# Patient Record
Sex: FEMALE | Race: WHITE | Employment: OTHER | URBAN - METROPOLITAN AREA
[De-identification: names, ages, dates, MRNs, and addresses within clinical notes are randomized per-mention and may not be internally consistent; named-entity substitution may affect disease eponyms.]

---

## 2017-06-05 ENCOUNTER — HOSPITAL ENCOUNTER (EMERGENCY)
Age: 77
Discharge: HOME | End: 2017-06-05
Attending: EMERGENCY MEDICINE
Payer: MEDICARE

## 2017-06-05 VITALS
TEMPERATURE: 98.6 F | RESPIRATION RATE: 17 BRPM | OXYGEN SATURATION: 99 % | SYSTOLIC BLOOD PRESSURE: 126 MMHG | HEIGHT: 65 IN | WEIGHT: 123 LBS | BODY MASS INDEX: 20.49 KG/M2 | DIASTOLIC BLOOD PRESSURE: 84 MMHG | HEART RATE: 70 BPM

## 2017-06-05 DIAGNOSIS — R07.89 ATYPICAL CHEST PAIN: Primary | ICD-10-CM

## 2017-06-05 LAB
ANION GAP BLD CALC-SCNC: 4 MMOL/L (ref 3–18)
ATRIAL RATE: 98 BPM
BASOPHILS # BLD AUTO: 0 K/UL (ref 0–0.06)
BASOPHILS # BLD: 0 % (ref 0–2)
BUN SERPL-MCNC: 14 MG/DL (ref 7–18)
BUN/CREAT SERPL: 19 (ref 12–20)
CALCIUM SERPL-MCNC: 8.6 MG/DL (ref 8.5–10.1)
CALCULATED R AXIS, ECG10: 62 DEGREES
CALCULATED T AXIS, ECG11: 25 DEGREES
CHLORIDE SERPL-SCNC: 105 MMOL/L (ref 100–108)
CO2 SERPL-SCNC: 32 MMOL/L (ref 21–32)
CREAT SERPL-MCNC: 0.72 MG/DL (ref 0.6–1.3)
DIAGNOSIS, 93000: NORMAL
DIFFERENTIAL METHOD BLD: ABNORMAL
EOSINOPHIL # BLD: 0.1 K/UL (ref 0–0.4)
EOSINOPHIL NFR BLD: 1 % (ref 0–5)
ERYTHROCYTE [DISTWIDTH] IN BLOOD BY AUTOMATED COUNT: 12.9 % (ref 11.6–14.5)
GLUCOSE SERPL-MCNC: 105 MG/DL (ref 74–99)
HCT VFR BLD AUTO: 36.8 % (ref 35–45)
HGB BLD-MCNC: 11.4 G/DL (ref 12–16)
LYMPHOCYTES # BLD AUTO: 18 % (ref 21–52)
LYMPHOCYTES # BLD: 1.3 K/UL (ref 0.9–3.6)
MCH RBC QN AUTO: 31.8 PG (ref 24–34)
MCHC RBC AUTO-ENTMCNC: 31 G/DL (ref 31–37)
MCV RBC AUTO: 102.5 FL (ref 74–97)
MONOCYTES # BLD: 0.7 K/UL (ref 0.05–1.2)
MONOCYTES NFR BLD AUTO: 10 % (ref 3–10)
NEUTS SEG # BLD: 4.9 K/UL (ref 1.8–8)
NEUTS SEG NFR BLD AUTO: 71 % (ref 40–73)
PLATELET # BLD AUTO: 232 K/UL (ref 135–420)
PMV BLD AUTO: 9.8 FL (ref 9.2–11.8)
POTASSIUM SERPL-SCNC: 4.2 MMOL/L (ref 3.5–5.5)
Q-T INTERVAL, ECG07: 408 MS
QRS DURATION, ECG06: 80 MS
QTC CALCULATION (BEZET), ECG08: 410 MS
RBC # BLD AUTO: 3.59 M/UL (ref 4.2–5.3)
SODIUM SERPL-SCNC: 141 MMOL/L (ref 136–145)
TROPONIN I SERPL-MCNC: 0.02 NG/ML (ref 0–0.06)
VENTRICULAR RATE, ECG03: 61 BPM
WBC # BLD AUTO: 6.9 K/UL (ref 4.6–13.2)

## 2017-06-05 PROCEDURE — 80048 BASIC METABOLIC PNL TOTAL CA: CPT | Performed by: EMERGENCY MEDICINE

## 2017-06-05 PROCEDURE — 85025 COMPLETE CBC W/AUTO DIFF WBC: CPT | Performed by: EMERGENCY MEDICINE

## 2017-06-05 PROCEDURE — 99284 EMERGENCY DEPT VISIT MOD MDM: CPT

## 2017-06-05 PROCEDURE — 84484 ASSAY OF TROPONIN QUANT: CPT | Performed by: EMERGENCY MEDICINE

## 2017-06-05 PROCEDURE — 93005 ELECTROCARDIOGRAM TRACING: CPT

## 2017-06-05 NOTE — ED TRIAGE NOTES
68 YOF here for atraumatic sharp chest pain since 2pm today, she says she has had this before, but that was with a gallbladder attack she says. She says she was told to come to the ED for an evaluation. She denies syncope.

## 2017-06-05 NOTE — ED PROVIDER NOTES
HPI Comments: 4:41 PM Esthela Kaur is a 68 y.o. female with a history of HTN, atrial fibrillation, COPD, asthma who presents to the emergency department c/o acute onset of midsternal chest pain at 2:00 pm today. Patient describes the pain as a \"sharp\" pain and states that the pain resolved on its own after about 12 minutes. She says she experienced similar symptoms in Oct 2016 and states that she had an emergency gall bladder sx. Pt denies and associated SOB, diaphoresis, or any current pain. Patient states that she believes her symtpoms may be due to stress. The patient reports no other symptoms and has no other concerns at this time. PCP: Aubrey Mena MD        The history is provided by the patient. 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      Social History Main Topics    Smoking status: Former Smoker     Quit date: 1/13/1981    Smokeless tobacco: Never Used    Alcohol use 0.0 oz/week      Comment: occasionally    Drug use: No    Sexual activity: Not Currently     Other Topics Concern    Not on file Social History Narrative         ALLERGIES: Codeine and Diltiazem    Review of Systems   Constitutional: Negative for diaphoresis and fever. HENT: Negative for ear pain, rhinorrhea and trouble swallowing. Eyes: Negative for visual disturbance. Respiratory: Negative for cough and shortness of breath. Cardiovascular: Positive for chest pain (acute, midsternal, \"sharp\"). Negative for leg swelling. Gastrointestinal: Negative for abdominal pain, blood in stool, diarrhea, nausea and vomiting. Genitourinary: Negative for difficulty urinating, flank pain and hematuria. Musculoskeletal: Negative for back pain and neck pain. Skin: Negative for rash. Neurological: Negative for dizziness, weakness, numbness and headaches. Hematological: Negative. Psychiatric/Behavioral: Negative. All other systems reviewed and are negative. Vitals:    06/05/17 1530 06/05/17 1645 06/05/17 1700 06/05/17 1715   BP: 144/66 130/60 132/69 126/84   Pulse:  62 63 70   Resp:  27 23 17   Temp:       SpO2:  99% 99% 99%   Weight:       Height:                Physical Exam   Constitutional: She is oriented to person, place, and time. She appears well-developed and well-nourished. No distress. HENT:   Head: Normocephalic and atraumatic. Right Ear: External ear normal.   Left Ear: External ear normal.   Nose: Nose normal.   Mouth/Throat: Oropharynx is clear and moist.   Eyes: Conjunctivae and EOM are normal. Pupils are equal, round, and reactive to light. No scleral icterus. Neck: Normal range of motion. Neck supple. No JVD present. No tracheal deviation present. No thyromegaly present. Cardiovascular: Normal rate, normal heart sounds and intact distal pulses. An irregular rhythm present. Exam reveals no gallop and no friction rub. No murmur heard. Pulmonary/Chest: Effort normal and breath sounds normal. She exhibits no tenderness. Abdominal: Soft. Bowel sounds are normal. She exhibits no distension.  There is no tenderness. There is no rebound and no guarding. Musculoskeletal: Normal range of motion. She exhibits no edema or tenderness. Lymphadenopathy:     She has no cervical adenopathy. Neurological: She is alert and oriented to person, place, and time. No cranial nerve deficit. Coordination normal.   No sensory loss, Gait normal, Motor 5/5   Skin: Skin is warm and dry. Psychiatric: She has a normal mood and affect. Her behavior is normal. Judgment and thought content normal.   Nursing note and vitals reviewed. MDM  Number of Diagnoses or Management Options  Atypical chest pain:   Diagnosis management comments: PT resting comfortably, no recurrence of sx, she agrees with dispo and F/U plan. Bravo Denton MD  5:33 PM         Amount and/or Complexity of Data Reviewed  Clinical lab tests: reviewed and ordered  Independent visualization of images, tracings, or specimens: yes      ED Course       Procedures  Vitals:  Patient Vitals for the past 12 hrs:   Temp Pulse Resp BP SpO2   06/05/17 1715 - 70 17 126/84 99 %   06/05/17 1700 - 63 23 132/69 99 %   06/05/17 1645 - 62 27 130/60 99 %   06/05/17 1530 - - - 144/66 -   06/05/17 1527 98.6 °F (37 °C) 66 18 - 100 %   100 %. Percentage is within normal limits.      Medications ordered:   Medications - No data to display      Lab findings:  Recent Results (from the past 12 hour(s))   EKG, 12 LEAD, INITIAL    Collection Time: 06/05/17  3:21 PM   Result Value Ref Range    Ventricular Rate 61 BPM    Atrial Rate 98 BPM    QRS Duration 80 ms    Q-T Interval 408 ms    QTC Calculation (Bezet) 410 ms    Calculated R Axis 62 degrees    Calculated T Axis 25 degrees    Diagnosis       Atrial fibrillation  Nonspecific ST and T wave abnormality , probably digitalis effect  Abnormal ECG  When compared with ECG of 23-DEC-2016 15:22,  No significant change was found  Confirmed by Betty Vidal MD, --- ([id number]) on 6/5/2017 1:65:93 PM     METABOLIC PANEL, BASIC    Collection Time: 06/05/17  4:58 PM   Result Value Ref Range    Sodium 141 136 - 145 mmol/L    Potassium 4.2 3.5 - 5.5 mmol/L    Chloride 105 100 - 108 mmol/L    CO2 32 21 - 32 mmol/L    Anion gap 4 3.0 - 18 mmol/L    Glucose 105 (H) 74 - 99 mg/dL    BUN 14 7.0 - 18 MG/DL    Creatinine 0.72 0.6 - 1.3 MG/DL    BUN/Creatinine ratio 19 12 - 20      GFR est AA >60 >60 ml/min/1.73m2    GFR est non-AA >60 >60 ml/min/1.73m2    Calcium 8.6 8.5 - 10.1 MG/DL   CBC WITH AUTOMATED DIFF    Collection Time: 06/05/17  4:58 PM   Result Value Ref Range    WBC 6.9 4.6 - 13.2 K/uL    RBC 3.59 (L) 4.20 - 5.30 M/uL    HGB 11.4 (L) 12.0 - 16.0 g/dL    HCT 36.8 35.0 - 45.0 %    .5 (H) 74.0 - 97.0 FL    MCH 31.8 24.0 - 34.0 PG    MCHC 31.0 31.0 - 37.0 g/dL    RDW 12.9 11.6 - 14.5 %    PLATELET 384 831 - 081 K/uL    MPV 9.8 9.2 - 11.8 FL    NEUTROPHILS 71 40 - 73 %    LYMPHOCYTES 18 (L) 21 - 52 %    MONOCYTES 10 3 - 10 %    EOSINOPHILS 1 0 - 5 %    BASOPHILS 0 0 - 2 %    ABS. NEUTROPHILS 4.9 1.8 - 8.0 K/UL    ABS. LYMPHOCYTES 1.3 0.9 - 3.6 K/UL    ABS. MONOCYTES 0.7 0.05 - 1.2 K/UL    ABS. EOSINOPHILS 0.1 0.0 - 0.4 K/UL    ABS. BASOPHILS 0.0 0.0 - 0.06 K/UL    DF AUTOMATED     TROPONIN I    Collection Time: 06/05/17  4:58 PM   Result Value Ref Range    Troponin-I, Qt. 0.02 0.00 - 0.06 NG/ML       EKG interpretation by ED Physician:  15:21 Atrial fibrillation with response of 61. No acute changes. Progress notes, Consult notes or additional Procedure notes:   5:30 PM I have reevaluated patient. Patient is feeling better. Discussed lab results with the patient. She agrees with plan for discharge and follow up with PCP. All concerns have been addressed. Disposition:  Diagnosis:   1.  Atypical chest pain        Disposition: Discharged    Follow-up Information     None           Patient's Medications   Start Taking    No medications on file   Continue Taking    ALBUTEROL (PROVENTIL HFA, VENTOLIN HFA, PROAIR HFA) 90 MCG/ACTUATION INHALER    Take 2 puffs by inhalation every four (4) hours as needed for Wheezing. BIOTIN PO    Take  by mouth. CALCIUM PO    Take  by mouth. CHOLECALCIFEROL, VITAMIN D3, (VITAMIN D3 PO)    Take  by mouth. CYANOCOBALAMIN (VITAMIN B12) 1,000 MCG/ML INJECTION    INJECT 1 ML IM Q 2 WEEKS    DIGOXIN (DIGOX) 0.125 MG TABLET    TAKE 1 TABLET BY MOUTH ONCE DAILY    FOLIC ACID 998 MCG TABLET    Take 400 mcg by mouth daily. HEMOCYTE-F 324 MG (106 MG IRON)-1 MG TAB        HYOSCYAMINE SL (LEVSIN/SL) 0.125 MG SL TABLET    0.125 mg by SubLINGual route every six (6) hours as needed for Cramping. IRON POLYSACCHARIDES COMPLEX (FERREX 150 PO)    Take  by mouth. LACTOBACILLUS ACIDOPHILUS (ACIDOPHILUS PO)    TAKE 1 TABLET PO DAILY    METOPROLOL SUCCINATE (TOPROL-XL) 25 MG XL TABLET    TAKE 1 TABLET BY MOUTH TWICE DAILY    MONTELUKAST (SINGULAIR) 10 MG TABLET    Take 10 mg by mouth daily. HI-XO-HD-FE-MIN-LYCOPEN-LUTEIN (CENTRUM) 0.4-162-18 MG TAB    Take  by mouth. ONDANSETRON (ZOFRAN ODT) 4 MG DISINTEGRATING TABLET    Take 1 Tab by mouth every eight (8) hours as needed for Nausea. PANTOPRAZOLE (PROTONIX) 40 MG TABLET    Take 40 mg by mouth daily. PRADAXA 150 MG CAPSULE    Take 1 Cap by mouth two (2) times a day. TIOTROPIUM (SPIRIVA WITH HANDIHALER) 18 MCG INHALATION CAPSULE    Take 1 Cap by inhalation daily. TIOTROPIUM BROMIDE (SPIRIVA RESPIMAT) 2.5 MCG/ACTUATION MIST    Take 2 Puffs by inhalation daily. TRAMADOL-ACETAMINOPHEN (ULTRACET) 37.5-325 MG PER TABLET    Take 1 Tab by mouth every four (4) hours as needed. Max Daily Amount: 6 Tabs.  Indications: PAIN   These Medications have changed    No medications on file   Stop Taking    No medications on file     Scribe Attestation:     Jennifer TAVERA, scribing for and in the presence of Stacey Ding MD June 05, 2017 at 5:31 PM     Signed by: Johanne Garcia, June 05, 2017, 4:44 PM    Physician Attestation:   I personally performed the services described in this documentation, reviewed and edited the documentation which was dictated to the scribe in my presence, and it accurately records my words and actions.  Anibal Galvez MD  June 05, 2017 at 5:31 PM

## 2017-06-05 NOTE — ED NOTES
Pema Durham is a 68 y.o. female that was discharged in stable condition. The patients diagnosis, condition and treatment were explained to  patient and aftercare instructions were given. The patient verbalized understanding. Patient armband removed and shredded.

## 2017-06-05 NOTE — DISCHARGE INSTRUCTIONS
Chest Pain: Care Instructions  Your Care Instructions  There are many things that can cause chest pain. Some are not serious and will get better on their own in a few days. But some kinds of chest pain need more testing and treatment. Your doctor may have recommended a follow-up visit in the next 8 to 12 hours. If you are not getting better, you may need more tests or treatment. Even though your doctor has released you, you still need to watch for any problems. The doctor carefully checked you, but sometimes problems can develop later. If you have new symptoms or if your symptoms do not get better, get medical care right away. If you have worse or different chest pain or pressure that lasts more than 5 minutes or you passed out (lost consciousness), call 911 or seek other emergency help right away. A medical visit is only one step in your treatment. Even if you feel better, you still need to do what your doctor recommends, such as going to all suggested follow-up appointments and taking medicines exactly as directed. This will help you recover and help prevent future problems. How can you care for yourself at home? · Rest until you feel better. · Take your medicine exactly as prescribed. Call your doctor if you think you are having a problem with your medicine. · Do not drive after taking a prescription pain medicine. When should you call for help? Call 911 if:  · You passed out (lost consciousness). · You have severe difficulty breathing. · You have symptoms of a heart attack. These may include:  ¨ Chest pain or pressure, or a strange feeling in your chest.  ¨ Sweating. ¨ Shortness of breath. ¨ Nausea or vomiting. ¨ Pain, pressure, or a strange feeling in your back, neck, jaw, or upper belly or in one or both shoulders or arms. ¨ Lightheadedness or sudden weakness. ¨ A fast or irregular heartbeat.   After you call 911, the  may tell you to chew 1 adult-strength or 2 to 4 low-dose aspirin. Wait for an ambulance. Do not try to drive yourself. Call your doctor today if:  · You have any trouble breathing. · Your chest pain gets worse. · You are dizzy or lightheaded, or you feel like you may faint. · You are not getting better as expected. · You are having new or different chest pain. Where can you learn more? Go to http://bety-norma.info/. Enter A120 in the search box to learn more about \"Chest Pain: Care Instructions. \"  Current as of: May 27, 2016  Content Version: 11.2  © 1129-1186 [employer]. Care instructions adapted under license by [employer] (which disclaims liability or warranty for this information). If you have questions about a medical condition or this instruction, always ask your healthcare professional. [address] any warranty or liability for your use of this information.

## 2017-08-14 ENCOUNTER — OFFICE VISIT (OUTPATIENT)
Dept: ORTHOPEDIC SURGERY | Age: 77
End: 2017-08-14

## 2017-08-14 VITALS
HEIGHT: 65 IN | BODY MASS INDEX: 21.16 KG/M2 | OXYGEN SATURATION: 96 % | DIASTOLIC BLOOD PRESSURE: 51 MMHG | WEIGHT: 127 LBS | TEMPERATURE: 97.2 F | RESPIRATION RATE: 16 BRPM | HEART RATE: 63 BPM | SYSTOLIC BLOOD PRESSURE: 102 MMHG

## 2017-08-14 DIAGNOSIS — M25.511 RIGHT SHOULDER PAIN, UNSPECIFIED CHRONICITY: ICD-10-CM

## 2017-08-14 DIAGNOSIS — M75.121 COMPLETE TEAR OF RIGHT ROTATOR CUFF: Primary | ICD-10-CM

## 2017-08-14 NOTE — PROGRESS NOTES
Umesh Anaya  1940   Chief Complaint   Patient presents with    Shoulder Pain     right        HISTORY OF PRESENT ILLNESS  Umesh Anaya is a 68 y.o. female who presents today for reevaluation of right shoulder. At last office visit in November 2016, I discussed possible rotator cuff repair. She rates her pain 4/10 today. She completed an MRI 11/8/16, showing a full thickness rotator cuff tear. She has increased pain with reaching and overhead motions. She notes she has difficulty sleeping and with some daily activities due to the pain. Patient denies any fever, chills, chest pain, shortness of breath or calf pain. There are no new illness or injuries to report since last seen in the office. There are no changes to medications, allergies, family or social history. PHYSICAL EXAM:   Visit Vitals    /51    Pulse 63    Temp 97.2 °F (36.2 °C) (Oral)    Resp 16    Ht 5' 5\" (1.651 m)    Wt 127 lb (57.6 kg)    SpO2 96%    BMI 21.13 kg/m2     The patient is a well-developed, well-nourished female   in no acute distress. The patient is alert and oriented times three. The patient is alert and oriented times three. Mood and affect are normal.  LYMPHATIC: lymph nodes are not enlarged and are within normal limits  SKIN: normal in color and non tender to palpation. There are no bruises or abrasions noted. NEUROLOGICAL: Motor sensory exam is within normal limits. Reflexes are equal bilaterally.  There is normal sensation to pinprick and light touch  MUSCULOSKELETAL:  Examination Right shoulder   Skin Intact   AC joint tenderness -   Biceps tenderness -   Forward flexion/Elevation    Active abduction    Glenohumeral abduction 90   External rotation ROM 90   Internal rotation ROM 70   Apprehension -   Ernestos Relocation -   Jerk -   Load and Shift -   Obriens -   Speeds -   Impingement sign +   Supraspinatus/Empty Can +, 4/5   External Rotation Strength -, 5/5   Lift Off/Belly Press -, 5/5   Neurovascular Intact     IMAGING:   XR of the right shoulder dated 8/14/17 was reviewed and read: type 3 acromion, sclerotic changes in the greater tuberosity. MRI of the right shoulder dated 11/8/16 was reviewed and read   IMPRESSION: Limited study due to motion artifacts. Full-thickness tear of  supraspinatus tendon. Infraspinatus tendinosis. Subacromial bursitis. Suspect  anterior labral tear. AC joint hypertrophy/inflammation present.        IMPRESSION:      ICD-10-CM ICD-9-CM    1. Complete tear of right rotator cuff M75.121 727.61    2. Right shoulder pain, unspecified chronicity M25.511 719.41 AMB POC XRAY, SHOULDER; COMPLETE, 2+        PLAN:   1. Patient's right shoulder condition worsening. I discussed the risks and benefits and potential adverse outcomes of both operative vs non operative treatment of right shoulder rotator cuff tear with the patient. Patient wishes to proceed with right arthroscopic rotator cuff repair. Risks of operative intervention include but not limited to bleeding, infection, deep vein thrombosis, pulmonary embolism, death, limb length discrepancy, reflexive sympathetic dystrophy, fat embolism syndrome,damage to blood vessels and nerves, malunion, non-union, delayed union, failure of hardware, post traumatic arthritis, stroke, heart attack, and death. Patient understands that infection may arise and may require numerous surgeries. History and physical exam scheduled for a later date. Risk factors include: hypertension  2. No cortisone injection indicated today   3. No Physical/Occupational Therapy indicated today  4. No diagnostic test indicated today  5. No durable medical equipment indicated today  6. No referral indicated today   7. No medications indicated today  8. No Narcotic indicated today.     RTC H&P  Follow-up Disposition: Not on File    Scribed by Chun Armijo [facility] [address]) as dictated by Rebekah De Dios MD    IDr. Luis Alfredo Job, confirm that all documentation is accurate.     Luis Alfredo Bar M.D.   [place] [facility]

## 2017-08-14 NOTE — PATIENT INSTRUCTIONS
Joint Pain: Care Instructions  Your Care Instructions  Many people have small aches and pains from overuse or injury to muscles and joints. Joint injuries often happen during sports or recreation, work tasks, or projects around the home. An overuse injury can happen when you put too much stress on a joint or when you do an activity that stresses the joint over and over, such as using the computer or rowing a boat. You can take action at home to help your muscles and joints get better. You should feel better in 1 to 2 weeks, but it can take 3 months or more to heal completely. Follow-up care is a key part of your treatment and safety. Be sure to make and go to all appointments, and call your doctor if you are having problems. It's also a good idea to know your test results and keep a list of the medicines you take. How can you care for yourself at home? · Do not put weight on the injured joint for at least a day or two. · For the first day or two after an injury, do not take hot showers or baths, and do not use hot packs. The heat could make swelling worse. · Put ice or a cold pack on the sore joint for 10 to 20 minutes at a time. Try to do this every 1 to 2 hours for the next 3 days (when you are awake) or until the swelling goes down. Put a thin cloth between the ice and your skin. · Wrap the injury in an elastic bandage. Do not wrap it too tightly because this can cause more swelling. · Prop up the sore joint on a pillow when you ice it or anytime you sit or lie down during the next 3 days. Try to keep it above the level of your heart. This will help reduce swelling. · Take an over-the-counter pain medicine, such as acetaminophen (Tylenol), ibuprofen (Advil, Motrin), or naproxen (Aleve). Read and follow all instructions on the label. · After 1 or 2 days of rest, begin moving the joint gently.  While the joint is still healing, you can begin to exercise using activities that do not strain or hurt the painful joint. When should you call for help? Call your doctor now or seek immediate medical care if:  · You have signs of infection, such as:  ¨ Increased pain, swelling, warmth, and redness. ¨ Red streaks leading from the joint. ¨ A fever. Watch closely for changes in your health, and be sure to contact your doctor if:  · Your movement or symptoms are not getting better after 1 to 2 weeks of home treatment. Where can you learn more? Go to http://bety-norma.info/. Enter P205 in the search box to learn more about \"Joint Pain: Care Instructions. \"  Current as of: March 21, 2017  Content Version: 11.3  © 1396-3700 [employer]. Care instructions adapted under license by [employer] (which disclaims liability or warranty for this information). If you have questions about a medical condition or this instruction, always ask your healthcare professional. [address] any warranty or liability for your use of this information.

## 2018-03-05 ENCOUNTER — OFFICE VISIT (OUTPATIENT)
Dept: ORTHOPEDIC SURGERY | Age: 78
End: 2018-03-05

## 2018-03-05 VITALS
HEART RATE: 60 BPM | BODY MASS INDEX: 21.16 KG/M2 | DIASTOLIC BLOOD PRESSURE: 56 MMHG | WEIGHT: 127 LBS | OXYGEN SATURATION: 96 % | HEIGHT: 65 IN | SYSTOLIC BLOOD PRESSURE: 129 MMHG | TEMPERATURE: 95.3 F

## 2018-03-05 DIAGNOSIS — M75.121 COMPLETE TEAR OF RIGHT ROTATOR CUFF: Primary | ICD-10-CM

## 2018-03-05 NOTE — PROGRESS NOTES
Leo Kulkarni  1940   Chief Complaint   Patient presents with    Shoulder Pain     right        HISTORY OF PRESENT ILLNESS  Leo Kulkarni is a 68 y.o. female who presents today for reevaluation of right shoulder. She reports continued pain when she moves her shoulder, especially with abduction and forward flexion. At her last office visit she was scheduled for arthroscopic repair but canceled due to concerns about not having a caregiver for the postsurgical period. PHYSICAL EXAM:   Visit Vitals    /56    Pulse 60    Temp 95.3 °F (35.2 °C) (Oral)    Ht 5' 5\" (1.651 m)    Wt 127 lb (57.6 kg)    SpO2 96%    BMI 21.13 kg/m2     The patient is a well-developed, well-nourished female   in no acute distress. The patient is alert and oriented times three. The patient is alert and oriented times three. Mood and affect are normal.  LYMPHATIC: lymph nodes are not enlarged and are within normal limits  SKIN: normal in color and non tender to palpation. There are no bruises or abrasions noted. NEUROLOGICAL: Motor sensory exam is within normal limits. Reflexes are equal bilaterally. There is normal sensation to pinprick and light touch  MUSCULOSKELETAL:  Examination Right shoulder   Skin Intact   AC joint tenderness -   Biceps tenderness -   Forward flexion/Elevation    Active abduction    Glenohumeral abduction 90   External rotation ROM 90   Internal rotation ROM 70   Apprehension -   Ernestos Relocation -   Jerk -   Load and Shift -   Obriens -   Speeds -   Impingement sign +   Supraspinatus/Empty Can +, 4/5   External Rotation Strength -, 5/5   Lift Off/Belly Press -, 5/5   Neurovascular Intact     IMAGING:   XR of the right shoulder dated 8/14/17 was reviewed and read: type 3 acromion, sclerotic changes in the greater tuberosity. MRI of the right shoulder dated 11/8/16 was reviewed and read   IMPRESSION: Limited study due to motion artifacts.  Full-thickness tear of  supraspinatus tendon. Infraspinatus tendinosis. Subacromial bursitis. Suspect  anterior labral tear. AC joint hypertrophy/inflammation present.          IMPRESSION:      ICD-10-CM ICD-9-CM    1. Complete tear of right rotator cuff M75.121 727.61 MRI SHOULDER RT WO CONT        PLAN:   1. Patient's right shoulder condition worsening. She had to cancel her previously scheduled surgery due to logistical concerns. Given as her MRI is now over a year old, recommended we obtain a new one to evaluate the [address] joint as well as the rotator cuff as she may eventually need a shoulder replacement versus arthroscopic repair. Patient understands that infection may arise and may require numerous surgeries. History and physical exam scheduled for a later date. Risk factors include: hypertension  2. No cortisone injection indicated today   3. No Physical/Occupational Therapy indicated today  4. Yes diagnostic test indicated today: R shoulder MRI  5. No durable medical equipment indicated today  6. No referral indicated today   7. No medications indicated today  8. No Narcotic indicated today. RTC H&P  Follow-up Disposition: Not on File    Scribed by Darci Saleh [address]) as dictated by Kay Soriano MD    I, Dr. Kay Soriano, confirm that all documentation is accurate.     Kay Soriano M.D.   [facility]

## 2018-03-07 ENCOUNTER — HOSPITAL ENCOUNTER (OUTPATIENT)
Age: 78
Discharge: HOME | End: 2018-03-07
Attending: ORTHOPAEDIC SURGERY
Payer: MEDICARE

## 2018-03-07 DIAGNOSIS — M75.121 COMPLETE TEAR OF RIGHT ROTATOR CUFF: ICD-10-CM

## 2018-03-07 PROCEDURE — 73221 MRI JOINT UPR EXTREM W/O DYE: CPT

## 2018-03-13 ENCOUNTER — OFFICE VISIT (OUTPATIENT)
Dept: ORTHOPEDIC SURGERY | Age: 78
End: 2018-03-13

## 2018-03-13 VITALS
HEART RATE: 70 BPM | BODY MASS INDEX: 20.99 KG/M2 | WEIGHT: 126 LBS | DIASTOLIC BLOOD PRESSURE: 57 MMHG | SYSTOLIC BLOOD PRESSURE: 111 MMHG | HEIGHT: 65 IN | RESPIRATION RATE: 16 BRPM | OXYGEN SATURATION: 98 % | TEMPERATURE: 98.5 F

## 2018-03-13 DIAGNOSIS — M75.121 COMPLETE TEAR OF RIGHT ROTATOR CUFF: Primary | ICD-10-CM

## 2018-03-13 RX ORDER — TRIAMCINOLONE ACETONIDE 40 MG/ML
40 INJECTION, SUSPENSION INTRA-ARTICULAR; INTRAMUSCULAR ONCE
Qty: 1 ML | Refills: 0
Start: 2018-03-13 | End: 2018-03-13

## 2018-03-13 NOTE — MR AVS SNAPSHOT
[facility] [address], [address] 706 [facility] 
[phone] Patient: Janice Shrestha MRN:  [id number] Visit Information Date & Time Provider Department Dept. Phone Encounter #  
 3/13/2018  9:40 AM Jose Acevedo MD [facility] [phone] Upcoming Health Maintenance Date Due DTaP/Tdap/Td series (1 - Tdap) 12/8/1961 ZOSTER VACCINE AGE 60> 10/8/2000 GLAUCOMA SCREENING Q2Y 12/8/2005 Bone Densitometry (Dexa) Screening 12/8/2005 Pneumococcal 65+ High/Highest Risk (1 of 2 - PCV13) 12/8/2005 MEDICARE YEARLY EXAM 12/8/2005 Influenza Age 5 to Adult 8/1/2017 Allergies as of 3/13/2018  Review Complete On: 3/13/2018 By: Jose Acevedo MD  
  
 Severity Noted Reaction Type Reactions Codeine    Nausea Only Diltiazem    Other (comments)  
 red raised rash Current Immunizations  Reviewed on 10/22/2012 Name Date Influenza Vaccine Whole 10/15/2012 Not reviewed this visit You Were Diagnosed With   
  
 Codes Comments Complete tear of right rotator cuff    -  Primary ICD-10-CM: G50.274 ICD-9-CM: 727.61 Vitals BP Pulse Temp Resp Height(growth percentile) Weight(growth percentile) 111/57 70 98.5 °F (36.9 °C) 16 5' 5\" (1.651 m) 126 lb (57.2 kg) SpO2 BMI OB Status Smoking Status 98% 20.97 kg/m2 Postmenopausal Former Smoker BMI and BSA Data Body Mass Index Body Surface Area  
 20.97 kg/m 2 1.62 m 2 Preferred Pharmacy Pharmacy Name Phone [facility] [address] 52 [facility] 18 [phone] Your Updated Medication List  
  
   
This list is accurate as of 3/13/18 10:27 AM.  Always use your most recent med list.  
  
  
  
  
 ACIDOPHILUS PO  
TAKE 1 TABLET PO DAILY  
  
 albuterol 90 mcg/actuation inhaler Commonly known as:  PROVENTIL HFA, VENTOLIN HFA, PROAIR HFA Take 2 puffs by inhalation every four (4) hours as needed for Wheezing. BIOTIN PO Take  by mouth. CALCIUM PO Take  by mouth. CENTRUM 0.4-162-18 mg Tab Generic drug:  UR-LI-WP-Fe-Min-Lycopen-Lutein Take  by mouth. CRANBERRY EXTRACT PO Take  by mouth.  
  
 cyanocobalamin 1,000 mcg/mL injection Commonly known as:  VITAMIN B12 INJECT 1 ML IM Q 2 WEEKS  
  
 digoxin 0.125 mg tablet Commonly known as:  [address] [place] TAKE 1 TABLET BY MOUTH ONCE DAILY FERREX 150 PO Take  by mouth. folic acid 294 mcg tablet Take 400 mcg by mouth daily. HEMOCYTE-F 324 mg (106 mg iron)-1 mg Tab Generic drug:  ferrous fumarate-folic acid  
  
 hyoscyamine SL 0.125 mg SL tablet Commonly known as:  LEVSIN/SL  
0.125 mg by SubLINGual route every six (6) hours as needed for Cramping.  
  
 metoprolol succinate 25 mg XL tablet Commonly known as:  TOPROL-XL  
TAKE 1 TABLET BY MOUTH TWICE DAILY  
  
 ondansetron 4 mg disintegrating tablet Commonly known as:  ZOFRAN ODT Take 1 Tab by mouth every eight (8) hours as needed for Nausea. PRADAXA 150 mg capsule Generic drug:  dabigatran etexilate Take 1 Cap by mouth two (2) times a day. PROTONIX 40 mg tablet Generic drug:  pantoprazole Take 40 mg by mouth daily. SINGULAIR 10 mg tablet Generic drug:  montelukast  
Take 10 mg by mouth daily. * tiotropium bromide 2.5 mcg/actuation inhaler Commonly known as:  Paola Flowers Take 2 Puffs by inhalation daily. * tiotropium 18 mcg inhalation capsule Commonly known as:  [facility] [email or web address] Take 1 Cap by inhalation daily. traMADol-acetaminophen 37.5-325 mg per tablet Commonly known as:  ULTRACET Take 1 Tab by mouth every four (4) hours as needed. Max Daily Amount: 6 Tabs. Indications: PAIN  
  
 triamcinolone acetonide 40 mg/mL injection Commonly known as:  KENALOG 1 mL by IntraMUSCular route once for 1 dose. VITAMIN D3 PO Take  by mouth. * Notice: This list has 2 medication(s) that are the same as other medications prescribed for you. Read the directions carefully, and ask your doctor or other care provider to review them with you. We Performed the Following DRAIN/INJECT LARGE JOINT/BURSA [id number] CPT(R)] TRIAMCINOLONE ACETONIDE INJ [ [facility]] Introducing [facility] SERVICES! [facility] introduces IMVU patient portal. Now you can access parts of your medical record, email your doctor's office, and request medication refills online. 1. In your internet browser, go to [email or web address] 2. Click on the First Time User? Click Here link in the Sign In box. You will see the New Member Sign Up page. 3. Enter your IMVU Access Code exactly as it appears below. You will not need to use this code after youve completed the sign-up process. If you do not sign up before the expiration date, you must request a new code. · IMVU Access Code: [email or web address] Expires: 6/3/2018  3:52 PM 
 
4. Enter the last four digits of your Social Security Number (xxxx) and Date of Birth (mm/dd/yyyy) as indicated and click Submit. You will be taken to the next sign-up page. 5. Create a IMVU ID. This will be your IMVU login ID and cannot be changed, so think of one that is secure and easy to remember. 6. Create a IMVU password. You can change your password at any time. 7. Enter your Password Reset Question and Answer. This can be used at a later time if you forget your password. 8. Enter your e-mail address. You will receive e-mail notification when new information is available in [address] 9. Click Sign Up. You can now view and download portions of your medical record. 10. Click the Download Summary menu link to download a portable copy of your medical information. If you have questions, please visit the Frequently Asked Questions section of the [employer] website. Remember, [employer] is NOT to be used for urgent needs. For medical emergencies, dial 911. Now available from your iPhone and Android! Please provide this summary of care documentation to your next provider. Your primary care clinician is listed as Neha Ritchie. If you have any questions after today's visit, please call [phone].

## 2018-03-13 NOTE — PROGRESS NOTES
Anita Renee  1940   Chief Complaint   Patient presents with    Follow-up     rt shoulder         HISTORY OF PRESENT ILLNESS  Anita Renee is a 68 y.o. female who presents today for reevaluation of right shoulder and to review MRI results. Patient rates pain as 2/10 today. She has pain with certain movements but states that overall it does not affect her ADLs. She has pain at night, when laying down. PHYSICAL EXAM:   Visit Vitals    /57    Pulse 70    Temp 98.5 °F (36.9 °C)    Resp 16    Ht 5' 5\" (1.651 m)    Wt 126 lb (57.2 kg)    SpO2 98%    BMI 20.97 kg/m2     The patient is a well-developed, well-nourished female   in no acute distress. The patient is alert and oriented times three. The patient is alert and oriented times three. Mood and affect are normal.  LYMPHATIC: lymph nodes are not enlarged and are within normal limits  SKIN: normal in color and non tender to palpation. There are no bruises or abrasions noted. NEUROLOGICAL: Motor sensory exam is within normal limits. Reflexes are equal bilaterally. There is normal sensation to pinprick and light touch  MUSCULOSKELETAL:  Examination Right shoulder   Skin Intact   AC joint tenderness -   Biceps tenderness -   Forward flexion/Elevation    Active abduction    Glenohumeral abduction 90   External rotation ROM 90   Internal rotation ROM 70   Apprehension -   Ernestos Relocation -   Jerk -   Load and Shift -   Obriens -   Speeds -   Impingement sign +   Supraspinatus/Empty Can +, 4/5   External Rotation Strength -, 5/5   Lift Off/Belly Press -, 5/5   Neurovascular Intact     PROCEDURE: After sterile prep, 6 cc of Xylocaine and 1 cc of Kenalog were injected into the right shoulder.        [facility]  OFFICE PROCEDURE PROGRESS NOTE        Chart reviewed for the following:  Rin Elizabeth MD, have reviewed the History, Physical and updated the Allergic reactions for [address] [facility] performed immediately prior to start of procedure:  Kamilah Hernandez MD, have performed the following reviews on [facility] prior to the start of the procedure:            * Patient was identified by name and date of birth   * Agreement on procedure being performed was verified  * Risks and Benefits explained to the patient  * Procedure site verified and marked as necessary  * Patient was positioned for comfort  * Consent was signed and verified     Time: 10:12 AM    Date of procedure: 3/13/2018    Procedure performed by:  Bishop Thor MD    Provider assisted by: (see medication administration)    How tolerated by patient: tolerated the procedure well with no complications    Comments: none      IMAGING: MRI of the right shoulder dated 3/7/18 was reviewed and read:   IMPRESSION:  1. No change in a full-thickness or virtually full-thickness tear of supraspinatus within its attachment. No retraction or disproportionate muscle atrophy. 2. Infraspinatus tendinosis as before. 3. Degenerative signal within the labrum, but no tear. 4. 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 XR of the right shoulder dated 8/14/17 was reviewed and read: type 3 acromion, sclerotic changes in the greater tuberosity. MRI of the right shoulder dated 11/8/16 was reviewed and read   IMPRESSION: Limited study due to motion artifacts. Full-thickness tear of  supraspinatus tendon. Infraspinatus tendinosis. Subacromial bursitis. Suspect  anterior labral tear. AC joint hypertrophy/inflammation present.          IMPRESSION:      ICD-10-CM ICD-9-CM    1. Complete tear of right rotator cuff M75.121 727.61 TRIAMCINOLONE ACETONIDE INJ      triamcinolone acetonide (KENALOG) 40 mg/mL injection      DRAIN/INJECT LARGE JOINT/BURSA        PLAN:   1. I discussed the results of the MRI and the treatment options with the patient.  Emphasized to the patient that due to her age, repairing the tear has a higher failure rate. She does not wish to proceed with surgery at this time, especially due to the recovery. Risk factors include: hypertension  2. Yes cortisone injection indicated today R SHOULDER  3. No Physical/Occupational Therapy indicated today  4. No diagnostic test indicated today:   5. No durable medical equipment indicated today  6. No referral indicated today   7. No medications indicated today  8. No Narcotic indicated today. RTC 4 weeks   Follow-up Disposition: Not on File    Scribed by Lakeisha Valdez [address] [place] [address] [address]) as dictated by Yoli Rodas MD    I, Dr. Yoli Rodas, confirm that all documentation is accurate.     Yoli Rodas M.D.   [facility]

## 2018-04-11 ENCOUNTER — OFFICE VISIT (OUTPATIENT)
Dept: ORTHOPEDIC SURGERY | Age: 78
End: 2018-04-11

## 2018-04-11 VITALS
WEIGHT: 128 LBS | OXYGEN SATURATION: 96 % | TEMPERATURE: 97.8 F | HEART RATE: 85 BPM | DIASTOLIC BLOOD PRESSURE: 59 MMHG | SYSTOLIC BLOOD PRESSURE: 94 MMHG | HEIGHT: 65 IN | BODY MASS INDEX: 21.33 KG/M2

## 2018-04-11 DIAGNOSIS — M75.121 COMPLETE TEAR OF RIGHT ROTATOR CUFF: Primary | ICD-10-CM

## 2018-04-11 NOTE — PROGRESS NOTES
Janet Hendrix  1940   Chief Complaint   Patient presents with    Shoulder Pain     right shoulder pain        HISTORY OF PRESENT ILLNESS  Janet Hendrix is a 68 y.o. female who presents today for reevaluation of right shoulder pain. Patient rates pain as 2/10 today. At last OV, patient had a cortisone injection which provided good relief. A few days after the injection, she was eating cashews and noticed an itchy, red, rash from her forearm to her hand on the right side. She is here today to determine whether the rash could be correlated to the injection. She reports having less shoulder pain following the injection. She is able to reach her hand to her head and has improved ROM. PHYSICAL EXAM:   Visit Vitals    BP 94/59    Pulse 85    Temp 97.8 °F (36.6 °C)    Ht 5' 5\" (1.651 m)    Wt 128 lb (58.1 kg)    SpO2 96%    BMI 21.3 kg/m2     The patient is a well-developed, well-nourished female   in no acute distress. The patient is alert and oriented times three. The patient is alert and oriented times three. Mood and affect are normal.  LYMPHATIC: lymph nodes are not enlarged and are within normal limits  SKIN: normal in color and non tender to palpation. There are no bruises or abrasions noted. NEUROLOGICAL: Motor sensory exam is within normal limits. Reflexes are equal bilaterally.  There is normal sensation to pinprick and light touch  MUSCULOSKELETAL:  Examination Right shoulder   Skin Intact   AC joint tenderness -   Biceps tenderness -   Forward flexion/Elevation    Active abduction    Glenohumeral abduction 90   External rotation ROM 90   Internal rotation ROM 70   Apprehension -   Ernestos Relocation -   Jerk -   Load and Shift -   Obriens -   Speeds -   Impingement sign +   Supraspinatus/Empty Can +, 4/5   External Rotation Strength -, 5/5   Lift Off/Belly Press -, 5/5   Neurovascular Intact       IMAGING: MRI of the right shoulder dated 3/7/18 was reviewed and read:   IMPRESSION:  1. No change in a full-thickness or virtually full-thickness tear of supraspinatus within its attachment. No retraction or disproportionate muscle atrophy. 2. Infraspinatus tendinosis as before. 3. Degenerative signal within the labrum, but no tear. 4. 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 XR of the right shoulder dated 8/14/17 was reviewed and read: type 3 acromion, sclerotic changes in the greater tuberosity. MRI of the right shoulder dated 11/8/16 was reviewed and read   IMPRESSION: Limited study due to motion artifacts. Full-thickness tear of  supraspinatus tendon. Infraspinatus tendinosis. Subacromial bursitis. Suspect  anterior labral tear. AC joint hypertrophy/inflammation present.       IMPRESSION:      ICD-10-CM ICD-9-CM    1. Complete tear of right rotator cuff M75.121 727.61         PLAN:   1. I am pleased that the patient is having good relief from the cortisone injection. I cannot correlate the rash from the cashews with the injection. Risk factors include: hypertension  2. No cortisone injection indicated today   3. No Physical/Occupational Therapy indicated today  4. No diagnostic test indicated today:   5. No durable medical equipment indicated today  6. No referral indicated today   7. No medications indicated today  8. No Narcotic indicated today. RTC prn  Follow-up Disposition: Not on File    Scribed by Kenneth Delgado [facility]) as dictated by Luis Alfredo Bar MD    I, Dr. Luis Alfredo Bar, confirm that all documentation is accurate.     Luis Alfredo Bar M.D.   [facility]

## 2018-06-14 ENCOUNTER — OFFICE VISIT (OUTPATIENT)
Dept: ORTHOPEDIC SURGERY | Age: 78
End: 2018-06-14

## 2018-06-14 VITALS
TEMPERATURE: 97.3 F | BODY MASS INDEX: 21.33 KG/M2 | RESPIRATION RATE: 14 BRPM | WEIGHT: 128 LBS | OXYGEN SATURATION: 94 % | HEIGHT: 65 IN | HEART RATE: 62 BPM | DIASTOLIC BLOOD PRESSURE: 59 MMHG | SYSTOLIC BLOOD PRESSURE: 97 MMHG

## 2018-06-14 DIAGNOSIS — M75.121 COMPLETE TEAR OF RIGHT ROTATOR CUFF: Primary | ICD-10-CM

## 2018-06-14 RX ORDER — TRIAMCINOLONE ACETONIDE 40 MG/ML
40 INJECTION, SUSPENSION INTRA-ARTICULAR; INTRAMUSCULAR ONCE
Qty: 1 ML | Refills: 0
Start: 2018-06-14 | End: 2018-06-14

## 2018-06-14 NOTE — PROGRESS NOTES
Steffi Arriaza  1940   Chief Complaint   Patient presents with    Shoulder Pain     Right        HISTORY OF PRESENT ILLNESS  Steffi Arriaza is a 68 y.o. female who presents today for reevaluation of right shoulder pain. Patient rates pain as 8/10 today. At last OV, patient had a cortisone injection which provided good relief. She is requesting another injection today. The pain has been gradually returning. It is worse with certain movements. PHYSICAL EXAM:   Visit Vitals    BP 97/59 (BP 1 Location: Left arm, BP Patient Position: Sitting)    Pulse 62    Temp 97.3 °F (36.3 °C) (Oral)    Resp 14    Ht 5' 5\" (1.651 m)    Wt 128 lb (58.1 kg)    SpO2 94%    BMI 21.3 kg/m2     The patient is a well-developed, well-nourished female   in no acute distress. The patient is alert and oriented times three. The patient is alert and oriented times three. Mood and affect are normal.  LYMPHATIC: lymph nodes are not enlarged and are within normal limits  SKIN: normal in color and non tender to palpation. There are no bruises or abrasions noted. NEUROLOGICAL: Motor sensory exam is within normal limits. Reflexes are equal bilaterally. There is normal sensation to pinprick and light touch  MUSCULOSKELETAL:  Examination Right shoulder   Skin Intact   AC joint tenderness -   Biceps tenderness -   Forward flexion/Elevation    Active abduction    Glenohumeral abduction 90   External rotation ROM 90   Internal rotation ROM 70   Apprehension -   Ernestos Relocation -   Jerk -   Load and Shift -   Obriens -   Speeds -   Impingement sign +   Supraspinatus/Empty Can +, 4/5   External Rotation Strength -, 5/5   Lift Off/Belly Press -, 5/5   Neurovascular Intact       PROCEDURE: Right shoulder Injection with Ultrasound Guidance      After sterile prep, 6 cc of Xylocaine and 1 cc of Kenalog were injected into the right shoulder.   Ultrasound images captured using Betzy machine and scanned into patient's chart. [facility]  OFFICE PROCEDURE PROGRESS NOTE        Chart reviewed for the following:  Manda Carter M.D, have reviewed the History, Physical and updated the Allergic reactions for [facility] performed immediately prior to start of procedure:  Manda Carter M.D, have performed the following reviews on [facility] prior to the start of the procedure:            * Patient was identified by name and date of birth   * Agreement on procedure being performed was verified  * Risks and Benefits explained to the patient  * Procedure site verified and marked as necessary  * Patient was positioned for comfort  * Consent was signed and verified     Time: 11:35 AM     Date of procedure: 6/14/2018    Procedure performed by:  Rachel Tejeda M.D    Provider assisted by: (see medication administration)    How tolerated by patient: tolerated the procedure well with no complications    Comments: none      IMAGING: MRI of the right shoulder dated 3/7/18 was reviewed and read:   IMPRESSION:  1. No change in a full-thickness or virtually full-thickness tear of supraspinatus within its attachment. No retraction or disproportionate muscle atrophy. 2. Infraspinatus tendinosis as before. 3. Degenerative signal within the labrum, but no tear. 4. 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 XR of the right shoulder dated 8/14/17 was reviewed and read: type 3 acromion, sclerotic changes in the greater tuberosity. MRI of the right shoulder dated 11/8/16 was reviewed and read   IMPRESSION: Limited study due to motion artifacts. Full-thickness tear of  supraspinatus tendon. Infraspinatus tendinosis. Subacromial bursitis. Suspect  anterior labral tear. AC joint hypertrophy/inflammation present.       IMPRESSION:      ICD-10-CM ICD-9-CM    1.  Complete tear of right rotator cuff M75.121 727.61 TRIAMCINOLONE ACETONIDE INJ      triamcinolone acetonide (KENALOG) 40 mg/mL injection      US GUIDE INJ/ASP/ARTHRO LG JNT/BURSA        PLAN:   1. Patient's right shoulder pain has been gradually returning. Risk factors include: hypertension  2. Yes cortisone injection indicated today R SHOULDER, US  3. No Physical/Occupational Therapy indicated today  4. No diagnostic test indicated today:   5. No durable medical equipment indicated today  6. No referral indicated today   7. No medications indicated today  8. No Narcotic indicated today. RTC prn  Follow-up Disposition: Not on File    Scribed by Fabiola Crane [address]) as dictated by Ascencion Clay MD    I, Dr. Ascencion Clay, confirm that all documentation is accurate.     Ascencion Clay M.D.   [facility]

## 2018-08-14 ENCOUNTER — HOSPITAL ENCOUNTER (OUTPATIENT)
Dept: VASCULAR SURGERY | Age: 78
Discharge: HOME | End: 2018-08-14
Attending: FAMILY MEDICINE
Payer: MEDICARE

## 2018-08-14 DIAGNOSIS — M79.605 LEFT LEG PAIN: ICD-10-CM

## 2018-08-14 PROCEDURE — 93971 EXTREMITY STUDY: CPT

## 2018-09-11 ENCOUNTER — OFFICE VISIT (OUTPATIENT)
Dept: ORTHOPEDIC SURGERY | Age: 78
End: 2018-09-11

## 2018-09-11 VITALS
BODY MASS INDEX: 21.29 KG/M2 | DIASTOLIC BLOOD PRESSURE: 61 MMHG | HEIGHT: 65 IN | RESPIRATION RATE: 16 BRPM | OXYGEN SATURATION: 98 % | WEIGHT: 127.8 LBS | SYSTOLIC BLOOD PRESSURE: 132 MMHG | TEMPERATURE: 97.5 F | HEART RATE: 61 BPM

## 2018-09-11 DIAGNOSIS — M75.121 COMPLETE TEAR OF RIGHT ROTATOR CUFF: Primary | ICD-10-CM

## 2018-09-11 RX ORDER — TRIAMCINOLONE ACETONIDE 40 MG/ML
40 INJECTION, SUSPENSION INTRA-ARTICULAR; INTRAMUSCULAR ONCE
Qty: 1 ML | Refills: 0
Start: 2018-09-11 | End: 2018-09-11

## 2018-09-11 NOTE — PROGRESS NOTES
Ange Juarez 
1940 Chief Complaint Patient presents with  Shoulder Pain RIGHT SHOULDER PAIN, F/U APPT. HISTORY OF PRESENT ILLNESS Ange Juarez is a 68 y.o. female who presents today for reevaluation of right shoulder pain. Patient rates pain as 6/10 today. At last OV, patient had a cortisone injection which provided good relief, about 2 months of relief. She is requesting another injection today. The pain has been gradually returning. It is worse with certain movements. Patient denies any fever, chills, chest pain, shortness of breath or calf pain. The remainder of the review of systems is negative. There are no new illness or injuries to report since last seen in the office. There are no changes to medications, allergies, family or social history. PHYSICAL EXAM:  
Visit Vitals  /61  Pulse 61  Temp 97.5 °F (36.4 °C) (Oral)  Resp 16  
 Ht 5' 5\" (1.651 m)  Wt 127 lb 12.8 oz (58 kg)  SpO2 98%  BMI 21.27 kg/m2 The patient is a well-developed, well-nourished female   in no acute distress. The patient is alert and oriented times three. The patient is alert and oriented times three. Mood and affect are normal. 
LYMPHATIC: lymph nodes are not enlarged and are within normal limits SKIN: normal in color and non tender to palpation. There are no bruises or abrasions noted. NEUROLOGICAL: Motor sensory exam is within normal limits. Reflexes are equal bilaterally. There is normal sensation to pinprick and light touch MUSCULOSKELETAL: 
Examination Right shoulder Skin Intact AC joint tenderness - Biceps tenderness - Forward flexion/Elevation  Active abduction  Glenohumeral abduction 90 External rotation ROM 90 Internal rotation ROM 70 Apprehension -  
Ernestos Relocation -  
Jerk - Load and Shift -  
Louana Bolds - Speeds - Impingement sign + Supraspinatus/Empty Can +, 4/5  
 External Rotation Strength -, 5/5 Lift Off/Belly Press -, 5/5 Neurovascular Intact PROCEDURE: Right Shoulder Injection with Ultrasound Guidance After sterile prep, 6 cc of Xylocaine and 1 cc of Kenalog were injected into the right shoulder. Ultrasound images captured using [address] [place] [address] Ultrasound machine and scanned into patient's chart. [address] VIEWOFFICE PROCEDURE PROGRESS NOTE Chart reviewed for the following: 
Britni Dias M.D, have reviewed the History, Physical and updated the Allergic reactions for [facility] TIME OUT performed immediately prior to start of procedure: 
I, Mady Julian M.D, have performed the following reviews on Lopez Zamora prior to the start of the procedure: 
         
* Patient was identified by name and date of birth * Agreement on procedure being performed was verified * Risks and Benefits explained to the patient * Procedure site verified and marked as necessary * Patient was positioned for comfort * Consent was signed and verified Time: 10:01 AM  
 
Date of procedure: 9/11/2018 Procedure performed by:  Mady Julian M.D Provider assisted by: (see medication administration) How tolerated by patient: tolerated the procedure well with no complications Comments: none IMAGING: MRI of the right shoulder dated 3/7/18 was reviewed and read:  
IMPRESSION: 
1. No change in a full-thickness or virtually full-thickness tear of supraspinatus within its attachment. No retraction or disproportionate muscle atrophy. 2. Infraspinatus tendinosis as before. 3. Degenerative signal within the labrum, but no tear. 4. 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 XR of the right shoulder dated 8/14/17 was reviewed and read: type 3 acromion, sclerotic changes in the greater tuberosity. MRI of the right shoulder dated 11/8/16 was reviewed and read IMPRESSION: Limited study due to motion artifacts. Full-thickness tear of 
supraspinatus tendon. Infraspinatus tendinosis. Subacromial bursitis. Suspect 
anterior labral tear. AC joint hypertrophy/inflammation present. 
  
 
IMPRESSION:   
  ICD-10-CM ICD-9-CM 1. Complete tear of right rotator cuff M75.121 727.61 TRIAMCINOLONE ACETONIDE INJ  
   triamcinolone acetonide (KENALOG) 40 mg/mL injection US GUIDE INJ/ASP/ARTHRO LG JNT/BURSA PLAN:  
1. Patient's right shoulder pain has been gradually returning and I am hopeful an injection will help. Discussed surgery options at length today. Risk factors include: hypertension 2. Yes cortisone injection indicated today R SHOULDER, US 
3. No Physical/Occupational Therapy indicated today 4. No diagnostic test indicated today: 5. No durable medical equipment indicated today 6. No referral indicated today 7. No medications indicated today 8. No Narcotic indicated today. RTC prn Follow-up Disposition: Not on File Scribed by Jaylan Graham ([facility]) as dictated by Khris Armendariz MD 
 
I, Dr. Khris Armendariz, confirm that all documentation is accurate.  
 
Khris Armendariz M.D.  
[facility]

## 2018-12-04 ENCOUNTER — OFFICE VISIT (OUTPATIENT)
Dept: ORTHOPEDIC SURGERY | Age: 78
End: 2018-12-04

## 2018-12-04 VITALS
SYSTOLIC BLOOD PRESSURE: 116 MMHG | DIASTOLIC BLOOD PRESSURE: 75 MMHG | HEART RATE: 94 BPM | BODY MASS INDEX: 21.92 KG/M2 | TEMPERATURE: 97.9 F | WEIGHT: 131.6 LBS | OXYGEN SATURATION: 71 % | HEIGHT: 65 IN | RESPIRATION RATE: 16 BRPM

## 2018-12-04 DIAGNOSIS — M75.121 COMPLETE TEAR OF RIGHT ROTATOR CUFF: Primary | ICD-10-CM

## 2018-12-04 RX ORDER — TRIAMCINOLONE ACETONIDE 40 MG/ML
40 INJECTION, SUSPENSION INTRA-ARTICULAR; INTRAMUSCULAR ONCE
Qty: 1 ML | Refills: 0
Start: 2018-12-04 | End: 2018-12-04

## 2018-12-04 NOTE — PROGRESS NOTES
Cydney Lozano 
1940 Chief Complaint Patient presents with  Shoulder Pain  
  right shoulder follow up HISTORY OF PRESENT ILLNESS Cydney Lozano is a 68 y.o. female who presents today for reevaluation of right shoulder pain. Patient rates pain as 2/10 today. At last OV, patient had a cortisone injection which provided good relief. She is requesting an injection today because she is leaving for [facility] next week. The pain has been gradually returning. It is worse with certain movements. Patient denies any fever, chills, chest pain, shortness of breath or calf pain. The remainder of the review of systems is negative. There are no new illness or injuries to report since last seen in the office. There are no changes to medications, allergies, family or social history. PHYSICAL EXAM:  
Visit Vitals /75 Pulse 94 Temp 97.9 °F (36.6 °C) Resp 16 Ht 5' 5\" (1.651 m) Wt 131 lb 9.6 oz (59.7 kg) SpO2 (!) 71% BMI 21.90 kg/m² The patient is a well-developed, well-nourished female   in no acute distress. The patient is alert and oriented times three. The patient is alert and oriented times three. Mood and affect are normal. 
LYMPHATIC: lymph nodes are not enlarged and are within normal limits SKIN: normal in color and non tender to palpation. There are no bruises or abrasions noted. NEUROLOGICAL: Motor sensory exam is within normal limits. Reflexes are equal bilaterally. There is normal sensation to pinprick and light touch MUSCULOSKELETAL: 
Examination Right shoulder Skin Intact AC joint tenderness - Biceps tenderness - Forward flexion/Elevation  Active abduction  Glenohumeral abduction 90 External rotation ROM 90 Internal rotation ROM 70 Apprehension -  
Ernestos Relocation -  
Jerk - Load and Shift -  
Verprakasha Hung - Speeds - Impingement sign + Supraspinatus/Empty Can +, 4/5 External Rotation Strength -, 5/5  
 Lift Off/Belly Press -, 5/5 Neurovascular Intact PROCEDURE: Right Shoulder Injection with Ultrasound Guidance Indication:Right Shoulder pain/swelling After sterile prep, 6 cc of Xylocaine and 1 cc of Kenalog were injected into the right shoulder. Ultrasound images captured using [address] Ultrasound machine and scanned into patient's chart. [facility] PROCEDURE PROGRESS NOTE Chart reviewed for the following: 
Phillip Nguyen M.D, have reviewed the History, Physical and updated the Allergic reactions for [employer] TIME OUT performed immediately prior to start of procedure: 
IIndia M.D, have performed the following reviews on [employer] prior to the start of the procedure: 
         
* Patient was identified by name and date of birth * Agreement on procedure being performed was verified * Risks and Benefits explained to the patient * Procedure site verified and marked as necessary * Patient was positioned for comfort * Consent was signed and verified Time: 11:52 AM  
 
Date of procedure: 12/4/2018 Procedure performed by:  India Conley M.D Provider assisted by: (see medication administration) How tolerated by patient: tolerated the procedure well with no complications Comments: none IMAGING: MRI of the right shoulder dated 3/7/18 was reviewed and read:  
IMPRESSION: 
1. No change in a full-thickness or virtually full-thickness tear of supraspinatus within its attachment. No retraction or disproportionate muscle atrophy. 2. Infraspinatus tendinosis as before. 3. Degenerative signal within the labrum, but no tear. 4. 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 XR of the right shoulder dated 8/14/17 was reviewed and read: type 3 acromion, sclerotic changes in the greater tuberosity. MRI of the right shoulder dated 11/8/16 was reviewed and read IMPRESSION: Limited study due to motion artifacts. Full-thickness tear of 
supraspinatus tendon. Infraspinatus tendinosis. Subacromial bursitis. Suspect 
anterior labral tear. AC joint hypertrophy/inflammation present. 
  
 
IMPRESSION:   
  ICD-10-CM ICD-9-CM 1. Complete tear of right rotator cuff M75.121 727.61 TRIAMCINOLONE ACETONIDE INJ  
   triamcinolone acetonide (KENALOG) 40 mg/mL injection US GUIDE INJ/ASP/ARTHRO LG JNT/BURSA PLAN:  
1. Patient's right shoulder pain has been gradually returning and I am hopeful an injection will help. Discussed surgery options at length today. Risk factors include: hypertension 2. Yes cortisone injection indicated today R SHOULDER, US 
3. No Physical/Occupational Therapy indicated today 4. No diagnostic test indicated today: 5. No durable medical equipment indicated today 6. No referral indicated today 7. No medications indicated today 8. No Narcotic indicated today. RTC prn Follow-up Disposition: Not on File Scribed by Veronica Harding ([address]) as dictated by India Conley MD 
 
I, Dr. Idnia Conley, confirm that all documentation is accurate.  
 
India Conley M.D.  
[facility]

## 2018-12-04 NOTE — PATIENT INSTRUCTIONS
Aftercare Instructions following your Cortisone Injection: 
 
Your cortisone injection today included both a pain reliever and a steroid. You can expect the pain to begin to improve in the next 30-45 mins secondary to the pain reliever. The steroid medicine itself generally takes up to 48 hours to being to decrease the inflammation. We recommend to avoid strenuous activities on the day you get the injection. The following day you may gradually increase your activities as tolerated. NOTHING should cause an increase in pain or swelling Rarely the following side effects can occur: 1. Flushing or redness - this is a normal reaction and will subside within a day. Benedryl and tylenol can help decrease the symptoms 2. Acute increase in pain - this is also something normal that can occur day of the injection. If the pain occures, you can put ice or a cold pack on the painful area for 10 to 20 minutes at a time. Put a thin cloth between the ice and your skin. 3. If you are a diabetic a cortisone injection can cause your glucose (sugar) to rise. Please monitor your sugar closely for 2 days following the injection. If your sugar is elevated beyond your normal please contact your PCP immediately. We recommend following back up with Orthopedics in 3 weeks following your injection unless your provider instructed differently.

## 2019-03-11 ENCOUNTER — OFFICE VISIT (OUTPATIENT)
Dept: ORTHOPEDIC SURGERY | Age: 79
End: 2019-03-11

## 2019-03-11 VITALS
TEMPERATURE: 96.8 F | HEIGHT: 65 IN | BODY MASS INDEX: 22.09 KG/M2 | WEIGHT: 132.6 LBS | HEART RATE: 67 BPM | RESPIRATION RATE: 16 BRPM | SYSTOLIC BLOOD PRESSURE: 128 MMHG | DIASTOLIC BLOOD PRESSURE: 84 MMHG | OXYGEN SATURATION: 97 %

## 2019-03-11 DIAGNOSIS — M75.121 COMPLETE TEAR OF RIGHT ROTATOR CUFF: Primary | ICD-10-CM

## 2019-03-11 RX ORDER — TRIAMCINOLONE ACETONIDE 40 MG/ML
40 INJECTION, SUSPENSION INTRA-ARTICULAR; INTRAMUSCULAR ONCE
Qty: 1 ML | Refills: 0
Start: 2019-03-11 | End: 2019-03-11

## 2019-03-12 ENCOUNTER — HOSPITAL ENCOUNTER (OUTPATIENT)
Dept: VASCULAR SURGERY | Age: 79
Discharge: HOME | End: 2019-03-12
Attending: FAMILY MEDICINE
Payer: MEDICARE

## 2019-03-12 DIAGNOSIS — R60.0 BILATERAL EDEMA OF LOWER EXTREMITY: ICD-10-CM

## 2019-03-12 PROCEDURE — 93970 EXTREMITY STUDY: CPT

## 2019-04-02 ENCOUNTER — OFFICE VISIT (OUTPATIENT)
Dept: VASCULAR SURGERY | Age: 79
End: 2019-04-02

## 2019-04-02 VITALS
BODY MASS INDEX: 21.99 KG/M2 | RESPIRATION RATE: 12 BRPM | HEIGHT: 65 IN | SYSTOLIC BLOOD PRESSURE: 118 MMHG | WEIGHT: 132 LBS | HEART RATE: 66 BPM | DIASTOLIC BLOOD PRESSURE: 70 MMHG

## 2019-04-02 DIAGNOSIS — M79.605 LEFT LEG PAIN: ICD-10-CM

## 2019-04-02 DIAGNOSIS — R60.0 BILATERAL EDEMA OF LOWER EXTREMITY: Primary | ICD-10-CM

## 2019-04-02 NOTE — PROGRESS NOTES
Shaun Ivan Chief Complaint Patient presents with  New Patient  Swelling History and Physical   
49-year-old female here today for evaluation of leg swelling. She has had this on again off again leg swelling affected both legs especially the right for about the past 5-6 months. She has had 2 studies that were negative for DVT. She has no history of renal failure, she has history of tobacco use and COPD not a current smoker. She is nondiabetic treated for hypertension. 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 Healthy-appearing 78 Past Medical History:  
Diagnosis Date  Aorta aneurysm   
 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   
 nos  DVT   
 12/10  R Subclavian (PICC associated)  Dyslipidemia  Hypertension  Hypertension  Papillary adenocarcinoma   
 gastric  Pernicious anemia Patient Active Problem List  
Diagnosis Code  Hypertension I11.9  Dyslipidemia E78.5  Atrial fibrillation  I48.91  
 Malignant neoplasm of stomach (HCC) C16.9  Right shoulder pain M25.511  
 Postlaminectomy syndrome M96.1  Aorta aneurysm I71.9  Chronic cholecystitis with calculus K80.10 Past Surgical History:  
Procedure Laterality Date  GASTROJEJUNOSTOMY    
 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  
 GASTROJEJUNOSTOMY    
 12/10 Lia en Y (after Bilroth II)  HX HERNIA REPAIR Current Outpatient Medications Medication Sig Dispense Refill  CRANBERRY FRUIT EXTRACT (CRANBERRY EXTRACT PO) Take  by mouth.  metoprolol succinate (TOPROL-XL) 25 mg XL tablet TAKE 1 TABLET BY MOUTH TWICE DAILY 180 Tab 3  PRADAXA 150 mg capsule Take 1 Cap by mouth two (2) times a day. 180 Cap 3  cyanocobalamin (VITAMIN B12) 1,000 mcg/mL injection INJECT 1 ML IM Q 2 WEEKS  1  LACTOBACILLUS ACIDOPHILUS (ACIDOPHILUS PO) TAKE 1 TABLET PO DAILY  3  
 HEMOCYTE-F 324 mg (106 mg iron)-1 mg tab   3  
 digoxin (DIGOX) 0.125 mg tablet TAKE 1 TABLET BY MOUTH ONCE DAILY 90 Tab 3  
 tiotropium (SPIRIVA WITH HANDIHALER) 18 mcg inhalation capsule Take 1 Cap by inhalation daily. 20 Cap 0  
 tiotropium bromide (SPIRIVA RESPIMAT) 2.5 mcg/actuation mist Take 2 Puffs by inhalation daily. 2 Inhaler 0  
 hyoscyamine SL (LEVSIN/SL) 0.125 mg SL tablet 0.125 mg by SubLINGual route every six (6) hours as needed for Cramping.  IA-AM-HS-Fe-Min-Lycopen-Lutein (CENTRUM) 0.4-162-18 mg tab Take  by mouth.  CHOLECALCIFEROL, VITAMIN D3, (VITAMIN D3 PO) Take  by mouth.  IRON POLYSACCHARIDES COMPLEX (FERREX 150 PO) Take  by mouth.  pantoprazole (PROTONIX) 40 mg tablet Take 40 mg by mouth daily.  BIOTIN PO Take  by mouth.  CALCIUM PO Take  by mouth.  montelukast (SINGULAIR) 10 mg tablet Take 10 mg by mouth daily.  folic acid 945 mcg tablet Take 400 mcg by mouth daily. Allergies Allergen Reactions  Codeine Nausea Only Patient states not allergic  Diltiazem Other (comments) Patient states not allergic Social History Socioeconomic History  Marital status:  Spouse name: Not on file  Number of children: Not on file  Years of education: Not on file  Highest education level: Not on file Occupational History  Not on file Social Needs  Financial resource strain: Not on file  Food insecurity:  
  Worry: Not on file Inability: Not on file  Transportation needs:  
  Medical: Not on file Non-medical: Not on file Tobacco Use  Smoking status: Former Smoker Last attempt to quit: 1981 Years since quittin.2  Smokeless tobacco: Never Used Substance and Sexual Activity  Alcohol use: Yes Alcohol/week: 0.0 oz  
  Comment: occasionally  Drug use: No  
 Sexual activity: Not Currently Lifestyle  Physical activity:  
  Days per week: Not on file Minutes per session: Not on file  Stress: Not on file Relationships  Social connections:  
  Talks on phone: Not on file Gets together: Not on file Attends [employer] service: Not on file Active member of club or organization: Not on file Attends meetings of clubs or organizations: Not on file Relationship status: Not on file  Intimate partner violence:  
  Fear of current or ex partner: Not on file Emotionally abused: Not on file Physically abused: Not on file Forced sexual activity: Not on file Other Topics Concern  Not on file Social History Narrative  Not on file Family History Problem Relation Age of Onset  Heart Attack Father  Breast Cancer Sister  Breast Cancer Sister  Breast Cancer Other Grandmother - nos Review of Systems No history of seizures stroke, nondiabetic, positive for hypertension, no constipation or diarrhea, DVT related to PICC line, COPD, currently non-smoker with history of tobacco use, no history of renal failure, leg pain but tells me she walks \"just fine\" no anxiety or depression Physical Exam:   
Visit Vitals /70 (BP 1 Location: Left arm, BP Patient Position: Sitting) Pulse 66 Resp 12 Ht 5' 5\" (1.651 m) Wt 132 lb (59.9 kg) BMI 21.97 kg/m² Good spirits Head is normocephalic Neck no JVD Chest clear Cardiac regular Abdomen soft flat nontender Lower extremities I do not feel her pulses but are present on Doppler Skin is intact and warm Cat scratch ulcer right lower extremity looks like it is healing nicely No significant swelling today Reviewed her ultrasounds are negative for DVT Impression and Plan: ICD-10-CM ICD-9-CM 1. Bilateral edema of lower extremity R60.0 782.3 DUPLEX LOWER EXT VENOUS BILAT 2.  Left leg pain M79.605 729.5 LOWER EXT ART PVR MULT LEVEL SEG PRESSURES  
 
 we will check an arterial PVL with nonpalpable pulses and some leg pain We will check a reflux study to better evaluate for her bilateral leg swelling and follow-up with results No orders of the defined types were placed in this encounter. Follow-up and Dispositions · Return in about 3 weeks (around 4/23/2019). Zaid Scott MD 
 
PLEASE NOTE: 
This document has been produced using voice recognition software. Unrecognized errors in transcription may be present.

## 2019-04-02 NOTE — LETTER
4/2/19 Patient: Daya Cantu YOB: 1940 Date of Visit: 4/2/2019 Kathryn Friedman MD 
[address] [place] [address] [address] [address] VIA Facsimile: [phone] Dear Kathryn Friedman MD, Thank you for referring Ms. Daya Cantu to [facility] for evaluation. My notes for this consultation are attached. If you have questions, please do not hesitate to call me. I look forward to following your patient along with you. Sincerely, Jayden Lazo MD

## 2019-04-09 ENCOUNTER — HOSPITAL ENCOUNTER (OUTPATIENT)
Dept: CT IMAGING | Age: 79
Discharge: HOME | End: 2019-04-09
Attending: INTERNAL MEDICINE
Payer: MEDICARE

## 2019-04-09 DIAGNOSIS — I71.20 THORACIC AORTIC ANEURYSM WITHOUT RUPTURE: ICD-10-CM

## 2019-04-09 DIAGNOSIS — R07.9 CHEST PAIN, UNSPECIFIED: ICD-10-CM

## 2019-04-09 PROCEDURE — 74175 CTA ABDOMEN W/CONTRAST: CPT

## 2019-04-09 PROCEDURE — 74011636320 HC RX REV CODE- 636/320

## 2019-04-09 PROCEDURE — 82565 ASSAY OF CREATININE: CPT

## 2019-04-09 RX ADMIN — IOPAMIDOL 100 ML: 755 INJECTION, SOLUTION INTRAVENOUS at 14:48

## 2019-04-10 LAB — CREAT UR-MCNC: 0.7 MG/DL (ref 0.6–1.3)

## 2019-04-23 ENCOUNTER — OFFICE VISIT (OUTPATIENT)
Dept: VASCULAR SURGERY | Age: 79
End: 2019-04-23

## 2019-04-23 VITALS
HEIGHT: 65 IN | BODY MASS INDEX: 21.99 KG/M2 | WEIGHT: 132 LBS | RESPIRATION RATE: 16 BRPM | HEART RATE: 66 BPM | SYSTOLIC BLOOD PRESSURE: 136 MMHG | DIASTOLIC BLOOD PRESSURE: 70 MMHG

## 2019-04-23 DIAGNOSIS — R60.0 BILATERAL EDEMA OF LOWER EXTREMITY: Primary | ICD-10-CM

## 2019-04-23 NOTE — PROGRESS NOTES
Ms. Gita Kaufman is a 66-year-old female following up today after some lower extremity Doppler testing. She has this bilateral leg swelling. She tells me is persistent day and night worsens throughout the day. Her venous Doppler shows normal sized veins with no evidence of DVT and no evidence of venous reflux deep or superficial. 
Her arterial Doppler shows multiphasic waveforms throughout with no evidence of arterial insufficiency. She has this mild lower extremity swelling with no relationship observable to veins or arteries. Talked her about interstitial edema and fluid retention. Offered her a prescription for light compression stockings. She says likely she will not wear these. She can follow-up as needed.

## 2019-04-23 NOTE — PROGRESS NOTES
1. Have you been to the ER, urgent care clinic since your last visit? Hospitalized since your last visit? No 
 
2. Have you seen or consulted any other health care providers outside of the [address] [place] [address] since your last visit? Include any pap smears or colon screening.  No

## 2019-05-10 ENCOUNTER — HOSPITAL ENCOUNTER (OUTPATIENT)
Dept: BONE DENSITY | Age: 79
Discharge: HOME | End: 2019-05-10
Attending: FAMILY MEDICINE
Payer: MEDICARE

## 2019-05-10 DIAGNOSIS — M81.0 OSTEOPOROSIS: ICD-10-CM

## 2019-05-10 PROCEDURE — 77080 DXA BONE DENSITY AXIAL: CPT

## 2019-06-05 ENCOUNTER — OFFICE VISIT (OUTPATIENT)
Dept: ORTHOPEDIC SURGERY | Age: 79
End: 2019-06-05

## 2019-06-05 VITALS
HEART RATE: 67 BPM | HEIGHT: 65 IN | BODY MASS INDEX: 21.49 KG/M2 | TEMPERATURE: 96.8 F | DIASTOLIC BLOOD PRESSURE: 60 MMHG | OXYGEN SATURATION: 95 % | SYSTOLIC BLOOD PRESSURE: 135 MMHG | WEIGHT: 129 LBS | RESPIRATION RATE: 16 BRPM

## 2019-06-05 DIAGNOSIS — M75.121 COMPLETE TEAR OF RIGHT ROTATOR CUFF, UNSPECIFIED WHETHER TRAUMATIC: Primary | ICD-10-CM

## 2019-06-05 DIAGNOSIS — M54.2 NECK PAIN: ICD-10-CM

## 2019-06-05 RX ORDER — BACLOFEN 10 MG/1
TABLET ORAL 3 TIMES DAILY
COMMUNITY
End: 2019-07-19

## 2019-06-05 NOTE — PROGRESS NOTES
1. Have you been to the ER, urgent care clinic since your last visit? Hospitalized since your last visit? NO    2. Have you seen or consulted any other health care providers outside of the [address] [place] [address] since your last visit? Include any pap smears or colon screening.  NO

## 2019-06-05 NOTE — PROGRESS NOTES
Chas Estrada  1940   Chief Complaint   Patient presents with    Shoulder Pain     right shoulder pain, f/u appt. HISTORY OF PRESENT ILLNESS  Chas Estrada is a 66 y.o. female who presents today for reevaluation of right shoulder pain. Patient rates pain as 10/10 today. At last OV, the patient had cortisone injection which provided minimal relief. It is worse with certain movements. Patient denies any fever, chills, chest pain, shortness of breath or calf pain. The remainder of the review of systems is negative. There are no new illness or injuries to report since last seen in the office. There are no changes to medications, allergies, family or social history. Pain Assessment  6/5/2019   Location of Pain Shoulder   Location Modifiers Right   Severity of Pain 10   Quality of Pain Sharp;Burning   Duration of Pain A few minutes   Duration of Pain Comment -   Frequency of Pain Intermittent   Aggravating Factors Other (Comment); Bending;Stretching   Aggravating Factors Comment worse at night/reaching/lifting    Limiting Behavior No   Relieving Factors Nothing   Relieving Factors Comment -   Result of Injury No     PHYSICAL EXAM:   Visit Vitals  /60 (BP 1 Location: Left arm, BP Patient Position: Sitting)   Pulse 67   Temp 96.8 °F (36 °C) (Oral)   Resp 16   Ht 5' 5\" (1.651 m)   Wt 129 lb (58.5 kg)   LMP  (LMP Unknown)   SpO2 95%   BMI 21.47 kg/m²     The patient is a well-developed, well-nourished female   in no acute distress. The patient is alert and oriented times three. The patient is alert and oriented times three. Mood and affect are normal.  LYMPHATIC: lymph nodes are not enlarged and are within normal limits  SKIN: normal in color and non tender to palpation. There are no bruises or abrasions noted. NEUROLOGICAL: Motor sensory exam is within normal limits. Reflexes are equal bilaterally.  There is normal sensation to pinprick and light touch  MUSCULOSKELETAL:  Examination Right shoulder   Skin Intact   AC joint tenderness -   Biceps tenderness -   Forward flexion/Elevation    Active abduction    Glenohumeral abduction 90   External rotation ROM 90   Internal rotation ROM 70   Apprehension -   Ernestos Relocation -   Jerk -   Load and Shift -   Obriens -   Speeds -   Impingement sign +   Supraspinatus/Empty Can +, 4/5   External Rotation Strength -, 5/5   Lift Off/Belly Press -, 5/5   Neurovascular Intact     IMAGING: MRI of the right shoulder dated 3/7/18 was reviewed and read:   IMPRESSION:  1. No change in a full-thickness or virtually full-thickness tear of supraspinatus within its attachment. No retraction or disproportionate muscle atrophy. 2. Infraspinatus tendinosis as before. 3. Degenerative signal within the labrum, but no tear. 4. 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 XR of the right shoulder dated 8/14/17 was reviewed and read: type 3 acromion, sclerotic changes in the greater tuberosity. MRI of the right shoulder dated 11/8/16 was reviewed and read   IMPRESSION: Limited study due to motion artifacts. Full-thickness tear of  supraspinatus tendon. Infraspinatus tendinosis. Subacromial bursitis. Suspect  anterior labral tear. AC joint hypertrophy/inflammation present.       IMPRESSION:      ICD-10-CM ICD-9-CM    1. Complete tear of right rotator cuff, unspecified whether traumatic M75.121 727.61 CT SHOULDER RT W CONT      XR INJ SHOULDER RT PRE CT/MRI ARTHRO   2. Neck pain M54.2 723.1 REFERRAL TO SPINE SURGERY        PLAN:   1. The patient presents today with right shoulder pain and I would like to get a CT to assess the rotator cuff. Discussed need for possible shoulder replacement today. Risk factors include: hypertension  2. No cortisone injection indicated today  3. No Physical/Occupational Therapy indicated today  4. Yes diagnostic test indicated today: CT R SHOULDER  5.  No durable medical equipment indicated today  6. Yes referral indicated today Chasidy  7. No medications indicated today  8. No Narcotic indicated today. RTC following CT. Scribed by Kaylie Ibrahim ([address] [place] [address] [address]) as dictated by Kierra Bell MD    I, Dr. Kierra Bell, confirm that all documentation is accurate.     Kierra Bell M.D.   [facility]

## 2019-06-10 ENCOUNTER — OFFICE VISIT (OUTPATIENT)
Dept: ORTHOPEDIC SURGERY | Age: 79
End: 2019-06-10

## 2019-06-10 VITALS
OXYGEN SATURATION: 95 % | RESPIRATION RATE: 24 BRPM | TEMPERATURE: 97.3 F | DIASTOLIC BLOOD PRESSURE: 69 MMHG | SYSTOLIC BLOOD PRESSURE: 102 MMHG | BODY MASS INDEX: 21.33 KG/M2 | HEART RATE: 57 BPM | HEIGHT: 65 IN | WEIGHT: 128 LBS

## 2019-06-10 DIAGNOSIS — M25.511 RIGHT SHOULDER PAIN, UNSPECIFIED CHRONICITY: Primary | ICD-10-CM

## 2019-06-10 DIAGNOSIS — R26.89 LOSS OF BALANCE: ICD-10-CM

## 2019-06-10 DIAGNOSIS — M96.1 POST LAMINECTOMY SYNDROME: ICD-10-CM

## 2019-06-10 RX ORDER — METHYLPREDNISOLONE 4 MG/1
TABLET ORAL
Qty: 1 DOSE PACK | Refills: 0 | Status: SHIPPED | OUTPATIENT
Start: 2019-06-10 | End: 2019-07-19

## 2019-06-10 RX ORDER — GABAPENTIN 300 MG/1
CAPSULE ORAL
Refills: 1 | COMMUNITY
Start: 2019-06-07

## 2019-06-10 NOTE — LETTER
6/10/19 Patient: Will Wren YOB: 1940 Date of Visit: 6/10/2019 Kathryn Friedman MD 
[address] [address] VIA Facsimile: [phone] Consuelo Patel MD 
[place] [address] [address] [address] [place] [id number] VIA In Basket Dear MD Consuelo Pearce MD, Thank you for referring Ms. West Pimentel to [address] [place] [address] for evaluation. My notes for this consultation are attached. If you have questions, please do not hesitate to call me. I look forward to following your patient along with you. Sincerely, Linda Green MD

## 2019-06-10 NOTE — PROGRESS NOTES
[facility]  [address], [place]   Phone: [phone]  Fax: [phone]        PROGRESS NOTE      HISTORY OF PRESENT ILLNESS:  The patient is a 66 y.o. female and was seen today for follow up of right shoulder pain. Pt initially described low back and RUE pain. Pt is a poor historian. She had cervical spine surgery in 2006 by Dr. Dolores Duarte. She denies any injury or trauma. She reports a history of A-fib, which is controlled. She reports right shoulder injection provided good relief x1 month. She has failed LYRICA, NEURONTIN, TOPAMAX, and CYMBALTA in the past. X-ray of the cervical spine from 6/2/16 was reviewed. Per report, AP, lateral and open-mouth views of the cervical spine performed. The C7 level is partially obscured by the patient's shoulders on the lateral view. Again noted are changes of C4 through C7 anterior and interbody fusion with fixation plate and screws at the C4 and C7 levels, unchanged. The fusion again appears solid. A prominent anterior osteophyte is noted projecting from the inferior aspect of C3. There is straightening of the normal lordotic curve. There may be facet joint sclerosis mid cervical spine. No acute fracture or dislocation. No prevertebral soft tissue swelling. Right shoulder MRI dated 11/8/16 pre report revealed limited study due to motion artifacts. Full-thickness tear of supraspinatus tendon. Infraspinatus tendinosis. Subacromial bursitis. Suspect anterior labral tear. AC joint hypertrophy/inflammation present. At her last clinical appointment, once again, she was referred to ortho for further evaluation of right shoulder. The patient returns today for increase in neck pain since early May 2019. She rates her pain 5/10, previously 4/10. Last seen by me 11/10/16. She was scheduled to f/u prn. Pt has been treated with muscle relaxants. She reports LOB x a couple weeks, denies falls or dropping things.  Cervical spine XR dated 19 not available for my review. Per report, solid-appearing C4-C7 fusion. No acute findings. Degenerative changes, most pronounced at the left C3-4 facet. Note from Dr. Sasha Pang dated 16 indicating patient was seen with c/o right shoulder pain. Dx with full rotator cuff tear and discussed surgery at that time. Note from Dr. Sasha Pang dated 19 indicating patient was seen with c/o right shoulder pain. Of note, pt had cortisone injection at her last appointment without relief. Set her up for a CT scan and referred her back to me at that time.  reviewed. Body mass index is 21.3 kg/m². PCP: Luis Enrique Ramirez MD      Past Medical History:   Diagnosis Date    Aorta aneurysm     ascending 4.0 cm (CTA 5/15)    Asthma     Atrial fibrillation     CHADS score 1 (-CHF, +HTN, -AGE, -DM, -CVA)    COPD     Depression     nos    DVT     12/10  R Subclavian (PICC associated)    Dyslipidemia     Hypertension     Hypertension     Papillary adenocarcinoma     gastric    Pernicious anemia         Social History     Socioeconomic History    Marital status:      Spouse name: Not on file    Number of children: Not on file    Years of education: Not on file    Highest education level: Not on file   Occupational History    Not on file   Social Needs    Financial resource strain: Not on file    Food insecurity:     Worry: Not on file     Inability: Not on file    Transportation needs:     Medical: Not on file     Non-medical: Not on file   Tobacco Use    Smoking status: Former Smoker     Last attempt to quit: 1981     Years since quittin.4    Smokeless tobacco: Never Used   Substance and Sexual Activity    Alcohol use:  Yes     Alcohol/week: 0.0 oz     Comment: occasionally    Drug use: No    Sexual activity: Not Currently   Lifestyle    Physical activity:     Days per week: Not on file     Minutes per session: Not on file    Stress: Not on file   Relationships    Social connections:     Talks on phone: Not on file     Gets together: Not on file     Attends Orthodoxy service: Not on file     Active member of club or organization: Not on file     Attends meetings of clubs or organizations: Not on file     Relationship status: Not on file    Intimate partner violence:     Fear of current or ex partner: Not on file     Emotionally abused: Not on file     Physically abused: Not on file     Forced sexual activity: Not on file   Other Topics Concern    Not on file   Social History Narrative    Not on file       Current Outpatient Medications   Medication Sig Dispense Refill    baclofen (LIORESAL) 10 mg tablet Take  by mouth three (3) times daily.  CRANBERRY FRUIT EXTRACT (CRANBERRY EXTRACT PO) Take  by mouth.  metoprolol succinate (TOPROL-XL) 25 mg XL tablet TAKE 1 TABLET BY MOUTH TWICE DAILY 180 Tab 3    PRADAXA 150 mg capsule Take 1 Cap by mouth two (2) times a day. 180 Cap 3    cyanocobalamin (VITAMIN B12) 1,000 mcg/mL injection INJECT 1 ML IM Q 2 WEEKS  1    LACTOBACILLUS ACIDOPHILUS (ACIDOPHILUS PO) TAKE 1 TABLET PO DAILY  3    HEMOCYTE-F 324 mg (106 mg iron)-1 mg tab   3    digoxin (DIGOX) 0.125 mg tablet TAKE 1 TABLET BY MOUTH ONCE DAILY 90 Tab 3    tiotropium (SPIRIVA WITH HANDIHALER) 18 mcg inhalation capsule Take 1 Cap by inhalation daily. 20 Cap 0    tiotropium bromide (SPIRIVA RESPIMAT) 2.5 mcg/actuation mist Take 2 Puffs by inhalation daily. 2 Inhaler 0    hyoscyamine SL (LEVSIN/SL) 0.125 mg SL tablet 0.125 mg by SubLINGual route every six (6) hours as needed for Cramping.  SB-CT-BS-Fe-Min-Lycopen-Lutein (CENTRUM) 0.4-162-18 mg tab Take  by mouth.  CHOLECALCIFEROL, VITAMIN D3, (VITAMIN D3 PO) Take  by mouth.  IRON POLYSACCHARIDES COMPLEX (FERREX 150 PO) Take  by mouth.  pantoprazole (PROTONIX) 40 mg tablet Take 40 mg by mouth daily.  BIOTIN PO Take  by mouth.  folic acid 229 mcg tablet Take 400 mcg by mouth daily.  CALCIUM PO Take  by mouth.  montelukast (SINGULAIR) 10 mg tablet Take 10 mg by mouth daily.  gabapentin (NEURONTIN) 300 mg capsule TK 1 C PO BID  1       Allergies   Allergen Reactions    Codeine Nausea Only     Patient states not allergic    Diltiazem Other (comments)     Patient states not allergic          PHYSICAL EXAMINATION    Visit Vitals  /69   Pulse (!) 57   Temp 97.3 °F (36.3 °C)   Resp 24   Ht 5' 5\" (1.651 m)   Wt 128 lb (58.1 kg)   LMP  (LMP Unknown)   SpO2 95%   BMI 21.30 kg/m²       CONSTITUTIONAL: NAD, A&O x 3  SENSATION: Intact to light touch throughout  NEURO: Blu's is negative bilaterally. RANGE OF MOTION: The patient has full passive range of motion in all four extremities. Tandem gait: mild LOB     Shoulder AB/Flex Elbow Flex Wrist Ext Elbow Ext Wrist Flex Hand Intrin Tone   Right +4/5 +4/5 +4/5 +4/5 +4/5 +4/5 +4/5   Left +4/5 +4/5 +4/5 +4/5 +4/5 +4/5 +4/5                 ASSESSMENT   Diagnoses and all orders for this visit:    1. Right shoulder pain, unspecified chronicity    2. Post laminectomy syndrome    3. Loss of balance          IMPRESSION AND PLAN:  Patient returns for increase in neck pain since early May 2019. She reports LOB x a couple weeks, which I did appreciate upon physical examination. I will set her up for an MRI of the cervical spine. I advised patient to bring copies of films to next visit. In the interim, I will start her on Medrol Dosepak. I gave her a prescription for Naproxen following completion of the Medrol Dosepak. I will see the patient back following MRI. Written by Robi Bronson, as dictated by Sayda Wallace MD  I examined the patient, reviewed and agree with the note.

## 2019-06-11 ENCOUNTER — TELEPHONE (OUTPATIENT)
Dept: ORTHOPEDIC SURGERY | Age: 79
End: 2019-06-11

## 2019-06-11 ENCOUNTER — HOSPITAL ENCOUNTER (OUTPATIENT)
Dept: MRI IMAGING | Age: 79
Discharge: HOME | End: 2019-06-11
Attending: PHYSICAL MEDICINE & REHABILITATION
Payer: MEDICARE

## 2019-06-11 DIAGNOSIS — R26.89 LOSS OF BALANCE: ICD-10-CM

## 2019-06-11 DIAGNOSIS — M54.2 NECK PAIN: ICD-10-CM

## 2019-06-11 DIAGNOSIS — M25.511 RIGHT SHOULDER PAIN, UNSPECIFIED CHRONICITY: ICD-10-CM

## 2019-06-11 DIAGNOSIS — M96.1 POST LAMINECTOMY SYNDROME: ICD-10-CM

## 2019-06-11 DIAGNOSIS — M25.511 RIGHT SHOULDER PAIN, UNSPECIFIED CHRONICITY: Primary | ICD-10-CM

## 2019-06-11 PROCEDURE — 72141 MRI NECK SPINE W/O DYE: CPT

## 2019-06-12 ENCOUNTER — HOSPITAL ENCOUNTER (OUTPATIENT)
Dept: LAB | Age: 79
Discharge: HOME | End: 2019-06-12
Payer: MEDICARE

## 2019-06-12 DIAGNOSIS — R26.89 LOSS OF BALANCE: ICD-10-CM

## 2019-06-12 DIAGNOSIS — M54.2 NECK PAIN: ICD-10-CM

## 2019-06-12 DIAGNOSIS — M96.1 POST LAMINECTOMY SYNDROME: ICD-10-CM

## 2019-06-12 DIAGNOSIS — M25.511 RIGHT SHOULDER PAIN, UNSPECIFIED CHRONICITY: ICD-10-CM

## 2019-06-12 LAB
BASOPHILS # BLD: 0 K/UL (ref 0–0.1)
BASOPHILS NFR BLD: 0 % (ref 0–2)
CRP SERPL-MCNC: <0.3 MG/DL (ref 0–0.3)
DIFFERENTIAL METHOD BLD: ABNORMAL
EOSINOPHIL # BLD: 0.1 K/UL (ref 0–0.4)
EOSINOPHIL NFR BLD: 1 % (ref 0–5)
ERYTHROCYTE [DISTWIDTH] IN BLOOD BY AUTOMATED COUNT: 13.7 % (ref 11.6–14.5)
ERYTHROCYTE [SEDIMENTATION RATE] IN BLOOD: 6 MM/HR (ref 0–30)
HCT VFR BLD AUTO: 39 % (ref 35–45)
HGB BLD-MCNC: 12.3 G/DL (ref 12–16)
LYMPHOCYTES # BLD: 0.7 K/UL (ref 0.9–3.6)
LYMPHOCYTES NFR BLD: 8 % (ref 21–52)
MCH RBC QN AUTO: 30.6 PG (ref 24–34)
MCHC RBC AUTO-ENTMCNC: 31.5 G/DL (ref 31–37)
MCV RBC AUTO: 97 FL (ref 74–97)
MONOCYTES # BLD: 0.4 K/UL (ref 0.05–1.2)
MONOCYTES NFR BLD: 4 % (ref 3–10)
NEUTS SEG # BLD: 8 K/UL (ref 1.8–8)
NEUTS SEG NFR BLD: 87 % (ref 40–73)
PLATELET # BLD AUTO: 293 K/UL (ref 135–420)
PMV BLD AUTO: 9.9 FL (ref 9.2–11.8)
RBC # BLD AUTO: 4.02 M/UL (ref 4.2–5.3)
WBC # BLD AUTO: 9.2 K/UL (ref 4.6–13.2)

## 2019-06-12 PROCEDURE — 36415 COLL VENOUS BLD VENIPUNCTURE: CPT

## 2019-06-12 PROCEDURE — 85025 COMPLETE CBC W/AUTO DIFF WBC: CPT

## 2019-06-12 PROCEDURE — 86140 C-REACTIVE PROTEIN: CPT

## 2019-06-12 PROCEDURE — 85652 RBC SED RATE AUTOMATED: CPT

## 2019-06-12 NOTE — TELEPHONE ENCOUNTER
Received a call from Tho Kennedy from our radiology department in regards to the patient MRI Cervical spine. She states the radiologist was reading the MRI and it showed possible and they wanted the provider to be aware. Dr. Shasha Myles is out of the office and I gave the message and report to Dr. Rinku Ornelas and reviewed the MRI images and stated/gave a verbal to order a CBC, Sed rate and C-reactive protein and have the patient follow up with him.

## 2019-06-12 NOTE — TELEPHONE ENCOUNTER
Called patient and informed her the the radiologist and provider has reviewed her MRi and would like to have additional labs done on her. I informed her that they are CBC, Sed rate and C-reactive protein and it rules out infection. Patient states she will go to [facility] tomorrow and will be going out of town on Saturday.

## 2019-06-13 ENCOUNTER — HOSPITAL ENCOUNTER (OUTPATIENT)
Dept: CT IMAGING | Age: 79
Discharge: HOME | End: 2019-06-13
Attending: ORTHOPAEDIC SURGERY
Payer: MEDICARE

## 2019-06-13 ENCOUNTER — HOSPITAL ENCOUNTER (OUTPATIENT)
Dept: GENERAL RADIOLOGY | Age: 79
Discharge: HOME | End: 2019-06-13
Attending: ORTHOPAEDIC SURGERY
Payer: MEDICARE

## 2019-06-13 DIAGNOSIS — M75.121 COMPLETE TEAR OF RIGHT ROTATOR CUFF, UNSPECIFIED WHETHER TRAUMATIC: ICD-10-CM

## 2019-06-13 PROCEDURE — 74011000250 HC RX REV CODE- 250: Performed by: ORTHOPAEDIC SURGERY

## 2019-06-13 PROCEDURE — 77002 NEEDLE LOCALIZATION BY XRAY: CPT

## 2019-06-13 PROCEDURE — 74011636320 HC RX REV CODE- 636/320: Performed by: ORTHOPAEDIC SURGERY

## 2019-06-13 PROCEDURE — 73201 CT UPPER EXTREMITY W/DYE: CPT

## 2019-06-13 RX ORDER — SODIUM CHLORIDE 9 MG/ML
10 INJECTION INTRAMUSCULAR; INTRAVENOUS; SUBCUTANEOUS
Status: COMPLETED | OUTPATIENT
Start: 2019-06-13 | End: 2019-06-13

## 2019-06-13 RX ADMIN — IOPAMIDOL 10 ML: 408 INJECTION, SOLUTION INTRATHECAL at 12:48

## 2019-06-13 RX ADMIN — SODIUM CHLORIDE 10 ML: 9 INJECTION, SOLUTION INTRAMUSCULAR; INTRAVENOUS; SUBCUTANEOUS at 12:48

## 2019-06-13 NOTE — TELEPHONE ENCOUNTER
Patient called stating she is going on vacation for 3 weeks on Saturday. She is wondering if she is able to have the results of her MRI read to her so Jaqueline Vince is not worrying herself to death on vacation\".  Please advise patient at [phone]

## 2019-06-13 NOTE — TELEPHONE ENCOUNTER
Patient has an appointment with Dr. Cristiana Mayo tomorrow at 1020 am at [facility]. No further action needed at this time.

## 2019-06-14 ENCOUNTER — OFFICE VISIT (OUTPATIENT)
Dept: ORTHOPEDIC SURGERY | Age: 79
End: 2019-06-14

## 2019-06-14 VITALS
DIASTOLIC BLOOD PRESSURE: 73 MMHG | OXYGEN SATURATION: 97 % | BODY MASS INDEX: 20.83 KG/M2 | TEMPERATURE: 97.8 F | SYSTOLIC BLOOD PRESSURE: 127 MMHG | WEIGHT: 125 LBS | RESPIRATION RATE: 18 BRPM | HEIGHT: 65 IN | HEART RATE: 63 BPM

## 2019-06-14 DIAGNOSIS — M54.2 CERVICAL PAIN: Primary | ICD-10-CM

## 2019-06-14 RX ORDER — PREDNISONE 5 MG/1
TABLET ORAL
Qty: 21 TAB | Refills: 0 | Status: SHIPPED | OUTPATIENT
Start: 2019-06-14 | End: 2019-07-19

## 2019-06-14 NOTE — PROGRESS NOTES
[address]  [address]  [place] [address]  Phone: [phone]  Fax: [phone]  INITIAL CONSULTATION  Patient: Harini Leger                MRN: [id number]       SSN: [id number]  YOB: 1940        AGE: 66 y.o. SEX: female  Body mass index is 20.8 kg/m². PCP: Genesis Rodarte MD  06/14/19    Chief Complaint   Patient presents with    Neck Pain     neck pain         HISTORY OF PRESENT ILLNESS, RADIOGRAPHS, and PLAN:         HISTORY OF PRESENT ILLNESS:  Ms. Wyatt Chopra is seen today at the request of Dr. Carly Lauren. Ms. Wyatt Chopra is a 27-year-old female with a history of A-fib and a previous multilevel cervical fusion by Dr. Bryan Ferguson in 2006. He has polyarticular arthritis and a right rotator cuff tear being managed by Dr. Justyn Hamm. She has had a progression of cervical arthritis with neck pain and radiating arm pain. She primarily has no arm pain, just neck pain at the occipital cervical junction. Her MRI demonstrates some synovitis at C3-4 and some ligamentous invagination at C2-3. On close discussion, she may have some subtle myelopathic symptoms from gait disturbance but no other myelopathic findings. She cannot do a tandem gait, but that has been somewhat chronic. It is not clear to me whether it is from her initial cervical surgery 10 years ago or recently. She has good strength, sensation, and reflexes otherwise. ASSESSMENT/PLAN: I reviewed her studies with her and discussed the matter. At this point, pain is really her main issue, and it is mechanical pain after sleeping funny a few months ago. For that, I would try her on a soft collar. She cannot take nonsteroidal anti-inflammatories because she is on blood thinners for her A-fib. We will provide her a Medrol Dosepak for prn use.  I recommend cervical facet blocks and C2-3 and C3-4 to see if they will help her with her junctional neck pain and a soft cervical collar. I will see her back again should she notice any change in her neurologic condition or progression of neck pain. If I had to consider surgery for her, it would likely be a C3 laminectomy that would decompress her cord. There is no evidence of instability. I went over the things in detail. I will see her back in as needed. cc: Alison Ruffin M.D. Past Medical History:   Diagnosis Date    Aorta aneurysm     ascending 4.0 cm (CTA 5/15)    Asthma     Atrial fibrillation     CHADS score 1 (-CHF, +HTN, -AGE, -DM, -CVA)    COPD     Depression     nos    DVT     12/10  R Subclavian (PICC associated)    Dyslipidemia     Hypertension     Hypertension     Papillary adenocarcinoma     gastric    Pernicious anemia        Family History   Problem Relation Age of Onset    Heart Attack Father     Breast Cancer Sister     Breast Cancer Sister     Breast Cancer Other         Grandmother - nos       Current Outpatient Medications   Medication Sig Dispense Refill    gabapentin (NEURONTIN) 300 mg capsule TK 1 C PO BID  1    methylPREDNISolone (MEDROL DOSEPACK) 4 mg tablet Per dose pack instructions 1 Dose Pack 0    baclofen (LIORESAL) 10 mg tablet Take  by mouth three (3) times daily.  CRANBERRY FRUIT EXTRACT (CRANBERRY EXTRACT PO) Take  by mouth.  metoprolol succinate (TOPROL-XL) 25 mg XL tablet TAKE 1 TABLET BY MOUTH TWICE DAILY 180 Tab 3    PRADAXA 150 mg capsule Take 1 Cap by mouth two (2) times a day. 180 Cap 3    cyanocobalamin (VITAMIN B12) 1,000 mcg/mL injection INJECT 1 ML IM Q 2 WEEKS  1    LACTOBACILLUS ACIDOPHILUS (ACIDOPHILUS PO) TAKE 1 TABLET PO DAILY  3    HEMOCYTE-F 324 mg (106 mg iron)-1 mg tab   3    digoxin (DIGOX) 0.125 mg tablet TAKE 1 TABLET BY MOUTH ONCE DAILY 90 Tab 3    tiotropium (SPIRIVA WITH HANDIHALER) 18 mcg inhalation capsule Take 1 Cap by inhalation daily.  20 Cap 0    tiotropium bromide (SPIRIVA RESPIMAT) 2.5 mcg/actuation mist Take 2 Puffs by inhalation daily. 2 Inhaler 0    hyoscyamine SL (LEVSIN/SL) 0.125 mg SL tablet 0.125 mg by SubLINGual route every six (6) hours as needed for Cramping.  LV-YT-YO-Fe-Min-Lycopen-Lutein (CENTRUM) 0.4-162-18 mg tab Take  by mouth.  CHOLECALCIFEROL, VITAMIN D3, (VITAMIN D3 PO) Take  by mouth.  IRON POLYSACCHARIDES COMPLEX (FERREX 150 PO) Take  by mouth.  pantoprazole (PROTONIX) 40 mg tablet Take 40 mg by mouth daily.  BIOTIN PO Take  by mouth.  folic acid 596 mcg tablet Take 400 mcg by mouth daily.  CALCIUM PO Take  by mouth.  montelukast (SINGULAIR) 10 mg tablet Take 10 mg by mouth daily.          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    GASTROJEJUNOSTOMY      12/10 Lia en Y (after Bilroth II)    HX HERNIA REPAIR      NEUROLOGICAL PROCEDURE UNLISTED  2006    neck surgery       Past Medical History:   Diagnosis Date    Aorta aneurysm     ascending 4.0 cm (CTA 5/15)    Asthma     Atrial fibrillation     CHADS score 1 (-CHF, +HTN, -AGE, -DM, -CVA)    COPD     Depression     nos    DVT     12/10  R Subclavian (PICC associated)    Dyslipidemia     Hypertension     Hypertension     Papillary adenocarcinoma     gastric    Pernicious anemia        Social History     Socioeconomic History    Marital status:      Spouse name: Not on file    Number of children: Not on file    Years of education: Not on file    Highest education level: Not on file   Occupational History    Not on file   Social Needs    Financial resource strain: Not on file    Food insecurity:     Worry: Not on file     Inability: Not on file    Transportation needs:     Medical: Not on file     Non-medical: Not on file   Tobacco Use    Smoking status: Former Smoker     Last attempt to quit: 1981     Years since quittin.4    Smokeless tobacco: Never Used   Substance and Sexual Activity    Alcohol use: Yes     Alcohol/week: 0.0 oz     Comment: occasionally    Drug use: No    Sexual activity: Not Currently   Lifestyle    Physical activity:     Days per week: Not on file     Minutes per session: Not on file    Stress: Not on file   Relationships    Social connections:     Talks on phone: Not on file     Gets together: Not on file     Attends Anabaptism service: Not on file     Active member of club or organization: Not on file     Attends meetings of clubs or organizations: Not on file     Relationship status: Not on file    Intimate partner violence:     Fear of current or ex partner: Not on file     Emotionally abused: Not on file     Physically abused: Not on file     Forced sexual activity: Not on file   Other Topics Concern     Service Not Asked    Blood Transfusions Not Asked    Caffeine Concern Not Asked    Occupational Exposure Not Asked   Irvin Estevan Hazards Not Asked    Sleep Concern Not Asked    Stress Concern Not Asked    Weight Concern Not Asked    Special Diet Not Asked    Back Care Not Asked    Exercise Not Asked    Bike Helmet Not Asked    [address] Not Asked    Self-Exams Not Asked   Social History Narrative    Not on file           REVIEW OF SYSTEMS:   CONSTITUTIONAL SYMPTOMS:  Negative. EYES:  Negative. EARS, NOSE, THROAT AND MOUTH:  Negative. CARDIOVASCULAR:  Negative. RESPIRATORY:  Negative. GENITOURINARY: Per HPI. GASTROINTESTINAL:  Per HPI. INTEGUMENTARY (SKIN AND/OR BREAST):  Negative. MUSCULOSKELETAL: Per HPI.   ENDOCRINE/RHEUMATOLOGIC:  Negative. NEUROLOGICAL:  Per HPI. HEMATOLOGIC/LYMPHATIC:  Negative. ALLERGIC/IMMUNOLOGIC:  Negative. PSYCHIATRIC:  Negative.     PHYSICAL EXAMINATION:   Visit Vitals  /73   Pulse 63   Temp 97.8 °F (36.6 °C)   Resp 18   Ht 5' 5\" (1.651 m) Wt 125 lb (56.7 kg)   LMP  (LMP Unknown)   SpO2 97%   BMI 20.80 kg/m²    PAIN SCALE: 6/10    CONSTITUTIONAL: The patient is in no apparent distress and is alert and oriented x 3. HEENT: Normocephalic. Hearing grossly intact. NECK: Supple and symmetric. no tenderness, or masses were felt. RESPIRATORY: No labored breathing. CARDIOVASCULAR: The carotid pulses were normal. Peripheral pulses were 2+. CHEST: Normal AP diameter and normal contour without any kyphoscoliosis. LYMPHATIC: No lymphadenopathy was appreciated in the neck, axillae or groin. SKIN:  Negative for scars, rashes, lesions, or ulcers on the right upper, right lower, left upper, left lower and trunk. NEUROLOGICAL: Alert and oriented x 3. Ambulation without assistive device. FWB. Imbalance. EXTREMITIES:  See musculoskeletal.  MUSCULOSKELETAL:   Head and Neck: Neck pain radiating down RUE. Negative for misalignment, asymmetry, crepitation, defects, tenderness masses or effusions.  Left Upper Extremity: Inspection, percussion and palpation performed. Rapps sign is negative.  Right Upper Extremity: Inspection, percussion and palpation performed. Rapps sign is negative.  Spine, Ribs and Pelvis: Inspection, percussion and palpation performed. Negative for misalignment, asymmetry, crepitation, defects, tenderness masses or effusions.  Left Lower Extremity: Inspection, percussion and palpation performed. Negative straight leg raise.  Right Lower Extremity: Inspection, percussion and palpation performed. Negative straight leg raise. SPINE EXAM:     Cervical spine: Neck is midline. Normal muscle tone. No focal atrophy is noted. Lumbar spine: No rash, ecchymosis, or gross obliquity. No focal atrophy is noted. ASSESSMENT    ICD-10-CM ICD-9-CM    1.  Cervical pain M54.2 723.1 AMB SUPPLY ORDER      REFERRAL TO PAIN MANAGEMENT      predniSONE (STERAPRED) 5 mg dose pack       Written by [place] dictated by Ursula Ivy MD.    I, Dr. Ursula Ivy MD, confirm that all documentation is accurate.

## 2019-06-29 NOTE — ED TRIAGE NOTES
Pt. States she was at her PCP today around 2pm today in the [facility] she experienced Chest pains in the center of her chest. Pt states the pains are now gone and she has no pain at this time. She states she came in to get checked to be sure. none

## 2019-07-02 ENCOUNTER — OFFICE VISIT (OUTPATIENT)
Dept: ORTHOPEDIC SURGERY | Age: 79
End: 2019-07-02

## 2019-07-02 VITALS
BODY MASS INDEX: 20.83 KG/M2 | TEMPERATURE: 97.1 F | HEART RATE: 49 BPM | DIASTOLIC BLOOD PRESSURE: 66 MMHG | WEIGHT: 125 LBS | RESPIRATION RATE: 16 BRPM | OXYGEN SATURATION: 98 % | HEIGHT: 65 IN | SYSTOLIC BLOOD PRESSURE: 115 MMHG

## 2019-07-02 DIAGNOSIS — M12.811 ROTATOR CUFF ARTHROPATHY, RIGHT: Primary | ICD-10-CM

## 2019-07-02 NOTE — PROGRESS NOTES
Radha Rodrigues  1940   Chief Complaint   Patient presents with    Shoulder Pain     right shoulder pain, f/u appt. HISTORY OF PRESENT ILLNESS  Violet Levy is a 66 y.o. female who presents today for reevaluation of right shoulder pain. Patient rates pain as 0/10 today. The patient has had a cortisone injection in the past which provided minimal relief. Pain is worse with certain movements of the arm, reaching, lifting. Patient denies any fever, chills, chest pain, shortness of breath or calf pain. The remainder of the review of systems is negative. There are no new illness or injuries to report since last seen in the office. There are no changes to medications, allergies, family or social history. PHYSICAL EXAM:   Visit Vitals  /66 (BP 1 Location: Left arm, BP Patient Position: Sitting)   Pulse (!) 49   Temp 97.1 °F (36.2 °C) (Oral)   Resp 16   Ht 5' 5\" (1.651 m)   Wt 125 lb (56.7 kg)   LMP  (LMP Unknown)   SpO2 98%   BMI 20.80 kg/m²     The patient is a well-developed, well-nourished female   in no acute distress. The patient is alert and oriented times three. The patient is alert and oriented times three. Mood and affect are normal.  LYMPHATIC: lymph nodes are not enlarged and are within normal limits  SKIN: normal in color and non tender to palpation. There are no bruises or abrasions noted. NEUROLOGICAL: Motor sensory exam is within normal limits. Reflexes are equal bilaterally.  There is normal sensation to pinprick and light touch  MUSCULOSKELETAL:  Examination Right shoulder   Skin Intact   AC joint tenderness -   Biceps tenderness -   Forward flexion/Elevation    Active abduction    Glenohumeral abduction 90   External rotation ROM 90   Internal rotation ROM 70   Apprehension -   Ernestos Relocation -   Jerk -   Load and Shift -   Obriens -   Speeds -   Impingement sign +   Supraspinatus/Empty Can +, 4/5   External Rotation Strength -, 5/5   Lift Off/Belly Press -, 5/5   Neurovascular Intact     IMAGING: CT of right shoulder dated 6/13/19 was reviewed and read:  No CT arthrogram evidence of full-thickness or articular surface rotator cuff tear. Intra-articular contrast does not extend into the subacromial bursa.  -Of note, to full-thickness or near full-thickness supraspinatus tear was identified on 2018 MRI. Bursal sided tears may not be readily identified on CT arthrogram.   Suspected small tear/separation at the superior labrum/chondral junction. AC joint degenerative changes. Type II acromion with mild lateral acromion downsloping and undersurface spurring. ad      MRI of the right shoulder dated 3/7/18 was reviewed and read:   IMPRESSION:  1. No change in a full-thickness or virtually full-thickness tear of supraspinatus within its attachment. No retraction or disproportionate muscle atrophy. 2. Infraspinatus tendinosis as before. 3. Degenerative signal within the labrum, but no tear. 4. 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 XR of the right shoulder dated 8/14/17 was reviewed and read: type 3 acromion, sclerotic changes in the greater tuberosity. MRI of the right shoulder dated 11/8/16 was reviewed and read   IMPRESSION: Limited study due to motion artifacts. Full-thickness tear of  supraspinatus tendon. Infraspinatus tendinosis. Subacromial bursitis. Suspect  anterior labral tear. AC joint hypertrophy/inflammation present.       IMPRESSION:      ICD-10-CM ICD-9-CM    1. Rotator cuff arthropathy, right M12.811 716.81         PLAN:   1. The patient presents today with right shoulder pain due to rotator cuff arthropathy. I discussed the risks and benefits and potential adverse outcomes of both operative vs non operative treatment of right rotator cuff arthropathy with the patient. Patient wishes to proceed with right reverse shoulder replacement.      Risks of operative intervention include but not limited to bleeding, infection, deep vein thrombosis, pulmonary embolism, death, limb length discrepancy, reflexive sympathetic dystrophy, fat embolism syndrome,damage to blood vessels and nerves, malunion, non-union, delayed union, failure of hardware, post traumatic arthritis, stroke, heart attack, and death. Patient understands that infection may arise and may require numerous surgeries. History and physical exam scheduled for a later date. Risk factors include: hypertension  2. No cortisone injection indicated today  3. No Physical/Occupational Therapy indicated today  4. No diagnostic test indicated today:   5. No durable medical equipment indicated today  6. No referral indicated today   7. No medications indicated today  8. No Narcotic indicated today. RTC H&P    Scribed by Cherrie Streeter [address] [place] [address] [address]) as dictated by Kareem Carpenter MD    I, Dr. Kareem Carpenter, confirm that all documentation is accurate.     Kareem Carpenter M.D.   [facility]

## 2019-07-02 NOTE — PROGRESS NOTES
1. Have you been to the ER, urgent care clinic since your last visit? Hospitalized since your last visit? NO    2. Have you seen or consulted any other health care providers outside of the [address] [place] [address] since your last visit? Include any pap smears or colon screening.  NO

## 2019-07-10 DIAGNOSIS — M12.811 ROTATOR CUFF ARTHROPATHY, RIGHT: Primary | ICD-10-CM

## 2019-07-10 DIAGNOSIS — Z01.818 PREOP EXAMINATION: Primary | ICD-10-CM

## 2019-07-12 ENCOUNTER — OFFICE VISIT (OUTPATIENT)
Dept: ORTHOPEDIC SURGERY | Age: 79
End: 2019-07-12

## 2019-07-12 VITALS
TEMPERATURE: 98 F | OXYGEN SATURATION: 98 % | WEIGHT: 129.6 LBS | HEART RATE: 77 BPM | DIASTOLIC BLOOD PRESSURE: 58 MMHG | SYSTOLIC BLOOD PRESSURE: 121 MMHG | BODY MASS INDEX: 21.59 KG/M2 | HEIGHT: 65 IN

## 2019-07-12 DIAGNOSIS — M50.30 DDD (DEGENERATIVE DISC DISEASE), CERVICAL: ICD-10-CM

## 2019-07-12 DIAGNOSIS — M47.812 CERVICAL SPONDYLOSIS WITHOUT MYELOPATHY: ICD-10-CM

## 2019-07-12 DIAGNOSIS — M96.1 POST LAMINECTOMY SYNDROME: Primary | ICD-10-CM

## 2019-07-12 DIAGNOSIS — M54.12 CERVICAL NEURITIS: ICD-10-CM

## 2019-07-12 NOTE — LETTER
7/12/19 Patient: Eric Bello YOB: 1940 Date of Visit: 7/12/2019 Kenn Youssef MD 
[address] [place] [address] [address] [address] VIA Facsimile: [phone] Dear Kenn Youssef MD, Thank you for referring Ms. Clifton Suárez to [address] [place] [address] for evaluation. My notes for this consultation are attached. If you have questions, please do not hesitate to call me. I look forward to following your patient along with you. Sincerely, Navjot Cm MD

## 2019-07-12 NOTE — PROGRESS NOTES
[facility]  [address], [place]   Phone: [phone]  Fax: [phone]        PROGRESS NOTE      HISTORY OF PRESENT ILLNESS:  The patient is a 66 y.o. female and was seen today for follow up of increase in neck pain since early May 2019. Pt initially described low back and RUE pain. Pt is a poor historian. She had cervical spine surgery in 2006 by Dr. Tomasz Mccord. She denies any injury or trauma. She reports a history of A-fib, which is controlled. She reports right shoulder injection provided good relief x1 month. She has failed LYRICA, NEURONTIN, TOPAMAX, and CYMBALTA in the past. Note from Dr. Bob Soria dated 11/29/16 indicating patient was seen with c/o right shoulder pain. Dx with full rotator cuff tear and discussed surgery at that time. Note from Dr. Bob Soria dated 6/5/19 indicating patient was seen with c/o right shoulder pain. Of note, pt had cortisone injection at her last appointment without relief. Set her up for a CT scan and referred her back to me at that time. X-ray of the cervical spine from 6/2/16 was reviewed. Per report, AP, lateral and open-mouth views of the cervical spine performed. The C7 level is partially obscured by the patient's shoulders on the lateral view. Again noted are changes of C4 through C7 anterior and interbody fusion with fixation plate and screws at the C4 and C7 levels, unchanged. The fusion again appears solid. A prominent anterior osteophyte is noted projecting from the inferior aspect of C3. There is straightening of the normal lordotic curve. There may be facet joint sclerosis mid cervical spine. No acute fracture or dislocation. No prevertebral soft tissue swelling. Right shoulder MRI dated 11/8/16 pre report revealed limited study due to motion artifacts. Full-thickness tear of supraspinatus tendon. Infraspinatus tendinosis. Subacromial bursitis. Suspect anterior labral tear. AC joint hypertrophy/inflammation present. Cervical spine XR dated 5/7/19 not available for my review. Per report, solid-appearing C4-C7 fusion. No acute findings. Degenerative changes, most pronounced at the left C3-4 facet. At her last clinical appointment, patient returned for increase in neck pain since early May 2019. She reported LOB x a couple weeks, which I did appreciate upon physical examination. I set her up for an MRI of the cervical spine. I advised patient to bring copies of films to next visit. In the interim, I started her on Medrol Dosepak. I gave her a prescription for Naproxen following completion of the Medrol Dosepak. The patient returns today with pain location and distribution remain unchanged. She rates her pain 0/10, previously 5/10. Radiologist referred her to Kari Cotto following MRI. Patient completed MDP with no relief. She reports LOB x a couple weeks, denies falls or dropping things. She is unsure if it has been getting worse. Note from Dr. Catina Chauhan dated 6/14/19 indicating patient was seen with c/o progressive neck pain in the RUE. Her MRI reveled synovitis at C3-4 and some ligamentous invagination at C2-3. Recommended a soft collar. Put her on MDP. Wasn't concerned about neurologic issues to get her into the OR. Set her up to get cervical facet block on 7/17/19. Note from Dr. Kj Howard dated 7/2/19 indicating patient was seen with c/o right shoulder pain. The patient has had a cortisone injection in the past which provided minimal relief. Pain is worse with certain movements of the arm, reaching, lifting. Patient wished to proceed with right reverse shoulder replacement on 7/31/19. Cervical spine MRI dated 6/11/19 reviewed. Per report, right C2-C3 facet marrow edema, small facet effusion, and jessi-facet edema. This may be due to active facet arthropathy, or instability. Septic facet arthritis or inflammatory arthropathy may be considered in the appropriate setting. Degenerative changes results in mild to moderate spinal canal stenosis and foraminal stenoses at this level. Status post C4-C7 ACDF. Spinal canal patent at these levels. Notable foraminal stenoses at these levels described above. At C3-C4, the superior junctional level, degenerative changes results in severe foraminal stenoses and mild spinal canal stenosis. Notable degenerative changes at other levels without significant stenosis. Carmelo Lozano  reviewed. Body mass index is 21.57 kg/m². PCP: Charlene Troncoso MD      Past Medical History:   Diagnosis Date    Aorta aneurysm     ascending 4.0 cm (CTA 5/15)    Asthma     Atrial fibrillation     CHADS score 1 (-CHF, +HTN, -AGE, -DM, -CVA)    COPD     Depression     nos    DVT     12/10  R Subclavian (PICC associated)    Dyslipidemia     Hypertension     Hypertension     Papillary adenocarcinoma     gastric    Pernicious anemia         Social History     Socioeconomic History    Marital status:      Spouse name: Not on file    Number of children: Not on file    Years of education: Not on file    Highest education level: Not on file   Occupational History    Not on file   Social Needs    Financial resource strain: Not on file    Food insecurity:     Worry: Not on file     Inability: Not on file    Transportation needs:     Medical: Not on file     Non-medical: Not on file   Tobacco Use    Smoking status: Former Smoker     Last attempt to quit: 1981     Years since quittin.5    Smokeless tobacco: Never Used   Substance and Sexual Activity    Alcohol use:  Yes     Alcohol/week: 0.0 oz     Comment: occasionally    Drug use: No    Sexual activity: Not Currently   Lifestyle    Physical activity:     Days per week: Not on file     Minutes per session: Not on file    Stress: Not on file   Relationships    Social connections:     Talks on phone: Not on file     Gets together: Not on file     Attends Alevism service: Not on file     Active member of club or organization: Not on file Attends meetings of clubs or organizations: Not on file     Relationship status: Not on file    Intimate partner violence:     Fear of current or ex partner: Not on file     Emotionally abused: Not on file     Physically abused: Not on file     Forced sexual activity: Not on file   Other Topics Concern     Service Not Asked    Blood Transfusions Not Asked    Caffeine Concern Not Asked    Occupational Exposure Not Asked   Omayra Daubs Hazards Not Asked    Sleep Concern Not Asked    Stress Concern Not Asked    Weight Concern Not Asked    Special Diet Not Asked    Back Care Not Asked    Exercise Not Asked    Bike Helmet Not Asked    Seat Belt Not Asked    Self-Exams Not Asked   Social History Narrative    Not on file       Current Outpatient Medications   Medication Sig Dispense Refill    gabapentin (NEURONTIN) 300 mg capsule TK 1 C PO BID  1    methylPREDNISolone (MEDROL DOSEPACK) 4 mg tablet Per dose pack instructions 1 Dose Pack 0    CRANBERRY FRUIT EXTRACT (CRANBERRY EXTRACT PO) Take  by mouth.  metoprolol succinate (TOPROL-XL) 25 mg XL tablet TAKE 1 TABLET BY MOUTH TWICE DAILY 180 Tab 3    PRADAXA 150 mg capsule Take 1 Cap by mouth two (2) times a day. 180 Cap 3    cyanocobalamin (VITAMIN B12) 1,000 mcg/mL injection INJECT 1 ML IM Q 2 WEEKS  1    HEMOCYTE-F 324 mg (106 mg iron)-1 mg tab   3    digoxin (DIGOX) 0.125 mg tablet TAKE 1 TABLET BY MOUTH ONCE DAILY 90 Tab 3    tiotropium (SPIRIVA WITH HANDIHALER) 18 mcg inhalation capsule Take 1 Cap by inhalation daily. 20 Cap 0    RF-WM-RE-Fe-Min-Lycopen-Lutein (CENTRUM) 0.4-162-18 mg tab Take  by mouth.  CHOLECALCIFEROL, VITAMIN D3, (VITAMIN D3 PO) Take  by mouth.  pantoprazole (PROTONIX) 40 mg tablet Take 40 mg by mouth daily.  BIOTIN PO Take  by mouth.  CALCIUM PO Take  by mouth.  montelukast (SINGULAIR) 10 mg tablet Take 10 mg by mouth daily.       predniSONE (STERAPRED) 5 mg dose pack See administration instruction per 5mg dose pack 21 Tab 0    baclofen (LIORESAL) 10 mg tablet Take  by mouth three (3) times daily.  LACTOBACILLUS ACIDOPHILUS (ACIDOPHILUS PO) TAKE 1 TABLET PO DAILY  3    tiotropium bromide (SPIRIVA RESPIMAT) 2.5 mcg/actuation mist Take 2 Puffs by inhalation daily. 2 Inhaler 0    hyoscyamine SL (LEVSIN/SL) 0.125 mg SL tablet 0.125 mg by SubLINGual route every six (6) hours as needed for Cramping.  IRON POLYSACCHARIDES COMPLEX (FERREX 150 PO) Take  by mouth.  folic acid 298 mcg tablet Take 400 mcg by mouth daily. Facility-Administered Medications Ordered in Other Visits   Medication Dose Route Frequency Provider Last Rate Last Dose    [START ON 7/18/2019] denosumab (PROLIA) injection 60 mg  60 mg SubCUTAneous ONCE Alison Osborn MD           Allergies   Allergen Reactions    Codeine Nausea Only     Patient states not allergic    Diltiazem Other (comments)     Patient states not allergic          PHYSICAL EXAMINATION    Visit Vitals  /58 (BP 1 Location: Left arm, BP Patient Position: Sitting)   Pulse 77   Temp 98 °F (36.7 °C) (Oral)   Ht 5' 5\" (1.651 m)   Wt 129 lb 9.6 oz (58.8 kg)   LMP  (LMP Unknown)   SpO2 98%   BMI 21.57 kg/m²       CONSTITUTIONAL: NAD, A&O x 3  SENSATION: Intact to light touch throughout  NEURO: Blu's is negative bilaterally. RANGE OF MOTION: The patient has full passive range of motion in all four extremities. Tandem gait: LOB      Shoulder AB/Flex Elbow Flex Wrist Ext Elbow Ext Wrist Flex Hand Intrin Tone   Right +4/5 +4/5 +4/5 +4/5 +4/5 +4/5 +4/5   Left +4/5 +4/5 +4/5 +4/5 +4/5 +4/5 +4/5                 ASSESSMENT   Diagnoses and all orders for this visit:    1. Post laminectomy syndrome    2. Cervical neuritis    3. DDD (degenerative disc disease), cervical    4. Cervical spondylosis without myelopathy          IMPRESSION AND PLAN:  Patient is scheduled for right reverse shoulder replacement on 7/31/19 with Dr. Yeni Baugh.  She reports LOB x a couple weeks, denies falls or dropping things. She is unsure if it has been getting worse. I advised patient that if loss of balance noticeably worsens, to call us. I recommend she continue with cervical facet block scheduled 7/17/19 with Dr. Ginna Teran to see if that helps with her sxs. Patient is neurologically intact. I will see the patient back in 2 month's time or earlier if needed. Written by Tia Casillas, as dictated by Alexandrea Griffith MD  I examined the patient, reviewed and agree with the note.

## 2019-07-18 ENCOUNTER — HOSPITAL ENCOUNTER (OUTPATIENT)
Dept: INFUSION THERAPY | Age: 79
Discharge: HOME | End: 2019-07-18
Payer: MEDICARE

## 2019-07-18 VITALS
DIASTOLIC BLOOD PRESSURE: 68 MMHG | OXYGEN SATURATION: 98 % | RESPIRATION RATE: 18 BRPM | HEART RATE: 63 BPM | TEMPERATURE: 98 F | SYSTOLIC BLOOD PRESSURE: 117 MMHG

## 2019-07-18 LAB
ANION GAP BLD CALC-SCNC: 17 MMOL/L (ref 10–20)
BUN BLD-MCNC: 17 MG/DL (ref 7–18)
CA-I BLD-MCNC: 1.13 MMOL/L (ref 1.12–1.32)
CHLORIDE BLD-SCNC: 104 MMOL/L (ref 100–108)
CO2 BLD-SCNC: 25 MMOL/L (ref 19–24)
CREAT UR-MCNC: 0.7 MG/DL (ref 0.6–1.3)
GLUCOSE BLD STRIP.AUTO-MCNC: 132 MG/DL (ref 74–106)
HCT VFR BLD CALC: 35 % (ref 36–49)
HGB BLD-MCNC: 11.9 G/DL (ref 12–16)
MAGNESIUM SERPL-MCNC: 2.3 MG/DL (ref 1.6–2.6)
PHOSPHATE SERPL-MCNC: 3.4 MG/DL (ref 2.5–4.9)
POTASSIUM BLD-SCNC: 4.6 MMOL/L (ref 3.5–5.5)
SODIUM BLD-SCNC: 140 MMOL/L (ref 136–145)

## 2019-07-18 PROCEDURE — 96372 THER/PROPH/DIAG INJ SC/IM: CPT

## 2019-07-18 PROCEDURE — 36415 COLL VENOUS BLD VENIPUNCTURE: CPT

## 2019-07-18 PROCEDURE — 74011250636 HC RX REV CODE- 250/636: Performed by: FAMILY MEDICINE

## 2019-07-18 PROCEDURE — 83735 ASSAY OF MAGNESIUM: CPT

## 2019-07-18 PROCEDURE — 84100 ASSAY OF PHOSPHORUS: CPT

## 2019-07-18 PROCEDURE — 80047 BASIC METABLC PNL IONIZED CA: CPT

## 2019-07-18 RX ADMIN — DENOSUMAB 60 MG: 60 INJECTION SUBCUTANEOUS at 09:59

## 2019-07-18 NOTE — PROGRESS NOTES
[facility] Progress Note    Date: 2019    Name: [facility]    MRN: [id number]         : 1940    Prolia injection. Ms. Anais Dominguez arrived to [facility] at [address] ambulatory. No complaints or concerns voiced    Ms. Anais Dominguez was assessed and education was provided. Care notes given. Patient verbalized understanding of actions, route, side effects, possible reaction, and management if reaction occurs    Ms. Jenna Jones vitals were reviewed. Visit Vitals  /68 (BP 1 Location: Left arm, BP Patient Position: At rest)   Pulse 63   Temp 98 °F (36.7 °C)   Resp 18   SpO2 98%       Blood drawn for labs via LEFT AC venipuncture x1 attempt for istat bmp,magnesium and phosphorus    Lab results were obtained and reviewed. Recent Results (from the past 12 hour(s))   POC CHEM8    Collection Time: 19  9:49 AM   Result Value Ref Range    CO2, POC 25 (H) 19 - 24 MMOL/L    Glucose,  (H) 74 - 106 MG/DL    BUN, POC 17 7 - 18 MG/DL    Creatinine, POC 0.7 0.6 - 1.3 MG/DL    GFRAA, POC >60 >60 ml/min/1.73m2    GFRNA, POC >60 >60 ml/min/1.73m2    Sodium,  136 - 145 MMOL/L    Potassium, POC 4.6 3.5 - 5.5 MMOL/L    Calcium, ionized (POC) 1.13 1.12 - 1.32 mmol/L    Chloride,  100 - 108 MMOL/L    Anion gap, POC 17 10 - 20      Hematocrit, POC 35 (L) 36 - 49 %    Hemoglobin, POC 11.9 (L) 12 - 16 G/DL         Prolia 60 mg was administered as ordered SQ in patient's LEFT upper back of arm. No irritation to site. Bandaid to site. Ms. Anais Dominguez tolerated well without complaints. Will monitor for 30 minutes    Patient without complaints or reaction 30 minutes post injection. Patient Vitals for the past 4 hrs:   Temp Pulse Resp BP SpO2   19 1029 98 °F (36.7 °C) 63 18 117/68    19 0940 98 °F (36.7 °C) 68 18 125/61 98 %       Reviewed discharge instructions with patient. She verbalized understanding        Ms. Anais Dominguez was discharged from [facility] in stable condition at 1030. She is to return on 1/16/2020 at 0930 for her next appointment.     Scott Byrd RN  July 18, 2019

## 2019-07-19 ENCOUNTER — HOSPITAL ENCOUNTER (OUTPATIENT)
Dept: GENERAL RADIOLOGY | Age: 79
Discharge: HOME | End: 2019-07-19
Payer: MEDICARE

## 2019-07-19 ENCOUNTER — TELEPHONE (OUTPATIENT)
Dept: ORTHOPEDIC SURGERY | Age: 79
End: 2019-07-19

## 2019-07-19 ENCOUNTER — HOSPITAL ENCOUNTER (OUTPATIENT)
Dept: PREADMISSION TESTING | Age: 79
Discharge: HOME | End: 2019-07-19
Payer: MEDICARE

## 2019-07-19 DIAGNOSIS — Z01.818 PREOP EXAMINATION: ICD-10-CM

## 2019-07-19 LAB
ABO + RH BLD: NORMAL
ALBUMIN SERPL-MCNC: 3.2 G/DL (ref 3.4–5)
ALBUMIN/GLOB SERPL: 1.3 {RATIO} (ref 0.8–1.7)
ALP SERPL-CCNC: 57 U/L (ref 45–117)
ALT SERPL-CCNC: 31 U/L (ref 13–56)
ANION GAP SERPL CALC-SCNC: 5 MMOL/L (ref 3–18)
APPEARANCE UR: CLEAR
APTT PPP: 31 SEC (ref 23–36.4)
AST SERPL-CCNC: 14 U/L (ref 10–38)
BACTERIA URNS QL MICRO: ABNORMAL /HPF
BASOPHILS # BLD: 0 K/UL (ref 0–0.1)
BASOPHILS NFR BLD: 0 % (ref 0–2)
BILIRUB SERPL-MCNC: 0.2 MG/DL (ref 0.2–1)
BILIRUB UR QL: NEGATIVE
BLOOD GROUP ANTIBODIES SERPL: NORMAL
BUN SERPL-MCNC: 17 MG/DL (ref 7–18)
BUN/CREAT SERPL: 25 (ref 12–20)
CALCIUM SERPL-MCNC: 8.7 MG/DL (ref 8.5–10.1)
CHLORIDE SERPL-SCNC: 108 MMOL/L (ref 100–111)
CO2 SERPL-SCNC: 29 MMOL/L (ref 21–32)
COLOR UR: YELLOW
CREAT SERPL-MCNC: 0.68 MG/DL (ref 0.6–1.3)
DIFFERENTIAL METHOD BLD: ABNORMAL
EOSINOPHIL # BLD: 0 K/UL (ref 0–0.4)
EOSINOPHIL NFR BLD: 0 % (ref 0–5)
EPITH CASTS URNS QL MICRO: ABNORMAL /LPF (ref 0–5)
ERYTHROCYTE [DISTWIDTH] IN BLOOD BY AUTOMATED COUNT: 14.1 % (ref 11.6–14.5)
GLOBULIN SER CALC-MCNC: 2.5 G/DL (ref 2–4)
GLUCOSE SERPL-MCNC: 111 MG/DL (ref 74–99)
GLUCOSE UR STRIP.AUTO-MCNC: NEGATIVE MG/DL
HCT VFR BLD AUTO: 37.8 % (ref 35–45)
HGB BLD-MCNC: 11.8 G/DL (ref 12–16)
HGB UR QL STRIP: ABNORMAL
INR PPP: 1.1 (ref 0.8–1.2)
KETONES UR QL STRIP.AUTO: NEGATIVE MG/DL
LEUKOCYTE ESTERASE UR QL STRIP.AUTO: ABNORMAL
LYMPHOCYTES # BLD: 0.8 K/UL (ref 0.9–3.6)
LYMPHOCYTES NFR BLD: 9 % (ref 21–52)
MCH RBC QN AUTO: 30.3 PG (ref 24–34)
MCHC RBC AUTO-ENTMCNC: 31.2 G/DL (ref 31–37)
MCV RBC AUTO: 96.9 FL (ref 74–97)
MONOCYTES # BLD: 0.6 K/UL (ref 0.05–1.2)
MONOCYTES NFR BLD: 6 % (ref 3–10)
NEUTS SEG # BLD: 7.8 K/UL (ref 1.8–8)
NEUTS SEG NFR BLD: 85 % (ref 40–73)
NITRITE UR QL STRIP.AUTO: NEGATIVE
PH UR STRIP: 5.5 [PH] (ref 5–8)
PLATELET # BLD AUTO: 319 K/UL (ref 135–420)
PMV BLD AUTO: 10.1 FL (ref 9.2–11.8)
POTASSIUM SERPL-SCNC: 4.4 MMOL/L (ref 3.5–5.5)
PROT SERPL-MCNC: 5.7 G/DL (ref 6.4–8.2)
PROT UR STRIP-MCNC: NEGATIVE MG/DL
PROTHROMBIN TIME: 13.7 SEC (ref 11.5–15.2)
RBC # BLD AUTO: 3.9 M/UL (ref 4.2–5.3)
RBC #/AREA URNS HPF: ABNORMAL /HPF (ref 0–5)
SODIUM SERPL-SCNC: 142 MMOL/L (ref 136–145)
SP GR UR REFRACTOMETRY: 1.02 (ref 1–1.03)
SPECIMEN EXP DATE BLD: NORMAL
UROBILINOGEN UR QL STRIP.AUTO: 0.2 EU/DL (ref 0.2–1)
WBC # BLD AUTO: 9.3 K/UL (ref 4.6–13.2)
WBC URNS QL MICRO: ABNORMAL /HPF (ref 0–4)

## 2019-07-19 PROCEDURE — 81001 URINALYSIS AUTO W/SCOPE: CPT

## 2019-07-19 PROCEDURE — 93005 ELECTROCARDIOGRAM TRACING: CPT

## 2019-07-19 PROCEDURE — 71046 X-RAY EXAM CHEST 2 VIEWS: CPT

## 2019-07-19 PROCEDURE — 85610 PROTHROMBIN TIME: CPT

## 2019-07-19 PROCEDURE — 36415 COLL VENOUS BLD VENIPUNCTURE: CPT

## 2019-07-19 PROCEDURE — 80053 COMPREHEN METABOLIC PANEL: CPT

## 2019-07-19 PROCEDURE — 86900 BLOOD TYPING SEROLOGIC ABO: CPT

## 2019-07-19 PROCEDURE — 85025 COMPLETE CBC W/AUTO DIFF WBC: CPT

## 2019-07-19 PROCEDURE — 85730 THROMBOPLASTIN TIME PARTIAL: CPT

## 2019-07-19 RX ORDER — LANOLIN ALCOHOL/MO/W.PET/CERES
325 CREAM (GRAM) TOPICAL 2 TIMES DAILY
COMMUNITY

## 2019-07-19 RX ORDER — SULFAMETHOXAZOLE AND TRIMETHOPRIM 400; 80 MG/1; MG/1
1 TABLET ORAL 2 TIMES DAILY
Qty: 14 TAB | Refills: 0 | Status: SHIPPED | OUTPATIENT
Start: 2019-07-19 | End: 2019-07-26

## 2019-07-19 NOTE — TELEPHONE ENCOUNTER
Please call and inform patient she has a small UTI. I have sent abx to her pharmacy.  Please have her start taking ASAP

## 2019-07-19 NOTE — PERIOP NOTES
Yaniv Aguilar was here today for her PAT appointment. Health assessment was completed and instructions given regarding NPO status, medications, Hibiclens washes, and removal of all jewelry and/or body piercing. Instructed patient not to remove the red Blood Bank armband that was placed on their arm when the Type and Screen was drawn. Opportunity was given to ask questions and all questions were answered. Understanding of instructions was verbalized.

## 2019-07-20 LAB
ATRIAL RATE: 42 BPM
CALCULATED R AXIS, ECG10: 68 DEGREES
CALCULATED T AXIS, ECG11: 5 DEGREES
DIAGNOSIS, 93000: NORMAL
Q-T INTERVAL, ECG07: 410 MS
QRS DURATION, ECG06: 80 MS
QTC CALCULATION (BEZET), ECG08: 416 MS
VENTRICULAR RATE, ECG03: 62 BPM

## 2019-07-29 ENCOUNTER — OFFICE VISIT (OUTPATIENT)
Dept: ORTHOPEDIC SURGERY | Age: 79
End: 2019-07-29

## 2019-07-29 ENCOUNTER — TELEPHONE (OUTPATIENT)
Dept: ORTHOPEDIC SURGERY | Age: 79
End: 2019-07-29

## 2019-07-29 VITALS
OXYGEN SATURATION: 95 % | BODY MASS INDEX: 20.34 KG/M2 | HEIGHT: 66 IN | SYSTOLIC BLOOD PRESSURE: 110 MMHG | DIASTOLIC BLOOD PRESSURE: 55 MMHG | HEART RATE: 54 BPM | TEMPERATURE: 97.7 F | WEIGHT: 126.6 LBS

## 2019-07-29 DIAGNOSIS — M12.811 ROTATOR CUFF ARTHROPATHY, RIGHT: Primary | ICD-10-CM

## 2019-07-29 RX ORDER — GABAPENTIN 100 MG/1
400 CAPSULE ORAL ONCE
Status: CANCELLED | OUTPATIENT
Start: 2019-07-29 | End: 2019-07-29

## 2019-07-29 RX ORDER — ACETAMINOPHEN 325 MG/1
1000 TABLET ORAL ONCE
Status: CANCELLED | OUTPATIENT
Start: 2019-07-29 | End: 2019-07-29

## 2019-07-29 RX ORDER — OXYCODONE HYDROCHLORIDE 5 MG/1
5 TABLET ORAL
Qty: 40 TAB | Refills: 0 | Status: SHIPPED | OUTPATIENT
Start: 2019-07-29 | End: 2019-08-05

## 2019-07-29 RX ORDER — CELECOXIB 100 MG/1
400 CAPSULE ORAL ONCE
Status: CANCELLED | OUTPATIENT
Start: 2019-07-29 | End: 2019-07-29

## 2019-07-29 NOTE — TELEPHONE ENCOUNTER
Patient called and she wants to change where she will have PT. The patient will like to go to [facility] [address] [facility]. Patient may be reached at [phone].

## 2019-07-29 NOTE — H&P
HISTORY AND PHYSICAL          Patient: Eleanor Limon                MRN: [id number]       SSN: [id number]  YOB: 1940          AGE: 66 y.o. SEX: female      Patient scheduled for:  Right reverse total shoulder arthroplasty    Surgeon: Kourtney Russell MD    ANESTHESIA TYPE:  General    HISTORY:     The patient was seen in the office today for a preoperative history and physical for an upcoming above listed surgery. The patient is a pleasant 66 y.o. female who has a history of right shoulder pain. The patient has had a cortisone injection in the past which provided minimal relief. Pain is worse with certain movements of the arm, reaching, lifting. . Pain level is a 0/10. Due to the current findings, affected activity of daily living and continued pain and discomfort, surgical intervention is indicated. The alternatives, risks, and complications, including but not limited to infection, blood loss, need for blood transfusion, neurovascular damage, jessi-incisional numbness, subcutaneous hematoma, bone fracture, anesthetic complications, DVT, PE, death, RSD, postoperative stiffness and pain, possible surgical scar, delayed healing and nonhealing, reflexive sympathetic dystrophy, damage to blood vessels and nerves, need for more surgery, MI, and stroke,  failure of hardware, gait disturbances,have been discussed. The patient understands and wishes to proceed with surgery.      PAST MEDICAL HISTORY:     Past Medical History:   Diagnosis Date    Aorta aneurysm     ascending 4.0 cm (CTA 5/15)    Arthritis     Asthma     Atrial fibrillation     CHADS score 1 (-CHF, +HTN, -AGE, -DM, -CVA)    COPD     Depression     nos    DVT     12/10  R Subclavian (PICC associated)    Dyslipidemia     Hypertension     Hypertension     Papillary adenocarcinoma     gastric    Pernicious anemia     Sleep apnea        CURRENT MEDICATIONS:     Current Outpatient Medications   Medication Sig Dispense Refill    ferrous sulfate 325 mg (65 mg iron) tablet Take 325 mg by mouth two (2) times a day.  gabapentin (NEURONTIN) 300 mg capsule nightly. 1    CRANBERRY FRUIT EXTRACT (CRANBERRY EXTRACT PO) Take  by mouth daily.  metoprolol succinate (TOPROL-XL) 25 mg XL tablet TAKE 1 TABLET BY MOUTH TWICE DAILY 180 Tab 3    PRADAXA 150 mg capsule Take 1 Cap by mouth two (2) times a day. 180 Cap 3    cyanocobalamin (VITAMIN B12) 1,000 mcg/mL injection INJECT 1 ML IM Q 2 WEEKS  1    digoxin (DIGOX) 0.125 mg tablet TAKE 1 TABLET BY MOUTH ONCE DAILY 90 Tab 3    tiotropium (SPIRIVA WITH HANDIHALER) 18 mcg inhalation capsule Take 1 Cap by inhalation daily. 20 Cap 0    ZX-HG-MG-Fe-Min-Lycopen-Lutein (CENTRUM) 0.4-162-18 mg tab Take  by mouth daily.  pantoprazole (PROTONIX) 40 mg tablet Take 40 mg by mouth daily.  BIOTIN PO Take  by mouth two (2) times a day.  CALCIUM PO Take  by mouth daily.  montelukast (SINGULAIR) 10 mg tablet Take 10 mg by mouth daily.  folic acid 472 mcg tablet Take 400 mcg by mouth daily. ALLERGIES:     Allergies   Allergen Reactions    Codeine Nausea Only     Patient states not allergic    Diltiazem Other (comments)     Patient states not allergic         SURGICAL HISTORY:     Past Surgical History:   Procedure Laterality Date    GASTROJEJUNOSTOMY      12/10 Bilroth II    GASTROJEJUNOSTOMY      12/10 Lia en Y (after Bilroth II)    HX HERNIA REPAIR      NEUROLOGICAL PROCEDURE UNLISTED      neck surgery       SOCIAL HISTORY:     Social History     Socioeconomic History    Marital status:      Spouse name: Not on file    Number of children: Not on file    Years of education: Not on file    Highest education level: Not on file   Tobacco Use    Smoking status: Former Smoker     Last attempt to quit: 1981     Years since quittin.5    Smokeless tobacco: Never Used   Substance and Sexual Activity    Alcohol use:  Yes Alcohol/week: 0.0 standard drinks     Comment: wine nightly    Drug use: No    Sexual activity: Not Currently   Other Topics Concern       FAMILY HISTORY:     Family History   Problem Relation Age of Onset    Heart Attack Father     Breast Cancer Sister     Breast Cancer Sister     Breast Cancer Other         Grandmother - nos       REVIEW OF SYSTEMS:     Negative for fevers, chills, chest pain, shortness of breath, weight loss, recent illness     General: Negative for fever and chills. No unexpected change in weight. Denies fatigue. No change in appetite. Skin: Negative for rash or itching. HEENT: Negative for congestion, sore throat, neck pain and neck stiffness. No change in vision or hearing. Hasn't noted any enlarged lymph nodes in the neck. Cardiovascular:  Negative for chest pain and palpitations. Has not noted pedal edema. Respiratory: Negative for cough, colds, sinus, hemoptysis, shortness of breath and wheezing. Gastrointestinal: Negative for nausea and vomiting, rectal bleeding, coffee ground emesis, abdominal pain, diarrhea and constipation. Genitourinary: Negative for dysuria, frequency urgency, or burning on micturition. No flank pain, no foul smelling urine, no difficulty with initiating urination. Hematological: Negative for bleeding or easy bruising. Musculoskeletal: Negative  for arthralgias, back pain or neck pain. Neurological: Negative for dizziness, seizures or syncopal episodes. Denies headaches. Endocrine: Denies excessive thirst.  No heat/cold intolerance. Psychiatric: Negative for depression or insomnia. PHYSICAL EXAMINATION:     VITALS:   Visit Vitals  /55   Pulse (!) 54   Temp 97.7 °F (36.5 °C) (Oral)   Ht 5' 5.5\" (1.664 m)   Wt 126 lb 9.6 oz (57.4 kg)   LMP  (LMP Unknown)   SpO2 95%   BMI 20.75 kg/m²     GEN:  Well developed, well nourished 66 y.o. female in no acute distress. HEENT: Normocephalic and atraumatic. Eyes: Conjunctivae and EOM are normal.Pupils are equal, round, and reactive to light. External ear normal appearance, external nose normal appearing. Mouth/Throat: Oropharynx is clear and moist, able to handle oral secretions w/out difficulty, airway patent  NECK: Supple. Normal ROM, No lymphadenopathy. Trachea is midline. No bruising, swelling or deformity  RESP: Clear to auscultation bilaterally. No wheezes, rales, rhonchi. Normal effort and breath sounds. No respiratory distress  CARDIO:  Normal rate, regular rhythm and normal heart sounds. No MGR. ABDOMEN: Soft, non-tender, non-distended, normoactive bowel sounds in all four quadrants. There is no tenderness. There is no rebound and no guarding. BACK: No CVA or spinal tenderness  BREAST:  Deferred  PELVIC:    Deferred   RECTAL:  Deferred   :           Deferred  EXTREMITIES: EXAMINATION OF: right shoulder  Examination Right shoulder   Skin Intact   AC joint tenderness -   Biceps tenderness -   Forward flexion/Elevation    Active abduction    Glenohumeral abduction 90   External rotation ROM 90   Internal rotation ROM 70   Apprehension -   Ernestos Relocation -   Jerk -   Load and Shift -   Obriens -   Speeds -   Impingement sign +   Supraspinatus/Empty Can +, 4/5   External Rotation Strength -, 5/5   Lift Off/Belly Press -, 5/5   Neurovascular Intact     NEUROVASCULAR: Sensation intact to light touch and strength grossly intact and symmetrical. No nystagmus. Positive distal pulses and capillary refill. DVT ASSESSMENT:  There is not  calf tenderness. No evidence of DVT seen on physical exam.  MOTOR: In tact  PSYCH: Alert an oriented to person, place and time. Mood, memory, affect, behavior and judgment normal       RADIOGRAPHS & DIAGNOSTIC STUDIES:     MRI/xray reveals :    IMAGING: CT of right shoulder dated 6/13/19 was reviewed and read:  No CT arthrogram evidence of full-thickness or articular surface rotator cuff tear.  Intra-articular contrast does not extend into the subacromial bursa.  -Of note, to full-thickness or near full-thickness supraspinatus tear was identified on 2018 MRI. Bursal sided tears may not be readily identified on CT arthrogram.   Suspected small tear/separation at the superior labrum/chondral junction. AC joint degenerative changes. Type II acromion with mild lateral acromion downsloping and undersurface spurring. ad        MRI of the right shoulder dated 3/7/18 was reviewed and read:   IMPRESSION:  1. No change in a full-thickness or virtually full-thickness tear of supraspinatus within its attachment. No retraction or disproportionate muscle atrophy. 2. Infraspinatus tendinosis as before. 3. Degenerative signal within the labrum, but no tear. 4. 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     XR of the right shoulder dated 8/14/17 was reviewed and read: type 3 acromion, sclerotic changes in the greater tuberosity.     MRI of the right shoulder dated 11/8/16 was reviewed and read   IMPRESSION: Limited study due to motion artifacts. Full-thickness tear of  supraspinatus tendon. Infraspinatus tendinosis. Subacromial bursitis. Suspect  anterior labral tear. AC joint hypertrophy/inflammation present.         LABS:       @  CBC:   Lab Results   Component Value Date/Time    WBC 9.3 07/19/2019 09:29 AM    RBC 3.90 (L) 07/19/2019 09:29 AM    HGB 11.8 (L) 07/19/2019 09:29 AM    HCT 37.8 07/19/2019 09:29 AM    PLATELET 246 88/27/6235 09:29 AM   , BMP:   Lab Results   Component Value Date/Time    Glucose 111 (H) 07/19/2019 09:29 AM    Sodium 142 07/19/2019 09:29 AM    Potassium 4.4 07/19/2019 09:29 AM    Chloride 108 07/19/2019 09:29 AM    CO2 29 07/19/2019 09:29 AM    BUN 17 07/19/2019 09:29 AM    Creatinine 0.68 07/19/2019 09:29 AM    Calcium 8.7 07/19/2019 09:29 AM   , Coagulation:   Lab Results   Component Value Date/Time    Prothrombin time 13.7 07/19/2019 09:29 AM    INR 1.1 07/19/2019 09:29 AM    aPTT 31.0 07/19/2019 09:29 AM    and Radiology review: CXR no acute abnormalities@  Urinalysis reveals UTI - treated  Preoperative labs were reviewed and are substantially within normal limits   EKG: atrial fibrillation no changes. ASSESSMENT:       Encounter Diagnosis   Name Primary?  Rotator cuff arthropathy, right Yes       PLAN:     Again, the alternatives, risks, and complications, as well as expected outcome were discussed. The patient understands and agrees to proceed with right reverse total shoulder arthroplasty. She has been cleared by cardiac and by PCP. Patient given orders listed below:    No orders of the defined types were placed in this encounter.         Ayleen Bailey PA-C  7/29/2019  3:22 PM

## 2019-07-30 ENCOUNTER — ANESTHESIA EVENT (OUTPATIENT)
Dept: SURGERY | Age: 79
DRG: 483 | End: 2019-07-30
Payer: MEDICARE

## 2019-07-31 ENCOUNTER — ANESTHESIA (OUTPATIENT)
Dept: SURGERY | Age: 79
DRG: 483 | End: 2019-07-31
Payer: MEDICARE

## 2019-07-31 ENCOUNTER — HOSPITAL ENCOUNTER (INPATIENT)
Age: 79
LOS: 1 days | Discharge: HOME | DRG: 483 | End: 2019-08-01
Attending: ORTHOPAEDIC SURGERY | Admitting: ORTHOPAEDIC SURGERY
Payer: MEDICARE

## 2019-07-31 PROBLEM — M12.811 ROTATOR CUFF TEAR ARTHROPATHY OF RIGHT SHOULDER: Status: ACTIVE | Noted: 2019-07-31

## 2019-07-31 PROBLEM — M75.101 ROTATOR CUFF TEAR ARTHROPATHY OF RIGHT SHOULDER: Status: ACTIVE | Noted: 2019-07-31

## 2019-07-31 PROCEDURE — 0RRJ00Z REPLACEMENT OF RIGHT SHOULDER JOINT WITH REVERSE BALL AND SOCKET SYNTHETIC SUBSTITUTE, OPEN APPROACH: ICD-10-PCS | Performed by: ORTHOPAEDIC SURGERY

## 2019-07-31 PROCEDURE — 77030003666 HC NDL SPINAL BD -A: Performed by: ORTHOPAEDIC SURGERY

## 2019-07-31 PROCEDURE — 77030013079 HC BLNKT BAIR HGGR 3M -A: Performed by: ORTHOPAEDIC SURGERY

## 2019-07-31 PROCEDURE — 77030019605: Performed by: ORTHOPAEDIC SURGERY

## 2019-07-31 PROCEDURE — 64415 NJX AA&/STRD BRCH PLXS IMG: CPT | Performed by: ANESTHESIOLOGY

## 2019-07-31 PROCEDURE — L3650 SO 8 ABD RESTRAINT PRE OTS: HCPCS | Performed by: ORTHOPAEDIC SURGERY

## 2019-07-31 PROCEDURE — C9290 INJ, BUPIVACAINE LIPOSOME: HCPCS | Performed by: ORTHOPAEDIC SURGERY

## 2019-07-31 PROCEDURE — 74011250636 HC RX REV CODE- 250/636

## 2019-07-31 PROCEDURE — 74011000250 HC RX REV CODE- 250: Performed by: NURSE ANESTHETIST, CERTIFIED REGISTERED

## 2019-07-31 PROCEDURE — 77030006835 HC BLD SAW SAG STRY -B: Performed by: ORTHOPAEDIC SURGERY

## 2019-07-31 PROCEDURE — 77030018074 HC RTVR SUT ARTH4 S&N -B: Performed by: ORTHOPAEDIC SURGERY

## 2019-07-31 PROCEDURE — 74011250636 HC RX REV CODE- 250/636: Performed by: NURSE ANESTHETIST, CERTIFIED REGISTERED

## 2019-07-31 PROCEDURE — 77030018788 HC NDL SUT ANCH -A: Performed by: ORTHOPAEDIC SURGERY

## 2019-07-31 PROCEDURE — 77030020268 HC MISC GENERAL SUPPLY: Performed by: ORTHOPAEDIC SURGERY

## 2019-07-31 PROCEDURE — 77030002933 HC SUT MCRYL J&J -A: Performed by: ORTHOPAEDIC SURGERY

## 2019-07-31 PROCEDURE — 76010000132 HC OR TIME 2.5 TO 3 HR: Performed by: ORTHOPAEDIC SURGERY

## 2019-07-31 PROCEDURE — 65270000029 HC RM PRIVATE

## 2019-07-31 PROCEDURE — 77030018836 HC SOL IRR NACL ICUM -A: Performed by: ORTHOPAEDIC SURGERY

## 2019-07-31 PROCEDURE — C1776 JOINT DEVICE (IMPLANTABLE): HCPCS | Performed by: ORTHOPAEDIC SURGERY

## 2019-07-31 PROCEDURE — 77030013708 HC HNDPC SUC IRR PULS STRY –B: Performed by: ORTHOPAEDIC SURGERY

## 2019-07-31 PROCEDURE — 76942 ECHO GUIDE FOR BIOPSY: CPT | Performed by: ANESTHESIOLOGY

## 2019-07-31 PROCEDURE — 77030040361 HC SLV COMPR DVT MDII -B: Performed by: ORTHOPAEDIC SURGERY

## 2019-07-31 PROCEDURE — 74011250636 HC RX REV CODE- 250/636: Performed by: PHYSICIAN ASSISTANT

## 2019-07-31 PROCEDURE — 76060000036 HC ANESTHESIA 2.5 TO 3 HR: Performed by: ORTHOPAEDIC SURGERY

## 2019-07-31 PROCEDURE — 74011250636 HC RX REV CODE- 250/636: Performed by: ORTHOPAEDIC SURGERY

## 2019-07-31 PROCEDURE — 74011250637 HC RX REV CODE- 250/637: Performed by: INTERNAL MEDICINE

## 2019-07-31 PROCEDURE — 97161 PT EVAL LOW COMPLEX 20 MIN: CPT

## 2019-07-31 PROCEDURE — 97116 GAIT TRAINING THERAPY: CPT

## 2019-07-31 PROCEDURE — 77030027138 HC INCENT SPIROMETER -A: Performed by: ORTHOPAEDIC SURGERY

## 2019-07-31 PROCEDURE — 77030002922 HC SUT FBRWRE ARTH -B: Performed by: ORTHOPAEDIC SURGERY

## 2019-07-31 PROCEDURE — 77030026091 HC NDL SUT TAPR HMRK -A: Performed by: ORTHOPAEDIC SURGERY

## 2019-07-31 PROCEDURE — 76210000016 HC OR PH I REC 1 TO 1.5 HR: Performed by: ORTHOPAEDIC SURGERY

## 2019-07-31 PROCEDURE — 77030031139 HC SUT VCRL2 J&J -A: Performed by: ORTHOPAEDIC SURGERY

## 2019-07-31 PROCEDURE — 3E0T3BZ INTRODUCTION OF ANESTHETIC AGENT INTO PERIPHERAL NERVES AND PLEXI, PERCUTANEOUS APPROACH: ICD-10-PCS | Performed by: ANESTHESIOLOGY

## 2019-07-31 PROCEDURE — 74011000250 HC RX REV CODE- 250

## 2019-07-31 PROCEDURE — 74011250637 HC RX REV CODE- 250/637: Performed by: PHYSICIAN ASSISTANT

## 2019-07-31 DEVICE — COMPONENT SHLDR REVERSED S4: Type: IMPLANTABLE DEVICE | Site: SHOULDER | Status: FUNCTIONAL

## 2019-07-31 DEVICE — STEM HUM SZ 8 SHLDR TI UNIVERS REVERS: Type: IMPLANTABLE DEVICE | Site: SHOULDER | Status: FUNCTIONAL

## 2019-07-31 DEVICE — SCREW BONE L16MM DIA4.5MM PERIPH NONLOCKING FOR MOD GLEN SYS: Type: IMPLANTABLE DEVICE | Site: SHOULDER | Status: FUNCTIONAL

## 2019-07-31 DEVICE — SCREW BNE L20MM DIA45MM PERIPH NONLOCKING FOR MOD GLEN SYS: Type: IMPLANTABLE DEVICE | Site: SHOULDER | Status: FUNCTIONAL

## 2019-07-31 RX ORDER — ACETAMINOPHEN 500 MG
1000 TABLET ORAL EVERY 8 HOURS
Status: DISCONTINUED | OUTPATIENT
Start: 2019-07-31 | End: 2019-08-01 | Stop reason: HOSPADM

## 2019-07-31 RX ORDER — CEFAZOLIN SODIUM 2 G/50ML
2 SOLUTION INTRAVENOUS ONCE
Status: DISCONTINUED | OUTPATIENT
Start: 2019-07-31 | End: 2019-07-31 | Stop reason: HOSPADM

## 2019-07-31 RX ORDER — MONTELUKAST SODIUM 10 MG/1
10 TABLET ORAL DAILY
Status: DISCONTINUED | OUTPATIENT
Start: 2019-08-01 | End: 2019-08-01 | Stop reason: HOSPADM

## 2019-07-31 RX ORDER — OXYCODONE HYDROCHLORIDE 5 MG/1
5 TABLET ORAL
Status: DISCONTINUED | OUTPATIENT
Start: 2019-07-31 | End: 2019-08-01

## 2019-07-31 RX ORDER — SODIUM CHLORIDE, SODIUM LACTATE, POTASSIUM CHLORIDE, CALCIUM CHLORIDE 600; 310; 30; 20 MG/100ML; MG/100ML; MG/100ML; MG/100ML
INJECTION, SOLUTION INTRAVENOUS
Status: DISCONTINUED | OUTPATIENT
Start: 2019-07-31 | End: 2019-07-31

## 2019-07-31 RX ORDER — LANOLIN ALCOHOL/MO/W.PET/CERES
325 CREAM (GRAM) TOPICAL 2 TIMES DAILY
Status: DISCONTINUED | OUTPATIENT
Start: 2019-07-31 | End: 2019-08-01 | Stop reason: HOSPADM

## 2019-07-31 RX ORDER — NALOXONE HYDROCHLORIDE 1 MG/ML
1 INJECTION INTRAMUSCULAR; INTRAVENOUS; SUBCUTANEOUS
Status: ACTIVE | OUTPATIENT
Start: 2019-07-31 | End: 2019-08-01

## 2019-07-31 RX ORDER — AMOXICILLIN 250 MG
1 CAPSULE ORAL DAILY
Status: DISCONTINUED | OUTPATIENT
Start: 2019-07-31 | End: 2019-08-01 | Stop reason: HOSPADM

## 2019-07-31 RX ORDER — FENTANYL CITRATE 50 UG/ML
25 INJECTION, SOLUTION INTRAMUSCULAR; INTRAVENOUS
Status: DISCONTINUED | OUTPATIENT
Start: 2019-07-31 | End: 2019-07-31 | Stop reason: HOSPADM

## 2019-07-31 RX ORDER — SODIUM CHLORIDE 0.9 % (FLUSH) 0.9 %
5-40 SYRINGE (ML) INJECTION AS NEEDED
Status: DISCONTINUED | OUTPATIENT
Start: 2019-07-31 | End: 2019-07-31 | Stop reason: HOSPADM

## 2019-07-31 RX ORDER — SODIUM CHLORIDE 0.9 % (FLUSH) 0.9 %
5-40 SYRINGE (ML) INJECTION EVERY 8 HOURS
Status: DISCONTINUED | OUTPATIENT
Start: 2019-07-31 | End: 2019-07-31 | Stop reason: HOSPADM

## 2019-07-31 RX ORDER — EPHEDRINE SULFATE 50 MG/ML
INJECTION, SOLUTION INTRAVENOUS AS NEEDED
Status: DISCONTINUED | OUTPATIENT
Start: 2019-07-31 | End: 2019-07-31 | Stop reason: HOSPADM

## 2019-07-31 RX ORDER — ONDANSETRON 2 MG/ML
4 INJECTION INTRAMUSCULAR; INTRAVENOUS ONCE
Status: DISCONTINUED | OUTPATIENT
Start: 2019-07-31 | End: 2019-07-31 | Stop reason: HOSPADM

## 2019-07-31 RX ORDER — DEXAMETHASONE SODIUM PHOSPHATE 4 MG/ML
INJECTION, SOLUTION INTRA-ARTICULAR; INTRALESIONAL; INTRAMUSCULAR; INTRAVENOUS; SOFT TISSUE AS NEEDED
Status: DISCONTINUED | OUTPATIENT
Start: 2019-07-31 | End: 2019-07-31 | Stop reason: HOSPADM

## 2019-07-31 RX ORDER — GABAPENTIN 300 MG/1
300 CAPSULE ORAL
Status: DISCONTINUED | OUTPATIENT
Start: 2019-07-31 | End: 2019-08-01 | Stop reason: HOSPADM

## 2019-07-31 RX ORDER — CEFAZOLIN SODIUM 2 G/50ML
2 SOLUTION INTRAVENOUS EVERY 8 HOURS
Status: COMPLETED | OUTPATIENT
Start: 2019-07-31 | End: 2019-08-01

## 2019-07-31 RX ORDER — MORPHINE SULFATE 2 MG/ML
1-2 INJECTION, SOLUTION INTRAMUSCULAR; INTRAVENOUS
Status: DISCONTINUED | OUTPATIENT
Start: 2019-07-31 | End: 2019-08-01 | Stop reason: HOSPADM

## 2019-07-31 RX ORDER — METOPROLOL SUCCINATE 25 MG/1
25 TABLET, EXTENDED RELEASE ORAL 2 TIMES DAILY
Status: DISCONTINUED | OUTPATIENT
Start: 2019-07-31 | End: 2019-08-01 | Stop reason: HOSPADM

## 2019-07-31 RX ORDER — MIDAZOLAM HYDROCHLORIDE 1 MG/ML
2 INJECTION, SOLUTION INTRAMUSCULAR; INTRAVENOUS ONCE
Status: COMPLETED | OUTPATIENT
Start: 2019-07-31 | End: 2019-07-31

## 2019-07-31 RX ORDER — GLYCOPYRROLATE 0.2 MG/ML
INJECTION INTRAMUSCULAR; INTRAVENOUS AS NEEDED
Status: DISCONTINUED | OUTPATIENT
Start: 2019-07-31 | End: 2019-07-31 | Stop reason: HOSPADM

## 2019-07-31 RX ORDER — POLYETHYLENE GLYCOL 3350 17 G/17G
17 POWDER, FOR SOLUTION ORAL DAILY
Status: DISCONTINUED | OUTPATIENT
Start: 2019-07-31 | End: 2019-08-01 | Stop reason: HOSPADM

## 2019-07-31 RX ORDER — SUCCINYLCHOLINE CHLORIDE 20 MG/ML
INJECTION INTRAMUSCULAR; INTRAVENOUS AS NEEDED
Status: DISCONTINUED | OUTPATIENT
Start: 2019-07-31 | End: 2019-07-31 | Stop reason: HOSPADM

## 2019-07-31 RX ORDER — ONDANSETRON 2 MG/ML
8 INJECTION INTRAMUSCULAR; INTRAVENOUS
Status: DISCONTINUED | OUTPATIENT
Start: 2019-07-31 | End: 2019-08-01 | Stop reason: HOSPADM

## 2019-07-31 RX ORDER — CELECOXIB 400 MG/1
400 CAPSULE ORAL ONCE
Status: COMPLETED | OUTPATIENT
Start: 2019-07-31 | End: 2019-07-31

## 2019-07-31 RX ORDER — ROPIVACAINE HYDROCHLORIDE 5 MG/ML
60 INJECTION, SOLUTION EPIDURAL; INFILTRATION; PERINEURAL
Status: COMPLETED | OUTPATIENT
Start: 2019-07-31 | End: 2019-07-31

## 2019-07-31 RX ORDER — HYDROMORPHONE HYDROCHLORIDE 2 MG/ML
0.5 INJECTION, SOLUTION INTRAMUSCULAR; INTRAVENOUS; SUBCUTANEOUS
Status: DISCONTINUED | OUTPATIENT
Start: 2019-07-31 | End: 2019-07-31 | Stop reason: HOSPADM

## 2019-07-31 RX ORDER — LIDOCAINE HYDROCHLORIDE 20 MG/ML
INJECTION, SOLUTION EPIDURAL; INFILTRATION; INTRACAUDAL; PERINEURAL AS NEEDED
Status: DISCONTINUED | OUTPATIENT
Start: 2019-07-31 | End: 2019-07-31 | Stop reason: HOSPADM

## 2019-07-31 RX ORDER — LIDOCAINE HYDROCHLORIDE 10 MG/ML
0.1 INJECTION, SOLUTION EPIDURAL; INFILTRATION; INTRACAUDAL; PERINEURAL AS NEEDED
Status: DISCONTINUED | OUTPATIENT
Start: 2019-07-31 | End: 2019-07-31 | Stop reason: HOSPADM

## 2019-07-31 RX ORDER — SODIUM CHLORIDE 9 MG/ML
50 INJECTION, SOLUTION INTRAVENOUS CONTINUOUS
Status: DISCONTINUED | OUTPATIENT
Start: 2019-07-31 | End: 2019-07-31 | Stop reason: HOSPADM

## 2019-07-31 RX ORDER — ACETAMINOPHEN 500 MG
1000 TABLET ORAL ONCE
Status: COMPLETED | OUTPATIENT
Start: 2019-07-31 | End: 2019-07-31

## 2019-07-31 RX ORDER — PROPOFOL 10 MG/ML
INJECTION, EMULSION INTRAVENOUS AS NEEDED
Status: DISCONTINUED | OUTPATIENT
Start: 2019-07-31 | End: 2019-07-31 | Stop reason: HOSPADM

## 2019-07-31 RX ORDER — PANTOPRAZOLE SODIUM 40 MG/1
40 TABLET, DELAYED RELEASE ORAL DAILY
Status: DISCONTINUED | OUTPATIENT
Start: 2019-07-31 | End: 2019-08-01 | Stop reason: HOSPADM

## 2019-07-31 RX ORDER — DABIGATRAN ETEXILATE 150 MG/1
150 CAPSULE ORAL 2 TIMES DAILY
Status: DISCONTINUED | OUTPATIENT
Start: 2019-08-01 | End: 2019-08-01 | Stop reason: HOSPADM

## 2019-07-31 RX ORDER — ROCURONIUM BROMIDE 10 MG/ML
INJECTION, SOLUTION INTRAVENOUS AS NEEDED
Status: DISCONTINUED | OUTPATIENT
Start: 2019-07-31 | End: 2019-07-31 | Stop reason: HOSPADM

## 2019-07-31 RX ORDER — FOLIC ACID 1 MG/1
1000 TABLET ORAL DAILY
Status: DISCONTINUED | OUTPATIENT
Start: 2019-07-31 | End: 2019-08-01 | Stop reason: HOSPADM

## 2019-07-31 RX ORDER — ONDANSETRON 2 MG/ML
INJECTION INTRAMUSCULAR; INTRAVENOUS AS NEEDED
Status: DISCONTINUED | OUTPATIENT
Start: 2019-07-31 | End: 2019-07-31 | Stop reason: HOSPADM

## 2019-07-31 RX ORDER — ROPIVACAINE HYDROCHLORIDE 5 MG/ML
INJECTION, SOLUTION EPIDURAL; INFILTRATION; PERINEURAL
Status: COMPLETED | OUTPATIENT
Start: 2019-07-31 | End: 2019-07-31

## 2019-07-31 RX ORDER — LABETALOL HCL 20 MG/4 ML
SYRINGE (ML) INTRAVENOUS AS NEEDED
Status: DISCONTINUED | OUTPATIENT
Start: 2019-07-31 | End: 2019-07-31 | Stop reason: HOSPADM

## 2019-07-31 RX ORDER — IPRATROPIUM BROMIDE AND ALBUTEROL SULFATE 2.5; .5 MG/3ML; MG/3ML
3 SOLUTION RESPIRATORY (INHALATION)
Status: DISCONTINUED | OUTPATIENT
Start: 2019-07-31 | End: 2019-08-01 | Stop reason: HOSPADM

## 2019-07-31 RX ORDER — NEOSTIGMINE METHYLSULFATE 5 MG/5 ML
SYRINGE (ML) INTRAVENOUS AS NEEDED
Status: DISCONTINUED | OUTPATIENT
Start: 2019-07-31 | End: 2019-07-31 | Stop reason: HOSPADM

## 2019-07-31 RX ORDER — GABAPENTIN 400 MG/1
400 CAPSULE ORAL ONCE
Status: COMPLETED | OUTPATIENT
Start: 2019-07-31 | End: 2019-07-31

## 2019-07-31 RX ORDER — CEFAZOLIN SODIUM IN 0.9 % NACL 2 G/100 ML
PLASTIC BAG, INJECTION (ML) INTRAVENOUS AS NEEDED
Status: DISCONTINUED | OUTPATIENT
Start: 2019-07-31 | End: 2019-07-31 | Stop reason: HOSPADM

## 2019-07-31 RX ORDER — DIGOXIN 125 MCG
0.12 TABLET ORAL DAILY
Status: DISCONTINUED | OUTPATIENT
Start: 2019-08-01 | End: 2019-08-01 | Stop reason: HOSPADM

## 2019-07-31 RX ORDER — FENTANYL CITRATE 50 UG/ML
100 INJECTION, SOLUTION INTRAMUSCULAR; INTRAVENOUS ONCE
Status: COMPLETED | OUTPATIENT
Start: 2019-07-31 | End: 2019-07-31

## 2019-07-31 RX ADMIN — Medication 5 MG: at 09:50

## 2019-07-31 RX ADMIN — Medication 2.5 MG: at 09:36

## 2019-07-31 RX ADMIN — Medication 2 G: at 07:55

## 2019-07-31 RX ADMIN — ONDANSETRON 4 MG: 2 INJECTION INTRAMUSCULAR; INTRAVENOUS at 09:35

## 2019-07-31 RX ADMIN — BENZOCAINE AND MENTHOL 1 LOZENGE: 15; 3.6 LOZENGE ORAL at 13:17

## 2019-07-31 RX ADMIN — ROCURONIUM BROMIDE 10 MG: 10 INJECTION, SOLUTION INTRAVENOUS at 08:34

## 2019-07-31 RX ADMIN — OXYCODONE HYDROCHLORIDE 5 MG: 5 TABLET ORAL at 20:52

## 2019-07-31 RX ADMIN — SODIUM CHLORIDE: 900 INJECTION, SOLUTION INTRAVENOUS at 07:30

## 2019-07-31 RX ADMIN — ACETAMINOPHEN 1000 MG: 500 TABLET ORAL at 22:00

## 2019-07-31 RX ADMIN — LIDOCAINE HYDROCHLORIDE 40 MG: 20 INJECTION, SOLUTION EPIDURAL; INFILTRATION; INTRACAUDAL; PERINEURAL at 07:37

## 2019-07-31 RX ADMIN — EPHEDRINE SULFATE 5 MG: 50 INJECTION, SOLUTION INTRAVENOUS at 08:13

## 2019-07-31 RX ADMIN — CEFAZOLIN 2 G: 10 INJECTION, POWDER, FOR SOLUTION INTRAVENOUS at 15:54

## 2019-07-31 RX ADMIN — GABAPENTIN 400 MG: 400 CAPSULE ORAL at 06:54

## 2019-07-31 RX ADMIN — FOLIC ACID 1 MG: 1 TABLET ORAL at 15:53

## 2019-07-31 RX ADMIN — FERROUS SULFATE TAB 325 MG (65 MG ELEMENTAL FE) 325 MG: 325 (65 FE) TAB at 18:23

## 2019-07-31 RX ADMIN — GLYCOPYRROLATE 0.4 MG: 0.2 INJECTION INTRAMUSCULAR; INTRAVENOUS at 09:34

## 2019-07-31 RX ADMIN — SODIUM CHLORIDE 50 ML/HR: 900 INJECTION, SOLUTION INTRAVENOUS at 06:54

## 2019-07-31 RX ADMIN — BENZOCAINE AND MENTHOL 1 LOZENGE: 15; 3.6 LOZENGE ORAL at 18:23

## 2019-07-31 RX ADMIN — FAMOTIDINE 20 MG: 10 INJECTION, SOLUTION INTRAVENOUS at 06:55

## 2019-07-31 RX ADMIN — SUCCINYLCHOLINE CHLORIDE 120 MG: 20 INJECTION INTRAMUSCULAR; INTRAVENOUS at 07:39

## 2019-07-31 RX ADMIN — FENTANYL CITRATE 50 MCG: 50 INJECTION INTRAMUSCULAR; INTRAVENOUS at 07:14

## 2019-07-31 RX ADMIN — ROPIVACAINE HYDROCHLORIDE 25 ML: 5 INJECTION, SOLUTION EPIDURAL; INFILTRATION; PERINEURAL at 07:48

## 2019-07-31 RX ADMIN — ACETAMINOPHEN 1000 MG: 500 TABLET ORAL at 06:54

## 2019-07-31 RX ADMIN — CEFAZOLIN 2 G: 10 INJECTION, POWDER, FOR SOLUTION INTRAVENOUS at 23:32

## 2019-07-31 RX ADMIN — DEXAMETHASONE SODIUM PHOSPHATE 4 MG: 4 INJECTION, SOLUTION INTRA-ARTICULAR; INTRALESIONAL; INTRAMUSCULAR; INTRAVENOUS; SOFT TISSUE at 08:54

## 2019-07-31 RX ADMIN — GABAPENTIN 300 MG: 300 CAPSULE ORAL at 23:00

## 2019-07-31 RX ADMIN — MIDAZOLAM HYDROCHLORIDE 2 MG: 2 INJECTION, SOLUTION INTRAMUSCULAR; INTRAVENOUS at 07:14

## 2019-07-31 RX ADMIN — ROCURONIUM BROMIDE 30 MG: 10 INJECTION, SOLUTION INTRAVENOUS at 07:55

## 2019-07-31 RX ADMIN — ROPIVACAINE HYDROCHLORIDE 60 MG: 5 INJECTION, SOLUTION EPIDURAL; INFILTRATION; PERINEURAL at 07:16

## 2019-07-31 RX ADMIN — CELECOXIB 400 MG: 400 CAPSULE ORAL at 06:54

## 2019-07-31 RX ADMIN — BENZOCAINE AND MENTHOL 1 LOZENGE: 15; 3.6 LOZENGE ORAL at 21:28

## 2019-07-31 RX ADMIN — ACETAMINOPHEN 1000 MG: 500 TABLET ORAL at 15:53

## 2019-07-31 RX ADMIN — SENNOSIDES, DOCUSATE SODIUM 1 TABLET: 50; 8.6 TABLET, FILM COATED ORAL at 15:53

## 2019-07-31 RX ADMIN — PROPOFOL 120 MG: 10 INJECTION, EMULSION INTRAVENOUS at 07:39

## 2019-07-31 NOTE — CONSULTS
Consult    Patient: Delores Stahl MRN: [id number]  SSN: [id number]    YOB: 1940  Age: 66 y.o. Sex: female      Subjective:      Delores Stahl is a 66 y.o. female who is being seen for medical consultation and management. All available records, laboratory results and images are being reviewed. Pt was seen post right reverse total shoulder replacement/arthrex, family at bedside. She is alert and awake. No complaint of pain. She has sore throat and has been hard time swallowing. No shortness of breath, able to cough appropriately. Reviewed her medications as per charge and she agreed. Has not taken her medications this AM.     She underwent right reverse total shoulder replacement/arthrex for rotator cuff arthropathy, by Dr. Tariq Rodriguez. No complication. Required general ansthesia.        ROS: no fever/chills, no headache, no dizziness, no facial pain, no sinus congestion, +sore throat  + swallowing pain, No chest pain, no palpitation, no shortness of breath, no abd pain,  No diarrhea, no urinary complaint, no leg pain or swelling    Past Medical History:   Diagnosis Date    Aorta aneurysm     ascending 4.0 cm (CTA 5/15)    Arthritis     Asthma     Atrial fibrillation     CHADS score 1 (-CHF, +HTN, -AGE, -DM, -CVA)    COPD     Depression     nos    DVT     12/10  R Subclavian (PICC associated)    Dyslipidemia     Hypertension     Hypertension     Papillary adenocarcinoma     gastric    Pernicious anemia     Sleep apnea      Past Surgical History:   Procedure Laterality Date    GASTROJEJUNOSTOMY      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    GASTROJEJUNOSTOMY      12/10 Lia en Y (after Bilroth II)    HX HERNIA REPAIR      NEUROLOGICAL PROCEDURE UNLISTED  2006    neck surgery      Family History   Problem Relation Age of Onset    Heart Attack Father     Breast Cancer Sister     Breast Cancer Sister     Breast Cancer Other         Grandmother - nos     Social History Tobacco Use    Smoking status: Former Smoker     Last attempt to quit: 1981     Years since quittin.5    Smokeless tobacco: Never Used   Substance Use Topics    Alcohol use:  Yes     Alcohol/week: 0.0 standard drinks     Comment: wine nightly      Current Facility-Administered Medications   Medication Dose Route Frequency Provider Last Rate Last Dose    [START ON 2019] digoxin (LANOXIN) tablet 0.125 mg  0.125 mg Oral DAILY Sue Beyer [place]        ferrous sulfate tablet 325 mg  325 mg Oral BID Sue Beyer [place]        folic acid (FOLVITE) tablet 1 mg  1,000 mcg Oral DAILY Mortimer Ginsberg, [place]        gabapentin (NEURONTIN) capsule 300 mg  300 mg Oral QHS Mortimer Ginsberg, [place]        metoprolol succinate (TOPROL-XL) XL tablet 25 mg  25 mg Oral BID Osiris Villalobos        [START ON 2019] montelukast (SINGULAIR) tablet 10 mg  10 mg Oral DAILY Mortimer Ginsberg, [place]        oxyCODONE IR (ROXICODONE) tablet 5 mg  5 mg Oral Q4H PRN MATTHEW Villalobos        pantoprazole (PROTONIX) tablet 40 mg  40 mg Oral DAILY Mortimer Ginsberg, [place]        [START ON 2019] dabigatran etexilate (PRADAXA) capsule 150 mg  150 mg Oral BID Sue Beyer PA        tiotropium [facility]) inhalation capsule 18 mcg  1 Cap Inhalation QAM RT Osiris Villalobos        acetaminophen (TYLENOL) tablet 1,000 mg  1,000 mg Oral Q8H Sue Beyer [place]        ceFAZolin (ANCEF) 2g IVPB in 50 mL D5W  2 g IntraVENous Q8H Sue Beyer PA        naloxone [facility]) injection 1 mg  1 mg IntraVENous ONCE PRN Osiris Villalobos        polyethylene glycol (MIRALAX) packet 17 g  17 g Oral DAILY Mortimer Ginsberg, [place]        senna-docusate (PERICOLACE) 8.6-50 mg per tablet 1 Tab  1 Tab Oral DAILY Mortimer Ginsberg, [place]        morphine injection 1-2 mg  1-2 mg IntraVENous Q4H PRN Sue Beyer PA        ondansetron [facility]) injection 8 mg  8 mg IntraVENous Q6H PRN Kayleen, MATTHEW Oseguera        benzocaine-menthol (CEPACOL) lozenge 1 Lozenge  1 Lozenge Mucous Membrane PRN Catalina Crowe MD          Prior to Admission medications    Medication Sig Start Date End Date Taking? Authorizing Provider   ferrous sulfate 325 mg (65 mg iron) tablet Take 325 mg by mouth two (2) times a day. Yes Provider, Historical   gabapentin (NEURONTIN) 300 mg capsule nightly as needed. 6/7/19  Yes Provider, Historical   CRANBERRY FRUIT EXTRACT (CRANBERRY EXTRACT PO) Take  by mouth daily. Yes Provider, Historical   metoprolol succinate (TOPROL-XL) 25 mg XL tablet TAKE 1 TABLET BY MOUTH TWICE DAILY 12/14/16  Yes Angélica Duncan MD   PRADAXA 150 mg capsule Take 1 Cap by mouth two (2) times a day. 12/14/16  Yes Angélica Duncan MD   cyanocobalamin (VITAMIN B12) 1,000 mcg/mL injection Taken weekly on Saturdays 10/4/16  Yes Provider, Historical   digoxin (DIGOX) 0.125 mg tablet TAKE 1 TABLET BY MOUTH ONCE DAILY 7/6/16  Yes Angélica Duncan MD   tiotropium (SPIRIVA WITH HANDIHALER) 18 mcg inhalation capsule Take 1 Cap by inhalation daily. 12/8/15  Yes Bianca Pedraza MD   KQ-SG-EV-Fe-Min-Lycopen-Lutein (CENTRUM) 0.4-162-18 mg tab Take  by mouth daily. Yes Provider, Historical   BIOTIN PO Take  by mouth two (2) times a day. Yes Provider, Historical   CALCIUM PO Take  by mouth daily. Yes Provider, Historical   montelukast (SINGULAIR) 10 mg tablet Take 10 mg by mouth daily. Yes Provider, Historical   oxyCODONE IR (ROXICODONE) 5 mg immediate release tablet Take 1 Tab by mouth every four (4) hours as needed for Pain for up to 7 days. Max Daily Amount: 30 mg. DO NOT TAKE UNTIL AFTER SURGERY (for acute post operative pain) 7/29/19 8/5/19  MATTHEW Castillo   pantoprazole (PROTONIX) 40 mg tablet Take 40 mg by mouth daily. Provider, Historical   folic acid 298 mcg tablet Take 400 mcg by mouth daily.     Provider, Historical       Allergies   Allergen Reactions    Codeine Nausea Only Patient states not allergic    Diltiazem Other (comments)     Patient states not allergic         Objective:     Vitals:    07/31/19 1108 07/31/19 1118 07/31/19 1123 07/31/19 1154   BP: 110/62   102/59   Pulse: (!) 55 (!) 54 (!) 57 63   Resp: 17 23 17 16   Temp:    95.8 °F (35.4 °C)   SpO2: 99% 96% 99% 98%   Weight:       Height:            Lab/Data Review:  Labs: Results:       Chemistry Lab Results   Component Value Date/Time    Sodium 142 07/19/2019 09:29 AM    Potassium 4.4 07/19/2019 09:29 AM    Chloride 108 07/19/2019 09:29 AM    CO2 29 07/19/2019 09:29 AM    Anion gap 5 07/19/2019 09:29 AM    Glucose 111 (H) 07/19/2019 09:29 AM    BUN 17 07/19/2019 09:29 AM    Creatinine 0.68 07/19/2019 09:29 AM    BUN/Creatinine ratio 25 (H) 07/19/2019 09:29 AM    GFR est AA >60 07/19/2019 09:29 AM    GFR est non-AA >60 07/19/2019 09:29 AM    Calcium 8.7 07/19/2019 09:29 AM        CBC w/Diff Lab Results   Component Value Date/Time    WBC 9.3 07/19/2019 09:29 AM    Hemoglobin, POC 11.9 (L) 07/18/2019 09:49 AM    HGB 11.8 (L) 07/19/2019 09:29 AM    Hematocrit, POC 35 (L) 07/18/2019 09:49 AM    HCT 37.8 07/19/2019 09:29 AM    PLATELET 232 58/31/1847 09:29 AM    MCV 96.9 07/19/2019 09:29 AM        Coagulation    Iron/Ferritin    BNP    Cardiac Enzymes    Liver Enzymes    Thyroid Studies Lab Results   Component Value Date/Time    TSH 1.140 08/21/2014 12:00 AM          All Micro Results     None          Intake/Output    Intake/Output Summary (Last 24 hours) at 7/31/2019 1253  Last data filed at 7/31/2019 0942  Gross per 24 hour   Intake --   Output 75 ml   Net -75 ml       Physical Exam:    General:  Cooperative, Not in acute distress, speaks in full sentence while in bed  HEENT: PERRL, EOMI, supple neck, no JVD, dry oral mucosa  Cardiovascular: S1S2 regular, no rub/gallop   Pulmonary: Clear air entry bilaterally, no wheezing, no crackle  GI:  Soft, non tender, non distended, +bs, no guarding   Extremities:  No pedal edema, +distal pulses appreciated   Right shoulder wound dress, dry, no swelling, ice pack placed   Neuro: AOx3, moving all extremities, no gross deficit. Assessment:     1)  Right shoulder, rotator cuff tear arthropathy  2)  S/p Right reverse total shoulder replacement/arthrex  3)  Sore throat. 4)  COPD/asthma, stable without wheezing   5)  HTN, stable  6)  Atrial fibrillation, Persistent  7)  Chronic anticoagulation with Pradaxa   8)  Normocytic anemia, listed with pernicious anemia  9)  H/o papillary adenocarcinoma of gastric         Plan:     Flako Barreto ordered, will ask nurse for assistance. Resume PPI  Can start bronchodilator prn, resume her Spiriva, continue Singulair   She can resume home pradaxa, metoprolol, digoxin   Encourage ICS, OOB and PT/OT follow up. Wound care and pain control per Ortho.      Full code  Continue to follow     Thank You    Signed By: Debby Joseph MD     July 31, 2019

## 2019-07-31 NOTE — PERIOP NOTES
TRANSFER - OUT REPORT:    Verbal report given to Andree ABBOTT)\ on Larissa Zazueta  being transferred to  [phone] for routine post - op       Report consisted of patients Situation, Background, Assessment and   Recommendations(SBAR). Information from the following report(s) SBAR, Kardex and MAR was reviewed with the receiving nurse. Lines:   Peripheral IV 07/31/19 Left; Anterior Wrist (Active)   Site Assessment Clean, dry, & intact 7/31/2019  6:35 AM   Phlebitis Assessment 0 7/31/2019  6:35 AM   Infiltration Assessment 0 7/31/2019  6:35 AM   Dressing Status Clean, dry, & intact; Occlusive 7/31/2019  6:35 AM   Dressing Type Transparent 7/31/2019  6:35 AM   Hub Color/Line Status Pink; Infusing 7/31/2019  6:35 AM        Opportunity for questions and clarification was provided.       Patient transported with:   [place]

## 2019-07-31 NOTE — PROGRESS NOTES
Date of Surgery Update:  Eleanor Limon was seen and examined. History and physical has been reviewed. The patient has been examined.  There have been no significant clinical changes since the completion of the originally dated History and Physical.    Signed By: Graylon Callander, MD     July 31, 2019 7:26 AM

## 2019-07-31 NOTE — BRIEF OP NOTE
BRIEF OPERATIVE NOTE    Date of Procedure: 7/31/2019   Preoperative Diagnosis: Rotator cuff arthropathy, right [M12.811]  Postoperative Diagnosis: Rotator cuff arthropathy, right     Procedure(s):  RIGHT REVERSE TOTAL SHOULDER REPLACEMENT/ARTHREX  Surgeon(s) and Role:     Duaine Frankel, MD - Primary         Surgical Assistant: Kraig Jeffers PA-C    Surgical Staff:  Circ-1: Hima Ontiveros RN  Circ-2: Clifford Beavers RN  Circ-Relief: Lexus Pak RN  Physician Assistant: MATTHEW Gomez  Scrub Tech-1: Nanette Hidalgo  Scrub Tech-2: Akhil ACOSTA  Surg Asst-1: Alicja Mata  Event Time In Time Out   Incision Start 8240    Incision Close       Anesthesia: General   Estimated Blood Loss: 100mL  Specimens: * No specimens in log *   Findings: cuff tear arthropathy   Complications: none patient to recovery room stable  Implants:   Implant Name Type Inv.  Item Serial No.  Lot No. LRB No. Used Action   UNIVERS REVERS MODULAR [id number] SYS, CENTRAL SCREW MODULAR    [id number] Right 1 Implanted   Glenoid  system glenospere,     ARTHREX [id number] Right 1 Implanted   MODULAR GLENOID SYS, BASEPLATE, MODULAR    ARTHREX [id number] Right 1 Implanted   PERIPHERAL SCREW LOCKING    ARTHREX [id number] Right 1 Implanted   SUTURE CUP     ARTHREX [id number] Right 1 Implanted   STEM HUM REV SHLDR SZ8 Martha Scotter - [id number]  STEM HUM REV SHLDR SZ8 -- [employer]IVERS REVERS  ARTHREX [id number] Right 1 Implanted   PERHERAL SCREW, LOCKING    ARTHREX [id number] Right 1 Implanted   SCR BNE NLCK 4.5X20MM -- [employer]IVERS REVERS - [id number]  SCR BNE NLCK 4.5X20MM -- [employer] REVERS  ARTHREX [id number] Right 1 Implanted   COMBINATION HUMERAL INSERT    ARTHREX [id number] Right 1 Implanted   SCR BNE NLCK 4.5X16MM -- [employer]IVERS REVERS - [id number]  SCR BNE NLCK 4.5X16MM -- UNIVERS REVERS  ARTHREX [id number] Right 1 Implanted

## 2019-07-31 NOTE — ANESTHESIA PREPROCEDURE EVALUATION
Anesthetic History               Review of Systems / Medical History  Patient summary reviewed and pertinent labs reviewed    Pulmonary    COPD: moderate    Sleep apnea: No treatment    Asthma : well controlled       Neuro/Psych         Psychiatric history     Cardiovascular    Hypertension: well controlled        Dysrhythmias : atrial fibrillation      Exercise tolerance: >4 METS     GI/Hepatic/Renal                Endo/Other        Arthritis     Other Findings   Comments: FLU SHOT received? YES       If NO, provide reason: Pt did not want a Flu shot    Current Smoker? NO       Elective Surgery? Yes       Abstained from smoking 24 hours prior to anesthesia? N/A    Risk Factors for Postoperative nausea/vomiting:       History of postoperative nausea/vomiting? NO       Female? YES       Motion sickness? NO       Intended opioid administration for postoperative analgesia?   YES           Physical Exam    Airway  Mallampati: II  TM Distance: > 6 cm  Neck ROM: normal range of motion   Mouth opening: Normal     Cardiovascular    Rhythm: irregular  Rate: normal         Dental    Dentition: Full upper dentures     Pulmonary  Breath sounds clear to auscultation               Abdominal  GI exam deferred       Other Findings            Anesthetic Plan    ASA: 3  Anesthesia type: general      Post-op pain plan if not by surgeon: peripheral nerve block single    Induction: Intravenous  Anesthetic plan and risks discussed with: Patient

## 2019-07-31 NOTE — ANESTHESIA POSTPROCEDURE EVALUATION
Procedure(s):  RIGHT REVERSE TOTAL SHOULDER REPLACEMENT/ARTHREX.    general    Anesthesia Post Evaluation      Multimodal analgesia: multimodal analgesia used between 6 hours prior to anesthesia start to PACU discharge  Patient location during evaluation: bedside  Patient participation: complete - patient participated  Level of consciousness: awake  Pain management: adequate  Airway patency: patent  Anesthetic complications: no  Cardiovascular status: stable  Respiratory status: acceptable  Hydration status: acceptable  Post anesthesia nausea and vomiting:  controlled      Vitals Value Taken Time   /49 7/31/2019 11:18 AM   Temp 36.3 °C (97.4 °F) 7/31/2019 10:59 AM   Pulse 57 7/31/2019 11:23 AM   Resp 17 7/31/2019 11:23 AM   SpO2 99 % 7/31/2019 11:23 AM   Vitals shown include unvalidated device data.

## 2019-07-31 NOTE — PROGRESS NOTES
Reason for Admission:  Rotator cuff arthropathy, right [M12.811]  Rotator cuff tear arthropathy of right shoulder [M75.101, M12.811]                  RRAT Score:    12            Plan for utilizing home health:    NO, OUTPATIENT PT                      Likelihood of Readmission:   LOW                         Transition of Care Plan:              Initial assessment completed with patient. Cognitive status of patient: oriented to time, place, person and situation. Face sheet information confirmed:  yes. The patient designates Spouse Yissel Adair to participate in her discharge plan and to receive any needed information. This patient lives in a single family home with patient and spouse. Patient is able to navigate steps as needed. Prior to hospitalization, patient was considered to be independent with ADLs/IADLS : yes . Patient has a current ACP document on file: no  The patient and spouse will be available to transport patient home upon discharge. The patient already has none reported medical equipment available in the home. Patient is not currently active with home health. Patient has not stayed in a skilled nursing facility or rehab. This patient is on dialysis :no      Freedom of choice signed: no. Currently, the discharge plan is Home with outpatient PT    The patient states that she can obtain her medications from the pharmacy, and take her medications as directed. Patient's current insurance is Medicare and [address] [place] [address] Management Interventions  PCP Verified by CM:  Yes  Last Visit to PCP: 07/29/19  Mode of Transport at Discharge: Self  Physical Therapy Consult: Yes  Occupational Therapy Consult: Yes  Current Support Network: Lives with Spouse  Confirm Follow Up Transport: Family  Plan discussed with Pt/Family/Caregiver: Yes  Discharge Location  Discharge Placement: Home with outpatient services        Sanjeev Omer RN  Care Manager  [phone]

## 2019-07-31 NOTE — ANESTHESIA PROCEDURE NOTES
Peripheral Block    Start time: 7/31/2019 7:10 AM  End time: 7/31/2019 7:20 AM  Performed by: Eboni Jurado MD  Authorized by: Eboni Jurado MD       Pre-procedure: Indications: at surgeon's request and post-op pain management    Preanesthetic Checklist: patient identified, risks and benefits discussed, site marked, timeout performed, anesthesia consent given and patient being monitored      Block Type:   Block Type:  Brachial plexus and interscalene  Laterality:  Right  Monitoring:  Standard ASA monitoring, continuous pulse ox, responsive to questions, oxygen, frequent vital sign checks and heart rate  Injection Technique:  Single shot  Procedures: ultrasound guided and nerve stimulator    Patient Position: seated  Prep: chlorhexidine    Location:  Interscalene  Needle Type:  Ultraplex  Needle Gauge:  22 G  Needle Localization:  Ultrasound guidance  Motor Response: minimal motor response >0.4 mA      Assessment:  Number of attempts:  1  Injection Assessment:  Incremental injection every 5 mL, negative aspiration for blood, local visualized surrounding nerve on ultrasound, no paresthesia, ultrasound image on chart and no intravascular symptoms  Patient tolerance:  Patient tolerated the procedure well with no immediate complications  For Vitals see nursing notes.      Monika Andrews MD  7:48 AM

## 2019-07-31 NOTE — PROGRESS NOTES
Problem: Mobility Impaired (Adult and Pediatric)  Goal: *Acute Goals and Plan of Care (Insert Text)  Description  Physical Therapy Goals  Initiated 7/31/2019 and to be accomplished within 7 day(s)  1. Patient will move from supine to sit and sit to supine  in bed with modified independence. 2.  Patient will transfer from bed to chair and chair to bed with modified independence using the least restrictive device. 3.  Patient will perform sit to stand with modified independence. 4.  Patient will ambulate with modified independence for 200 feet with the least restrictive device. 5.  Patient will ascend/descend 3 stairs with 1 handrail(s) with supervision/set-up. To prepare pt for home mobility. PLOF:  Pt lives with  in a 1 story home with 3 steps to enter, 1 railing. Pt ambulated independently in community with no AD prior to this procedure. Outcome: Progressing Towards Goal  PHYSICAL THERAPY EVALUATION    Patient: Lilo Ballesteros (26 y.o. female)  Date: 7/31/2019  Primary Diagnosis: Rotator cuff arthropathy, right [M12.811]  Rotator cuff tear arthropathy of right shoulder [M75.101, M12.811]  Procedure(s) (LRB):  RIGHT REVERSE TOTAL SHOULDER REPLACEMENT/ARTHREX (Right) Day of Surgery   Precautions:   NWB( R UE)    PLOF: See goals section above    ASSESSMENT :  Based on the objective data described below, the patient presents with decreased bilateral LE strength, impaired gait, and decreased functional activity tolerance following admission for R Reverse total shoulder replacement. Pt was cleared by nursing to work with therapy. Pt was received semi-reclined in bed, agreeable to participate in PT . Pt performed supine-sit with min A and bed modified and sit-stand with CGA. Pt displayed good sitting balance and good static/fair dynamic  standing balance. Pt ambulated 15 feet to bathroom with no AD with CGA. Pt transferred on/off toilet with CGA.   She ambulated an additional 15 feet with no AD to recliner and performed stand-sit transfer with CGA with verbal cues for safe hand placement. At conclusion of session, pt was left resting comfortably in chair, needs met, call bell in reach, nurse notified. Encouraged pt to move R hand/squeeze ball as feeling returns to R UE. Patient will benefit from skilled intervention to address the above impairments. Patient's rehabilitation potential is considered to be Good  Factors which may influence rehabilitation potential include:   ? None noted  ? Mental ability/status  ? Medical condition  ? Home/family situation and support systems  ? Safety awareness  ? Pain tolerance/management  ? Other:      PLAN :  Recommendations and Planned Interventions:   ?           Bed Mobility Training             ? Neuromuscular Re-Education  ? Transfer Training                   ? Orthotic/Prosthetic Training  ? Gait Training                          ? Modalities  ? Therapeutic Exercises           ? Edema Management/Control  ? Therapeutic Activities            ? Family Training/Education  ? Patient Education  ? Other (comment):    Frequency/Duration: Patient will be followed by physical therapy 1-2 times per day to address goals. Discharge Recommendations: Outpatient  Further Equipment Recommendations for Discharge: N/A     SUBJECTIVE:   Patient stated  I hate this sling. I want a different one. 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    GASTROJEJUNOSTOMY      12/10 [facility] (after Bilroth II)    HX HERNIA REPAIR      NEUROLOGICAL PROCEDURE UNLISTED  2006    neck surgery     Barriers to Learning/Limitations: None  Compensate with: N/A  Home Situation:  Home Situation  Home Environment: Private residence  # Steps to Enter: 3  Rails to Enter: No  One/Two Story Residence: One story  Living Alone: No  Support Systems: Spouse/Significant Other/Partner  Patient Expects to be Discharged to[de-identified] Private residence  Current DME Used/Available at Home: None  Critical Behavior:  Neurologic State: Alert  Orientation Level: Oriented X4  Cognition: Follows commands     Psychosocial  Patient Behaviors: Calm; Cooperative  Purposeful Interaction: No (comment)    Skin Integrity: Intact  Skin Integumentary  Skin Color: (Right hand fingers warm.)  Skin Integrity: Intact    Strength:    Strength: Within functional limits    Tone & Sensation:   Tone: Normal    Sensation: Impaired(R UE numbness)    Range Of Motion:  AROM: Within functional limits(B LEs)    Functional Mobility:  Bed Mobility:    Supine to Sit: Minimum assistance    Transfers:  Sit to Stand: Contact guard assistance  Stand to Sit: Contact guard assistance    Balance:   Sitting: Intact  Standing: Impaired; Without support  Standing - Static: Good  Standing - Dynamic : Fair  Ambulation/Gait Training:  Distance (ft): 30 Feet (ft)  Assistive Device: (no AD)  Ambulation - Level of Assistance: Contact guard assistance  Gait Description (WDL): Exceptions to WDL  Gait Abnormalities: Decreased step clearance    Speed/Zina: Shuffled; Slow  Step Length: Left shortened;Right shortened      Pain:  Pain level pre-treatment: 4/10 (throat)  Pain level post-treatment: 4/10 (throat)  Pain Intervention(s) : Medication (see MAR);  Rest, Ice, Repositioning  Response to intervention: Nurse notified, See doc flow    Activity Tolerance:   Good  Please refer to the flowsheet for vital signs taken during this treatment. After treatment:   ?         Patient left in no apparent distress sitting up in chair  ? Patient left in no apparent distress in bed  ? Call bell left within reach  ? Nursing notified  ? Caregiver present  ? Bed alarm activated  ? SCDs applied    COMMUNICATION/EDUCATION:   ?         Role of Physical Therapy in the acute care setting. ?         Fall prevention education was provided and the patient/caregiver indicated understanding. ? Patient/family have participated as able in goal setting and plan of care. ?         Patient/family agree to work toward stated goals and plan of care. ?         Patient understands intent and goals of therapy, but is neutral about his/her participation. ? Patient is unable to participate in goal setting/plan of care: ongoing with therapy staff. ?         Other:     Thank you for this referral.  Felice Nunes, PT   Time Calculation: 25 mins      Eval Complexity: History: MEDIUM  Complexity : 1-2 comorbidities / personal factors will impact the outcome/ POC Exam:MEDIUM Complexity : 3 Standardized tests and measures addressing body structure, function, activity limitation and / or participation in recreation  Presentation: MEDIUM Complexity : Evolving with changing characteristics  Clinical Decision Making:Medium Complexity    Overall Complexity:MEDIUM

## 2019-07-31 NOTE — PROGRESS NOTES
conducted a pre-surgery visit with Kirti Wilson, who is a 66 y.o.,female. The  provided the following Interventions:  Initiated a relationship of care and support. Offered prayer and assurance of continued prayers on patient's behalf. Plan:  Chaplains will continue to follow and will provide pastoral care on an as needed/requested basis.  recommends bedside caregivers page  on duty if patient shows signs of acute spiritual or emotional distress.     [address] [facility]   Board Certified [address] [place] [address]   [phone]

## 2019-07-31 NOTE — ROUTINE PROCESS
End of Shift Note     Bedside and verbal shift change report given to Ben Olivares  (On coming nurse) by Poonam Martínez (Off going nurse).   Report included the following information:Procedure Summary, Intake/Output, MAR, Recent Results, Med Rec Status, and SBAR  (Situation, Background, Assessment and Recommendations)    SBAR Recommendations:     Issues for Provider to address: none    Activity This Shift      [] Bedrest   [] Dangle   [x] Chair Ambulated to     [x] Bathroom     [] BSC     [] Refused Ambulated In    [x] Room    [] Hallway     Bladder Scan  Voiding Status [] Yes  [x] Void [] No  [] Sy [] N/A  [] I&O Cath   Bladder Irrigation Managed [] Yes [] No Time Bag Changed    Blood Sugars Managed [x] Yes [] No [] N/A   Bowel Movement  Passing Gas [] Yes  [x] Yes [x] No  [] No    CHG Bath Done     Before Surgery     After Surgery   [x] Yes  [] Yes   [] No  [x] No   [] N/A  [] N/A   Drain Removed [] Yes [] No [x] N/A   Dressing Checked  Dressing Changed [] Yes  [x] Yes [x] No  [] No [] N/A  [] N/A   Nausea/Vomiting [] Yes [x] No    Ice Packs Changed [] Yes [] No [x] N/A   Incentive Spirometer  [x] Yes [] No Volume 2500   Ostomy Assessed Change/Emptied [] Yes  [] Yes [] No  [] No [x] N/A  [x] N/A   SCD Pumps On Bilaterally  Ankle Pumping  [x] Yes  [] Yes [] Yes  [] No [] N/A  [x] N/A    Supplemental O2 [] Yes [] No Sat Off O2    Telemetry Monitoring [] Yes [] No Rhythm NA   Up to Chair for Meals [x] Yes [] No [] N/A   Urinal In Bathroom [] Yes [] No [x] N/A   Urinal Emptied  Bathroom \"Hat\" Emptied [] Yes  [x] Yes [] No  [] No [x] N/A  [] N/A

## 2019-08-01 VITALS
SYSTOLIC BLOOD PRESSURE: 100 MMHG | WEIGHT: 126 LBS | RESPIRATION RATE: 17 BRPM | TEMPERATURE: 98.3 F | HEART RATE: 60 BPM | HEIGHT: 66 IN | OXYGEN SATURATION: 98 % | BODY MASS INDEX: 20.25 KG/M2 | DIASTOLIC BLOOD PRESSURE: 61 MMHG

## 2019-08-01 LAB
ERYTHROCYTE [DISTWIDTH] IN BLOOD BY AUTOMATED COUNT: 14.6 % (ref 11.6–14.5)
HCT VFR BLD AUTO: 34 % (ref 35–45)
HGB BLD-MCNC: 10.5 G/DL (ref 12–16)
MCH RBC QN AUTO: 30 PG (ref 24–34)
MCHC RBC AUTO-ENTMCNC: 30.9 G/DL (ref 31–37)
MCV RBC AUTO: 97.1 FL (ref 74–97)
PLATELET # BLD AUTO: 244 K/UL (ref 135–420)
PMV BLD AUTO: 9.4 FL (ref 9.2–11.8)
RBC # BLD AUTO: 3.5 M/UL (ref 4.2–5.3)
WBC # BLD AUTO: 11.5 K/UL (ref 4.6–13.2)

## 2019-08-01 PROCEDURE — 74011250637 HC RX REV CODE- 250/637: Performed by: PHYSICIAN ASSISTANT

## 2019-08-01 PROCEDURE — 85027 COMPLETE CBC AUTOMATED: CPT

## 2019-08-01 PROCEDURE — 36415 COLL VENOUS BLD VENIPUNCTURE: CPT

## 2019-08-01 PROCEDURE — 97165 OT EVAL LOW COMPLEX 30 MIN: CPT

## 2019-08-01 PROCEDURE — 74011250636 HC RX REV CODE- 250/636: Performed by: PHYSICIAN ASSISTANT

## 2019-08-01 PROCEDURE — 97535 SELF CARE MNGMENT TRAINING: CPT

## 2019-08-01 PROCEDURE — 97530 THERAPEUTIC ACTIVITIES: CPT

## 2019-08-01 PROCEDURE — 97116 GAIT TRAINING THERAPY: CPT

## 2019-08-01 RX ORDER — ACETAMINOPHEN 500 MG
1000 TABLET ORAL EVERY 8 HOURS
Qty: 30 TAB | Refills: 0 | Status: SHIPPED | OUTPATIENT
Start: 2019-08-01 | End: 2019-08-06

## 2019-08-01 RX ORDER — BACLOFEN 10 MG/1
10 TABLET ORAL 3 TIMES DAILY
Status: DISCONTINUED | OUTPATIENT
Start: 2019-08-01 | End: 2019-08-01 | Stop reason: HOSPADM

## 2019-08-01 RX ORDER — OXYCODONE HYDROCHLORIDE 5 MG/1
5-10 TABLET ORAL
Status: DISCONTINUED | OUTPATIENT
Start: 2019-08-01 | End: 2019-08-01 | Stop reason: HOSPADM

## 2019-08-01 RX ORDER — BACLOFEN 10 MG/1
10 TABLET ORAL
Qty: 90 TAB | Refills: 0 | Status: SHIPPED | OUTPATIENT
Start: 2019-08-01

## 2019-08-01 RX ADMIN — DABIGATRAN ETEXILATE MESYLATE 150 MG: 150 CAPSULE ORAL at 09:53

## 2019-08-01 RX ADMIN — MONTELUKAST SODIUM 10 MG: 10 TABLET, FILM COATED ORAL at 09:52

## 2019-08-01 RX ADMIN — POLYETHYLENE GLYCOL 3350 17 G: 17 POWDER, FOR SOLUTION ORAL at 09:52

## 2019-08-01 RX ADMIN — ACETAMINOPHEN 1000 MG: 500 TABLET ORAL at 06:22

## 2019-08-01 RX ADMIN — SENNOSIDES, DOCUSATE SODIUM 1 TABLET: 50; 8.6 TABLET, FILM COATED ORAL at 09:53

## 2019-08-01 RX ADMIN — OXYCODONE HYDROCHLORIDE 10 MG: 5 TABLET ORAL at 12:13

## 2019-08-01 RX ADMIN — MORPHINE SULFATE 2 MG: 2 INJECTION, SOLUTION INTRAMUSCULAR; INTRAVENOUS at 03:15

## 2019-08-01 RX ADMIN — FOLIC ACID 1 MG: 1 TABLET ORAL at 09:54

## 2019-08-01 RX ADMIN — DIGOXIN 0.12 MG: 125 TABLET ORAL at 09:54

## 2019-08-01 RX ADMIN — BACLOFEN 10 MG: 10 TABLET ORAL at 09:53

## 2019-08-01 RX ADMIN — BENZOCAINE AND MENTHOL 1 LOZENGE: 15; 3.6 LOZENGE ORAL at 10:16

## 2019-08-01 RX ADMIN — PANTOPRAZOLE SODIUM 40 MG: 40 TABLET, DELAYED RELEASE ORAL at 09:52

## 2019-08-01 RX ADMIN — FERROUS SULFATE TAB 325 MG (65 MG ELEMENTAL FE) 325 MG: 325 (65 FE) TAB at 09:52

## 2019-08-01 RX ADMIN — CEFAZOLIN 2 G: 10 INJECTION, POWDER, FOR SOLUTION INTRAVENOUS at 09:52

## 2019-08-01 RX ADMIN — METOPROLOL SUCCINATE 25 MG: 25 TABLET, EXTENDED RELEASE ORAL at 09:53

## 2019-08-01 RX ADMIN — TIOTROPIUM BROMIDE 18 MCG: 18 CAPSULE ORAL; RESPIRATORY (INHALATION) at 10:16

## 2019-08-01 NOTE — PROGRESS NOTES
8/1/2019  3:07 AM    Patient appears agitated with complaints about sling/brace. Patient requesting smaller blue arm sling as she \"will not wear this big one in public. \"    Patient complaint of pain related to sling. Adjusted and educated through demonstration and teachback proper alignment of sling and ability to adjust sling as needed when sitting/standing in different positions and ability to remove 'block'. Patient more agreeable but still would like blue sling to go home with. Patient returned to bed; right arm elevated with pillows; and towel roll given for neck. Mirna barraza.

## 2019-08-01 NOTE — PROGRESS NOTES
Problem: Mobility Impaired (Adult and Pediatric)  Goal: *Acute Goals and Plan of Care (Insert Text)  Description  Physical Therapy Goals  Initiated 7/31/2019 and to be accomplished within 7 day(s)  1. Patient will move from supine to sit and sit to supine  in bed with modified independence. 2.  Patient will transfer from bed to chair and chair to bed with modified independence using the least restrictive device. 3.  Patient will perform sit to stand with modified independence. 4.  Patient will ambulate with modified independence for 200 feet with the least restrictive device. 5.  Patient will ascend/descend 3 stairs with 1 handrail(s) with supervision/set-up. To prepare pt for home mobility. PLOF:  Pt lives with  in a 1 story home with 3 steps to enter, 1 railing. Pt ambulated independently in community with no AD prior to this procedure. Outcome: Progressing Towards Goal   PHYSICAL THERAPY TREATMENT    Patient: Samy August (89 y.o. female)  Date: 8/1/2019  Diagnosis: Rotator cuff arthropathy, right [M12.811]  Rotator cuff tear arthropathy of right shoulder [M75.101, M12.811] <principal problem not specified>  Procedure(s) (LRB):  RIGHT REVERSE TOTAL SHOULDER REPLACEMENT/ARTHREX (Right) 1 Day Post-Op  Precautions: NWB( R UE)      ASSESSMENT:  Pt found seated in recliner willing to participate w/ therapy. Pt demonstrated good stability and balance t/o tx all w/ good compliance w/ shoulder precautions. Prior to mobility, precautions reviewed w/ good understanding. There-ex also reviewed/performed w/ no increase in pain. Only pain pt voiced during tx was a persistent pain, which increased during elbow flexion, in medial right arm in area of insertion of coracobrachialis.  Prior to mobility, abductor pillow removed as pt voiced \"it's too heavy\" w/ further complaints of brace in general. When observing bracing w/ arm in proper position, the current brace seemed to line appropriately w/ proper support. However, pt cont to voice discomfort and and need of other brace, pt stated surgical staff aware of needs. From a safety standpoint, pt mobilized well w/ no concerns displaying ability to safely amb w/o AD for 61' and safely perform 4 steps using one hand railing. Pt returned to room to recliner and left in OT care. From a PT standpoint, pt is safe to return home and would benefit OP therapy. Progression toward goals: good   ? Improving appropriately and progressing toward goals  ? Improving slowly and progressing toward goals  ? Not making progress toward goals and plan of care will be adjusted     PLAN:  Patient continues to benefit from skilled intervention to address the above impairments. Continue treatment per established plan of care. Discharge Recommendations:  Outpatient  Further Equipment Recommendations for Discharge: none      SUBJECTIVE:   Patient stated I'm not leaving here until I get that new brace.     OBJECTIVE DATA SUMMARY:   Critical Behavior:  Neurologic State: Alert  Orientation Level: Oriented X4  Cognition: Follows commands  Safety/Judgement: Good awareness of safety precautions, Awareness of environment  Functional Mobility Training:    Transfers:  Sit to Stand: Supervision  Stand to Sit: Supervision  Balance:  Sitting: Intact  Standing: Intact; With support  Standing - Static: Good  Standing - Dynamic : Fair(+)  Ambulation/Gait Training:  Distance (ft): 60 Feet (ft)  Assistive Device: (none)  Ambulation - Level of Assistance: Supervision  Gait Abnormalities: Decreased step clearance  Base of Support: Center of gravity altered  Speed/Zina: Pace decreased (<100 feet/min)  Stairs:  Number of Stairs Trained: 4  Stairs - Level of Assistance: Supervision  Rail Use: Left   Therapeutic Exercises:       EXERCISE   Sets   Reps   Active Active Assist   Passive Self ROM   Comments   Elbow flex/ext 1 10  ? ? ? ?     Wrist flex/ext/  radial/ulnar dev 1 10  ? ? ? ? Hold for 5 secs   Wrist ABCs 1 1X through ? ? ? ? A-Z     Pain:  Pain level pre-treatment: 0/10  Pain level post-treatment: 0/10   0/10 in shoulder joint, pain voiced in medial area near insertion of coracobrachialis     Activity Tolerance:   Good   Please refer to the flowsheet for vital signs taken during this treatment. After treatment:   ? Patient left in no apparent distress sitting up in chair  ? Patient left in no apparent distress in bed  ? Call bell left within reach  ? Nursing notified  ? Caregiver present  ? Bed alarm activated  ? SCDs applied      COMMUNICATION/EDUCATION:   ?         Role of Physical Therapy in the acute care setting. ?         Fall prevention education was provided and the patient/caregiver indicated understanding. ? Patient/family have participated as able in working toward goals and plan of care. ?         Patient/family agree to work toward stated goals and plan of care. ?         Patient understands intent and goals of therapy, but is neutral about his/her participation. ? Patient is unable to participate in stated goals/plan of care: ongoing with therapy staff.   ?         Other:        Chad Olivo PTA   Time Calculation: 23 mins

## 2019-08-01 NOTE — ROUTINE PROCESS
End of Shift Note     Bedside and verbal shift change report given to Mook (On coming nurse) by Lexx Wolfe RN (Off going nurse).   Report included the following information:Procedure Summary, Intake/Output, MAR, Recent Results, Med Rec Status, and SBAR  (Situation, Background, Assessment and Recommendations)    SBAR Recommendations: PT/OT    Issues for Provider to address Safety    Activity This Shift      [] Bedrest   [] Dangle   [] Chair Ambulated to     [x] Bathroom     [] BSC     [] Refused Ambulated In    [] Room    [x] Hallway     Bladder Scan  Voiding Status [] Yes  [] Void [] No  [] Sy [x] N/A  [] I&O Cath   Bladder Irrigation Managed [] Yes [x] No Time Bag Changed    Blood Sugars Managed [] Yes [x] No [x] N/A   Bowel Movement  Passing Gas [] Yes  [x] Yes [x] No  [] No    CHG Bath Done     Before Surgery     After Surgery   [] Yes  [x] Yes   [] No  [] No   [x] N/A  [] N/A   Drain Removed [] Yes [x] No [] N/A   Dressing Checked  Dressing Changed [x] Yes  [] Yes [] No  [x] No [] N/A  [] N/A   Nausea/Vomiting [] Yes [x] No    Ice Packs Changed [x] Yes [] No [] N/A   Incentive Spirometer  [x] Yes [] No Volume    Ostomy Assessed Change/Emptied [] Yes  [] Yes [] No  [] No [x] N/A  [x] N/A   SCD Pumps On Bilaterally  Ankle Pumping  [x] Yes  [] Yes [] Yes  [] No [] N/A  [x] N/A    Supplemental O2 [] Yes [x] No Sat Off O2    Telemetry Monitoring [] Yes [x] No Rhythm    Up to Chair for Meals [x] Yes [] No [] N/A   Urinal In Bathroom [x] Yes [] No [] N/A   Urinal Emptied  Bathroom \"Hat\" Emptied [] Yes  [x] Yes [] No  [] No [x] N/A  [] N/A

## 2019-08-01 NOTE — OP NOTES
[address] [place] [address]   OPERATIVE REPORT    Name:  April Mccain  MR#:   [id number]  :  1940  ACCOUNT #:  [de-identified]  DATE OF SERVICE:  2019    PREOPERATIVE DIAGNOSIS:  Cuff tear arthropathy, right shoulder. POSTOPERATIVE DIAGNOSIS:  Cuff tear arthropathy, right shoulder. PROCEDURE PERFORMED:  Reverse total shoulder arthroplasty, right shoulder. SURGEON:  Landon Godinez MD.    ASSISTANT:  MATTHEW Meeks. Shai Denise was instrumental in assisting, managing soft tissue while soft tissue and bony work is done as well as assisting in positioning and closure. ANESTHESIA:  General.    COMPLICATIONS:  None. SPECIMENS REMOVED:  None. IMPLANTS:  Per brief op note. ESTIMATED BLOOD LOSS:  30 mL. BRIEF HISTORY:  The patient has had significant amount of problems with the right shoulder, not responding to conservative treatment, was consented for surgery after having discussed at length possible risks and complications of surgery including infection, bleeding, recurrence of pain among other possible problems. PROCEDURE IN DETAIL:  The patient was taken to the operating room, placed under general endotracheal anesthesia by the Anesthesia staff, placed in a standard beach chair position. The right arm was prepped with ChloraPrep solution and draped as a free sterile field. A deltopectoral approach was used. Subcutaneous tissues dissected sharply to the level of the interval, which was developed protecting the cephalic vein medially. The superior third of the pectoralis tendon was divided and the conjoint tendon was retracted medially and deltoid laterally protecting the musculocutaneous axillary nerves at all times. The circumflex vessels were ligated. A subscapularis tenotomy performed and the capsule was divided. The joint was being accessed through the top and it was reamed to 5 and 6 mm.   Cutting guide was placed and a 135-degree cut was accomplished to the anatomic neck. Attention was then placed in the glenoid where it was exposed by moving the capsule circumferentially. The guide pin was placed and was measured at 33. The central hole was measured at 25, it was drilled and tapped, and a 33 baseplate was then inserted. Stabilized with 4 screws, 2 locking screws and 2 non-locking screws. A +4 glenosphere was then placed and secured with the screw. Attention was then placed in the humerus where the implant size 8 mm thought to be appropiate and a 36 +3 humeral insert. Humerus was reduced, was stable in all planes of range of motion. Subscapularis was reattached with 4 holes to the bone and 7 sutures of FiberWire. Exofin was then injected. The deltopectoral interval was closed with 0 Vicryl, subcutaneous tissues with 2-0 Vicryl, and the skin with 4-0 Monocryl. Sterile dressings were applied. The patient tolerated the procedure well and was taken to recovery room without problems.       Katelynn Reynolds MD      [id number]  D:  07/31/2019 15:05  T:  08/01/2019 1:23  JOB #:  [id number]

## 2019-08-01 NOTE — PROGRESS NOTES
D/C orders received. No needs identified by . Patient will follow up with outpatient PT. Chart reviewed. Pt will be transported home by .  available as needed.     Lara Aguilar, RN  Care Manager  [phone]

## 2019-08-01 NOTE — DISCHARGE INSTRUCTIONS
DISCHARGE SUMMARY from Nurse    PATIENT INSTRUCTIONS:    After general anesthesia or intravenous sedation, for 24 hours or while taking prescription Narcotics:  · Limit your activities  · Do not drive and operate hazardous machinery  · Do not make important personal or business decisions  · Do  not drink alcoholic beverages  · If you have not urinated within 8 hours after discharge, please contact your surgeon on call. Report the following to your surgeon:  · Excessive pain, swelling, redness or odor of or around the surgical area  · Temperature over 100.5  · Nausea and vomiting lasting longer than 4 hours or if unable to take medications  · Any signs of decreased circulation or nerve impairment to extremity: change in color, persistent  numbness, tingling, coldness or increase pain  · Any questions    What to do at Home:  Recommended activity: Activity as tolerated,     If you experience any of the following symptoms  Fever, chills, shorntes of breath, please follow up with md    *  Please give a list of your current medications to your Primary Care Provider. *  Please update this list whenever your medications are discontinued, doses are      changed, or new medications (including over-the-counter products) are added. *  Please carry medication information at all times in case of emergency situations. These are general instructions for a healthy lifestyle:    No smoking/ No tobacco products/ Avoid exposure to second hand smoke  Surgeon General's Warning:  Quitting smoking now greatly reduces serious risk to your health.     Obesity, smoking, and sedentary lifestyle greatly increases your risk for illness    A healthy diet, regular physical exercise & weight monitoring are important for maintaining a healthy lifestyle    You may be retaining fluid if you have a history of heart failure or if you experience any of the following symptoms:  Weight gain of 3 pounds or more overnight or 5 pounds in a week, increased swelling in our hands or feet or shortness of breath while lying flat in bed. Please call your doctor as soon as you notice any of these symptoms; do not wait until your next office visit. The discharge information has been reviewed with the patient. The patient verbalized understanding. Discharge medications reviewed with the {[facility] meds reviewed PKME:[id number]} and appropriate educational materials and side effects teaching were provided. ___________________________________________________________________________________________________________________________________Dr. Sherrill Tyler Total Shoulder Postoperative Information    How to manage your pain after your Surgery:  After your surgery it is expected that you will have some pain. In efforts to reduce the amounts of side effects from pain medications we would like you to follow the below medication regimen after surgery:  1. Take 1000mg of Tylenol by mouth every 8 hours x 5 days. This is 4 tabs of regular strength Tylenol or 2 tabs of Extra Strength Tylenol  2. Then ONLY IF YOUR PAIN IS UNCONTROLLED you may take your Narcotic Pain medication as prescribed. THIS IS THE PRESCRIPTION THAT WAS GIVEN TO YOU IN THE OFFICE. You should place an ice bag over your shoulder to help with pain and reduce swelling. Your shoulder may be sore and develop bruising over the next several days. The bruising should resolve and no special care will be needed. Leave your bandage on until we see you in the office next week. It is safe to take a shower when you get home    You will be given a sling to use. Continue to wear this while you are active or up and moving around. OK to take off if you are sedentary. No moving the arm away from your body on your own, reaching or carrying with the operated arm. There has been an operation inside and around the shoulder joint. Complete healing may take weeks or several months.       If you have a high temperature, unexpected pain, redness or swelling in your shoulder please contact my office immediately. Please call to make an appointment to see me in my office in 1 week.      Dr. Osvaldo Gaston office number [phone]

## 2019-08-01 NOTE — PROGRESS NOTES
Problem: Self Care Deficits Care Plan (Adult)  Goal: *Acute Goals and Plan of Care (Insert Text)  Outcome: Resolved/Met     OCCUPATIONAL THERAPY EVALUATION/DISCHARGE    Patient: Rafael Chery (89 y.o. female)  Date: 8/1/2019  Primary Diagnosis: Rotator cuff arthropathy, right [M12.811]  Rotator cuff tear arthropathy of right shoulder [M75.101, M12.811]  Procedure(s) (LRB):  RIGHT REVERSE TOTAL SHOULDER REPLACEMENT/ARTHREX (Right) 1 Day Post-Op   Precautions: NWB( R UE)  PLOF: Patient was independent with self-care and functional mobility PTA. ASSESSMENT AND RECOMMENDATIONS:  Pt cleared to participate in OT evaluation by RN. Upon entering the room, pt was seated in chair, alert, and agreeable to participate in OT evaluation. Patient is modified-independent- supervision (vcs for no shoulder movement) with basic self-care and Supervision with functional transfers. Patient educated on shoulder precautions including no active use of shoulder, proper sling application/wear and importance of elbow flex/ext, wrist flex/ext and ulnar and radial deviation, as well as active finger and hand movement to prevent edema and maintain function. Patient educated on compensatory strategies for self care tasks including bathing and dressing and able to dress self for d/c home. Based on the objective data described below, the patient presents with no deficits that impede pt function with ADLs, functional transfers, and functional mobility. At this time, pt with no further skilled OT needs. OT to d/c from caseload. Skilled occupational therapy is not indicated at this time.   Discharge Recommendations: Outpatient  Further Equipment Recommendations for Discharge: N/A      SUBJECTIVE:   Patient stated I dont like this sling at all    OBJECTIVE DATA SUMMARY:     Past Medical History:   Diagnosis Date    Aorta aneurysm     ascending 4.0 cm (CTA 5/15)    Arthritis     Asthma     Atrial fibrillation     CHADS score 1 (-CHF, +HTN, -AGE, -DM, -CVA)    COPD     Depression     nos    DVT     12/10  R Subclavian (PICC associated)    Dyslipidemia     Hypertension     Hypertension     Papillary adenocarcinoma     gastric    Pernicious anemia     Sleep apnea      Past Surgical History:   Procedure Laterality Date    GASTROJEJUNOSTOMY      12/10 [facility]th II    GASTROJEJUNOSTOMY      12/10 [facility] (after Bilroth II)    HX HERNIA REPAIR      NEUROLOGICAL PROCEDURE UNLISTED  2006    neck surgery     Barriers to Learning/Limitations: None  Compensate with: visual, verbal, tactile, kinesthetic cues/model    Home Situation:   Home Situation  Home Environment: Private residence  # Steps to Enter: 3  Rails to Enter: No  One/Two Story Residence: One story  Living Alone: No  Support Systems: Spouse/Significant Other/Partner  Patient Expects to be Discharged to[de-identified] Private residence  Current DME Used/Available at Home: None  Tub or Shower Type: Tub/Shower combination  ? Right hand dominant   ? Left hand dominant    Cognitive/Behavioral Status:  Neurologic State: Alert  Orientation Level: Oriented X4  Cognition: Follows commands  Safety/Judgement: Good awareness of safety precautions; Awareness of environment    Skin: Intact  Edema: None noted    Vision/Perceptual:    Acuity: Within Defined Limits      Balance:  Sitting: Intact  Standing: Intact; With support  Standing - Static: Good  Standing - Dynamic : Fair(+)    Strength: BUE  Strength:  Within Functional Limits ( strength)    Tone & Sensation: BUE  Tone: Normal  Sensation: Impaired (BUE numbness)    Range of Motion: BUE  AROM: Within Functional Limits (LUE and R wrist, hand, elbow, shoulder within precautions)  PROM: Within Functional Limits (RUE)      Functional Mobility and Transfers for ADLs:  Transfers:  Sit to Stand: Supervision  Stand to Sit: Supervision      ADL Assessment:  Feeding: Modified Independent  Grooming: Modified Independent  Bathing: Modified Independent  Upper Body Dressing: Independent  Lower Body Dressing: Independent  Toileting: Modified Independent    ADL Intervention:  Feeding  Feeding Assistance: Modified independent  Food to Mouth: Modified independent    Upper Body Dressing Assistance  Dressing Assistance: Modified independent  Bra: Supervision  Orthotics(Brace): Supervision(sling; vcs for straps)  Pullover Shirt: Modified independent    Lower Body Dressing Assistance  Dressing Assistance: Modified independent  Underpants: Modified independent  Pants With Button/Zipper: Modified independent  Socks: Modified independent  Leg Crossed Method Used: Yes  Position Performed: Seated in chair      Cognitive Retraining  Safety/Judgement: Good awareness of safety precautions; Awareness of environment    Pain:  Pain level pre-treatment: 5/10, RUE  Pain level post-treatment: 5/10, RUE  Pain Intervention(s): Medication (see MAR); Response to intervention: See doc flow    Activity Tolerance:   Patient demonstrated good activity tolerance    Please refer to the flowsheet for vital signs taken during this treatment. After treatment:   ?  Patient left in no apparent distress sitting up in chair  ? Patient left in no apparent distress in bed  ? Call bell left within reach  ? Nursing notified  ? Caregiver present  ? Bed alarm activated    COMMUNICATION/EDUCATION:   ?      Role of Occupational Therapy in the acute care setting  ? Home safety education was provided and the patient/caregiver indicated understanding. ? Patient/family have participated as able and agree with findings and recommendations. ?      Patient is unable to participate in plan of care at this time. Thank you for this referral.  Luz Marina Parker, OTR/L  Time Calculation: 48 mins      Eval Complexity: History: LOW Complexity : Brief history review ;    Examination: LOW Complexity : 1-3 performance deficits relating to physical, cognitive , or psychosocial skils that result in activity limitations and / or participation restrictions ;    Decision Making:LOW Complexity : No comorbidities that affect functional and no verbal or physical assistance needed to complete eval tasks

## 2019-08-01 NOTE — PROGRESS NOTES
Hospitalist Progress Note    Patient: Osmin Urban Age: 66 y.o. : 1940 MR#: [id number] SSN: [id number]  Date/Time: 2019 11:49 AM    DOA: 2019  PCP: Chong Mae MD    Subjective:     Pt found sitting in chair post PT/OT work this morning. Pain in right shoulder. No fever. Tolerated pain medications last night. Feels left hand shaking but no there complaint. Has tolerated resume all home medication. Stable lab. Stable vitals    ROS: no fever/chills, no headache, no dizziness, no facial pain, no sinus congestion,   No swallowing pain, No chest pain, no palpitation, no shortness of breath, no abd pain,  No diarrhea, no urinary complaint, no leg pain or swelling, +right shoulder pain      Assessment/Plan:     1)  Right shoulder, rotator cuff tear arthropathy  2)  S/p Right reverse total shoulder replacement/arthrex, POD#1  3)  Sore throat. improved  4)  COPD/asthma, stable without wheezing   5)  HTN, stable  6)  Atrial fibrillation, Persistent  7)  Chronic anticoagulation with Pradaxa   8)  Normocytic anemia, listed with pernicious anemia, stable   9)  H/o papillary adenocarcinoma of gastric     OK for home discharge. Pain management per Ortho. Wound care follow up OP. Resume home medications. Encourage continue ICS and PT outpt     Thank you. Additional Notes:    Time spent >20 minutes    Case discussed with:  [x]Patient  [x]Family  [x]Nursing  []Case Management  DVT Prophylaxis:  [x]Lovenox  []Hep SQ  []SCDs  []Coumadin   []On Heparin gtt    Signed By: Shae Chavez MD     2019 11:49 AM              Objective:   VS:   Visit Vitals  /61 (BP 1 Location: Left arm)   Pulse 60   Temp 98.3 °F (36.8 °C)   Resp 17   Ht 5' 5.5\" (1.664 m)   Wt 57.2 kg (126 lb)   LMP  (LMP Unknown)   SpO2 98%   Breastfeeding?  No   BMI 20.65 kg/m²      Tmax/24hrs: Temp (24hrs), Av.3 °F (36.3 °C), Min:95.8 °F (35.4 °C), Max:98.3 °F (36.8 °C)      Intake/Output Summary (Last 24 hours) at 8/1/2019 1149  Last data filed at 8/1/2019 4300  Gross per 24 hour   Intake 840 ml   Output 1402 ml   Net -562 ml       General:  Cooperative, Not in acute distress, speaks in full sentence while in bed  HEENT: PERRL, EOMI, supple neck, no JVD, dry oral mucosa  Cardiovascular: S1S2 irregular, no rub/gallop   Pulmonary: Clear air entry bilaterally, no wheezing, no crackle  GI:  Soft, non tender, non distended, +bs, no guarding   Extremities:  No pedal edema, +distal pulses appreciated   Right shoulder dress wound clean/dry  Neuro: AOx3, moving all extremities, no gross deficit.      Additional:       Current Facility-Administered Medications   Medication Dose Route Frequency    oxyCODONE IR (ROXICODONE) tablet 5-10 mg  5-10 mg Oral Q4H PRN    baclofen (LIORESAL) tablet 10 mg  10 mg Oral TID    digoxin (LANOXIN) tablet 0.125 mg  0.125 mg Oral DAILY    ferrous sulfate tablet 325 mg  325 mg Oral BID    folic acid (FOLVITE) tablet 1 mg  1,000 mcg Oral DAILY    gabapentin (NEURONTIN) capsule 300 mg  300 mg Oral QHS    metoprolol succinate (TOPROL-XL) XL tablet 25 mg  25 mg Oral BID    montelukast (SINGULAIR) tablet 10 mg  10 mg Oral DAILY    pantoprazole (PROTONIX) tablet 40 mg  40 mg Oral DAILY    dabigatran etexilate (PRADAXA) capsule 150 mg  150 mg Oral BID    tiotropium (SPIRIVA) inhalation capsule 18 mcg  1 Cap Inhalation QAM RT    acetaminophen (TYLENOL) tablet 1,000 mg  1,000 mg Oral Q8H    polyethylene glycol (MIRALAX) packet 17 g  17 g Oral DAILY    senna-docusate (PERICOLACE) 8.6-50 mg per tablet 1 Tab  1 Tab Oral DAILY    morphine injection 1-2 mg  1-2 mg IntraVENous Q4H PRN    ondansetron (ZOFRAN) injection 8 mg  8 mg IntraVENous Q6H PRN    benzocaine-menthol (CEPACOL) lozenge 1 Lozenge  1 Lozenge Mucous Membrane QID    albuterol-ipratropium (DUO-NEB) 2.5 MG-0.5 MG/3 ML  3 mL Nebulization Q6H PRN            Lab/Data Review:  Labs: Results:       Chemistry No results for input(s): GLU, NA, K, CL, CO2, BUN, CREA, BUCR, AGAP, CA, PHOS in the last 72 hours. No results for input(s): TBIL, ALT, SGOT, ALKP, TP, ALB, GLOB, AGRAT in the last 72 hours. CBC w/Diff Recent Labs     08/01/19  0540   WBC 11.5   RBC 3.50*   HGB 10.5*   HCT 34.0*   MCV 97.1*   MCH 30.0   MCHC 30.9*   RDW 14.6*         Coagulation No results for input(s): PTP, INR, APTT in the last 72 hours. No lab exists for component: INREXT    Iron/Ferritin Lab Results   Component Value Date/Time    Iron 43 10/29/2010 04:30 AM    TIBC 297 10/29/2010 04:30 AM    Iron % saturation 14 (L) 10/29/2010 04:30 AM    Ferritin 73 10/30/2010 03:05 AM       BNP    Cardiac Enzymes Lab Results   Component Value Date/Time    Troponin-I, QT 0.02 06/05/2017 04:58 PM        Lactic Acid    Thyroid Studies          All Micro Results     None            Images:    CT (Most Recent). XRAY (Most Recent)      EKG Results for orders placed or performed in visit on 07/15/14   AMB POC EKG ROUTINE W/ 12 LEADS, INTER & REP     Status: None    Impression    Atrial fibrillation. Diffuse ST abnormality.  Consider digoxin  effect         2D ECHO

## 2019-08-01 NOTE — ROUTINE PROCESS
Pt given discharge instructions. Pt verbalized understanding.  IV d/cd no signs of infection noted. Each discharge page verified that it matched pts name. Pt d/cd to home. Pt stable. Merry Amezcua

## 2019-08-01 NOTE — DISCHARGE SUMMARY
Discharge Summary    Patient: Giana Kenny               Sex: female          DOA: 7/31/2019         YOB: 1940      Age:  66 y.o.        LOS:  LOS: 1 day                Admit Date: 7/31/2019    Discharge Date: 8/1/2019    Admission Diagnoses: Rotator cuff arthropathy, right [M12.811]  Rotator cuff tear arthropathy of right shoulder [M75.101, M12.811]    Discharge Diagnoses:    Problem List as of 8/1/2019 Date Reviewed: 7/29/2019          Codes Class Noted - Resolved    Rotator cuff tear arthropathy of right shoulder ICD-10-CM: M75.101, M12.811  ICD-9-CM: 716.81  7/31/2019 - Present        Chronic cholecystitis with calculus (Chronic) ICD-10-CM: K80.10  ICD-9-CM: 574.10  12/28/2016 - Present        Aorta aneurysm ICD-10-CM: I71.9  ICD-9-CM: 441.9  Unknown - Present    Overview Signed 11/17/2016  8:02 AM by Jeffrey DILLON     ascending 4.0 cm (CTA 5/15)             Right shoulder pain ICD-10-CM: M25.511  ICD-9-CM: 719.41  10/27/2016 - Present        Postlaminectomy syndrome ICD-10-CM: M96.1  ICD-9-CM: 722.80  10/27/2016 - Present        Hypertension ICD-10-CM: I11.9  ICD-9-CM: 402.10  Unknown - Present        Dyslipidemia ICD-10-CM: E78.5  ICD-9-CM: 272.4  Unknown - Present        Atrial fibrillation  ICD-10-CM: I48.91  ICD-9-CM: 427.31  Unknown - Present    Overview Signed 5/15/2012  4:24 PM by Jeffrey DILLON     CHADS score 1 (-CHF, +HTN, -AGE, -DM, -CVA)             Malignant neoplasm of stomach ([facility] [address] ICD-10-CM: C16.9  ICD-9-CM: 151.9  12/9/2010 - Present              Discharge Condition: Good    Discharge Destination: Home    Hospital Course: 66 y.o. female who is s/p right reverse total shoulder arthroplasty. She is doing well post operatively. Notes some spasms PO day #1. Overall pain controlled. States that \"her sling sucks and we should be better\". That is her only complaint. Her labs and vitals remained stable. Denied chest pain, sob. Only fatigued from lack of sleep. Hospitalist, PT and OT saw patient. Received 23 hours of prophylactic abx. Patient verbalized readiness to be discharged to home. Consults: Hospitalist    Significant Diagnostic Studies: labs: CBC    Discharge Medications:     Current Discharge Medication List      START taking these medications    Details   baclofen (LIORESAL) 10 mg tablet Take 1 Tab by mouth three (3) times daily as needed for Other (spasms). Qty: 90 Tab, Refills: 0      acetaminophen (TYLENOL EXTRA STRENGTH) 500 mg tablet Take 2 Tabs by mouth every eight (8) hours for 5 days. Qty: 30 Tab, Refills: 0         CONTINUE these medications which have NOT CHANGED    Details   ferrous sulfate 325 mg (65 mg iron) tablet Take 325 mg by mouth two (2) times a day.      gabapentin (NEURONTIN) 300 mg capsule nightly as needed. Refills: 1      CRANBERRY FRUIT EXTRACT (CRANBERRY EXTRACT PO) Take  by mouth daily. metoprolol succinate (TOPROL-XL) 25 mg XL tablet TAKE 1 TABLET BY MOUTH TWICE DAILY  Qty: 180 Tab, Refills: 3      PRADAXA 150 mg capsule Take 1 Cap by mouth two (2) times a day. Qty: 180 Cap, Refills: 3      cyanocobalamin (VITAMIN B12) 1,000 mcg/mL injection Taken weekly on Saturdays  Refills: 1      digoxin (DIGOX) 0.125 mg tablet TAKE 1 TABLET BY MOUTH ONCE DAILY  Qty: 90 Tab, Refills: 3      tiotropium (SPIRIVA WITH HANDIHALER) 18 mcg inhalation capsule Take 1 Cap by inhalation daily. Qty: 20 Cap, Refills: 0      DB-AB-QQ-Fe-Min-Lycopen-Lutein (CENTRUM) 0.4-162-18 mg tab Take  by mouth daily. BIOTIN PO Take  by mouth two (2) times a day. CALCIUM PO Take  by mouth daily. montelukast (SINGULAIR) 10 mg tablet Take 10 mg by mouth daily. oxyCODONE IR (ROXICODONE) 5 mg immediate release tablet Take 1 Tab by mouth every four (4) hours as needed for Pain for up to 7 days.  Max Daily Amount: 30 mg. DO NOT TAKE UNTIL AFTER SURGERY (for acute post operative pain)  Qty: 40 Tab, Refills: 0    Associated Diagnoses: Rotator cuff arthropathy, right      pantoprazole (PROTONIX) 40 mg tablet Take 40 mg by mouth daily. folic acid 098 mcg tablet Take 400 mcg by mouth daily. Activity: No heavy lifting, pushing, pulling with the implant side for 2 months, No driving for 3 weeks, PT/OT Eval and Treat and See surgical instructions    Diet: Cardiac Diet    Wound Care: Keep wound clean and dry, Reinforce dressing PRN and Ice to area for comfort    Follow-up: On Thursday 8/8/19 with ortho.  2 weeks with PCP      DARCIE [facility]

## 2019-08-01 NOTE — ROUTINE PROCESS
Mobility Intervention:       [] Pt dangled at edge of bed    [] Pt assisted OOB to bedside commode    [] Pt assisted OOB to chair    [x] Pt ambulated to bathroom    [x] Patient was ambulated in room/hallway    Assistive Device Utilized:       [] Rolling walker   [] Crutches   [] Straight Cane   [] Knee immobilizer   [] IV pole    After Mobilization:     [] Patient left in no apparent distress sitting up in chair  [x] Patient left in no apparent distress in bed  [x] Call bell left within reach  [x] SCDs on & machine turned on  [] Ice applied   RN notified  [] Caregiver present  [] Bed alarm activated    Reason patient not mobilized:      [] Patient refused   [] Nausea/vomiting   [] Low blood pressure   [] Drowsy/lethargic    Pain Rating:     [] 0  [] 1  Assistive Device:        [] 2  [] 3  [] 4  [] 5  [] 6  Assistive Device:        [] 7  [] 8  [] 9  [] 10    Comments:

## 2019-08-01 NOTE — PROGRESS NOTES
Sandra 51  rounded on patient post shoulder surgery    Educated on:  . Activity:  OOB for all meals, walk every hour to prevent blood clots  Follow shoulder restrictions of no active ROM. VTE prophylaxis:   Use SCD pumps except when walking. Ankle pumps 10 times an hour at hospital & home. If chemical prophylaxis is order take per MD direction. Do not skip a dose. Call the surgeon if bleeding noted or forget to take a dose when at home. Pain Control:  Pain medications side effects discussed. Wean off narcotics ASAP. Use Tylenol ( 3000 mg/24 hours) , ice, distraction, moving, & change position to help with pain. Don't get nauseated. Eat a snack before taking pain medication    Do not get constipated: take stool softener/mild laxative daily while on narcotics. Incentive Spirometry:    Use of incentive spirometer 10 x/hr. Demonstration  1250 ml x 3  Wound Care: Dressing intact with sling in place. Unhappy with her sling. .   Educated patient on how to care for their dressing and incision at home per MD protocol. Keep the incision and or/dressing clean and dry. No lotions, powders, creams to surgical leg. .    Patient Safety:   Call light & belongings in reach. Call for help when want to walk or get OOB. Diet:   Eat for healing. Protein heals bone/muscle. Drink 8 glasses of water a day. Educational material given. Patient agreed to continue doing everything at home to prevent complications and have a successful recovery. Patient verbalizing understanding of using incentive spirometer, ankle pumping, drinking lots of water, taking medication to help with constipation, importance of wearing the breg sling, and walking frequently during the day and evening. Patient verbalized understand. Given the opportunity for asking questions.

## 2019-08-01 NOTE — ROUTINE PROCESS
2050 Patient AAOx4 , resting in bed,respiration unlabored. Patient ambulated in the hallway tolerated well, returned to bed.  2350 Patient assisted to bathroom voided adequate amounts. IV site swelling took it off. New IV cath #24 inserted in the left forearm, patient tolerated procedure.

## 2019-08-01 NOTE — PROGRESS NOTES
PT tx performed at 733. Pt safely able to perform all mobility tasks and is safe to return home. Full note to follow.     Amol Denton, LPTA

## 2019-08-04 ENCOUNTER — APPOINTMENT (OUTPATIENT)
Dept: VASCULAR SURGERY | Age: 79
End: 2019-08-04
Attending: EMERGENCY MEDICINE
Payer: MEDICARE

## 2019-08-04 ENCOUNTER — HOSPITAL ENCOUNTER (EMERGENCY)
Age: 79
Discharge: HOME | End: 2019-08-04
Attending: EMERGENCY MEDICINE
Payer: MEDICARE

## 2019-08-04 VITALS
OXYGEN SATURATION: 95 % | HEIGHT: 65 IN | WEIGHT: 126 LBS | BODY MASS INDEX: 20.99 KG/M2 | RESPIRATION RATE: 12 BRPM | HEART RATE: 83 BPM | SYSTOLIC BLOOD PRESSURE: 125 MMHG | TEMPERATURE: 97.9 F | DIASTOLIC BLOOD PRESSURE: 78 MMHG

## 2019-08-04 DIAGNOSIS — M79.601 ARM PAIN, DIFFUSE, RIGHT: Primary | ICD-10-CM

## 2019-08-04 LAB
ANION GAP SERPL CALC-SCNC: 7 MMOL/L (ref 3–18)
BASOPHILS # BLD: 0 K/UL (ref 0–0.1)
BASOPHILS NFR BLD: 0 % (ref 0–2)
BUN SERPL-MCNC: 14 MG/DL (ref 7–18)
BUN/CREAT SERPL: 23 (ref 12–20)
CALCIUM SERPL-MCNC: 7.8 MG/DL (ref 8.5–10.1)
CHLORIDE SERPL-SCNC: 109 MMOL/L (ref 100–111)
CO2 SERPL-SCNC: 27 MMOL/L (ref 21–32)
CREAT SERPL-MCNC: 0.6 MG/DL (ref 0.6–1.3)
DIFFERENTIAL METHOD BLD: ABNORMAL
EOSINOPHIL # BLD: 0.2 K/UL (ref 0–0.4)
EOSINOPHIL NFR BLD: 2 % (ref 0–5)
ERYTHROCYTE [DISTWIDTH] IN BLOOD BY AUTOMATED COUNT: 14.4 % (ref 11.6–14.5)
GLUCOSE SERPL-MCNC: 138 MG/DL (ref 74–99)
HCT VFR BLD AUTO: 35.5 % (ref 35–45)
HGB BLD-MCNC: 11 G/DL (ref 12–16)
LYMPHOCYTES # BLD: 0.9 K/UL (ref 0.9–3.6)
LYMPHOCYTES NFR BLD: 11 % (ref 21–52)
MCH RBC QN AUTO: 30.2 PG (ref 24–34)
MCHC RBC AUTO-ENTMCNC: 31 G/DL (ref 31–37)
MCV RBC AUTO: 97.5 FL (ref 74–97)
MONOCYTES # BLD: 0.3 K/UL (ref 0.05–1.2)
MONOCYTES NFR BLD: 4 % (ref 3–10)
NEUTS SEG # BLD: 6.6 K/UL (ref 1.8–8)
NEUTS SEG NFR BLD: 83 % (ref 40–73)
PLATELET # BLD AUTO: 263 K/UL (ref 135–420)
PMV BLD AUTO: 9.2 FL (ref 9.2–11.8)
POTASSIUM SERPL-SCNC: 4.1 MMOL/L (ref 3.5–5.5)
RBC # BLD AUTO: 3.64 M/UL (ref 4.2–5.3)
SODIUM SERPL-SCNC: 143 MMOL/L (ref 136–145)
WBC # BLD AUTO: 8 K/UL (ref 4.6–13.2)

## 2019-08-04 PROCEDURE — 93971 EXTREMITY STUDY: CPT

## 2019-08-04 PROCEDURE — 99283 EMERGENCY DEPT VISIT LOW MDM: CPT

## 2019-08-04 PROCEDURE — 85025 COMPLETE CBC W/AUTO DIFF WBC: CPT

## 2019-08-04 PROCEDURE — 80048 BASIC METABOLIC PNL TOTAL CA: CPT

## 2019-08-04 NOTE — ED TRIAGE NOTES
\"I had surgery on my shoulder on 07/31, a reversal total shoulder rebuild. My hand started swelling yesterday. I had a blood clot in this same arm before. \"

## 2019-08-04 NOTE — ED PROVIDER NOTES
EMERGENCY DEPARTMENT HISTORY AND PHYSICAL EXAM    Date: 8/4/2019  Patient Name: Sulema Mahoney    History of Presenting Illness     Chief Complaint   Patient presents with    Post OP Complication         History Provided By: Patient and Patient's     Additional History (Context): Sulema Mahoney is a 66 y.o. female with hypertension, hyperlipidemia, osteoarthritis and aortic aneurysm, afib who presents with right arm swelling. Patient had right shoulder revision performed this week by Dr. Billy Rivers and started having swelling yesterday. Was instructed by PA on call to apply ice. Symptoms worsened despite the ice that she came in for her concerns. Had a pain medication this morning and is not want anything additional.  Denies any chest pain or shortness of breath. Does have a history of a DVT in this arm when she had a PICC line placed. PCP: Charlene Troncoso MD    Current Outpatient Medications   Medication Sig Dispense Refill    baclofen (LIORESAL) 10 mg tablet Take 1 Tab by mouth three (3) times daily as needed for Other (spasms). 90 Tab 0    acetaminophen (TYLENOL EXTRA STRENGTH) 500 mg tablet Take 2 Tabs by mouth every eight (8) hours for 5 days. 30 Tab 0    oxyCODONE IR (ROXICODONE) 5 mg immediate release tablet Take 1 Tab by mouth every four (4) hours as needed for Pain for up to 7 days. Max Daily Amount: 30 mg. DO NOT TAKE UNTIL AFTER SURGERY (for acute post operative pain) 40 Tab 0    ferrous sulfate 325 mg (65 mg iron) tablet Take 325 mg by mouth two (2) times a day.  gabapentin (NEURONTIN) 300 mg capsule nightly as needed. 1    CRANBERRY FRUIT EXTRACT (CRANBERRY EXTRACT PO) Take  by mouth daily.  metoprolol succinate (TOPROL-XL) 25 mg XL tablet TAKE 1 TABLET BY MOUTH TWICE DAILY 180 Tab 3    PRADAXA 150 mg capsule Take 1 Cap by mouth two (2) times a day.  180 Cap 3    cyanocobalamin (VITAMIN B12) 1,000 mcg/mL injection Taken weekly on   1    digoxin (DIGOX) 0.125 mg tablet TAKE 1 TABLET BY MOUTH ONCE DAILY 90 Tab 3    tiotropium (SPIRIVA WITH HANDIHALER) 18 mcg inhalation capsule Take 1 Cap by inhalation daily. 20 Cap 0    UO-PU-VJ-Fe-Min-Lycopen-Lutein (CENTRUM) 0.4-162-18 mg tab Take  by mouth daily.  pantoprazole (PROTONIX) 40 mg tablet Take 40 mg by mouth daily.  BIOTIN PO Take  by mouth two (2) times a day.  folic acid 494 mcg tablet Take 400 mcg by mouth daily.  CALCIUM PO Take  by mouth daily.  montelukast (SINGULAIR) 10 mg tablet Take 10 mg by mouth daily. 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    GASTROJEJUNOSTOMY      12/10 Lia en Y (after Bilroth II)    HX HERNIA REPAIR      NEUROLOGICAL PROCEDURE UNLISTED  2006    neck surgery       Family History:  Family History   Problem Relation Age of Onset    Heart Attack Father     Breast Cancer Sister     Breast Cancer Sister     Breast Cancer Other         Grandmother - nos       Social History:  Social History     Tobacco Use    Smoking status: Former Smoker     Last attempt to quit: 1981     Years since quittin.5    Smokeless tobacco: Never Used   Substance Use Topics    Alcohol use: Yes     Alcohol/week: 0.0 standard drinks     Comment: wine nightly    Drug use: No       Allergies:   Allergies   Allergen Reactions    Codeine Nausea Only     Patient states not allergic    Diltiazem Other (comments) Patient states not allergic         Review of Systems   Review of Systems   Respiratory: Negative for shortness of breath. Cardiovascular: Negative for chest pain. Musculoskeletal: Positive for myalgias. Arm swelling   Neurological: Negative for weakness and numbness. All Other Systems Negative  Physical Exam     Vitals:    08/04/19 1001 08/04/19 1038   BP: 125/78    Pulse: 83    Resp: 12    Temp: 97.9 °F (36.6 °C)    SpO2: 95% 95%   Weight: 57.2 kg (126 lb)    Height: 5' 5\" (1.651 m)      Physical Exam   Constitutional: She is oriented to person, place, and time. She appears well-developed. HENT:   Head: Normocephalic and atraumatic. Eyes: Pupils are equal, round, and reactive to light. Neck: No JVD present. No tracheal deviation present. No thyromegaly present. Cardiovascular: Normal rate, regular rhythm and normal heart sounds. Exam reveals no gallop and no friction rub. No murmur heard. Pulmonary/Chest: Effort normal and breath sounds normal. No stridor. No respiratory distress. She has no wheezes. She has no rales. She exhibits no tenderness. Abdominal: Soft. She exhibits no distension and no mass. There is no tenderness. There is no rebound and no guarding. Musculoskeletal: She exhibits edema. She exhibits no tenderness. 1cm circumferential difference between both upper extremities. Radial pulse palpable. RUE in sling. Lymphadenopathy:     She has no cervical adenopathy. Neurological: She is alert and oriented to person, place, and time. Skin: Skin is warm and dry. No rash noted. No erythema. No pallor. Psychiatric: She has a normal mood and affect. Her behavior is normal. Thought content normal.   Nursing note and vitals reviewed.              Diagnostic Study Results     Labs -     Recent Results (from the past 12 hour(s))   METABOLIC PANEL, BASIC    Collection Time: 08/04/19 10:34 AM   Result Value Ref Range    Sodium 143 136 - 145 mmol/L    Potassium 4.1 3.5 - 5.5 mmol/L    Chloride 109 100 - 111 mmol/L    CO2 27 21 - 32 mmol/L    Anion gap 7 3.0 - 18 mmol/L    Glucose 138 (H) 74 - 99 mg/dL    BUN 14 7.0 - 18 MG/DL    Creatinine 0.60 0.6 - 1.3 MG/DL    BUN/Creatinine ratio 23 (H) 12 - 20      GFR est AA >60 >60 ml/min/1.73m2    GFR est non-AA >60 >60 ml/min/1.73m2    Calcium 7.8 (L) 8.5 - 10.1 MG/DL   CBC WITH AUTOMATED DIFF    Collection Time: 08/04/19 10:34 AM   Result Value Ref Range    WBC 8.0 4.6 - 13.2 K/uL    RBC 3.64 (L) 4.20 - 5.30 M/uL    HGB 11.0 (L) 12.0 - 16.0 g/dL    HCT 35.5 35.0 - 45.0 %    MCV 97.5 (H) 74.0 - 97.0 FL    MCH 30.2 24.0 - 34.0 PG    MCHC 31.0 31.0 - 37.0 g/dL    RDW 14.4 11.6 - 14.5 %    PLATELET 114 959 - 576 K/uL    MPV 9.2 9.2 - 11.8 FL    NEUTROPHILS 83 (H) 40 - 73 %    LYMPHOCYTES 11 (L) 21 - 52 %    MONOCYTES 4 3 - 10 %    EOSINOPHILS 2 0 - 5 %    BASOPHILS 0 0 - 2 %    ABS. NEUTROPHILS 6.6 1.8 - 8.0 K/UL    ABS. LYMPHOCYTES 0.9 0.9 - 3.6 K/UL    ABS. MONOCYTES 0.3 0.05 - 1.2 K/UL    ABS. EOSINOPHILS 0.2 0.0 - 0.4 K/UL    ABS. BASOPHILS 0.0 0.0 - 0.1 K/UL    DF AUTOMATED         Radiologic Studies -   No orders to display     CT Results  (Last 48 hours)    None        CXR Results  (Last 48 hours)    None            Medical Decision Making   I am the first provider for this patient. I reviewed the vital signs, available nursing notes, past medical history, past surgical history, family history and social history. Vital Signs-Reviewed the patient's vital signs. Procedures:  Procedures    Provider Notes (Medical Decision Making): Nothing acute on labs. No acute occlusive thrombus on right upper extremity from PVL study. Has pain medications at home. Will DC home and have her follow-up with Dr. lÁvaro Barry tomorrow. MED RECONCILIATION:  No current facility-administered medications for this encounter.       Current Outpatient Medications   Medication Sig    baclofen (LIORESAL) 10 mg tablet Take 1 Tab by mouth three (3) times daily as needed for Other (spasms).  acetaminophen (TYLENOL EXTRA STRENGTH) 500 mg tablet Take 2 Tabs by mouth every eight (8) hours for 5 days.  oxyCODONE IR (ROXICODONE) 5 mg immediate release tablet Take 1 Tab by mouth every four (4) hours as needed for Pain for up to 7 days. Max Daily Amount: 30 mg. DO NOT TAKE UNTIL AFTER SURGERY (for acute post operative pain)    ferrous sulfate 325 mg (65 mg iron) tablet Take 325 mg by mouth two (2) times a day.  gabapentin (NEURONTIN) 300 mg capsule nightly as needed.  CRANBERRY FRUIT EXTRACT (CRANBERRY EXTRACT PO) Take  by mouth daily.  metoprolol succinate (TOPROL-XL) 25 mg XL tablet TAKE 1 TABLET BY MOUTH TWICE DAILY    PRADAXA 150 mg capsule Take 1 Cap by mouth two (2) times a day.  cyanocobalamin (VITAMIN B12) 1,000 mcg/mL injection Taken weekly on Saturdays    digoxin (DIGOX) 0.125 mg tablet TAKE 1 TABLET BY MOUTH ONCE DAILY    tiotropium (SPIRIVA WITH HANDIHALER) 18 mcg inhalation capsule Take 1 Cap by inhalation daily.  JU-UB-DD-Fe-Min-Lycopen-Lutein (CENTRUM) 0.4-162-18 mg tab Take  by mouth daily.  pantoprazole (PROTONIX) 40 mg tablet Take 40 mg by mouth daily.  BIOTIN PO Take  by mouth two (2) times a day.  folic acid 307 mcg tablet Take 400 mcg by mouth daily.  CALCIUM PO Take  by mouth daily.  montelukast (SINGULAIR) 10 mg tablet Take 10 mg by mouth daily. Disposition:  home    DISCHARGE NOTE:   1:43 PM    Pt has been reexamined. Patient has no new complaints, changes, or physical findings. Care plan outlined and precautions discussed. Results of labs, PVL were reviewed with the patient. All medications were reviewed with the patient. All of pt's questions and concerns were addressed. Patient was instructed and agrees to follow up with Manjit Ortiz, as well as to return to the ED upon further deterioration. Patient is ready to go home.     Follow-up Information     Follow up With Specialties Details Why Contact Info Tyson Parker MD Orthopedic Surgery Schedule an appointment as soon as possible for a visit in 1 day  [address]      [facility] EMERGENCY DEPT Emergency Medicine  If symptoms worsen return immediately [address]  [phone]          Current Discharge Medication List          Diagnosis     Clinical Impression:   1.  Arm pain, diffuse, right

## 2019-08-05 ENCOUNTER — OFFICE VISIT (OUTPATIENT)
Dept: ORTHOPEDIC SURGERY | Age: 79
End: 2019-08-05

## 2019-08-05 VITALS
SYSTOLIC BLOOD PRESSURE: 142 MMHG | DIASTOLIC BLOOD PRESSURE: 76 MMHG | HEIGHT: 65 IN | WEIGHT: 126 LBS | BODY MASS INDEX: 20.99 KG/M2

## 2019-08-05 DIAGNOSIS — M12.811 ROTATOR CUFF ARTHROPATHY, RIGHT: Primary | ICD-10-CM

## 2019-08-05 RX ORDER — TRAMADOL HYDROCHLORIDE 50 MG/1
50 TABLET ORAL
Qty: 40 TAB | Refills: 0 | Status: SHIPPED | OUTPATIENT
Start: 2019-08-05 | End: 2019-08-12

## 2019-08-05 NOTE — PROGRESS NOTES
Allie Tejada Osman  1940     HISTORY OF PRESENT ILLNESS  Violet Barry is a 66 y.o. female who presents today for evaluation s/p Right reverse total shoulder arthroplasty on 7/31/19. Patient has not been going to PT. Describes pain as a 7/10. Has been taking roxicodoen for pain butit keeps her up at night. Has a lot of swelling in her hand. Still has night pain. Went to ED on Sunday at my request to have doppler r/o DVT. Patient denies any fever, chills, chest pain, shortness of breath or calf pain. The remainder of the review of systems is negative. There are no new illness or injuries to report since last seen in the office. No changes in medications, allergies, social or family history. PHYSICAL EXAM:   Visit Vitals  /76   Ht 5' 5\" (1.651 m)   Wt 126 lb (57.2 kg)   LMP  (LMP Unknown)   BMI 20.97 kg/m²      The patient is a well-developed, well-nourished female in no acute distress. The patient is alert and oriented times three. The patient appears to be well groomed. Mood and affect are normal.  ORTHOPEDIC EXAM of right shoulder:  Inspection: swelling present,  Bruising not present, swelling to right hand and fingers  Incision, clean, dry, intact, sutures in place  Passive glenohumeral abduction 0-30 degrees  Stability: Stable  Strength: n/a  2+ distal pulses    Doppler RUE: no evidence of DVT    IMPRESSION:  S/P Right reverse total shoulder arthroplasty    PLAN:   Incisions cleaned. Surgery was discussed at length today. Will place well padded splint on right upper extremity to decrease swelling. Patient to continue with PROM and PT. Continue wearing sling. Stressed to patient that nothing causes an increase in pain. rx for tramadol at night. Patient given pain medication for short term acute pain relief. Goal is to treat patient according to above plan and to ultimately have patient off all pain medications once appropriate.  If chronic pain management is required beyond what is expected for current orthopedic problem, will refer patient to pain management.   was reviewed and will be reviewed with every medication refill request.      RTC Thursday    Rigoberto Sanz [facility]ist

## 2019-08-07 ENCOUNTER — HOSPITAL ENCOUNTER (OUTPATIENT)
Dept: PHYSICAL THERAPY | Age: 79
End: 2019-08-07

## 2019-08-08 ENCOUNTER — OFFICE VISIT (OUTPATIENT)
Dept: ORTHOPEDIC SURGERY | Age: 79
End: 2019-08-08

## 2019-08-08 ENCOUNTER — HOSPITAL ENCOUNTER (OUTPATIENT)
Dept: PHYSICAL THERAPY | Age: 79
Discharge: HOME | End: 2019-08-08
Payer: MEDICARE

## 2019-08-08 VITALS
HEART RATE: 67 BPM | OXYGEN SATURATION: 96 % | HEIGHT: 65 IN | RESPIRATION RATE: 17 BRPM | WEIGHT: 130 LBS | DIASTOLIC BLOOD PRESSURE: 58 MMHG | BODY MASS INDEX: 21.66 KG/M2 | TEMPERATURE: 96.3 F | SYSTOLIC BLOOD PRESSURE: 120 MMHG

## 2019-08-08 DIAGNOSIS — M12.811 ROTATOR CUFF ARTHROPATHY, RIGHT: Primary | ICD-10-CM

## 2019-08-08 PROCEDURE — 97162 PT EVAL MOD COMPLEX 30 MIN: CPT

## 2019-08-08 PROCEDURE — 97016 VASOPNEUMATIC DEVICE THERAPY: CPT

## 2019-08-08 PROCEDURE — 97140 MANUAL THERAPY 1/> REGIONS: CPT

## 2019-08-08 PROCEDURE — 97110 THERAPEUTIC EXERCISES: CPT

## 2019-08-08 NOTE — PROGRESS NOTES
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 Prior Level of Function: Right Hand Dominant. Previous DVT. Lives with . The Plan of Care and following information is based on the information from the initial evaluation. Assessment/ key information: Pt is a 66 yr old right hand dominant female sp Right TSR 19. Pt attends evaluation today wearing a sling and with 3/10 pain throughout her arm. Pt is observed to have moderate pitting edema throughout her UE including her hand and digits. Tissue is tender to touch throughout Right UE. DVT is ruled out. Pt reports she is frustrated that the swelling has occurred and the outcome is not what she expected from her surgery  so far. Right Shoulder PROM flexion=72 deg , scaption=70 deg (supine)  Pt is wearing her sling most times. Incision area is covered and dry. Pt will benefit from skilled therapy to improve ROM, decrease swelling, improve shoulder mobility, strength and improve QOL to return to PLOF.  .     Evaluation Complexity History MEDIUM  Complexity : 1-2 comorbidities / personal factors will impact the outcome/ POC ; Examination MEDIUM Complexity : 3 Standardized tests and measures addressing body structure, function, activity limitation and / or participation in recreation  ;Presentation MEDIUM Complexity : Evolving with changing characteristics  ; Clinical Decision Making MEDIUM Complexity : FOTO score of 26-74  Overall Complexity Rating: MEDIUM  Problem List: pain affecting function, decrease ROM, decrease strength, impaired gait/ balance, decrease ADL/ functional abilitiies and decrease activity tolerance   Treatment Plan may include any combination of the following: Therapeutic exercise, Therapeutic activities, Neuromuscular re-education, Physical agent/modality, Gait/balance training, Manual therapy, Patient education and Self Care training  Patient / Family readiness to learn indicated by: asking questions, trying to perform skills and interest  Persons(s) to be included in education: patient (P) and family support person (FSP);list   Barriers to Learning/Limitations: None  Patient Goal (s): to get her shoulder better  Patient Self Reported Health Status: good  Rehabilitation Potential: good    Short Term Goals: To be accomplished in 1 weeks:   1. Pt will be compliant with a HEP to improve shoulder function. Long Term Goals: To be accomplished in 8 weeks:   1. Pt will complete FOTO to be able to determine right shoulder function. 2. Pt will increase right shoulder ROM >130 deg flexion/scaption to ease with progress toward ADL's.    3. Pt will decrease right shoulder swelling at web space circumference to be same as left Hand to be able to hold and  light objects for ADL's.   4. Pt will have full passive flexion /scaption to [facility] to ease with progression of shoulder mobility per protocol. Frequency / Duration: Patient to be seen 3 times per week for 8 weeks.     Patient/ Caregiver education and instruction: Diagnosis, prognosis, self care, activity modification, brace/ splint application and exercises   [x]  Plan of care has been reviewed with PTA    Certification Period: 8/8/19-  Toro Ferris, PT 8/8/2019 2:38 PM    ________________________________________________________________________    I certify that the above Therapy Services are being furnished while the patient is under my care. I agree with the treatment plan and certify that this therapy is necessary.     Physician's Signature:____________Date:_________TIME:________    ** Signature, Date and Time must be completed for valid certification **    Please sign and return to [facility] [employer] [employer] [facility]  [address] [place] [address]  [phone] fax

## 2019-08-08 NOTE — PROGRESS NOTES
Jc Brewer  1940     HISTORY OF PRESENT ILLNESS  Violet Forde is a 66 y.o. female who presents today for evaluation s/p Right reverse total shoulder arthroplasty on 7/31/19. Patient has not been going to PT. Her therapy eval was cancelled yesterday. Describes pain as a 5/10. Has been able to get some some sleep taking tramadol at night Has a lot of swelling in her hand. Still has night pain. . Patient denies any fever, chills, chest pain, shortness of breath or calf pain. The remainder of the review of systems is negative. There are no new illness or injuries to report since last seen in the office. No changes in medications, allergies, social or family history. PHYSICAL EXAM:   Visit Vitals  LMP  (LMP Unknown)      The patient is a well-developed, well-nourished female in no acute distress. The patient is alert and oriented times three. The patient appears to be well groomed. Mood and affect are normal.  ORTHOPEDIC EXAM of right shoulder:  Inspection: swelling present,  Bruising present, swelling to right hand and fingers - with some improvement  Incision well healed  Passive glenohumeral abduction 0-30 degrees, able to make passive fist  Stability: Stable  Strength: n/a  2+ distal pulses    Doppler RUE: no evidence of DVT    IMPRESSION:  S/P Right reverse total shoulder arthroplasty    PLAN:   1. Patient swelling better- splint removed.    2. Will get patient back into PT - apt scheduled at noon today      Patient seen and evaluated by Dr. Ricardo Wyatt today who agrees with treatment plan     RTC Tuesday    DARCIE Mack [facility]ist

## 2019-08-08 NOTE — PROGRESS NOTES
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 Moderate pitting edema to dorsum , forearm and upper arm  Pain, warmth as well. Doppler studies are negative. Previous DVT from subclavian picc line. Wearing sling following precautions.      OBJECTIVE    Modality rationale: decrease edema and increase tissue extensibility to improve the patients ability to ease with ADL's   Min Type Additional Details    [] Estim:  []Unatt       []IFC  []Premod                        []Other:  []w/ice   []w/heat  Position:  Location:    [] Estim: []Att    []TENS instruct  []NMES                    []Other:  []w/US   []w/ice   []w/heat  Position:  Location:    []  Traction: [] Cervical       []Lumbar                       [] Prone          []Supine                       []Intermittent   []Continuous Lbs:  [] before manual  [] after manual    []  Ultrasound: []Continuous   [] Pulsed                           []1MHz   []3MHz W/cm2:  Location:    []  Iontophoresis with dexamethasone         Location: [] Take home patch   [] In clinic    []  Ice     []  heat  []  Ice massage  []  Laser   []  Anodyne Position:  Location:    []  Laser with stim  []  Other:  Position:  Location:   15 []  Vasopneumatic Device Pressure:       [x] lo [] med [] hi   Temperature: [x] lo [] med [] hi   [] Skin assessment post-treatment:  []intact []redness- no adverse reaction    []redness - adverse reaction:     15 min [x]Eval                  []Re-Eval       15 min Therapeutic Exercise:  [] See flow sheet :   Rationale: increase ROM to improve the patients ability to ease with ADL's    15 min Manual Therapy:  PROM, Oscillation, retrograde massage. Rationale: decrease pain, increase ROM, increase tissue extensibility, decrease edema , correct positional vertigo and decrease trigger points to ease with PROM. With   [] TE   [] TA   [] neuro   [] other: Patient Education: [x] Review HEP    [] Progressed/Changed HEP based on:   [] positioning   [] body mechanics   [] transfers   [] heat/ice application    [] other:      Other Objective/Functional Measures: PROM right shoulder flexion= 72 deg/ scaption=70 deg   Observed swelling and bruising of arm , hand and fingers. Edema is pitting on arm and forearm. Measurements: 4 cm up from wrist=17 cm, Circumference at hand,hemphill space=19.5 cm  Pain Level (0-10 scale) post treatment: 3/10    ASSESSMENT/Changes in Function: see poc    Patient will continue to benefit from skilled PT services to modify and progress therapeutic interventions, address functional mobility deficits, address ROM deficits, address strength deficits, analyze and address soft tissue restrictions, analyze and cue movement patterns, analyze and modify body mechanics/ergonomics and assess and modify postural abnormalities to attain remaining goals.      [x]  See Plan of Care  []  See progress note/recertification  []  See Discharge Summary         Progress towards goals / Updated goals:  See poc    PLAN  [x]  Upgrade activities as tolerated     [x]  Continue plan of care  []  Update interventions per flow sheet       []  Discharge due to:_  []  Other:_      Zoltan Barger, PT 8/8/2019  11:56 AM    Future Appointments   Date Time Provider Heri Beal   8/8/2019 12:00 PM Alethea Flores, PT [id number]   8/13/2019  9:00 AM MATTHEW Tucker [facility]   9/10/2019 10:15 AM MD Saud Holloway   1/16/2020  9:30 AM [facility] FAST TRACK NURSE [facility]

## 2019-08-08 NOTE — PROGRESS NOTES
1. Have you been to the ER, urgent care clinic since your last visit? Hospitalized since your last visit? No    2. Have you seen or consulted any other health care providers outside of the [address] [place] [address] since your last visit? Include any pap smears or colon screening.  No

## 2019-08-12 ENCOUNTER — HOSPITAL ENCOUNTER (OUTPATIENT)
Dept: PHYSICAL THERAPY | Age: 79
Discharge: HOME | End: 2019-08-12
Payer: MEDICARE

## 2019-08-12 PROCEDURE — 97140 MANUAL THERAPY 1/> REGIONS: CPT

## 2019-08-12 PROCEDURE — 97110 THERAPEUTIC EXERCISES: CPT

## 2019-08-12 PROCEDURE — 97016 VASOPNEUMATIC DEVICE THERAPY: CPT

## 2019-08-12 NOTE — PROGRESS NOTES
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 She went to the emergency room on  and they ruled out a DVT. She follows up with her surgeon tomorrow.         OBJECTIVE    Modality rationale: decrease inflammation and decrease pain to improve the patients ability to tolerate daily tasks    Min Type Additional Details    [] Estim:  []Unatt       []IFC  []Premod                        []Other:  []w/ice   []w/heat  Position:  Location:    [] Estim: []Att    []TENS instruct  []NMES                    []Other:  []w/US   []w/ice   []w/heat  Position:  Location:    []  Traction: [] Cervical       []Lumbar                       [] Prone          []Supine                       []Intermittent   []Continuous Lbs:  [] before manual  [] after manual    []  Ultrasound: []Continuous   [] Pulsed                           []1MHz   []3MHz W/cm2:  Location:    []  Iontophoresis with dexamethasone         Location: [] Take home patch   [] In clinic    []  Ice     []  heat  []  Ice massage  []  Laser   []  Anodyne Position:  Location:    []  Laser with stim  []  Other:  Position:  Location:   15 []  Vasopneumatic Device Pressure:       [x] lo [] med [] hi   Temperature: [x] lo [] med [] hi   [x] Skin assessment post-treatment:  [x]intact []redness- no adverse reaction    []redness - adverse reaction:       25 min Therapeutic Exercise:  [x] See flow sheet :   Rationale: increase ROM and increase strength to improve the patients ability to improve ease of ADLs    10 min Manual Therapy:  PROM flexion/ER right shoulder with gentle oscillations, retrograde massage right UE   Rationale: decrease pain, increase ROM and increase tissue extensibility to increase functional use of right UE       With   [] TE   [] TA   [] neuro   [] other: Patient Education: [x] Review HEP    [] Progressed/Changed HEP based on:   [] positioning   [] body mechanics   [] transfers   [] heat/ice application    [] other:      Other Objective/Functional Measures:     PROM flexion to ~75*, not formally assessed   PROM ER in scapular plane to neutral  Empty end-feel with PROM all directions  All PROM performed unforced     Continued significant swelling right UE  Discomfort with elbow supination/pronation that subsided immediately upon ceasing    No significant change in swelling with retrograde massage    Right radial pulse 2+ using pulse 4-point scale    No increased pain following session      Discussed possible referral for lymphedema therapy pending MD recommendation and pt agreed  Gave pt a blank script for lymphedema therapy for pt to discuss with her MD tomorrow      Pain Level (0-10 scale) post treatment: 4-5/10    ASSESSMENT/Changes in Function:     Initiated treatment per POC. Pt was motivated in therapy and put forth good effort with interventions. She continues to demonstrate significant swelling, pain, and increased temp throughout right UE. Differential diagnosis includes complex regional pain syndrome; however, right radial pulse strength is WFL. Pt follows up with her MD tomorrow and was instructed to ask about possible referral for lymphedema therapy.   Will continue to address strength, ROM, and flexibility deficits in order to increase functional use of right UE and allow for return to PLOF. Patient will continue to benefit from skilled PT services to modify and progress therapeutic interventions, address ROM deficits, address strength deficits, analyze and address soft tissue restrictions, analyze and cue movement patterns, analyze and modify body mechanics/ergonomics, assess and modify postural abnormalities and instruct in home and community integration to attain remaining goals. Progress towards goals / Updated goals:  Short Term Goals: To be accomplished in 1 weeks:              1. Pt will be compliant with a HEP to improve shoulder function. Long Term Goals: To be accomplished in 8 weeks:              1. Pt will complete FOTO to be able to determine right shoulder function. 2. Pt will increase right shoulder ROM >130 deg flexion/scaption to ease with progress toward ADL's. Progressing. PROM flexion ~ 75* 8/12/19              3. Pt will decrease right shoulder swelling at web space circumference to be same as left Hand to be able to hold and  light objects for ADL's.              4. Pt will have full passive flexion /scaption to [facility] to ease with progression of shoulder mobility per protocol.      PLAN  []  Upgrade activities as tolerated     [x]  Continue plan of care  []  Update interventions per flow sheet       []  Discharge due to:_  []  Other:_      Dania Salazar PT 8/12/2019  1:03 PM    Future Appointments   Date Time Provider Heri Beal   8/13/2019  9:00 AM MATTHEW [address]   8/13/2019 12:00 PM Kary Hinds PT [facility] [address]   8/16/2019 11:00 AM Kanu Mao PT [facility] [address]   8/20/2019  3:00 PM Vince Cabral PTA [facility] [address]   8/22/2019 12:00 PM Kary Hinds PT [id number] [address]   8/23/2019  9:30 AM Carter Galarza PTA [facility] [address]   8/26/2019  9:30 AM Vince Cabral PTA [facility] [address]   8/30/2019  3:00 PM Vince Cabral PTA [facility]   9/3/2019 10:30 AM Jessica Guevara, PT [facility]   9/4/2019  9:00 AM Luis Robertson, PT [facility]   9/6/2019  9:30 AM Martita Stanford, PTA [facility]   9/10/2019 10:15 AM MD Saud Castillo [place]   1/16/2020  9:30 AM [facility] FAST TRACK NURSE [facility]

## 2019-08-13 ENCOUNTER — HOSPITAL ENCOUNTER (OUTPATIENT)
Dept: PHYSICAL THERAPY | Age: 79
Discharge: HOME | End: 2019-08-13
Payer: MEDICARE

## 2019-08-13 ENCOUNTER — OFFICE VISIT (OUTPATIENT)
Dept: ORTHOPEDIC SURGERY | Age: 79
End: 2019-08-13

## 2019-08-13 VITALS
TEMPERATURE: 95 F | DIASTOLIC BLOOD PRESSURE: 56 MMHG | WEIGHT: 130 LBS | OXYGEN SATURATION: 96 % | SYSTOLIC BLOOD PRESSURE: 105 MMHG | HEIGHT: 65 IN | HEART RATE: 82 BPM | BODY MASS INDEX: 21.66 KG/M2

## 2019-08-13 DIAGNOSIS — M12.811 ROTATOR CUFF ARTHROPATHY, RIGHT: Primary | ICD-10-CM

## 2019-08-13 PROCEDURE — 97016 VASOPNEUMATIC DEVICE THERAPY: CPT

## 2019-08-13 PROCEDURE — 97110 THERAPEUTIC EXERCISES: CPT

## 2019-08-13 PROCEDURE — 97140 MANUAL THERAPY 1/> REGIONS: CPT

## 2019-08-13 NOTE — PROGRESS NOTES
Sherly Bolwillisromán Sylviadanielkitty  1940     HISTORY OF PRESENT ILLNESS  Violet Adams is a 66 y.o. female who presents today for evaluation s/p Right reverse total shoulder arthroplasty on 7/31/19. Patient has been going to PT. Describes pain as a 7/10. Has been able to get some some sleep taking tramadol at night Has a lot of swelling in her hand. Still has night pain. . Patient denies any fever, chills, chest pain, shortness of breath or calf pain. The remainder of the review of systems is negative. There are no new illness or injuries to report since last seen in the office. No changes in medications, allergies, social or family history. PHYSICAL EXAM:   Visit Vitals  /56   Pulse 82   Temp 95 °F (35 °C) (Oral)   Ht 5' 5\" (1.651 m)   Wt 130 lb (59 kg)   LMP  (LMP Unknown)   SpO2 96%   BMI 21.63 kg/m²      The patient is a well-developed, well-nourished female in no acute distress. The patient is alert and oriented times three. The patient appears to be well groomed. Mood and affect are normal.  ORTHOPEDIC EXAM of right shoulder:  Inspection: swelling present,  Bruising present, swelling to right hand and fingers - with some improvement  Incision well healed  Passive glenohumeral abduction 0-45 degrees, able to make passive fist  Stability: Stable  Strength: n/a  2+ distal pulses    Doppler RUE: no evidence of DVT    IMPRESSION:  S/P Right reverse total shoulder arthroplasty    PLAN:   1. Patient swelling better  2.  Will set patient up with PT and OT for cont decrease in inflammation        Patient seen and evaluated by Dr. Juli Brown today who agrees with treatment plan     RTC 2 weeks    DARCIE Savage [facility]

## 2019-08-13 NOTE — PROGRESS NOTES
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 She will begin vitamin E massage.    Pt stated she does self massage and the swelling goes down but comes back in 1/2 hr.   OBJECTIVE    Modality rationale: decrease pain to improve the patients ability to ease with ADL's   Min Type Additional Details    [] Estim:  []Unatt       []IFC  []Premod                        []Other:  []w/ice   []w/heat  Position:  Location:    [] Estim: []Att    []TENS instruct  []NMES                    []Other:  []w/US   []w/ice   []w/heat  Position:  Location:    []  Traction: [] Cervical       []Lumbar                       [] Prone          []Supine                       []Intermittent   []Continuous Lbs:  [] before manual  [] after manual    []  Ultrasound: []Continuous   [] Pulsed                           []1MHz   []3MHz W/cm2:  Location:    []  Iontophoresis with dexamethasone         Location: [] Take home patch   [] In clinic    []  Ice     []  heat  []  Ice massage  []  Laser   []  Anodyne Position:  Location:    []  Laser with stim  []  Other:  Position:  Location:   15 []  Vasopneumatic Device Pressure:       [x] lo [] med [] hi   Temperature: [x] lo [] med [] hi   [] Skin assessment post-treatment:  []intact []redness- no adverse reaction    []redness - adverse reaction:       20 min Therapeutic Exercise:  [] See flow sheet :   Rationale: increase ROM and increase strength to improve the patients ability to ease with ADL:s'    10 min Manual Therapy:  Pt placed in semireclined position with towel under her neck. PROM in flexion and scaption, oscillations, gentle retrograde massage, thumb circles and effleurage    Rationale: decrease pain, increase ROM and decrease trigger points to decr pain. With   [] TE   [] TA   [] neuro   [] other: Patient Education: [x] Review HEP    [] Progressed/Changed HEP based on:   [] positioning   [] body mechanics   [] transfers   [] heat/ice application    [] other:      Other Objective/Functional Measures:NO increased pain reported. Pt is relaxed throughout session. Mod/severe pitting edema throughout increases distally to all digits and dorsum. PROM flexion=94 deg(supine)  Incision is well approximated, some dried blood around. Pain Level (0-10 scale) post treatment: 4/10    ASSESSMENT/Changes in Function: Progressing well with PROM. Pt is Not wearing a sling. Spoke with PA who stated pt will begin /continue with PT/OY at [facility] to get the lymphedema therapy. Pt verbalized agreement. Patient will continue to benefit from skilled PT services to modify and progress therapeutic interventions, address functional mobility deficits, address ROM deficits, address strength deficits, analyze and address soft tissue restrictions, analyze and cue movement patterns and analyze and modify body mechanics/ergonomics to attain remaining goals. [x]  See Plan of Care  []  See progress note/recertification  []  See Discharge Summary         Progress towards goals / Updated goals:      1.  Pt will be compliant with a HEP to improve shoulder function.   Long Term Goals: To be accomplished in 8 weeks:              1. Pt will complete FOTO to be able to determine right shoulder function.               1. Pt will increase right shoulder ROM >130 deg flexion/scaption to ease with progress toward ADL's. Progressing.   PROM flexion ~ 75* 8/12/19              3. Pt will decrease right shoulder swelling at web space circumference to be same as left Hand to be able to hold and  light objects for ADL's.              4. Pt will have full passive flexion /scaption to [facility] to ease with progression of shoulder mobility per protocol.        PLAN  [x]  Upgrade activities as tolerated     [x]  Continue plan of care  []  Update interventions per flow sheet       []  Discharge due to:_  []  Other:_      Doreen Leroy, PT 8/13/2019  11:48 AM    Future Appointments   Date Time Provider Heri Beal   8/13/2019 12:00 PM Alysia Poe PT [facility]   8/16/2019 11:00 AM Sofía Cisneros, PT [facility]   8/20/2019  3:00 PM Wes Nicole, PTA [facility]   8/22/2019 12:00 PM Alysia Poe PT [facility]   8/23/2019  9:30 AM Elier Felix PTA [facility]   8/26/2019  9:30 AM Wes Nicole, PTA [facility]   8/27/2019  8:20 AM Celine Sue MD [place] [id number]   8/30/2019  3:00 PM Wes Nicole, PTA [facility]   9/3/2019 10:30 AM Alysia Poe PT [facility]   9/4/2019  9:00 AM Sofía Cisneros, PT [facility]   9/6/2019  9:30 AM Wes Nicole, PTA [facility]   9/10/2019 10:15 AM Amish Wesley MD [facility]   1/16/2020  9:30 AM [facility] FAST TRACK NURSE [facility]

## 2019-08-14 ENCOUNTER — TELEPHONE (OUTPATIENT)
Dept: ORTHOPEDIC SURGERY | Age: 79
End: 2019-08-14

## 2019-08-14 NOTE — TELEPHONE ENCOUNTER
Spoke with patient, she states that [employer] told her to ask us about ordering a sleeve for her swelling. She also states that [employer] has not contacted her regarding setting up her therapy for her hand.

## 2019-08-14 NOTE — TELEPHONE ENCOUNTER
Not sure what she is discussing. No sleeve was discussed.  Please make sure [facility] called her as well

## 2019-08-14 NOTE — TELEPHONE ENCOUNTER
Patient is calling back asking for Denny Nowak to let her know where the patient can get the sleve discussed yesterday for her rt shld. Patient was upset and forgot to ask. Does need the sleve. Please call her back today at [phone].

## 2019-08-15 NOTE — TELEPHONE ENCOUNTER
Just spoke with therapy. She is going to do both PT and OT at [facility] to make more convenient for her. She should receive a call about setting up OT today.  She can also call them to schedule

## 2019-08-15 NOTE — TELEPHONE ENCOUNTER
She needs to be evaluated by OT first. Arvind Easton should be contacting patient today.  We are trying to get both of her PT and OT to be at [facility] so closer to her home

## 2019-08-16 ENCOUNTER — HOSPITAL ENCOUNTER (OUTPATIENT)
Dept: PHYSICAL THERAPY | Age: 79
Discharge: HOME | End: 2019-08-16
Payer: MEDICARE

## 2019-08-16 PROCEDURE — 97110 THERAPEUTIC EXERCISES: CPT

## 2019-08-16 PROCEDURE — 97016 VASOPNEUMATIC DEVICE THERAPY: CPT

## 2019-08-16 PROCEDURE — 97140 MANUAL THERAPY 1/> REGIONS: CPT

## 2019-08-16 NOTE — PROGRESS NOTES
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 Reports she is doing ok today.  She reports she has been doing the massage at home a lot but it is still swollen     OBJECTIVE    Modality rationale: decrease inflammation and decrease pain to improve the patients ability to increase ease of ADLs   Min Type Additional Details    [] Estim:  []Unatt       []IFC  []Premod                        []Other:  []w/ice   []w/heat  Position:  Location:    [] Estim: []Att    []TENS instruct  []NMES                    []Other:  []w/US   []w/ice   []w/heat  Position:  Location:    []  Traction: [] Cervical       []Lumbar                       [] Prone          []Supine                       []Intermittent   []Continuous Lbs:  [] before manual  [] after manual    []  Ultrasound: []Continuous   [] Pulsed                           []1MHz   []3MHz W/cm2:  Location:    []  Iontophoresis with dexamethasone         Location: [] Take home patch   [] In clinic    []  Ice     []  heat  []  Ice massage  []  Laser   []  Anodyne Position:  Location:    []  Laser with stim  []  Other:  Position:  Location:   15 [x]  Vasopneumatic Device Pressure:       [x] lo [] med [] hi   Temperature: [x] lo [] med [] hi   [x] Skin assessment post-treatment:  [x]intact []redness- no adverse reaction    []redness - adverse reaction:     25 min Therapeutic Exercise:  [] See flow sheet :   Rationale: increase ROM and increase strength to improve the patients ability to increase ease of ADLs    15 min Manual Therapy:  Trigger point release to UT, infraspinatus, and lats   Rationale: decrease pain, increase ROM and increase tissue extensibility to increase ease of ADLs          With   [x] TE   [] TA   [] neuro   [] other: Patient Education: [x] Review HEP    [] Progressed/Changed HEP based on:   [] positioning   [] body mechanics   [] transfers   [] heat/ice application    [] other:      Other Objective/Functional Measures:   Right shoulder flexion PROM: 79 degrees  She was educated on towel under arm when laying supine and no active should motion yet  She came in without sling today because she forgot it and was educated on importance of sling use     Pain Level (0-10 scale) post treatment:  5-6/10    ASSESSMENT/Changes in Function:  Pt. Is progressing slowly towards goals. She continues to have significant right UE edema and guarding during PROM. She was not compliant with sling use today but reports typically uses it. She was also re-educated on not actively using shoulder yet. Patient will continue to benefit from skilled PT services to modify and progress therapeutic interventions, address functional mobility deficits, address ROM deficits, address strength deficits, analyze and address soft tissue restrictions, analyze and cue movement patterns, analyze and modify body mechanics/ergonomics and assess and modify postural abnormalities to attain remaining goals. Progress towards goals / Updated goals:  1.  Pt will be compliant with a HEP to improve shoulder function.   met    Long Term Goals: To be accomplished in 8 weeks:              1. Pt will complete FOTO to be able to determine right shoulder function.               2. Pt will increase right shoulder ROM >130 deg flexion/scaption to ease with progress toward ADL's. Progressing.  PROM flexion: 79 degrees (8/16/19)              3. Pt will decrease right shoulder swelling at web space circumference to be same as left Hand to be able to hold and  light objects for ADL's.              4. Pt will have full passive flexion /scaption to [facility] to ease with progression of shoulder mobility per protocol.     PLAN  []  Upgrade activities as tolerated     [x]  Continue plan of care  []  Update interventions per flow sheet       []  Discharge due to:_  []  Other:_      Berto Stuart, PT 8/16/2019  10:40 AM    Future Appointments   Date Time Provider Heri Lian   8/16/2019 11:00 AM Sky Singh, PT [facility]   8/20/2019  3:00 PM Tiki Dawit, PTA [facility]   8/22/2019 12:00 PM Riya Boykin, PT [facility]   8/23/2019  9:30 AM Basia Gallegos PTA [facility]   8/26/2019  9:30 AM Tiki Dawit, PTA [facility]   8/27/2019  8:20 AM Marissa Todd MD [address]   8/30/2019  3:00 PM Tiki Dawit, PTA [facility]   9/3/2019 10:30 AM Riya Boykin PT [facility]   9/4/2019  9:00 AM Sky Singh PT [facility]   9/6/2019  9:30 AM Tiki Dawit, PTA [facility]   9/10/2019 10:15 AM MD Saud Guthrie   1/16/2020  9:30 AM [facility] FAST TRACK NURSE [facility]

## 2019-08-20 ENCOUNTER — HOSPITAL ENCOUNTER (OUTPATIENT)
Dept: PHYSICAL THERAPY | Age: 79
Discharge: HOME | End: 2019-08-20
Payer: MEDICARE

## 2019-08-20 PROCEDURE — 97140 MANUAL THERAPY 1/> REGIONS: CPT

## 2019-08-20 PROCEDURE — 97110 THERAPEUTIC EXERCISES: CPT

## 2019-08-20 PROCEDURE — 97165 OT EVAL LOW COMPLEX 30 MIN: CPT

## 2019-08-20 NOTE — PROGRESS NOTES
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 She notes the wrapping for 4 days did nothing and she doesn't understand why she isn't getting transferred for therapy for Lymphedema if that is what she has. She notes difficulty sleeping and that she is icing daily. She notes her entire arm is swollen in the morning and she tries to massage it to help. She is right handed and likes to play golf.      OBJECTIVE      19 min Therapeutic Exercise:  [] See flow sheet :   Rationale: increase ROM and increase strength to improve the patients ability to increase ease of ADLs    15 min Manual Therapy:  PROM with oscillations   Rationale: decrease pain, increase ROM and increase tissue extensibility to increase ease of ADLs          With   [x] TE   [] TA   [] neuro   [] other: Patient Education: [x] Review HEP    [] Progressed/Changed HEP based on:   [] positioning   [] body mechanics   [] transfers   [] heat/ice application    [] other:      Other Objective/Functional Measures:   Manager [address] [place] [address] came over to speak with pt regarding concerns; pt was informed that the PA/MD do not feel it is lymphedema at this time and that the goal is to address the edema with PT/OT to see if improvement can be achieved before further measures are taken  Pt was understanding and agreeable to do OT evaluation  Much improved edema noted compared to previous sessions per Cinthia  Improving PROM  TC for game ready due to OT evaluation    Pain Level (0-10 scale) post treatment:  5/10    ASSESSMENT/Changes in Function: Pt is progressing with improving PROM and decreased swelling. Her pain remains moderate levels but she is performing HEP and icing daily. Will work on edema control with massage, exercise and ice to improve progression through protocol with protective phase for eventual return to performing ADLs and golfing for enjoyment. Progress towards goals / Updated goals:  1.  Pt will be compliant with a HEP to improve shoulder function.   met    Long Term Goals: To be accomplished in 8 weeks:              1. Pt will complete FOTO to be able to determine right shoulder function.               2. Pt will increase right shoulder ROM >130 deg flexion/scaption to ease with progress toward ADL's. Progressing.  PROM flexion: 79 degrees (8/16/19)              3. Pt will decrease right shoulder swelling at web space circumference to be same as left Hand to be able to hold and  light objects for ADL's.              4. Pt will have full passive flexion /scaption to [facility] to ease with progression of shoulder mobility per protocol.     PLAN  [x]  Upgrade activities as tolerated     [x]  Continue plan of care  []  Update interventions per flow sheet       []  Discharge due to:_  []  Other:_      Robbie Coelho PTA 8/20/2019  10:40 AM    Future Appointments   Date Time Provider Heri Dela Cruzi   8/22/2019 12:00 PM Maddy Chavez PT [facility]   8/23/2019  9:30 AM Montana Segovia [facility]   8/26/2019  9:30 AM Jo Ann Yepez PTA [facility]   8/27/2019  8:20 AM Lisandra Rubi MD [facility]   8/30/2019  3:00 PM Jo Ann Yepez PTA [facility]   9/3/2019 10:30 AM Mike Perez, PT [facility]DSDR [facility]   9/4/2019  9:00 AM Douglas Rodriguez, PT [facility]   9/6/2019  9:30 AM Lito Malik, PTA [facility]   9/10/2019 10:15 AM Christiane Goodrich MD [facility]   1/16/2020  9:30 AM [facility] FAST TRACK NURSE [facility]

## 2019-08-20 NOTE — PROGRESS NOTES
[facility] [place] [facility] [employer] [facility] [employer] [facility]  [address] [facility]  [phone] fax    Plan of Care/Statement of Necessity for Occupational Therapy Services    Patient name: Aung Alejo Start of Care: 2019   Referral source: Allen Osiris Molina : 1940    Medical Diagnosis: Rotator cuff arthropathy, right [M12.811]  Payor: VA MEDICARE / Plan: VA MEDICARE PART A & B / Product Type: Medicare /  Onset Date:19    Treatment Diagnosis: Edema pain right UE   Prior Hospitalization: see medical history Provider#: [id number]   Medications: Verified on Patient summary List    Comorbidities: COPD, reverse total shoulder right   Prior Level of Function: I self care home care driving, golf gardening          The Plan of Care and following information is based on the information from the initial evaluation. Assessment/ key information: 66year old RHD female who underwent right reverse total shoulder replacement following several years of pain in right shoulder. She reports pain following surgery of 4/10. Her main concern and reason for referral to occupational therapy is recurrent edema in right upper extremity. She is anxious and frustrated. She reports edema decreases with massage but returns. She is performing massage and hand exercises. She has full AROM of digits and tight fist despite edema on dorsal hand and PIPs. Girth measurements show increased girth at wrist and MCPs. Her wrist AROM is reduced in extension 40 ( left 68). Other motions are essentially equal.  She is beginning to be able to grasp with right hand but has limited use for self care. laila was fitting with edema sleeve and glove and given further instructions in clearing exercises and edema management . She will benefit from skilled occupational therapy to reduce edema, improve distal extremity function for self care.       Evaluation Complexity: History LOW Complexity : Brief history review  Examination LOW Complexity : 1-3 performance deficits relating to physical, cognitive , or psychosocial skils that result in activity limitations and / or participation restrictions  Clinical Decision Making LOW Complexity : No comorbidities that affect functional and no verbal or physical assistance needed to complete eval tasks   Overall Complexity Rating: LOW     Problem List: Pain effecting function, Decreased range of motion, Decreased strength, Edema effecting function, Decreased ADL/functional abilities  and Sensability     Treatment Plan may include any combination of the following: Therapeutic exercise, Therapeutic activities, Physical agent/modality, Manual therapy, Patient education and ADLs/IADLs    Patient / Family readiness to learn indicated by: asking questions, trying to perform skills and interest    Persons(s) to be included in education:   patient (P)    Barriers to Learning/Limitations: yes;  emotional    Patient Goal (s): Get rid of swelling, be able to use hand    Patient Self Reported Health Status: good    Rehabilitation Potential: good    Short Term Goals: To be accomplished in 2 weeks:  1. Patient will be independent in home exercise program for hand and wrist ROM. 2.  Patient will be familiar with positioning of UE to reduce edema. 3.  Patient will be provided with compression sleeve and glove for edema reduction. Long Term Goals: To be accomplished in 4 weeks:   1. Patient will report pain in hand 0-2/10 with use for fine motor tasks at table level. 2.  Patient will increase right wrist AROM to within 5 degrees of left for use for self care tasks. .   3.  Patient will increase right hand  at least 10# for return to hobbies and home care tasks. 4.  Patient will report resolved/ reduced significantly  when garments removed to allow for improved hand function and UE recovery.         Frequency / Duration: Patient to be seen 2-3 times per week for 4 weeks:    Patient/ Caregiver education and instruction: Diagnosis, prognosis, self care, activity modification and exercises  [x]  Plan of care has been reviewed with VELAZQUEZ    Certification Period: 8/20/19-9/18/19    Candelaria Banks OTR/L 8/20/2019 3:21 PM      ________________________________________________________________________    I certify that the above Therapy Services are being furnished while the patient is under my care. I agree with the treatment plan and certify that this therapy is necessary.   Physician's Signature:____________Date:_________TIME:________    ** Signature, Date and Time must be completed for valid certification **    Please sign and return to [facility] [employer] [facility] [employer] [facility]   [address] [place] [address]  [phone] fax

## 2019-08-20 NOTE — PROGRESS NOTES
OT DAILY TREATMENT NOTE 10-18    Patient Name: Paulo Elliott  Date:2019  : 1940  [x]  Patient  Verified  Payor: VA MEDICARE / Plan: VA MEDICARE PART A & B / Product Type: Medicare /    In time:330  Out time:420  Total Treatment Time (min): 50  Visit #: 1 of 8    Medicare/BCBS Only   Total Timed Codes (min):  30 1:1 Treatment Time:  50     Treatment Area: Rotator cuff arthropathy, right [M12.811]    SUBJECTIVE  Pain Level (0-10 scale): 4/10  Any medication changes, allergies to medications, adverse drug reactions, diagnosis change, or new procedure performed?: [x] No    [] Yes (see summary sheet for update)  Subjective functional status/changes:   [] No changes reported  I don't understand why I have so much swelling  I have been doing the hand exercises and the massage    OBJECTIVE    20 min [x]Eval                  []Re-Eval       15 min Therapeutic Exercise:  [] See flow sheet :   Rationale: reduce edema to improve the patients ability to use right arm  Reviewed clearing exercises within limits of precautions      15 min Manual Therapy:  Edema management   Rationale: decrease pain, increase ROM and decrease edema  to improve Right UE funciton  Issued and reviewed use of edema sleeve and glove  Massage focusing on webspaces to recue edema  Reviewed positioning of UE         With   [] TE   [] TA   [] neuro   [] other: Patient Education: [x] Review HEP    [] Progressed/Changed HEP based on: clearing exercises  [] positioning   [] body mechanics   [] transfers   [] heat/ice application   [] Splint wear/care   [] Sensory re-education   [] scar management      [] other:Edema control            Other Objective/Functional Measures:   Subjective: pt is a right hand dominant, 78 y.o.y/o, female whohad pain in right shoulder  and had surgery on 19.    Prior level of function: I self care home care, golf, driving, gardening  Pain level:(0-no pain 10-debilitating pain) moderate Description/Location: right arm and right hand with c/o intermittent rlelief   Worst pain10/10 Least pain 2/10   Activities which aggravate pain: night   Activities which ease pain: rest  Current functional limitations/living situation: *With spouse**    Medical hx: Reverse total shoulder replacement, COPD, osteoporosis    Medications: See the written copy of this report in the patient's paper medical record.        Objective:  Edema: Circumference    Right  Left   Styolid Level  73/4cm  7.0cm   MCP   61/2cm  6.0cm  Olecranon     10cm     Sensation:Tingling all digits  Intact touch localization  Range of Motion:     Active Passive     Norms Right Left Right Left   Shoulder Flex 0-180        Ext 0-60        abd 0-180        Horizontal add 0-45        IR 0-70        ER 0-90       Elbow Ext/flex 0-150       Forearm Supination 0-80        Pronation 0-80       Wrist Flex 0-80 65 60      Ext 0-70 40 68      Ulnar Dev 0-30 30 30      Radial Dev 0-20 20 25           Hand ROM WNL  Hand Strength:   Gross Grasp 3pt Pinch Lateral Pinch Tip Pinch   Right  32 9 9 7   Left 40 8 8 6       Finger Opposition:WNL      ADLs TBA       Pain Level (0-10 scale) post treatment: 4/10    ASSESSMENT/Changes in Function:    [x]  See Plan of Care  []  See progress note/recertification  []  See Discharge Summary             PLAN  []  Upgrade activities as tolerated     []  Continue plan of care  []  Update interventions per flow sheet       []  Discharge due to:_  []  Other:_      GANGA Romero/L 8/20/2019  3:17 PM    Future Appointments   Date Time Provider Heri Beal   8/20/2019  3:30 PM Ellis Baker OTR/L [facility]   8/22/2019 12:00 PM Twanna Goldmann, PT KSBCDNA [facility]   8/23/2019  9:30 AM Eryn Armando PTA [facility]   8/26/2019  9:30 AM Thomas Fermin PTA [facility]   8/27/2019  8:20 AM Benjamin Pa MD [place] [id number]   8/30/2019  3:00 PM Thomas Fermin PTA [facility]   9/3/2019 10:30 AM Twanna Goldmann, PT TAG[facility]NW [facility]   9/4/2019  9:00 AM Prudence Dates, PT [facility]   9/6/2019  9:30 AM Vamshi Zamora, PTA [facility]   9/10/2019 10:15 AM MD Saud Hankins [place]   1/16/2020  9:30 AM [facility] FAST TRACK NURSE [facility]

## 2019-08-21 ENCOUNTER — HOSPITAL ENCOUNTER (OUTPATIENT)
Dept: PHYSICAL THERAPY | Age: 79
Discharge: HOME | End: 2019-08-21
Payer: MEDICARE

## 2019-08-21 PROCEDURE — 97140 MANUAL THERAPY 1/> REGIONS: CPT

## 2019-08-21 PROCEDURE — 97110 THERAPEUTIC EXERCISES: CPT

## 2019-08-21 NOTE — PROGRESS NOTES
OT DAILY TREATMENT NOTE 10-18    Patient Name: Narendra Freeze  Date:2019  : 1940  [x]  Patient  Verified  Payor: VA MEDICARE / Plan: VA MEDICARE PART A & B / Product Type: Medicare /    In time:315  Out time:350  Total Treatment Time (min): 35  Visit #: 2 of     Medicare/BCBS Only   Total Timed Codes (min):  35 1:1 Treatment Time:  35     Treatment Area: Right hand weakness [R29.898]  Rotator cuff arthropathy, right [M12.811]    SUBJECTIVE  Pain Level (0-10 scale): 4/10  Any medication changes, allergies to medications, adverse drug reactions, diagnosis change, or new procedure performed?: [x] No    [] Yes (see summary sheet for update)  Subjective functional status/changes:   [] No changes reported  This is working great!     OBJECTIVE          15 min Therapeutic Exercise:  [] See flow sheet :   Rationale: increase ROM to improve the patients ability to reduce edema improve wrist and forearm motion  AROM wrist flex ext with and no fist, forearm sup pro x 10 each, RD/UD x 10      20 min Manual Therapy:  Edema mangement   Rationale: decrease pain, increase ROM, increase tissue extensibility and decrease edema  to reduce edema  Edema management soft tissue mobilization to reduce edema in hand forearm and wrist      With   [] TE   [] TA   [] neuro   [] other: Patient Education: [x] Review HEP    [] Progressed/Changed HEP based on: wrist and forearm AROM  [] positioning   [] body mechanics   [] transfers   [] heat/ice application   [] Splint wear/care   [] Sensory re-education   [] scar management      [] other:            Other Objective/Functional Measures:   Now with wrinkles at digits     Pain Level (0-10 scale) post treatment: 0/10    ASSESSMENT/Changes in Function: Reduced edema, improved ROM     Patient will continue to benefit from skilled OT services to modify and progress therapeutic interventions, address ROM deficits, address strength deficits, analyze and cue movement patterns and instruct in home and community integration to attain remaining goals. []  See Plan of Care  []  See progress note/recertification  []  See Discharge Summary         Progress towards goals / Updated goals:  *1.  Patient will be independent in home exercise program for hand and wrist ROM. met 8/21/19  2. Patient will be familiar with positioning of UE to reduce edema. 8/21/19  3. Patient will be provided with compression sleeve and glove for edema reduction. met 8/21/19     Long Term Goals: To be accomplished in 4 weeks:              1.  Patient will report pain in hand 0-2/10 with use for fine motor tasks at table level. 2.  Patient will increase right wrist AROM to within 5 degrees of left for use for self care tasks. .   3.  Patient will increase right hand  at least 10# for return to hobbies and home care tasks.     4.  Patient will report resolved/ reduced significantly  when garments removed to allow for improved hand function and UE recovery    PLAN  []  Upgrade activities as tolerated     []  Continue plan of care  []  Update interventions per flow sheet       []  Discharge due to:_  []  Other:_      Rinku Stokes, OTR/L 8/21/2019  4:14 PM    Future Appointments   Date Time Provider Heri Beal   8/22/2019 12:00 PM Dimitris Pacheco, PT [facility]   8/23/2019  7:30 AM Catherine Yusuf, OTR/L [facility]   8/23/2019  8:30 AM Romelia Head, PT [facility]   8/26/2019  8:45 AM Catherine Yusuf, OTR/L [facility]   8/26/2019  9:30 AM Selene Villafana, PTA [facility]   8/27/2019  8:20 AM MD Deepti Siegel 69   8/28/2019 12:00 PM Romelia Head, PT [facility]   8/30/2019  1:15 PM Delbert WILSONUUBOWEN [facility]   8/30/2019  2:00 PM Romelia Head, PT [facility]   9/3/2019 10:30 AM Dimitris Pacheco, PT [facility]   9/3/2019 12:00 PM Delbert PETER [facility]   9/4/2019  9:00 AM Romelia Head, PT [facility]   9/4/2019  9:30 AM Catherine Yusuf, OTR/L [facility] 9/6/2019  9:30 AM Jacob Hills, PTA [facility]   9/6/2019 10:30 AM Yoselin Ding, OTR/L [facility]   9/9/2019  2:00 PM Beth Thornton, PT [facility]   9/9/2019  2:30 PM Yoselin Ding, OTR/L [facility]   9/10/2019 10:15 AM MD Saud Zheng   9/11/2019 11:00 AM Yoselin Ding, OTR/L [facility]   9/11/2019 12:00 PM Jacob Hills, PTA [facility]   9/13/2019 10:30 AM Kenard Habermann [id number] [facility]   9/13/2019 11:30 AM Jacob Hills, PTA [facility]   9/16/2019 11:00 AM Kenard Habermann [id number] [facility]   9/16/2019 12:00 PM Beth Thornton, PT [facility]   9/18/2019 11:45 AM Yoselin Ding, OTR/L [facility]   9/18/2019 12:30 PM Jacob Hills, PTA [facility]   9/19/2019  2:00 PM Beth Thornton, PT [facility]   9/19/2019  2:45 PM Yoselin Ding, OTR/L [facility]   9/23/2019 10:00 AM Beth Thornton, PT [facility]   9/23/2019 11:00 AM Yoselin Ding, OTR/L [facility]   9/25/2019 10:00 AM Jacob Hlils PTA [facility]   9/25/2019 11:00 AM GANGA Gomez/L [facility]   9/27/2019  9:00 AM Jacob Hills PTA [facility]   9/27/2019  9:45 AM Kenard Habermann [facility]   1/16/2020  9:30 AM [facility] FAST TRACK NURSE [facility]

## 2019-08-22 ENCOUNTER — HOSPITAL ENCOUNTER (OUTPATIENT)
Dept: PHYSICAL THERAPY | Age: 79
Discharge: HOME | End: 2019-08-22
Payer: MEDICARE

## 2019-08-22 PROCEDURE — 97016 VASOPNEUMATIC DEVICE THERAPY: CPT

## 2019-08-22 PROCEDURE — 97140 MANUAL THERAPY 1/> REGIONS: CPT

## 2019-08-22 PROCEDURE — 97110 THERAPEUTIC EXERCISES: CPT

## 2019-08-22 NOTE — PROGRESS NOTES
PT DAILY TREATMENT NOTE 10-18    Patient Name: Praveen Meyers  Date:2019  : 1940  [x]  Patient  Verified  Payor: [place] MEDICARE / Plan: [place] MEDICARE PART A & B / Product Type: Medicare /    In IFDP:[id number]  Out time:1243  Total Treatment Time (min): 44  Visit #: 6 of 12    Medicare/BCBS Only   Total Timed Codes (min):  44 1:1 Treatment Time:  29       Treatment Area: Traumatic arthropathy, right shoulder [M12.511]    SUBJECTIVE  Pain Level (0-10 scale): 5/10  Any medication changes, allergies to medications, adverse drug reactions, diagnosis change, or new procedure performed?: [x] No    [] Yes (see summary sheet for update)  Subjective functional status/changes:   [] No changes reported  Pt is pleased with the reduction in swelling since she got her glove/sleeve. She is also pleased with the scar, she has been putting vitamin E and holy water on it.   OBJECTIVE    Modality rationale: decrease pain to improve the patients ability to ease with ADL's   Min Type Additional Details    [] Estim:  []Unatt       []IFC  []Premod                        []Other:  []w/ice   []w/heat  Position:  Location:    [] Estim: []Att    []TENS instruct  []NMES                    []Other:  []w/US   []w/ice   []w/heat  Position:  Location:    []  Traction: [] Cervical       []Lumbar                       [] Prone          []Supine                       []Intermittent   []Continuous Lbs:  [] before manual  [] after manual    []  Ultrasound: []Continuous   [] Pulsed                           []1MHz   []3MHz W/cm2:  Location:    []  Iontophoresis with dexamethasone         Location: [] Take home patch   [] In clinic    []  Ice     []  heat  []  Ice massage  []  Laser   []  Anodyne Position:  Location:    []  Laser with stim  []  Other:  Position:  Location:   15 []  Vasopneumatic Device Pressure:       [x] lo [] med [] hi   Temperature: [x] lo [] med [] hi   [] Skin assessment post-treatment:  []intact []redness- no adverse reaction        19 min Therapeutic Exercise:  [] See flow sheet :   Rationale: increase ROM to improve the patients ability to ease with shoulder mobility and edema reduction. 10 min Manual Therapy:  Gentle upperarm retrograde massage, PROM. Rationale: decrease pain, increase ROM and decrease trigger points to ease with ADL's          With   [] TE   [] TA   [] neuro   [] other: Patient Education: [x] Review HEP    [] Progressed/Changed HEP based on:   [] positioning   [] body mechanics   [] transfers   [] heat/ice application    [] other:      Other Objective/Functional Measures: PROM flexion=93 deg, scaption=61 deg. Scar is well approximated and sensation is intact. Cues to relax upper trap during PROM. Tactile cueing to prevent shoulder hiking. continues to have pockets of edema at edge of sleeve in mid shoulder. Pain Level (0-10 scale) post treatment: 5/10    ASSESSMENT/Changes in Function: Progressing well. Edema to dorsum of hand is reduced with compression glove. Issued longer sleeve with OT's permission to further decrease edema. Patient will continue to benefit from skilled PT services to modify and progress therapeutic interventions, address functional mobility deficits, address ROM deficits, address strength deficits, analyze and address soft tissue restrictions, analyze and cue movement patterns, analyze and modify body mechanics/ergonomics and assess and modify postural abnormalities to attain remaining goals. [x]  See Plan of Care  []  See progress note/recertification  []  See Discharge Summary         Progress towards goals / Updated goals:    1.  Pt will be compliant with a HEP to improve shoulder function.   met     Long Term Goals: To be accomplished in 8 weeks:              1. Pt will complete FOTO to be able to determine right shoulder function.               2. Pt will increase right shoulder ROM >130 deg flexion/scaption to ease with progress toward ADL's. Progressing.  PROM flexion: 79 degrees (8/16/19)              3. Pt will decrease right shoulder swelling at web space circumference to be same as left Hand to be able to hold and  light objects for ADL's.   Progressing. 8/22/19              4. Pt will have full passive flexion /scaption to [facility] to ease with progression of shoulder mobility per protocol.   PLAN  [x]  Upgrade activities as tolerated     [x]  Continue plan of care  []  Update interventions per flow sheet       []  Discharge due to:_  []  Other:_      Beatriz Sutherland, PT 8/22/2019  11:58 AM    Future Appointments   Date Time Provider Heri Lian   8/22/2019 12:00 PM Osbaldo Mckenna, PT [facility]   8/23/2019  7:30 AM Herberth De Los Santos, OTR/L [facility]   8/23/2019  8:30 AM Prudence Dates, PT [facility]   8/26/2019  8:45 AM Herberth De Los Santos, OTR/L [facility]   8/26/2019  9:30 AM Vamshi Zamora PTA [facility]   8/27/2019  8:20 AM [facility]   8/28/2019 12:00 PM Prudence Dates, PT [facility]   8/30/2019  1:15 PM Jeanne Persaud AELWPCR [facility]   8/30/2019  2:00 PM Prudence Dates, PT [facility]   9/3/2019 10:30 AM Osbaldo Mckenna, PT [facility]   9/3/2019 12:00 PM Jeanne ACOSTAOMKVL [facility]   9/4/2019  9:00 AM Prudence Dates, PT [facility]   9/4/2019  9:30 AM Herberth De Los Santos, OTR/L [facility]   9/6/2019  9:30 AM Vamshi Zamora, PTA [facility]   9/6/2019 10:30 AM Vergamaliel De Los Santos, OTR/L [facility]   9/9/2019  2:00 PM Prudence Dates, PT [facility]   9/9/2019  2:30 PM Vernida Filiberto, OTR/L [facility]   9/10/2019 10:15 AM MD Saud Hankins Antonito   9/11/2019 11:00 AM Herberth De Los Santos, OTR/L [facility]   9/11/2019 12:00 PM Vamshi Zamora, PTA [facility]   9/13/2019 10:30 AM Jeanne Persaud IPQCYRL [facility]   9/13/2019 11:30 AM Vamshi Zamora, PTA [facility]   9/16/2019 11:00 AM Jeanne Persaud XLNSURY [facility]   9/16/2019 12:00 PM Prudence Dates, PT [facility]   9/18/2019 11:45 AM Jovani Sam, OTR/L [facility]   9/18/2019 12:30 PM Benny Chapa, PTA [facility]   9/19/2019  2:00 PM Cholo Judit, PT [facility]   9/19/2019  2:45 PM Jovani Sam, OTR/L [facility]   9/23/2019 10:00 AM Cholo Judit, PT [facility]   9/23/2019 11:00 AM Jovani Sam, OTR/L [facility]   9/25/2019 10:00 AM Benny Chapa, PTA [facility]   9/25/2019 11:00 AM Jovani Sam, OTR/L [facility]   9/27/2019  9:00 AM Benny Chapa, PTA [facility]   9/27/2019  9:45 AM Armstrong Lobe [facility]   1/16/2020  9:30 AM [facility] FAST TRACK NURSE [facility]

## 2019-08-23 ENCOUNTER — HOSPITAL ENCOUNTER (OUTPATIENT)
Dept: PHYSICAL THERAPY | Age: 79
Discharge: HOME | End: 2019-08-23
Payer: MEDICARE

## 2019-08-23 PROCEDURE — 97016 VASOPNEUMATIC DEVICE THERAPY: CPT

## 2019-08-23 PROCEDURE — 97140 MANUAL THERAPY 1/> REGIONS: CPT

## 2019-08-23 PROCEDURE — 97110 THERAPEUTIC EXERCISES: CPT

## 2019-08-23 NOTE — PROGRESS NOTES
OT DAILY TREATMENT NOTE 10-18    Patient Name: Giana Kenny  Date:2019  : 1940  [x]  Patient  Verified  Payor: VA MEDICARE / Plan: VA MEDICARE PART A & B / Product Type: Medicare /    In time:740  Out time:815  Total Treatment Time (min): 35  Visit #: 3 of     Medicare/BCBS Only   Total Timed Codes (min):  35 1:1 Treatment Time:  35     Treatment Area: Right hand weakness [R29.898]  Rotator cuff arthropathy, right [M12.811]    SUBJECTIVE  Pain Level (0-10 scale): 7/10  Any medication changes, allergies to medications, adverse drug reactions, diagnosis change, or new procedure performed?: [x] No    [] Yes (see summary sheet for update)  Subjective functional status/changes:   [] No changes reported  Couldn't get comfortable last night  It was really swollen up by my elbow.       OBJECTIVE        15 min Therapeutic Exercise:  [] See flow sheet :   Rationale: increase ROM to improve the patients ability to move wrist forearm and hand  Wrist flex et RD UD sup pro x 10 each  Tendon glides x 10    2**0 min Manual Therapy:  Manual therapy   Rationale: decrease pain, increase ROM and decrease edema  to use right UE  Edema management  Soft tissue mobilization right UE digits to above elbow    With   [] TE   [] TA   [] neuro   [x] other: Patient Education: [x] Review HEP    [] Progressed/Changed HEP based on: Avoid rolling over tubigrip sleeve  [] positioning   [] body mechanics   [] transfers   [] heat/ice application   [] Splint wear/care   [] Sensory re-education   [] scar management      [] other:            Other Objective/Functional Measures:   Increased edema around elbow      Pain Level (0-10 scale) post treatment: 4/10    ASSESSMENT/Changes in Function: REduced edema after session, increase likely due to doubling tubigrip sleeve around elbow blocking fluid flow    Patient will continue to benefit from skilled OT services to modify and progress therapeutic interventions, address ROM deficits, address strength deficits, analyze and address soft tissue restrictions and instruct in home and community integration to attain remaining goals. []  See Plan of Care  []  See progress note/recertification  []  See Discharge Summary         Progress towards goals / Updated goals:  1.  Patient will be independent in home exercise program for hand and wrist ROM. met 8/21/19  2.  Patient will be familiar with positioning of UE to reduce edema. 8/21/19  3.  Patient will be provided with compression sleeve and glove for edema reduction. met 8/21/19, wearing doubled today with reduced improvement 8/23/19     Long Term Goals: To be accomplished in 4 weeks:              1.  Patient will report pain in hand 0-2/10 with use for fine motor tasks at table level. 2.  Patient will increase right wrist AROM to within 5 degrees of left for use for self care tasks. .   3.  Patient will increase right hand  at least 10# for return to hobbies and home care tasks.    4.  Patient will report resolved/ reduced significantly  when garments removed to allow for improved hand function and UE recovery    PLAN  []  Upgrade activities as tolerated     []  Continue plan of care  []  Update interventions per flow sheet       []  Discharge due to:_  []  Other:_      Yoav Arreaga, OTR/L 8/23/2019  8:22 AM    Future Appointments   Date Time Provider Heri Beal   8/23/2019  8:30 AM Alicia Michaud, PT [facility]   8/26/2019  8:45 AM Dawson Conley OTR/L [facility]   8/26/2019  9:30 AM Elver Callejas PTA [facility]   8/27/2019  8:20 AM MD Deepti Hogan 69   8/28/2019 12:00 PM Alicia Michaud, PT [facility]   9/3/2019 10:30 AM Hari Bowen PT [facility]   9/3/2019 12:00 PM Kit Tang XGFDHEG [facility]   9/4/2019  9:00 AM Alicia Michaud, PT QKXEDNY [facility]   9/4/2019  9:30 AM Dawson Conley, OTR/L [facility]   9/6/2019  9:30 AM Elver Callejas, PTA [facility]   9/6/2019 10:30 AM Tinana Rosas Marah Gunn, OTR/L [facility]   9/9/2019  2:00 PM Tura Base, PT [facility]   9/9/2019  2:30 PM Roxene Kind, OTR/L [facility]   9/10/2019 10:15 AM MD Saud Macedo   9/11/2019 11:00 AM Roxene Kind, OTR/L [facility]   9/11/2019 12:00 PM Alisha Regan, PTA [facility]   9/13/2019 10:30 AM Voncille Lav QLFXTEQ [facility]   9/13/2019 11:30 AM Alisha Regan, PTA [facility]   9/16/2019 11:00 AM Voncille Lav GROGRQO [facility]   9/16/2019 12:00 PM Tura Base, PT [facility]   9/18/2019 11:45 AM Roxene Kind, OTR/L [facility]   9/18/2019 12:30 PM Alisha Regan, PTA [facility]   9/19/2019  2:00 PM Tura Base, PT [facility]   9/19/2019  2:45 PM Roxene Kind, OTR/L [facility]   9/23/2019 10:00 AM Tura Base, PT [facility]   9/23/2019 11:00 AM Roxene Kind, OTR/L [facility]   9/25/2019 10:00 AM Alisha Regan, PTA [facility]   9/25/2019 11:00 AM Chandu Mchugh, OTR/L [facility]   9/27/2019  9:00 AM Alisha Regan PTA [facility]   9/27/2019  9:45 AM Mikala Tom [facility]   1/16/2020  9:30 AM [facility] FAST TRACK NURSE [facility]

## 2019-08-23 NOTE — PROGRESS NOTES
PT DAILY TREATMENT NOTE 10-18    Patient Name: Kelly Morocho  Date:2019  : 1940  [x]  Patient  Verified  Payor: [address] [place] [address] / Plan: [place] MEDICARE PART A & B / Product Type: Medicare /    In time: 8:30  Out time: 9:20  Total Treatment Time (min): 50  Visit #: 7 of 12    Medicare/BCBS Only   Total Timed Codes (min):  45 1:1 Treatment Time:  30       Treatment Area: Traumatic arthropathy, right shoulder [M12.511]    SUBJECTIVE  Pain Level (0-10 scale): 3-4/10  Any medication changes, allergies to medications, adverse drug reactions, diagnosis change, or new procedure performed?: [x] No    [] Yes (see summary sheet for update)  Subjective functional status/changes:   [] No changes reported  Pt reports no new changes, continues to have difficulty with sleep and states the arm sleeve is uncomfortable and causes increased arm pain. OBJECTIVE    Modality rationale: decrease inflammation and decrease pain to improve the patients ability to perform ADL's with ease.    Min Type Additional Details    [] Estim:  []Unatt       []IFC  []Premod                        []Other:  []w/ice   []w/heat  Position:  Location:    [] Estim: []Att    []TENS instruct  []NMES                    []Other:  []w/US   []w/ice   []w/heat  Position:  Location:    []  Traction: [] Cervical       []Lumbar                       [] Prone          []Supine                       []Intermittent   []Continuous Lbs:  [] before manual  [] after manual    []  Ultrasound: []Continuous   [] Pulsed                           []1MHz   []3MHz W/cm2:  Location:    []  Iontophoresis with dexamethasone         Location: [] Take home patch   [] In clinic    []  Ice     []  heat  []  Ice massage  []  Laser   []  Anodyne Position:  Location:    []  Laser with stim  []  Other:  Position:  Location:   15 []  Vasopneumatic Device Pressure:       [x] lo [] med [] hi   Temperature: [x] lo [] med [] hi   [] Skin assessment post-treatment:  []intact []redness- no adverse reaction    []redness - adverse reaction:     15 min Therapeutic Exercise:  [x] See flow sheet :   Rationale: increase ROM and increase strength to improve the patients ability to perform ADL's with ease. 15 min Manual Therapy:  STM upper trap and parascapular musculature, PROM flexion   Rationale: decrease pain, increase ROM and decrease trigger points to improve patient's ability to perform ADL's with ease. With   [x] TE   [] TA   [] neuro   [] other: Patient Education: [x] Review HEP    [] Progressed/Changed HEP based on:   [] positioning   [] body mechanics   [] transfers   [] heat/ice application    [] other:      Other Objective/Functional Measures:   PROM shoulder flexion: 90 deg  Pt did not tolerate half foam roll under elbow prefers towel roll  Tolerated all ther-ex  5 minutes untimed for GR setup     Pain Level (0-10 scale) post treatment: 3/10    ASSESSMENT/Changes in Function: Pt progressing slowly with therapy and is advancing therapeutic exercises. Pt shoulder PROM improving. Pt began sub-max shoulder iso's and tolerated well. Pt activity tolerance improving and overall function of UE distal to right shoulder continues to improve. Patient will continue to benefit from skilled PT services to modify and progress therapeutic interventions, address functional mobility deficits, address ROM deficits, address strength deficits, analyze and address soft tissue restrictions, analyze and cue movement patterns, analyze and modify body mechanics/ergonomics and assess and modify postural abnormalities to attain remaining goals. [x]  See Plan of Care  []  See progress note/recertification  []  See Discharge Summary         Progress towards goals / Updated goals:  1.  Pt will be compliant with a HEP to improve shoulder function.   met     Long Term Goals: To be accomplished in 8 weeks:              1. Pt will complete FOTO to be able to determine right shoulder function.               9. Pt will increase right shoulder ROM >130 deg flexion/scaption to ease with progress toward ADL's. Progressing.  PROM flexion: 90 degrees (8/23/19)              3. Pt will decrease right shoulder swelling at web space circumference to be same as left Hand to be able to hold and  light objects for ADL's. Progressing. 8/22/19              4. Pt will have full passive flexion /scaption to [facility] to ease with progression of shoulder mobility per protocol. PLAN  []  Upgrade activities as tolerated     [x]  Continue plan of care  []  Update interventions per flow sheet       []  Discharge due to:_  []  Other:_      Jeanne Heather 8/23/2019  8:25 AM  I was present during the entire treatment, directing and participating in the treatment.    Mellissa Hastings DPT      Future Appointments   Date Time Provider Heri Beal   8/23/2019  8:30 AM Jamel Munoz, PT [facility] [address]   8/26/2019  8:45 AM Manjula Oviedos, OTR/L [facility] [address]   8/26/2019  9:30 AM Terry Rouse PTA [facility] [address]   8/27/2019  8:20 AM Melinda Mota MD [facility]   8/28/2019 12:00 PM Jamel Munoz PT [facility] [address]   9/3/2019 10:30 AM Riana Adams PT [facility] [address]   9/3/2019 12:00 PM Ana Cristina Simpson [id number] [address]   9/4/2019  9:00 AM Jamel Munoz PT [facility] [address]   9/4/2019  9:30 AM Tranniee Preets, OTR/L [facility] [address]   9/6/2019  9:30 AM Terry Rouse PTA [facility] [address]   9/6/2019 10:30 AM Trudee Ores, OTR/L [facility] [address]   9/9/2019  2:00 PM Jamel Munoz PT [facility] [address]   9/9/2019  2:30 PM Trudee Ores, OTR/L [facility] [address]   9/10/2019 10:15 AM MD Saud Mckeon   9/11/2019 11:00 AM Manjula Desai, OTR/L [facility] [address]   9/11/2019 12:00 PM Terry Rouse PTA [facility] [address]   9/13/2019 10:30 AM Ana Cristina Simpson [email or web address] [address]   9/13/2019 11:30 AM Terry Rouse PTA [facility] [address]   9/16/2019 11:00 AM Ana Cristina Simpson [email or web address] [address]   9/16/2019 12:00 PM Jamel Munoz, PT WZUOOCA [facility]   9/18/2019 11:45 AM Yasmani Benitez, OTR/L [facility]   9/18/2019 12:30 PM Akash Leggett, PTA [facility]   9/19/2019  2:00 PM Debrshawn Early, PT [facility]   9/19/2019  2:45 PM Yasmani Benitez, OTR/L [facility]   9/23/2019 10:00 AM Debrshawnh Broderick, PT [facility]   9/23/2019 11:00 AM Yasmani Benitez, OTR/L [facility]   9/25/2019 10:00 AM Akash Leggett, PTA [facility]   9/25/2019 11:00 AM Yasmani Benitez, OTR/L [facility]   9/27/2019  9:00 AM Akash Leggett, PTA [facility]   9/27/2019  9:45 AM Sky Dc [facility]   1/16/2020  9:30 AM [facility] FAST TRACK NURSE [facility]

## 2019-08-26 ENCOUNTER — HOSPITAL ENCOUNTER (OUTPATIENT)
Dept: PHYSICAL THERAPY | Age: 79
Discharge: HOME | End: 2019-08-26
Payer: MEDICARE

## 2019-08-26 PROCEDURE — 97140 MANUAL THERAPY 1/> REGIONS: CPT

## 2019-08-26 PROCEDURE — 97110 THERAPEUTIC EXERCISES: CPT

## 2019-08-26 PROCEDURE — 97016 VASOPNEUMATIC DEVICE THERAPY: CPT

## 2019-08-26 NOTE — PROGRESS NOTES
OT DAILY TREATMENT NOTE 10-18    Patient Name: Narendra Freeze  Date:2019  : 1940  [x]  Patient  Verified  Payor: [place] MEDICARE / Plan: VA MEDICARE PART A & B / Product Type: Medicare /    In time:848  Out time:928  Total Treatment Time (min): 40  Visit #: 4 of 8    Medicare/BCBS Only   Total Timed Codes (min):  40 1:1 Treatment Time:  40     Treatment Area: Right hand weakness [R29.898]  Rotator cuff arthropathy, right [M12.811]    SUBJECTIVE  Pain Level (0-10 scale): 3/10  Any medication changes, allergies to medications, adverse drug reactions, diagnosis change, or new procedure performed?: [x] No    [] Yes (see summary sheet for update)  Subjective functional status/changes:   [] No changes reported  Swelling much better, except ablve elbow    OBJECTIVE      15 min Therapeutic Exercise:  [] See flow sheet :   Rationale: increase ROM to improve the patients ability to grasp  Wrist flex ext x 10, RD UD x 10, sup pro  1/4 in pegs for fine motor tasks with digit ROM-required elbow support      25 min Manual Therapy:  Soft tissue mobilization   Rationale: decrease pain, increase ROM and decrease edema  to improve motion of distal UE  Soft tissue mobilization to forearm, hand and upper arm ( distal ) to reduce edema        With   [] TE   [] TA   [] neuro   [] other: Patient Education: [x] Review HEP    [] Progressed/Changed HEP based on: sleeve to extend past elbow  [] positioning   [] body mechanics   [] transfers   [] heat/ice application   [] Splint wear/care   [] Sensory re-education   [] scar management      [] other:            Other Objective/Functional Measures:   Edema in hand wrist nearly completely resolved  Pocket of edema present above elbow     Pain Level (0-10 scale) post treatment: 2/10    ASSESSMENT/Changes in Function: Edema decreasing, pain decreasing    Patient will continue to benefit from skilled OT services to modify and progress therapeutic interventions, address ROM deficits, analyze and address soft tissue restrictions and instruct in home and community integration to attain remaining goals. []  See Plan of Care  []  See progress note/recertification  []  See Discharge Summary         Progress towards goals / Updated goals:  *1.  Patient will be independent in home exercise program for hand and wrist ROM. met 8/21/19  2.  Patient will be familiar with positioning of UE to reduce edema. 8/21/19  3.  Patient will be provided with compression sleeve and glove for edema reduction. met 8/21/19, wearing doubled today with reduced improvement 8/23/19     Long Term Goals: To be accomplished in 4 weeks:              1.  Patient will report pain in hand 0-2/10 with use for fine motor tasks at table level. 2.  Patient will increase right wrist AROM to within 5 degrees of left for use for self care tasks. .   3.  Patient will increase right hand  at least 10# for return to hobbies and home care tasks.    4.  Patient will report resolved/ reduced significantly  when garments removed to allow for improved hand function and UE recovery     PLAN  []  Upgrade activities as tolerated     [x]  Continue plan of care  []  Update interventions per flow sheet       []  Discharge due to:_  []  Other:_      Emerald Newman OTR/L 8/26/2019  8:27 AM    Future Appointments   Date Time Provider Heri Beal   8/26/2019  8:45 AM Veronika Gupta, OTR/L [facility]   8/26/2019  9:30 AM Feliz Diana PTA [facility]   8/27/2019  8:20 AM Loyd Crump MD [facility]   8/28/2019 12:00 PM Maude Taylor PT [facility]   9/3/2019 10:30 AM Richard Laws PT [facility]   9/3/2019 12:00 PM Monster Leblanc NHXJVCL [facility]   9/4/2019  9:00 AM Maude Taylor PT KUTUQJK [facility]   9/4/2019  9:30 AM Veronika Bound, OTR/L [facility]   9/6/2019  9:30 AM Feliz Diana, DAREK [facility]   9/6/2019 10:30 AM Veronika Bound, OTR/L TXYAKPZ [facility]   9/9/2019  2:00 PM Maude Taylor, PT [facility] 9/9/2019  2:30 PM Janora Schwab, OTR/L [facility]   9/10/2019 10:15 AM MD Saud Flores Pitman   9/11/2019 11:00 AM Janora Schwab, OTR/L [facility]   9/11/2019 12:00 PM Alexys Quinonez, PTA [facility]   9/13/2019 10:30 AM Sally Caba LTCUAKD [facility]   9/13/2019 11:30 AM Alexys Quinonez, PTA [facility]   9/16/2019 11:00 AM Sally Caba [email or web address] [facility]   9/16/2019 12:00 PM Pradeep Peaks, PT [facility]   9/18/2019 11:45 AM Janora Schwab, OTR/L [facility]   9/18/2019 12:30 PM Alexys Quinonez, PTA [facility]   9/19/2019  2:00 PM Pradeep Peaks, PT [facility]   9/19/2019  2:45 PM Janora Schwab, OTR/L [facility]   9/23/2019 10:00 AM Pradeep Peaks, PT [facility]   9/23/2019 11:00 AM Janora Schwab, OTR/L [facility]   9/25/2019 10:00 AM Alexys Quinonez, PTA [facility]   9/25/2019 11:00 AM Janora Schwab, OTR/L [facility]   9/27/2019  9:00 AM Alexys Dayhoff, PTA [facility]   9/27/2019  9:45 AM Sally Caba [facility]   1/16/2020  9:30 AM [facility] FAST TRACK NURSE [facility]

## 2019-08-26 NOTE — PROGRESS NOTES
[facility] - [facility] [employer]  [address] [facility]  [phone] fax    Occupational Therapy Physician Update  [x] Progress Note  [] Discharge Summary  Patient name: Tiff Singh Start of Care: 19   Referral source: Toney Elkins [place] : 1940   Medical/Treatment Diagnosis: Right hand weakness [R29.898]  Rotator cuff arthropathy, right [M12.811]  Payor: VA MEDICARE / Plan: VA MEDICARE PART A & B / Product Type: Medicare /  Onset Date:19     Prior Hospitalization: see medical history Provider#: [id number]   Medications: Verified on Patient Summary List    Comorbidities: *COPD, reverse total shoulder right**  Prior Level of Function:*I self care home care driving, golf gardening                  **  Visits from Start of Care: 4    Missed Visits: 0    Status at Evaluation/Last Progress Note: Diffuse edema from digits to above elbow    Progress towards Goals:   1.  Patient will be independent in home exercise program for hand and wrist ROM. met 19  2.  Patient will be familiar with positioning of UE to reduce edemamet . 19  3.  Patient will be provided with compression sleeve and glove for edema reduction. met 19, wearing doubled today with reduced improvement 19     Long Term Goals: To be accomplished in 4 weeks:              1.  Patient will report pain in hand 0-2/10 with use for fine motor tasks at table level. 2.  Patient will increase right wrist AROM to within 5 degrees of left for use for self care tasks. . Progressing 19  3.  Patient will increase right hand  at least 10# for return to hobbies and home care tasks.    4.  Patient will report resolved/ reduced significantly  when garments removed to allow for improved hand function and UE recoverynow trying to leave garments off at times 19    Goals: to be achieved in 2 weeks:    ASSESSMENT/RECOMMENDATIONS:Excellent reduction in edema and pain since initiation of OT. Will benefit from a few additional sessions to stabilize status. [x]Continue therapy per initial plan/protocol at a frequency of  2 x per week for up to 2 weeks  []Continue therapy with the following recommended changes:_____________________      _____________________________________________________________________  []Discontinue therapy progressing towards or have reached established goals  []Discontinue therapy due to lack of appreciable progress towards goals  []Discontinue therapy due to lack of attendance or compliance  []Await Physician's recommendations/decisions regarding therapy  []Other:________________________________________________________________    Thank you for this referral.   GANGA Byers/L 8/26/2019 3:37 PM  NOTE TO PHYSICIAN:  [address]   FAX TO [facility]: [phone]  If you are unable to process this request in 24 hours please contact our office: [phone] I have read the above report and request that my patient continue as recommended. ? I have read the above report and request that my patient continue therapy with the following changes/special instructions:___________________________________________________________  ? I have read the above report and request that my patient be discharged from therapy.     [de-identified] Signature:____________Date:_________TIME:________    [employer], Date and Time must be completed for valid certification **

## 2019-08-26 NOTE — PROGRESS NOTES
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 She notes the swelling and pain is much improved. She has tightness on the right side of her neck. OBJECTIVE    Modality rationale: decrease inflammation and decrease pain to improve the patients ability to perform ADLs with ease.    Min Type Additional Details    [] Estim:  []Unatt       []IFC  []Premod                        []Other:  []w/ice   []w/heat  Position:  Location:    [] Estim: []Att    []TENS instruct  []NMES                    []Other:  []w/US   []w/ice   []w/heat  Position:  Location:    []  Traction: [] Cervical       []Lumbar                       [] Prone          []Supine                       []Intermittent   []Continuous Lbs:  [] before manual  [] after manual    []  Ultrasound: []Continuous   [] Pulsed                           []1MHz   []3MHz W/cm2:  Location:    []  Iontophoresis with dexamethasone         Location: [] Take home patch   [] In clinic    []  Ice     []  heat  []  Ice massage  []  Laser   []  Anodyne Position:  Location:    []  Laser with stim  []  Other:  Position:  Location:   15 [x]  Vasopneumatic Device Pressure:       [x] lo [] med [] hi   Temperature: [x] lo [] med [] hi   [x] Skin assessment post-treatment:  [x]intact []redness- no adverse reaction    []redness - adverse reaction:     23 min Therapeutic Exercise:  [x] See flow sheet :   Rationale: increase ROM and increase strength to improve the patients ability to perform ADLs with ease. 10 min Manual Therapy: STM right UT; PROM with oscillations flexion,scaption    Rationale: decrease pain, increase ROM and decrease trigger points to improve patient's ability to perform ADLs with ease. With   [x] TE   [] TA   [] neuro   [] other: Patient Education: [x] Review HEP    [] Progressed/Changed HEP based on:   [] positioning   [] body mechanics   [] transfers   [] heat/ice application    [] other:      Other Objective/Functional Measures:   Initiated new exercises per flow sheet  UT compensation initially on pulleys and with scap squeezes  Cues for form with sub max isometrics  Improving PROM >90 degrees today    Pain Level (0-10 scale) post treatment: 1/10    ASSESSMENT/Changes in Function:   Pt is progressing with decreasing pain and swelling. She has improving PROM but continues with compensation using upper trap with exercises. Will continue progressing mobility, posture, decreased edema and decreased pain for ease of sleeping and cooking. Progress towards goals / Updated goals:  1. Pt will be compliant with a HEP to improve shoulder function.   met     Long Term Goals: To be accomplished in 8 weeks:              1. Pt will complete FOTO to be able to determine right shoulder function.               2. Pt will increase right shoulder ROM >130 deg flexion/scaption to ease with progress toward ADL's. Progressing.  PROM flexion: 90 degrees (8/23/19)              3. Pt will decrease right shoulder swelling at web space circumference to be same as left Hand to be able to hold and  light objects for ADL's.   Progressing. 8/22/19              4. Pt will have full passive flexion /scaption to [facility] to ease with progression of shoulder mobility per protocol.     PLAN  [x]  Upgrade activities as tolerated     [x]  Continue plan of care  []  Update interventions per flow sheet       []  Discharge due to:_  []  Other:_      Akanksha Ervin, PTA 8/26/2019  8:25 AM        Future Appointments   Date Time Provider Heri Lian   8/26/2019  9:30 AM Pino Day, PTA [facility]   8/27/2019  8:20 AM Duaine Frankel, MD [address]   8/28/2019 12:00 PM Brian Frank, PT [facility]   9/3/2019 10:30 AM Marco Garcia, PT [facility]   9/3/2019 12:00 PM Carroll Oakley CDSPKNQ [facility]   9/4/2019  9:00 AM Brian Frank, PT [facility]   9/4/2019  9:30 AM Liza Dowd, OTR/L [facility]   9/6/2019  9:30 AM Pino Day, PTA [facility]   9/6/2019 10:30 AM Liza Dowd, OTR/L [facility]   9/9/2019  2:00 PM Brian Frank, PT [facility]   9/9/2019  2:30 PM Liza Dowd, OTR/L [facility]   9/10/2019 10:15 AM Darrius Valdez MD [facility]   9/11/2019 11:00 AM Liza Dowd, OTR/L [facility]   9/11/2019 12:00 PM Pino Day, PTA [facility]   9/13/2019 10:30 AM Carroll Oakley LBAZOVE [facility]   9/13/2019 11:30 AM Pino Halo, PTA [facility]   9/16/2019 11:00 AM Carroll Oakley OABRXRA [facility]   9/16/2019 12:00 PM Brian Frank, PT [facility]   9/18/2019 11:45 AM Liza Dowd, OTR/L [facility]   9/18/2019 12:30 PM Pino Halo, PTA [facility]   9/19/2019  2:00 PM Brian Frank, PT [facility]   9/19/2019  2:45 PM Liza Dowd, OTR/L [facility]   9/23/2019 10:00 AM Brian Frank, PT [facility]   9/23/2019 11:00 AM Liza Dowd, OTR/L [facility]   9/25/2019 10:00 AM Pino Shay, PTA [facility]   9/25/2019 11:00 AM Liza Dowd, OTR/L [facility]   9/27/2019  9:00 AM Pino Halo, PTA [facility]   9/27/2019  9:45 AM Carroll Baezely MMCPTPB [facility]   1/16/2020  9:30 AM [facility] FAST TRACK NURSE [facility]

## 2019-08-27 ENCOUNTER — OFFICE VISIT (OUTPATIENT)
Dept: ORTHOPEDIC SURGERY | Age: 79
End: 2019-08-27

## 2019-08-27 VITALS
OXYGEN SATURATION: 96 % | RESPIRATION RATE: 16 BRPM | TEMPERATURE: 97.1 F | WEIGHT: 123 LBS | HEART RATE: 65 BPM | BODY MASS INDEX: 20.49 KG/M2 | SYSTOLIC BLOOD PRESSURE: 123 MMHG | DIASTOLIC BLOOD PRESSURE: 50 MMHG | HEIGHT: 65 IN

## 2019-08-27 DIAGNOSIS — Z96.611 S/P SHOULDER REPLACEMENT, RIGHT: Primary | ICD-10-CM

## 2019-08-27 NOTE — PROGRESS NOTES
1. Have you been to the ER, urgent care clinic since your last visit? Hospitalized since your last visit? NO    2. Have you seen or consulted any other health care providers outside of the [address] [place] [address] since your last visit? Include any pap smears or colon screening.  NO

## 2019-08-27 NOTE — PROGRESS NOTES
Gertrudis Lucas Tieshakitty  1940     HISTORY OF PRESENT ILLNESS  Violet Layton Grandchild is a 66 y.o. female who presents today for evaluation s/p Right reverse total shoulder arthroplasty on 7/31/19. Describes pain as a 4/10. Pt presents today wearing an arm sleeve. Pt reports her arm is doing much better today and the swelling has improved since last OV. Pt is able to move hand and has been doing exercises. Pt states her shoulder has been sore and has been attending PT. Patient denies any fever, chills, chest pain, shortness of breath or calf pain. The remainder of the review of systems is negative. There are no new illness or injuries to report since last seen in the office. No changes in medications, allergies, social or family history. Pain Assessment  8/27/2019   Location of Pain Shoulder   Location Modifiers Right   Severity of Pain 4   Quality of Pain Sharp   Quality of Pain Comment -   Duration of Pain A few minutes   Duration of Pain Comment -   Frequency of Pain Intermittent   Aggravating Factors (No Data)   Aggravating Factors Comment pt states nothing, and hurts just sitting but states it does hurt when lifting    Limiting Behavior Yes   Relieving Factors Ice   Relieving Factors Comment -   Result of Injury No       PHYSICAL EXAM:   Visit Vitals  /50 (BP 1 Location: Left arm, BP Patient Position: Sitting)   Pulse 65   Temp 97.1 °F (36.2 °C) (Oral)   Resp 16   Ht 5' 5\" (1.651 m)   Wt 123 lb (55.8 kg)   LMP  (LMP Unknown)   SpO2 96%   BMI 20.47 kg/m²      The patient is a well-developed, well-nourished female in no acute distress. The patient is alert and oriented times three. The patient appears to be well groomed.  Mood and affect are normal.  ORTHOPEDIC EXAM of right shoulder:  Inspection: swelling present,  Bruising present, swelling to right hand and fingers - with some improvement  Incision well healed  Passive glenohumeral abduction 0-45 degrees, able to make passive fist  Stability: Stable  Strength: n/a  2+ distal pulses  Pitting edema in the forearm and hand much improved  Doppler RUE: no evidence of DVT    IMPRESSION:  S/P Right reverse total shoulder arthroplasty    PLAN:   1. Patient swelling better  2. Will continue with PT and will start active ROM in 1 week  3. Will start HEP with PROM of arm  4. Limit using the sling to when out of the house. D/c wearing sling after 1 week         RTC 2 weeks    Scribed by Festus Ganser [address]) as dictated by Landon Godinez MD    I, Dr. Landon Godinez, confirm that all documentation is accurate.     Landon Godinez M.D.   [facility]

## 2019-08-28 ENCOUNTER — HOSPITAL ENCOUNTER (OUTPATIENT)
Dept: PHYSICAL THERAPY | Age: 79
Discharge: HOME | End: 2019-08-28
Payer: MEDICARE

## 2019-08-28 PROCEDURE — 97110 THERAPEUTIC EXERCISES: CPT

## 2019-08-28 PROCEDURE — 97016 VASOPNEUMATIC DEVICE THERAPY: CPT

## 2019-08-28 NOTE — PROGRESS NOTES
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 Reports she is doing pretty good and is having less swelling. She reports she saw her physician yesterday and he wants her to work on raising her arm with her elbow straight.      OBJECTIVE    Modality rationale: decrease edema, decrease inflammation and decrease pain to improve the patients ability to increase ease of ADLs   Min Type Additional Details    [] Estim:  []Unatt       []IFC  []Premod                        []Other:  []w/ice   []w/heat  Position:  Location:    [] Estim: []Att    []TENS instruct  []NMES                    []Other:  []w/US   []w/ice   []w/heat  Position:  Location:    []  Traction: [] Cervical       []Lumbar                       [] Prone          []Supine                       []Intermittent   []Continuous Lbs:  [] before manual  [] after manual    []  Ultrasound: []Continuous   [] Pulsed                           []1MHz   []3MHz W/cm2:  Location:    []  Iontophoresis with dexamethasone         Location: [] Take home patch   [] In clinic    []  Ice     []  heat  []  Ice massage  []  Laser   []  Anodyne Position:  Location:    []  Laser with stim  []  Other:  Position:  Location:   15 [x]  Vasopneumatic Device Pressure:       [x] lo [] med [] hi   Temperature: [x] lo [] med [] hi   [x] Skin assessment post-treatment:  [x]intact []redness- no adverse reaction    []redness - adverse reaction:     37 min Therapeutic Exercise:  [x] See flow sheet :   Rationale: increase ROM and increase strength to improve the patients ability to increase ease of ADLs    With   [x] TE   [] TA   [] neuro   [] other: Patient Education: [x] Review HEP    [] Progressed/Changed HEP based on:   [] positioning   [] body mechanics   [] transfers   [] heat/ice application    [] other:      Other Objective/Functional Measures:   Pt. Go to level 13 on finger ladder and required multiple cues to perform in pain free range and to bring it back down slowly  She has poor form with isometrics on wall but performed with manual resistance well  She was educated on getting pulleys for home but patient does not wish to get them at this time       Pain Level (0-10 scale) post treatment: 2/10    ASSESSMENT/Changes in Function:  Pt. Is progressing slowly towards goals. She continues to demonstrate improving right shoulder PROM but has empty end feel due to pain. Patient will continue to benefit from skilled PT services to modify and progress therapeutic interventions, address functional mobility deficits, address ROM deficits, address strength deficits, analyze and address soft tissue restrictions, analyze and cue movement patterns, analyze and modify body mechanics/ergonomics and assess and modify postural abnormalities to attain remaining goals. Progress towards goals / Updated goals:  1.  Pt will be compliant with a HEP to improve shoulder function.   met     Long Term Goals: To be accomplished in 8 weeks:              1. Pt will complete FOTO to be able to determine right shoulder function.               2. Pt will increase right shoulder ROM >130 deg flexion/scaption to ease with progress toward ADL's. Progressing.  PROM flexion: 90 degrees (8/23/19)              3. Pt will decrease right shoulder swelling at web space circumference to be same as left Hand to be able to hold and  light objects for ADL's. Progressing. 8/22/19              4. Pt will have full passive flexion /scaption to [facility] to ease with progression of shoulder mobility per protocol.     PLAN  []  Upgrade activities as tolerated     [x]  Continue plan of care  []  Update interventions per flow sheet       []  Discharge due to:_  []  Other:_      Sayda Choe, PT 8/28/2019  12:06 PM    Future Appointments   Date Time Provider Heri Beal   9/3/2019 10:30 AM Dimitris Pacheco, PT [facility]   9/3/2019 12:00 PM Dlebert Sellers JIIPJCU [facility]   9/4/2019  9:00 AM Romelia Head, PT [facility]   9/4/2019  9:30 AM Catherine [place], OTR/L [facility]   9/6/2019  9:30 AM Earlean Early, PTA [facility]   9/6/2019 10:30 AM Catherine [place], OTR/L [facility]   9/9/2019  2:00 PM Romelia Head, PT [facility]   9/9/2019  2:30 PM Catherine [place], OTR/L [facility]   9/10/2019  8:30 AM MD Nicolas Siegel    9/10/2019 10:15 AM MD Saud Dumont   9/11/2019 11:00 AM Catherine [place], OTR/L [facility]   9/11/2019 12:00 PM Earlean Early, PTA [facility]   9/13/2019 10:30 AM Delbert Sellers JWFIBYD [facility]   9/13/2019 11:30 AM Earlean Early, PTA [facility]   9/16/2019 11:00 AM Delbert Sellers IILKCNI [facility]   9/16/2019 12:00 PM Larrymichael EngSonido, PT [facility]   9/18/2019 11:45 AM Catherine [place], OTR/L [facility]   9/18/2019 12:30 PM Earlean Early, PTA [facility]   9/19/2019  2:00 PM Romelia Head, PT [facility]   9/19/2019  2:45 PM Catherine [place], OTR/L [facility]   9/23/2019 10:00 AM Romelia Head, PT [facility]   9/23/2019 11:00 AM Catherine [place], OTR/L [facility]   9/25/2019 10:00 AM Earlean Early, PTA [facility]   9/25/2019 11:00 AM Catherine [place], OTR/L [facility]   9/27/2019  9:00 AM Earlean Early, PTA [facility]   9/27/2019  9:45 AM Delbert Sellers GJSVOGW [facility]   1/16/2020 9:30 AM [facility] FAST TRACK NURSE [facility]

## 2019-08-30 ENCOUNTER — APPOINTMENT (OUTPATIENT)
Dept: PHYSICAL THERAPY | Age: 79
End: 2019-08-30
Payer: MEDICARE

## 2019-09-03 ENCOUNTER — HOSPITAL ENCOUNTER (OUTPATIENT)
Dept: PHYSICAL THERAPY | Age: 79
End: 2019-09-03
Payer: MEDICARE

## 2019-09-03 ENCOUNTER — HOSPITAL ENCOUNTER (OUTPATIENT)
Dept: PHYSICAL THERAPY | Age: 79
Discharge: HOME | End: 2019-09-03
Payer: MEDICARE

## 2019-09-03 PROCEDURE — 97140 MANUAL THERAPY 1/> REGIONS: CPT

## 2019-09-03 PROCEDURE — 97110 THERAPEUTIC EXERCISES: CPT

## 2019-09-03 PROCEDURE — 97016 VASOPNEUMATIC DEVICE THERAPY: CPT

## 2019-09-03 NOTE — PROGRESS NOTES
OT DAILY TREATMENT NOTE 10-18    Patient Name: Samy August  Date:9/3/2019  : 1940  [x]  Patient  Verified  Payor: [place] MEDICARE / Plan: [place] MEDICARE PART A & B / Product Type: Medicare /    In time:1202  Out time:1245  Total Treatment Time (min): 43  Visit #: 5 of 8    Medicare/BCBS Only   Total Timed Codes (min):  43 1:1 Treatment Time:  43     Treatment Area: Right hand weakness [R29.898]  Rotator cuff arthropathy, right [M12.811]    SUBJECTIVE  Pain Level (0-10 scale): 3/10  Any medication changes, allergies to medications, adverse drug reactions, diagnosis change, or new procedure performed?: [x] No    [] Yes (see summary sheet for update)  Subjective functional status/changes:   [] No changes reported  The swelling is doing much better than it was. OBJECTIVE     18 min Therapeutic Exercise:  [] See flow sheet :   Rationale: increase ROM to improve the patients ability to grasp    Wrist flex ext x 10, RD UD x 10, sup pro  1/4 in pegs for fine motor tasks with digit ROM-required elbow support  Soft Theraputty: manipulated with Right hand with elbow support- to reveal/remove 1/4 in pegs 10/10      25 min Manual Therapy:  Soft tissue mobilization   Rationale: decrease pain, increase ROM and decrease edema  to improve motion of distal UE  Soft tissue mobilization to forearm, hand and upper arm ( distal ) to reduce edema       With   [] TE   [] TA   [] neuro   [] other: Patient Education: [x] Review HEP    [] Progressed/Changed HEP based on:   [] positioning   [] body mechanics   [] transfers   [] heat/ice application   [] Splint wear/care   [] Sensory re-education   [] scar management      [] other:            Other Objective/Functional Measures:   Majority of edema present above elbow.   Perseverating about previous swelling in arm     Pain Level (0-10 scale) post treatment: 3/10    ASSESSMENT/Changes in Function: ROM improving, edema decreasing     Patient will continue to benefit from skilled OT services to modify and progress therapeutic interventions, address ROM deficits, address strength deficits and instruct in home and community integration to attain remaining goals. []  See Plan of Care  []  See progress note/recertification  []  See Discharge Summary           Progress towards Goals:   1.  Patient will be independent in home exercise program for hand and wrist ROM. met 8/21/19  2.  Patient will be familiar with positioning of UE to reduce edemamet . 8/21/19  3.  Patient will be provided with compression sleeve and glove for edema reduction. met 8/21/19, wearing doubled today with reduced improvement 8/23/19     Long Term Goals: To be accomplished in 4 weeks:              1.  Patient will report pain in hand 0-2/10 with use for fine motor tasks at table level. 2.  Patient will increase right wrist AROM to within 5 degrees of left for use for self care tasks. . Progressing 8/26/19  3.  Patient will increase right hand  at least 10# for return to hobbies and home care tasks.  Progressing 9/3/19  4.  Patient will report resolved/ reduced significantly  when garments removed to allow for improved hand function and UE recoverynow trying to leave garments off at times 8/26/19       PLAN  []  Upgrade activities as tolerated     [x]  Continue plan of care  []  Update interventions per flow sheet       []  Discharge due to:_  []  Other:_      DIONNA Jacobson 9/3/2019  12:20 PM    Future Appointments   Date Time Provider Heri Beal   9/3/2019 12:45 PM Mary Cortez PT [facility]   9/4/2019  9:00 AM Yousif Armendariz PT [facility]   9/4/2019  9:30 AM [place] OTR/L [facility]   9/9/2019  2:00 PM Yousif Armendariz PT [facility]   9/9/2019  2:30 PM [place] OTR/L [facility]   9/10/2019  8:30 AM Jeff Oliveira MD [place] [id number]   9/10/2019 10:15 AM MD Saud Alegria   9/10/2019  3:30 PM [place] OTR/APRIL [facility] 9/11/2019 11:00 AM Lawanda Esquivel, OTR/L [facility]   9/11/2019 12:00 PM Tawnya Terry, PTA [facility]   9/13/2019 10:30 AM Gianna Harrington HLEALOG [facility]   9/13/2019 11:30 AM Tawnya Terry, PTA [facility]   9/16/2019 11:00 AM Gianna Harrington PIQFHEA [facility]   9/16/2019 12:00 PM Lelo Resendez, PT [facility]   9/18/2019 11:45 AM Lawanda Esquivel, OTR/L [facility]   9/18/2019 12:30 PM Tawnya Terry, PTA [facility]   9/19/2019  2:00 PM Lelo Resendez, PT [facility]   9/19/2019  2:45 PM Lawanda Esquivel, OTR/L [facility]   9/23/2019 10:00 AM Lelo Resendez, PT [facility]   9/23/2019 11:00 AM Lawanda Esquivel, OTR/L [facility]   9/25/2019 10:00 AM Tawnya Terry, PTA [facility]   9/25/2019 11:00 AM Lawanda Esquivel, OTR/L [facility]   9/27/2019  9:00 AM Tawnya Terry, PTA [facility]   9/27/2019  9:45 AM Gianna Harrington [facility]   1/16/2020  9:30 AM [facility] FAST TRACK NURSE [facility]

## 2019-09-03 NOTE — PROGRESS NOTES
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     \" look the swelling is almost gone, with exception of some fluid pockets\"  OBJECTIVE    Modality rationale: decrease pain to improve the patients ability to ease with ADL's   Min Type Additional Details    [] Estim:  []Unatt       []IFC  []Premod                        []Other:  []w/ice   []w/heat  Position:  Location:    [] Estim: []Att    []TENS instruct  []NMES                    []Other:  []w/US   []w/ice   []w/heat  Position:  Location:    []  Traction: [] Cervical       []Lumbar                       [] Prone          []Supine                       []Intermittent   []Continuous Lbs:  [] before manual  [] after manual    []  Ultrasound: []Continuous   [] Pulsed                           []1MHz   []3MHz W/cm2:  Location:    []  Iontophoresis with dexamethasone         Location: [] Take home patch   [] In clinic    []  Ice     []  heat  []  Ice massage  []  Laser   []  Anodyne Position:  Location:    []  Laser with stim  []  Other:  Position:  Location:   15 []  Vasopneumatic Device Pressure:       [x] lo [] med [] hi   Temperature: [x] lo [] med [] hi   [] Skin assessment post-treatment: []intact []redness- no adverse reaction    []redness - adverse reaction:       25 min Therapeutic Exercise:  [] See flow sheet :   Rationale: increase ROM to improve the patients ability to ease with ADL's    10 min Manual Therapy:  Oscillations, Gentle PROM,    Rationale: decrease pain, increase ROM and increase tissue extensibility to ease with ADLs'          With   [] TE   [] TA   [] neuro   [] other: Patient Education: [x] Review HEP    [] Progressed/Changed HEP based on:   [] positioning   [] body mechanics   [] transfers   [] heat/ice application    [] other:      Other Objective/Functional Measures: Added light hand held weight  for elbow flexion/ext and wrist ROM. Level #15/#16 on finger ladder  Manual resistance isometrics requires correction. Pt issued an ex sheet. Decent of shoulder from flexion with some discomfort at lateral upper arm . PROM bzaewop=151 deg, scaption=74 deg  Pain Level (0-10 scale) post treatment: 0/10    ASSESSMENT/Changes in Function: Progressing well. Requires cues and reminders to relax during PROM to dec shoulder guarding and [place]. Pt issued HEP. Wearing sling only out of house. Initiated AAROM. --> AROM    Patient will continue to benefit from skilled PT services to modify and progress therapeutic interventions, address functional mobility deficits, address ROM deficits, address strength deficits, analyze and address soft tissue restrictions, analyze and cue movement patterns, analyze and modify body mechanics/ergonomics, assess and modify postural abnormalities and address imbalance/dizziness to attain remaining goals. []  See Plan of Care  []  See progress note/recertification  []  See Discharge Summary         Progress towards goals / Updated goals:  1.  Pt will be compliant with a HEP to improve shoulder function.   met     Long Term Goals: To be accomplished in 8 weeks:              1. Pt will complete FOTO to be able to determine right shoulder function.               5. Pt will increase right shoulder ROM >130 deg flexion/scaption to ease with progress toward ADL's. Progressing.  PROM flexion: 103 degrees (9/03/19)              3. Pt will decrease right shoulder swelling at web space circumference to be same as left Hand to be able to hold and  light objects for ADL's. MET. 9/3/19              4. Pt will have full passive flexion /scaption to [facility] to ease with progression of shoulder mobility per protocol. Progressing.  9/3/19    PLAN  [x]  Upgrade activities as tolerated     [x]  Continue plan of care  []  Update interventions per flow sheet       []  Discharge due to:_  []  Other:_      Jeison Simon PT 9/3/2019  2:20 PM    Future Appointments   Date Time Provider Heri Beal   9/4/2019  9:00 AM David Ellis, PT [facility]   9/4/2019  9:30 AM Roxene Kind, OTR/L [facility]   9/9/2019  2:00 PM David Ellis, PT [facility]   9/9/2019  2:30 PM Roxene Kind, OTR/L [facility]   9/10/2019  8:30 AM Tyson Parker MD [place] [id number]   9/10/2019 10:15 AM MD Saud Macedo   9/10/2019  3:30 PM Roxene Kind, OTR/L [facility]   9/11/2019 11:00 AM Roxene Kind, OTR/L [facility]   9/11/2019 12:00 PM Alisha Regan PTA [facility]   9/13/2019 10:30 AM Voncille Lav [id number] [facility]   9/13/2019 11:30 AM Alisha Regan PTA [facility]   9/16/2019 11:00 AM Voncille Lav QBCLUZJ [facility]   9/16/2019 12:00 PM Tura Base, PT [facility]   9/18/2019 11:45 AM Roxene Jeison, OTR/L [facility]   9/18/2019 12:30 PM Alisha Regan, PTA [facility]   9/19/2019  2:00 PM Tura Base, PT [facility]   9/19/2019  2:45 PM Roxene Kind, OTR/L [facility]   9/23/2019 10:00 AM Tura Base, PT [facility]   9/23/2019 11:00 AM Roxene Jeison, OTR/L [facility]   9/25/2019 10:00 AM Alisha Regan, PTA [facility]   9/25/2019 11:00 AM Roxene Kind, OTR/L [facility]   9/27/2019  9:00 AM Jil Wolff Manuel Gibbons [facility]   9/27/2019  9:45 AM Alma Luz [facility]   1/16/2020  9:30 AM [facility] FAST TRACK NURSE [facility]

## 2019-09-03 NOTE — PROGRESS NOTES
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 Prior Level of Function: Right Hand Dominant. Previous DVT. Lives with . Visits from Start of Care: 10    Missed Visits: 0    The Plan of Care and following information is based on the patient's current status:  Goal: Pt will be compliant with a HEP to improve shoulder function.   Status at last note/certification:  Current Status: met    Goal: Pt will complete FOTO to be able to determine right shoulder function.   Status at last note/certification:  Current Status: met    Goal:Pt will increase right shoulder ROM >130 deg flexion/scaption to ease with progress toward ADL's. Status at last note/certification:  Current Status: Progressing    Goal:Pt will decrease right shoulder swelling at web space circumference to be same as left Hand to be able to hold and  light objects for ADL's. Status at last note/certification:  Current Status: met       Pt will have full passive flexion /scaption to [facility] to ease with progression of shoulder mobility per protocol. Progressing. Key functional changes: Pt reports she is making good progress in Therapy. She is improving with a reduction in edema to her hand and forearm, elbow and upper arm as well as ROM to her shoulder. She has been wearing a compression sleeve and glove with good compliance. Right Shoulder gentle  PROM is slowly improving. PROM flexion= 103 deg, scaption= 74 deg  Pt has been updated by MD to wean off sling. LEWIS has been initiated in therapy. Pt is compliant with visits and with shoulder protocol. Her pain varies per visit from 3-5/10    Problems/ barriers to goal attainment: none     Problem List: pain affecting function, decrease ROM, decrease strength, edema affecting function, impaired gait/ balance, decrease ADL/ functional abilitiies and decrease activity tolerance    Treatment Plan: Therapeutic exercise, Therapeutic activities, Neuromuscular re-education, Physical agent/modality, Gait/balance training, Manual therapy, Patient education and Self Care training     Patient Goal (s) has been updated and includes: improve shoulder function     Goals for this certification period to be accomplished in 8 weeks:  1. Pt will increase FOTO score by 27 pts to improve shoulder function.    2. Pt will increase right shoulder AROM >150 deg flexion/scaption to ease with progress toward ADL's   3. Pt will have full passive flexion /scaption to [facility] to ease with progression of shoulder mobility per protocol. 4. Pt will demonstrate right shoulder strength to 4/5 or greater to return to PLOF. Frequency / Duration: Patient to be seen 2 times per week for 8 weeks:    Assessment / Recommendations:Patient will continue to benefit from skilled PT services to modify and progress therapeutic interventions, address functional mobility deficits, address ROM deficits, address strength deficits, analyze and address soft tissue restrictions, analyze and cue movement patterns, analyze and modify body mechanics/ergonomics and assess and modify postural abnormalities to attain remaining goals.     Certification Period: 9/3/19-11/02/19    Gideon Church PT 9/3/2019 2:38 PM    ________________________________________________________________________  I certify that the above Therapy Services are being furnished while the patient is under my care. I agree with the treatment plan and certify that this therapy is necessary. [] I have read the above and request that my patient continue as recommended.   [] I have read the above report and request that my patient continue therapy with the following changes/special instructions: _______________________________________  [] I have read the above report and request that my patient be discharged from therapy    Physician's Signature:____________Date:_________TIME:________    ** Signature, Date and Time must be completed for valid certification **    Please sign and return to [facility] [employer]  [address] [place] [address]  [phone] fax

## 2019-09-04 ENCOUNTER — HOSPITAL ENCOUNTER (OUTPATIENT)
Dept: PHYSICAL THERAPY | Age: 79
Discharge: HOME | End: 2019-09-04
Payer: MEDICARE

## 2019-09-04 PROCEDURE — 97110 THERAPEUTIC EXERCISES: CPT

## 2019-09-04 PROCEDURE — 97140 MANUAL THERAPY 1/> REGIONS: CPT

## 2019-09-04 NOTE — PROGRESS NOTES
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 Reports she had a lot of pain last night from her new exercise. OBJECTIVE    30 min Therapeutic Exercise:  [x] See flow sheet :   Rationale: increase ROM and increase strength to improve the patients ability to increase ease of ADls          With   [] TE   [] TA   [] neuro   [] other: Patient Education: [x] Review HEP    [] Progressed/Changed HEP based on:   [] positioning   [] body mechanics   [] transfers   [] heat/ice application    [] other:      Other Objective/Functional Measures: FOTO: 38  Right shoulder PROM flex: 90 degrees scap: 61 degrees ER at side: 5 degrees   Pt. Was educated on performing self AAROM flexion in supine rather than standing for decreased pain    Pain Level (0-10 scale) post treatment:  0/10    ASSESSMENT/Changes in Function: pt. Is progressing slowly towards goals. She continues to have decreased right shoulder PROM with empty end feel due to pain. She also continues to have difficulty sleeping at times. Her right UE edema is significantly improving.      Patient will continue to benefit from skilled PT services to modify and progress therapeutic interventions, address functional mobility deficits, address ROM deficits, address strength deficits, analyze and address soft tissue restrictions, analyze and cue movement patterns, analyze and modify body mechanics/ergonomics and assess and modify postural abnormalities to attain remaining goals. Progress towards goals / Updated goals:  1. Pt will be compliant with a HEP to improve shoulder function.   met     Long Term Goals: To be accomplished in 8 weeks:              1. Pt will complete FOTO to be able to determine right shoulder function.               2. Pt will increase right shoulder ROM >130 deg flexion/scaption to ease with progress toward ADL's. Progressing.  PROM flexion: 90 degrees (8/23/19)              3. Pt will decrease right shoulder swelling at web space circumference to be same as left Hand to be able to hold and  light objects for ADL's. Progressing. 8/22/19              4. Pt will have full passive flexion /scaption to [facility] to ease with progression of shoulder mobility per protocol.     PLAN  []  Upgrade activities as tolerated     [x]  Continue plan of care  []  Update interventions per flow sheet       []  Discharge due to:_  []  Other:_      Jeremy Cortes, PT 9/4/2019  7:39 AM    Future Appointments   Date Time Provider Heri Beal   9/4/2019  9:00 AM David Ellis, PT [facility]   9/4/2019  9:30 AM Chandu Mchugh, OTR/L [facility]   9/9/2019  2:00 PM David Ellis, PT [facility]   9/9/2019  2:30 PM Chandu Mchugh, OTR/L [facility]   9/10/2019  8:30 AM MD Deepti Ivory 69   9/10/2019 10:15 AM MD Saud Macedo   9/10/2019  3:30 PM Chandu Mchugh, OTR/L [facility]   9/11/2019 11:00 AM Chandu Mchugh, OTR/L [facility]   9/11/2019 12:00 PM Alisha Regan PTA [facility]   9/13/2019 10:30 AM Voncille Lav [id number] [facility]   9/13/2019 11:30 AM Alisha Regan, PTA [facility]   9/16/2019 11:00 AM Voncille Lav SCDDHVR [facility]   9/16/2019 12:00 PM David Ellis, PT [facility]   9/18/2019 11:45 AM Chandu Mchugh, OTR/L [facility] 9/18/2019 12:30 PM Ran Mata, PTA [facility]   9/19/2019  2:00 PM Fany Jimenez, PT [facility]   9/19/2019  2:45 PM Lizzeth Burn, OTR/L [facility]   9/23/2019 10:00 AM Fany Jimenez, PT [facility]   9/23/2019 11:00 AM Lizzeth Burn, OTR/L [facility]   9/25/2019 10:00 AM Ran Mata, PTA [facility]   9/25/2019 11:00 AM Lizzeth Burn, OTR/L [facility]   9/27/2019  9:00 AM Ran Mata, PTA [facility]   9/27/2019  9:45 AM Earnstine Naren [facility]   1/16/2020  9:30 AM [facility] FAST TRACK NURSE [facility]

## 2019-09-04 NOTE — PROGRESS NOTES
OT DAILY TREATMENT NOTE 10-18    Patient Name: Citlaly Valdovinos  Date:2019  : 1940  [x]  Patient  Verified  Payor: VA MEDICARE / Plan: VA MEDICARE PART A & B / Product Type: Medicare /    In time:*930**  Out time:1008  Total Treatment Time (min): 38  Visit #: 6 of 8    Medicare/BCBS Only   Total Timed Codes (min):  38 1:1 Treatment Time:  38     Treatment Area: Right hand weakness [R29.898]  Rotator cuff arthropathy, right [M12.811]    SUBJECTIVE  Pain Level (0-10 scale): 10  Any medication changes, allergies to medications, adverse drug reactions, diagnosis change, or new procedure performed?: [x] No    [] Yes (see summary sheet for update)  Subjective functional status/changes:   [] No changes reported  My arm really hurt last night  Still swollen above the elbow  This putty is good exercises    OBJECTIVE    13 min Therapeutic Exercise:  [] See flow sheet :   Rationale: increase ROM and increase strength to improve the patients ability to grasp  Wrist flex ext and sup pro  And RD/UD x 10  Soft putty and pegs, flatten roll, pinch right        25 min Manual Therapy:  Soft tissue mobilization   Rationale: decrease pain, increase ROM and decrease edema  to reduce swelling above elbow and surrounding area        With   [] TE   [] TA   [] neuro   [] other: Patient Education: [x] Review HEP    [] Progressed/Changed HEP based on: swelling should resolve with overhead activities in PT over time  [] positioning   [] body mechanics   [] transfers   [] heat/ice application   [] Splint wear/care   [] Sensory re-education   [] scar management      [] other:            Other Objective/Functional Measures:   Hiking of shoulder during activities     Pain Level (0-10 scale) post treatment: 3/10  ASSESSMENT/Changes in Function: Improving edema, hand strength    Patient will continue to benefit from skilled OT services to modify and progress therapeutic interventions, address ROM deficits, address strength deficits, analyze and address soft tissue restrictions and instruct in home and community integration to attain remaining goals. []  See Plan of Care  []  See progress note/recertification  []  See Discharge Summary         Progress towards goals / Updated goals:  *1.  Patient will be independent in home exercise program for hand and wrist ROM. met 8/21/19  2.  Patient will be familiar with positioning of UE to reduce edemamet .8/21/19  3.  Patient will be provided with compression sleeve and glove for edema reduction. met 8/21/19, wearing doubled today with reduced improvement 8/23/19     Long Term Goals: To be accomplished in 4 weeks:              1.  Patient will report pain in hand 0-2/10 with use for fine motor tasks at table level. 2.  Patient will increase right wrist AROM to within 5 degrees of left for use for self care tasks. . Progressing 8/26/19  3.  Patient will increase right hand  at least 10# for return to hobbies and home care tasks.  Progressing 9/3/19, progressing with soft putty 9/4/19  4.  Patient will report resolved/ reduced significantly  when garments removed to allow for improved hand function and UE recoverynow trying to leave garments off at times 8/26/19, cleared to remove sling except when out 9/4/19    PLAN  []  Upgrade activities as tolerated     [x]  Continue plan of care  []  Update interventions per flow sheet       []  Discharge due to:_  []  Other:_      Mecca Kovacs OTR/L 9/4/2019  8:15 AM    Future Appointments   Date Time Provider Heri Beal   9/4/2019  9:00 AM Chaka Wesley PT [facility]   9/4/2019  9:30 AM Jorge Hathaway OTR/L [facility]   9/9/2019  2:00 PM Chaka Wesley PT [facility]   9/9/2019  2:30 PM Jorge Hathaway OTR/L [facility]   9/10/2019  8:30 AM MD Deepti Patino Tiago Tia   9/10/2019 10:15 AM MD Saud Blake   9/10/2019  3:30 PM Jorge Hathaway, OTR/L [facility]   9/11/2019 11:00 AM Jorge Hathaway, OTR/L [facility]   9/11/2019 12:00 PM Dayan Jose, PTA [facility]   9/13/2019 10:30 AM Ricarda Salcedohead [id number] [facility]   9/13/2019 11:30 AM Dayan Garcia, PTA [facility]   9/16/2019 11:00 AM Ricarda Perry AELZRMX [facility]   9/16/2019 12:00 PM Lilibeth Seajessica, PT [facility]   9/18/2019 11:45 AM Aftab Juarez, OTR/L [facility]   9/18/2019 12:30 PM Dayan Garcia, PTA [facility]   9/19/2019  2:00 PM Lilibeth Hansen, PT [facility]   9/19/2019  2:45 PM Aftab Juarez, OTR/L [facility]   9/23/2019 10:00 AM Lilibeth Hansen, PT [facility]   9/23/2019 11:00 AM Aftab Juarez, OTR/L [facility]   9/25/2019 10:00 AM Dayan Garcia, PTA [facility]   9/25/2019 11:00 AM Aftab Juarez, OTR/L [facility]   9/27/2019  9:00 AM Dayan Garcia, PTA [facility]   9/27/2019  9:45 AM Ricarda Perry [facility]   1/16/2020  9:30 AM [facility] FAST TRACK NURSE [facility]

## 2019-09-06 ENCOUNTER — APPOINTMENT (OUTPATIENT)
Dept: PHYSICAL THERAPY | Age: 79
End: 2019-09-06
Payer: MEDICARE

## 2019-09-09 ENCOUNTER — HOSPITAL ENCOUNTER (OUTPATIENT)
Dept: PHYSICAL THERAPY | Age: 79
Discharge: HOME | End: 2019-09-09
Payer: MEDICARE

## 2019-09-09 PROCEDURE — 97140 MANUAL THERAPY 1/> REGIONS: CPT

## 2019-09-09 PROCEDURE — 97110 THERAPEUTIC EXERCISES: CPT

## 2019-09-09 NOTE — PROGRESS NOTES
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 Reports she is having more pain today because she thinks she over did it. OBJECTIVE    20 min Therapeutic Exercise:  [x] See flow sheet :   Rationale: increase ROM and increase strength to improve the patients ability to increase ease of ADls    10 min Manual Therapy:  Trigger point release to infraspinatus and UT, scap mobs   Rationale: decrease pain, increase ROM and increase tissue extensibility to increase ease of ADLs          With   [x] TE   [] TA   [] neuro   [] other: Patient Education: [x] Review HEP    [] Progressed/Changed HEP based on:   [] positioning   [] body mechanics   [] transfers   [] heat/ice application    [] other:      Other Objective/Functional Measures:   Pt. Was unable to perform AROM flexion or abduction laying down so did AAROM with therapist assist instead  She was challenged with stick bench press and only able to perform 5 reps  She was re-educated on isometrics for her HEP     Pain Level (0-10 scale) post treatment: 3/10    ASSESSMENT/Changes in Function: pt. Is making slow progress with PT. She continues to have significant shoulder weakness which is affecting her mobility. She was limited today by pain which decreased following trigger point release.  She was re-educated on progressed HEP. Patient will continue to benefit from skilled PT services to modify and progress therapeutic interventions, address functional mobility deficits, address ROM deficits, address strength deficits, analyze and address soft tissue restrictions, analyze and cue movement patterns, analyze and modify body mechanics/ergonomics and assess and modify postural abnormalities to attain remaining goals. Progress towards goals / Updated goals:  1. Pt will increase FOTO score by 27 pts to improve shoulder function.    2. Pt will increase right shoulder AROM >150 deg flexion/scaption to ease with progress toward ADL's   3. Pt will have full passive flexion /scaption to [facility] to ease with progression of shoulder mobility per protocol.   4. Pt will demonstrate right shoulder strength to 4/5 or greater to return to [facility].     PLAN  []  Upgrade activities as tolerated     [x]  Continue plan of care  []  Update interventions per flow sheet       []  Discharge due to:_  []  Other:_      Pamela Martinez, PT 9/9/2019  8:44 AM    Future Appointments   Date Time Provider Heri Beal   9/9/2019  2:00 PM Prudence Dates, PT [facility]   9/9/2019  2:30 PM Herberth De Los Santos, OTR/L [facility]   9/10/2019  8:30 AM Sue Ornelas MD [place] [id number]   9/10/2019 10:15 AM Kiya Painter MD [facility]   9/10/2019  3:30 PM Herberth De Los Santos, OTR/L [facility]   9/11/2019 11:00 AM Herberth De Los Santos OTR/L [facility]   9/11/2019 12:00 PM Vamshi Zamora PTA [facility]   9/13/2019 10:30 AM Jeanne Persaud [id number] [facility]   9/13/2019 11:30 AM Vamshi Zamora PTA [facility]   9/16/2019 11:00 AM Jeanen Persaud YSFPEOI [facility]   9/16/2019 12:00 PM Prudence Dates, PT [id number] [facility]   9/18/2019 11:45 AM Herberth De Los Santos, OTR/L [facility]   9/18/2019 12:30 PM Vamshi Zamora, PTA [facility]   9/19/2019  2:00 PM Prudence Dates, PT [facility]   9/19/2019  2:45 PM Herberth De Los Santos, OTR/L [facility] 9/23/2019 10:00 AM Romelia Head, PT [facility]   9/23/2019 11:00 AM Catherine Yusuf, OTR/L [facility]   9/25/2019 10:00 AM Earlean Early, PTA [facility]   9/25/2019 11:00 AM Catherine Yusuf, OTR/L [facility]   9/27/2019  9:00 AM Earlean Early, PTA [facility]   9/27/2019  9:45 AM Delbert Sellers [facility]   1/16/2020  9:30 AM [facility] FAST TRACK NURSE [facility]

## 2019-09-09 NOTE — PROGRESS NOTES
OT DAILY TREATMENT NOTE 10-18    Patient Name: Magda Melendez  Date:2019  : 1940  [x]  Patient  Verified  Payor: [place] MEDICARE / Plan: [place] MEDICARE PART A & B / Product Type: Medicare /    In time:235  Out time:315  Total Treatment Time (min): 40  Visit #: 7 of     Medicare/BCBS Only   Total Timed Codes (min):  40 1:1 Treatment Time:  40     Treatment Area: Right hand weakness [R29.898]  Rotator cuff arthropathy, right [M12.811]    SUBJECTIVE  Pain Level (0-10 scale): 2-3/10  Any medication changes, allergies to medications, adverse drug reactions, diagnosis change, or new procedure performed?: [x] No    [] Yes (see summary sheet for update)  Subjective functional status/changes:   [] No changes reported  Arm achy all yesterday  Swollen up in armpit    OBJECTIVE      15 min Therapeutic Exercise:  [] See flow sheet :   Rationale: increase ROM and increase strength to improve the patients ability to grasp  Increased to med putty right hnad and pegs x 10  Wrist ROm, forearm ROM x 15 each ( increased reps)       25 min Manual Therapy:  Soft tissue mobilization   Rationale: decrease pain, increase tissue extensibility and decrease edema  to grasp        With   [] TE   [] TA   [] neuro   [] other: Patient Education: [x] Review HEP    [] Progressed/Changed HEP based on:   [] positioning   [] body mechanics   [] transfers   [] heat/ice application   [] Splint wear/care   [] Sensory re-education   [] scar management      [] other:            Other Objective/Functional Measures:   Edema resolved distally and reduced near elbow     Pain Level (0-10 scale) post treatment: 2-310    ASSESSMENT/Changes in Function: Edema improving    Patient will continue to benefit from skilled OT services to modify and progress therapeutic interventions, address ROM deficits, address strength deficits, analyze and address soft tissue restrictions and instruct in home and community integration to attain remaining goals.     []  See Plan of Care  []  See progress note/recertification  []  See Discharge Summary         Progress towards goals / Updated goals:  *1.  Patient will be independent in home exercise program for hand and wrist ROM. met 8/21/19  2.  Patient will be familiar with positioning of UE to reduce edemamet .8/21/19  3.  Patient will be provided with compression sleeve and glove for edema reduction. met 8/21/19, wearing doubled today with reduced improvement 8/23/19     Long Term Goals: To be accomplished in 4 weeks:              1.  Patient will report pain in hand 0-2/10 with use for fine motor tasks at table level. 2.  Patient will increase right wrist AROM to within 5 degrees of left for use for self care tasks. . Progressing 8/26/19  3.  Patient will increase right hand  at least 10# for return to hobbies and home care tasks.  Progressing 9/3/19, progressing with soft putty 9/4/19, medium 9/9/19  4.  Patient will report resolved/ reduced significantly  when garments removed to allow for improved hand function and UE recoverynow trying to leave garments off at times 8/26/19, cleared to remove sling except when out 9/4/19    PLAN  []  Upgrade activities as tolerated     [x]  Continue plan of care  []  Update interventions per flow sheet       []  Discharge due to:_  []  Other:_      GANGA Tabares/L 9/9/2019  3:06 PM    Future Appointments   Date Time Provider Heri Beal   9/10/2019  8:30 AM [place] [id number]   9/10/2019 10:15 AM MD Saud Hagen   9/10/2019  3:30 PM Lizzeth Vasquez OTR/L [facility]   9/11/2019 11:00 AM Lizzeth Vasquez OTR/L [facility]   9/11/2019 12:00 PM Ran Mata PTA [facility]   9/13/2019 10:30 AM Herberth Sneed YCQMMWU [facility]   9/13/2019 11:30 AM Ran Mata PTA [facility]   9/16/2019 11:00 AM Herberth DC [facility]   9/16/2019 12:00 PM Fany Jimenez, PT [facility]   9/18/2019 11:45 AM Leoma Seip Lex Watson, OTR/L [facility]   9/18/2019 12:30 PM Jacob Hills, PTA [facility]   9/19/2019  2:00 PM Beth Thornton, PT [facility]   9/19/2019  2:45 PM Yoselin Ding, OTR/L [facility]   9/23/2019 10:00 AM Beth Thornton, PT [id number] [facility]   9/23/2019 11:00 AM Yoselin Ding, OTR/L [facility]   9/25/2019 10:00 AM Jacob Hills PTA [facility]   9/25/2019 11:00 AM Yoselin Ding, OTR/L [facility]   9/27/2019  9:00 AM Jacob Hills PTA [facility]   9/27/2019  9:45 AM Kenard Habermann [facility]   1/16/2020  9:30 AM [facility] FAST TRACK NURSE [facility]

## 2019-09-10 ENCOUNTER — OFFICE VISIT (OUTPATIENT)
Dept: ORTHOPEDIC SURGERY | Age: 79
End: 2019-09-10

## 2019-09-10 ENCOUNTER — HOSPITAL ENCOUNTER (OUTPATIENT)
Dept: PHYSICAL THERAPY | Age: 79
Discharge: HOME | End: 2019-09-10
Payer: MEDICARE

## 2019-09-10 VITALS
SYSTOLIC BLOOD PRESSURE: 116 MMHG | DIASTOLIC BLOOD PRESSURE: 67 MMHG | BODY MASS INDEX: 21.33 KG/M2 | WEIGHT: 128 LBS | HEIGHT: 65 IN | OXYGEN SATURATION: 98 % | RESPIRATION RATE: 16 BRPM | HEART RATE: 62 BPM

## 2019-09-10 VITALS
HEIGHT: 65 IN | DIASTOLIC BLOOD PRESSURE: 48 MMHG | HEART RATE: 72 BPM | BODY MASS INDEX: 20.66 KG/M2 | SYSTOLIC BLOOD PRESSURE: 105 MMHG | TEMPERATURE: 96 F | OXYGEN SATURATION: 96 % | RESPIRATION RATE: 11 BRPM | WEIGHT: 124 LBS

## 2019-09-10 DIAGNOSIS — M54.12 CERVICAL NEURITIS: ICD-10-CM

## 2019-09-10 DIAGNOSIS — M96.1 POST LAMINECTOMY SYNDROME: Primary | ICD-10-CM

## 2019-09-10 DIAGNOSIS — M50.30 DDD (DEGENERATIVE DISC DISEASE), CERVICAL: ICD-10-CM

## 2019-09-10 DIAGNOSIS — M12.811 ROTATOR CUFF ARTHROPATHY, RIGHT: Primary | ICD-10-CM

## 2019-09-10 DIAGNOSIS — M47.812 CERVICAL SPONDYLOSIS WITHOUT MYELOPATHY: ICD-10-CM

## 2019-09-10 PROCEDURE — 97140 MANUAL THERAPY 1/> REGIONS: CPT

## 2019-09-10 PROCEDURE — 97110 THERAPEUTIC EXERCISES: CPT

## 2019-09-10 NOTE — LETTER
9/10/19 Patient: Narendra Beverly YOB: 1940 Date of Visit: 9/10/2019 Som Hernandez MD 
[address] [place] [address] [address] [address] VIA Facsimile: [phone] Dear Som Hernandez MD, Thank you for referring Ms. Mansi Mcdonald to [address] [place] [address] for evaluation. My notes for this consultation are attached. If you have questions, please do not hesitate to call me. I look forward to following your patient along with you. Sincerely, Eliot Thompson MD

## 2019-09-10 NOTE — PROGRESS NOTES
OT DAILY TREATMENT NOTE 10-18    Patient Name: Magda Melendez  Date:9/10/2019  : 1940  [x]  Patient  Verified  Payor: VA MEDICARE / Plan: VA MEDICARE PART A & B / Product Type: Medicare /    In time:330  Out time:410  Total Treatment Time (min): 40  Visit #: 8 of     Medicare/BCBS Only   Total Timed Codes (min):  40 1:1 Treatment Time:  40     Treatment Area: Right hand weakness [R29.898]  Rotator cuff arthropathy, right [M12.811]    SUBJECTIVE  Pain Level (0-10 scale): 2/10 shoulder  Any medication changes, allergies to medications, adverse drug reactions, diagnosis change, or new procedure performed?: [x] No    [] Yes (see summary sheet for update)  Subjective functional status/changes:   [] No changes reported  I cannot raise my arm    OBJECTIVE        25 min Therapeutic Exercise:  [] See flow sheet :   Rationale: increase ROM and increase strength to improve the patients ability to grasp  Med putty and pegs, rolling pinching gripping right  Wrist flex ext Rd UD and sup pro x 10      20 min Manual Therapy:  *edema management soft tissue mobilization**   Rationale: increase ROM, increase tissue extensibility and decrease edema  to use right arm  Edema management to right UE        With   [] TE   [] TA   [] neuro   [] other: Patient Education: [x] Review HEP    [] Progressed/Changed HEP based on: normal to not have AROM at this stage, has not been allowed  [] positioning   [] body mechanics   [] transfers   [] heat/ice application   [] Splint wear/care   [] Sensory re-education   [] scar management      [] other:            Other Objective/Functional Measures:   Still with edema above elbow on dorsal side     Pain Level (0-10 scale) post treatment: 0/10 distal, 2/10 shoulder    ASSESSMENT/Changes in Function: Improving edema    Patient will continue to benefit from skilled OT services to modify and progress therapeutic interventions, address ROM deficits, address strength deficits, analyze and address soft tissue restrictions and instruct in home and community integration to attain remaining goals. []  See Plan of Care  []  See progress note/recertification  []  See Discharge Summary         Progress towards goals / Updated goals:  1.  Patient will be independent in home exercise program for hand and wrist ROM. met 8/21/19  2.  Patient will be familiar with positioning of UE to reduce edemamet .8/21/19  3.  Patient will be provided with compression sleeve and glove for edema reduction. met 8/21/19, wearing doubled today with reduced improvement 8/23/19     Long Term Goals: To be accomplished in 4 weeks:              1.  Patient will report pain in hand 0-2/10 with use for fine motor tasks at table level. 2.  Patient will increase right wrist AROM to within 5 degrees of left for use for self care tasks. . Progressing 8/26/19  3.  Patient will increase right hand  at least 10# for return to hobbies and home care tasks.  Progressing 9/3/19, progressing with soft putty 9/4/19, medium 9/9/19  4.  Patient will report resolved/ reduced significantly  when garments removed to allow for improved hand function and UE recoverynow trying to leave garments off at times 8/26/19, cleared to remove sling except when out 9/4/19    PLAN  []  Upgrade activities as tolerated     [x]  Continue plan of care  []  Update interventions per flow sheet       []  Discharge due to:_  []  Other:_      GANGA Holley/L 9/10/2019  1:30 PM    Future Appointments   Date Time Provider Heri Beal   9/10/2019  3:30 PM Megan Jurado OTR/L [facility]   9/11/2019 11:00 AM Megan Jurado OTR/L [facility]   9/11/2019 12:00 PM Acacia Sahu PTA [facility]   9/13/2019 10:30 AM Chava TOVARJSSGAMBER [facility]   9/13/2019 11:30 AM Acacia Sahu PTA [facility]   9/16/2019 11:00 AM Chava Jose ACPRZWM [facility]   9/16/2019 12:00 PM Earl Manrique, PT [facility]   9/18/2019 11:45 AM Megan Jurado, OTR/L [facility]   9/18/2019 12:30 PM Teresa Backer, PTA [facility]   9/19/2019  2:00 PM Sandeep Sabal, PT [facility]   9/19/2019  2:45 PM Elfida Ora, OTR/L [facility]   9/23/2019 10:00 AM Sandeep Stevens, PT [facility]   9/23/2019 11:00 AM Elfida Ora, OTR/L [facility]   9/25/2019 10:00 AM Teresa Backer, PTA [facility]   9/25/2019 11:00 AM Elfida Ora, OTR/L [facility]   9/27/2019  9:00 AM Teresa Backer, PTA [facility]   9/27/2019  9:45 AM Eriberto Fitch JLHYDWZ [facility]   10/1/2019  9:20 AM Roz Martel MD [place] [address]   1/16/2020  9:30 AM [facility] FAST TRACK NURSE [facility]

## 2019-09-10 NOTE — PROGRESS NOTES
1. Have you been to the ER, urgent care clinic since your last visit? Hospitalized since your last visit? No    2. Have you seen or consulted any other health care providers outside of the [address] [place] [address] since your last visit? Include any pap smears or colon screening.  No

## 2019-09-10 NOTE — PROGRESS NOTES
[facility]  [address], [place]   Phone: [phone]  Fax: [phone]        PROGRESS NOTE      HISTORY OF PRESENT ILLNESS:  The patient is a 66 y.o. female and was seen today for follow up of increase in neck pain since early May 2019. Pt initially described low back and RUE pain. Pt is a poor historian. She had cervical spine surgery in 2006 by Dr. Leesa Hendrickson. She denies any injury or trauma. She reports a history of A-fib, which is controlled. She reports right shoulder injection provided good relief x1 month. She has failed LYRICA, NEURONTIN, TOPAMAX, and CYMBALTA in the past. Patient completed MDP with no relief. Note from Dr. Nirmala Carreon dated 11/29/16 indicating patient was seen with c/o right shoulder pain. Dx with full rotator cuff tear and discussed surgery at that time. Note from Dr. Nirmala Carreon dated 6/5/19 indicating patient was seen with c/o right shoulder pain. Of note, pt had cortisone injection at her last appointment without relief. Set her up for a CT scan and referred her back to me at that time. Note from Dr. Kameron Dai dated 6/14/19 indicating patient was seen with c/o progressive neck pain in the RUE. Her MRI reveled synovitis at C3-4 and some ligamentous invagination at C2-3. Recommended a soft collar. Put her on MDP. Set her up to get cervical facet block on 7/17/19. Note from Dr. Nirmala Carreon dated 7/2/19 indicating patient was seen with c/o right shoulder pain. The patient has had a cortisone injection in the past which provided minimal relief. Pain is worse with certain movements of the arm, reaching, lifting. Patient wished to proceed with right reverse shoulder replacement on 7/31/19. X-ray of the cervical spine from 6/2/16 was reviewed. Per report, AP, lateral and open-mouth views of the cervical spine performed. The C7 level is partially obscured by the patient's shoulders on the lateral view.  Again noted are changes of C4 through C7 anterior and interbody fusion with fixation plate and screws at the C4 and C7 levels, unchanged. The fusion again appears solid. A prominent anterior osteophyte is noted projecting from the inferior aspect of C3. There is straightening of the normal lordotic curve. There may be facet joint sclerosis mid cervical spine. No acute fracture or dislocation. No prevertebral soft tissue swelling. Right shoulder MRI dated 11/8/16 pre report revealed limited study due to motion artifacts. Full-thickness tear of supraspinatus tendon. Infraspinatus tendinosis. Subacromial bursitis. Suspect anterior labral tear. AC joint hypertrophy/inflammation present. Cervical spine XR dated 5/7/19 not available for my review. Per report, solid-appearing C4-C7 fusion. No acute findings. Degenerative changes, most pronounced at the left C3-4 facet. Cervical spine MRI dated 6/11/19 reviewed. Per report, right C2-C3 facet marrow edema, small facet effusion, and jessi-facet edema. This may be due to active facet arthropathy, or instability. Septic facet arthritis or inflammatory arthropathy may be considered in the appropriate setting. Degenerative changes results in mild to moderate spinal canal stenosis and foraminal stenoses at this level. Status post C4-C7 ACDF. Spinal canal patent at these levels. Notable foraminal stenoses at these levels described above. At C3-C4, the superior junctional level, degenerative changes results in severe foraminal stenoses and mild spinal canal stenosis. Notable degenerative changes at other levels without significant stenosis. At her last clinical appointment, patient was scheduled for right reverse shoulder replacement on 7/31/19 with Dr. Johnny Cantu. She reported LOB x a couple weeks, denied falls or dropping things. She was unsure if it had been getting worse. I advised patient that if loss of balance noticeably worsened, to call us.  I recommended she continue with cervical facet block scheduled 7/17/19 with Dr. Nabil Graham to see if that helps with her sxs. The patient returns today with pain in her right shoulder. She rates her pain 4/10, previously 0/10. Pt is currently enrolled in PT for her shoulder. Pt reports she underwent cervical facet block on 19 with Dr. Lelo Cruz that provided relief for her neck pain. Pt denies dropping things or loss of balance. [facility] [place] [place] [facility] indicating patient presented for post-op complications with right arm swelling beginning the day prior. Performed vascular studies to make sure she did not have a blood clot, came back negative. Note from Dr. Kevin Lucas dated 19 indicating patients arm pain was doing much better. Swelling has improved since last OV. Pain at that time was 4/10. Indicated pt was in PT.  reviewed. Body mass index is 21.3 kg/m². PCP: Sofia Peck MD      Past Medical History:   Diagnosis Date    Aorta aneurysm     ascending 4.0 cm (CTA 5/15)    Arthritis     Asthma     Atrial fibrillation     CHADS score 1 (-CHF, +HTN, -AGE, -DM, -CVA)    COPD     Depression     nos    DVT     12/10  R Subclavian (PICC associated)    Dyslipidemia     Hypertension     Hypertension     Papillary adenocarcinoma     gastric    Pernicious anemia     Sleep apnea         Social History     Socioeconomic History    Marital status:      Spouse name: Not on file    Number of children: Not on file    Years of education: Not on file    Highest education level: Not on file   Occupational History    Not on file   Social Needs    Financial resource strain: Not on file    Food insecurity:     Worry: Not on file     Inability: Not on file    Transportation needs:     Medical: Not on file     Non-medical: Not on file   Tobacco Use    Smoking status: Former Smoker     Last attempt to quit: 1981     Years since quittin.6    Smokeless tobacco: Never Used   Substance and Sexual Activity    Alcohol use:  Yes     Alcohol/week: 0.0 standard drinks     Comment: wine nightly    Drug use: No    Sexual activity: Not Currently   Lifestyle    Physical activity:     Days per week: Not on file     Minutes per session: Not on file    Stress: Not on file   Relationships    Social connections:     Talks on phone: Not on file     Gets together: Not on file     Attends Evangelical service: Not on file     Active member of club or organization: Not on file     Attends meetings of clubs or organizations: Not on file     Relationship status: Not on file    Intimate partner violence:     Fear of current or ex partner: Not on file     Emotionally abused: Not on file     Physically abused: Not on file     Forced sexual activity: Not on file   Other Topics Concern     Service Not Asked    Blood Transfusions Not Asked    Caffeine Concern Not Asked    Occupational Exposure Not Asked   Jessica Rahman Hazards Not Asked    Sleep Concern Not Asked    Stress Concern Not Asked    Weight Concern Not Asked    Special Diet Not Asked    Back Care Not Asked    Exercise Not Asked    Bike Helmet Not Asked   [address] Not Asked    Self-Exams Not Asked   Social History Narrative    Not on file       Current Outpatient Medications   Medication Sig Dispense Refill    baclofen (LIORESAL) 10 mg tablet Take 1 Tab by mouth three (3) times daily as needed for Other (spasms). 90 Tab 0    ferrous sulfate 325 mg (65 mg iron) tablet Take 325 mg by mouth two (2) times a day.  gabapentin (NEURONTIN) 300 mg capsule nightly as needed. 1    CRANBERRY FRUIT EXTRACT (CRANBERRY EXTRACT PO) Take  by mouth daily.  metoprolol succinate (TOPROL-XL) 25 mg XL tablet TAKE 1 TABLET BY MOUTH TWICE DAILY 180 Tab 3    PRADAXA 150 mg capsule Take 1 Cap by mouth two (2) times a day.  180 Cap 3    cyanocobalamin (VITAMIN B12) 1,000 mcg/mL injection Taken weekly on Saturdays  1    digoxin (DIGOX) 0.125 mg tablet TAKE 1 TABLET BY MOUTH ONCE DAILY 90 Tab 3    tiotropium (SPIRIVA WITH HANDIHALER) 18 mcg inhalation capsule Take 1 Cap by inhalation daily. 20 Cap 0    CK-YH-BA-Fe-Min-Lycopen-Lutein (CENTRUM) 0.4-162-18 mg tab Take  by mouth daily.  pantoprazole (PROTONIX) 40 mg tablet Take 40 mg by mouth daily.  BIOTIN PO Take  by mouth two (2) times a day.  folic acid 487 mcg tablet Take 400 mcg by mouth daily.  CALCIUM PO Take  by mouth daily.  montelukast (SINGULAIR) 10 mg tablet Take 10 mg by mouth daily. Allergies   Allergen Reactions    Codeine Nausea Only     Patient states not allergic    Diltiazem Other (comments)     Patient states not allergic          PHYSICAL EXAMINATION    Visit Vitals  /67 (BP 1 Location: Left arm, BP Patient Position: Sitting)   Pulse 62   Resp 16   Ht 5' 5\" (1.651 m)   Wt 128 lb (58.1 kg)   LMP  (LMP Unknown)   SpO2 98%   BMI 21.30 kg/m²       CONSTITUTIONAL: NAD, A&O x 3  SENSATION: Intact to light touch throughout  NEURO: Blu's is negative bilaterally. RANGE OF MOTION: The patient has full passive range of motion in all four extremities. Biceps, triceps and shoulders not tested due to recent surgery   Shoulder AB/Flex Elbow Flex Wrist Ext Elbow Ext Wrist Flex Hand Intrin Tone   Right Not tested +4/5 +4/5 +4/5 +4/5 +4/5 +4/5   Left +4/5 +4/5 +4/5 +4/5 +4/5 +4/5 +4/5                 ASSESSMENT   Diagnoses and all orders for this visit:    1. Post laminectomy syndrome    2. Cervical neuritis    3. DDD (degenerative disc disease), cervical    4. Cervical spondylosis without myelopathy          IMPRESSION AND PLAN:  Patient returns today with improvement in her neck pain. Her primary pain complaint is in her right shoulder. I recommend she continue to f/u with Dr. Jayme Hart for this. Patient is neurologically intact. I will see the patient back prn. Written by Diomedes Desai, as dictated by Keisha Baldwin MD  I examined the patient, reviewed and agree with the note.

## 2019-09-10 NOTE — PROGRESS NOTES
Leda Rose Marie Sylviadanielkitty  1940     HISTORY OF PRESENT ILLNESS  Violet Avelar is a 66 y.o. female who presents today for evaluation s/p Right reverse total shoulder arthroplasty on 7/31/19. Describes pain as a 4/10 today. She has been attending PT for her hand and shoulder. Pt has improved ROM with the shoulder today. Pt localizes pain to the shoulder today. The pain has improved in her hand and lower arm. The swelling has improved in her hand. Pt complains of low blood pressure and dizziness today. Patient denies any fever, chills, chest pain, shortness of breath or calf pain. The remainder of the review of systems is negative. There are no new illness or injuries to report since last seen in the office. No changes in medications, allergies, social or family history. Pain Assessment  9/10/2019   Location of Pain Shoulder   Location Modifiers Right   Severity of Pain 4   Quality of Pain Sharp   Quality of Pain Comment -   Duration of Pain Persistent   Duration of Pain Comment -   Frequency of Pain Constant   Aggravating Factors Stretching   Aggravating Factors Comment -   Limiting Behavior -   Relieving Factors Nothing   Relieving Factors Comment -   Result of Injury No       PHYSICAL EXAM:   Visit Vitals  /48   Pulse 72   Temp 96 °F (35.6 °C) (Oral)   Resp 11   Ht 5' 5\" (1.651 m)   Wt 124 lb (56.2 kg)   LMP  (LMP Unknown)   SpO2 96%   BMI 20.63 kg/m²      The patient is a well-developed, well-nourished female in no acute distress. The patient is alert and oriented times three. The patient appears to be well groomed.  Mood and affect are normal.  ORTHOPEDIC EXAM of right shoulder:  Inspection: swelling present,  Bruising present, swelling to right hand and fingers - with some improvement  Incision well healed  Passive glenohumeral abduction 0-45 degrees, able to make active passive fist  Stability: Stable  Strength: n/a  2+ distal pulses  Pitting edema in the forearm and hand much improved  Doppler RUE: no evidence of DVT    IMPRESSION:  S/P Right reverse total shoulder arthroplasty    PLAN:   1. Patient swelling and pain better  2. Will continue with PT and will start active ROM. RTC 3 weeks    Scribed by Ashley Paul [id number]) as dictated by MD LIANG Pressley, Dr. Hollis Harrell, confirm that all documentation is accurate.     Hollis Harrell M.D.   [facility]

## 2019-09-11 ENCOUNTER — HOSPITAL ENCOUNTER (OUTPATIENT)
Dept: PHYSICAL THERAPY | Age: 79
Discharge: HOME | End: 2019-09-11
Payer: MEDICARE

## 2019-09-11 PROCEDURE — 97140 MANUAL THERAPY 1/> REGIONS: CPT

## 2019-09-11 PROCEDURE — 97110 THERAPEUTIC EXERCISES: CPT

## 2019-09-11 PROCEDURE — 97016 VASOPNEUMATIC DEVICE THERAPY: CPT

## 2019-09-11 NOTE — PROGRESS NOTES
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 She states the pain doesn't go away and the ice feels good. She isn't sure which exercises to continue to do at home or not.      OBJECTIVE    15 minutes of game ready to the right shoulder to decrease pain and improve ease of [place] reaching    18 min Therapeutic Exercise:  [x] See flow sheet :   Rationale: increase ROM and increase strength to improve the patients ability to increase ease of ADls    10 min Manual Therapy:  STM right UT, deltoid, PROM with oscillations; scar massage   Rationale: decrease pain, increase ROM and increase tissue extensibility to increase ease of ADLs          With   [x] TE   [] TA   [] neuro   [] other: Patient Education: [x] Review HEP    [] Progressed/Changed HEP based on:   [] positioning   [] body mechanics   [] transfers   [] heat/ice application    [] other:      Other Objective/Functional Measures:   Educated to bring in all current HEPs to go over which to continue and which to D/C  Challenged with supine AAROM and bench press due to anterior deltoid/pec weakness  Pt was unaware of HEP including shoulder isometrics   Educated on working on updated HEP with Susie Albert flexion and eccentric shoulder flexion in supine  Continues to have decreased ROM and UT hiking likely habitual compensation    Pain Level (0-10 scale) post treatment: 3/10    ASSESSMENT/Changes in Function: Pt is making slow progress towards updated goals with continued decreased strength in the right shoulder and limited ROM. She has scar tightness but has not been performing scar massage. She has most pain relief with manual and ice. Will continue progressing ROM and correct neuro recruitment for ease of shoulder elevation for ADLs. Progress towards goals / Updated goals:  1. Pt will increase FOTO score by 27 pts to improve shoulder function.    2. Pt will increase right shoulder AROM >150 deg flexion/scaption to ease with progress toward ADLs   3. Pt will have full passive flexion /scaption to [facility] to ease with progression of shoulder mobility per protocol.   4. Pt will demonstrate right shoulder strength to 4/5 or greater to return to PLOF.     PLAN  [x]  Upgrade activities as tolerated     [x]  Continue plan of care  []  Update interventions per flow sheet       []  Discharge due to:_  []  Other:_      Lucía Cohen PTA 9/11/2019  8:44 AM    Future Appointments   Date Time Provider Heri Beal   9/11/2019 12:00 PM Dayan Garcia PTA [facility]   9/16/2019 11:00 AM Ricarda Perry PQMTITL [facility]   9/16/2019 12:00 PM Lilibeth Hansen PT [facility]   9/18/2019 11:45 AM Aftab Juarez, OTR/L [facility]   9/18/2019 12:30 PM Dayan Garcia PTA [facility]   9/19/2019  2:00 PM Lilibeth Hansen PT [facility]   9/19/2019  2:45 PM Aftab Juarez, OTR/L [facility]   9/23/2019 10:00 AM Lilibeth Hansen PT [facility]   9/23/2019 11:00 AM Aftab Juarez, OTR/L [facility]   9/25/2019 10:00 AM Dayan Garcia PTA [facility]   9/25/2019 11:00 AM Aftab Juarez, OTR/L [facility]   9/27/2019  9:00 AM Dayan Garcia PTA [facility]   9/27/2019  9:45 AM Ricarda Perry SKQFVPU [facility]   10/1/2019 9:20 AM Muna Mack MD [facility]   1/16/2020  9:30 AM [facility] FAST TRACK NURSE [facility]

## 2019-09-11 NOTE — PROGRESS NOTES
OT DAILY TREATMENT NOTE 10-18    Patient Name: Osmin Urban  Date:2019  : 1940  [x]  Patient  Verified  Payor: [place] MEDICARE / Plan: [place] MEDICARE PART A & B / Product Type: Medicare /    In time:1100  Out time:1140  Total Treatment Time (min): 40  Visit #: 9 of     Medicare/BCBS Only   Total Timed Codes (min):  40 1:1 Treatment Time:  40     Treatment Area: Right hand weakness [R29.898]  Rotator cuff arthropathy, right [M12.811]    SUBJECTIVE  Pain Level (0-10 scale): 2/10  Any medication changes, allergies to medications, adverse drug reactions, diagnosis change, or new procedure performed?: [x] No    [] Yes (see summary sheet for update)  Subjective functional status/changes:   [] No changes reported  Still swells some above elbow  Still worried that my arm will not lift up yet   Feels comforting to have sleeve on    OBJECTIVE        23 min Therapeutic Exercise:  [] See flow sheet :   Rationale: increase ROM and increase strength to improve the patients ability to use right hand  Wrist ex 15 reps each, forearm ex 15 each  Med putty and pegs x 10, flatten roll, pinch      17 min Manual Therapy:  Soft tissue mobilization   Rationale: decrease pain, increase ROM, increase tissue extensibility and decrease edema  to improve right UE use  Edema management right UE    With   [] TE   [] TA   [] neuro   [] other: Patient Education: [x] Review HEP    [] Progressed/Changed HEP based on: reminder that AROM just starting and not expected to be good at this time  [] positioning   [] body mechanics   [] transfers   [] heat/ice application   [] Splint wear/care   [] Sensory re-education   [] scar management      [] other:            Other Objective/Functional Measures:   Edema above elbow remains     Pain Level (0-10 scale) post treatment: , edema    ASSESSMENT/Changes in Function: Improving hand strength    Patient will continue to benefit from skilled OT services to modify and progress therapeutic interventions, address ROM deficits, address strength deficits, analyze and address soft tissue restrictions and instruct in home and community integration to attain remaining goals. []  See Plan of Care  []  See progress note/recertification  []  See Discharge Summary         Progress towards goals / Updated goals:  *1.  Patient will be independent in home exercise program for hand and wrist ROM. met 8/21/19  2.  Patient will be familiar with positioning of UE to reduce edemamet .8/21/19  3.  Patient will be provided with compression sleeve and glove for edema reduction. met 8/21/19, wearing doubled today with reduced improvement 8/23/19     Long Term Goals: To be accomplished in 4 weeks:              1.  Patient will report pain in hand 0-2/10 with use for fine motor tasks at table level. met 9/11/19  2.  Patient will increase right wrist AROM to within 5 degrees of left for use for self care tasks. . Progressing 8/26/19  3.  Patient will increase right hand  at least 10# for return to hobbies and home care tasks.  Progressing 9/3/19, progressing with soft putty 9/4/19, medium 9/9/19  4.  Patient will report resolved/ reduced significantly  when garments removed to allow for improved hand function and UE recoverynow trying to leave garments off at times 8/26/19, cleared to remove sling except when out 9/4/19, removing garments periodically , no longer using glove 9/11/19    PLAN  []  Upgrade activities as tolerated     [x]  Continue plan of care  []  Update interventions per flow sheet       []  Discharge due to:_  []  Other:_      Prudence GANGA Watkins/L 9/11/2019  8:03 AM    Future Appointments   Date Time Provider Heri Beal   9/11/2019 11:00 AM GANGA Christy/L [facility]   9/11/2019 12:00 PM [facility]i Roth [facility]   9/13/2019 10:30 AM Trina Wilson [facility]   9/13/2019 11:30 AM [facility]   9/16/2019 11:00 AM Trina RubinPTPB [facility] 9/16/2019 12:00 PM Ira Anthony, PT [facility]   9/18/2019 11:45 AM Ellis Olivia, OTR/L [facility]   9/18/2019 12:30 PM Thomas Fermin, PTA [facility]   9/19/2019  2:00 PM Ira Boleser, PT [facility]   9/19/2019  2:45 PM Ellis Olivia, OTR/L [facility]   9/23/2019 10:00 AM Ira Anthony, PT [facility]   9/23/2019 11:00 AM Ellis Olivia, OTR/L [facility]   9/25/2019 10:00 AM Thomas Fermin, PTA [facility]   9/25/2019 11:00 AM Ellis Olivia, OTR/L [facility]   9/27/2019  9:00 AM Thomas Fermin, PTA [facility]   9/27/2019  9:45 AM Priscilla Goldsmith EDXZHVC [facility]   10/1/2019  9:20 AM Benjamin Pa MD [place] [address]   1/16/2020  9:30 AM [facility] FAST TRACK NURSE [facility]

## 2019-09-13 ENCOUNTER — APPOINTMENT (OUTPATIENT)
Dept: PHYSICAL THERAPY | Age: 79
End: 2019-09-13
Payer: MEDICARE

## 2019-09-16 ENCOUNTER — HOSPITAL ENCOUNTER (OUTPATIENT)
Dept: PHYSICAL THERAPY | Age: 79
Discharge: HOME | End: 2019-09-16
Payer: MEDICARE

## 2019-09-16 PROCEDURE — 97110 THERAPEUTIC EXERCISES: CPT

## 2019-09-16 PROCEDURE — 97140 MANUAL THERAPY 1/> REGIONS: CPT

## 2019-09-16 PROCEDURE — 97016 VASOPNEUMATIC DEVICE THERAPY: CPT

## 2019-09-16 NOTE — PROGRESS NOTES
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 Reports she is having a lot of pain today and was hurting all weekend.      OBJECTIVE    Modality rationale: decrease edema, decrease inflammation and decrease pain to improve the patients ability to increase ease of ADLs   Min Type Additional Details    [] Estim:  []Unatt       []IFC  []Premod                        []Other:  []w/ice   []w/heat  Position:  Location:    [] Estim: []Att    []TENS instruct  []NMES                    []Other:  []w/US   []w/ice   []w/heat  Position:  Location:    []  Traction: [] Cervical       []Lumbar                       [] Prone          []Supine                       []Intermittent   []Continuous Lbs:  [] before manual  [] after manual    []  Ultrasound: []Continuous   [] Pulsed                           []1MHz   []3MHz W/cm2:  Location:    []  Iontophoresis with dexamethasone         Location: [] Take home patch   [] In clinic    []  Ice     []  heat  []  Ice massage  []  Laser   []  Anodyne Position:  Location:    []  Laser with stim  []  Other:  Position:  Location:   15 [x]  Vasopneumatic Device Pressure:       [x] lo [] med [] hi   Temperature: [x] lo [] med [] hi   [x] Skin assessment post-treatment:  [x]intact []redness- no adverse reaction    []redness - adverse reaction:     18 min Therapeutic Exercise:  [x] See flow sheet :   Rationale: increase ROM and increase strength to improve the patients ability to increase ease of ADLs    15 min Manual Therapy: scap mobs, trigger point release to infraspinatus and UT. Rationale: decrease pain, increase ROM and increase tissue extensibility to increase ease of ADLs          With   [x] TE   [] TA   [] neuro   [] other: Patient Education: [x] Review HEP    [] Progressed/Changed HEP based on:   [] positioning   [] body mechanics   [] transfers   [] heat/ice application    [] other:      Other Objective/Functional Measures:   Right shoulder PROM flex: 90 degrees   Pt. Has less pain following manual  She was educated on less frequency of HEP and avoiding activities that increase shoulder pain    Pain Level (0-10 scale) post treatment:  2/10    ASSESSMENT/Changes in Function:  Pt. Is progressing slowly towards goals. She continues to have limited right shoulder mobility and significant decrease in strength. She requires assistance to perform shoulder flexion and abduction. Patient will continue to benefit from skilled PT services to modify and progress therapeutic interventions, address functional mobility deficits, address ROM deficits, address strength deficits, analyze and address soft tissue restrictions, analyze and cue movement patterns, analyze and modify body mechanics/ergonomics and assess and modify postural abnormalities to attain remaining goals. Progress towards goals / Updated goals:  1. Pt will increase FOTO score by 27 pts to improve shoulder function.    2. Pt will increase right shoulder AROM >150 deg flexion/scaption to ease with progress toward ADLs  Not met: PROM flex: 90 degrees (9/16/19)   3. Pt will have full passive flexion /scaption to [facility] to ease with progression of shoulder mobility per protocol.   4. Pt will demonstrate right shoulder strength to 4/5 or greater to return to PLOF.     PLAN  []  Upgrade activities as tolerated     [x]  Continue plan of care  []  Update interventions per flow sheet       []  Discharge due to:_  []  Other:_      Rinku Alcantara, PT 9/16/2019  12:01 PM    Future Appointments   Date Time Provider Heri Beal   9/18/2019 11:00 AM Katina Daubs, PTA [facility]   9/18/2019 11:45 AM Dillon Hernandez OTR/L [facility]   9/19/2019  2:00 PM Arthur Mcqueen, PT [facility]   9/23/2019 10:00 AM Arthur Mcqueen, PT [facility]   9/24/2019  9:00 AM Katina Daubs, PTA [facility]   9/25/2019 10:00 AM Katina Daubs, PTA [facility]   10/1/2019  9:20 AM Lien Antunez MD [address]   1/16/2020  9:30 AM [facility] FAST TRACK NURSE [facility]

## 2019-09-16 NOTE — PROGRESS NOTES
OT DAILY TREATMENT NOTE 10-18    Patient Name: Osmin Urban  Date:2019  : 1940  [x]  Patient  Verified  Payor: VA MEDICARE / Plan: VA MEDICARE PART A & B / Product Type: Medicare /    In time:1105  Out time:1145  Total Treatment Time (min): 40  Visit #: 10 of 12    Medicare/BCBS Only   Total Timed Codes (min):  40 1:1 Treatment Time:  40     Treatment Area: Right hand weakness [R29.898]  Rotator cuff arthropathy, right [M12.811]    SUBJECTIVE  Pain Level (0-10 scale): 6/10  Any medication changes, allergies to medications, adverse drug reactions, diagnosis change, or new procedure performed?: [x] No    [] Yes (see summary sheet for update)  Subjective functional status/changes:   [] No changes reported  Shoulder really sore, hard to sleep    OBJECTIVE      25 min Therapeutic Exercise:  [] See flow sheet :   Rationale: increase ROM and increase strength to improve the patients ability to grasp  Added 1# t wrist and forearm exercises on wedge x 15 right hand  Med putty and pegs x 10, rolling, pinching right hand      15 min Manual Therapy:  Soft tissue mobilization   Rationale: decrease pain, increase tissue extensibility and decrease edema  to improve right UE motion  Soft tissue mobilization at elbow level to lower upper arm to reuce edema and improve ROM and reduce pain        With   [] TE   [] TA   [] neuro   [] other: Patient Education: [x] Review HEP    [] Progressed/Changed HEP based on: normal pain due to weakness  [] positioning   [] body mechanics   [] transfers   [] heat/ice application   [] Splint wear/care   [] Sensory re-education   [] scar management      [] other:            Other Objective/Functional Measures:   Firm swelling above elbow nearly completely resolved     Pain Level (0-10 scale) post treatment: 4/10    ASSESSMENT/Changes in Function: Improved self cueing for reducing shoulder hiking    Patient will continue to benefit from skilled OT services to modify and progress therapeutic interventions, address strength deficits, analyze and address soft tissue restrictions and instruct in home and community integration to attain remaining goals. []  See Plan of Care  []  See progress note/recertification  []  See Discharge Summary         Progress towards goals / Updated goals:  *1.  Patient will be independent in home exercise program for hand and wrist ROM. met 8/21/19  2.  Patient will be familiar with positioning of UE to reduce edemamet .8/21/19  3.  Patient will be provided with compression sleeve and glove for edema reduction. met 8/21/19, wearing doubled today with reduced improvement 8/23/19     Long Term Goals: To be accomplished in 4 weeks:              1.  Patient will report pain in hand 0-2/10 with use for fine motor tasks at table level. met 9/11/19  2.  Patient will increase right wrist AROM to within 5 degrees of left for use for self care tasks. . Progressing 8/26/19  3.  Patient will increase right hand  at least 10# for return to hobbies and home care tasks.  Progressing 9/3/19, progressing with soft putty 9/4/19, medium 9/9/19  4.  Patient will report resolved/ reduced significantly  when garments removed to allow for improved hand function and UE recoverynow trying to leave garments off at times 8/26/19, cleared to remove sling except when out 9/4/19, removing garments periodically , no longer using glove 9/11/19, not wearing sleeve or glove 9/16/19   **    PLAN  []  Upgrade activities as tolerated     []  Continue plan of care  []  Update interventions per flow sheet       []  Discharge due to:_  []  Other:_      GANGA Barlow/L 9/16/2019  12:00 PM    Future Appointments   Date Time Provider Heri Beal   9/18/2019 11:00 AM Jeny Dutton PTA [facility]   9/18/2019 11:45 AM GANGA Purdy/L [facility]   9/19/2019  2:00 PM Mendel Doan, PT [facility]   9/23/2019 10:00 AM Mendel Doan, ABELINO [facility]   9/24/2019  9:00 AM [place] [facility]   9/25/2019 10:00 AM [facility]   10/1/2019  9:20 AM Mannie Becker MD [place] [address]   1/16/2020  9:30 AM [facility] FAST TRACK NURSE [facility]

## 2019-09-18 ENCOUNTER — HOSPITAL ENCOUNTER (OUTPATIENT)
Dept: PHYSICAL THERAPY | Age: 79
Discharge: HOME | End: 2019-09-18
Payer: MEDICARE

## 2019-09-18 PROCEDURE — 97110 THERAPEUTIC EXERCISES: CPT

## 2019-09-18 PROCEDURE — 97140 MANUAL THERAPY 1/> REGIONS: CPT

## 2019-09-18 NOTE — PROGRESS NOTES
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 She feels like she should be further along than she is. She is finishing up with OT soon.      OBJECTIVE    Modality rationale: decrease edema, decrease inflammation and decrease pain to improve the patients ability to increase ease of ADLs   Min Type Additional Details    [] Estim:  []Unatt       []IFC  []Premod                        []Other:  []w/ice   []w/heat  Position:  Location:    [] Estim: []Att    []TENS instruct  []NMES                    []Other:  []w/US   []w/ice   []w/heat  Position:  Location:    []  Traction: [] Cervical       []Lumbar                       [] Prone          []Supine                       []Intermittent   []Continuous Lbs:  [] before manual  [] after manual    []  Ultrasound: []Continuous   [] Pulsed                           []1MHz   []3MHz W/cm2:  Location:    []  Iontophoresis with dexamethasone         Location: [] Take home patch   [] In clinic   10 []  Ice     [x]  heat  []  Ice massage  []  Laser   []  Anodyne Position:seated  Location: right shoulder    []  Laser with stim  []  Other:  Position:  Location:    []  Vasopneumatic Device Pressure:       [] lo [] med [] hi   Temperature: [] lo [] med [] hi   [x] Skin assessment post-treatment:  [x]intact []redness- no adverse reaction    []redness - adverse reaction:     25 min Therapeutic Exercise:  [x] See flow sheet :   Rationale: increase ROM and increase strength to improve the patients ability to increase ease of ADLs    15 min Manual Therapy: STM to posterior shoulder; contract relax to improve shoulder flexion   Rationale: decrease pain, increase ROM and increase tissue extensibility to increase ease of ADLs          With   [x] TE   [] TA   [] neuro   [] other: Patient Education: [x] Review HEP    [] Progressed/Changed HEP based on:   [] positioning   [] body mechanics   [] transfers   [] heat/ice application    [] other:      Other Objective/Functional Measures:   Right shoulder AAROM post manual 115 degrees  Educated on self stretching at home to improve mobility  No increased pain with session  Trial of heat for muscle tightness/relaxation as pt had no swelling    Pain Level (0-10 scale) post treatment:  2/10    ASSESSMENT/Changes in Function:  Pt progressing slowing with improving strength and mobility in shoulder. She has posterior shoulder tightness and UT hiking with elevation. Will continue progressing strength within available range in order to reach into cabinets to get her salt and pepper shakers. Progress towards goals / Updated goals:  1. Pt will increase FOTO score by 27 pts to improve shoulder function.    2. Pt will increase right shoulder AROM >150 deg flexion/scaption to ease with progress toward ADLs  Not met: PROM flex: 90 degrees (9/16/19)  Progressing AAROM 115 post manual (9/19/19)   3. Pt will have full passive flexion /scaption to [facility] to ease with progression of shoulder mobility per protocol. 4. Pt will demonstrate right shoulder strength to 4/5 or greater to return to PLOF.     PLAN  [x]  Upgrade activities as tolerated     [x]  Continue plan of care  []  Update interventions per flow sheet       []  Discharge due to:_  []  Other:_      Misa Clark, PTA 9/18/2019  12:01 PM    Future Appointments   Date Time Provider Heri Lian   9/19/2019  2:00 PM Mendel Doan, PT [facility] [address]   9/23/2019 10:00 AM Jeny Dutton, PTA [facility] [address]   9/24/2019  9:00 AM Jeny Dutton PTA [facility] [address]   9/25/2019 10:00 AM Carrie Black PTA [facility] [address]   10/1/2019  9:20 AM Kathrin Angelo MD [facility]   1/16/2020  9:30 AM [facility] FAST TRACK NURSE [facility]

## 2019-09-18 NOTE — PROGRESS NOTES
OT DAILY TREATMENT NOTE 10-18    Patient Name: Kyara Evans  Date:2019  : 1940  [x]  Patient  Verified  Payor: [place] MEDICARE / Plan: [place] MEDICARE PART A & B / Product Type: Medicare /    In time:1150  Out time:1230  Total Treatment Time (min): 40  Visit #: 11 of 12    Medicare/BCBS Only   Total Timed Codes (min):  40 1:1 Treatment Time:  40     Treatment Area: Right hand weakness [R29.898]  Rotator cuff arthropathy, right [M12.811]    SUBJECTIVE  Pain Level (0-10 scale): 2/10  Any medication changes, allergies to medications, adverse drug reactions, diagnosis change, or new procedure performed?: [x] No    [] Yes (see summary sheet for update)  Subjective functional status/changes:   [] No changes reported  *Still a little swollen above my elbow**    OBJECTIVE      30 min Therapeutic Exercise:  [] See flow sheet :   Rationale: increase strength to improve the patients ability to grasp, lift  Recheck  and ROM  Wrist flex ext and RD/UD 10x 1# for HEP  Med putty HEP reviewed and completed      10 min Manual Therapy:  Soft tissue mobilization   Rationale: increase ROM, increase tissue extensibility and decrease edema  to Improve right UE function  Soft tissue mobilization right UE      With   [] TE   [] TA   [] neuro   [] other: Patient Education: [x] Review HEP    [] Progressed/Changed HEP based on: Final HEP  [] positioning   [] body mechanics   [] transfers   [] heat/ice application   [] Splint wear/care   [] Sensory re-education   [] scar management      [] other:            Other Objective/Functional Measures:    32# right  Wrsit Ext 68 right     Pain Level (0-10 scale) post treatment: 0/10    ASSESSMENT/Changes in Function: Improved  funciton per FOTO, Improved ROM, edema       []  See Plan of Care  []  See progress note/recertification  [x]  See Discharge Summary         Progress towards goals / Updated goals:  *1.  Patient will be independent in home exercise program for hand and wrist ROM. met 8/21/19  2.  Patient will be familiar with positioning of UE to reduce edemamet .8/21/19  3.  Patient will be provided with compression sleeve and glove for edema reduction. met 8/21/19, wearing doubled today with reduced improvement 8/23/19     Long Term Goals: To be accomplished in 4 weeks:              1.  Patient will report pain in hand 0-2/10 with use for fine motor tasks at table level. met 9/11/19  2.  Patient will increase right wrist AROM to within 5 degrees of left for use for self care tasks. . Progressing 8/26/19, met 9/18/19  3.  Patient will increase right hand  at least 10# for return to hobbies and home care tasks.  Progressing 9/3/19, progressing with soft putty 9/4/19, medium 9/9/19, not met 9/18/19  4.  Patient will report resolved/ reduced significantly  when garments removed to allow for improved hand function and UE recoverynow trying to leave garments off at times 8/26/19, cleared to remove sling except when out 9/4/19, removing garments periodically , no longer using glove 9/11/19, not wearing sleeve or glove 9/16/19   *met 9/18/19    PLAN  []  Upgrade activities as tolerated     []  Continue plan of care  []  Update interventions per flow sheet       [x]  Discharge due to:_goals met/partially met  []  Other:_      GANGA Galo/L 9/18/2019  12:57 PM    Future Appointments   Date Time Provider Heri Beal   9/19/2019  2:00 PM Sky Singh, PT [facility]   9/23/2019 10:00 AM Tiki Dawit, PTA [facility]   9/24/2019  9:00 AM Tiki Dawit, PTA [facility]   9/25/2019 10:00 AM Flaco Grullon, PTA [facility]   10/1/2019  9:20 AM Marissa Todd MD [id number] [facility]   1/16/2020  9:30 AM [facility] FAST TRACK NURSE [facility]

## 2019-09-19 ENCOUNTER — HOSPITAL ENCOUNTER (OUTPATIENT)
Dept: PHYSICAL THERAPY | Age: 79
Discharge: HOME | End: 2019-09-19
Payer: MEDICARE

## 2019-09-19 ENCOUNTER — APPOINTMENT (OUTPATIENT)
Dept: PHYSICAL THERAPY | Age: 79
End: 2019-09-19
Payer: MEDICARE

## 2019-09-19 PROCEDURE — 97016 VASOPNEUMATIC DEVICE THERAPY: CPT

## 2019-09-19 PROCEDURE — 97110 THERAPEUTIC EXERCISES: CPT

## 2019-09-19 PROCEDURE — 97140 MANUAL THERAPY 1/> REGIONS: CPT

## 2019-09-19 NOTE — PROGRESS NOTES
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 Reports she is still having a lot of pain but it's not as bad as the other day.      OBJECTIVE    Modality rationale: decrease edema, decrease inflammation and decrease pain to improve the patients ability to increase ease of ADLs   Min Type Additional Details    [] Estim:  []Unatt       []IFC  []Premod                        []Other:  []w/ice   []w/heat  Position:  Location:    [] Estim: []Att    []TENS instruct  []NMES                    []Other:  []w/US   []w/ice   []w/heat  Position:  Location:    []  Traction: [] Cervical       []Lumbar                       [] Prone          []Supine                       []Intermittent   []Continuous Lbs:  [] before manual  [] after manual    []  Ultrasound: []Continuous   [] Pulsed                           []1MHz   []3MHz W/cm2:  Location:    []  Iontophoresis with dexamethasone         Location: [] Take home patch   [] In clinic    []  Ice     []  heat  []  Ice massage  []  Laser   []  Anodyne Position:  Location:    []  Laser with stim  []  Other:  Position:  Location:   15 [x]  Vasopneumatic Device Pressure:       [x] lo [] med [] hi   Temperature: [x] lo [] med [] hi   [x] Skin assessment post-treatment:  [x]intact []redness- no adverse reaction []redness - adverse reaction:     19 min Therapeutic Exercise:  [x] See flow sheet :   Rationale: increase ROM and increase strength to improve the patients ability to increase ease of ADLs    15 min Manual Therapy:  Trigger point release to infraspinatus, sub scap, and UT   Rationale: decrease pain, increase ROM and increase tissue extensibility to increase ease of reaching          With   [x] TE   [] TA   [] neuro   [] other: Patient Education: [x] Review HEP    [] Progressed/Changed HEP based on:   [] positioning   [] body mechanics   [] transfers   [] heat/ice application    [] other:      Other Objective/Functional Measures:   Pt. Tolerated PT well with no reports of increased pain  Reviewed HEP with pt. For isometrics and shoulder flexion AAROM  Re-educated on decreasing shoulder hike during AAROM    Pain Level (0-10 scale) post treatment:  2/10    ASSESSMENT/Changes in Function: pt. Is progressing slowly towards goals. She continues to have significant weakness in right shoulder which is limiting her mobility. She also continues to have moderate pain with significant tightness and trigger points in posterior shoulder. She was re-educated on her HEP and to avoid activities that cause pain. Patient will continue to benefit from skilled PT services to modify and progress therapeutic interventions, address functional mobility deficits, address ROM deficits, address strength deficits, analyze and address soft tissue restrictions, analyze and cue movement patterns, analyze and modify body mechanics/ergonomics and assess and modify postural abnormalities to attain remaining goals. Progress towards goals / Updated goals:  1.  Pt will increase FOTO score by 27 pts to improve shoulder function.    2. Pt will increase right shoulder AROM >150 deg flexion/scaption to ease with progress toward ADLs  Not met: PROM flex: 90 degrees (9/16/19)  Progressing AAROM 115 post manual (9/19/19)   3. Pt will have full passive flexion /scaption to [facility] to ease with progression of shoulder mobility per protocol. 4. Pt will demonstrate right shoulder strength to 4/5 or greater to return to [facility].     PLAN  []  Upgrade activities as tolerated     [x]  Continue plan of care  []  Update interventions per flow sheet       []  Discharge due to:_  []  Other:_      Ailyn Hollins, PT 9/19/2019  1:56 PM    Future Appointments   Date Time Provider Heri Beal   9/19/2019  2:00 PM Chaka Wesley, PT [facility]   9/23/2019 10:00 AM Joyce GOFF [facility]   9/24/2019  9:00 AM Suyapa Barboza PTA [facility]   9/25/2019 10:00 AM Janyth Hammans, PTA [facility]   10/1/2019  9:20 AM MD Deepti Patino 69   1/16/2020  9:30 AM [facility] FAST TRACK NURSE [facility]

## 2019-09-20 NOTE — PROGRESS NOTES
[facility] - Karuna Chawla  [address]  [phone] fax    Occupational Therapy Discharge Summary  Patient name: Praveen Meyers Start of Care: 19   Referral source: Osiris Obregon : 1940   Medical/Treatment Diagnosis: Right hand weakness [R29.898]  Rotator cuff arthropathy, right [M12.811]  Payor: VA MEDICARE / Plan: VA MEDICARE PART A & B / Product Type: Medicare /  Onset Date:19     Prior Hospitalization: see medical history Provider#: [id number]   Medications: Verified on Patient Summary List    Comorbidities: *COPD, reverse total shoulder right  Prior Level of Function:I self care home care driving, golf gardening                  **    Visits from Start of Care: 11    Missed Visits: 0    Reporting Period : 19 to 19    Summary of Care:Jevon seen for manual thrapy therapeutic exercises and modalities. She has HEP. Goal:1.  Patient will be independent in home exercise program for hand and wrist ROM  Status at last note/certification:Did not have  Status at discharge: met    Bertha Patel will be familiar with positioning of UE to reduce edemamet   Status at last note/certification:Unaware  Status at discharge: met    Bertha Patel will be provided with compression sleeve and glove for edema reduction  Status at last note/certification:Did not have  Status at discharge: met Charline Dobbs will report pain in hand 0-2/10 with use for fine motor tasks at table level  Status at last note/certification:Pain 4/47  Status at discharge: met    Bertha Patel will increase right wrist AROM to within 5 degrees of left for use for self care tasks  Status at last note/certification:Wrist JOHNS 105  Status at discharge: met      Bertha Patel will increase right hand  at least 10# for return to hobbies and home care tasks.    Status at last note/certification: 02#  Status at discharge: not met      Carine Sprain will report resolved/ reduced significantly  when garments removed to allow for improved hand function and UE recover  Status at last note/certification:Wearing all the time  Status at discharge: met      ASSESSMENT/Changes in Function: edema resolved distally and will be further addressed in physical therapy as UE ROM progresses.       ASSESSMENT/RECOMMENDATIONS:  [x]Discontinue therapy: [x]Patient has reached or is progressing toward set goals      []Patient is non-compliant or has abdicated      []Due to lack of appreciable progress towards set goals    Yoav Arreaga OTR/L 9/20/2019 1:47 PM

## 2019-09-23 ENCOUNTER — APPOINTMENT (OUTPATIENT)
Dept: PHYSICAL THERAPY | Age: 79
End: 2019-09-23
Payer: MEDICARE

## 2019-09-23 ENCOUNTER — HOSPITAL ENCOUNTER (OUTPATIENT)
Dept: PHYSICAL THERAPY | Age: 79
Discharge: HOME | End: 2019-09-23
Payer: MEDICARE

## 2019-09-23 PROCEDURE — 97110 THERAPEUTIC EXERCISES: CPT

## 2019-09-23 PROCEDURE — 97140 MANUAL THERAPY 1/> REGIONS: CPT

## 2019-09-23 NOTE — PROGRESS NOTES
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 OBJECTIVE    Modality rationale: decrease edema and decrease inflammation to improve the patients ability to return to normal activities.    Min Type Additional Details    [] Estim:  []Unatt       []IFC  []Premod                        []Other:  []w/ice   []w/heat  Position:  Location:    [] Estim: []Att    []TENS instruct  []NMES                    []Other:  []w/US   []w/ice   []w/heat  Position:  Location:    []  Traction: [] Cervical       []Lumbar                       [] Prone          []Supine                       []Intermittent   []Continuous Lbs:  [] before manual  [] after manual    []  Ultrasound: []Continuous   [] Pulsed                           []1MHz   []3MHz W/cm2:  Location:    []  Iontophoresis with dexamethasone         Location: [] Take home patch   [] In clinic   10 [x]  Ice     []  heat  []  Ice massage  []  Laser   []  Anodyne Position:supine  Location:right shoulder    []  Laser with stim  []  Other:  Position:  Location:    []  Vasopneumatic Device Pressure:       [] lo [] med [] hi   Temperature: [] lo [] med [] hi   [] Skin assessment post-treatment:  []intact []redness- no adverse reaction Therapeutic exercises=32 minutes to address weakness, decreased ROM and difficulty with functional activities. 8 min Manual Therapy:  DTM, TPR of scap   Rationale: decrease pain, increase ROM, increase tissue extensibility and decrease edema  to return to household chores          With   [] TE   [] TA   [] neuro   [] other: Patient Education: [x] Review HEP    [] Progressed/Changed HEP based on:   [] positioning   [] body mechanics   [] transfers   [] heat/ice application    [] other:      Other Objective/Functional Measures: Increased pain and decreased functional independence. Patient does not independently do her hairdo  Patient with negative response to her current progression  Discussed healing process and the need to allow sufficient time for healing to take affect. Pain Level (0-10 scale) post treatment: 6/10    ASSESSMENT/Changes in Function: Slow progress toward goals    Patient will continue to benefit from skilled PT services to address functional mobility deficits, address ROM deficits and address strength deficits to attain remaining goals. [x]  See Plan of Care  []  See progress note/recertification  []  See Discharge Summary         Progress towards goals / Updated goals:  1. Pt will increase FOTO score by 27 pts to improve shoulder function.    2. Pt will increase right shoulder AROM >150 deg flexion/scaption to ease with progress toward ADLs  Not met: PROM flex: 90 degrees (9/16/19)  Progressing AAROM 115 post manual (9/19/19)   3. Pt will have full passive flexion /scaption to [facility] to ease with progression of shoulder mobility per protocol. 4. Pt will demonstrate right shoulder strength to 4/5 or greater to return to PLOF.     PLAN  [x]  Upgrade activities as tolerated     [x]  Continue plan of care  []  Update interventions per flow sheet       []  Discharge due to:_  []  Other:_      Meagan Perez 9/23/2019  10:01 AM    Future Appointments   Date Time Provider Heri Beal 9/24/2019 12:00 PM Nadira Marks, PTA [facility]   9/25/2019 10:00 AM Osiris Larisajustin, PTA [facility]   9/30/2019  3:00 PM Nadira Selina, PTA [facility]   10/1/2019  9:20 AM April Rangel MD [place] [id number]   10/2/2019 10:00 AM Nadira Marks, PTA [facility]   10/4/2019 11:00 AM Nadira Marks, PTA [facility]   10/7/2019  8:00 AM Yousif Armendariz, PT [facility]   10/9/2019 10:00 AM Nadira Marks, PTA [facility]   10/11/2019 10:00 AM Nadira Marks, PTA [facility]   10/14/2019  8:00 AM Yousif Armendariz, PT [facility]   10/16/2019 10:00 AM Nadira Marks, PTA [facility]   10/18/2019 10:30 AM Nadira Marks, PTA [facility]   10/21/2019 11:30 AM Yousif Armendariz, PT [facility]   10/23/2019 12:00 PM Yousif Armendariz, PT [facility]   10/25/2019 12:00 PM Yousif Armendariz, PT [facility]   10/28/2019 11:00 AM Yousif Armendariz, PT [facility]   10/29/2019 10:00 AM Nadira Marks, PTA [facility]   11/4/2019 10:00 AM Nadira Marks PTA [facility]   1/16/2020  9:30 AM [facility] FAST TRACK NURSE [facility]

## 2019-09-24 ENCOUNTER — HOSPITAL ENCOUNTER (OUTPATIENT)
Dept: PHYSICAL THERAPY | Age: 79
Discharge: HOME | End: 2019-09-24
Payer: MEDICARE

## 2019-09-24 PROCEDURE — 97110 THERAPEUTIC EXERCISES: CPT

## 2019-09-24 PROCEDURE — 97112 NEUROMUSCULAR REEDUCATION: CPT

## 2019-09-24 NOTE — PROGRESS NOTES
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 She wants to be able to reach her opposite shoulder and wash her hair. She is frustrated with her slow progress. OBJECTIVE    Modality rationale: decrease edema and decrease inflammation to improve the patients ability to return to normal activities.    Min Type Additional Details    [] Estim:  []Unatt       []IFC  []Premod                        []Other:  []w/ice   []w/heat  Position:  Location:    [] Estim: []Att    []TENS instruct  []NMES                    []Other:  []w/US   []w/ice   []w/heat  Position:  Location:    []  Traction: [] Cervical       []Lumbar                       [] Prone          []Supine                       []Intermittent   []Continuous Lbs:  [] before manual  [] after manual    []  Ultrasound: []Continuous   [] Pulsed                           []1MHz   []3MHz W/cm2:  Location:    []  Iontophoresis with dexamethasone         Location: [] Take home patch   [] In clinic   10 []  Ice     [x]  heat  []  Ice massage  []  Laser   []  Anodyne Position:seated  Location:right shoulder    []  Laser with stim  []  Other:  Position:  Location:    []  Vasopneumatic Device Pressure:       [] lo [] med [] hi   Temperature: [] lo [] med [] hi   [x] Skin assessment post-treatment:  [x]intact []redness- no adverse reaction     30-Therapeutic exercises=32 minutes to address weakness, decreased ROM and difficulty with functional activities. 15 minutes neuro muscular re-education to work on correct muscle recruitment for shoulder elevation to prevent UT compensation for ease of performing ADLs      With   [] TE   [] TA   [] neuro   [] other: Patient Education: [x] Review HEP    [] Progressed/Changed HEP based on:   [] positioning   [] body mechanics   [] transfers   [] heat/ice application    [] other:      Other Objective/Functional Measures:   Cues on pulleys to pull into a stretch; pt noted she thought she was just moving the arms on it and didn't realize she was supposed to stretch  Pt with compensations on finger ladder so D/C  Educated with tactile cues to improve pec activation to help with OH reaching and reaching to contralateral shoulder; max cues to prevent UT hiking  tricep restrictions limiting OH mobility and decreased upward scap rotation    Pain Level (0-10 scale) post treatment: 0/10    ASSESSMENT/Changes in Function: Pt is making slow progress at improving right shoulder strength and OH mobility. She has anterior shoulder pain due to UT compensation likely habitual in nature. Will continue to work on improved posture and improved mobility for ease of washing her hair and performing dressing activities. Progress towards goals / Updated goals:  1. Pt will increase FOTO score by 27 pts to improve shoulder function.    2. Pt will increase right shoulder AROM >150 deg flexion/scaption to ease with progress toward ADLs  Not met: PROM flex: 90 degrees (9/16/19)  Progressing AAROM 115 post manual (9/19/19)   3. Pt will have full passive flexion /scaption to [facility] to ease with progression of shoulder mobility per protocol. 4. Pt will demonstrate right shoulder strength to 4/5 or greater to return to PLOF.     PLAN  [x]  Upgrade activities as tolerated     [x]  Continue plan of care  []  Update interventions per flow sheet       []  Discharge due to:_  []  Other:_      Dustin School, PTA 9/24/2019  10:01 AM    Future Appointments   Date Time Provider Heri Beal   9/24/2019 12:00 PM Erin Lowe, PTA [facility]   9/25/2019 10:00 AM Abhinav Estevez, PTA [facility]   9/30/2019  3:00 PM Erin Lowe, PTA [facility]   10/1/2019  9:20 AM Bard Gracie MD [place] [id number]   10/2/2019 10:00 AM Erin Lowe, PTA [facility]   10/4/2019 11:00 AM Erin Lowe, PTA [facility]   10/7/2019  8:00 AM Josephine Bile, PT [facility]   10/9/2019 10:00 AM Erin Lowe, PTA [facility]   10/11/2019 10:00 AM Erin Lowe, PTA [facility]   10/14/2019  8:00 AM [place] Bile, PT [facility]   10/16/2019 10:00 AM Erin Lowe, PTA [facility]   10/18/2019 10:30 AM Gianniia Mynor, PTA [facility]   10/21/2019 11:30 AM [place] Bile, PT [facility]   10/23/2019 12:00 PM [place] Bile, PT [facility]   10/25/2019 12:00 PM Josephine Bile, PT [facility]   10/28/2019 11:00 AM Josephine Bile, PT [facility]   10/29/2019 10:00 AM Erin Lowe PTA [facility]   11/4/2019 10:00 AM Erin Lowe PTA [facility]   1/16/2020  9:30 AM [facility] FAST TRACK NURSE [facility]

## 2019-09-25 ENCOUNTER — APPOINTMENT (OUTPATIENT)
Dept: PHYSICAL THERAPY | Age: 79
End: 2019-09-25
Payer: MEDICARE

## 2019-09-25 ENCOUNTER — HOSPITAL ENCOUNTER (OUTPATIENT)
Dept: PHYSICAL THERAPY | Age: 79
Discharge: HOME | End: 2019-09-25
Payer: MEDICARE

## 2019-09-25 PROCEDURE — 97140 MANUAL THERAPY 1/> REGIONS: CPT

## 2019-09-25 PROCEDURE — 97110 THERAPEUTIC EXERCISES: CPT

## 2019-09-25 NOTE — PROGRESS NOTES
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 She is working on keeping her shoulders back for posture. OBJECTIVE    Modality rationale: decrease edema and decrease inflammation to improve the patients ability to return to normal activities.    Min Type Additional Details    [] Estim:  []Unatt       []IFC  []Premod                        []Other:  []w/ice   []w/heat  Position:  Location:    [] Estim: []Att    []TENS instruct  []NMES                    []Other:  []w/US   []w/ice   []w/heat  Position:  Location:    []  Traction: [] Cervical       []Lumbar                       [] Prone          []Supine                       []Intermittent   []Continuous Lbs:  [] before manual  [] after manual    []  Ultrasound: []Continuous   [] Pulsed                           []1MHz   []3MHz W/cm2:  Location:    []  Iontophoresis with dexamethasone         Location: [] Take home patch   [] In clinic   10 []  Ice     [x]  heat  []  Ice massage  []  Laser   []  Anodyne Position:seated  Location:right shoulder    []  Laser with stim  []  Other:  Position:  Location:    []  Vasopneumatic Device Pressure:       [] lo [] med [] hi   Temperature: [] lo [] med [] hi   [x] Skin assessment post-treatment:  [x]intact []redness- no adverse reaction     15 minutes of manual therapy for STM and TPR to the posterior right shoulder in the triceps, teres major/minor and lats to improve ease of OH reaching for ADLs like showering and reaching in cabinets    32 minutes of therapeutic exercise to improve strength of the right shoulder for ease of reaching OH and to return to golfing      With   [] TE   [] TA   [] neuro   [] other: Patient Education: [x] Review HEP    [] Progressed/Changed HEP based on:   [] positioning   [] body mechanics   [] transfers   [] heat/ice application    [] other:      Other Objective/Functional Measures:   AAROM flexion with overpressure post manual 122 degrees flexion  Pt given GTB for HEP for rows  Educated on working on stretching in supine at home to prevent UT hiking  Improved activation in supine with better chest engagement to prevent anterior shoulder pain  Educated on bicep stretch to address insertional pain       Pain Level (0-10 scale) post treatment: 0/10    ASSESSMENT/Changes in Function: Pt continues to progress slowly with improving ROM of the right shoulder due to hypertonicity of the shoulder extensor group. She has improved awareness of correct activation of shoulder flexors for elevation to prevent anterior shoulder pain and UT hiking. Will continue to progress mobility and strength for ease of showering and reaching into cabinets. Progress towards goals / Updated goals:  1. Pt will increase FOTO score by 27 pts to improve shoulder function.    2. Pt will increase right shoulder AROM >150 deg flexion/scaption to ease with progress toward ADLs  Not met: PROM flex: 90 degrees (9/16/19)  Progressing AAROM 115 post manual (9/19/19)  AAROM post manual 122 degrees (9/25/19)   3. Pt will have full passive flexion /scaption to [facility] to ease with progression of shoulder mobility per protocol.    4. Pt will demonstrate right shoulder strength to 4/5 or greater to return to [facility].     PLAN  [x]  Upgrade activities as tolerated     [x]  Continue plan of care  []  Update interventions per flow sheet       []  Discharge due to:_  []  Other:_      Julisa Ellington, DAREK 9/25/2019  10:01 AM    Future Appointments   Date Time Provider Heri Beal   9/25/2019 10:00 AM Butler Holstein, PTA [facility]   9/30/2019  3:00 PM Butler Holstein, PTA [facility]   10/1/2019  9:20 AM Momo Feldman MD [place] [id number]   10/2/2019 10:00 AM Butler Holstein, PTA [facility]   10/4/2019 11:00 AM Butler Holstein, PTA [facility]   10/7/2019  8:00 AM Betty Baires, PT [facility]   10/9/2019 10:00 AM Butler Holstein, PTA [facility]   10/11/2019 10:00 AM Butler Holstein, PTA [facility]   10/14/2019  8:00 AM Betty Baires, PT [facility]   10/16/2019 10:00 AM Butler Holstein, PTA [facility]   10/18/2019 10:30 AM Butler Holstein, PTA [facility]   10/21/2019 11:30 AM Betty Baires, PT [facility]   10/23/2019 12:00 PM Betty Baires, PT [facility]   10/25/2019 12:00 PM Betty Baires, PT [facility]   10/28/2019 11:00 AM Betty Baires, PT [facility]   10/29/2019 10:00 AM Butler Holstein, PTA [facility]   11/4/2019 10:00 AM Butler Holstein, PTA [facility]   1/16/2020  9:30 AM [facility] FAST TRACK NURSE [facility]

## 2019-09-27 ENCOUNTER — APPOINTMENT (OUTPATIENT)
Dept: PHYSICAL THERAPY | Age: 79
End: 2019-09-27
Payer: MEDICARE

## 2019-09-30 ENCOUNTER — HOSPITAL ENCOUNTER (OUTPATIENT)
Dept: PHYSICAL THERAPY | Age: 79
Discharge: HOME | End: 2019-09-30
Payer: MEDICARE

## 2019-09-30 PROCEDURE — 97112 NEUROMUSCULAR REEDUCATION: CPT

## 2019-09-30 PROCEDURE — 97110 THERAPEUTIC EXERCISES: CPT

## 2019-09-30 PROCEDURE — 97016 VASOPNEUMATIC DEVICE THERAPY: CPT

## 2019-09-30 NOTE — PROGRESS NOTES
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    Min Type Additional Details    [] Estim:  []Unatt       []IFC  []Premod                        []Other:  []w/ice   []w/heat  Position:  Location:    [] Estim: []Att    []TENS instruct  []NMES                    []Other:  []w/US   []w/ice   []w/heat  Position:  Location:    []  Traction: [] Cervical       []Lumbar                       [] Prone          []Supine                       []Intermittent   []Continuous Lbs:  [] before manual  [] after manual    []  Ultrasound: []Continuous   [] Pulsed                           []1MHz   []3MHz W/cm2:  Location:    []  Iontophoresis with dexamethasone         Location: [] Take home patch   [] In clinic    []  Ice     []  heat  []  Ice massage  []  Laser   []  Anodyne Position:  Location:    []  Laser with stim  []  Other:  Position:  Location:   15 [x]  Vasopneumatic Device Pressure:       [x] lo [] med [] hi   Temperature: [x] lo [] med [] hi   [x] Skin assessment post-treatment:  [x]intact []redness- no adverse reaction     15 minutes of neuromuscular re-education to improve right pectoral/deltoid/bicep activation over UT compensation for ease of reaching contralateral shoulder and posterior head for ADLs like bathing/dressing    15 minutes of therapeutic exercise to improve strength of the right shoulder for ease of reaching OH and to return to golfing      With   [] TE   [] TA   [] neuro   [] other: Patient Education: [x] Review HEP    [] Progressed/Changed HEP based on:   [] positioning   [] body mechanics   [] transfers   [] heat/ice application    [] other:      Other Objective/Functional Measures:   Continues to compensate with UT for shoulder elevation causing anterior pain  Challenged with PNF activation to improve use of right UE  Added rows/extension to help with posture to prevent forward shoulder  Pt appears upset with progress in therapy as she continues to struggle with shoulder elevation     Pain Level (0-10 scale) post treatment: 0/10    ASSESSMENT/Changes in Function: Pt making slow progress at improving right UE strength. She continues to have UT compensation with shoulder elevation causing anterior shoulder pain. Will continue progressing correct muscle recruitment for strengthening and improved scapulohumeral rhythm for ease of performing ADLs. Progress towards goals / Updated goals:  1. Pt will increase FOTO score by 27 pts to improve shoulder function.    2. Pt will increase right shoulder AROM >150 deg flexion/scaption to ease with progress toward ADLs  Not met: PROM flex: 90 degrees (9/16/19)  Progressing AAROM 115 post manual (9/19/19)  AAROM post manual 122 degrees (9/25/19)   3. Pt will have full passive flexion /scaption to [facility] to ease with progression of shoulder mobility per protocol. 4. Pt will demonstrate right shoulder strength to 4/5 or greater to return to PLOF.     PLAN  [x]  Upgrade activities as tolerated     [x]  Continue plan of care  []  Update interventions per flow sheet       []  Discharge due to:_  []  Other:_      Ramila Juárez, PTA 9/30/2019  10:01 AM    Future Appointments   Date Time Provider Heri Beal   10/1/2019  9:20 AM Melina Lemon MD [address]   10/2/2019 10:00 AM Cyn Puckett, PTA [facility]   10/4/2019 11:00 AM Cyn Puckett, PTA [facility]   10/7/2019  8:00 AM Adelaida Hays, PT [facility]   10/9/2019 10:00 AM Cyn Puckett, PTA [facility]   10/11/2019 10:00 AM Cyn Puckett, PTA [facility]   10/14/2019  8:00 AM Adelaida Hays, PT [facility]   10/16/2019 10:00 AM Cyn Puckett, PTA [facility]   10/18/2019 10:30 AM Cyn Puckett, PTA [facility]   10/21/2019 11:30 AM Adelaida Hays, PT [facility]   10/23/2019 12:00 PM Adelaida Hays, PT [facility]   10/25/2019 12:00 PM Adelaida Hays, PT [facility]   10/28/2019 11:00 AM Adelaida Hays, PT [facility]   10/29/2019 10:00 AM Cyn Puckett, PTA [facility]   11/4/2019 10:00 AM Cyn Puckett, PTA [facility]   1/16/2020  9:30 AM [facility] FAST TRACK NURSE [facility]

## 2019-10-01 ENCOUNTER — OFFICE VISIT (OUTPATIENT)
Dept: ORTHOPEDIC SURGERY | Age: 79
End: 2019-10-01

## 2019-10-01 VITALS
BODY MASS INDEX: 20.79 KG/M2 | TEMPERATURE: 97.2 F | OXYGEN SATURATION: 97 % | DIASTOLIC BLOOD PRESSURE: 61 MMHG | HEIGHT: 65 IN | HEART RATE: 65 BPM | SYSTOLIC BLOOD PRESSURE: 105 MMHG | WEIGHT: 124.8 LBS | RESPIRATION RATE: 14 BRPM

## 2019-10-01 DIAGNOSIS — M12.811 ROTATOR CUFF ARTHROPATHY, RIGHT: Primary | ICD-10-CM

## 2019-10-01 NOTE — PROGRESS NOTES
1. Have you been to the ER, urgent care clinic since your last visit? Hospitalized since your last visit? No    2. Have you seen or consulted any other health care providers outside of the [address] [place] [address] since your last visit? Include any pap smears or colon screening.  No

## 2019-10-01 NOTE — PROGRESS NOTES
Nettie Wardacometkitty  1940     HISTORY OF PRESENT ILLNESS  Voilet Connor is a 66 y.o. female who presents today for evaluation s/p Right reverse total shoulder arthroplasty on 7/31/19. Describes pain as a 3/10 today. She has been attending PT for her shoulder. Pt has improved ROM with the shoulder today. Pt notes concerns about how long it has taken to see an improvement in her ROM. Pt still notes trouble with raising her arm and trying to wash her hair. Pt does report an overall improvement since last month. Patient denies any fever, chills, chest pain, shortness of breath or calf pain. The remainder of the review of systems is negative. There are no new illness or injuries to report since last seen in the office. No changes in medications, allergies, social or family history. Pain Assessment  10/1/2019   Location of Pain Shoulder   Location Modifiers Right   Severity of Pain 3   Quality of Pain Aching   Quality of Pain Comment -   Duration of Pain A few hours   Duration of Pain Comment -   Frequency of Pain Intermittent   Aggravating Factors Stretching;Bending   Aggravating Factors Comment -   Limiting Behavior -   Relieving Factors Nothing   Relieving Factors Comment -   Result of Injury No       PHYSICAL EXAM:   Visit Vitals  /61   Pulse 65   Temp 97.2 °F (36.2 °C) (Oral)   Resp 14   Ht 5' 5\" (1.651 m)   Wt 124 lb 12.8 oz (56.6 kg)   LMP  (LMP Unknown)   SpO2 97%   BMI 20.77 kg/m²      The patient is a well-developed, well-nourished female in no acute distress. The patient is alert and oriented times three. The patient appears to be well groomed.  Mood and affect are normal.  ORTHOPEDIC EXAM of right shoulder:  Inspection: swelling present,  Bruising present, swelling to right hand and fingers - with some improvement  Incision well healed  Passive glenohumeral abduction 0-45 degrees, able to make active passive fist  Stability: Stable  Strength: n/a  2+ distal pulses  Pitting edema in the forearm and hand much improved  Doppler RUE: no evidence of DVT    IMPRESSION:  S/P Right reverse total shoulder arthroplasty    PLAN:   1. Patient pain and ROM has improved. 2. Will continue with PT.         RTC 4 weeks    Scribed by Jonny Aceves [address]) as dictated by Genesis Peña MD    I, Dr. Genesis Peña, confirm that all documentation is accurate.     Genesis Peña M.D.   [facility]

## 2019-10-02 ENCOUNTER — HOSPITAL ENCOUNTER (OUTPATIENT)
Dept: PHYSICAL THERAPY | Age: 79
Discharge: HOME | End: 2019-10-02
Payer: MEDICARE

## 2019-10-02 PROCEDURE — 97110 THERAPEUTIC EXERCISES: CPT

## 2019-10-02 PROCEDURE — 97140 MANUAL THERAPY 1/> REGIONS: CPT

## 2019-10-02 NOTE — PROGRESS NOTES
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 She isn't doing much at home to strengthen but is watching her posture. OBJECTIVE    Modality rationale: decrease edema and decrease inflammation to improve the patients ability to return to normal activities.    Min Type Additional Details    [] Estim:  []Unatt       []IFC  []Premod                        []Other:  []w/ice   []w/heat  Position:  Location:    [] Estim: []Att    []TENS instruct  []NMES                    []Other:  []w/US   []w/ice   []w/heat  Position:  Location:    []  Traction: [] Cervical       []Lumbar                       [] Prone          []Supine                       []Intermittent   []Continuous Lbs:  [] before manual  [] after manual    []  Ultrasound: []Continuous   [] Pulsed                           []1MHz   []3MHz W/cm2:  Location:    []  Iontophoresis with dexamethasone         Location: [] Take home patch   [] In clinic   10 []  Ice     [x]  heat  []  Ice massage  []  Laser   []  Anodyne Position:seated  Location: right shoulder    []  Laser with stim  []  Other:  Position:  Location:    []  Vasopneumatic Device Pressure:       [] lo [] med [] hi   Temperature: [] lo [] med [] hi [x] Skin assessment post-treatment:  [x]intact []redness- no adverse reaction     15 minutes of manual for PROM with oscillations and TPR to the posterior shoulder girdle for ease of OH reaching    40 minutes of therapeutic exercise to improve strength of the right shoulder for ease of reaching OH and to return to golfing      With   [] TE   [] TA   [] neuro   [] other: Patient Education: [x] Review HEP    [] Progressed/Changed HEP based on:   [] positioning   [] body mechanics   [] transfers   [] heat/ice application    [] other:      Other Objective/Functional Measures:   UT compensation with shoulder elevation  Worked on eccentric lowering  Worked on overpressure stretching  Educated to work on table slides at home and rows  Added chest flies to also work on pectoral strength     Pain Level (0-10 scale) post treatment: 4/10    ASSESSMENT/Changes in Function: Pt making slow progress at improving right UE strength. She continues to have UT compensation with shoulder elevation causing anterior shoulder pain. She has most difficulty with reaching salt/pepper and washing her hair Will continue progressing correct muscle recruitment for strengthening and improved scapulohumeral rhythm for ease of performing ADLs. Progress towards goals / Updated goals:  1. Pt will increase FOTO score by 27 pts to improve shoulder function.    2. Pt will increase right shoulder AROM >150 deg flexion/scaption to ease with progress toward ADLs  Not met: PROM flex: 90 degrees (9/16/19)  Progressing AAROM 115 post manual (9/19/19)  AAROM post manual 122 degrees (9/25/19)   3. Pt will have full passive flexion /scaption to [facility] to ease with progression of shoulder mobility per protocol. Not met  4. Pt will demonstrate right shoulder strength to 4/5 or greater to return to PLOF.     PLAN  [x]  Upgrade activities as tolerated     [x]  Continue plan of care  []  Update interventions per flow sheet       []  Discharge due to:_  [] Other:_      Reji Whitley, PTA 10/2/2019  10:01 AM    Future Appointments   Date Time Provider Heri Beal   10/2/2019 10:00 AM Jina Farmington, PTA [facility] [address]   10/4/2019 11:00 AM Jina Farmington, PTA [facility] [address]   10/7/2019  8:00 AM Judithe Batters, PT [facility] [address]   10/9/2019 10:00 AM Jina Farmington, PTA [facility] [address]   10/11/2019 10:00 AM [place], PTA [facility] [address]   10/14/2019  8:00 AM Judithe Batters, PT [facility] [address]   10/16/2019 10:00 AM [place], PTA [facility] [address]   10/18/2019 10:30 AM [place], PTA [facility] [address]   10/21/2019 11:30 AM Judithe Batters, PT [facility] [address]   10/23/2019 12:00 PM Judithe Batters, PT [facility] [address]   10/25/2019 12:00 PM Judithe Batters, PT [facility] [address]   10/28/2019 11:00 AM Judithe Batters, PT [facility] [address]   10/29/2019  9:20 [facility] [address]   10/29/2019 10:00 AM Jina Farmington, PTA [facility] [address]   11/4/2019 10:00 AM Jina Farmington, PTA [facility] [address]   1/16/2020  9:30 AM [facility] FAST TRACK NURSE [facility]

## 2019-10-04 ENCOUNTER — HOSPITAL ENCOUNTER (OUTPATIENT)
Dept: PHYSICAL THERAPY | Age: 79
Discharge: HOME | End: 2019-10-04
Payer: MEDICARE

## 2019-10-04 PROCEDURE — 97110 THERAPEUTIC EXERCISES: CPT

## 2019-10-04 PROCEDURE — 97140 MANUAL THERAPY 1/> REGIONS: CPT

## 2019-10-04 NOTE — PROGRESS NOTES
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 She notes little improvements in her right arm like ability to put deodorant on her left arm. She still is using the ability to reach salt/pepper as her goal.      OBJECTIVE    Modality rationale: decrease edema and decrease inflammation to improve the patients ability to return to normal activities.    Min Type Additional Details    [] Estim:  []Unatt       []IFC  []Premod                        []Other:  []w/ice   []w/heat  Position:  Location:    [] Estim: []Att    []TENS instruct  []NMES                    []Other:  []w/US   []w/ice   []w/heat  Position:  Location:    []  Traction: [] Cervical       []Lumbar                       [] Prone          []Supine                       []Intermittent   []Continuous Lbs:  [] before manual  [] after manual    []  Ultrasound: []Continuous   [] Pulsed                           []1MHz   []3MHz W/cm2:  Location:    []  Iontophoresis with dexamethasone         Location: [] Take home patch   [] In clinic   10 []  Ice     [x]  heat  []  Ice massage  []  Laser   []  Anodyne Position:seated  Location: right shoulder    []  Laser with stim  []  Other:  Position:  Location:    []  Vasopneumatic Device Pressure:       [] lo [] med [] hi   Temperature: [] lo [] med [] hi   [x] Skin assessment post-treatment:  [x]intact []redness- no adverse reaction     15 minutes of manual for PROM with oscillations and TPR to the posterior shoulder girdle/right levator scap for ease of OH reaching    24 minutes of therapeutic exercise to improve strength of the right shoulder for ease of reaching OH and to return to golfing      With   [] TE   [] TA   [] neuro   [] other: Patient Education: [x] Review HEP    [] Progressed/Changed HEP based on:   [] positioning   [] body mechanics   [] transfers   [] heat/ice application    [] other:      Other Objective/Functional Measures:   PROM flexion 119 degrees  AAROM flexion inclined table 115 degrees  AROM flexion inclined table: 85 degrees with UT compensation  Educated to continue working on table slides at home  Limited by tricep hypertonicity  TP in right levator scap  Improving scap upward rotation  Continues with forward shoulder posture  TPs in right pectorals     Pain Level (0-10 scale) post treatment: 0/10    ASSESSMENT/Changes in Function: Pt making slow, steady progress with improving PROM and AROM. She continues to have UT compensation from weakness and poor posture. Will continue to work on improving mobility of the right shoulder and correct scapulohumeral rhythm for ease of reaching into her cabinets and washing/styling her hair. Progress towards goals / Updated goals:  1. Pt will increase FOTO score by 27 pts to improve shoulder function.    2. Pt will increase right shoulder AROM >150 deg flexion/scaption to ease with progress toward ADLs  Not met: PROM flex: 90 degrees (9/16/19)  Progressing AAROM 115 post manual (9/19/19)  AAROM post manual 122 degrees (9/25/19)   3. Pt will have full passive flexion /scaption to [facility] to ease with progression of shoulder mobility per protocol. Not met  4.  Pt will demonstrate right shoulder strength to 4/5 or greater to return to PLOF.     PLAN  [x]  Upgrade activities as tolerated     [x]  Continue plan of care  []  Update interventions per flow sheet       []  Discharge due to:_  []  Other:_      Colt Ni, DAREK 10/4/2019  10:01 AM    Future Appointments   Date Time Provider Heri Beal   10/4/2019 11:00 AM Negro Diana PTA [facility]   10/7/2019  8:00 AM Sary Case, PT [facility]   10/9/2019 10:00 AM Negro Diana PTA [facility]   10/11/2019 10:00 AM Negro Diana PTA [facility]   10/14/2019  8:00 AM Sary Case, PT [facility]   10/16/2019 10:00 AM Negro Diana PTA [facility]   10/18/2019 10:30 AM Negro Diana PTA [facility]   10/21/2019 11:30 AM Sary Case, PT [facility]   10/23/2019 12:00 PM Sary Case, PT [facility]   10/25/2019 12:00 PM Sary Case, PT [facility]   10/28/2019 11:00 AM Sary Case, PT [facility]   10/29/2019  9:20 AM Vincent Polo MD [address]   10/29/2019 10:00 AM Negro Diana PTA [facility]   11/4/2019 10:00 AM Negro Diana PTA [facility]   1/16/2020  9:30 AM [facility] FAST TRACK NURSE [facility]

## 2019-10-07 ENCOUNTER — HOSPITAL ENCOUNTER (OUTPATIENT)
Dept: PHYSICAL THERAPY | Age: 79
Discharge: HOME | End: 2019-10-07
Payer: MEDICARE

## 2019-10-07 PROCEDURE — 97110 THERAPEUTIC EXERCISES: CPT

## 2019-10-07 PROCEDURE — 97140 MANUAL THERAPY 1/> REGIONS: CPT

## 2019-10-07 NOTE — PROGRESS NOTES
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  Reports she continues to have a lot of soreness in her arm    OBJECTIVE    Modality rationale: decrease pain and increase tissue extensibility to improve the patients ability to increase ease of reaching   Min Type Additional Details    [] Estim:  []Unatt       []IFC  []Premod                        []Other:  []w/ice   []w/heat  Position:  Location:    [] Estim: []Att    []TENS instruct  []NMES                    []Other:  []w/US   []w/ice   []w/heat  Position:  Location:    []  Traction: [] Cervical       []Lumbar                       [] Prone          []Supine                       []Intermittent   []Continuous Lbs:  [] before manual  [] after manual    []  Ultrasound: []Continuous   [] Pulsed                           []1MHz   []3MHz W/cm2:  Location:    []  Iontophoresis with dexamethasone         Location: [] Take home patch   [] In clinic   15 []  Ice     [x]  heat  []  Ice massage  []  Laser   []  Anodyne Position: seated  Location: right UT    []  Laser with stim  []  Other:  Position:  Location:    []  Vasopneumatic Device Pressure:       [] lo [] med [] hi   Temperature: [] lo [] med [] hi   [] Skin assessment post-treatment:  []intact []redness- no adverse reaction    []redness - adverse reaction:     30 min Therapeutic Exercise:  [x] See flow sheet :   Rationale: increase ROM and increase strength to improve the patients ability to increase ease of ADLs      10 min Manual Therapy:  Trigger point release infraspinatus, UT, and deltoid   Rationale: decrease pain, increase ROM and increase tissue extensibility to increase ease of ADLs          With   [x] TE   [] TA   [] neuro   [] other: Patient Education: [x] Review HEP    [] Progressed/Changed HEP based on:   [] positioning   [] body mechanics   [] transfers   [] heat/ice application    [] other:      Other Objective/Functional Measures:   Pt. Was able to perform some reps of supine and side lying AROM without assistance today  She was challenged with eric exercises today  Pt. Continues to have shrugging with AROM in standing position    Pain Level (0-10 scale) post treatment: 0/10    ASSESSMENT/Changes in Function:  Pt. Is progressing slowly towards goals. She continues to have limited AROM mobility secondary to decreased strength. She demonstrates improving mobility and strength during supine exercises    Patient will continue to benefit from skilled PT services to modify and progress therapeutic interventions, address functional mobility deficits, address ROM deficits, address strength deficits, analyze and address soft tissue restrictions, analyze and cue movement patterns, analyze and modify body mechanics/ergonomics and assess and modify postural abnormalities to attain remaining goals. Progress towards goals / Updated goals:  1. Pt will increase FOTO score by 27 pts to improve shoulder function.    2. Pt will increase right shoulder AROM >150 deg flexion/scaption to ease with progress toward ADLs  Not met: PROM flex: 90 degrees (9/16/19)  Progressing AAROM 115 post manual (9/19/19)  AAROM post manual 122 degrees (9/25/19)   3. Pt will have full passive flexion /scaption to [facility] to ease with progression of shoulder mobility per protocol. Not met  4. Pt will demonstrate right shoulder strength to 4/5 or greater to return to OF.   Not met: right shoulder flex/abd: 2+/5 (10/7/19)    PLAN  []  Upgrade activities as tolerated     [x]  Continue plan of care  []  Update interventions per flow sheet       []  Discharge due to:_  []  Other:_      Paula Dobbins, PT 10/7/2019  7:40 AM    Future Appointments   Date Time Provider Heri Beal   10/7/2019  8:00 AM Jose Carlos Howell, PT [facility]   10/9/2019 10:00 AM Rosary Rom, PTA [facility]   10/11/2019 10:00 AM Rosary Rom, PTA [facility]   10/14/2019  8:00 AM Jose Carlos Howell, PT [facility]   10/16/2019 10:00 AM Rosary Rom, PTA [facility]   10/18/2019 10:30 AM Rosary Rom, PTA [facility]   10/21/2019 11:30 AM Jose Carlos Howell, PT [facility]   10/23/2019 12:00 PM Jose Carlos Howell, PT [facility]   10/25/2019 12:00 PM Jose Carlos Howell, PT [facility]   10/28/2019 11:00 AM Jose Carlos Howell, PT [facility]   10/29/2019  9:20 AM Albertina Beverly MD [facility]   10/29/2019 10:00 AM Rosary Rom, PTA [facility]   11/4/2019 10:00 AM Rosary Rom, PTA [facility]   1/16/2020  9:30 AM [facility] FAST TRACK NURSE [facility]

## 2019-10-09 ENCOUNTER — HOSPITAL ENCOUNTER (OUTPATIENT)
Dept: PHYSICAL THERAPY | Age: 79
Discharge: HOME | End: 2019-10-09
Payer: MEDICARE

## 2019-10-09 PROCEDURE — 97110 THERAPEUTIC EXERCISES: CPT

## 2019-10-09 PROCEDURE — 97140 MANUAL THERAPY 1/> REGIONS: CPT

## 2019-10-09 NOTE — PROGRESS NOTES
PT DAILY TREATMENT NOTE 10-18    Patient Name: Michael 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 She still can't reach her salt and pepper shakers but she can get deodorant on now.        OBJECTIVE    Modality rationale: decrease pain and increase tissue extensibility to improve the patients ability to increase ease of reaching   Min Type Additional Details    [] Estim:  []Unatt       []IFC  []Premod                        []Other:  []w/ice   []w/heat  Position:  Location:    [] Estim: []Att    []TENS instruct  []NMES                    []Other:  []w/US   []w/ice   []w/heat  Position:  Location:    []  Traction: [] Cervical       []Lumbar                       [] Prone          []Supine                       []Intermittent   []Continuous Lbs:  [] before manual  [] after manual    []  Ultrasound: []Continuous   [] Pulsed                           []1MHz   []3MHz W/cm2:  Location:    []  Iontophoresis with dexamethasone         Location: [] Take home patch   [] In clinic   15 []  Ice     [x]  heat  []  Ice massage  []  Laser   []  Anodyne Position: seated  Location: right UT    []  Laser with stim  []  Other:  Position:  Location:    []  Vasopneumatic Device Pressure:       [] lo [] med [] hi   Temperature: [] lo [] med [] hi   [] Skin assessment post-treatment:  []intact []redness- no adverse reaction    []redness - adverse reaction:     15 min Therapeutic Exercise:  [x] See flow sheet :   Rationale: increase ROM and increase strength to improve the patients ability to increase ease of ADLs      15 min Manual Therapy:  Stick roll to teres major/lats/triceps; PROM with oscillations and gentle overpressure; TPR to triceps; MET for posterior capsule   Rationale: decrease pain, increase ROM and increase tissue extensibility to increase ease of ADLs          With   [x] TE   [] TA   [] neuro   [] other: Patient Education: [x] Review HEP    [] Progressed/Changed HEP based on:   [] positioning   [] body mechanics   [] transfers   [] heat/ice application    [] other:      Other Objective/Functional Measures:   PROM flexion 130 degrees  Supine table incline AROM flexion: 110 degrees with UT compensation  Challenged with cabinet reaching to reduce UT compensation and required assistance to perform; worked on eccentric lowering with pec focus  Improved ability to perform opposite shoulder reaching post posterior capsule MET    Pain Level (0-10 scale) post treatment: 0/10    ASSESSMENT/Changes in Function:  Pt making steady progress towards goals with improving PROM but continues to struggle with decreased strength. She has poor muscle recruitment but responds well with PNF for correct activation with less UT compensation. Will continue strengthening within available range to perform ADLs like reaching into her cabinets. Progress towards goals / Updated goals:  1.  Pt will increase FOTO score by 27 pts to improve shoulder function.    2. Pt will increase right shoulder AROM >150 deg flexion/scaption to ease with progress toward ADLs  Not met: PROM flex: 90 degrees (9/16/19)  Progressing AAROM 115 post manual (9/19/19)  AAROM post manual 122 degrees (9/25/19)   3. Pt will have full passive flexion /scaption to [facility] to ease with progression of shoulder mobility per protocol. Not met  4. Pt will demonstrate right shoulder strength to 4/5 or greater to return to PLOF.   Not met: right shoulder flex/abd: 2+/5 (10/7/19)    PLAN  [x]  Upgrade activities as tolerated     [x]  Continue plan of care  []  Update interventions per flow sheet       []  Discharge due to:_  []  Other:_      Corneliomyrna Day, DAREK 10/9/2019  7:40 AM    Future Appointments   Date Time Provider Heri Beal   10/9/2019 10:00 AM Cyn Puckett, PTA [facility] [address]   10/11/2019 10:00 AM Cyn Puckett, PTA [facility] [address]   10/14/2019  8:00 AM Adelaida Lis, PT [facility] [address]   10/16/2019 10:00 AM Cyn Puckett, PTA [facility] [address]   10/18/2019 10:30 AM Cyn Puckett, PTA [facility] [address]   10/21/2019 11:30 AM Adelaida Lis, PT [facility] [address]   10/23/2019 12:00 PM Adelaida Lis, PT [facility] [address]   10/25/2019 12:00 PM Adelaida Lis, PT [facility] [address]   10/28/2019 11:00 AM Adelaida Lis, PT [facility] [address]   10/29/2019  9:20 AM Melina Lemon MD [facility]   10/29/2019 10:00 AM Cyn Puckett, PTA [facility] [address]   11/4/2019 10:00 AM Cyn Puckett, PTA [facility] [address]   1/16/2020  9:30 AM [facility] FAST TRACK NURSE [facility]

## 2019-10-11 ENCOUNTER — HOSPITAL ENCOUNTER (OUTPATIENT)
Dept: PHYSICAL THERAPY | Age: 79
Discharge: HOME | End: 2019-10-11
Payer: MEDICARE

## 2019-10-11 PROCEDURE — 97110 THERAPEUTIC EXERCISES: CPT

## 2019-10-11 PROCEDURE — 97140 MANUAL THERAPY 1/> REGIONS: CPT

## 2019-10-11 NOTE — PROGRESS NOTES
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 She notes after her last session and for a few days was the first time she didn't have any pain. She notes some neck pain today along the right side.      OBJECTIVE    Modality rationale: decrease pain and increase tissue extensibility to improve the patients ability to increase ease of reaching   Min Type Additional Details    [] Estim:  []Unatt       []IFC  []Premod                        []Other:  []w/ice   []w/heat  Position:  Location:    [] Estim: []Att    []TENS instruct  []NMES                    []Other:  []w/US   []w/ice   []w/heat  Position:  Location:    []  Traction: [] Cervical       []Lumbar                       [] Prone          []Supine                       []Intermittent   []Continuous Lbs:  [] before manual  [] after manual    []  Ultrasound: []Continuous   [] Pulsed                           []1MHz   []3MHz W/cm2:  Location:    []  Iontophoresis with dexamethasone         Location: [] Take home patch   [] In clinic   15 []  Ice     [x]  heat  []  Ice massage  []  Laser   []  Anodyne Position: seated  Location: right UT    []  Laser with stim  []  Other:  Position:  Location:    []  Vasopneumatic Device Pressure:       [] lo [] med [] hi   Temperature: [] lo [] med [] hi   [x] Skin assessment post-treatment:  [x]intact []redness- no adverse reaction    []redness - adverse reaction:     33 min Therapeutic Exercise:  [x] See flow sheet :   Rationale: increase ROM and increase strength to improve the patients ability to increase ease of ADLs      25 min Manual Therapy:  STM with doug to right levator scap and UT; stick roll to the teres major/minor and latissimus dorsi; PROM with oscillations and end range Overpressure; TPR to teres major with active release during flexion   Rationale: decrease pain, increase ROM and increase tissue extensibility to increase ease of ADLs          With   [x] TE   [] TA   [] neuro   [] other: Patient Education: [x] Review HEP    [] Progressed/Changed HEP based on:   [] positioning   [] body mechanics   [] transfers   [] heat/ice application    [] other:      Other Objective/Functional Measures:   Worked on standing flexion with forward pushing into therapist hand for correct pec activation  Increased weight for bicep curl  Pain with horizontal adduction due to UT compensation  Teres major/lats restriction flexion mobility  PROM 130 degrees flexion  Improved awareness of posture    Pain Level (0-10 scale) post treatment: 0/10    ASSESSMENT/Changes in Function:  Pt continues to progress with improving PROM and AROM. She continues to have UT compensation but has improved awareness of posture. She has pec activaiton with tactile cueing but is challenged with actively recruiting the muscle for elevation. Will continue working to improve right shoulder mobility and strength for ease of styling her hair and reaching into her cabinets. Progress towards goals / Updated goals:  1.  Pt will increase FOTO score by 27 pts to improve shoulder function.    2. Pt will increase right shoulder AROM >150 deg flexion/scaption to ease with progress toward ADLs  Not met: PROM flex: 90 degrees (9/16/19)  Progressing AAROM 115 post manual (9/19/19)  AAROM post manual 122 degrees (9/25/19)   3. Pt will have full passive flexion /scaption to [facility] to ease with progression of shoulder mobility per protocol. Not met  4. Pt will demonstrate right shoulder strength to 4/5 or greater to return to OF.   Not met: right shoulder flex/abd: 2+/5 (10/7/19)    PLAN  [x]  Upgrade activities as tolerated     [x]  Continue plan of care  []  Update interventions per flow sheet       []  Discharge due to:_  []  Other:_      Do Other, PTA 10/11/2019  7:40 AM    Future Appointments   Date Time Provider Heri Beal   10/11/2019 10:00 AM Howie Margie, PTA [facility]   10/14/2019  8:00 AM Arcelia Blunt PT [facility]   10/16/2019 10:00 AM Howie Margie, PTA [facility]   10/18/2019 10:30 AM Laurel Margie, PTA [facility]   10/21/2019 11:30 AM Arcelia Blunt PT [facility]   10/23/2019 12:00 PM Howie Margie, PTA [facility]   10/25/2019 12:00 PM Arcelia Blunt PT [facility]   10/28/2019 11:00 AM Arcelia Blunt PT [facility]   10/29/2019  9:20 AM Ady Becker MD [facility]   10/29/2019 10:00 AM Laurel Margie, PTA [facility]   11/4/2019 10:00 AM Laurel Margie, PTA [facility]   1/16/2020  9:30 AM [facility] FAST TRACK NURSE [facility]

## 2019-10-14 ENCOUNTER — HOSPITAL ENCOUNTER (OUTPATIENT)
Dept: PHYSICAL THERAPY | Age: 79
Discharge: HOME | End: 2019-10-14
Payer: MEDICARE

## 2019-10-14 PROCEDURE — 97140 MANUAL THERAPY 1/> REGIONS: CPT

## 2019-10-14 PROCEDURE — 97110 THERAPEUTIC EXERCISES: CPT

## 2019-10-14 NOTE — PROGRESS NOTES
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   Prior Level of Function: Right Hand Dominant. Previous DVT. Lives with .     Visits from Start of Care: 22    Missed Visits: 0    Reporting Period: 9/3/19 to 10/4/19    Subjective Reports: pt. Reports she continues to have difficulty reaching to her head and into cabinets at home. Current Status/ treatment goals Objective measures   1. Pt will increase FOTO score by 27 pts to improve shoulder function.    [] met                 [x] not met  [] progressing  last note: 38    Current: 38   2. Pt will increase right shoulder AROM >150 deg flexion/scaption to ease with progress toward ADLs   [] met                 [] not met  [x] progressing Last note: unable    Current: 85 degrees in inclined position   3.  Pt will have full passive flexion /scaption to [facility] to ease with progression of shoulder mobility per protocol. [] met                 [] not met  [x] progressing Last note: decreased mobility     Current: AAROM flex: 122 degrees   4. Pt will demonstrate right shoulder strength to 4/5 or greater to return to PLOF.    [] met                 [x] not met  [] progressing Last note: poor shoulder strength    Current: poor shoulder strength      Key functional changes: improving PROM and AAROM mobility       Problems/ barriers to goal attainment: none Assessment / Recommendations: pt. Is progressing slowly towards goals. She demonstrates improving PROM and AAROM mobility. She continues to be most limited by decreased shoulder strength. AROM is improving slowly but continues to be limited against gravity. She also has right shoulder hiking during AROM to compensate for her weakness. Skilled PT is medically necessary in order to continue to improve right shoulder strength and mobility for increased ease of ADLs and improved independence at home. Problem List: pain affecting function, decrease ROM, decrease strength, edema affecting function, impaired gait/ balance, decrease ADL/ functional abilitiies, decrease activity tolerance and decrease flexibility/ joint mobility   Treatment Plan: Therapeutic exercise, Therapeutic activities, Neuromuscular re-education, Physical agent/modality, Gait/balance training, Manual therapy, Patient education, Self Care training and Functional mobility training    Patient Goal (s) has been updated and includes: to move arm better     Updated Goals to be accomplished in 10 treatments:  1. Patient will improve FOTO score by 27 points in order to demonstrate a significant improvement in function. 2. Patient will improve right shoulder AROM flex/abd to 90 degrees in standing in order to increase ease of reaching into cabinets at home. 3. Patient will improve right shoulder strength to 4-/5 in order to increase ease of household chores.      Frequency / Duration: Patient to be seen 2 times per week for 10 treatments       Randell Abreu PT 10/14/2019 6:38 PM

## 2019-10-14 NOTE — PROGRESS NOTES
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 Reports she isn't doing too bad today.      OBJECTIVE    Modality rationale: decrease pain and increase tissue extensibility to improve the patients ability to increase ease of reaching   Min Type Additional Details    [] Estim:  []Unatt       []IFC  []Premod                        []Other:  []w/ice   []w/heat  Position:  Location:    [] Estim: []Att    []TENS instruct  []NMES                    []Other:  []w/US   []w/ice   []w/heat  Position:  Location:    []  Traction: [] Cervical       []Lumbar                       [] Prone          []Supine                       []Intermittent   []Continuous Lbs:  [] before manual  [] after manual    []  Ultrasound: []Continuous   [] Pulsed                           []1MHz   []3MHz W/cm2:  Location:    []  Iontophoresis with dexamethasone         Location: [] Take home patch   [] In clinic   15 []  Ice     [x]  heat  []  Ice massage  []  Laser   []  Anodyne Position: seated  Location: right UT    []  Laser with stim  []  Other:  Position:  Location:    []  Vasopneumatic Device Pressure:       [] lo [] med [] hi   Temperature: [] lo [] med [] hi   [x] Skin assessment post-treatment:  [x]intact []redness- no adverse reaction    []redness - adverse reaction:     23 min Therapeutic Exercise:  [x] See flow sheet :   Rationale: increase ROM and increase strength to improve the patients ability to increase ease of ADLs    10 min Manual Therapy:  Trigger point release to right UT, infraspinatus,    Rationale: decrease pain, increase ROM and increase tissue extensibility to increase ease of reaching          With   [x] TE   [] TA   [] neuro   [] other: Patient Education: [x] Review HEP    [] Progressed/Changed HEP based on:   [] positioning   [] body mechanics   [] transfers   [] heat/ice application    [] other:      Other Objective/Functional Measures:   Right shoulder AROM in supine ~45 degrees  Required min A for reaching into low cabinet  She tolerated PT well with no reports of increased pain    Pain Level (0-10 scale) post treatment:  2/10    ASSESSMENT/Changes in Function:  Pt. Is progressing slowly towards goals. She continues to have decreased right shoulder strength which is limiting her mobility. She continues to have difficulty reaching her head and into cabinets at home. Patient will continue to benefit from skilled PT services to modify and progress therapeutic interventions, address functional mobility deficits, address ROM deficits, address strength deficits, analyze and address soft tissue restrictions, analyze and cue movement patterns, analyze and modify body mechanics/ergonomics and assess and modify postural abnormalities to attain remaining goals. Progress towards goals / Updated goals:  1. Pt will increase FOTO score by 27 pts to improve shoulder function.    2. Pt will increase right shoulder AROM >150 deg flexion/scaption to ease with progress toward ADLs  Not met: in supine ~45 degrees (10/14/19)   3. Pt will have full passive flexion /scaption to [facility] to ease with progression of shoulder mobility per protocol. Not met  4. Pt will demonstrate right shoulder strength to 4/5 or greater to return to PLOF.   Not met: right shoulder flex/abd: 2+/5 (10/7/19)    PLAN  []  Upgrade activities as tolerated     [x]  Continue plan of care  []  Update interventions per flow sheet        []  Discharge due to:_  []  Other:_      Gely Vasquez, PT 10/14/2019  7:39 AM    Future Appointments   Date Time Provider Heri Beal   10/14/2019  8:00 AM Rekha Fuentes, PT [facility]   10/16/2019 10:00 AM Chad Thompson, PTA [facility]   10/18/2019 10:30 AM Paulofortino Thompson, PTA [facility]   10/21/2019 11:30 AM Rekha Fuentes, PT [facility]   10/23/2019 12:00 PM Paulofortino Thompson, PTA [facility]   10/25/2019 12:00 PM Rekha Fuentes, PT [facility]   10/28/2019 11:00 AM Rekha Fuentes, PT [facility]   10/29/2019  9:20 AM Norman Arreola MD [facility]   10/29/2019 10:00 AM Chad Thompson, PTA [facility]   11/4/2019 10:00 AM Chad Thompson, PTA [facility]   1/16/2020  9:30 AM [facility] FAST TRACK NURSE [facility]

## 2019-10-16 ENCOUNTER — HOSPITAL ENCOUNTER (OUTPATIENT)
Dept: PHYSICAL THERAPY | Age: 79
Discharge: HOME | End: 2019-10-16
Payer: MEDICARE

## 2019-10-16 PROCEDURE — 97140 MANUAL THERAPY 1/> REGIONS: CPT

## 2019-10-16 PROCEDURE — 97110 THERAPEUTIC EXERCISES: CPT

## 2019-10-16 NOTE — PROGRESS NOTES
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 She feels she can lift her arm higher laying down but isn't sure if that caused more neck tightness.        OBJECTIVE    Modality rationale: decrease pain and increase tissue extensibility to improve the patients ability to increase ease of reaching   Min Type Additional Details    [] Estim:  []Unatt       []IFC  []Premod                        []Other:  []w/ice   []w/heat  Position:  Location:    [] Estim: []Att    []TENS instruct  []NMES                    []Other:  []w/US   []w/ice   []w/heat  Position:  Location:    []  Traction: [] Cervical       []Lumbar                       [] Prone          []Supine                       []Intermittent   []Continuous Lbs:  [] before manual  [] after manual    []  Ultrasound: []Continuous   [] Pulsed                           []1MHz   []3MHz W/cm2:  Location:    []  Iontophoresis with dexamethasone         Location: [] Take home patch   [] In clinic   15 []  Ice     [x]  heat  []  Ice massage  []  Laser   []  Anodyne Position: seated  Location: right UT    []  Laser with stim  []  Other:  Position:  Location:    []  Vasopneumatic Device Pressure:       [] lo [] med [] hi   Temperature: [] lo [] med [] hi   [x] Skin assessment post-treatment:  [x]intact []redness- no adverse reaction    []redness - adverse reaction:     25 min Therapeutic Exercise:  [x] See flow sheet :   Rationale: increase ROM and increase strength to improve the patients ability to increase ease of ADLs    10 min Manual Therapy:  Trigger point release to right UT, levator scap; KT to inhibit right levator scapula   Rationale: decrease pain, increase ROM and increase tissue extensibility to increase ease of reaching          With   [x] TE   [] TA   [] neuro   [] other: Patient Education: [x] Review HEP    [] Progressed/Changed HEP based on:   [] positioning   [] body mechanics   [] transfers   [] heat/ice application    [] other:      Other Objective/Functional Measures:   UT and levator compensation with supine end range flexion due to weakness  Educated on tennis ball release in supine to the neck musculature  Instructed in using left UE to lift right and then perform eccentric lowering for strength  Challenged with supine chest flies due to weakness  Cues throughout session to prevent shoulder hiking    Pain Level (0-10 scale) post treatment: 0/10      ASSESSMENT/Changes in Function: Pt making slow progress due to habitual compensation of using UT for shoulder elevation. She continues to have right sided neck pain from the compensations. Will continue progressing correct mechanics for shoulder elevation and improve mobility for ease of [place] reaching into cabinets with decreased pain. Progress towards goals / Updated goals:  1. Pt will increase FOTO score by 27 pts to improve shoulder function.    2. Pt will increase right shoulder AROM >150 deg flexion/scaption to ease with progress toward ADLs  Not met: in supine ~45 degrees (10/14/19)   3. Pt will have full passive flexion /scaption to [facility] to ease with progression of shoulder mobility per protocol. Not met  4.  Pt will demonstrate right shoulder strength to 4/5 or greater to return to PLOF.   Not met: right shoulder flex/abd: 2+/5 (10/7/19)    PLAN  [x]  Upgrade activities as tolerated     [x]  Continue plan of care  []  Update interventions per flow sheet        []  Discharge due to:_  []  Other:_      Eren RoldanDAREK 10/16/2019  7:39 AM    Future Appointments   Date Time Provider Heri Beal   10/16/2019 10:00 AM Kamini Net, PTA [facility]   10/18/2019 10:30 AM Kamini Net, PTA [facility]   10/21/2019 11:30 AM Shayne Randle, PT [facility]   10/23/2019 12:00 PM Kamini Net, PTA [facility]   10/25/2019 12:00 PM Shayne Randle, PT [facility]   10/28/2019 11:00 AM Shayne Randle, PT [facility]   10/29/2019  9:20 AM Shay Dykes MD [place] [id number]   10/29/2019 10:00 AM Kamini Net, PTA [facility]   11/4/2019 10:00 AM Kamini Net, PTA [facility]   1/16/2020  9:30 AM [facility] FAST TRACK NURSE [facility]

## 2019-10-18 ENCOUNTER — HOSPITAL ENCOUNTER (OUTPATIENT)
Dept: PHYSICAL THERAPY | Age: 79
Discharge: HOME | End: 2019-10-18
Payer: MEDICARE

## 2019-10-18 PROCEDURE — 97140 MANUAL THERAPY 1/> REGIONS: CPT

## 2019-10-18 PROCEDURE — 97110 THERAPEUTIC EXERCISES: CPT

## 2019-10-18 NOTE — PROGRESS NOTES
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 She was able to use her hair dryer today on the right.        OBJECTIVE    Modality rationale: decrease pain and increase tissue extensibility to improve the patients ability to increase ease of reaching   Min Type Additional Details    [] Estim:  []Unatt       []IFC  []Premod                        []Other:  []w/ice   []w/heat  Position:  Location:    [] Estim: []Att    []TENS instruct  []NMES                    []Other:  []w/US   []w/ice   []w/heat  Position:  Location:    []  Traction: [] Cervical       []Lumbar                       [] Prone          []Supine                       []Intermittent   []Continuous Lbs:  [] before manual  [] after manual    []  Ultrasound: []Continuous   [] Pulsed                           []1MHz   []3MHz W/cm2:  Location:    []  Iontophoresis with dexamethasone         Location: [] Take home patch   [] In clinic   10 []  Ice     [x]  heat  []  Ice massage  []  Laser   []  Anodyne Position: seated  Location: right UT    []  Laser with stim  []  Other:  Position:  Location:    []  Vasopneumatic Device Pressure:       [] lo [] med [] hi   Temperature: [] lo [] med [] hi   [x] Skin assessment post-treatment:  [x]intact []redness- no adverse reaction    []redness - adverse reaction:     21 min Therapeutic Exercise:  [x] See flow sheet :   Rationale: increase ROM and increase strength to improve the patients ability to increase ease of ADLs    8 min Manual Therapy:  PROM with oscillations and overpressure   Rationale: decrease pain, increase ROM and increase tissue extensibility to increase ease of reaching          With   [x] TE   [] TA   [] neuro   [] other: Patient Education: [x] Review HEP    [] Progressed/Changed HEP based on:   [] positioning   [] body mechanics   [] transfers   [] heat/ice application    [] other:      Other Objective/Functional Measures: Focused on PNF and functional strengthening with tactile cueing to prevent compensations  Improving pectoral activation with shoulder elevation versus initial UT activation  Fatigued with supine pec strengthening of flies and dumbbell press  Tricep limitation and teres major for flexion mobility  Pt more aware of posture as well      Pain Level (0-10 scale) post treatment:  0/10    ASSESSMENT/Changes in Function: Pt making slow progress with improving strength. She has habitual compensation of UT use for shoulder elevation with decreased pectoral strength. Will continue to progress functional strengthening for ease of reaching in her cabinets and performing self care tasks like washing/styling her hair. Progress towards goals / Updated goals:  1. Pt will increase FOTO score by 27 pts to improve shoulder function.    2. Pt will increase right shoulder AROM >150 deg flexion/scaption to ease with progress toward ADLs  Not met: in supine ~45 degrees (10/14/19)   3. Pt will have full passive flexion /scaption to [facility] to ease with progression of shoulder mobility per protocol. Not met  4. Pt will demonstrate right shoulder strength to 4/5 or greater to return to PLOF.   Not met: right shoulder flex/abd: 2+/5 (10/7/19)    PLAN  [x]  Upgrade activities as tolerated     [x]  Continue plan of care  []  Update interventions per flow sheet        []  Discharge due to:_  []  Other:_      Juliocesar Castillo, PTA 10/18/2019  7:39 AM    Future Appointments   Date Time Provider Heri Beal   10/18/2019 10:30 AM Chrissyry Rom, PTA [facility] [address]   10/21/2019 11:30 AM Jose Carlos Howell, PT [id number] [address]   10/23/2019 12:00 PM Rosary Rom, PTA [facility] [address]   10/25/2019 12:00 PM Jose Carlos Howell, PT [facility] [address]   10/28/2019 11:00 AM Jose Carlos Howell, PT [facility] [address]   10/29/2019  9:20 AM [facility]   10/29/2019 10:00 AM Rosary Rom, PTA [facility] [address]   11/4/2019 10:00 AM Rosary Rom, PTA [facility] [address]   1/16/2020  9:30 AM [facility] FAST TRACK NURSE [facility]

## 2019-10-21 ENCOUNTER — HOSPITAL ENCOUNTER (OUTPATIENT)
Dept: PHYSICAL THERAPY | Age: 79
Discharge: HOME | End: 2019-10-21
Payer: MEDICARE

## 2019-10-21 PROCEDURE — 97140 MANUAL THERAPY 1/> REGIONS: CPT

## 2019-10-21 PROCEDURE — 97110 THERAPEUTIC EXERCISES: CPT

## 2019-10-21 NOTE — PROGRESS NOTES
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 Reports she continues to have pain on right side of neck and shoulder.     OBJECTIVE    Modality rationale: decrease pain and increase tissue extensibility to improve the patients ability to increase ease of reaching   Min Type Additional Details    [] Estim:  []Unatt       []IFC  []Premod                        []Other:  []w/ice   []w/heat  Position:  Location:    [] Estim: []Att    []TENS instruct  []NMES                    []Other:  []w/US   []w/ice   []w/heat  Position:  Location:    []  Traction: [] Cervical       []Lumbar                       [] Prone          []Supine                       []Intermittent   []Continuous Lbs:  [] before manual  [] after manual    []  Ultrasound: []Continuous   [] Pulsed                           []1MHz   []3MHz W/cm2:  Location:    []  Iontophoresis with dexamethasone         Location: [] Take home patch   [] In clinic   10 []  Ice     [x]  heat  []  Ice massage  []  Laser   []  Anodyne Position: seated  Location: right shoulder    []  Laser with stim  []  Other:  Position:  Location:    []  Vasopneumatic Device Pressure:       [] lo [] med [] hi   Temperature: [] lo [] med [] hi   [x] Skin assessment post-treatment:  [x]intact []redness- no adverse reaction    []redness - adverse reaction:     22 min Therapeutic Exercise:  [x] See flow sheet :   Rationale: increase ROM and increase strength to improve the patients ability to increase ease of ADLs    10 min: manual: trigger point release and STM to right UT and infraspinatus   Rationale: increase ROM for increased ease of reaching          With   [x] TE   [] TA   [] neuro   [] other: Patient Education: [x] Review HEP    [] Progressed/Changed HEP based on:   [] positioning   [] body mechanics   [] transfers   [] heat/ice application    [] other:      Other Objective/Functional Measures:   Right shoulder AROM flex: 66 degrees  Pt. Tolerated PT well with no reports of increased pain  She continues to have significant shoulder hiking during AROM       Pain Level (0-10 scale) post treatment: 0/10    ASSESSMENT/Changes in Function:  Pt. Is making slow progress towards goals. She continues to have decreased right shoulder AROM secondary to decreased strength. She also continues to have pain in UT secondary to shoulder hiking during AROM. Patient will continue to benefit from skilled PT services to modify and progress therapeutic interventions, address functional mobility deficits, address ROM deficits, address strength deficits, analyze and address soft tissue restrictions, analyze and cue movement patterns, analyze and modify body mechanics/ergonomics and assess and modify postural abnormalities to attain remaining goals. Progress towards goals / Updated goals:  1. Pt will increase FOTO score by 27 pts to improve shoulder function.   2. Pt will increase right shoulder AROM >150 deg flexion/scaption to ease with progress toward ADLs  Not met: flexion:  66 degrees (10/21/19)   3. Pt will have full passive flexion /scaption to [facility] to ease with progression of shoulder mobility per protocol. Not met  4. Pt will demonstrate right shoulder strength to 4/5 or greater to return to PLOF.   Not met: right shoulder flex/abd: 2+/5 (10/7/19)    PLAN  []  Upgrade activities as tolerated     [x]  Continue plan of care  []  Update interventions per flow sheet       []  Discharge due to:_  []  Other:_      Yony Henry, PT 10/21/2019  11:51 AM    Future Appointments   Date Time Provider Heri Beal   10/23/2019 12:00 PM Willirachele Blueer, PTA [facility]   10/25/2019 12:00 PM Martínez Nab, PT [facility]   10/28/2019 11:00 AM Martínez Nab, PT [facility]   10/29/2019  9:20 AM Sissy Moran MD [place] [id number]   10/30/2019  2:00 PM Shiv Aragon, PTA [facility]   11/4/2019 10:00 AM Williemae Reaper, PTA [facility]   11/6/2019 10:00 AM Williemae Reaper, PTA [facility]   11/8/2019 10:30 AM Williemae Reaper, PTA [facility]   11/11/2019 10:30 AM Williemae Reaper, PTA [facility]   11/13/2019  3:30 PM Martínez Nab, PT [facility]   11/15/2019 10:30 AM Williemae Reaper, PTA [facility]   11/18/2019 10:30 AM Williemae Reaper, PTA [facility]   11/20/2019  9:30 AM Martínez Nab, PT [facility]   11/22/2019 12:00 PM Martínez Nab, PT [facility]   11/25/2019  9:30 AM Willimarichuye Reaper, PTA [facility]   11/26/2019  9:00 AM Eugene Spain PTA [facility]   11/27/2019 10:00 AM Eugene Spain PTA [facility]   1/16/2020  9:30 AM [facility] TRACK NUR[facility]

## 2019-10-23 ENCOUNTER — HOSPITAL ENCOUNTER (OUTPATIENT)
Dept: PHYSICAL THERAPY | Age: 79
Discharge: HOME | End: 2019-10-23
Payer: MEDICARE

## 2019-10-23 PROCEDURE — 97110 THERAPEUTIC EXERCISES: CPT

## 2019-10-23 PROCEDURE — 97140 MANUAL THERAPY 1/> REGIONS: CPT

## 2019-10-23 NOTE — PROGRESS NOTES
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 Reports she continues to have pain on right side of neck and shoulder. She still can't reach overhead or her opposite shoulder and wants to know how long it will take to strengthen.      OBJECTIVE    Modality rationale: decrease pain and increase tissue extensibility to improve the patients ability to increase ease of reaching   Min Type Additional Details    [] Estim:  []Unatt       []IFC  []Premod                        []Other:  []w/ice   []w/heat  Position:  Location:    [] Estim: []Att    []TENS instruct  []NMES                    []Other:  []w/US   []w/ice   []w/heat  Position:  Location:    []  Traction: [] Cervical       []Lumbar                       [] Prone          []Supine                       []Intermittent   []Continuous Lbs:  [] before manual  [] after manual    []  Ultrasound: []Continuous   [] Pulsed                           []1MHz   []3MHz W/cm2:  Location:    []  Iontophoresis with dexamethasone         Location: [] Take home patch   [] In clinic   15 []  Ice     [x]  heat  []  Ice massage  []  Laser   []  Anodyne Position: seated  Location: right shoulder    []  Laser with stim  []  Other:  Position:  Location:    []  Vasopneumatic Device Pressure:       [] lo [] med [] hi Temperature: [] lo [] med [] hi   [x] Skin assessment post-treatment:  [x]intact []redness- no adverse reaction    []redness - adverse reaction:     25 min Therapeutic Exercise:  [x] See flow sheet :   Rationale: increase ROM and increase strength to improve the patients ability to increase ease of ADLs    14 min: manual: trigger point release and STM to right UT and infraspinatus and levator scap and scalenes  Rationale: increase ROM for increased ease of reaching          With   [x] TE   [] TA   [] neuro   [] other: Patient Education: [x] Review HEP    [] Progressed/Changed HEP based on:   [] positioning   [] body mechanics   [] transfers   [] heat/ice application    [] other:      Other Objective/Functional Measures:   Improved eccentric control with elevation but challenged with AROM   Continues with UT activation with elevation  Challenged with pectoral strengthening  Multiple TPs in right c/s musculature but good hysteresis with manual       Pain Level (0-10 scale) post treatment: 0/10    ASSESSMENT/Changes in Function:  Pt. Is making slow progress towards goals. She continues to have decreased right shoulder AROM secondary to decreased strength and posterior shoulder tightness. Will continue with strengthening to improve ease of reaching in cabinets. Progress towards goals / Updated goals:  1. Pt will increase FOTO score by 27 pts to improve shoulder function.   2. Pt will increase right shoulder AROM >150 deg flexion/scaption to ease with progress toward ADLs  Not met: flexion:  66 degrees (10/21/19)   3. Pt will have full passive flexion /scaption to [facility] to ease with progression of shoulder mobility per protocol. Not met  4. Pt will demonstrate right shoulder strength to 4/5 or greater to return to PLOF.   Not met: right shoulder flex/abd: 2+/5 (10/7/19)    PLAN  []  Upgrade activities as tolerated     [x]  Continue plan of care  []  Update interventions per flow sheet       []  Discharge due to:_  []  Other:_      Philomena Souza, PTA 10/23/2019  11:51 AM    Future Appointments   Date Time Provider Heri Beal   10/25/2019 12:00 PM Larwence Pacer, PT [facility]   10/28/2019 11:00 AM Tian Pyle, PT [facility]   10/29/2019  9:20 AM Valdez Ross MD [place] [id number]   10/30/2019  2:00 PM Demetra Justyn, PTA [facility]   11/1/2019 10:30 AM Darya Brake, PTA [facility]   11/4/2019 10:00 AM Darya Brake, PTA [facility]   11/6/2019 10:00 AM Darya Brake, PTA [facility]   11/8/2019 10:30 AM Darya Brake, PTA [facility]   11/11/2019 10:30 AM Darya Brake, PTA [facility]   11/13/2019  3:30 PM Larwence Pacer, PT [facility]   11/15/2019 10:30 AM Darya Brake, PTA [facility]   11/18/2019 10:30 AM Darya Brake, PTA [facility]   11/20/2019  9:30 AM Larwence Pacer, PT [facility]   11/22/2019 12:00 PM Larwence Pacer, PT [facility]   11/25/2019  9:30 AM Darya Brake, PTA [facility]   11/26/2019  9:00 AM Darya Brake, PTA [facility]   11/27/2019 10:00 AM Darya Liang PTA [facility]   1/16/2020  9:30 AM [facility]ST TRACK NUR[facility]

## 2019-10-25 ENCOUNTER — HOSPITAL ENCOUNTER (OUTPATIENT)
Dept: PHYSICAL THERAPY | Age: 79
Discharge: HOME | End: 2019-10-25
Payer: MEDICARE

## 2019-10-25 PROCEDURE — 97140 MANUAL THERAPY 1/> REGIONS: CPT

## 2019-10-25 PROCEDURE — 97110 THERAPEUTIC EXERCISES: CPT

## 2019-10-25 NOTE — PROGRESS NOTES
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   Prior Level of Function: Right Hand Dominant. Previous DVT. Lives with .     Visits from Start of Care: 31    Missed Visits: 0    The Plan of Care and following information is based on the patient's current status:  Goal: Patient will improve FOTO score by 27 points in order to demonstrate a significant improvement in function. Status at last note/certification: 38  Current Status: not met    Goal: Patient will improve right shoulder AROM flex/abd to 90 degrees in standing in order to increase ease of reaching into cabinets at home. Status at last note/certification: unable in sitting. 85 degrees in inclined position  Current Status: not met    Goal: Patient will improve right shoulder strength to 4-/5 in order to increase ease of household chores. Status at last note/certification: poor strength   Current Status: not met    Key functional changes: able to put on deodorant now.  Continued difficulty reaching her head to fix her hair and into cabinets      Problems/ barriers to goal attainment: significant edema at start of care which is resolving now     Problem List: pain affecting function, decrease ROM, decrease strength, edema affecting function, impaired gait/ balance, decrease ADL/ functional abilitiies, decrease activity tolerance, decrease flexibility/ joint mobility and decrease transfer abilities    Treatment Plan: Therapeutic exercise, Therapeutic activities, Neuromuscular re-education, Physical agent/modality, Gait/balance training, Manual therapy, Patient education, Self Care training and Functional mobility training     Patient Goal (s) has been updated and includes: to reach into cabinets and to my head better     Goals for this certification period to be accomplished in 4 weeks:  1. Patient will improve FOTO score by 27 points in order to demonstrate a significant improvement in function. 2. Patient will improve right shoulder AROM in flexion and scaption to 90 degrees in seated position in order to increase ease of fixing her hair. 3. Patient will improve right shoulder flexion/abd strength to 4-/5 in order to increase ease of ADLs    Frequency / Duration: Patient to be seen 2-3 times per week for 4 weeks:    Assessment / Recommendations: pt. Continues to make slow progress with physical therapy. She continues to have pain in right upper trap at 3/10. She also continues to have significant decrease in right shoulder mobility in upright position. She is unable to reach into cabinets at home. Right shoulder AROM flexion in upright position was 70 degrees with shoulder shrugging compensation. She does demonstrate improvement in her mobility and strength in supine and side lying positions. She is also now able to put deodorant under left arm. Skilled PT is medically necessary in order to continue to improve right shoulder strength and mobility for increased ease of ADLs and improved quality of life. Certification Period:  10/25/19-11/24/19    Odell Solantwan, PT 10/25/2019 12:57 PM    ________________________________________________________________________  I certify that the above Therapy Services are being furnished while the patient is under my care.  I agree with the treatment plan and certify that this therapy is necessary. [] I have read the above and request that my patient continue as recommended.   [] I have read the above report and request that my patient continue therapy with the following changes/special instructions: _______________________________________  [] I have read the above report and request that my patient be discharged from therapy    Physician's Signature:____________Date:_________TIME:________    ** Signature, Date and Time must be completed for valid certification **    Please sign and return to [facility]  [address] [place] [address]  [phone] fax

## 2019-10-25 NOTE — PROGRESS NOTES
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 Reports she continues to have difficulty reaching up. She reports she has been trying to work on the exercises at home.      OBJECTIVE    Modality rationale: decrease pain and increase tissue extensibility to improve the patients ability to increase ease of reaching   Min Type Additional Details    [] Estim:  []Unatt       []IFC  []Premod                        []Other:  []w/ice   []w/heat  Position:  Location:    [] Estim: []Att    []TENS instruct  []NMES                    []Other:  []w/US   []w/ice   []w/heat  Position:  Location:    []  Traction: [] Cervical       []Lumbar                       [] Prone          []Supine                       []Intermittent   []Continuous Lbs:  [] before manual  [] after manual    []  Ultrasound: []Continuous   [] Pulsed                           []1MHz   []3MHz W/cm2:  Location:    []  Iontophoresis with dexamethasone         Location: [] Take home patch   [] In clinic   10 []  Ice     [x]  heat  []  Ice massage  []  Laser   []  Anodyne Position: seated  Location: right shoulder    []  Laser with stim  []  Other:  Position:  Location:    []  Vasopneumatic Device Pressure:       [] lo [] med [] hi   Temperature: [] lo [] med [] hi   [x] Skin assessment post-treatment:  [x]intact []redness- no adverse reaction    []redness - adverse reaction:      35 min Therapeutic Exercise:  [x] See flow sheet :   Rationale: increase ROM and increase strength to improve the patients ability to increase ease of reaching    10 min Manual Therapy:  Trigger point release to right UT and infraspinatus    Rationale: decrease pain, increase ROM and increase tissue extensibility to increase ease of ADLs          With   [x] TE   [] TA   [] neuro   [] other: Patient Education: [x] Review HEP    [] Progressed/Changed HEP based on:   [] positioning   [] body mechanics   [] transfers   [] heat/ice application    [] other:      Other Objective/Functional Measures:   Right shoulder AROM flexion: 70 degrees with shrugging  Pt.  Tolerated 1# resistance during therex well  Challenged with ball on wall stability  She tolerated PT well with no reports of increased pain       Pain Level (0-10 scale) post treatment: 0/10    ASSESSMENT/Changes in Function:     See progress note     Progress towards goals / Updated goals:  See progress note    PLAN  []  Upgrade activities as tolerated     [x]  Continue plan of care  []  Update interventions per flow sheet       []  Discharge due to:_  []  Other:_      Caryl Mark, PT 10/25/2019  11:59 AM    Future Appointments   Date Time Provider Heri Lian   10/25/2019 12:00 PM Kenya Andrade, PT [facility]   10/28/2019 11:00 AM Anju Membreno, PT [facility]   10/29/2019  9:20 AM Elma Lam MD [place] [id number]   10/30/2019  2:00 PM Ann-Marie Barnes, PTA [facility]   11/1/2019 10:30 AM Erwin Huggins, PTA [facility]   11/4/2019 10:00 AM Erwin Huggins, PTA [facility]   11/6/2019 10:00 AM Erwin Huggins, PTA [facility]   11/8/2019 10:30 AM Erwin Huggins, PTA [facility]   11/11/2019 10:30 AM Erwin Huggins, PTA [facility]   11/13/2019  3:30 PM Kenya Andrade PT [facility]   11/15/2019 10:30 AM Erwin Huggins, PTA [facility]   11/18/2019 10:30 AM Gely Broderick, PTA [facility]   11/20/2019  9:30 AM Ramin Chau, PT [facility]   11/22/2019 12:00 PM Ramin Chau, PT [facility]   11/25/2019  9:30 AM Gely Broderick, PTA [facility]   11/26/2019  9:00 AM Gely Broderick, PTA [facility]   11/27/2019 10:00 AM Gely Broderick PTA [facility]   1/16/2020  9:30 AM [facility]ST TRACK NUR[facility]

## 2019-10-28 ENCOUNTER — HOSPITAL ENCOUNTER (OUTPATIENT)
Dept: PHYSICAL THERAPY | Age: 79
Discharge: HOME | End: 2019-10-28
Payer: MEDICARE

## 2019-10-28 PROCEDURE — 97110 THERAPEUTIC EXERCISES: CPT | Performed by: GENERAL ACUTE CARE HOSPITAL

## 2019-10-28 PROCEDURE — 97140 MANUAL THERAPY 1/> REGIONS: CPT | Performed by: GENERAL ACUTE CARE HOSPITAL

## 2019-10-28 NOTE — PROGRESS NOTES
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      OBJECTIVE    Modality rationale: decrease pain and increase tissue extensibility to improve the patients ability to increase ease of reaching   Min Type Additional Details    [] Estim:  []Unatt       []IFC  []Premod                        []Other:  []w/ice   []w/heat  Position:  Location:    [] Estim: []Att    []TENS instruct  []NMES                    []Other:  []w/US   []w/ice   []w/heat  Position:  Location:    []  Traction: [] Cervical       []Lumbar                       [] Prone          []Supine                       []Intermittent   []Continuous Lbs:  [] before manual  [] after manual    []  Ultrasound: []Continuous   [] Pulsed                           []1MHz   []3MHz W/cm2:  Location:    []  Iontophoresis with dexamethasone         Location: [] Take home patch   [] In clinic   PD []  Ice     [x]  heat  []  Ice massage  []  Laser   []  Anodyne Position: seated  Location: right shoulder    []  Laser with stim  []  Other:  Position:  Location:    []  Vasopneumatic Device Pressure:       [] lo [] med [] hi   Temperature: [] lo [] med [] hi   [x] Skin assessment post-treatment:  [x]intact []redness- no adverse reaction    []redness - adverse reaction:      23 min Therapeutic Exercise:  [x] See flow sheet :   Rationale: increase ROM and increase strength to improve the patients ability to increase ease of reaching    8 min Manual Therapy:  Post/inf GHJ mobs, PROM with end range OP stretch. Manual post cap stretch. Rationale: decrease pain, increase ROM and increase tissue extensibility to increase ease of ADLs          With   [x] TE   [] TA   [] neuro   [] other: Patient Education: [x] Review HEP    [] Progressed/Changed HEP based on:   [] positioning   [] body mechanics   [] transfers   [] heat/ice application    [] other:      Other Objective/Functional Measures:   Right shoulder AROM flexion: 70 degrees with shrugging      Pain Level (0-10 scale) post treatment: 0/10    ASSESSMENT/Changes in Function:   Pt remains with reduced AROM in R shoulder but responded well to manual therapy. Pt has poor strength and was challenged by 1# resistance. Noted rounded shoulder posture, with difficulty maintaining scapular set.        Progress towards goals / Updated goals:    PLAN  []  Upgrade activities as tolerated     [x]  Continue plan of care  []  Update interventions per flow sheet       []  Discharge due to:_  []  Other:_      Kary Cadena, PT 10/28/2019  11:59 AM    Future Appointments   Date Time Provider Heri Dela Cruzi   10/29/2019  9:20 AM Shay Dykes MD [place] [id number]   10/30/2019  9:00 AM Kamini Net, PTA [facility]   11/1/2019 10:30 AM Kamini Net, PTA [facility]   11/4/2019 10:00 AM Kamini Net, PTA [facility]   11/6/2019 10:00 AM Kamini Net, PTA [facility]   11/8/2019 10:30 AM Kamini Net, PTA [facility]   11/11/2019 10:30 AM Kamini Net, PTA [facility]   11/13/2019  3:30 PM Shayne Randle PT [facility]   11/15/2019 10:30 AM DAREK [facility]PB [facility]   11/18/2019 10:30 AM Kamini Pappas PTA [facility]   11/20/2019  9:30 AM Cecil Lindau, PT [facility]   11/22/2019 12:00 PM Cecil Lindkenyetta, PT [facility]   11/25/2019  9:30 AM Jc Khoa, PTA [facility]   11/26/2019  9:00 AM Jc Khoa, PTA [facility]   11/27/2019 10:00 AM Jc Khoa, PTA [facility]   1/16/2020  9:30 AM [facility] NUR[facility]

## 2019-10-29 ENCOUNTER — APPOINTMENT (OUTPATIENT)
Dept: PHYSICAL THERAPY | Age: 79
End: 2019-10-29
Payer: MEDICARE

## 2019-10-29 ENCOUNTER — OFFICE VISIT (OUTPATIENT)
Dept: ORTHOPEDIC SURGERY | Age: 79
End: 2019-10-29

## 2019-10-29 VITALS
RESPIRATION RATE: 12 BRPM | HEIGHT: 65 IN | DIASTOLIC BLOOD PRESSURE: 54 MMHG | OXYGEN SATURATION: 98 % | TEMPERATURE: 97.6 F | HEART RATE: 62 BPM | SYSTOLIC BLOOD PRESSURE: 116 MMHG | WEIGHT: 125.4 LBS | BODY MASS INDEX: 20.89 KG/M2

## 2019-10-29 DIAGNOSIS — M12.811 ROTATOR CUFF ARTHROPATHY, RIGHT: Primary | ICD-10-CM

## 2019-10-29 NOTE — PROGRESS NOTES
Taliajake Paul Osman  1940     HISTORY OF PRESENT ILLNESS  Violet Ortiz is a 66 y.o. female who presents today for evaluation s/p Right reverse total shoulder arthroplasty on 7/31/19. Describes pain as a 2/10 today. She has been attending PT for her shoulder. Pt still notes concerns about how long it has taken to see an improvement in her ROM. She states her progress is too slow for her but does report an overall improvement in ROM since last month. Pt describes pain as achy that is different from her pain prior to surgery. She states the pain before surgery was constant and now she only reports pain while exercising or with certain movements of the arm. She is able to reach up to her head today. Patient denies any fever, chills, chest pain, shortness of breath or calf pain. The remainder of the review of systems is negative. There are no new illness or injuries to report since last seen in the office. No changes in medications, allergies, social or family history. Pain Assessment  10/29/2019   Location of Pain Shoulder   Location Modifiers Right   Severity of Pain 2   Quality of Pain Aching   Quality of Pain Comment -   Duration of Pain A few hours   Duration of Pain Comment -   Frequency of Pain Intermittent   Aggravating Factors Stretching   Aggravating Factors Comment -   Limiting Behavior -   Relieving Factors Heat   Relieving Factors Comment -   Result of Injury No       PHYSICAL EXAM:   Visit Vitals  /54   Pulse 62   Temp 97.6 °F (36.4 °C) (Oral)   Resp 12   Ht 5' 5\" (1.651 m)   Wt 125 lb 6.4 oz (56.9 kg)   LMP  (LMP Unknown)   SpO2 98%   BMI 20.87 kg/m²      The patient is a well-developed, well-nourished female in no acute distress. The patient is alert and oriented times three. The patient appears to be well groomed.  Mood and affect are normal.  ORTHOPEDIC EXAM of right shoulder:  Inspection: swelling present,  Bruising present, swelling to right hand and fingers - with some improvement  Incision well healed  Passive glenohumeral abduction 0-60 degrees, able to make active passive fist  Stability: Stable  Strength: n/a  2+ distal pulses  Pitting edema in the forearm and hand much improved  Doppler RUE: no evidence of DVT    IMPRESSION:  S/P Right reverse total shoulder arthroplasty    PLAN:   1. Patient pain and ROM have improved since last OV. She still notes concerns about how long recovery is taking. 2. Will continue with PT.         RTC 4 weeks    Scribed by Tahir Doherty) as dictated by Medhat Arriaza MD    I, Dr. Medhat Arriaza, confirm that all documentation is accurate.     Medhat Arriaza M.D.   [facility]

## 2019-10-29 NOTE — PROGRESS NOTES
1. Have you been to the ER, urgent care clinic since your last visit? Hospitalized since your last visit? No    2. Have you seen or consulted any other health care providers outside of the [address] [place] [address] since your last visit? Include any pap smears or colon screening.  No

## 2019-10-30 ENCOUNTER — HOSPITAL ENCOUNTER (OUTPATIENT)
Dept: PHYSICAL THERAPY | Age: 79
Discharge: HOME | End: 2019-10-30
Payer: MEDICARE

## 2019-10-30 PROCEDURE — 97140 MANUAL THERAPY 1/> REGIONS: CPT

## 2019-10-30 PROCEDURE — 97110 THERAPEUTIC EXERCISES: CPT

## 2019-10-30 PROCEDURE — 97016 VASOPNEUMATIC DEVICE THERAPY: CPT

## 2019-10-30 NOTE — PROGRESS NOTES
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  Laying down at home helps most.   She has a new script to continue for another month    OBJECTIVE    Modality rationale: decrease pain to improve the patients ability to ease with ADL's   Min Type Additional Details    [] Estim:  []Unatt       []IFC  []Premod                        []Other:  []w/ice   []w/heat  Position:  Location:    [] Estim: []Att    []TENS instruct  []NMES                    []Other:  []w/US   []w/ice   []w/heat  Position:  Location:    []  Traction: [] Cervical       []Lumbar                       [] Prone          []Supine                       []Intermittent   []Continuous Lbs:  [] before manual  [] after manual    []  Ultrasound: []Continuous   [] Pulsed                           []1MHz   []3MHz W/cm2:  Location:    []  Iontophoresis with dexamethasone         Location: [] Take home patch   [] In clinic   15 []  Ice     [x]  heat  []  Ice massage  []  Laser   []  Anodyne Position:seated  Location:right sh    []  Laser with stim  []  Other:  Position:  Location:    []  Vasopneumatic Device Pressure:       [] lo [] med [] hi   Temperature: [] lo [] med [] hi   [] Skin assessment post-treatment:  []intact []redness- no adverse reaction []redness - adverse reaction:       21 min Therapeutic Exercise:  [] See flow sheet :   Rationale: increase ROM and increase strength to improve the patients ability to ease with ADL's    10 min Manual Therapy:  Contract/relax to increase ROM in flexion , jt mobs grades 3 , scapula mobs , PNF pattern., TPR to subscap. Rationale: decrease pain, increase ROM and decrease trigger points to ease with ADL's          With   [] TE   [] TA   [] neuro   [] other: Patient Education: [x] Review HEP    [] Progressed/Changed HEP based on:   [] positioning   [] body mechanics   [] transfers   [] heat/ice application    [] other:      Other Objective/Functional Measures: AAROM flexion with self assist= 108 deg, PROM njylbwz=191 deg after manual.      Pain Level (0-10 scale) post treatment: 1/10    ASSESSMENT/Changes in Function: Progressing steadily. Will continue to work on self ROM in supine to further increase ROM. Pt reports she is running out of patience and wishes she can drive, it's very difficult. Patient will continue to benefit from skilled PT services to modify and progress therapeutic interventions, address functional mobility deficits, address ROM deficits, address strength deficits, analyze and address soft tissue restrictions, analyze and cue movement patterns, analyze and modify body mechanics/ergonomics and assess and modify postural abnormalities to attain remaining goals. [x]  See/recertification           Progress towards goals / Updated goals:  1. Patient will improve FOTO score by 27 points in order to demonstrate a significant improvement in function. 2. Patient will improve right shoulder AROM in flexion and scaption to 90 degrees in seated position in order to increase ease of fixing her hair.    3. Patient will improve right shoulder flexion/abd strength to 4-/5 in order to increase ease of ADLs    PLAN  []  Upgrade activities as tolerated     [x]  Continue plan of care  []  Update interventions per flow sheet       []  Discharge due to:_  []  Other:_      Jak , PT 10/30/2019  9:10 AM    Future Appointments   Date Time Provider Heri Beal   11/4/2019 10:00 AM Savannah Troncoso, PTA [facility]   11/6/2019 10:00 AM Savannah Troncoso, PTA [facility]   11/8/2019 10:30 AM Savannah Troncoso, PTA [facility]   11/11/2019 10:30 AM Savannah Troncoso, PTA [facility]   11/13/2019  3:30 PM Rojelio Pump, PT [facility]   11/15/2019 10:30 AM Savannah Troncoso, PTA [facility]   11/18/2019 10:30 AM Savannah Troncoso, PTA [facility]   11/20/2019  9:30 AM Rojelio Pump, PT [facility]   11/22/2019 12:00 PM Rojelio Pump, PT [facility]   11/25/2019  9:30 AM Savannah Troncoso, PTA [facility]   11/26/2019  9:00 AM Savannah Troncoso, PTA [facility]   11/26/2019 11:30 AM Vitaliy Pereira MD [facility]   11/27/2019 10:00 AM Savannah Troncoso, PTA [facility]   1/16/2020  9:30 AM [facility] NUR[facility]

## 2019-11-01 ENCOUNTER — APPOINTMENT (OUTPATIENT)
Dept: PHYSICAL THERAPY | Age: 79
End: 2019-11-01
Payer: MEDICARE

## 2019-11-04 ENCOUNTER — HOSPITAL ENCOUNTER (OUTPATIENT)
Dept: PHYSICAL THERAPY | Age: 79
Discharge: HOME | End: 2019-11-04
Payer: MEDICARE

## 2019-11-04 PROCEDURE — 97110 THERAPEUTIC EXERCISES: CPT

## 2019-11-04 PROCEDURE — 97140 MANUAL THERAPY 1/> REGIONS: CPT

## 2019-11-04 NOTE — PROGRESS NOTES
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 She still can't reach her salt/pepper shakers but was able to comb her hair.        OBJECTIVE    Modality rationale: decrease pain to improve the patients ability to ease with ADL's   Min Type Additional Details    [] Estim:  []Unatt       []IFC  []Premod                        []Other:  []w/ice   []w/heat  Position:  Location:    [] Estim: []Att    []TENS instruct  []NMES                    []Other:  []w/US   []w/ice   []w/heat  Position:  Location:    []  Traction: [] Cervical       []Lumbar                       [] Prone          []Supine                       []Intermittent   []Continuous Lbs:  [] before manual  [] after manual    []  Ultrasound: []Continuous   [] Pulsed                           []1MHz   []3MHz W/cm2:  Location:    []  Iontophoresis with dexamethasone         Location: [] Take home patch   [] In clinic   10 []  Ice     [x]  heat  []  Ice massage  []  Laser   []  Anodyne Position:seated  Location:right sh    []  Laser with stim  []  Other:  Position:  Location:    []  Vasopneumatic Device Pressure:       [] lo [] med [] hi   Temperature: [] lo [] med [] hi   [] Skin assessment post-treatment:  []intact []redness- no adverse reaction    []redness - adverse reaction:       33 min Therapeutic Exercise:  [] See flow sheet :   Rationale: increase ROM and increase strength to improve the patients ability to ease with ADL's    15 min Manual Therapy:  STM right triceps with TPR: contract relax stretching into flexion     Rationale: decrease pain, increase ROM and decrease trigger points to ease with ADL's          With   [] TE   [] TA   [] neuro   [] other: Patient Education: [x] Review HEP    [] Progressed/Changed HEP based on:   [] positioning   [] body mechanics   [] transfers   [] heat/ice application    [] other:      Other Objective/Functional   PROM flexion 125 degrees  tricep restrictions limiting flexion ROM  Increased neck compensation  flexion from compensation  Continues with decreased pectoral strength    Pain Level (0-10 scale) post treatment: 0/10    ASSESSMENT/Changes in Function: Pt progressing with ROM of the right shoulder but continues to have increased UT compensation with elevation due to habit. She has extensor tightness limiting further flexion ROM> Will continue with right shoulder strengthening to improve ease of reaching into cabinets for salt/pepper shakers with decreased compensations and pain. Progress towards goals / Updated goals:  1. Patient will improve FOTO score by 27 points in order to demonstrate a significant improvement in function. 2. Patient will improve right shoulder AROM in flexion and scaption to 90 degrees in seated position in order to increase ease of fixing her hair.    3. Patient will improve right shoulder flexion/abd strength to 4-/5 in order to increase ease of ADLs    PLAN  [x]  Upgrade activities as tolerated     [x]  Continue plan of care  []  Update interventions per flow sheet       []  Discharge due to:_  []  Other:_      Hannah Dewitt PTA 11/4/2019  9:10 AM    Future Appointments   Date Time Provider Heri Beal   11/4/2019 10:00 AM Ludin Gonsalez PTA [facility]   11/6/2019 10:00 AM Earl Saez, PTA [facility]   11/8/2019 10:30 AM Earl Saez, PTA [facility]   11/11/2019 10:30 AM Earl Saez, PTA [facility]   11/13/2019  3:30 PM Alvin Benavidez, PT [facility]   11/15/2019 10:30 AM Earl Saez, PTA [facility]   11/18/2019 10:30 AM Earl Saez, PTA [facility]   11/20/2019  9:30 AM Alvin Benavidez, PT [facility]   11/22/2019 12:00 PM Alvin eBnavidez, PT [facility]   11/25/2019  9:30 AM Earl Saez, PTA [facility]   11/26/2019  9:00 AM Earl Saez, PTA [facility]   11/26/2019 11:30 AM Mani Loredo MD [place] [id number]   11/27/2019 10:00 AM Earl Saez, PTA [facility]   1/16/2020  9:30 AM [facility] NUR[facility]

## 2019-11-06 ENCOUNTER — HOSPITAL ENCOUNTER (OUTPATIENT)
Dept: PHYSICAL THERAPY | Age: 79
Discharge: HOME | End: 2019-11-06
Payer: MEDICARE

## 2019-11-06 PROCEDURE — 97110 THERAPEUTIC EXERCISES: CPT

## 2019-11-06 PROCEDURE — 97140 MANUAL THERAPY 1/> REGIONS: CPT

## 2019-11-06 NOTE — PROGRESS NOTES
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    \"My muscles hurt, not my shoulder\"    OBJECTIVE    Modality rationale: decrease pain to improve the patients ability to ease with ADL's   Min Type Additional Details    [] Estim:  []Unatt       []IFC  []Premod                        []Other:  []w/ice   []w/heat  Position:  Location:    [] Estim: []Att    []TENS instruct  []NMES                    []Other:  []w/US   []w/ice   []w/heat  Position:  Location:    []  Traction: [] Cervical       []Lumbar                       [] Prone          []Supine                       []Intermittent   []Continuous Lbs:  [] before manual  [] after manual    []  Ultrasound: []Continuous   [] Pulsed                           []1MHz   []3MHz W/cm2:  Location:    []  Iontophoresis with dexamethasone         Location: [] Take home patch   [] In clinic   10 []  Ice     [x]  heat  []  Ice massage   []  Laser   []  Anodyne Position: seated  Location:right sh    []  Laser with stim  []  Other:  Position:  Location:    []  Vasopneumatic Device Pressure:       [] lo [] med [] hi   Temperature: [] lo [] med [] hi   [] Skin assessment post-treatment:  []intact []redness- no adverse reaction      21 min Therapeutic Exercise:  [] See flow sheet :   Rationale: increase ROM and increase strength to improve the patients ability to ease with ADL's    10 min Manual Therapy:  PROM stretch in flexion, ER , Scaption. Contract relax in ER, Flexion with manual GH inferior depression   Rationale: decrease pain, increase ROM, increase tissue extensibility and decrease trigger points to ease with overhead reaching, grooming and dressing and driving. With   [] TE   [] TA   [] neuro   [] other: Patient Education: [x] Review HEP    [] Progressed/Changed HEP based on:   [] positioning   [] body mechanics   [] transfers   [] heat/ice application    [] other:      Other Objective/Functional Measures: Need supervision for form to decrease sh . Shoulder hiking is observed but pt is aware of correcting her form . PROM flexion= 130 deg, AAROM= 112 deg    Pain Level (0-10 scale) post treatment: 0/10    ASSESSMENT/Changes in Function: Slow progression. Pt reports she is going to drive tomorrow to get her hair doine and visit a friend. She is anxious but she can drive but it will depend on hand placement. Patient will continue to benefit from skilled PT services to modify and progress therapeutic interventions, address functional mobility deficits, address ROM deficits, address strength deficits, analyze and address soft tissue restrictions, analyze and cue movement patterns, analyze and modify body mechanics/ergonomics and assess and modify postural abnormalities to attain remaining goals. [x]  See Plan of Care  []  See progress note/recertification  []  See Discharge Summary         Progress towards goals / Updated goals:  1. Patient will improve FOTO score by 27 points in order to demonstrate a significant improvement in function. 2. Patient will improve right shoulder AROM in flexion and scaption to 90 degrees in seated position in order to increase ease of fixing her hair.    3. Patient will improve right shoulder flexion/abd strength to 4-/5 in order to increase ease of ADLs    PLAN  [x]  Upgrade activities as tolerated     [x]  Continue plan of care  []  Update interventions per flow sheet       []  Discharge due to:_  []  Other:_      Bertrand Seals, PT 11/6/2019  10:40 AM    Future Appointments   Date Time Provider Heri Lian   11/8/2019 10:30 AM Williemae Reaper, PTA [facility]   11/11/2019 10:30 AM Williemae Reaper, PTA [facility]   11/13/2019  3:30 PM Martínez Nab, PT [facility]   11/15/2019 10:30 AM Williemae Reaper, PTA [facility]   11/18/2019 10:30 AM Williemae Reaper, PTA [facility]   11/20/2019  9:30 AM Martínez Nab, PT [facility]   11/22/2019 12:00 PM Martínez Nab, PT [facility]   11/25/2019  9:30 AM Williemae Reaper, PTA [facility]   11/26/2019  9:00 AM Williemae Reaper, PTA [facility]   11/26/2019 11:30 AM Troy Carlos MD [place] [id number]   11/27/2019 10:00 AM Williemae Reaper, PTA [facility]   1/16/2020  9:30 AM [facility] TRACK NUR[facility]

## 2019-11-08 ENCOUNTER — HOSPITAL ENCOUNTER (OUTPATIENT)
Dept: PHYSICAL THERAPY | Age: 79
Discharge: HOME | End: 2019-11-08
Payer: MEDICARE

## 2019-11-08 PROCEDURE — 97140 MANUAL THERAPY 1/> REGIONS: CPT

## 2019-11-08 PROCEDURE — 97110 THERAPEUTIC EXERCISES: CPT

## 2019-11-08 NOTE — PROGRESS NOTES
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 She still can't reach into her cabinets.      OBJECTIVE    Modality rationale: decrease pain to improve the patients ability to ease with ADL's   Min Type Additional Details    [] Estim:  []Unatt       []IFC  []Premod                        []Other:  []w/ice   []w/heat  Position:  Location:    [] Estim: []Att    []TENS instruct  []NMES                    []Other:  []w/US   []w/ice   []w/heat  Position:  Location:    []  Traction: [] Cervical       []Lumbar                       [] Prone          []Supine                       []Intermittent   []Continuous Lbs:  [] before manual  [] after manual    []  Ultrasound: []Continuous   [] Pulsed                           []1MHz   []3MHz W/cm2:  Location:    []  Iontophoresis with dexamethasone         Location: [] Take home patch   [] In clinic   10 []  Ice     [x]  heat  []  Ice massage   []  Laser   []  Anodyne Position: seated  Location:right sh    []  Laser with stim  []  Other:  Position:  Location:    []  Vasopneumatic Device Pressure:       [] lo [] med [] hi   Temperature: [] lo [] med [] hi   [x] Skin assessment post-treatment:  [x]intact []redness- no adverse reaction      33 min Therapeutic Exercise: [x] See flow sheet :   Rationale: increase ROM and increase strength to improve the patients ability to ease with ADL's    11 min Manual Therapy: STM right triceps; PROM with oscillations and end range stretching   Rationale: decrease pain, increase ROM, increase tissue extensibility and decrease trigger points to ease with overhead reaching, grooming and dressing and driving. With   [] TE   [] TA   [] neuro   [] other: Patient Education: [x] Review HEP    [] Progressed/Changed HEP based on:   [] positioning   [] body mechanics   [] transfers   [] heat/ice application    [] other:      Other Objective/Functional Measures:   Improved shoulder flexion ROM post tricep massage  Teres major and minor restrictions  Needs GH stretching  Improved pectoral activation with shoulder elevation     Pain Level (0-10 scale) post treatment: 0/10    ASSESSMENT/Changes in Function: Pt progressing with right shoulder elevation post stretching to the posterior arm and shoulder. She has improved pectoral activation with decreased c/s compensation. Will continue to work on stretching and strengthening of the right shoulder to be able to reach [facility] into cabinets. Progress towards goals / Updated goals:  1. Patient will improve FOTO score by 27 points in order to demonstrate a significant improvement in function. 2. Patient will improve right shoulder AROM in flexion and scaption to 90 degrees in seated position in order to increase ease of fixing her hair.    3. Patient will improve right shoulder flexion/abd strength to 4-/5 in order to increase ease of ADLs    PLAN  [x]  Upgrade activities as tolerated     [x]  Continue plan of care  []  Update interventions per flow sheet       []  Discharge due to:_  []  Other:_      Renetta Butler PTA 11/8/2019  10:40 AM    Future Appointments   Date Time Provider Heri Beal   11/11/2019 10:30 AM Kiel Canales PTA [facility]   11/13/2019  3:30 PM Rd Herrera PT [facility]   11/15/2019 10:30 AM Chica Fernandez, PTA [facility]   11/18/2019 10:30 AM Chica Fernandez, PTA [facility]   11/20/2019  9:30 AM Priscila Grate, PT [facility]   11/22/2019 12:00 PM Chica Fernandez, PTA [facility]   11/25/2019  9:30 AM Chica Fernandez, PTA [facility]   11/26/2019  9:00 AM Chica Fernandez, PTA [facility]   11/26/2019 11:30 AM MD Deepti Jordan [place] [address]   11/27/2019 10:00 AM Chica Fernandez, PTA [facility]   1/16/2020  9:30 AM [facility] FAST TRACK NURSE [facility]

## 2019-11-11 ENCOUNTER — HOSPITAL ENCOUNTER (OUTPATIENT)
Dept: PHYSICAL THERAPY | Age: 79
Discharge: HOME | End: 2019-11-11
Payer: MEDICARE

## 2019-11-11 PROCEDURE — 97140 MANUAL THERAPY 1/> REGIONS: CPT

## 2019-11-11 PROCEDURE — 97110 THERAPEUTIC EXERCISES: CPT

## 2019-11-11 NOTE — PROGRESS NOTES
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 She states she tried to clean her ceiling fans but wasn't able to. She has had less pain recently since stretching more.      OBJECTIVE    Modality rationale: decrease pain to improve the patients ability to ease with ADL's   Min Type Additional Details    [] Estim:  []Unatt       []IFC  []Premod                        []Other:  []w/ice   []w/heat  Position:  Location:    [] Estim: []Att    []TENS instruct  []NMES                    []Other:  []w/US   []w/ice   []w/heat  Position:  Location:    []  Traction: [] Cervical       []Lumbar                       [] Prone          []Supine                       []Intermittent   []Continuous Lbs:  [] before manual  [] after manual    []  Ultrasound: []Continuous   [] Pulsed                           []1MHz   []3MHz W/cm2:  Location:    []  Iontophoresis with dexamethasone         Location: [] Take home patch   [] In clinic   10 []  Ice     [x]  heat  []  Ice massage   []  Laser   []  Anodyne Position: seated  Location:right sh    []  Laser with stim  []  Other:  Position:  Location:    []  Vasopneumatic Device Pressure:       [] lo [] med [] hi   Temperature: [] lo [] med [] hi   [x] Skin assessment post-treatment:  [x]intact []redness- no adverse reaction      29 min Therapeutic Exercise:  [x] See flow sheet :   Rationale: increase ROM and increase strength to improve the patients ability to ease with ADL's    10 min Manual Therapy: TPR teres major and triceps; PROM oscillations with end range stretching;GH stretching with scapula held to table   Rationale: decrease pain, increase ROM, increase tissue extensibility and decrease trigger points to ease with overhead reaching, grooming and dressing and driving. With   [] TE   [] TA   [] neuro   [] other: Patient Education: [x] Review HEP    [] Progressed/Changed HEP based on:   [] positioning   [] body mechanics   [] transfers   [] heat/ice application    [] other:      Other Objective/Functional Measures:   Eccentric control from 120 degrees but only able to actively lift to 90 degrees in standing  Improved supine AROM to 120 degrees  Educated on tricep stretching and using left arm to help lift right arm and then eccentrically lower for strengthening  Decreased c/s compensations post manual stretching    Pain Level (0-10 scale) post treatment: 0/10    ASSESSMENT/Changes in Function: Pt is progressing with right shoulder strength within available ranges. She has improved eccentric control from 120 degrees flexion but actively in sitting can only lift to 90 degrees with some compensations of UT from habitual use. Will continue working on posture and improved mechanics for shoulder elevation to reach into cabinets with improved form, ease and decreased pain. Progress towards goals / Updated goals:  1. Patient will improve FOTO score by 27 points in order to demonstrate a significant improvement in function. 2. Patient will improve right shoulder AROM in flexion and scaption to 90 degrees in seated position in order to increase ease of fixing her hair. Progressing right shoulder 90 degrees  3.  Patient will improve right shoulder flexion/abd strength to 4-/5 in order to increase ease of ADLs    PLAN  [x]  Upgrade activities as tolerated     [x]  Continue plan of care  []  Update interventions per flow sheet       []  Discharge due to:_  []  Other:_      Zoe Sy, PTA 11/11/2019  10:40 AM    Future Appointments   Date Time Provider Heri Beal   11/13/2019  3:30 PM Ramin Chau, PT [facility]   11/15/2019 10:30 AM Gely Broderick PTA [facility]   11/18/2019 10:30 AM Gely Broderick PTA [facility]   11/20/2019  9:30 AM Ramin Chau, PT [facility]   11/22/2019 12:00 PM Gely Broderick PTA [facility]   11/25/2019  9:30 AM Gely Broderick PTA [facility]   11/26/2019  9:00 AM Gely Broderick PTA [facility]   11/26/2019 11:30 AM Jesse Cerrato MD [facility]   11/27/2019 10:00 AM Gely Broderick PTA [facility]   1/16/2020  9:30 AM [facility] FAST TRACK NURSE [facility]

## 2019-11-13 ENCOUNTER — HOSPITAL ENCOUNTER (OUTPATIENT)
Dept: PHYSICAL THERAPY | Age: 79
Discharge: HOME | End: 2019-11-13
Payer: MEDICARE

## 2019-11-13 PROCEDURE — 97110 THERAPEUTIC EXERCISES: CPT

## 2019-11-13 NOTE — PROGRESS NOTES
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 She states she can reach into her microwave now. She still has decreased strength to lift or lower anything. She still can't reach her salt/pepper shakers.      OBJECTIVE    Modality rationale: decrease pain to improve the patients ability to ease with ADLs   Min Type Additional Details    [] Estim:  []Unatt       []IFC  []Premod                        []Other:  []w/ice   []w/heat  Position:  Location:    [] Estim: []Att    []TENS instruct  []NMES                    []Other:  []w/US   []w/ice   []w/heat  Position:  Location:    []  Traction: [] Cervical       []Lumbar                       [] Prone          []Supine                       []Intermittent   []Continuous Lbs:  [] before manual  [] after manual    []  Ultrasound: []Continuous   [] Pulsed                           []1MHz   []3MHz W/cm2:  Location:    []  Iontophoresis with dexamethasone         Location: [] Take home patch   [] In clinic   10 []  Ice     [x]  heat  []  Ice massage   []  Laser   []  Anodyne Position: seated  Location:right sh    []  Laser with stim  []  Other:  Position:  Location:    []  Vasopneumatic Device Pressure:       [] lo [] med [] hi   Temperature: [] lo [] med [] hi   [x] Skin assessment post-treatment:  [x]intact []redness- no adverse reaction      33 min Therapeutic Exercise:  [x] See flow sheet :   Rationale: increase ROM and increase strength to improve the patients ability to ease with ADLs            With   [] TE   [] TA   [] neuro   [] other: Patient Education: [x] Review HEP    [] Progressed/Changed HEP based on:   [] positioning   [] body mechanics   [] transfers   [] heat/ice application    [] other:      Other Objective/Functional Measures:   Cues in supine to prevent UT hiking; able to progress with assist to lift 3# weights in supine  At cabinet pt able to elevate ~90 degrees before requiring assist to reach to 125 degrees  tricep and teres major hypertonicity  Functionally improving with higher ROM    Pain Level (0-10 scale) post treatment: 0/10    ASSESSMENT/Changes in Function: Pt progressing in therapy with improving AROM both in supine and standing. She is able to reach into her microwave now but still can't reach into her cabinets for her salt/pepper shakers. She continues to have posterior shoulder tightness preventing higher ROM and decreased shoulder strength globally. She does have improved posture awareness. Will continue with stretching/strengthening to improve functional use of the right shoulder. Progress towards goals / Updated goals:  1. Patient will improve FOTO score by 27 points in order to demonstrate a significant improvement in function. 2. Patient will improve right shoulder AROM in flexion and scaption to 90 degrees in seated position in order to increase ease of fixing her hair. Progressing right shoulder 90 degrees  3.  Patient will improve right shoulder flexion/abd strength to 4-/5 in order to increase ease of ADLs    PLAN  [x]  Upgrade activities as tolerated     [x]  Continue plan of care  []  Update interventions per flow sheet       []  Discharge due to:_  []  Other:_      Ana Paula Cotton, DAREK 11/13/2019  10:40 AM    Future Appointments   Date Time Provider Heri Beal   11/15/2019 10:30 AM Jc Khoa, PTA [facility]   11/18/2019 10:30 AM Jc Khoa, PTA [facility]   11/20/2019  9:30 AM Cecil Cabrales, PT [facility]   11/22/2019 12:00 PM Jc Khoa, PTA [facility]   11/25/2019  9:30 AM Jc Khoa, PTA [facility]   11/26/2019  9:00 AM Jc Khoa, PTA [facility]   11/26/2019 11:30 AM [facility] [place] [facility]   11/27/2019 10:00 AM Jc Khoa, PTA [facility]   1/16/2020  9:30 AM [facility] FAST TRACK NURSE [facility]

## 2019-11-15 ENCOUNTER — HOSPITAL ENCOUNTER (OUTPATIENT)
Dept: PHYSICAL THERAPY | Age: 79
Discharge: HOME | End: 2019-11-15
Payer: MEDICARE

## 2019-11-15 PROCEDURE — 97110 THERAPEUTIC EXERCISES: CPT

## 2019-11-15 NOTE — PROGRESS NOTES
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 She feels like she is getting stronger. She is going to [place] in January.      OBJECTIVE    Modality rationale: decrease pain to improve the patients ability to ease with ADLs   Min Type Additional Details    [] Estim:  []Unatt       []IFC  []Premod                        []Other:  []w/ice   []w/heat  Position:  Location:    [] Estim: []Att    []TENS instruct  []NMES                    []Other:  []w/US   []w/ice   []w/heat  Position:  Location:    []  Traction: [] Cervical       []Lumbar                       [] Prone          []Supine                       []Intermittent   []Continuous Lbs:  [] before manual  [] after manual    []  Ultrasound: []Continuous   [] Pulsed                           []1MHz   []3MHz W/cm2:  Location:    []  Iontophoresis with dexamethasone         Location: [] Take home patch   [] In clinic   15 []  Ice     [x]  heat  []  Ice massage   []  Laser   []  Anodyne Position: seated  Location:right sh    []  Laser with stim  []  Other:  Position:  Location:    []  Vasopneumatic Device Pressure:       [] lo [] med [] hi   Temperature: [] lo [] med [] hi   [x] Skin assessment post-treatment:  [x]intact []redness- no adverse reaction      40 min Therapeutic Exercise:  [x] See flow sheet :   Rationale: increase ROM and increase strength to improve the patients ability to ease with ADLs            With   [] TE   [] TA   [] neuro   [] other: Patient Education: [x] Review HEP    [] Progressed/Changed HEP based on:   [] positioning   [] body mechanics   [] transfers   [] heat/ice application    [] other:      Other Objective/Functional Measures:   Improved pectoral activation with strengthening exercises  Challenged with keeping elbow extended to prevent full use of bicep with flexion versus pectorals  Fatigue with exercises but good push through session    Pain Level (0-10 scale) post treatment: 0/10    ASSESSMENT/Changes in Function: Pt progressing with pectoral strength but continues to overuse biceps for flexion due to pectoral weakness. Will continue with focus of strengthening to improve functional use of right shoulder for reaching into cabinets. Progress towards goals / Updated goals:  1. Patient will improve FOTO score by 27 points in order to demonstrate a significant improvement in function. 2. Patient will improve right shoulder AROM in flexion and scaption to 90 degrees in seated position in order to increase ease of fixing her hair. Progressing right shoulder 90 degrees  3.  Patient will improve right shoulder flexion/abd strength to 4-/5 in order to increase ease of ADLs    PLAN  [x]  Upgrade activities as tolerated     [x]  Continue plan of care  []  Update interventions per flow sheet       []  Discharge due to:_  []  Other:_      Imani Jon PTA 11/15/2019  10:40 AM    Future Appointments   Date Time Provider Heri Beal   11/18/2019 10:30 AM Adalgisa Chamber, PTA [facility]   11/20/2019  9:30 AM Eugene Camp, PT [facility]   11/22/2019 12:00 PM Adalgisa Chamber, PTA [facility]   11/25/2019  9:30 AM Adalgisa Chamber, PTA [facility]   11/26/2019  9:00 AM Adalgisa Chamber, PTA [facility] 11/26/2019 11:30 AM MD Deepti Moreno [address]   11/27/2019 10:00 AM Rosemary Beck PTA [facility]   1/16/2020  9:30 AM [facility] FAST TRACK NURSE [facility]

## 2019-11-18 ENCOUNTER — HOSPITAL ENCOUNTER (OUTPATIENT)
Dept: PHYSICAL THERAPY | Age: 79
Discharge: HOME | End: 2019-11-18
Payer: MEDICARE

## 2019-11-18 PROCEDURE — 97110 THERAPEUTIC EXERCISES: CPT

## 2019-11-18 NOTE — PROGRESS NOTES
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 She states still can't get in her cabinets without help. She doesn't do much strengthening at home but wants functional use of her arm back. Her neck always bothers her.        OBJECTIVE    Modality rationale: decrease pain to improve the patients ability to ease with ADLs   Min Type Additional Details    [] Estim:  []Unatt       []IFC  []Premod                        []Other:  []w/ice   []w/heat  Position:  Location:    [] Estim: []Att    []TENS instruct  []NMES                    []Other:  []w/US   []w/ice   []w/heat  Position:  Location:    []  Traction: [] Cervical       []Lumbar                       [] Prone          []Supine                       []Intermittent   []Continuous Lbs:  [] before manual  [] after manual    []  Ultrasound: []Continuous   [] Pulsed                           []1MHz   []3MHz W/cm2:  Location:    []  Iontophoresis with dexamethasone         Location: [] Take home patch   [] In clinic   15 []  Ice     [x]  heat  []  Ice massage   []  Laser   []  Anodyne Position: seated  Location:right sh    []  Laser with stim  []  Other:  Position:  Location:    []  Vasopneumatic Device Pressure:       [] lo [] med [] hi   Temperature: [] lo [] med [] hi   [x] Skin assessment post-treatment:  [x]intact []redness- no adverse reaction      29 min Therapeutic Exercise:  [x] See flow sheet :   Rationale: increase ROM and increase strength to improve the patients ability to ease with ADLs            With   [] TE   [] TA   [] neuro   [] other: Patient Education: [x] Review HEP    [] Progressed/Changed HEP based on:   [] positioning   [] body mechanics   [] transfers   [] heat/ice application    [] other:      Other Objective/Functional Measures:   Bicep compensation to help with supine shoulder flexion; required assist when lifting with elbow held in extension  Challenged with chest flies and chest bench press with increased weight; close guarding to in case of fatigue  Worked on eccentric strength by helping lift right  UE into cabinet and lowering glasses and bowls  Able to eccentrically lower but not actively flex to same level      Pain Level (0-10 scale) post treatment: 0/10    ASSESSMENT/Changes in Function: Pt progressing with eccentric strength of the shoulder flexors but continues to have difficulty with concentric contraction. She has improving pectoral activation with exercises but relies heavily on long head bicep contraction for assist. Will continue with general strengthening within available range to improve ease of reaching into cabinets. Progress towards goals / Updated goals:  1. Patient will improve FOTO score by 27 points in order to demonstrate a significant improvement in function. 2. Patient will improve right shoulder AROM in flexion and scaption to 90 degrees in seated position in order to increase ease of fixing her hair. Progressing right shoulder 90 degrees  3.  Patient will improve right shoulder flexion/abd strength to 4-/5 in order to increase ease of ADLs    PLAN  [x]  Upgrade activities as tolerated     [x]  Continue plan of care  []  Update interventions per flow sheet       []  Discharge due to:_  []  Other:_      Jeni Luciano Fina Muller, DAREK 11/18/2019  10:40 AM    Future Appointments   Date Time Provider Heri Beal   11/18/2019 10:30 AM Claus Terry, PTA [facility]   11/20/2019  9:30 AM Carmen Saldivar, PT [facility]   11/22/2019 12:00 PM Claus Terry, PTA [facility]   11/25/2019  9:30 AM Claus Terry, PTA [facility]   11/26/2019  9:00 AM Claus Terry, PTA [facility]   11/26/2019 11:30 AM Cecilia De Guzman MD [place] [id number]   11/27/2019 10:00 AM Claus Terry, PTA [facility]   1/16/2020  9:30 AM [facility] FAST TRACK NURSE [facility]

## 2019-11-20 ENCOUNTER — HOSPITAL ENCOUNTER (OUTPATIENT)
Dept: PHYSICAL THERAPY | Age: 79
Discharge: HOME | End: 2019-11-20
Payer: MEDICARE

## 2019-11-20 PROCEDURE — 97110 THERAPEUTIC EXERCISES: CPT

## 2019-11-20 NOTE — PROGRESS NOTES
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   Prior Level of Function: Right Hand Dominant. Previous DVT. Lives with .     Visits from Start of Care: 41    Missed Visits: 0    The Plan of Care and following information is based on the patient's current status:  Goal: Patient will improve FOTO score by 27 points in order to demonstrate a significant improvement in function. Status at last note/certification: 38  Current Status: not met    Goal: Patient will improve right shoulder AROM in flexion and scaption to 90 degrees in seated position in order to increase ease of fixing her hair.    Status at last note/certification: flexion: 70 degrees  Current Status: not met    Goal: Patient will improve right shoulder flexion/abd strength to 4-/5 in order to increase ease of ADLs  Status at last note/certification: unable to test  Current Status: not met    Key functional changes: improving right shoulder flexion AROM      Problems/ barriers to goal attainment: significant edema at start of care     Problem List: pain affecting function, decrease ROM, decrease strength, impaired gait/ balance, decrease ADL/ functional abilitiies, decrease activity tolerance, decrease flexibility/ joint mobility and decrease transfer abilities    Treatment Plan: Therapeutic exercise, Therapeutic activities, Neuromuscular re-education, Physical agent/modality, Gait/balance training, Manual therapy, Patient education, Self Care training and Functional mobility training     Patient Goal (s) has been updated and includes: to reach my salt and pepper shakers     Goals for this certification period to be accomplished in 4 weeks:  1. Patient will improve FOTO score by 27 points in order to demonstrate a significant improvement in function. 2. Patient will improve right shoulder AROM in flexion and scaption to 90 degrees in seated position in order to increase ease of fixing her hair. 3. Patient will improve right shoulder flexion/abd strength to 4-/5 in order to increase ease of ADLs    Frequency / Duration: Patient to be seen 2 times per week for 4 weeks:    Assessment / Recommendations: pt. Continues to progress with physical therapy. FOTO score improved to 54 points. AROM in sitting has been improving flexion: 90 degrees and scaption at 72 degrees. Right shoulder MMT flexion: 4-/5, abd: 3/5. Her mobility continues to be limited by decreased strength. She has increased ease of using right UE for ADLs but continues to be challenged with reaching into cabinets at home and has some difficulty reaching to her head at times. Skilled PT is medically necessary in order to continue to improve right shoulder strength and mobility for increased ease of ADLs and improved quality of life. Certification Period: 11/20/19-12/19/19    Jamie Henry, PT 11/20/2019 1:53 PM    ________________________________________________________________________  I certify that the above Therapy Services are being furnished while the patient is under my care. I agree with the treatment plan and certify that this therapy is necessary. [] I have read the above and request that my patient continue as recommended.   [] I have read the above report and request that my patient continue therapy with the following changes/special instructions: _______________________________________  [] I have read the above report and request that my patient be discharged from therapy    Physician's Signature:____________Date:_________TIME:________    ** Signature, Date and Time must be completed for valid certification **    Please sign and return to [facility] [employer] [facility] [employer]  [address] [place] [address]  [phone] fax

## 2019-11-20 NOTE — PROGRESS NOTES
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 Reports she is doing pretty good. She reports increased ease of plugging things in. Still having difficulty reaching into cabinets    OBJECTIVE    35 min Therapeutic Exercise:  [x] See flow sheet :   Rationale: increase ROM and increase strength to improve the patients ability to increase ease of ADLs          With   [x] TE   [] TA   [] neuro   [] other: Patient Education: [x] Review HEP    [] Progressed/Changed HEP based on:   [] positioning   [] body mechanics   [] transfers   [] heat/ice application    [] other:      Other Objective/Functional Measures: FOTO: 54  Right shoulder AROM flex: 90 scap: 72  Right shoulder MMT: flex: 4-/5 abd: 3/5  Pt.  Was educated on reaching into cabinets for her HEP    Pain Level (0-10 scale) post treatment:  2/10    ASSESSMENT/Changes in Function:    See progress    Progress towards goals / Updated goals:  See progress note    PLAN  []  Upgrade activities as tolerated     [x]  Continue plan of care  []  Update interventions per flow sheet       []  Discharge due to:_  []  Other:_      Lakshmi Mendez, PT 2019  8:40 AM    Future Appointments   Date Time Provider Heri Beal   2019  9:30 AM Suzette Alejo, PT [facility]   2019 12:00 PM Earl Saez, DAREK [facility]   11/25/2019  9:30 AM Earl Saez PTA [facility]   11/26/2019  9:00 AM Earl Saez PTA [facility]   11/26/2019 11:30 AM Mani Loredo MD [place] [id number]   11/27/2019 10:00 AM Earl Saez PTA [facility]   1/16/2020  9:30 AM [facility] FAST TRACK NURSE [facility]

## 2019-11-22 ENCOUNTER — HOSPITAL ENCOUNTER (OUTPATIENT)
Dept: PHYSICAL THERAPY | Age: 79
Discharge: HOME | End: 2019-11-22
Payer: MEDICARE

## 2019-11-22 PROCEDURE — 97110 THERAPEUTIC EXERCISES: CPT

## 2019-11-22 NOTE — PROGRESS NOTES
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 She still needs more strength. She needs to get her Sherri decorations down from the attic. OBJECTIVE    43 min Therapeutic Exercise:  [x] See flow sheet :   Rationale: increase ROM and increase strength to improve the patients ability to increase ease of ADLs    15 minutes of MH at end of session to right shoulder for decreased tightness and pain post exercises          With   [x] TE   [] TA   [] neuro   [] other: Patient Education: [x] Review HEP    [] Progressed/Changed HEP based on:   [] positioning   [] body mechanics   [] transfers   [] heat/ice application    [] other:      Other Objective/Functional Measures:   Supine AROM flexion 120 degrees  Supine PROM flexion with firm end feel 135 degrees  Educated on working on  range for strengthening    Pain Level (0-10 scale) post treatment:  0/10    ASSESSMENT/Changes in Function: Pt progressing with strengthening within available range. She has an easier time reaching into her microwave but still struggles with reaching into the cabinet. Will continue with right shoulder strength to improve functional use of arm for ADLs.     Goals for this certification period to be accomplished in 4 weeks:  1. Patient will improve FOTO score by 27 points in order to demonstrate a significant improvement in function. 2. Patient will improve right shoulder AROM in flexion and scaption to 90 degrees in seated position in order to increase ease of fixing her hair.    3. Patient will improve right shoulder flexion/abd strength to 4-/5 in order to increase ease of ADLs    PLAN  [x]  Upgrade activities as tolerated     [x]  Continue plan of care  []  Update interventions per flow sheet       []  Discharge due to:_  []  Other:_      Mimi Senior PTA 11/22/2019  8:40 AM    Future Appointments   Date Time Provider Heri Beal   11/25/2019  9:30 AM Alicia Pedro PTA [facility]   11/26/2019  9:00 AM Alicia Pedro PTA [facility]   11/26/2019 11:30 AM Samuel Poe MD [facility]   11/27/2019 10:00 AM Alicia Pedro PTA [facility]   12/2/2019 12:30 PM Alicia Pedro PTA [facility]   12/4/2019  9:00 AM Arash Juarez, PT [facility]   12/6/2019  9:30 AM Arash Juarez, PT [facility]   12/9/2019 11:00 AM Alicia Pedro PTA [facility]   12/11/2019  9:30 AM Arash Juarez, PT [facility]   12/12/2019 10:00 AM Alicia Pedro PTA [facility]   12/16/2019  9:00 AM Arash Juarez, PT [facility]   12/18/2019  9:30 AM Alicia Pedro PTA [facility]   12/20/2019  9:30 AM Alicia Gull, PTA [facility]   12/23/2019  9:30 AM Alicia Pedro, PTA [facility]   12/26/2019 10:00 AM Alicia Pedro, PTA [facility]   12/27/2019  9:30 AM Alicia Pedro PTA [facility]   12/30/2019  9:30 AM Alicia Pedro, PTA [facility]   1/2/2020  9:30 AM Alicia Pedro, PTA [facility]   1/3/2020  9:30 AM Alicia Pdero, PTA [facility]   1/16/2020  9:30 AM [facility] FAST TRACK NURSE [facility]

## 2019-11-25 ENCOUNTER — HOSPITAL ENCOUNTER (OUTPATIENT)
Dept: PHYSICAL THERAPY | Age: 79
Discharge: HOME | End: 2019-11-25
Payer: MEDICARE

## 2019-11-25 PROCEDURE — 97110 THERAPEUTIC EXERCISES: CPT

## 2019-11-25 NOTE — PROGRESS NOTES
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 She reports no pain after sessions. She has been working on her posture at home. OBJECTIVE    42 min Therapeutic Exercise:  [x] See flow sheet :   Rationale: increase ROM and increase strength to improve the patients ability to increase ease of ADLs    15 minutes of MH at end of session to right shoulder for decreased tightness and pain post exercises          With   [x] TE   [] TA   [] neuro   [] other: Patient Education: [x] Review HEP    [] Progressed/Changed HEP based on:   [] positioning   [] body mechanics   [] transfers   [] heat/ice application    [] other:      Other Objective/Functional Measures:   Continues to be challenged between  range for strength  Able to perform on  Inclined table but not standing  Cues for posture  Fatigue with pectoral strengthening  No increased pain during session    Pain Level (0-10 scale) post treatment: 0/10     ASSESSMENT/Changes in Function: Pt is progressing with overall strength but is most limited with  degrees range strength for reaching into cabinets. She has improved awareness of posture. Will continue with general functional strengthening for ease of performing ADLs. Goals for this certification period to be accomplished in 4 weeks:  1. Patient will improve FOTO score by 27 points in order to demonstrate a significant improvement in function. 2. Patient will improve right shoulder AROM in flexion and scaption to 90 degrees in seated position in order to increase ease of fixing her hair.    3. Patient will improve right shoulder flexion/abd strength to 4-/5 in order to increase ease of ADLs    PLAN  [x]  Upgrade activities as tolerated     [x]  Continue plan of care  []  Update interventions per flow sheet       []  Discharge due to:_  []  Other:_      Duran Cummings, DAREK 11/25/2019  8:40 AM    Future Appointments   Date Time Provider Heri Lian   11/25/2019  9:30 AM Chica Fernandez, PTA [facility]   11/26/2019  9:00 AM Chica Fernandez, PTA [facility]   11/26/2019 11:30 AM MD Deepti Jordan   11/27/2019 10:00 AM Chica Fernandez, PTA [facility]   12/2/2019 12:30 PM Chicasugey Fernandez, PTA [facility]   12/4/2019  9:00 AM Priscila Grate, PT [facility]   12/6/2019  9:30 AM Priscila Grate, PT [facility]   12/9/2019 11:00 AM Chica Rebecca, PTA [facility]   12/11/2019  9:30 AM Priscila Grate, PT [facility]   12/12/2019 10:00 AM Chica Rebecca, PTA [facility]   12/16/2019  9:00 AM Priscila Grate, PT [facility]   12/18/2019  9:30 AM Chica Rebecca, PTA [facility]   12/20/2019  9:30 AM Chicasugey Regaladoing, PTA [facility]   12/23/2019  9:30 AM Chica Rebecca, PTA [facility]   12/26/2019 10:00 AM Chica Regaladoing, PTA [facility]   12/27/2019  9:30 AM Chica Regaladoing, PTA [facility]   12/30/2019  9:30 AM Chica Regaladoing, PTA [facility]   1/2/2020  9:30 AM Chica Regaladoing, PTA [facility]   1/3/2020  9:30 AM Chica Fernandez, PTA [facility]   1/16/2020  9:30 AM [facility] FAST TRACK NURSE [facility]

## 2019-11-26 ENCOUNTER — OFFICE VISIT (OUTPATIENT)
Dept: ORTHOPEDIC SURGERY | Age: 79
End: 2019-11-26

## 2019-11-26 ENCOUNTER — HOSPITAL ENCOUNTER (OUTPATIENT)
Dept: PHYSICAL THERAPY | Age: 79
Discharge: HOME | End: 2019-11-26
Payer: MEDICARE

## 2019-11-26 VITALS
HEIGHT: 65 IN | HEART RATE: 62 BPM | SYSTOLIC BLOOD PRESSURE: 130 MMHG | DIASTOLIC BLOOD PRESSURE: 63 MMHG | OXYGEN SATURATION: 98 % | RESPIRATION RATE: 16 BRPM | BODY MASS INDEX: 20.69 KG/M2 | TEMPERATURE: 98.4 F | WEIGHT: 124.2 LBS

## 2019-11-26 DIAGNOSIS — M12.811 ROTATOR CUFF ARTHROPATHY, RIGHT: Primary | ICD-10-CM

## 2019-11-26 PROCEDURE — 97110 THERAPEUTIC EXERCISES: CPT

## 2019-11-26 NOTE — PROGRESS NOTES
[facility]  1940     HISTORY OF PRESENT ILLNESS  Violet Garces is a 66 y.o. female who presents today for evaluation s/p Right reverse total shoulder arthroplasty on 7/31/19. Describes pain as a 2/10 today. She has been attending PT for her shoulder. She states she does feel like she is making progress with PT. She does report some pain while in PT but this goes away. She states it is \"hard to Pitney Shon" if her pain has improved since before surgery. She is able to raise her arm a little higher since last OV. Patient denies any fever, chills, chest pain, shortness of breath or calf pain. The remainder of the review of systems is negative. There are no new illness or injuries to report since last seen in the office. No changes in medications, allergies, social or family history. Pain Assessment  11/26/2019   Location of Pain Shoulder   Location Modifiers Right   Severity of Pain 2   Quality of Pain Aching   Quality of Pain Comment -   Duration of Pain A few hours   Duration of Pain Comment -   Frequency of Pain Intermittent   Aggravating Factors Bending;Stretching   Aggravating Factors Comment -   Limiting Behavior Some   Relieving Factors Rest;Heat   Relieving Factors Comment -   Result of Injury No       PHYSICAL EXAM:   Visit Vitals  /63   Pulse 62   Temp 98.4 °F (36.9 °C) (Oral)   Resp 16   Ht 5' 5\" (1.651 m)   Wt 124 lb 3.2 oz (56.3 kg)   LMP  (LMP Unknown)   SpO2 98%   BMI 20.67 kg/m²      The patient is a well-developed, well-nourished female in no acute distress. The patient is alert and oriented times three. The patient appears to be well groomed. Mood and affect are normal.  LYMPHATIC: lymph nodes are not enlarged and are within normal limits  SKIN: normal in color and non tender to palpation. There are no bruises or abrasions noted. NEUROLOGICAL: Motor sensory exam is within normal limits. Reflexes are equal bilaterally.  There is normal sensation to pinprick and light touch  ORTHOPEDIC EXAM of right shoulder:  Inspection: swelling present,  Bruising present, swelling to right hand and fingers - with some improvement  Incision well healed  Passive glenohumeral abduction 0-80 degrees, able to make active passive fist  Stability: Stable  Strength: n/a  2+ distal pulses  Pitting edema in the forearm and hand much improved  Doppler RUE: no evidence of DVT    IMPRESSION:  S/P Right reverse total shoulder arthroplasty    Encounter Diagnosis   Name Primary?  Rotator cuff arthropathy, right Yes         PLAN:   1. Pt presents today s/p Right reverse total shoulder arthroplasty on 7/31/19. Pt reports some progress with PT and I would like her to continue with PT. We will see her back in 4 weeks. Risk factors include: htn  2. No ultrasound exam indicated today  3. No cortisone injection indicated today   4. Yes Physical/Occupational Therapy indicated today  5. No diagnostic test indicated today:   6. No durable medical equipment indicated today  7. No referral indicated today   8. No medications indicated today:   9. No Narcotic indicated today       RTC 4 weeks    Scribed by Ivonne Rich) as dictated by Emily Calderon MD    I, Dr. Emily Calderon, confirm that all documentation is accurate.     Emily Calderon M.D.   [facility]

## 2019-11-26 NOTE — PROGRESS NOTES
1. Have you been to the ER, urgent care clinic since your last visit? Hospitalized since your last visit? No    2. Have you seen or consulted any other health care providers outside of the [address] [place] [address] since your last visit? Include any pap smears or colon screening.  No

## 2019-11-26 NOTE — PROGRESS NOTES
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        OBJECTIVE    42 min Therapeutic Exercise:  [x] See flow sheet :   Rationale: increase ROM and increase strength to improve the patients ability to increase ease of ADLs    15 minutes of MH at end of session to right shoulder for decreased tightness and pain post exercises          With   [x] TE   [] TA   [] neuro   [] other: Patient Education: [x] Review HEP    [] Progressed/Changed HEP based on:   [] positioning   [] body mechanics   [] transfers   [] heat/ice application    [] other:      Other Objective/Functional Measures:   Challenged with eric chest press  Improved form with bench press in supine  Poor pectoral strength but good bicep strength  Fatigue with drivers using a 1# dumbbell  Added open books and bent over rows for posture    Pain Level (0-10 scale) post treatment:  0/10    ASSESSMENT/Changes in Function: Pt making slow progress with final strengthening. She has active motion to about 90 degrees with mainly bicep activation and poor pectoral strength.  Will continue with focus on chest strengthening to improve ease of overhead reaching for getting into her cabinets. Goals for this certification period to be accomplished in 4 weeks:  1. Patient will improve FOTO score by 27 points in order to demonstrate a significant improvement in function. 2. Patient will improve right shoulder AROM in flexion and scaption to 90 degrees in seated position in order to increase ease of fixing her hair.    3. Patient will improve right shoulder flexion/abd strength to 4-/5 in order to increase ease of ADLs    PLAN  [x]  Upgrade activities as tolerated     [x]  Continue plan of care  []  Update interventions per flow sheet       []  Discharge due to:_  []  Other:_      Mesha Cuenca PTA 11/26/2019  8:40 AM    Future Appointments   Date Time Provider Heri Beal   11/26/2019 11:30 AM Sissy Moran MD [place] [id number]   11/27/2019 10:00 AM Eugene Spain, PTA [facility]   12/2/2019 12:30 PM Eugene Spain, PTA [facility]   12/4/2019  9:00 AM Martínez Nab, PT [facility]   12/6/2019  9:30 AM Martínez Nab, PT [facility]   12/9/2019 11:00 AM Eugene Spain, PTA [facility]   12/11/2019  9:30 AM Martínez Nab, PT [facility]   12/12/2019 10:00 AM Eugene Spain, PTA [facility]   12/16/2019  9:00 AM Martínez Nab, PT [facility]   12/18/2019  9:30 AM Eugene Blueer, PTA [facility]   12/20/2019  9:30 AM Eugene Spain, PTA [facility]   12/23/2019  9:30 AM Eugene Spain, PTA [facility]   12/26/2019 10:00 AM Eugene Spain, PTA [facility]   12/27/2019  9:30 AM Eugene Spain, PTA [facility]   12/30/2019  9:30 AM Eugene Spain, PTA [facility]   1/2/2020  9:30 AM Eugene Spain, PTA [facility]   1/3/2020  9:30 AM Eugene Spain, PTA [facility]   1/16/2020  9:30 AM [facility] FAST TRACK NURSE [facility]

## 2019-11-27 ENCOUNTER — HOSPITAL ENCOUNTER (OUTPATIENT)
Dept: PHYSICAL THERAPY | Age: 79
Discharge: HOME | End: 2019-11-27
Payer: MEDICARE

## 2019-11-27 PROCEDURE — 97110 THERAPEUTIC EXERCISES: CPT

## 2019-11-27 NOTE — PROGRESS NOTES
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 She feels she is getting stronger. OBJECTIVE    37 min Therapeutic Exercise:  [x] See flow sheet :   Rationale: increase ROM and increase strength to improve the patients ability to increase ease of ADLs    15 minutes of MH at end of session to right shoulder for decreased tightness and pain post exercises          With   [x] TE   [] TA   [] neuro   [] other: Patient Education: [x] Review HEP    [] Progressed/Changed HEP based on:   [] positioning   [] body mechanics   [] transfers   [] heat/ice application    [] other:      Other Objective/Functional Measures:   Challenged with powder board horizontal adduction and flexion using 10# kettlebell  Challenged with chest press using OTB requiring assist  Fatigue with bench press  Focus of pectoral strengthening  Added SAEBO to improve reaching into cabinets    Pain Level (0-10 scale) post treatment:  0/10    ASSESSMENT/Changes in Function: Pt is progressing with pectoral strengthening but overuses biceps with shoulder flexion. She has improving functional use of the right shoulder.  Will continue per MD recommendation for a few more weeks of strengthening with transition to a home program for continued independent progression. Goals for this certification period to be accomplished in 4 weeks:  1. Patient will improve FOTO score by 27 points in order to demonstrate a significant improvement in function. 2. Patient will improve right shoulder AROM in flexion and scaption to 90 degrees in seated position in order to increase ease of fixing her hair.    3. Patient will improve right shoulder flexion/abd strength to 4-/5 in order to increase ease of ADLs    PLAN  [x]  Upgrade activities as tolerated     [x]  Continue plan of care  []  Update interventions per flow sheet       []  Discharge due to:_  []  Other:_      Ramila Juárez, PTA 11/27/2019  8:40 AM    Future Appointments   Date Time Provider Heri Beal   12/2/2019 12:30 PM Chris Olivares, PTA [facility]   12/4/2019  9:00 AM Alvarado Rouse, PT [facility]   12/6/2019  9:30 AM Alvarado Rouse, PT [facility]   12/9/2019 11:00 AM Chris Olivares, PTA [facility]   12/11/2019  9:30 AM Alvarado Rouse, PT [facility]   12/12/2019 10:00 AM Chris Olivares, PTA [facility]   12/16/2019  9:00 AM Alvarado Rouse, PT [facility]   12/18/2019  9:30 AM Chris Olivares, PTA [facility]   12/20/2019  9:30 AM Chris Olivares, PTA [facility]   12/23/2019  9:30 AM Chris Olivares, PTA [facility]   12/26/2019 10:00 AM Chris Olivares, PTA [facility]   12/27/2019  9:30 AM Chris Olivares, PTA [facility]   12/30/2019  9:30 AM Chris Olivares, PTA [facility]   12/30/2019  1:40 PM Damir Shelley MD [place] [id number]   1/2/2020  9:30 AM Chris Olivares, PTA [facility]   1/3/2020  9:30 AM Chris Olivares, PTA [facility]   1/16/2020  9:30 AM [facility]

## 2019-12-02 ENCOUNTER — HOSPITAL ENCOUNTER (OUTPATIENT)
Dept: PHYSICAL THERAPY | Age: 79
Discharge: HOME | End: 2019-12-02
Payer: MEDICARE

## 2019-12-02 PROCEDURE — 97110 THERAPEUTIC EXERCISES: CPT

## 2019-12-02 NOTE — PROGRESS NOTES
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 She continues to try to walk up her cabinets with her right shoulder but can't lift it on her own. OBJECTIVE    43 min Therapeutic Exercise:  [x] See flow sheet :   Rationale: increase ROM and increase strength to improve the patients ability to increase ease of ADLs    15 minutes of MH at end of session to right shoulder for decreased tightness and pain post exercises          With   [x] TE   [] TA   [] neuro   [] other: Patient Education: [x] Review HEP    [] Progressed/Changed HEP based on:   [] positioning   [] body mechanics   [] transfers   [] heat/ice application    [] other:      Other Objective/Functional Measures:    Focus of session remains on pectoral strengthening to improve shoulder flexion AROM  Challenged with eric chest press at 0.5#  Able to perform pressing motion on powder board for flexion and horizontal adduction  Used B UEs with back to wall for overhead press with 2# dumbbell to prevent trunk extension compensation  Cues to not go on tippy toes performing SAEBO from red to blue      Pain Level (0-10 scale) post treatment:  0/10    ASSESSMENT/Changes in Function: Pt making slow progress towards updated goals due to decreased pectoral strength and activation. She has improved posture awareness but has slow progression of strengthening likely due to continued use of left to assist at home with [facility] reaching. Will continue with functional right shoulder strengthening to improve ease of reaching into cabinets. Goals for this certification period to be accomplished in 4 weeks:  1. Patient will improve FOTO score by 27 points in order to demonstrate a significant improvement in function. 2. Patient will improve right shoulder AROM in flexion and scaption to 90 degrees in seated position in order to increase ease of fixing her hair. Remains 90 degrees  3.  Patient will improve right shoulder flexion/abd strength to 4-/5 in order to increase ease of ADLs    PLAN  [x]  Upgrade activities as tolerated     [x]  Continue plan of care  []  Update interventions per flow sheet       []  Discharge due to:_  []  Other:_      Valorie Lin PTA 12/2/2019  8:40 AM    Future Appointments   Date Time Provider Heri Beal   12/2/2019 12:30 PM Edna Menezes PTA [facility]   12/4/2019  9:00 AM Arie Iba, PT [facility]   12/6/2019  9:30 AM Arie Iba, PT [facility]   12/9/2019 11:00 AM Edna Menezes, PTA [facility]   12/11/2019  9:30 AM Arie Iba, PT [facility]   12/12/2019 10:00 AM Edna Menezes, PTA [facility]   12/16/2019  9:00 AM Arie Iba, PT [facility]   12/18/2019  9:30 AM Edna Menezes, PTA [facility]   12/20/2019  9:30 AM Edna Ponder, PTA [facility]   12/23/2019  9:30 AM Edna Menezes, PTA [facility]   12/26/2019 10:00 AM Edna Menezes, PTA [facility]   12/27/2019  9:30 AM Edna Menezes, PTA [facility]   12/30/2019  9:30 AM Edna Menezes, PTA [facility]   12/30/2019  1:40 PM Tayo Moe MD [place] [id number]   1/2/2020  9:30 AM Edna Menezes, PTA [facility]   1/3/2020  9:30 AM Edna Menezes, PTA [facility]   1/16/2020  9:30 AM [facility]

## 2019-12-04 ENCOUNTER — HOSPITAL ENCOUNTER (OUTPATIENT)
Dept: PHYSICAL THERAPY | Age: 79
Discharge: HOME | End: 2019-12-04
Payer: MEDICARE

## 2019-12-04 PROCEDURE — 97110 THERAPEUTIC EXERCISES: CPT

## 2019-12-04 PROCEDURE — 97140 MANUAL THERAPY 1/> REGIONS: CPT

## 2019-12-04 NOTE — PROGRESS NOTES
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 Reports she is having some more soreness in her shoulder today.      OBJECTIVE    Modality rationale: decrease pain to improve the patients ability to increase ease of ADLs   Min Type Additional Details    - Estim:  -Unatt       -IFC  -Premod                        -Other:  -w/ice   -w/heat  Position:  Location:    - Estim: -Att    -TENS instruct  -NMES                    -Other:  -w/US   -w/ice   -w/heat  Position:  Location:    -  Traction: - Cervical       -Lumbar                       - Prone          -Supine                       -Intermittent   -Continuous Lbs:  - before manual  - after manual    -  Ultrasound: -Continuous   - Pulsed                           -1MHz   -3MHz W/cm2:  Location:    -  Iontophoresis with dexamethasone         Location: - Take home patch   - In clinic   10 -  Ice     -x  heat  -  Ice massage  -  Laser   -  Anodyne Position: seated  Location: right shoulder    -  Laser with stim  -  Other:  Position:  Location:    -  Vasopneumatic Device Pressure:       - lo - med - hi   Temperature: - lo - med - hi   - x Skin assessment post-treatment:  -x intact -redness- no adverse reaction    -redness - adverse reaction:     26 min Therapeutic Exercise:  [x] See flow sheet : Rationale: increase ROM and increase strength to improve the patients ability to increase ease of reaching    10 min Manual Therapy:  scap mobs, trigger point release to deltoid and infraspinatus    Rationale: decrease pain, increase ROM and increase tissue extensibility to increase ease of reaching           With   [x] TE   [] TA   [] neuro   [] other: Patient Education: [x] Review HEP    [] Progressed/Changed HEP based on:   [] positioning   [] body mechanics   [] transfers   [] heat/ice application    [] other:      Other Objective/Functional Measures:   Right shoulder AROM flex: 88 degrees scap: 72 degrees  Pt. Tolerated PT well with no reports of increased pain  She was challenged with 4# resistance for elbow flexion and extension    Pain Level (0-10 scale) post treatment: 0/10    ASSESSMENT/Changes in Function:  Pt. Continues to progress slowly with physical therapy. Right shoulder AROM flexion and scaption mobility is improving but she does continue to have excessive shrugging during shoulder AROM. She has improvement in functional tasks like reaching into microwave at home. Patient will continue to benefit from skilled PT services to modify and progress therapeutic interventions, address functional mobility deficits, address ROM deficits, address strength deficits, analyze and address soft tissue restrictions, analyze and cue movement patterns and analyze and modify body mechanics/ergonomics to attain remaining goals. Progress towards goals / Updated goals:  1. Patient will improve FOTO score by 27 points in order to demonstrate a significant improvement in function. 2. Patient will improve right shoulder AROM in flexion and scaption to 90 degrees in seated position in order to increase ease of fixing her hair. Flex: 88 degrees scap: 72 degrees (12/4/19)  3.  Patient will improve right shoulder flexion/abd strength to 4-/5 in order to increase ease of ADLs    PLAN  []  Upgrade activities as tolerated     [x]  Continue plan of care  []  Update interventions per flow sheet       []  Discharge due to:_  []  Other:_      Makayla Hernández, PT 12/4/2019  9:00 AM    Future Appointments   Date Time Provider Heri Beal   12/6/2019  9:30 AM Noble Carpio, PT [facility]   12/9/2019 11:00 AM Etheleen Gerald, PTA [facility]   12/11/2019  9:30 AM Noble Carpio, PT [facility]   12/12/2019 10:00 AM Etheleen Gerald, PTA [facility]   12/16/2019  9:00 AM Noble Carpio, PT [facility]   12/18/2019  9:30 AM Etheleen Gerald, PTA [facility]   12/20/2019  9:30 AM Etheleen Gerald, PTA [facility]   12/23/2019  9:30 AM Etheleen Gerald, PTA [facility]   12/26/2019 10:00 AM Etheleen Gerald, PTA [facility]   12/27/2019  9:30 AM Etheleen Gerald, PTA [facility]   12/30/2019  9:30 AM Etheleen Gerald, PTA [facility]   12/30/2019  1:40 PM Joselin Olivo MD [facility] [place] [address]   1/2/2020  9:30 AM Etheleen Gerald, PTA [facility]   1/3/2020  9:30 AM Etheleen Gerald, PTA [facility]   1/16/2020  9:30 AM [facility] TRACK [facility]UR[facility]

## 2019-12-06 ENCOUNTER — HOSPITAL ENCOUNTER (OUTPATIENT)
Dept: PHYSICAL THERAPY | Age: 79
Discharge: HOME | End: 2019-12-06
Payer: MEDICARE

## 2019-12-06 PROCEDURE — 97110 THERAPEUTIC EXERCISES: CPT

## 2019-12-06 NOTE — PROGRESS NOTES
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 Reports she is doing a little better today but continues to be limited with her reaching.      OBJECTIVE    Modality rationale: decrease pain and increase tissue extensibility to improve the patients ability to increase ease of ADLs   Min Type Additional Details    [] Estim:  []Unatt       []IFC  []Premod                        []Other:  []w/ice   []w/heat  Position:  Location:    [] Estim: []Att    []TENS instruct  []NMES                    []Other:  []w/US   []w/ice   []w/heat  Position:  Location:    []  Traction: [] Cervical       []Lumbar                       [] Prone          []Supine                       []Intermittent   []Continuous Lbs:  [] before manual  [] after manual    []  Ultrasound: []Continuous   [] Pulsed                           []1MHz   []3MHz W/cm2:  Location:    []  Iontophoresis with dexamethasone         Location: [] Take home patch   [] In clinic   10 []  Ice     [x]  heat  []  Ice massage  []  Laser   []  Anodyne Position: seated  Location: right shoulder    []  Laser with stim  []  Other:  Position:  Location:    []  Vasopneumatic Device Pressure:       [] lo [] med [] hi   Temperature: [] lo [] med [] hi   [x] Skin assessment post-treatment:  [x]intact []redness- no adverse reaction    []redness - adverse reaction:     29 min Therapeutic Exercise:  [x] See flow sheet :   Rationale: increase ROM and increase strength to improve the patients ability to increase ease of reaching          With   [x] TE   [] TA   [] neuro   [] other: Patient Education: [x] Review HEP    [] Progressed/Changed HEP based on:   [] positioning   [] body mechanics   [] transfers   [] heat/ice application    [] other:      Other Objective/Functional Measures:   Pt. Was challenged with lifting 1/2# weight out cabinet  She continues to have significant decreased flexibility in lats   She was educated on table stretch for flexion for HEP     Pain Level (0-10 scale) post treatment: 0/10    ASSESSMENT/Changes in Function:  Pt. Is making limited progress towards goals. She continues to have decreased right shoulder AROM secondary to decreased strength and decreased flexibility. She has some improvement in functional tasks like reaching into microwave at home. Patient will continue to benefit from skilled PT services to modify and progress therapeutic interventions, address functional mobility deficits, address ROM deficits, address strength deficits, analyze and address soft tissue restrictions, analyze and cue movement patterns and analyze and modify body mechanics/ergonomics to attain remaining goals. Progress towards goals / Updated goals:  1. Patient will improve FOTO score by 27 points in order to demonstrate a significant improvement in function. 2. Patient will improve right shoulder AROM in flexion and scaption to 90 degrees in seated position in order to increase ease of fixing her hair. Flex: 88 degrees scap: 72 degrees (12/4/19)  3.  Patient will improve right shoulder flexion/abd strength to 4-/5 in order to increase ease of ADLs    PLAN  []  Upgrade activities as tolerated     [x]  Continue plan of care  []  Update interventions per flow sheet       []  Discharge due to:_  []  Other:_ Azul Hawthorne, PT 12/6/2019  7:44 AM    Future Appointments   Date Time Provider Heri Beal   12/6/2019  9:30 AM Wolm Rotunda, PT [facility]   12/9/2019 11:00 AM Desirae Jenna, PTA [facility]   12/11/2019  9:30 AM Wolm Rotunda, PT [facility]   12/12/2019 10:00 AM Desirae Jenna, PTA [facility]   12/16/2019  9:00 AM Wolm Rotunda, PT [facility]   12/18/2019  9:30 AM Desirae Herrmannl, PTA [facility]   12/20/2019  9:30 AM Desirae West, PTA [facility]   12/23/2019  9:30 AM Desirae West, PTA [facility]   12/26/2019 10:00 AM Desirae West, PTA [facility]   12/27/2019  9:30 AM Desirae West, PTA [facility]   12/30/2019  9:30 AM Desirae West, PTA [facility]   12/30/2019  1:40 PM Marcela Ledesma MD [facility] [address]   1/2/2020  9:30 AM Desirae West, PTA [facility]   1/3/2020  9:30 AM Desirae West, PTA [facility]   1/16/2020  9:30 AM [facility] TRACK [facility]UR[facility]

## 2019-12-09 ENCOUNTER — HOSPITAL ENCOUNTER (OUTPATIENT)
Dept: PHYSICAL THERAPY | Age: 79
Discharge: HOME | End: 2019-12-09
Payer: MEDICARE

## 2019-12-09 PROCEDURE — 97110 THERAPEUTIC EXERCISES: CPT

## 2019-12-09 NOTE — PROGRESS NOTES
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 She states she feels like she isn't getting stronger lately and can't get the last bit of range back. She wants to know what to work on at home to help.      OBJECTIVE    Modality rationale: decrease pain and increase tissue extensibility to improve the patients ability to increase ease of ADLs   Min Type Additional Details    [] Estim:  []Unatt       []IFC  []Premod                        []Other:  []w/ice   []w/heat  Position:  Location:    [] Estim: []Att    []TENS instruct  []NMES                    []Other:  []w/US   []w/ice   []w/heat  Position:  Location:    []  Traction: [] Cervical       []Lumbar                       [] Prone          []Supine                       []Intermittent   []Continuous Lbs:  [] before manual  [] after manual    []  Ultrasound: []Continuous   [] Pulsed                           []1MHz   []3MHz W/cm2:  Location:    []  Iontophoresis with dexamethasone         Location: [] Take home patch   [] In clinic   15 []  Ice     [x]  heat  []  Ice massage  []  Laser   []  Anodyne Position: seated  Location: right shoulder    []  Laser with stim  []  Other:  Position:  Location:    []  Vasopneumatic Device Pressure:       [] lo [] med [] hi Temperature: [] lo [] med [] hi   [x] Skin assessment post-treatment:  [x]intact []redness- no adverse reaction    []redness - adverse reaction:     42 min Therapeutic Exercise:  [x] See flow sheet :   Rationale: increase ROM and increase strength to improve the patients ability to increase ease of reaching          With   [x] TE   [] TA   [] neuro   [] other: Patient Education: [x] Review HEP    [] Progressed/Changed HEP based on:   [] positioning   [] body mechanics   [] transfers   [] heat/ice application    [] other:      Other Objective/Functional Measures: FOTO: 47  Right shoulder AROM flexion: 90 degrees  Right shoulder AROM scaption: 78 degrees  Right shoulder flexion MMT: 3+/5 within available range  Right shoulder abduction MMT:  3+/5 within available range  Educated to work on [employer] with LARA Nielsen for strength improvements with back to wall to prevent trunk extension compensation    Pain Level (0-10 scale) post treatment: 0/10    ASSESSMENT/Changes in Function:  Pt is making slow progress towards final goals in therapy. She continues to lack 20-30 degrees of strength in the right shoulder to reach available range. She struggles with reaching into the cabinet but has an easier time getting into her microwave. Will continue with strengthening and progression towards a home program for further strength improvements upon D/C from therapy. Progress towards goals / Updated goals:  1. Patient will improve FOTO score by 27 points in order to demonstrate a significant improvement in function. 9 point improvement  2. Patient will improve right shoulder AROM in flexion and scaption to 90 degrees in seated position in order to increase ease of fixing her hair. Flex: 88 degrees scap: 72 degrees (12/4/19)  Right shoulder AROM flexion: 90 degrees  Right shoulder AROM scaption: 78 degrees  3.  Patient will improve right shoulder flexion/abd strength to 4-/5 in order to increase ease of ADLs  Right shoulder flexion MMT: 3+/5 within available range  Right shoulder abduction MMT:  3+/5 within available range    PLAN  [x]  Upgrade activities as tolerated     [x]  Continue plan of care  []  Update interventions per flow sheet       []  Discharge due to:_  []  Other:_      Logan Trejo, PTA 12/9/2019  7:44 AM    Future Appointments   Date Time Provider Heri Lian   12/9/2019 11:00 AM Renelda Bigness, PTA [facility]   12/11/2019  9:30 AM Rekha Winside, PT [facility]   12/12/2019 10:00 AM Renelda Bigness, PTA [facility]   12/16/2019  9:00 AM Rekha Gina, PT [facility]   12/18/2019  9:30 AM Renelda Bigness, PTA [facility]   12/20/2019  9:30 AM Renelda Bigness, PTA [facility]   12/23/2019  9:30 AM Renelda Bigness, PTA [facility]   12/26/2019 10:00 AM [facility]el[facility]igness, [facility]   12/27/2019  9:30 AM Renelda Bigness, PTA [facility]   12/30/2019  9:30 AM Renelda Bigness, PTA [facility]   12/30/2019  1:40 PM Norman Arreola MD [facility] [place] [address]   1/2/2020  9:30 AM Renelda Bigness, PTA [facility]   1/3/2020  9:30 AM Renelda Bigness, PTA [facility]   1/16/2020  9:30 AM [facility] FAST TRACK NURSE [facility]

## 2019-12-10 NOTE — PROGRESS NOTES
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   Prior Level of Function: Right Hand Dominant. Previous DVT. Lives with .     Visits from Start of Care: 49    Missed Visits: 0    The Plan of Care and following information is based on the patient's current status:  Goal: Patient will improve FOTO score by 27 points in order to demonstrate a significant improvement in function. Status at last note/certification: 54  Current Status: not met    Goal: Patient will improve right shoulder AROM in flexion and scaption to 90 degrees in seated position in order to increase ease of fixing her hair.    Status at last note/certification: flexion 90 degrees scaption: 72 degrees  Current Status: not met    Goal: Patient will improve right shoulder flexion/abd strength to 4-/5 in order to increase ease of ADLs  Status at last note/certification:flex: 4-/5 abd: 3/5  Current Status: not met    Key functional changes: improving abduction mobility and strength      Problems/ barriers to goal attainment: none     Problem List: pain affecting function, decrease ROM, decrease strength, decrease ADL/ functional abilitiies, decrease activity tolerance, decrease flexibility/ joint mobility and decrease transfer abilities    Treatment Plan: Therapeutic exercise, Therapeutic activities, Neuromuscular re-education, Physical agent/modality, Gait/balance training, Manual therapy, Patient education, Self Care training, Functional mobility training and Home safety training     Patient Goal (s) has been updated and includes: to move shoulder better     Goals for this certification period to be accomplished in 4 weeks:  1. Patient will improve FOTO score by 27 points in order to demonstrate a significant improvement in function. 2. Patient will improve right shoulder elevation AROM to 110 degrees in order to increase ease of reaching into cabinets at home. 3. Patient will improve right shoulder MMT to 4-/5 in order to increase ease of ADLs    Frequency / Duration: Patient to be seen 2-3 times per week for 4 weeks:    Assessment / Recommendations: pt. Continues to make slow progress with PT but is starting to plateau. Right should scaption AROM improved to 78 degrees and abduction strength improved to 3+/5. She has increased ease of performing household chores below shoulder height. She continues to have difficulty reaching overhead into cabinets. Skilled PT is medically necessary in order to improve right shoulder strength and mobility and progress her HEP in order to continue to improve strength following D/C. Certification Period: 12/9/19-1/7/20    Kary Cheema, PT 12/9/2019 7:10 PM    ________________________________________________________________________  I certify that the above Therapy Services are being furnished while the patient is under my care. I agree with the treatment plan and certify that this therapy is necessary. [] I have read the above and request that my patient continue as recommended.   [] I have read the above report and request that my patient continue therapy with the following changes/special instructions: _______________________________________  [] I have read the above report and request that my patient be discharged from therapy    Physician's Signature:____________Date:_________TIME:________    [facility], Date and Time must be completed for valid certification **    Please sign and return to [facility] - Susanna Cadet  [address] [place] [address]  [phone] fax

## 2019-12-11 ENCOUNTER — HOSPITAL ENCOUNTER (OUTPATIENT)
Dept: PHYSICAL THERAPY | Age: 79
Discharge: HOME | End: 2019-12-11
Payer: MEDICARE

## 2019-12-11 PROCEDURE — 97140 MANUAL THERAPY 1/> REGIONS: CPT

## 2019-12-11 PROCEDURE — 97110 THERAPEUTIC EXERCISES: CPT

## 2019-12-11 NOTE — PROGRESS NOTES
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 Reports she feels like her arm was heavy yesterday and had more difficulty with reaching up    OBJECTIVE    30 min Therapeutic Exercise:  [x] See flow sheet :   Rationale: increase ROM and increase strength to improve the patients ability to increase ease of ADLs    8 min Manual Therapy:  Trigger point release to infraspinatus    Rationale: decrease pain, increase ROM and increase tissue extensibility to increase ease of reaching          With   [x] TE   [] TA   [] neuro   [] other: Patient Education: [x] Review HEP    [] Progressed/Changed HEP based on:   [] positioning   [] body mechanics   [] transfers   [] heat/ice application    [] other:      Other Objective/Functional Measures:   Pt. Has improved reaching into cabinet following manual  She continues to have limited mobility past 90 degrees  Unable to perform standing flexion with 0.5#    Pain Level (0-10 scale) post treatment:  0/10    ASSESSMENT/Changes in Function:  Pt. Continues to make slow progress with physical therapy. She continues to have decreased right shoulder mobility and strength and is challenged with overhead reaching.      Patient will continue to benefit from skilled PT services to modify and progress therapeutic interventions, address functional mobility deficits, address ROM deficits, address strength deficits, analyze and address soft tissue restrictions, analyze and cue movement patterns, analyze and modify body mechanics/ergonomics and assess and modify postural abnormalities to attain remaining goals. Progress towards goals / Updated goals:  1. Patient will improve FOTO score by 27 points in order to demonstrate a significant improvement in function. 2. Patient will improve right shoulder elevation AROM to 110 degrees in order to increase ease of reaching into cabinets at home.    3. Patient will improve right shoulder MMT to 4-/5 in order to increase ease of ADLs    PLAN  []  Upgrade activities as tolerated     [x]  Continue plan of care  []  Update interventions per flow sheet       []  Discharge due to:_  []  Other:_      Yony Henry, PT 12/11/2019  7:56 AM    Future Appointments   Date Time Provider Heri Beal   12/11/2019  9:30 AM Martínez Walters, PT [facility]   12/12/2019 10:00 AM Eugene Spain, PTA [facility]   12/16/2019  9:00 AM Martínez Walters, PT [facility]   12/18/2019  9:30 AM Eugene Blueer, PTA [facility]   12/19/2019 10:30 AM Martínez Walters, PT [facility]   12/23/2019  9:30 AM Willimarichuye Mirzaer, PTA [facility]   12/26/2019 10:00 AM Williemae Mirzaer, PTA [facility]   12/27/2019  9:30 AM Williemae Reaper, PTA [facility]   12/30/2019  9:30 AM Willimarichuye Mirzaer, PTA [facility]   12/30/2019  1:40 PM Sissy Moran MD [place] [id number]   1/2/2020  9:30 AM Martínez Walters, PT [facility]   1/3/2020  9:30 AM Eugene Spain, PTA [facility]   1/6/2020  9:30 AM Eugene Blueer, PTA [facility]   1/8/2020  9:30 AM Martínez Nab, PT [facility]   1/10/2020  9:30 AM Eugene Spain, PTA [facility]   1/13/2020  9:30 AM Eugene Spain, PTA [facility]   1/15/2020  9:30 AM Martínez Nab, PT [facility]   1/16/2020  9:30 AM [facility] FAST TRACK NURSE [facility]

## 2019-12-12 ENCOUNTER — HOSPITAL ENCOUNTER (OUTPATIENT)
Dept: PHYSICAL THERAPY | Age: 79
Discharge: HOME | End: 2019-12-12
Payer: MEDICARE

## 2019-12-12 PROCEDURE — 97110 THERAPEUTIC EXERCISES: CPT

## 2019-12-12 NOTE — PROGRESS NOTES
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 She just doesn't feel she is where she needs to be yet in regards to strength. She is happy coming 3 times a week because she knows she is getting her exercises in and strengthening the correct way. OBJECTIVE    15 minutes of MH in sitting to the right shoulder and tricep post session to reduce post exercise soreness for ease of performing ADLs    24 min Therapeutic Exercise:  [x] See flow sheet :   Rationale: increase ROM and increase strength to improve the patients ability to increase ease of ADLs            With   [x] TE   [] TA   [] neuro   [] other: Patient Education: [x] Review HEP    [] Progressed/Changed HEP based on:   [] positioning   [] body mechanics   [] transfers   [] heat/ice application    [] other:      Other Objective/Functional Measures:   Improved awareness of posture and reduced UT hiking  Assistance for [employer] and DB presses  Improved pectoral activation with exercises  Continues with decreased OH ability due to weakness not tightness    Pain Level (0-10 scale) post treatment: 0/10     ASSESSMENT/Changes in Function: Pt is progressing slowly towards final goals.  She has decreased pectoral strength which is limiting her functional OH mobility. Will continue with focus of pectoral strengthening with OH motions for ease of reaching into her cabinets. Progress towards goals / Updated goals:  1. Patient will improve FOTO score by 27 points in order to demonstrate a significant improvement in function. 2. Patient will improve right shoulder elevation AROM to 110 degrees in order to increase ease of reaching into cabinets at home.    3. Patient will improve right shoulder MMT to 4-/5 in order to increase ease of ADLs    PLAN  [x]  Upgrade activities as tolerated     [x]  Continue plan of care  []  Update interventions per flow sheet       []  Discharge due to:_  []  Other:_      Mimi Boss, PTA 12/12/2019  7:56 AM    Future Appointments   Date Time Provider Heri Beal   12/12/2019 10:00 AM Herlene Chain, PTA [facility]   12/16/2019  9:00 AM Reyes Pan, PT [facility]   12/18/2019  9:30 AM Herlene Chain, PTA [facility]   12/19/2019 10:30 AM Reyes Pan, PT [facility]   12/23/2019  9:30 AM Herlene Chain, PTA [facility]   12/26/2019 10:00 AM Herlene Chain, PTA [facility]   12/27/2019  9:30 AM Herlene Chain, PTA [facility]   12/30/2019  9:30 AM Herlene Chain, PTA [facility]   12/30/2019  1:40 PM Radha Flynn MD [place] [id number]   1/2/2020  9:30 AM Reyes Pan, PT [facility]   1/3/2020  9:30 AM Herlene Chain, PTA [facility]   1/6/2020  9:30 AM Herlene Chain, PTA [facility]   1/8/2020  9:30 AM Reyes Pan, PT [facility]   1/10/2020  9:30 AM Herlene Chain, PTA [facility]   1/13/2020  9:30 AM Herlene Chain, PTA [facility]   1/15/2020  9:30 AM Reyes Pan, PT [facility]   1/16/2020  9:30 AM [facility] FAST TRACK NURSE [facility]

## 2019-12-13 ENCOUNTER — OFFICE VISIT (OUTPATIENT)
Dept: ORTHOPEDIC SURGERY | Age: 79
End: 2019-12-13

## 2019-12-13 VITALS
HEART RATE: 60 BPM | HEIGHT: 65 IN | TEMPERATURE: 95.6 F | RESPIRATION RATE: 16 BRPM | OXYGEN SATURATION: 98 % | SYSTOLIC BLOOD PRESSURE: 125 MMHG | BODY MASS INDEX: 20.96 KG/M2 | WEIGHT: 125.8 LBS | DIASTOLIC BLOOD PRESSURE: 65 MMHG

## 2019-12-13 DIAGNOSIS — M25.511 RIGHT SHOULDER PAIN, UNSPECIFIED CHRONICITY: Primary | ICD-10-CM

## 2019-12-13 DIAGNOSIS — Z96.611 S/P SHOULDER REPLACEMENT, RIGHT: ICD-10-CM

## 2019-12-13 RX ORDER — HYDROCODONE BITARTRATE AND ACETAMINOPHEN 5; 325 MG/1; MG/1
1 TABLET ORAL
Qty: 28 TAB | Refills: 0 | Status: SHIPPED | OUTPATIENT
Start: 2019-12-13 | End: 2019-12-20

## 2019-12-13 NOTE — PROGRESS NOTES
Hemal Franklin Osman  1940     HISTORY OF PRESENT ILLNESS  Violet Ugarte is a 78 y.o. female who presents today for evaluation s/p Right reverse total shoulder arthroplasty on 7/31/19. Describes pain as a 10/10 today. She has been attending PT for her shoulder. She notes that 12/12/19 she reached to grab a piece of bread out of the microwave and heard a \"pop\" in her shoulder. Unable to move her arm. pain is intense. Took tylenol last night with no relief. Patient denies any fever, chills, chest pain, shortness of breath or calf pain. The remainder of the review of systems is negative. There are no new illness or injuries to report since last seen in the office. No changes in medications, allergies, social or family history. Pain Assessment  12/13/2019   Location of Pain Shoulder   Location Modifiers Right   Severity of Pain 10   Quality of Pain Popping;Aching   Quality of Pain Comment -   Duration of Pain Persistent   Duration of Pain Comment -   Frequency of Pain Constant   Aggravating Factors Bending;Stretching;Straightening   Aggravating Factors Comment -   Limiting Behavior -   Relieving Factors Rest;Heat   Relieving Factors Comment -   Result of Injury No       PHYSICAL EXAM:   Visit Vitals  /65   Pulse 60   Temp 95.6 °F (35.3 °C) (Oral)   Resp 16   Ht 5' 5\" (1.651 m)   Wt 125 lb 12.8 oz (57.1 kg)   LMP  (LMP Unknown)   SpO2 98%   BMI 20.93 kg/m²      The patient is a well-developed, well-nourished female in no acute distress. The patient is alert and oriented times three. The patient appears to be well groomed. Mood and affect are normal.  LYMPHATIC: lymph nodes are not enlarged and are within normal limits  SKIN: normal in color and non tender to palpation. There are no bruises or abrasions noted. NEUROLOGICAL: Motor sensory exam is within normal limits. Reflexes are equal bilaterally.  There is normal sensation to pinprick and light touch  ORTHOPEDIC EXAM of right shoulder:  Inspection: swelling present,  Bruising present, swelling to right hand and fingers - with some improvement  Incision well healed  Passive glenohumeral abduction 0-30 degrees with pain, able to make active passive fist  Stability: Stable  Strength: n/a  2+ distal pulses    Doppler RUE: no evidence of DVT    IMPRESSION:  S/P Right reverse total shoulder arthroplasty    Encounter Diagnoses   Name Primary?  Right shoulder pain, unspecified chronicity Yes    S/P shoulder replacement, right          PLAN:   1. Pt presents today s/p Right reverse total shoulder arthroplasty on 7/31/19 with increase in pain. No evidence of ant dislocation. Will ice and do PROM only over weekend. Hold PT on Monday  Risk factors include: htn  2. No ultrasound exam indicated today  3. No cortisone injection indicated today   4. No Physical/Occupational Therapy indicated today  5. No diagnostic test indicated today:   6. No durable medical equipment indicated today  7. No referral indicated today   8. No medications indicated today:   9. YES Narcotic indicated today Norco 5. Patient given pain medication for short term acute pain relief. Goal is to treat patient according to above plan and to ultimately have patient off all pain medications once appropriate. If chronic pain management is required beyond what is expected for current orthopedic problem, will refer patient to pain management.   was reviewed and will be reviewed with every medication refill request.          RTC 4 weeks  Nuria Johansen [facility]

## 2019-12-13 NOTE — PROGRESS NOTES
1. Have you been to the ER, urgent care clinic since your last visit? Hospitalized since your last visit? No    2. Have you seen or consulted any other health care providers outside of the [address] [place] [address] since your last visit? Include any pap smears or colon screening.  No

## 2019-12-16 ENCOUNTER — APPOINTMENT (OUTPATIENT)
Dept: PHYSICAL THERAPY | Age: 79
End: 2019-12-16
Payer: MEDICARE

## 2019-12-17 ENCOUNTER — OFFICE VISIT (OUTPATIENT)
Dept: ORTHOPEDIC SURGERY | Age: 79
End: 2019-12-17

## 2019-12-17 VITALS
WEIGHT: 127 LBS | SYSTOLIC BLOOD PRESSURE: 110 MMHG | DIASTOLIC BLOOD PRESSURE: 64 MMHG | RESPIRATION RATE: 16 BRPM | TEMPERATURE: 96.1 F | HEART RATE: 65 BPM | OXYGEN SATURATION: 97 % | BODY MASS INDEX: 21.16 KG/M2 | HEIGHT: 65 IN

## 2019-12-17 DIAGNOSIS — M12.811 ROTATOR CUFF ARTHROPATHY, RIGHT: Primary | ICD-10-CM

## 2019-12-17 NOTE — PROGRESS NOTES
Mayi Tate Osman  1940     HISTORY OF PRESENT ILLNESS  Violet Bisohp is a 78 y.o. female who presents today for evaluation s/p Right reverse total shoulder arthroplasty on 7/31/19. Describes pain as a 10/10 today. She has been to PT for her shoulder, did not go today as instructed during last OV. She notes that on 12/12/19 she reached to grab a piece of bread out of the microwave and heard a \"pop\" in her shoulder. Unable to move her arm,  pain is intense. Has tried taking Tylenol with no relief. She notes concerns with going back to PT, has severe pain with moving the arm. Patient denies any fever, chills, chest pain, shortness of breath or calf pain. The remainder of the review of systems is negative. There are no new illness or injuries to report since last seen in the office. No changes in medications, allergies, social or family history. Pain Assessment  12/17/2019   Location of Pain Shoulder   Location Modifiers Right   Severity of Pain 10   Quality of Pain Dull;Aching   Quality of Pain Comment -   Duration of Pain Persistent   Duration of Pain Comment -   Frequency of Pain Constant   Aggravating Factors Straightening;Stretching   Aggravating Factors Comment -   Limiting Behavior Yes   Relieving Factors Rest;Ice   Relieving Factors Comment -   Result of Injury No       PHYSICAL EXAM:   Visit Vitals  /64   Pulse 65   Temp 96.1 °F (35.6 °C) (Oral)   Resp 16   Ht 5' 5\" (1.651 m)   Wt 127 lb (57.6 kg)   LMP  (LMP Unknown)   SpO2 97%   BMI 21.13 kg/m²      The patient is a well-developed, well-nourished female in no acute distress. The patient is alert and oriented times three. The patient appears to be well groomed. Mood and affect are normal.  LYMPHATIC: lymph nodes are not enlarged and are within normal limits  SKIN: normal in color and non tender to palpation. There are no bruises or abrasions noted. NEUROLOGICAL: Motor sensory exam is within normal limits. Reflexes are equal bilaterally. There is normal sensation to pinprick and light touch  ORTHOPEDIC EXAM of right shoulder:  Inspection: swelling present,  Bruising present, swelling to right hand and fingers - with some improvement  Incision well healed  Passive glenohumeral abduction 0-30 degrees with pain, able to make active passive fist  Stability: Stable  Strength: n/a  2+ distal pulses    Doppler RUE: no evidence of DVT    IMPRESSION:  S/P Right reverse total shoulder arthroplasty    Encounter Diagnosis   Name Primary?  Rotator cuff arthropathy, right Yes         PLAN:   1. Pt presents today s/p Right reverse total shoulder arthroplasty on 7/31/19 with increase in pain. No evidence of ant dislocation. Will order a CT with contrast to r/o subscapularis tendon tear. I would like for her to continue with PT to work on gentle mobility. Risk factors include: htn  2. No ultrasound exam indicated today  3. No cortisone injection indicated today   4. Yes Physical/Occupational Therapy indicated today  5. Yes diagnostic test indicated today: CT R SHOULDER W CONT  6. No durable medical equipment indicated today  7. No referral indicated today   8. No medications indicated today:   9. No Narcotic indicated today      RTC following CT    Scribed by Sharon Pearl [facility]) as dictated by Georgi Mario MD    I, Dr. Georgi Mario, confirm that all documentation is accurate.     Georgi Mario M.D.   [facility]

## 2019-12-17 NOTE — PATIENT INSTRUCTIONS
If we order a Diagnostic test (such as MRI or CT) during your office visit please see below: 
  
Coordination of Care will be calling you to schedule your diagnostic test. If you have not heard from Coordination of Care within 5 business days, please call [phone]. Once you have a date scheduled for your diagnostic test, you will need to contact our office to schedule a follow up appointment, as this is when the physician will review your diagnostic test results with you. You can contact our office to schedule appointment by phone at [phone], or you can send a message via [employer] to request an appointment.

## 2019-12-18 ENCOUNTER — HOSPITAL ENCOUNTER (OUTPATIENT)
Dept: PHYSICAL THERAPY | Age: 79
Discharge: HOME | End: 2019-12-18
Payer: MEDICARE

## 2019-12-18 PROCEDURE — 97140 MANUAL THERAPY 1/> REGIONS: CPT

## 2019-12-18 PROCEDURE — 97016 VASOPNEUMATIC DEVICE THERAPY: CPT

## 2019-12-18 PROCEDURE — 97110 THERAPEUTIC EXERCISES: CPT

## 2019-12-18 NOTE — PROGRESS NOTES
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 She reports trying to reach into a microwave and feeling/hearing a loud pop in her shoulder with increased pain. She has seen the MD whom requested continuation of PT and a CT scan that will be done .      OBJECTIVE    15 minutes of game ready for decreased swelling/inflammation and ease of ADLs      18 min Therapeutic Exercise:  [x] See flow sheet :   Rationale: increase ROM and increase strength to improve the patients ability to increase ease of ADLs    10 minutes of manual therapy for PROM with gentle oscillations and gentle STM to the right deltoid with TPR to the infraspinatus and middle deltoid for decreased pain and ease of ADLs            With   [x] TE   [] TA   [] neuro   [] other: Patient Education: [x] Review HEP    [] Progressed/Changed HEP based on:   [] positioning   [] body mechanics   [] transfers   [] heat/ice application    [] other:      Other Objective/Functional Measures:   Pain with attempt of AAROM and AROM  TTP posterior shoulder and middle/posterior deltoid  Worked on gentle isometrics  Educated on gentle table slides at home    Pain Level (0-10 scale) post treatment: 2/10    ASSESSMENT/Changes in Function: Pt was making good progress until recent flare up with AROM reaching into a microwave and hearing a loud pop. She now has limited to no AROM of the right shoulder due to pain. She has maintained about 90 degrees PROM with stretching end range pain. Will work on light PROM to maintain mobility pending CT scan and MD recommendations with continuation of PT. Progress towards goals / Updated goals:  1. Patient will improve FOTO score by 27 points in order to demonstrate a significant improvement in function. 2. Patient will improve right shoulder elevation AROM to 110 degrees in order to increase ease of reaching into cabinets at home.    3. Patient will improve right shoulder MMT to 4-/5 in order to increase ease of ADLs    PLAN  [x]  Upgrade activities as tolerated     [x]  Continue plan of care  []  Update interventions per flow sheet       []  Discharge due to:_  []  Other:_      Zoe Sy PTA 12/18/2019  7:56 AM    Future Appointments   Date Time Provider Heri Beal   12/19/2019  9:00 AM Gely Broderick PTA [facility]   12/20/2019  1:00 PM Gely Broderick PTA [facility]   12/23/2019  9:30 AM Gely Broderick PTA [facility]   12/24/2019  1:00 PM [facility] DX RM 1 [facility]   12[facility]24/2019  2:00 PM [facility] CT RM 2 [facility]   12/26/2019 10:00 AM Gely Broderick PTA [facility]   12/27/2019  9:30 AM Gely Broderick PTA [facility]   12/30/2019  9:30 AM Gely Broderick PTA [facility]   12/30/2019  1:40 PM MD Nicolas Lozada 75   1/2/2020  9:30 AM Ramin Chau PT [facility]   1/3/2020  9:30 AM Gely Broderick, PTA [facility]   1/6/2020  9:30 AM Gely Broderick, PTA [facility]   1/8/2020  9:30 AM Ramin Chau, PT [facility]   1/10/2020  9:30 AM Gely Broderick, PTA [facility]   1/13/2020  9:30 AM Gely Broderick, PTA [facility]   1/15/2020  9:30 AM Ramin Chau, PT [facility]   1/16/2020  9:30 AM [facility] NUR[facility]

## 2019-12-19 ENCOUNTER — HOSPITAL ENCOUNTER (OUTPATIENT)
Dept: PHYSICAL THERAPY | Age: 79
Discharge: HOME | End: 2019-12-19
Payer: MEDICARE

## 2019-12-19 ENCOUNTER — APPOINTMENT (OUTPATIENT)
Dept: PHYSICAL THERAPY | Age: 79
End: 2019-12-19
Payer: MEDICARE

## 2019-12-19 PROCEDURE — 97110 THERAPEUTIC EXERCISES: CPT

## 2019-12-19 PROCEDURE — 97140 MANUAL THERAPY 1/> REGIONS: CPT

## 2019-12-19 PROCEDURE — 97016 VASOPNEUMATIC DEVICE THERAPY: CPT

## 2019-12-19 NOTE — PROGRESS NOTES
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 Her CT was scheduled for  and the MD follow up is . She is frustrated with her recent injury as she had come so far and was scheduled to go to [place] in January.      OBJECTIVE    10 minutes of MH in sitting to the right shoulder and tricep post session to reduce post exercise soreness for ease of performing ADLs    10 min Therapeutic Exercise:  [x] See flow sheet :   Rationale: increase ROM and increase strength to improve the patients ability to increase ease of ADLs    22 minutes of manual for PROM with gentle oscillations in all planes of motion for decreased pain and ease of ADLs            With   [x] TE   [] TA   [] neuro   [] other: Patient Education: [x] Review HEP    [] Progressed/Changed HEP based on:   [] positioning   [] body mechanics   [] transfers   [] heat/ice application    [] other:      Other Objective/Functional Measures:   PROM flexion: 89 degrees  PROM scaption: 91 degrees  PROM ER at 30 degrees: 53 degrees  Improved tolerance to table slides today  Educated on ice and rest and gentle table slides at home    Pain Level (0-10 scale) post treatment: 2/10     ASSESSMENT/Changes in Function: Pt with reduction of pain since initial re-injury. She has improving PROM and AAROM with gentle table slides. Will continue to progress gentle mobility without increased pain to maintain ROM while awaiting CT results. Progress towards goals / Updated goals:  1. Patient will improve FOTO score by 27 points in order to demonstrate a significant improvement in function. 2. Patient will improve right shoulder elevation AROM to 110 degrees in order to increase ease of reaching into cabinets at home.    3. Patient will improve right shoulder MMT to 4-/5 in order to increase ease of ADLs    PLAN  [x]  Upgrade activities as tolerated     [x]  Continue plan of care  []  Update interventions per flow sheet       []  Discharge due to:_  []  Other:_      Imanicalos Jon, PTA 12/19/2019  7:56 AM    Future Appointments   Date Time Provider Heri Beal   12/20/2019  1:00 PM Adalgisa Chamber, PTA [facility]   12/23/2019  9:30 AM Adalgisa Chamber, PTA [facility]   12/24/2019  1:00 PM [facility] DX RM 1 [facility]   12/24/2019  2:00 PM [facility] CT RM 2 [facility]   12/26/2019 10:00 AM Adalgisa Chamber, PTA [facility]   12/27/2019  9:30 AM Adalgisa Chamber, PTA [facility]   12/30/2019  9:30 AM Adalgisa Chamber, PTA [facility]   12/30/2019  1:40 PM MD Deepti Muñoz 69   1/2/2020  9:30 AM Eugene Camp, PT [facility]   1/3/2020  9:30 AM Adalgisa Chamber, PTA [facility]   1/6/2020  9:30 AM Adalgisa Chamber, PTA [facility]   1/8/2020  9:30 AM Williemae Zoë, PT [facility]   1/10/2020  9:30 AM Adalgisa Chamber, PTA [facility]   1/13/2020  9:30 AM Adalgisa Chamber, PTA [facility]   1/15/2020  9:30 AM Williemae Zoë, PT [facility]   1/16/2020  9:30 AM [facility]UR[facility]

## 2019-12-20 ENCOUNTER — APPOINTMENT (OUTPATIENT)
Dept: PHYSICAL THERAPY | Age: 79
End: 2019-12-20
Payer: MEDICARE

## 2019-12-20 ENCOUNTER — HOSPITAL ENCOUNTER (OUTPATIENT)
Dept: PHYSICAL THERAPY | Age: 79
Discharge: HOME | End: 2019-12-20
Payer: MEDICARE

## 2019-12-20 PROCEDURE — 97110 THERAPEUTIC EXERCISES: CPT

## 2019-12-20 PROCEDURE — 97016 VASOPNEUMATIC DEVICE THERAPY: CPT

## 2019-12-20 NOTE — PROGRESS NOTES
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 She wants to know what the pop was in her shoulder. OBJECTIVE    15 minutes of game ready for reduced pain inflammation for ease of ADLs    30 min Therapeutic Exercise:  [x] See flow sheet :   Rationale: increase ROM and increase strength to improve the patients ability to increase ease of ADLs            With   [x] TE   [] TA   [] neuro   [] other: Patient Education: [x] Review HEP    [] Progressed/Changed HEP based on:   [] positioning   [] body mechanics   [] transfers   [] heat/ice application    [] other:      Other Objective/Functional Measures:   Worked on light AAROM with pulleys and shoulder iso x 6 to help with healing process  Stretching pain with PROM  Educated on continued ice over the weekend and light table slides as needed    Pain Level (0-10 scale) post treatment: 2/10     ASSESSMENT/Changes in Function: Pt with reduction of pain and improved tolerance to AAROM. She continues to have limited AROM due to pain. Will continue with gentle progression of mobility to prevent frozen shoulder for return to ease of ADLs. Progress towards goals / Updated goals:  1.  Patient will improve FOTO score by 27 points in order to demonstrate a significant improvement in function. 2. Patient will improve right shoulder elevation AROM to 110 degrees in order to increase ease of reaching into cabinets at home.    3. Patient will improve right shoulder MMT to 4-/5 in order to increase ease of ADLs    PLAN  [x]  Upgrade activities as tolerated     [x]  Continue plan of care  []  Update interventions per flow sheet       []  Discharge due to:_  []  Other:_      Mitchel Siddiqui PTA 12/20/2019  7:56 AM    Future Appointments   Date Time Provider Heri Lian   12/20/2019  1:00 PM Etheleen Gerald, PTA [facility]   12/23/2019  9:30 AM Etheleen Gerald, PTA [facility]   12/24/2019  1:00 PM [facility] DX RM 1 [facility]   12/24/2019  2:00 PM [facility] CT RM 2 [facility]   12/26/2019 10:00 AM Etheleen Gerald, PTA [facility]   12/27/2019  9:30 AM Etheleen Gerald, PTA [facility]   12/30/2019  9:30 AM Etheleen Gerald, PTA [facility]   12/30/2019  1:40 PM Joselin Olivo MD [place] [id number]   1/2/2020  9:30 AM Noble Carpio, PT [facility]   1/3/2020  9:30 AM Etheleen Gerald, PTA [facility]   1/6/2020  9:30 AM Etheleen Gerald, PTA [facility]   1/8/2020  9:30 AM Noble Carpio, PT [facility]   1/10/2020  9:30 AM Etheleen Gerald, PTA [facility]   1/13/2020  9:30 AM Eliza Duarte, PTA [facility]   1/15/2020  9:30 AM Noble Carpio, PT [facility]   1/16/2020  9:30 AM [facility]ST TRACK NUR[facility]

## 2019-12-23 ENCOUNTER — HOSPITAL ENCOUNTER (OUTPATIENT)
Dept: PHYSICAL THERAPY | Age: 79
Discharge: HOME | End: 2019-12-23
Payer: MEDICARE

## 2019-12-23 PROCEDURE — 97110 THERAPEUTIC EXERCISES: CPT

## 2019-12-23 PROCEDURE — 97140 MANUAL THERAPY 1/> REGIONS: CPT

## 2019-12-23 NOTE — PROGRESS NOTES
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 She goes tomorrow for the CT to figure out what is happening in the shoulder. She still has pain any time she tries to move her shoulder. OBJECTIVE    10 minutes of ice to the right shoulder due to game ready in use to decreased pain and inflammation for ease of ADLs      30 min Therapeutic Exercise:  [x] See flow sheet :   Rationale: increase ROM and increase strength to improve the patients ability to increase ease of ADLs    8 minutes of manual therapy for gentle PROM with oscillations for ease OH reaching with less pain            With   [x] TE   [] TA   [] neuro   [] other: Patient Education: [x] Review HEP    [] Progressed/Changed HEP based on:   [] positioning   [] body mechanics   [] transfers   [] heat/ice application    [] other:      Other Objective/Functional Measures:   Worked on pulleys and table slides for gentle AAROM  Pain with attempt at self elevation  Good PROM without major loss since injury  Pulling pain in mid to posterior deltoid with attempt at shoulder elevation    Pain Level (0-10 scale) post treatment: 5/10    ASSESSMENT/Changes in Function: Pt making slow progress since re-injury.  While she has maintained close to pre-injury PROM she is limited with AROM. She is progressing with gentle AAROM and is to get a CT to rule out RTC tear of the subscapularis. Will continue with gentle mobility of the right shoulder and light activation for ease of performing ADLs. Progress towards goals / Updated goals:  1. Patient will improve FOTO score by 27 points in order to demonstrate a significant improvement in function. 2. Patient will improve right shoulder elevation AROM to 110 degrees in order to increase ease of reaching into cabinets at home.    3. Patient will improve right shoulder MMT to 4-/5 in order to increase ease of ADLs    PLAN  [x]  Upgrade activities as tolerated     [x]  Continue plan of care  []  Update interventions per flow sheet       []  Discharge due to:_  []  Other:_      Juliocesar Castillo PTA 12/23/2019  7:56 AM    Future Appointments   Date Time Provider Heri Beal   12/24/2019  1:00 PM [facility] DX RM 1 [facility]   12/24/2019  2:00 PM [facility] CT RM 2 [facility]   12/26/2019 10:00 AM Rosary Rom, PTA [facility]   12/27/2019  9:30 AM Rosary Rom, PTA [facility]   12/30/2019  9:30 AM Rosary Rom, PTA [facility]   12/30/2019  1:40 PM Albertina Beverly MD [facility]   1/2/2020  9:30 AM Jose Carlos Howell, PT [facility]   1/3/2020  9:30 AM Rosary Rom, PTA [facility]   1/6/2020  9:30 AM Rosary Rom, PTA [facility]   1/8/2020  9:30 AM Jose Carlos Howell, PT [facility]   1/10/2020  9:30 AM Rosary Rom, PTA [facility]   1/13/2020  9:30 AM Gregg Villar, PTA [facility]   1/15/2020  9:30 AM Jose Carlos Howell, PT [facility]   1/16/2020  9:30 AM [facility] NUR[facility]

## 2019-12-24 ENCOUNTER — HOSPITAL ENCOUNTER (OUTPATIENT)
Dept: CT IMAGING | Age: 79
Discharge: HOME | End: 2019-12-24
Attending: ORTHOPAEDIC SURGERY
Payer: MEDICARE

## 2019-12-24 ENCOUNTER — HOSPITAL ENCOUNTER (OUTPATIENT)
Dept: GENERAL RADIOLOGY | Age: 79
Discharge: HOME | End: 2019-12-24
Attending: ORTHOPAEDIC SURGERY
Payer: MEDICARE

## 2019-12-24 DIAGNOSIS — M12.811 ROTATOR CUFF ARTHROPATHY, RIGHT: ICD-10-CM

## 2019-12-24 PROCEDURE — 74011000250 HC RX REV CODE- 250: Performed by: ORTHOPAEDIC SURGERY

## 2019-12-24 PROCEDURE — 74011636320 HC RX REV CODE- 636/320: Performed by: ORTHOPAEDIC SURGERY

## 2019-12-24 PROCEDURE — 87070 CULTURE OTHR SPECIMN AEROBIC: CPT

## 2019-12-24 PROCEDURE — 73201 CT UPPER EXTREMITY W/DYE: CPT

## 2019-12-24 PROCEDURE — 77002 NEEDLE LOCALIZATION BY XRAY: CPT

## 2019-12-24 RX ORDER — SODIUM CHLORIDE 9 MG/ML
10 INJECTION INTRAMUSCULAR; INTRAVENOUS; SUBCUTANEOUS
Status: COMPLETED | OUTPATIENT
Start: 2019-12-24 | End: 2019-12-24

## 2019-12-24 RX ORDER — LIDOCAINE HYDROCHLORIDE 10 MG/ML
30 INJECTION, SOLUTION EPIDURAL; INFILTRATION; INTRACAUDAL; PERINEURAL
Status: COMPLETED | OUTPATIENT
Start: 2019-12-24 | End: 2019-12-24

## 2019-12-24 RX ADMIN — LIDOCAINE HYDROCHLORIDE 10 ML: 10 INJECTION, SOLUTION EPIDURAL; INFILTRATION; INTRACAUDAL; PERINEURAL at 13:20

## 2019-12-24 RX ADMIN — SODIUM CHLORIDE 10 ML: 9 INJECTION, SOLUTION INTRAMUSCULAR; INTRAVENOUS; SUBCUTANEOUS at 13:20

## 2019-12-24 RX ADMIN — IOPAMIDOL 15 ML: 408 INJECTION, SOLUTION INTRATHECAL at 13:20

## 2019-12-26 ENCOUNTER — HOSPITAL ENCOUNTER (OUTPATIENT)
Dept: PHYSICAL THERAPY | Age: 79
Discharge: HOME | End: 2019-12-26
Payer: MEDICARE

## 2019-12-26 PROCEDURE — 97110 THERAPEUTIC EXERCISES: CPT

## 2019-12-26 PROCEDURE — 97140 MANUAL THERAPY 1/> REGIONS: CPT

## 2019-12-26 NOTE — PROGRESS NOTES
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 She states her elbow has been bothering her but thinks it is from pushing up to sit up in bed. She still has pain trying to move her arm. She got her CT scan done yesterday but follows up with the MD for it next week.        OBJECTIVE    15 minutes of ice to the right shoulder due to game ready in use to decreased pain and inflammation for ease of ADLs      20 min Therapeutic Exercise:  [x] See flow sheet :   Rationale: increase ROM and increase strength to improve the patients ability to increase ease of ADLs     10 minutes of manual therapy for gentle PROM with oscillations for ease OH reaching with less pain            With   [x] TE   [] TA   [] neuro   [] other: Patient Education: [x] Review HEP    [] Progressed/Changed HEP based on:   [] positioning   [] body mechanics   [] transfers   [] heat/ice application    [] other:      Other Objective/Functional Measures:   Educated to work on table slides and shoulder iso x 6 at home  Able to assist with shoulder flexion lightly today with min increase in pain  Continues to have tenderness and swelling at lateral shoulder  No pain with IR/ER resistance  Continues to lack AROM due to pain  Progressing with PROM close to pre-injury measurements    Pain Level (0-10 scale) post treatment: 3/10    ASSESSMENT/Changes in Function: Pt is progressing with reducing pain since recent injury. She has improving PROM close to pre-injury status. She has most pain with attempt at AROM of the right shoulder into flexion. She doesn't have pain with IR/ER resistance. Will continue with gentle stretching and progression of AROM back to pre-injury status for ease of reaching into cabinets. Progress towards goals / Updated goals:  1. Patient will improve FOTO score by 27 points in order to demonstrate a significant improvement in function. 2. Patient will improve right shoulder elevation AROM to 110 degrees in order to increase ease of reaching into cabinets at home.    3. Patient will improve right shoulder MMT to 4-/5 in order to increase ease of ADLs    PLAN  [x]  Upgrade activities as tolerated     [x]  Continue plan of care  []  Update interventions per flow sheet       []  Discharge due to:_  []  Other:_      Zoe Sy PTA 12/26/2019  7:56 AM    Future Appointments   Date Time Provider Heri Beal   12/27/2019  9:30 AM Gely Broderick PTA [facility] [address]   12/30/2019  9:30 AM Gely Broderick PTA [facility] [address]   12/30/2019  1:40 PM Jesse Cerrato MD [place] [id number]   1/2/2020  9:30 AM Ramin Chau, PT [facility] [address]   1/3/2020  9:30 AM Gely Broderick PTA [facility] [address]   1/6/2020  9:30 AM Gely Broderick PTA [facility] [address]   1/8/2020  9:30 AM Ramin Chau PT [facility] [address]   1/10/2020  9:30 AM Gely Broderick PTA [facility] [address]   1/13/2020  9:30 AM Gely Broderick PTA [facility] [address]   1/15/2020  9:30 AM Ramin Chau, PT [facility] [address]   1/16/2020  9:30 AM [facility] FAST TRACK NURSE [facility]

## 2019-12-27 ENCOUNTER — HOSPITAL ENCOUNTER (OUTPATIENT)
Dept: PHYSICAL THERAPY | Age: 79
Discharge: HOME | End: 2019-12-27
Payer: MEDICARE

## 2019-12-27 PROCEDURE — 97140 MANUAL THERAPY 1/> REGIONS: CPT

## 2019-12-27 PROCEDURE — 97110 THERAPEUTIC EXERCISES: CPT

## 2019-12-27 PROCEDURE — 97016 VASOPNEUMATIC DEVICE THERAPY: CPT

## 2019-12-27 NOTE — PROGRESS NOTES
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 She notes feeling like her arm doesn't belong to her almost like she needs to shake it to wake it up.      OBJECTIVE    15 minutes of game ready (low temp, medium pressure) to the right shoulder to decreased pain and inflammation for ease of ADLs      23 min Therapeutic Exercise:  [x] See flow sheet :   Rationale: increase ROM and increase strength to improve the patients ability to increase ease of ADLs    10  minutes of manual therapy for gentle PROM with oscillations for ease OH reaching with less pain            With   [x] TE   [] TA   [] neuro   [] other: Patient Education: [x] Review HEP    [] Progressed/Changed HEP based on:   [] positioning   [] body mechanics   [] transfers   [] heat/ice application    [] other:      Other Objective/Functional Measures:   PROM flexion: 101 degrees  PROM scaption: 111 degrees  AAROM flexion: unable with supine stick  Able to perform S/L ER without pain  Pain with attempt at supine stick AAROM but able to perform with therapist assist  Assist with powder board flexion    Pain Level (0-10 scale) post treatment: 0/10    ASSESSMENT/Changes in Function: Pt is progressing with improving PROM but still has pain with AAROM and AROM. She has burning, sharp pain which may be associated with nerve irritation from swelling due to injury. Will continue with gentle PROM and progress with AAROM as pt is able for return to ease of ADLs with decreased pain. Progress towards goals / Updated goals:  1. Patient will improve FOTO score by 27 points in order to demonstrate a significant improvement in function. 2. Patient will improve right shoulder elevation AROM to 110 degrees in order to increase ease of reaching into cabinets at home.    3. Patient will improve right shoulder MMT to 4-/5 in order to increase ease of ADLs    PLAN  [x]  Upgrade activities as tolerated     [x]  Continue plan of care  []  Update interventions per flow sheet       []  Discharge due to:_  []  Other:_      Colt Ni PTA 12/27/2019  7:56 AM    Future Appointments   Date Time Provider Heri Beal   12/27/2019  9:30 AM Negro Diana PTA [facility]   12/30/2019  9:30 AM Negro Diana PTA [facility]   12/30/2019  1:40 PM Vincent Polo MD [facility]   1/2/2020  9:30 AM Sary Case, PT [facility]   1/3/2020  9:30 AM Negro Diana PTA [facility]   1/6/2020  9:30 AM Negro Diana PTA [facility]   1/7/2020 11:30 AM Tanner Pruett MD [facility]   1/8/2020  9:30 AM Sary Case, PT [facility]   1/10/2020  9:30 AM Negro Diana PTA [facility]   1/13/2020  9:30 AM Negro Diana PTA [facility]   1/15/2020  9:30 AM Sary Case, PT [facility]   1/16/2020  9:30 AM [facility] FAST TRACK NURSE [facility]

## 2019-12-30 ENCOUNTER — HOSPITAL ENCOUNTER (OUTPATIENT)
Dept: PHYSICAL THERAPY | Age: 79
Discharge: HOME | End: 2019-12-30
Payer: MEDICARE

## 2019-12-30 ENCOUNTER — OFFICE VISIT (OUTPATIENT)
Dept: ORTHOPEDIC SURGERY | Age: 79
End: 2019-12-30

## 2019-12-30 VITALS
RESPIRATION RATE: 15 BRPM | HEART RATE: 66 BPM | BODY MASS INDEX: 21.09 KG/M2 | HEIGHT: 65 IN | WEIGHT: 126.6 LBS | DIASTOLIC BLOOD PRESSURE: 54 MMHG | TEMPERATURE: 98.9 F | OXYGEN SATURATION: 96 % | SYSTOLIC BLOOD PRESSURE: 107 MMHG

## 2019-12-30 DIAGNOSIS — M12.811 ROTATOR CUFF ARTHROPATHY, RIGHT: Primary | ICD-10-CM

## 2019-12-30 PROCEDURE — 97110 THERAPEUTIC EXERCISES: CPT

## 2019-12-30 PROCEDURE — 97016 VASOPNEUMATIC DEVICE THERAPY: CPT

## 2019-12-30 PROCEDURE — 97140 MANUAL THERAPY 1/> REGIONS: CPT

## 2019-12-30 NOTE — PROGRESS NOTES
1. Have you been to the ER, urgent care clinic since your last visit? Hospitalized since your last visit? No    2. Have you seen or consulted any other health care providers outside of the [address] [place] [address] since your last visit? Include any pap smears or colon screening.  No

## 2019-12-30 NOTE — PROGRESS NOTES
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 She is able to perform the light table slides at home and shoulder isometrics but is unable to lift her arm without pain. She has the MD follow up today. She has been icing 2-3 times a day to address swelling.        OBJECTIVE    15 minutes of game ready (low temp, medium pressure) to the right shoulder to decreased pain and inflammation for ease of ADLs      23 min Therapeutic Exercise:  [x] See flow sheet :   Rationale: increase ROM and increase strength to improve the patients ability to increase ease of ADLs    10  minutes of manual therapy for gentle PROM with oscillations for ease OH reaching with less pain            With   [x] TE   [] TA   [] neuro   [] other: Patient Education: [x] Review HEP    [] Progressed/Changed HEP based on:   [] positioning   [] body mechanics   [] transfers   [] heat/ice application    [] other:      Other Objective/Functional Measures:   Palpable swelling to the posterior shoulder deltoid region  Continues with pain with attempt at [facility] or AROM   Improving PROM; last assessed >100 degrees; prior to injury she had 120 degrees PROM flexion   Able to perform AAROM with therapist assist in supine but unable to perform independently  TTP bicep long head  Pt points to mid to posterior deltoid when explaining the pain that prevents OH reaching    Pain Level (0-10 scale) post treatment: 0/10    ASSESSMENT/Changes in Function: Pt has made slow progress since injury with return to AROM. She has progressing PROM (101 degrees flexion and 111 degrees scaption measured 12/27/19) back to pre-injury state but has not been able to return to [facility] besides with assist or on the pulleys. She has initiated light stabilizing shoulder isometrics. She has most pain relief with ice/compression but has increased pain with any independent attempt of right shoulder elevation for ADLs. Will await MD recommendations post follow up today regarding progression in therapy. Progress towards goals / Updated goals:  1. Patient will improve FOTO score by 27 points in order to demonstrate a significant improvement in function. 2. Patient will improve right shoulder elevation AROM to 110 degrees in order to increase ease of reaching into cabinets at home. Passively 101 degrees flexion and 111 degrees scaption; unable to perform AROM due to pain   3. Patient will improve right shoulder MMT to 4-/5 in order to increase ease of ADLs Not assessed due to new onset injury.     PLAN  [x]  Upgrade activities as tolerated     [x]  Continue plan of care  []  Update interventions per flow sheet       []  Discharge due to:_  []  Other:_      Edi Diane PTA 12/30/2019  7:56 AM    Future Appointments   Date Time Provider Heri Dela Cruzi   12/30/2019  1:40 PM Matteo Avitia MD [facility]   1/2/2020  9:30 AM Scarlett Daniel PT [facility]   1/3/2020  9:30 AM Héctor Rabago PTA [facility]   1/6/2020  9:30 AM DAREK MoonPTCOLBY [facility]   1/7/2020 11:30 AM Delio Radford MD [facility]   1/8/2020  9:30 AM Scarlett Daniel PT [facility]   1/10/2020  9:30 AM Héctor Rabago PTA [facility] 1/13/2020  9:30 AM Latosha Pastor, PTA [facility]   1/15/2020  9:30 AM Idalmis Alex, PT [facility]   1/16/2020  9:30 AM [facility] FAST TRACK NUR[facility]   1/17/2020  9:30 AM Latosha Pastor, PTA [facility]   1/20/2020 10:00 AM Latosha Pastor, PTA [facility]   1/22/2020  9:30 AM Idalmis Alex, PT [facility]   1/24/2020  9:30 AM Latosha Pastor, PTA [facility]   1/27/2020 10:30 AM Latosha Pastor, PTA [facility]   1/29/2020  9:00 AM Idalmis Alex, PT [facility]   1/31/2020  9:30 AM Latosha Pastor, PTA [facility]

## 2019-12-30 NOTE — PROGRESS NOTES
Anais Brewer  1940     HISTORY OF PRESENT ILLNESS  Violet Carballo is a 78 y.o. female who presents today for evaluation s/p Right reverse total shoulder arthroplasty on 7/31/19. Describes pain as a 7/10 today. She is here for a CT review. She has been going to PT, has been working on gentle mobility exercises. She notes that on 12/12/19 she reached to grab a piece of bread out of the microwave and heard a \"pop\" in her shoulder. Still has significant pain and limited ROM today. She notes there is fluid in her shoulder today. She states that the radiologist took fluid out of her shoulder when she got the CT done. Has tried taking Tylenol with no relief. Patient denies any fever, chills, chest pain, shortness of breath or calf pain. The remainder of the review of systems is negative. There are no new illness or injuries to report since last seen in the office. No changes in medications, allergies, social or family history. Pain Assessment  12/30/2019   Location of Pain Shoulder   Location Modifiers Right   Severity of Pain 7   Quality of Pain Sharp   Quality of Pain Comment -   Duration of Pain Persistent   Duration of Pain Comment -   Frequency of Pain Constant   Aggravating Factors Other (Comment)   Aggravating Factors Comment -   Limiting Behavior -   Relieving Factors -   Relieving Factors Comment -   Result of Injury No       PHYSICAL EXAM:   Visit Vitals  /54 (BP 1 Location: Right arm, BP Patient Position: Sitting)   Pulse 66   Temp 98.9 °F (37.2 °C) (Oral)   Resp 15   Ht 5' 5\" (1.651 m)   Wt 126 lb 9.6 oz (57.4 kg)   LMP  (LMP Unknown)   SpO2 96%   BMI 21.07 kg/m²      The patient is a well-developed, well-nourished female in no acute distress. The patient is alert and oriented times three. The patient appears to be well groomed.  Mood and affect are normal.  LYMPHATIC: lymph nodes are not enlarged and are within normal limits  SKIN: normal in color and non tender to palpation. There are no bruises or abrasions noted. NEUROLOGICAL: Motor sensory exam is within normal limits. Reflexes are equal bilaterally. There is normal sensation to pinprick and light touch  ORTHOPEDIC EXAM of right shoulder:  Inspection: swelling present,  Bruising present, swelling to right hand and fingers - with some improvement  Incision well healed  Passive glenohumeral abduction 0-30 degrees with pain, able to make active passive fist  Stability: Stable  Strength: n/a  2+ distal pulses    IMAGING: CT of right shoulder with contrast dated 12/24/19 was reviewed and read by Dr. Martita Irvin:   IMPRESSION:  1. Mild inferior glenoid notching. No significant loosening or fracture  appreciated. 2.  Small full thickness tear of the supraspinatus. Mild to moderate atrophy. 3.  Small amount of contrast external to the subscapularis noted at the lesser  tuberosity which may be related to small defect or leakage during placement. No  large tear appreciated. Significant atrophy. Doppler RUE: no evidence of DVT    IMPRESSION:  S/P Right reverse total shoulder arthroplasty    Encounter Diagnosis   Name Primary?  Rotator cuff arthropathy, right Yes         PLAN:   1. Pt presents today s/p Right reverse total shoulder arthroplasty on 7/31/19 with increase in pain. No evidence of ant dislocation. The CT does not show a re-tear of the rotator cuff. I would like her to continue with PT and work on gentle mobility and passive ROM as tolerated. We will contact the lab to continue to grow her cultures for 2 more weeks to r/o propionibacterium acnes. Risk factors include: htn  2. No ultrasound exam indicated today  3. No cortisone injection indicated today   4. No Physical/Occupational Therapy indicated today  5. No diagnostic test indicated today  6. No durable medical equipment indicated today  7. No referral indicated today   8. No medications indicated today:   9.  No Narcotic indicated today      RTC 1.5 weeks    Scribed by Severiano Creamer Jacob Buckles) as dictated by MD LIANG Black, Dr. Sabi Maharaj, confirm that all documentation is accurate.     Sabi Maharaj M.D.   [facility]

## 2020-01-02 ENCOUNTER — HOSPITAL ENCOUNTER (OUTPATIENT)
Dept: PHYSICAL THERAPY | Age: 80
Discharge: HOME | End: 2020-01-02
Payer: MEDICARE

## 2020-01-02 PROCEDURE — 97110 THERAPEUTIC EXERCISES: CPT

## 2020-01-02 PROCEDURE — 97140 MANUAL THERAPY 1/> REGIONS: CPT

## 2020-01-02 PROCEDURE — 97016 VASOPNEUMATIC DEVICE THERAPY: CPT

## 2020-01-02 NOTE — PROGRESS NOTES
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 Reports her arm continues to feel about the same.  She reports she is unable to lift her arm and pain is constant     OBJECTIVE    Modality rationale: decrease edema, decrease inflammation and decrease pain to improve the patients ability to increase ease of ADLs   Min Type Additional Details    [] Estim:  []Unatt       []IFC  []Premod                        []Other:  []w/ice   []w/heat  Position:  Location:    [] Estim: []Att    []TENS instruct  []NMES                    []Other:  []w/US   []w/ice   []w/heat  Position:  Location:    []  Traction: [] Cervical       []Lumbar                       [] Prone          []Supine                       []Intermittent   []Continuous Lbs:  [] before manual  [] after manual    []  Ultrasound: []Continuous   [] Pulsed                           []1MHz   []3MHz W/cm2:  Location:    []  Iontophoresis with dexamethasone         Location: [] Take home patch   [] In clinic    []  Ice     []  heat  []  Ice massage  []  Laser   []  Anodyne Position:  Location:    []  Laser with stim  []  Other:  Position:  Location:   15 [x]  Vasopneumatic Device Pressure:       [x] lo [] med [] hi   Temperature: [x] lo [] med [] hi   [x] Skin assessment post-treatment:  [x]intact []redness- no adverse reaction    []redness - adverse reaction:     15 min Therapeutic Exercise:  [x] See flow sheet :   Rationale: increase ROM and increase strength to improve the patients ability to increase ease of reaching    15 min Manual Therapy:   Trigger point release and STM to infraspinatus, teres minor, and deltoid   Rationale: decrease pain, increase ROM and increase tissue extensibility to increase ease of ADLs          With   [x] TE   [] TA   [] neuro   [] other: Patient Education: [x] Review HEP    [] Progressed/Changed HEP based on:   [] positioning   [] body mechanics   [] transfers   [] heat/ice application    [] other:      Other Objective/Functional Measures:   Right shoulder PROM: flex: 90 degrees abd: 70 degrees  Right shoulder AROM  Abd: 20 degrees  MMT ER: 3/5  Some improvement in right shoulder AROM following manual and pt.  Was educated on self massage with tennis ball for HEP    Pain Level (0-10 scale) post treatment: 3/10    ASSESSMENT/Changes in Function:      []  See Plan of Care  [x]  See progress note/recertification  []  See Discharge Summary         Progress towards goals / Updated goals:  See progress note    PLAN  []  Upgrade activities as tolerated     [x]  Continue plan of care  []  Update interventions per flow sheet       []  Discharge due to:_  []  Other:_      Srinath Sosa, PT 1/2/2020  7:44 AM    Future Appointments   Date Time Provider Heri Beal   1/2/2020  9:30 AM Yokasta Hilton PT [facility]   1/3/2020  9:30 AM Darci Suggs PTA [facility]   1/6/2020  9:30 AM Darci Suggs PTA [facility]   1/7/2020 11:30 AM Billie Guaman, [address] [facility]   1/8/2020  9:30 AM Yokasta Hilton, ABELINO [facility]   1/9/2020 11:20 AM Amara Hendrickson MD [id number] [facility]   1/10/2020  9:30 AM Darci Suggs PTA [facility]   1/13/2020  9:30 AM Darci Suggs PTA [facility]   1/15/2020  9:30 AM Yokasta Hilton, PT [facility] 1/16/2020  9:30 AM [facility] FAST TRACK NURSE [facility]   1/17/2020  9:30 AM [facility], PTA [facility]   1/20/2020 10:00 AM [facility], PTA [facility]   1/22/2020  9:30 AM Kit Mustard, PT [facility]   1/24/2020  9:30 AM [facility], PTA [facility]   1/27/2020 10:30 AM [facility], PTA [facility]   1/29/2020  9:00 AM Kit Mustard, PT [facility]   1/31/2020  9:30 AM [facility], PTA [facility]

## 2020-01-02 NOTE — PROGRESS NOTES
[facility] - [facility]  [address] [place] [address]  [phone] fax    Continued Plan of Care/ Re-certification for Physical Therapy Services    Patient name: Violet [facility] Start of Care: 8/8/2019   Referral Margret Harrison,* OFC: 24/0/4320               Medical Diagnosis: Traumatic arthropathy, right shoulder [M12.511]  Payor: [place] MEDICARE / Plan: [place] MEDICARE PART A & B / Product Type: Medicare /  Onset Date:7/31/19               Treatment Diagnosis: Right Shoulder Pain   Prior Hospitalization: see medical history Provider#: [id number]   Medications: Verified on Patient summary List    Comorbidities: DVT, Arthritis, HTN, COPD.   Prior Level of Function: Right Hand Dominant. Previous DVT. Lives with .     Visits from Start of Care: 59    Missed Visits: 0    The Plan of Care and following information is based on the patient's current status:  Goal: Patient will improve FOTO score by 27 points in order to demonstrate a significant improvement in function. Status at last note/certification:47  Current Status: not met    Goal: Patient will improve right shoulder elevation AROM to 110 degrees in order to increase ease of reaching into cabinets at home.    Status at last note/certification: flex: 90 scaption: 72 degrees  Current Status: not met    Goal: Patient will improve right shoulder MMT to 4-/5 in order to increase ease of ADLs  Status at last note/certification: flex: 4-/5 abd: 3/5  Current Status: not met    Key functional changes: decreased AROM and increased pain       Problems/ barriers to goal attainment: increased pain after reaching into microwave     Problem List: pain affecting function, decrease ROM, decrease strength, edema affecting function, decrease ADL/ functional abilitiies, decrease activity tolerance, decrease flexibility/ joint mobility and decrease transfer abilities    Treatment Plan: Therapeutic exercise, Therapeutic activities, Neuromuscular re-education, Physical agent/modality, Manual therapy, Patient education, Self Care training and Functional mobility training     Patient Goal (s) has been updated and includes: to have less pain     Goals for this certification period to be accomplished in 4 weeks:  1. Patient will improve FOTO score by 27 points in order to demonstrate a significant improvement in function. 2. Patient will improve right shoulder PROM flex/abd to 120 degrees in order to increase ease of reaching. 3. Patient will improve right shoulder AROM elevation to 90 degrees in order to increase ease of ADLs. 4. Patient will report pain at 2/10 or less during daily activities in order to improve quality of life. Frequency / Duration: Patient to be seen 2 times per week for 4 weeks:    Assessment / Recommendations: pt. Made limited progress towards goals secondary to having increased shoulder pain. She complained of increased pain after feeling a pop in her shoulder when reaching into microwave at home to pull out a slice of bread. Since that time she has had increased pain and limited mobility. Shoulder AROM elevation was limited to 20 degrees with pain. She also has decreased right shoulder PROM flex: 90 degrees abd: 70 degrees. Right shoulder ER MMT was 3/5. Skilled PT is medically necessary in order to improve right shoulder mobility and strength for increased ease of ADLs and improved quality of life. Certification Period:  1/2/20-2/1/20    Holger Mays, PT 1/2/2020 12:47 PM    ________________________________________________________________________  I certify that the above Therapy Services are being furnished while the patient is under my care. I agree with the treatment plan and certify that this therapy is necessary. [] I have read the above and request that my patient continue as recommended.   [] I have read the above report and request that my patient continue therapy with the following changes/special instructions: _______________________________________  [] I have read the above report and request that my patient be discharged from therapy    Physician's Signature:____________Date:_________TIME:________    ** Signature, Date and Time must be completed for valid certification **    Please sign and return to [facility]  [address] [place] [address]  [phone] fax

## 2020-01-03 ENCOUNTER — HOSPITAL ENCOUNTER (OUTPATIENT)
Dept: PHYSICAL THERAPY | Age: 80
Discharge: HOME | End: 2020-01-03
Payer: MEDICARE

## 2020-01-03 PROCEDURE — 97140 MANUAL THERAPY 1/> REGIONS: CPT

## 2020-01-03 PROCEDURE — 97110 THERAPEUTIC EXERCISES: CPT

## 2020-01-03 PROCEDURE — 97016 VASOPNEUMATIC DEVICE THERAPY: CPT

## 2020-01-03 NOTE — PROGRESS NOTES
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 She states the MD told her in another 1.5 week if her pain hasn't gone down they would scope her shoulder to see the cause of pain.      OBJECTIVE    Modality rationale: decrease edema, decrease inflammation and decrease pain to improve the patients ability to increase ease of ADLs   Min Type Additional Details    [] Estim:  []Unatt       []IFC  []Premod                        []Other:  []w/ice   []w/heat  Position:  Location:    [] Estim: []Att    []TENS instruct  []NMES                    []Other:  []w/US   []w/ice   []w/heat  Position:  Location:    []  Traction: [] Cervical       []Lumbar                       [] Prone          []Supine                       []Intermittent   []Continuous Lbs:  [] before manual  [] after manual    []  Ultrasound: []Continuous   [] Pulsed                           []1MHz   []3MHz W/cm2:  Location:    []  Iontophoresis with dexamethasone         Location: [] Take home patch   [] In clinic    []  Ice     []  heat  []  Ice massage  []  Laser   []  Anodyne Position:  Location:    []  Laser with stim  []  Other:  Position:  Location:   15 [x]  Vasopneumatic Device Pressure: [x] lo [] med [] hi   Temperature: [x] lo [] med [] hi   [x] Skin assessment post-treatment:  [x]intact []redness- no adverse reaction    []redness - adverse reaction:     15 min Therapeutic Exercise:  [x] See flow sheet :   Rationale: increase ROM and increase strength to improve the patients ability to increase ease of reaching    15 min Manual Therapy: STM deltoid with TPR to the middle deltoid   Rationale: decrease pain, increase ROM and increase tissue extensibility to increase ease of ADLs          With   [x] TE   [] TA   [] neuro   [] other: Patient Education: [x] Review HEP    [] Progressed/Changed HEP based on:   [] positioning   [] body mechanics   [] transfers   [] heat/ice application    [] other:      Other Objective/Functional Measures:   No carryover of improvement with AROM or pain relief from last session  Continues to have pain along the middle/posterior deltoid fibers with palpation  Posterior shoulder swelling palpated compared to left shoulder  Decreased pain post ice    Pain Level (0-10 scale) post treatment: 0/10     ASSESSMENT/Changes in Function: Pt continues to make slow progress at improvement with AROM of the right shoulder. She continues to be tender along the middle/posterior deltoid. She has some improvement in pain with ice and manual but no carryover between sessions thus far. Goals for this certification period to be accomplished in 4 weeks:  1. Patient will improve FOTO score by 27 points in order to demonstrate a significant improvement in function. 2. Patient will improve right shoulder PROM flex/abd to 120 degrees in order to increase ease of reaching. 3. Patient will improve right shoulder AROM elevation to 90 degrees in order to increase ease of ADLs. 4. Patient will report pain at 2/10 or less during daily activities in order to improve quality of life.      PLAN  [x]  Upgrade activities as tolerated     [x]  Continue plan of care  []  Update interventions per flow sheet       []  Discharge due to:_  []  Other:_      Yu Ards, PTA 1/3/2020  7:44 AM    Future Appointments   Date Time Provider Heri Beal   1/3/2020  9:30 AM Latosha Pastor, PTA [facility]   1/6/2020  9:30 AM Latosha Pastor, PTA [facility]   1/7/2020 11:30 AM Andres Guajardo MD [id number]   1/8/2020  9:30 AM Idalmis Alex, PT [facility]   1/9/2020 11:20 AM MD Deepti Will [address]   1/10/2020  9:30 AM Latosha Pastor, PTA [facility]   1/13/2020  9:30 AM Latosha Pastor, PTA [facility]   1/15/2020  9:30 AM Idalmis Alex, PT [facility]   1/16/2020  9:30 AM [facility] FAST TRACK NURSE [facility]   1/17/2020  9:30 AM Latosha Pastor, PTA [facility]   1/20/2020 10:00 AM Latosha Pastor, PTA [facility]   1/22/2020  9:30 AM Idalmis Alex, PT [facility]   1/24/2020  9:30 AM Latosha Pastor PTA [facility]   1/27/2020 10:30 AM Latosha Pastor, PTA [facility]   1/29/2020  9:00 AM Idalmis Alex, PT [facility]   1/31/2020  9:30 AM Latosha Pastor, PTA [facility]

## 2020-01-06 ENCOUNTER — HOSPITAL ENCOUNTER (OUTPATIENT)
Dept: PHYSICAL THERAPY | Age: 80
Discharge: HOME | End: 2020-01-06
Payer: MEDICARE

## 2020-01-06 PROCEDURE — 97110 THERAPEUTIC EXERCISES: CPT

## 2020-01-06 PROCEDURE — 97140 MANUAL THERAPY 1/> REGIONS: CPT

## 2020-01-06 PROCEDURE — 97016 VASOPNEUMATIC DEVICE THERAPY: CPT

## 2020-01-06 NOTE — PROGRESS NOTES
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 She goes to see the shoulder doctor on Thursday. She is concerned because she is still having a lot of pain and inability to move her arm herself for dressing and bathing.        OBJECTIVE    Modality rationale: decrease edema, decrease inflammation and decrease pain to improve the patients ability to increase ease of ADLs   Min Type Additional Details    [] Estim:  []Unatt       []IFC  []Premod                        []Other:  []w/ice   []w/heat  Position:  Location:    [] Estim: []Att    []TENS instruct  []NMES                    []Other:  []w/US   []w/ice   []w/heat  Position:  Location:    []  Traction: [] Cervical       []Lumbar                       [] Prone          []Supine                       []Intermittent   []Continuous Lbs:  [] before manual  [] after manual    []  Ultrasound: []Continuous   [] Pulsed                           []1MHz   []3MHz W/cm2:  Location:    []  Iontophoresis with dexamethasone         Location: [] Take home patch   [] In clinic    []  Ice     []  heat  []  Ice massage  []  Laser   []  Anodyne Position:  Location:    []  Laser with stim  []  Other:  Position:  Location:   15 [x]  Vasopneumatic Device Pressure:       [x] lo [] med [] hi   Temperature: [x] lo [] med [] hi   [x] Skin assessment post-treatment:  [x]intact []redness- no adverse reaction    []redness - adverse reaction:     33 min Therapeutic Exercise:  [x] See flow sheet :   Rationale: increase ROM and increase strength to improve the patients ability to increase ease of reaching    10 min Manual Therapy: TPR to the deltoid and infraspinatus   Rationale: decrease pain, increase ROM and increase tissue extensibility to increase ease of ADLs          With   [x] TE   [] TA   [] neuro   [] other: Patient Education: [x] Review HEP    [] Progressed/Changed HEP based on:   [] positioning   [] body mechanics   [] transfers   [] heat/ice application    [] other:      Other Objective/Functional Measures:   Continues with decreased PROM  Pain with attempt at supine AAROM or AROM  Decreased swelling noted in shoulder today  Pitting edema to right LE: being addressed by MD tomorrow  Educated to monitor BP as well with swelling    Pain Level (0-10 scale) post treatment: 5/10    ASSESSMENT/Changes in Function: Pt making slow progress with continued decreased PROM and pain with attempt at [facility] and AROM. She is limited to around 100 degrees flexion PROM. She has pain along the posteromedial deltoid and has multiple TPs throughout the deltoid muscle. Will continue with gentle PROM to maintain range while awaiting MD decision regarding next step for the right shoulder. Goals for this certification period to be accomplished in 4 weeks:  1. Patient will improve FOTO score by 27 points in order to demonstrate a significant improvement in function. 2. Patient will improve right shoulder PROM flex/abd to 120 degrees in order to increase ease of reaching. 3. Patient will improve right shoulder AROM elevation to 90 degrees in order to increase ease of ADLs.    4. Patient will report pain at 2/10 or less during daily activities in order to improve quality of life.     PLAN  [x]  Upgrade activities as tolerated     [x]  Continue plan of care  []  Update interventions per flow sheet       []  Discharge due to:_  []  Other:_      Daylin Leggett, PTA 1/6/2020  7:44 AM    Future Appointments   Date Time Provider Heri Dela Cruzi   1/6/2020  9:30 AM Morgan Lovings, PTA [facility]   1/7/2020 11:30 AM Yinka Ibrahim MD [id number]   1/8/2020  9:30 AM Cindia Level, PT [facility]   1/9/2020 11:20 AM Mesfin Carver MD [id number] [facility]   1/10/2020  9:30 AM Morgan Lovings, PTA [facility]   1/13/2020  9:30 AM Morgan Lovings, PTA [facility]   1/15/2020  9:30 AM Cindia Level, PT [facility]   1/16/2020  9:30 AM [facility] FAST TRACK NURSE [facility]   1/17/2020  9:30 AM Morgan Lovings, PTA [facility]   1/20/2020 10:00 AM Morgan Lovings, PTA [facility]   1/22/2020  9:30 AM Cindia Level, PT [facility]   1/24/2020  9:30 AM Morgan Lovings, PTA [facility]   1/27/2020 10:30 AM Morgan Lovings, PTA [facility]   1/29/2020  9:00 AM [facility]india Level, [facility]   1/31/2020  9:30 AM Morgan Lovings, PTA [facility]

## 2020-01-07 ENCOUNTER — OFFICE VISIT (OUTPATIENT)
Dept: VASCULAR SURGERY | Age: 80
End: 2020-01-07

## 2020-01-07 VITALS
HEIGHT: 65 IN | BODY MASS INDEX: 20.99 KG/M2 | HEART RATE: 75 BPM | DIASTOLIC BLOOD PRESSURE: 70 MMHG | SYSTOLIC BLOOD PRESSURE: 104 MMHG | WEIGHT: 126 LBS | RESPIRATION RATE: 14 BRPM | OXYGEN SATURATION: 96 %

## 2020-01-07 DIAGNOSIS — R60.0 BILATERAL EDEMA OF LOWER EXTREMITY: Primary | ICD-10-CM

## 2020-01-07 LAB
BACTERIA SPEC CULT: NORMAL
GRAM STN SPEC: NORMAL
GRAM STN SPEC: NORMAL
SERVICE CMNT-IMP: NORMAL

## 2020-01-07 NOTE — PROGRESS NOTES
[facility]    Chief Complaint   Patient presents with    Swelling       History and Physical    Most pleasant 80-year-old female here today following up regarding leg edema. She tells me she is having some right shoulder difficulty she had a total shoulder done. She temporarily had some swelling on the arm but this is gone down. She is in physical therapy for this now is helping to improve her range of motion. Regarding her legs she has had a return of swelling again. This affects both legs goes down somewhat overnight but not totally. She wakes up with edema in the morning as well. She is had no ulcerations her legs just are uncomfortable to her she wears stockings as well. She is been wearing compression stockings since I seen her last which is been several months now. No shortness of breath no chest pain no studies indicating DVT.     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    Right shoulder pain M25.511    Postlaminectomy syndrome M96.1    Aorta aneurysm I71.9    Chronic cholecystitis with calculus K80.10    Rotator cuff tear arthropathy of right shoulder M75.101, M12.811     Past Surgical History:   Procedure Laterality Date    GASTROJEJUNOSTOMY      12/10 Bilroth II    GASTROJEJUNOSTOMY      12/10 Lia en Y (after Bilroth II)    HX HERNIA REPAIR      NEUROLOGICAL PROCEDURE UNLISTED  2006    neck surgery     Current Outpatient Medications Medication Sig Dispense Refill    baclofen (LIORESAL) 10 mg tablet Take 1 Tab by mouth three (3) times daily as needed for Other (spasms). 90 Tab 0    ferrous sulfate 325 mg (65 mg iron) tablet Take 325 mg by mouth two (2) times a day.  gabapentin (NEURONTIN) 300 mg capsule nightly as needed. 1    CRANBERRY FRUIT EXTRACT (CRANBERRY EXTRACT PO) Take  by mouth daily.  metoprolol succinate (TOPROL-XL) 25 mg XL tablet TAKE 1 TABLET BY MOUTH TWICE DAILY 180 Tab 3    PRADAXA 150 mg capsule Take 1 Cap by mouth two (2) times a day. 180 Cap 3    cyanocobalamin (VITAMIN B12) 1,000 mcg/mL injection Taken weekly on Saturdays  1    digoxin (DIGOX) 0.125 mg tablet TAKE 1 TABLET BY MOUTH ONCE DAILY 90 Tab 3    tiotropium (SPIRIVA WITH HANDIHALER) 18 mcg inhalation capsule Take 1 Cap by inhalation daily. 20 Cap 0    GA-NL-HG-Fe-Min-Lycopen-Lutein (CENTRUM) 0.4-162-18 mg tab Take  by mouth daily.  pantoprazole (PROTONIX) 40 mg tablet Take 40 mg by mouth daily.  BIOTIN PO Take  by mouth two (2) times a day.  folic acid 130 mcg tablet Take 400 mcg by mouth daily.  CALCIUM PO Take  by mouth daily.  montelukast (SINGULAIR) 10 mg tablet Take 10 mg by mouth daily. Allergies   Allergen Reactions    Codeine Nausea Only     Patient states not allergic    Diltiazem Other (comments)     Patient states not allergic       Review of Systems    A full review of systems was completed times ten organ systems and was deemed negative unless otherwise mentioned in the HPI.     Physical   Visit Vitals  /70 (BP 1 Location: Left arm, BP Patient Position: Sitting)   Pulse 75   Resp 14   Ht 5' 5\" (1.651 m)   Wt 126 lb (57.2 kg)   LMP  (LMP Unknown)   SpO2 96%   BMI 20.97 kg/m²       Thin statured healthy-appearing 78  Head is normocephalic  No facial symmetry  Neck no JVD  Chest is clear  Cardiac regular  Abdomen soft flat nontender  Lower extremities no showed no signs of arterial insufficiency  She does have swelling at the sock lines that goes from the knees to the ankles really. No skin ulcerations no breakdown she does have some scattered varicose veins. Impression/Plan:     ICD-10-CM ICD-9-CM    1. Bilateral edema of lower extremity R60.0 782.3 DUPLEX LOWER EXT VENOUS BILAT     Will repeat her venous testing since she seems to have worsening of symptoms. Her last testing was completely normal.  I encouraged her to continue with her stockings and notes difficult with her shoulder hurting her. We will see her back after the venous testing is complete. She did have her heart tested there is no signs of any heart failure. No orders of the defined types were placed in this encounter. Kitty Catalan MD    PLEASE NOTE:  This document has been produced using voice recognition software. Unrecognized errors in transcription may be present.

## 2020-01-07 NOTE — PROGRESS NOTES
1. Have you been to an emergency room or urgent care clinic since your last visit? Hospitalized since your last visit? If yes, where, when, and reason for visit? yes  2. Have you seen or consulted any other health care providers outside of the [facility] since your last visit including any procedures, health maintenance items.  If yes, where, when and reason for visit?   no

## 2020-01-08 ENCOUNTER — HOSPITAL ENCOUNTER (OUTPATIENT)
Dept: PHYSICAL THERAPY | Age: 80
Discharge: HOME | End: 2020-01-08
Payer: MEDICARE

## 2020-01-08 PROCEDURE — 97140 MANUAL THERAPY 1/> REGIONS: CPT

## 2020-01-08 PROCEDURE — 97110 THERAPEUTIC EXERCISES: CPT

## 2020-01-08 NOTE — PROGRESS NOTES
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 Reports she is still feeling about the same. She reports continued difficulty with lifting and still has constant pain in shoulder. She notes some improvements in positioning her arm while driving.      OBJECTIVE    Modality rationale: decrease inflammation and decrease pain to improve the patients ability to increase ease of ADLs   Min Type Additional Details    [] Estim:  []Unatt       []IFC  []Premod                        []Other:  []w/ice   []w/heat  Position:  Location:    [] Estim: []Att    []TENS instruct  []NMES                    []Other:  []w/US   []w/ice   []w/heat  Position:  Location:    []  Traction: [] Cervical       []Lumbar                       [] Prone          []Supine                       []Intermittent   []Continuous Lbs:  [] before manual  [] after manual    []  Ultrasound: []Continuous   [] Pulsed                           []1MHz   []3MHz W/cm2:  Location:    []  Iontophoresis with dexamethasone         Location: [] Take home patch   [] In clinic   15 []  Ice     [x]  heat  []  Ice massage  []  Laser   []  Anodyne Position: seated  Location: right shoulder    []  Laser with stim  []  Other:  Position:  Location:    []  Vasopneumatic Device Pressure:       [] lo [] med [] hi Temperature: [] lo [] med [] hi   [x] Skin assessment post-treatment:  [x]intact []redness- no adverse reaction    []redness - adverse reaction:     28 min Therapeutic Exercise:  [x] See flow sheet :   Rationale: increase ROM and increase strength to improve the patients ability to increase ease of ADLs    10 min Manual Therapy:  scap mobs, trigger points release and STM to right infraspinatus and deltoid   Rationale: decrease pain, increase ROM and increase tissue extensibility to increase ease of reaching          With   [x] TE   [] TA   [] neuro   [] other: Patient Education: [x] Review HEP    [] Progressed/Changed HEP based on:   [] positioning   [] body mechanics   [] transfers   [] heat/ice application    [] other:      Other Objective/Functional Measures:   Pt. Tolerated PT well with no reports of increased pain  She continues to have limited mobility with right shoulder AROM  Right shoulder PROM flexion ~100 degrees today     Pain Level (0-10 scale) post treatment:  5/10    ASSESSMENT/Changes in Function:  Pt. Is progressing slowly towards goals. She continues to have significant right shoulder pain and decreased PROM and AROM mobility. Will progress pt. Per physician recommendations following her appointment tomorrow. Patient will continue to benefit from skilled PT services to modify and progress therapeutic interventions, address functional mobility deficits, address ROM deficits, address strength deficits, analyze and address soft tissue restrictions, analyze and cue movement patterns, analyze and modify body mechanics/ergonomics and assess and modify postural abnormalities to attain remaining goals. Progress towards goals / Updated goals:  1. Patient will improve FOTO score by 27 points in order to demonstrate a significant improvement in function. 2. Patient will improve right shoulder PROM flex/abd to 120 degrees in order to increase ease of reaching.    Not met: ~100 degrees (1/8/20)  3. Patient will improve right shoulder AROM elevation to 90 degrees in order to increase ease of ADLs.    4. Patient will report pain at 2/10 or less during daily activities in order to improve quality of life.     PLAN  []  Upgrade activities as tolerated     [x]  Continue plan of care  []  Update interventions per flow sheet       []  Discharge due to:_  []  Other:_      Aubrey Chou, PT 1/8/2020  7:52 AM    Future Appointments   Date Time Provider Heri Beal   1/8/2020  9:30 AM Daniel Leak, PT [facility] [address]   1/9/2020 11:20 AM MD Deepti Mehta [address]   1/10/2020  9:30 AM Mere Laity, PTA [facility] [address]   1/13/2020  9:30 AM [place] Laity, PTA [facility] [address]   1/15/2020  9:30 AM Daniel Leak, PT [facility] [address]   1/16/2020  9:30 AM [facility] FAST TRACK NURSE [facility]   1/17/2020  9:30 AM Salem Laity, PTA [facility] [address]   1/20/2020 10:00 AM [place] Laity, PTA [facility] [address]   1/22/2020  9:30 AM Daniel Leak, PT [facility] [address]   1/24/2020  9:30 AM Salem Laity, PTA [facility] [address]   1/27/2020 10:30 AM [place] Laity, PTA [facility] [address]   1/29/2020  9:00 AM [place] Leak, PT [facility] [address]   1/31/2020  9:30 AM Mere Laity, PTA [facility] [address]   2/3/2020 12:45 PM [facility] SCHED   2/7/2020 10:45 AM Gloria Wilcox, [address] [facility]

## 2020-01-09 ENCOUNTER — OFFICE VISIT (OUTPATIENT)
Dept: ORTHOPEDIC SURGERY | Age: 80
End: 2020-01-09

## 2020-01-09 ENCOUNTER — ANESTHESIA EVENT (OUTPATIENT)
Dept: SURGERY | Age: 80
End: 2020-01-09
Payer: MEDICARE

## 2020-01-09 ENCOUNTER — HOSPITAL ENCOUNTER (OUTPATIENT)
Dept: LAB | Age: 80
Discharge: HOME | End: 2020-01-09
Payer: MEDICARE

## 2020-01-09 VITALS
OXYGEN SATURATION: 97 % | DIASTOLIC BLOOD PRESSURE: 61 MMHG | HEIGHT: 65 IN | TEMPERATURE: 97.3 F | SYSTOLIC BLOOD PRESSURE: 114 MMHG | BODY MASS INDEX: 21.06 KG/M2 | WEIGHT: 126.4 LBS | RESPIRATION RATE: 16 BRPM | HEART RATE: 60 BPM

## 2020-01-09 DIAGNOSIS — M12.811 ROTATOR CUFF ARTHROPATHY, RIGHT: ICD-10-CM

## 2020-01-09 DIAGNOSIS — M12.811 ROTATOR CUFF ARTHROPATHY, RIGHT: Primary | ICD-10-CM

## 2020-01-09 PROBLEM — M81.8 IDIOPATHIC OSTEOPOROSIS: Status: ACTIVE | Noted: 2020-01-09

## 2020-01-09 LAB
ANION GAP SERPL CALC-SCNC: 2 MMOL/L (ref 3–18)
BASOPHILS # BLD: 0 K/UL (ref 0–0.1)
BASOPHILS NFR BLD: 0 % (ref 0–2)
BUN SERPL-MCNC: 13 MG/DL (ref 7–18)
BUN/CREAT SERPL: 25 (ref 12–20)
CALCIUM SERPL-MCNC: 8.1 MG/DL (ref 8.5–10.1)
CHLORIDE SERPL-SCNC: 107 MMOL/L (ref 100–111)
CO2 SERPL-SCNC: 33 MMOL/L (ref 21–32)
CREAT SERPL-MCNC: 0.51 MG/DL (ref 0.6–1.3)
CRP SERPL-MCNC: <0.3 MG/DL (ref 0–0.3)
DIFFERENTIAL METHOD BLD: ABNORMAL
EOSINOPHIL # BLD: 0.1 K/UL (ref 0–0.4)
EOSINOPHIL NFR BLD: 1 % (ref 0–5)
ERYTHROCYTE [DISTWIDTH] IN BLOOD BY AUTOMATED COUNT: 14.3 % (ref 11.6–14.5)
ERYTHROCYTE [SEDIMENTATION RATE] IN BLOOD: 3 MM/HR (ref 0–30)
GLUCOSE SERPL-MCNC: 98 MG/DL (ref 74–99)
HCT VFR BLD AUTO: 39.3 % (ref 35–45)
HGB BLD-MCNC: 12.5 G/DL (ref 12–16)
LYMPHOCYTES # BLD: 1.4 K/UL (ref 0.9–3.6)
LYMPHOCYTES NFR BLD: 18 % (ref 21–52)
MCH RBC QN AUTO: 30.4 PG (ref 24–34)
MCHC RBC AUTO-ENTMCNC: 31.8 G/DL (ref 31–37)
MCV RBC AUTO: 95.6 FL (ref 74–97)
MONOCYTES # BLD: 0.6 K/UL (ref 0.05–1.2)
MONOCYTES NFR BLD: 8 % (ref 3–10)
NEUTS SEG # BLD: 5.5 K/UL (ref 1.8–8)
NEUTS SEG NFR BLD: 73 % (ref 40–73)
PLATELET # BLD AUTO: 300 K/UL (ref 135–420)
PMV BLD AUTO: 10.5 FL (ref 9.2–11.8)
POTASSIUM SERPL-SCNC: 4.6 MMOL/L (ref 3.5–5.5)
RBC # BLD AUTO: 4.11 M/UL (ref 4.2–5.3)
SODIUM SERPL-SCNC: 142 MMOL/L (ref 136–145)
WBC # BLD AUTO: 7.6 K/UL (ref 4.6–13.2)

## 2020-01-09 PROCEDURE — 85652 RBC SED RATE AUTOMATED: CPT

## 2020-01-09 PROCEDURE — 85025 COMPLETE CBC W/AUTO DIFF WBC: CPT

## 2020-01-09 PROCEDURE — 36415 COLL VENOUS BLD VENIPUNCTURE: CPT

## 2020-01-09 PROCEDURE — 86140 C-REACTIVE PROTEIN: CPT

## 2020-01-09 PROCEDURE — 80048 BASIC METABOLIC PNL TOTAL CA: CPT

## 2020-01-09 RX ORDER — DIPHENHYDRAMINE HYDROCHLORIDE 50 MG/ML
50 INJECTION, SOLUTION INTRAMUSCULAR; INTRAVENOUS AS NEEDED
Status: CANCELLED
Start: 2020-01-17

## 2020-01-09 RX ORDER — HYDROCODONE BITARTRATE AND ACETAMINOPHEN 5; 325 MG/1; MG/1
1 TABLET ORAL
Qty: 28 TAB | Refills: 0 | Status: CANCELLED | OUTPATIENT
Start: 2020-01-09 | End: 2020-01-16

## 2020-01-09 RX ORDER — ALBUTEROL SULFATE 0.83 MG/ML
2.5 SOLUTION RESPIRATORY (INHALATION) AS NEEDED
Status: CANCELLED
Start: 2020-01-17

## 2020-01-09 RX ORDER — GABAPENTIN 100 MG/1
400 CAPSULE ORAL ONCE
Status: CANCELLED | OUTPATIENT
Start: 2020-01-09 | End: 2020-01-09

## 2020-01-09 RX ORDER — ONDANSETRON 2 MG/ML
8 INJECTION INTRAMUSCULAR; INTRAVENOUS AS NEEDED
Status: CANCELLED | OUTPATIENT
Start: 2020-01-17

## 2020-01-09 RX ORDER — ACETAMINOPHEN 325 MG/1
650 TABLET ORAL AS NEEDED
Status: CANCELLED
Start: 2020-01-17

## 2020-01-09 RX ORDER — ACETAMINOPHEN 325 MG/1
1000 TABLET ORAL ONCE
Status: CANCELLED | OUTPATIENT
Start: 2020-01-09 | End: 2020-01-09

## 2020-01-09 RX ORDER — EPINEPHRINE 1 MG/ML
0.3 INJECTION, SOLUTION, CONCENTRATE INTRAVENOUS AS NEEDED
Status: CANCELLED | OUTPATIENT
Start: 2020-01-17

## 2020-01-09 RX ORDER — HYDROCORTISONE SODIUM SUCCINATE 100 MG/2ML
100 INJECTION, POWDER, FOR SOLUTION INTRAMUSCULAR; INTRAVENOUS AS NEEDED
Status: CANCELLED | OUTPATIENT
Start: 2020-01-17

## 2020-01-09 RX ORDER — HYDROCODONE BITARTRATE AND ACETAMINOPHEN 10; 325 MG/1; MG/1
1 TABLET ORAL
Qty: 40 TAB | Refills: 0 | Status: SHIPPED | OUTPATIENT
Start: 2020-01-09 | End: 2020-01-16

## 2020-01-09 NOTE — LETTER
[facility] Surgery Request Form for the [facility] Fax to [phone]                                        Telephone: [phone] or [phone] To be completed by Physician Office: 
 
Date: 2020    Requested by: Alina Cavazos Phone No: [phone]  Fax No:  [phone] Surgery Date: 1/10/20 Requested Time: 1:30PM         
      
Surgeon: Dominga Cramer MD  Assistant/2nd Surgeon: Please [place]:            EMERGENT       URGENT         ELECTIVE  
CPT CODE*  Procedure 90056 RIGHT SHOULDER ARTHROSCOPIC LYSIS OF ADHESIONS, DEBRIDEMENT   
   
   
*CPT code is required for ALL procedures. Patient Information: 
 
Name: Leydi Montoya SSN: [id number]  : 1940   Male/Female: female Home Phone No: [phone] (home) Primary Insurance: [facility] ([address] [place] [address],[address])   Insurance Policy Number: [id number]  
 
*If self-pay, please fax Self-Pay/Rubia Verification Form with this request. Elective cases will not be scheduled until approved. Latex Allergy:  No  
 
 
 IMPRESSION:   
    ICD-10-CM ICD-9-CM    
1. Rotator cuff arthropathy, right M12.811 716.81    
 
 
 
Admission:  Outpatient Anesthesia Type General 
 
Comments/Special Equipment and/or  [address]

## 2020-01-09 NOTE — H&P
HISTORY AND PHYSICAL          Patient: Tera Higgins                MRN: [id number]       SSN: [id number]  YOB: 1940          AGE: 78 y.o. SEX: female      Patient scheduled for:  Right shoulder arthroscopic lysis of adhesions and debridement    Surgeon: Kenzie Weeks MD    ANESTHESIA TYPE:  General    HISTORY:     The patient was seen in the office today for a preoperative history and physical for an upcoming above listed surgery. The patient is a pleasant 78 y.o. female who has a history of right shoulder pain. Right reverse total shoulder arthroplasty on 7/31/19. Describes pain as a 6/10 today. She has been going to PT with minimal relief with pain and ROM. Still has significant pain and limited ROM today. She notes that on 12/12/19 she reached to grab a piece of bread out of the microwave and heard a \"pop\" in her shoulder. Has tried taking Tylenol with no relief. Due to the current findings, affected activity of daily living and continued pain and discomfort, surgical intervention is indicated. The alternatives, risks, and complications, including but not limited to infection, blood loss, need for blood transfusion, neurovascular damage, jessi-incisional numbness, subcutaneous hematoma, bone fracture, anesthetic complications, DVT, PE, death, RSD, postoperative stiffness and pain, possible surgical scar, delayed healing and nonhealing, reflexive sympathetic dystrophy, damage to blood vessels and nerves, need for more surgery, MI, and stroke,  failure of hardware, gait disturbances,have been discussed. The patient understands and wishes to proceed with surgery.      PAST MEDICAL HISTORY:     Past Medical History:   Diagnosis Date    Aorta aneurysm     ascending 4.0 cm (CTA 5/15)    Arthritis     Asthma     Atrial fibrillation     CHADS score 1 (-CHF, +HTN, -AGE, -DM, -CVA)    COPD     Depression     nos    DVT     12/10  R Subclavian (PICC associated)    Dyslipidemia     Hypertension     Hypertension     Papillary adenocarcinoma     gastric    Pernicious anemia     Sleep apnea        CURRENT MEDICATIONS:     Current Outpatient Medications   Medication Sig Dispense Refill    HYDROcodone-acetaminophen (NORCO)  mg tablet Take 1 Tab by mouth every four (4) hours as needed for Pain for up to 7 days. Max Daily Amount: 6 Tabs. 40 Tab 0    baclofen (LIORESAL) 10 mg tablet Take 1 Tab by mouth three (3) times daily as needed for Other (spasms). 90 Tab 0    ferrous sulfate 325 mg (65 mg iron) tablet Take 325 mg by mouth two (2) times a day.  gabapentin (NEURONTIN) 300 mg capsule nightly as needed. 1    CRANBERRY FRUIT EXTRACT (CRANBERRY EXTRACT PO) Take  by mouth daily.  metoprolol succinate (TOPROL-XL) 25 mg XL tablet TAKE 1 TABLET BY MOUTH TWICE DAILY 180 Tab 3    PRADAXA 150 mg capsule Take 1 Cap by mouth two (2) times a day. 180 Cap 3    cyanocobalamin (VITAMIN B12) 1,000 mcg/mL injection Taken weekly on Saturdays  1    digoxin (DIGOX) 0.125 mg tablet TAKE 1 TABLET BY MOUTH ONCE DAILY 90 Tab 3    tiotropium (SPIRIVA WITH HANDIHALER) 18 mcg inhalation capsule Take 1 Cap by inhalation daily. 20 Cap 0    DM-YZ-BD-Fe-Min-Lycopen-Lutein (CENTRUM) 0.4-162-18 mg tab Take  by mouth daily.  pantoprazole (PROTONIX) 40 mg tablet Take 40 mg by mouth daily.  BIOTIN PO Take  by mouth two (2) times a day.  folic acid 854 mcg tablet Take 400 mcg by mouth daily.  CALCIUM PO Take  by mouth daily.  montelukast (SINGULAIR) 10 mg tablet Take 10 mg by mouth daily.          ALLERGIES:     Allergies   Allergen Reactions    Codeine Nausea Only     Patient states not allergic    Diltiazem Other (comments)     Patient states not allergic         SURGICAL HISTORY:     Past Surgical History:   Procedure Laterality Date    GASTROJEJUNOSTOMY      12/10 Bilroth II    GASTROJEJUNOSTOMY      12/10 Lia en Y (after Bilroth II)    HX HERNIA REPAIR  NEUROLOGICAL PROCEDURE UNLISTED  2006    neck surgery       SOCIAL HISTORY:     Social History     Socioeconomic History    Marital status:      Spouse name: Not on file    Number of children: Not on file    Years of education: Not on file    Highest education level: Not on file   Tobacco Use    Smoking status: Former Smoker     Last attempt to quit: 1981     Years since quittin.0    Smokeless tobacco: Never Used   Substance and Sexual Activity    Alcohol use: Yes     Alcohol/week: 0.0 standard drinks     Comment: wine nightly    Drug use: No    Sexual activity: Not Currently   Other Topics Concern       FAMILY HISTORY:     Family History   Problem Relation Age of Onset    Heart Attack Father     Breast Cancer Sister     Breast Cancer Sister     Breast Cancer Other         Grandmother - nos       REVIEW OF SYSTEMS:     Negative for fevers, chills, chest pain, shortness of breath, weight loss, recent illness     General: Negative for fever and chills. No unexpected change in weight. Denies fatigue. No change in appetite. Skin: Negative for rash or itching. HEENT: Negative for congestion, sore throat, neck pain and neck stiffness. No change in vision or hearing. Hasn't noted any enlarged lymph nodes in the neck. Cardiovascular:  Negative for chest pain and palpitations. Has not noted pedal edema. Respiratory: Negative for cough, colds, sinus, hemoptysis, shortness of breath and wheezing. Gastrointestinal: Negative for nausea and vomiting, rectal bleeding, coffee ground emesis, abdominal pain, diarrhea and constipation. Genitourinary: Negative for dysuria, frequency urgency, or burning on micturition. No flank pain, no foul smelling urine, no difficulty with initiating urination. Hematological: Negative for bleeding or easy bruising. Musculoskeletal: Negative  for arthralgias, back pain or neck pain. Neurological: Negative for dizziness, seizures or syncopal episodes. Denies headaches. Endocrine: Denies excessive thirst.  No heat/cold intolerance. Psychiatric: Negative for depression or insomnia. PHYSICAL EXAMINATION:     VITALS:   Visit Vitals  /61   Pulse 60   Temp 97.3 °F (36.3 °C) (Oral)   Resp 16   Ht 5' 5\" (1.651 m)   Wt 126 lb 6.4 oz (57.3 kg)   LMP  (LMP Unknown)   SpO2 97%   BMI 21.03 kg/m²     GEN:  Well developed, well nourished 78 y.o. female in no acute distress. HEENT: Normocephalic and atraumatic. Eyes: Conjunctivae and EOM are normal.Pupils are equal, round, and reactive to light. External ear normal appearance, external nose normal appearing. Mouth/Throat: Oropharynx is clear and moist, able to handle oral secretions w/out difficulty, airway patent  NECK: Supple. Normal ROM, No lymphadenopathy. Trachea is midline. No bruising, swelling or deformity  RESP: Clear to auscultation bilaterally. No wheezes, rales, rhonchi. Normal effort and breath sounds. No respiratory distress  CARDIO:  Normal rate, regular rhythm and normal heart sounds. No MGR. ABDOMEN: Soft, non-tender, non-distended, normoactive bowel sounds in all four quadrants. There is no tenderness. There is no rebound and no guarding. BACK: No CVA or spinal tenderness  BREAST:  Deferred  PELVIC:    Deferred   RECTAL:  Deferred   :           Deferred  EXTREMITIES: EXAMINATION OF: right shoulder  Inspection: swelling present,  Bruising present, swelling to right hand and fingers - with some improvement  Incision well healed  Passive glenohumeral abduction 0-60 degrees with pain, able to make active passive fist  Stability: Stable  Strength: n/a  2+ distal pulses  NEUROVASCULAR: Sensation intact to light touch and strength grossly intact and symmetrical. No nystagmus. Positive distal pulses and capillary refill. DVT ASSESSMENT:  There is not  calf tenderness. No evidence of DVT seen on physical exam.  MOTOR: In tact  PSYCH: Alert an oriented to person, place and time.  Mood, memory, affect, behavior and judgment normal       RADIOGRAPHS & DIAGNOSTIC STUDIES:     MRI/xray reveals : CT of right shoulder with contrast dated 12/24/19 was reviewed and read by Dr. Agnes Arias:   IMPRESSION:  1.  Mild inferior glenoid notching. No significant loosening or fracture  appreciated. 2.  Small full thickness tear of the supraspinatus. Mild to moderate atrophy. 3.  Small amount of contrast external to the subscapularis noted at the lesser  tuberosity which may be related to small defect or leakage during placement. No  large tear appreciated. Significant atrophy.          Doppler RUE: no evidence of DVT      LABS:     Labs pending    ASSESSMENT:       Encounter Diagnosis   Name Primary?  Rotator cuff arthropathy, right Yes       PLAN:     Again, the alternatives, risks, and complications, as well as expected outcome were discussed. The patient understands and agrees to proceed with right shoulder arthroscopic lysis of adhesions and debridement.  Patient given orders listed below:    Orders Placed This Encounter    CBC WITH AUTOMATED DIFF    METABOLIC PANEL, BASIC    C REACTIVE PROTEIN, QT    SED RATE (ESR)    HYDROcodone-acetaminophen (NORCO)  mg tablet         Job Singh PA-C  1/9/2020  12:25 PM

## 2020-01-09 NOTE — PROGRESS NOTES
1. Have you been to the ER, urgent care clinic since your last visit? Hospitalized since your last visit? No    2. Have you seen or consulted any other health care providers outside of the [address] [place] [address] since your last visit? Include any pap smears or colon screening.  No

## 2020-01-09 NOTE — LETTER
Patient: Juwan Chung Surgery Date: 1/10/20  Time: 1:30pm 
 
PROCEDURE: RIGHT SHOULDER ARTHROSCOPIC LYSIS OF ADHESIONS, DEBRIDEMENT Report for your Surgery at 12:00PM  @ [address]: 
  
cheri Five (5) days prior to surgery STOP taking ASPIRIN, ANTI-INFLAMMATORY , and/or BLOOD THINNER MEDICATIONS (such as COUMADIN, PLAVIX, HEPARIN or others). o If you are taking blood thinner medication please contact your prescribing physician for any special instructions. LAB: Report to  [facility]  or [facility]  14 days before your surgery date. (Your orders for these tests are in [facility] at the [place] [address]  
o It doesn't matter if you have eaten before going to the Lab. o If required labs and EKG are not completed Surgery will be canceled. NO- Medical Clearance appointment. NO-  Other Clearance appointment. (may change based on lab and or ekg results) TODAY History and Physical appointment with Justin Feliciano PA-C at the [facility] SURGICAL [address] [place], [address] [place] [address] Str[address] . This is a required appointment for your upcoming surgery. THE DAY OF SURGERY: 
  
 
o Do not eat, chew gum or drink anything after Midnight prior to the date of your surgery 
o Take your regular medications with small sips of water unless otherwise instructed. (This means blood pressure and/or heart medicine) If you are insulin dependent, bring your insulin with you, unless otherwise instructed. 
o Bring a list of your medications and the dosage to the hospital. 
o Do not wear nail polish, make-up or jewelry. o Do not bring valuables or money to the hospital. 
o Have someone accompany you so they may drive you home following the surgery. Bring your drivers license, your insurance card and Hospital co-pay. Return for your postoperative visit on Thursday, January 16, 2020 10:45 AM at the [facility] SURGICAL [id number] [place], [address] This appointment will be with Sd Wan, Dr. Ralph Clark physician assistant. Eliceo Lopez  [phone]      Surgical Coordinator

## 2020-01-10 ENCOUNTER — HOSPITAL ENCOUNTER (OUTPATIENT)
Age: 80
Setting detail: OUTPATIENT SURGERY
Discharge: HOME | End: 2020-01-10
Attending: ORTHOPAEDIC SURGERY | Admitting: ORTHOPAEDIC SURGERY
Payer: MEDICARE

## 2020-01-10 ENCOUNTER — ANESTHESIA (OUTPATIENT)
Dept: SURGERY | Age: 80
End: 2020-01-10
Payer: MEDICARE

## 2020-01-10 ENCOUNTER — APPOINTMENT (OUTPATIENT)
Dept: PHYSICAL THERAPY | Age: 80
End: 2020-01-10
Payer: MEDICARE

## 2020-01-10 ENCOUNTER — TELEPHONE (OUTPATIENT)
Dept: ORTHOPEDIC SURGERY | Age: 80
End: 2020-01-10

## 2020-01-10 VITALS
OXYGEN SATURATION: 91 % | HEIGHT: 65 IN | RESPIRATION RATE: 19 BRPM | SYSTOLIC BLOOD PRESSURE: 133 MMHG | HEART RATE: 70 BPM | TEMPERATURE: 97 F | BODY MASS INDEX: 20.62 KG/M2 | WEIGHT: 123.8 LBS | DIASTOLIC BLOOD PRESSURE: 65 MMHG

## 2020-01-10 DIAGNOSIS — M12.811 ROTATOR CUFF ARTHROPATHY, RIGHT: Primary | ICD-10-CM

## 2020-01-10 PROCEDURE — 77030018836 HC SOL IRR NACL ICUM -A: Performed by: ORTHOPAEDIC SURGERY

## 2020-01-10 PROCEDURE — 74011250636 HC RX REV CODE- 250/636: Performed by: PHYSICIAN ASSISTANT

## 2020-01-10 PROCEDURE — 77030039267 HC ADH SKN EXOFIN S2SG -B: Performed by: ORTHOPAEDIC SURGERY

## 2020-01-10 PROCEDURE — 77030031139 HC SUT VCRL2 J&J -A: Performed by: ORTHOPAEDIC SURGERY

## 2020-01-10 PROCEDURE — 87075 CULTR BACTERIA EXCEPT BLOOD: CPT

## 2020-01-10 PROCEDURE — 87116 MYCOBACTERIA CULTURE: CPT

## 2020-01-10 PROCEDURE — 76010000149 HC OR TIME 1 TO 1.5 HR: Performed by: ORTHOPAEDIC SURGERY

## 2020-01-10 PROCEDURE — 77030026438 HC STYL ET INTUB CARD -A: Performed by: ANESTHESIOLOGY

## 2020-01-10 PROCEDURE — 77030022036 HC BLD SHV TOMCAT STRY -B: Performed by: ORTHOPAEDIC SURGERY

## 2020-01-10 PROCEDURE — 77030002966 HC SUT PDS J&J -A: Performed by: ORTHOPAEDIC SURGERY

## 2020-01-10 PROCEDURE — 77030008683 HC TU ET CUF COVD -A: Performed by: ANESTHESIOLOGY

## 2020-01-10 PROCEDURE — 77030006891 HC BLD SHV RESECT STRY -B: Performed by: ORTHOPAEDIC SURGERY

## 2020-01-10 PROCEDURE — 76942 ECHO GUIDE FOR BIOPSY: CPT | Performed by: ANESTHESIOLOGY

## 2020-01-10 PROCEDURE — 74011250636 HC RX REV CODE- 250/636: Performed by: NURSE ANESTHETIST, CERTIFIED REGISTERED

## 2020-01-10 PROCEDURE — 74011000250 HC RX REV CODE- 250: Performed by: NURSE ANESTHETIST, CERTIFIED REGISTERED

## 2020-01-10 PROCEDURE — 77030010427: Performed by: ORTHOPAEDIC SURGERY

## 2020-01-10 PROCEDURE — 74011250637 HC RX REV CODE- 250/637: Performed by: NURSE ANESTHETIST, CERTIFIED REGISTERED

## 2020-01-10 PROCEDURE — L3650 SO 8 ABD RESTRAINT PRE OTS: HCPCS | Performed by: ORTHOPAEDIC SURGERY

## 2020-01-10 PROCEDURE — 74011250636 HC RX REV CODE- 250/636: Performed by: ORTHOPAEDIC SURGERY

## 2020-01-10 PROCEDURE — 76210000021 HC REC RM PH II 0.5 TO 1 HR: Performed by: ORTHOPAEDIC SURGERY

## 2020-01-10 PROCEDURE — 77030002933 HC SUT MCRYL J&J -A: Performed by: ORTHOPAEDIC SURGERY

## 2020-01-10 PROCEDURE — 77030003601 HC NDL NRV BLK BBMI -A: Performed by: ORTHOPAEDIC SURGERY

## 2020-01-10 PROCEDURE — 87101 SKIN FUNGI CULTURE: CPT

## 2020-01-10 PROCEDURE — 74011250637 HC RX REV CODE- 250/637: Performed by: PHYSICIAN ASSISTANT

## 2020-01-10 PROCEDURE — 77030040922 HC BLNKT HYPOTHRM STRY -A: Performed by: ORTHOPAEDIC SURGERY

## 2020-01-10 PROCEDURE — 77030040361 HC SLV COMPR DVT MDII -B: Performed by: ORTHOPAEDIC SURGERY

## 2020-01-10 PROCEDURE — 64450 NJX AA&/STRD OTHER PN/BRANCH: CPT | Performed by: ANESTHESIOLOGY

## 2020-01-10 PROCEDURE — 76060000033 HC ANESTHESIA 1 TO 1.5 HR: Performed by: ORTHOPAEDIC SURGERY

## 2020-01-10 PROCEDURE — 87070 CULTURE OTHR SPECIMN AEROBIC: CPT

## 2020-01-10 PROCEDURE — 74011250636 HC RX REV CODE- 250/636: Performed by: ANESTHESIOLOGY

## 2020-01-10 PROCEDURE — 74011000258 HC RX REV CODE- 258: Performed by: ORTHOPAEDIC SURGERY

## 2020-01-10 PROCEDURE — 76210000000 HC OR PH I REC 2 TO 2.5 HR: Performed by: ORTHOPAEDIC SURGERY

## 2020-01-10 PROCEDURE — 77030008574 HC TBNG SUC IRR STRY -B: Performed by: ORTHOPAEDIC SURGERY

## 2020-01-10 RX ORDER — LIDOCAINE HYDROCHLORIDE 20 MG/ML
INJECTION, SOLUTION EPIDURAL; INFILTRATION; INTRACAUDAL; PERINEURAL AS NEEDED
Status: DISCONTINUED | OUTPATIENT
Start: 2020-01-10 | End: 2020-01-10 | Stop reason: HOSPADM

## 2020-01-10 RX ORDER — EPHEDRINE SULFATE/0.9% NACL/PF 50 MG/5 ML
SYRINGE (ML) INTRAVENOUS AS NEEDED
Status: DISCONTINUED | OUTPATIENT
Start: 2020-01-10 | End: 2020-01-10 | Stop reason: HOSPADM

## 2020-01-10 RX ORDER — ONDANSETRON 2 MG/ML
INJECTION INTRAMUSCULAR; INTRAVENOUS AS NEEDED
Status: DISCONTINUED | OUTPATIENT
Start: 2020-01-10 | End: 2020-01-10 | Stop reason: HOSPADM

## 2020-01-10 RX ORDER — ROPIVACAINE HYDROCHLORIDE 5 MG/ML
INJECTION, SOLUTION EPIDURAL; INFILTRATION; PERINEURAL
Status: COMPLETED | OUTPATIENT
Start: 2020-01-10 | End: 2020-01-10

## 2020-01-10 RX ORDER — FENTANYL CITRATE 50 UG/ML
INJECTION, SOLUTION INTRAMUSCULAR; INTRAVENOUS AS NEEDED
Status: DISCONTINUED | OUTPATIENT
Start: 2020-01-10 | End: 2020-01-10 | Stop reason: HOSPADM

## 2020-01-10 RX ORDER — FAMOTIDINE 20 MG/1
20 TABLET, FILM COATED ORAL ONCE
Status: COMPLETED | OUTPATIENT
Start: 2020-01-10 | End: 2020-01-10

## 2020-01-10 RX ORDER — FENTANYL CITRATE 50 UG/ML
50 INJECTION, SOLUTION INTRAMUSCULAR; INTRAVENOUS AS NEEDED
Status: DISCONTINUED | OUTPATIENT
Start: 2020-01-10 | End: 2020-01-10 | Stop reason: HOSPADM

## 2020-01-10 RX ORDER — SODIUM CHLORIDE, SODIUM LACTATE, POTASSIUM CHLORIDE, CALCIUM CHLORIDE 600; 310; 30; 20 MG/100ML; MG/100ML; MG/100ML; MG/100ML
50 INJECTION, SOLUTION INTRAVENOUS CONTINUOUS
Status: DISCONTINUED | OUTPATIENT
Start: 2020-01-10 | End: 2020-01-10 | Stop reason: HOSPADM

## 2020-01-10 RX ORDER — PROPOFOL 10 MG/ML
INJECTION, EMULSION INTRAVENOUS AS NEEDED
Status: DISCONTINUED | OUTPATIENT
Start: 2020-01-10 | End: 2020-01-10 | Stop reason: HOSPADM

## 2020-01-10 RX ORDER — SODIUM CHLORIDE 9 MG/ML
50 INJECTION, SOLUTION INTRAVENOUS CONTINUOUS
Status: DISCONTINUED | OUTPATIENT
Start: 2020-01-10 | End: 2020-01-10 | Stop reason: HOSPADM

## 2020-01-10 RX ORDER — SODIUM CHLORIDE 0.9 % (FLUSH) 0.9 %
5-40 SYRINGE (ML) INJECTION EVERY 8 HOURS
Status: DISCONTINUED | OUTPATIENT
Start: 2020-01-10 | End: 2020-01-10 | Stop reason: HOSPADM

## 2020-01-10 RX ORDER — GABAPENTIN 400 MG/1
400 CAPSULE ORAL ONCE
Status: COMPLETED | OUTPATIENT
Start: 2020-01-10 | End: 2020-01-10

## 2020-01-10 RX ORDER — LIDOCAINE HYDROCHLORIDE 10 MG/ML
0.1 INJECTION, SOLUTION EPIDURAL; INFILTRATION; INTRACAUDAL; PERINEURAL AS NEEDED
Status: DISCONTINUED | OUTPATIENT
Start: 2020-01-10 | End: 2020-01-10 | Stop reason: HOSPADM

## 2020-01-10 RX ORDER — DIPHENHYDRAMINE HYDROCHLORIDE 50 MG/ML
12.5 INJECTION, SOLUTION INTRAMUSCULAR; INTRAVENOUS
Status: DISCONTINUED | OUTPATIENT
Start: 2020-01-10 | End: 2020-01-10 | Stop reason: HOSPADM

## 2020-01-10 RX ORDER — FENTANYL CITRATE 50 UG/ML
100 INJECTION, SOLUTION INTRAMUSCULAR; INTRAVENOUS
Status: DISCONTINUED | OUTPATIENT
Start: 2020-01-10 | End: 2020-01-10 | Stop reason: HOSPADM

## 2020-01-10 RX ORDER — SUCCINYLCHOLINE CHLORIDE 20 MG/ML
INJECTION INTRAMUSCULAR; INTRAVENOUS AS NEEDED
Status: DISCONTINUED | OUTPATIENT
Start: 2020-01-10 | End: 2020-01-10 | Stop reason: HOSPADM

## 2020-01-10 RX ORDER — NALOXONE HYDROCHLORIDE 0.4 MG/ML
0.1 INJECTION, SOLUTION INTRAMUSCULAR; INTRAVENOUS; SUBCUTANEOUS AS NEEDED
Status: DISCONTINUED | OUTPATIENT
Start: 2020-01-10 | End: 2020-01-10 | Stop reason: HOSPADM

## 2020-01-10 RX ORDER — SODIUM CHLORIDE, SODIUM LACTATE, POTASSIUM CHLORIDE, CALCIUM CHLORIDE 600; 310; 30; 20 MG/100ML; MG/100ML; MG/100ML; MG/100ML
INJECTION, SOLUTION INTRAVENOUS
Status: DISCONTINUED | OUTPATIENT
Start: 2020-01-10 | End: 2020-01-10 | Stop reason: HOSPADM

## 2020-01-10 RX ORDER — CEFAZOLIN SODIUM 2 G/50ML
2 SOLUTION INTRAVENOUS ONCE
Status: COMPLETED | OUTPATIENT
Start: 2020-01-10 | End: 2020-01-10

## 2020-01-10 RX ORDER — ACETAMINOPHEN 500 MG
1000 TABLET ORAL ONCE
Status: COMPLETED | OUTPATIENT
Start: 2020-01-10 | End: 2020-01-10

## 2020-01-10 RX ORDER — ROPIVACAINE HYDROCHLORIDE 5 MG/ML
30 INJECTION, SOLUTION EPIDURAL; INFILTRATION; PERINEURAL
Status: COMPLETED | OUTPATIENT
Start: 2020-01-10 | End: 2020-01-10

## 2020-01-10 RX ORDER — SODIUM CHLORIDE 0.9 % (FLUSH) 0.9 %
5-40 SYRINGE (ML) INJECTION AS NEEDED
Status: DISCONTINUED | OUTPATIENT
Start: 2020-01-10 | End: 2020-01-10 | Stop reason: HOSPADM

## 2020-01-10 RX ORDER — MIDAZOLAM HYDROCHLORIDE 1 MG/ML
2 INJECTION, SOLUTION INTRAMUSCULAR; INTRAVENOUS
Status: DISCONTINUED | OUTPATIENT
Start: 2020-01-10 | End: 2020-01-10 | Stop reason: HOSPADM

## 2020-01-10 RX ORDER — ONDANSETRON 2 MG/ML
4 INJECTION INTRAMUSCULAR; INTRAVENOUS ONCE
Status: COMPLETED | OUTPATIENT
Start: 2020-01-10 | End: 2020-01-10

## 2020-01-10 RX ADMIN — SUCCINYLCHOLINE CHLORIDE 100 MG: 20 INJECTION, SOLUTION INTRAMUSCULAR; INTRAVENOUS at 14:45

## 2020-01-10 RX ADMIN — CEFAZOLIN 2 G: 10 INJECTION, POWDER, FOR SOLUTION INTRAVENOUS at 15:22

## 2020-01-10 RX ADMIN — MIDAZOLAM 2 MG: 1 INJECTION INTRAMUSCULAR; INTRAVENOUS at 13:46

## 2020-01-10 RX ADMIN — GABAPENTIN 400 MG: 400 CAPSULE ORAL at 13:13

## 2020-01-10 RX ADMIN — ROPIVACAINE HYDROCHLORIDE 150 MG: 5 INJECTION EPIDURAL; INFILTRATION; PERINEURAL at 13:46

## 2020-01-10 RX ADMIN — Medication 20 MG: at 15:00

## 2020-01-10 RX ADMIN — FENTANYL CITRATE 50 MCG: 50 INJECTION, SOLUTION INTRAMUSCULAR; INTRAVENOUS at 13:46

## 2020-01-10 RX ADMIN — ACETAMINOPHEN 1000 MG: 500 TABLET ORAL at 13:12

## 2020-01-10 RX ADMIN — SODIUM CHLORIDE, SODIUM LACTATE, POTASSIUM CHLORIDE, AND CALCIUM CHLORIDE: 600; 310; 30; 20 INJECTION, SOLUTION INTRAVENOUS at 14:30

## 2020-01-10 RX ADMIN — SODIUM CHLORIDE 50 ML/HR: 900 INJECTION, SOLUTION INTRAVENOUS at 13:18

## 2020-01-10 RX ADMIN — Medication 10 MG: at 14:58

## 2020-01-10 RX ADMIN — ONDANSETRON 4 MG: 2 INJECTION INTRAMUSCULAR; INTRAVENOUS at 15:03

## 2020-01-10 RX ADMIN — PROPOFOL 120 MG: 10 INJECTION, EMULSION INTRAVENOUS at 14:45

## 2020-01-10 RX ADMIN — FAMOTIDINE 20 MG: 20 TABLET, FILM COATED ORAL at 13:12

## 2020-01-10 RX ADMIN — LIDOCAINE HYDROCHLORIDE 50 MG: 20 INJECTION, SOLUTION EPIDURAL; INFILTRATION; INTRACAUDAL; PERINEURAL at 14:45

## 2020-01-10 RX ADMIN — LIDOCAINE HYDROCHLORIDE 0.1 ML: 10 INJECTION, SOLUTION EPIDURAL; INFILTRATION; INTRACAUDAL; PERINEURAL at 13:48

## 2020-01-10 RX ADMIN — ROPIVACAINE HYDROCHLORIDE 25 ML: 5 INJECTION, SOLUTION EPIDURAL; INFILTRATION; PERINEURAL at 14:04

## 2020-01-10 RX ADMIN — ONDANSETRON 4 MG: 2 INJECTION INTRAMUSCULAR; INTRAVENOUS at 17:01

## 2020-01-10 RX ADMIN — FENTANYL CITRATE 100 MCG: 50 INJECTION INTRAMUSCULAR; INTRAVENOUS at 14:45

## 2020-01-10 NOTE — DISCHARGE INSTRUCTIONS
Dr. Fly Borja Shoulder Arthroscopy  Postoperative Information    How to manage your pain after your Surgery:  1. ONLY IF YOUR PAIN IS UNCONTROLLED you may take your Narcotic Pain medication as prescribed. THIS IS THE PRESCRIPTION THAT WAS GIVEN TO YOU IN THE OFFICE. You should place an ice bag over your shoulder to help with pain and reduce swelling. A soft bandage was placed on your shoulder. You may take the bandage off two days after surgery You may have a clear bandage on your incisions that looks like tape. This is surgical superglue. Leave these on unless you are noticing redness or itching. Band-Aids should be used for the first 4-5 days over each incision. There are 2-6 small incisions in your shoulder and they may be sore and develop bruising over the next several days. The bruising should resolve and no special care will be needed. It is safe to take a shower or bathe two days after surgery. You will be given a sling to use. Wear this for comfort. It is ok to move your arm on your own but no lifting, pushing or pulling. Even though your incisions are small, there has been an operation inside and around the shoulder joint. Complete healing may take weeks or several months. If you have a high temperature, unexpected pain, redness or swelling in your shoulder please contact my office immediately. Dr. Fly Borja office number [phone]  _____________________________________________________________________________________________    DISCHARGE SUMMARY from Nurse    PATIENT INSTRUCTIONS:    After general anesthesia or intravenous sedation, for 24 hours or while taking prescription Narcotics:  · Limit your activities  · Do not drive and operate hazardous machinery  · Do not make important personal or business decisions  · Do  not drink alcoholic beverages  · If you have not urinated within 8 hours after discharge, please contact your surgeon on call.     Report the following to your surgeon:  · Excessive pain, swelling, redness or odor of or around the surgical area  · Temperature over 100.5  · Nausea and vomiting lasting longer than 4 hours or if unable to take medications  · Any signs of decreased circulation or nerve impairment to extremity: change in color, persistent  numbness, tingling, coldness or increase pain  · Any questions    *  Please give a list of your current medications to your Primary Care Provider. *  Please update this list whenever your medications are discontinued, doses are      changed, or new medications (including over-the-counter products) are added. *  Please carry medication information at all times in case of emergency situations. These are general instructions for a healthy lifestyle:    No smoking/ No tobacco products/ Avoid exposure to second hand smoke  Surgeon General's Warning:  Quitting smoking now greatly reduces serious risk to your health. Obesity, smoking, and sedentary lifestyle greatly increases your risk for illness    A healthy diet, regular physical exercise & weight monitoring are important for maintaining a healthy lifestyle    You may be retaining fluid if you have a history of heart failure or if you experience any of the following symptoms:  Weight gain of 3 pounds or more overnight or 5 pounds in a week, increased swelling in our hands or feet or shortness of breath while lying flat in bed. Please call your doctor as soon as you notice any of these symptoms; do not wait until your next office visit. The discharge information has been reviewed with the spouse. The spouse verbalized understanding. Discharge medications reviewed with the spouse and appropriate educational materials and side effects teaching were provided.   ___________________________________________________________________________________________________________________________________

## 2020-01-10 NOTE — TELEPHONE ENCOUNTER
Sunrise from [facility] from the [place] location called for . Isadora said that she knows the patient is having surgery done on the Right Shoulder today by . Isadora would like to know when Jaja Prashant would like the patient to come back in to get Physical Therapy done on the Right Shoulder. Isadora said the reason she is calling now is because they want to set her up as soon as possible for Evaluation. [employer]. [phone].

## 2020-01-10 NOTE — ANESTHESIA PROCEDURE NOTES
Peripheral Block    Start time: 1/10/2020 1:48 PM  End time: 1/10/2020 2:04 PM  Performed by: Pierre Cali DO  Authorized by: Pierre Cali DO       Pre-procedure: Indications: at surgeon's request and post-op pain management    Preanesthetic Checklist: patient identified, risks and benefits discussed, site marked, timeout performed, anesthesia consent given and patient being monitored    Timeout Time: 13:48          Block Type:   Block Type:   Interscalene  Laterality:  Right  Monitoring:  Standard ASA monitoring, continuous pulse ox, frequent vital sign checks, heart rate, responsive to questions and oxygen  Injection Technique:  Single shot  Procedures: ultrasound guided    Patient Position: seated  Prep: chlorhexidine    Location:  Interscalene  Needle Localization:  Ultrasound guidance    Assessment:  Number of attempts: 1  Injection Assessment:   Patient tolerance:  Patient tolerated the procedure well with no immediate complications

## 2020-01-10 NOTE — ANESTHESIA PREPROCEDURE EVALUATION
Relevant Problems   No relevant active problems       Anesthetic History   No history of anesthetic complications            Review of Systems / Medical History  Patient summary reviewed, nursing notes reviewed and pertinent labs reviewed    Pulmonary    COPD: mild    Sleep apnea    Asthma : well controlled       Neuro/Psych         Psychiatric history     Cardiovascular    Hypertension: well controlled        Dysrhythmias       Exercise tolerance: >4 METS     GI/Hepatic/Renal  Within defined limits              Endo/Other        Arthritis     Other Findings              Physical Exam    Airway  Mallampati: II  TM Distance: 4 - 6 cm  Neck ROM: decreased range of motion   Mouth opening: Normal     Cardiovascular    Rhythm: regular  Rate: normal         Dental  No notable dental hx       Pulmonary  Breath sounds clear to auscultation               Abdominal  Abdominal exam normal       Other Findings            Anesthetic Plan    ASA: 3  Anesthesia type: general and regional - interscalene block          Induction: Intravenous  Anesthetic plan and risks discussed with: Patient

## 2020-01-10 NOTE — ANESTHESIA POSTPROCEDURE EVALUATION
Procedure(s):  RIGHT SHOULDER ARTHROSCOPIC LYSIS OF ADHESIONS, DEBRIDEMENT. general, regional    Anesthesia Post Evaluation      Multimodal analgesia: multimodal analgesia used between 6 hours prior to anesthesia start to PACU discharge  Patient location during evaluation: bedside  Patient participation: complete - patient participated  Level of consciousness: awake  Pain management: adequate  Airway patency: patent  Anesthetic complications: no  Cardiovascular status: stable  Respiratory status: acceptable  Hydration status: acceptable  Post anesthesia nausea and vomiting:  controlled      Vitals Value Taken Time   /58 1/10/2020  5:55 PM   Temp 36.4 °C (97.5 °F) 1/10/2020  4:15 PM   Pulse 65 1/10/2020  6:06 PM   Resp 19 1/10/2020  6:06 PM   SpO2 89 % 1/10/2020  6:06 PM   Vitals shown include unvalidated device data.

## 2020-01-11 NOTE — PROGRESS NOTES
[facility] [employer] [facility]  [address] [facility]  [phone] fax    Physical Therapy Discharge Summary    Patient name: [facility] Start of Care: 8/8/2019   Referral Michell Huang,* NBT: 97/5/5117               Medical Diagnosis: Traumatic arthropathy, right shoulder [M12.511]  Payor: [place] MEDICARE / Plan: [place] MEDICARE PART A & B / Product Type: Medicare /  Onset Date:7/31/19               Treatment Diagnosis: Right Shoulder Pain   Prior Hospitalization: see medical history Provider#: [id number]   Medications: Verified on Patient summary List    Comorbidities: DVT, Arthritis, HTN, COPD.   Prior Level of Function: Right Hand Dominant. Previous DVT. Lives with .     Visits from Start of Care: 62    Missed Visits: 0    Reporting Period : 1/2/20 to 1/8/20    Summary of Care:  Goal:  Patient will improve FOTO score by 27 points in order to demonstrate a significant improvement in function. Status at last note/certification: 52  Status at discharge: not met    Goal:  Patient will improve right shoulder PROM flex/abd to 120 degrees in order to increase ease of reaching. Status at last note/certification: flex: 90 degrees abd: 70 degrees  Status at discharge: not met    Goal: Patient will improve right shoulder AROM elevation to 90 degrees in order to increase ease of ADLs. Status at last note/certification: 20 degrees  Status at discharge: not met    Goal: Patient will report pain at 2/10 or less during daily activities in order to improve quality of life. Status at last note/certification: 4/06  Status at discharge: not met    Pt. Was seen for 3 visits after last progress note and continued to make no significant progress since her exacerbation of pain. Pt. Followed up with physician and will undergo surgery. She will be D/C at this time secondary to lack of progress and having surgery indicated at this time. ASSESSMENT/RECOMMENDATIONS:  [x]Discontinue therapy: []Patient has reached or is progressing toward set goals      []Patient is non-compliant or has abdicated      [x]Due to lack of appreciable progress towards set goals    Merissa Hallman PT 1/10/2020 7:05 PM

## 2020-01-13 ENCOUNTER — HOSPITAL ENCOUNTER (OUTPATIENT)
Dept: PHYSICAL THERAPY | Age: 80
Discharge: HOME | End: 2020-01-13
Payer: MEDICARE

## 2020-01-13 ENCOUNTER — HOSPITAL ENCOUNTER (OUTPATIENT)
Dept: PHYSICAL THERAPY | Age: 80
End: 2020-01-13
Payer: MEDICARE

## 2020-01-13 LAB
BACTERIA SPEC CULT: NORMAL
GRAM STN SPEC: NORMAL
GRAM STN SPEC: NORMAL
SERVICE CMNT-IMP: NORMAL

## 2020-01-13 PROCEDURE — 97161 PT EVAL LOW COMPLEX 20 MIN: CPT

## 2020-01-13 PROCEDURE — 97110 THERAPEUTIC EXERCISES: CPT

## 2020-01-13 NOTE — PROGRESS NOTES
PT DAILY TREATMENT NOTE  10-18    Patient Name: Bossman Kearns  Date:2020  : 1940  [x]  Patient  Verified  Payor: Scot Aranda / Plan: [place] MEDICARE PART A & B / Product Type: Medicare /    In time: 10:35   Out time:11:07  Total Treatment Time (min): 32  Visit #: 1 of 10    Medicare/BCBS Only   Total Timed Codes (min):  16 1:1 Treatment Time:  32     Treatment Area: Pain in right shoulder [M25.511]    SUBJECTIVE  Pain Level (0-10 scale): 2  Any medication changes, allergies to medications, adverse drug reactions, diagnosis change, or new procedure performed?: [x] No    [] Yes (see summary sheet for update)  Subjective functional status/changes:   [] No changes reported  See POC    OBJECTIVE    16 min [x]Eval                  []Re-Eval     8 min Therapeutic Exercise:  [] See flow sheet : HEP instruction and demonstration   Rationale: increase ROM and increase strength to improve the patients ability to tolerate ADLs    8 min Self Care/Home Management: []  See flow sheet : pt education regarding anatomy and physiology of the UEs and how it relates to the pt's condition, pt education on avoiding activities that significantly increase her pain/symptoms   Rationale: increase ROM, increase strength and decrease pain/symptoms  to improve the patients ability to tolerate functional tasks. With   [x] TE   [] TA   [] neuro   [x] Other: Self Care/Home management Patient Education: [x] Review HEP    [] Progressed/Changed HEP based on:   [] positioning   [] body mechanics   [] transfers   [] heat/ice application    [] other:      Other Objective/Functional Measures: See evaluation. Pain Level (0-10 scale) post treatment: 0 at rest    ASSESSMENT/Changes in Function: Pt given HEP handout to perform. Pt understood exercises in HEP handout. Pt demonstrated decreased AROM/PROM, muscle tightness, and impaired mobility. Bandaids ar noted over the incisions with no drainage noted on them.  Pt would benefit from physical therapy to improve the above impairments to help the pt return to performing ADLs and functional activities. Patient will continue to benefit from skilled PT services to modify and progress therapeutic interventions, address functional mobility deficits, address ROM deficits, address strength deficits, analyze and address soft tissue restrictions, analyze and cue movement patterns, analyze and modify body mechanics/ergonomics, assess and modify postural abnormalities and instruct in home and community integration to attain remaining goals. [x]  See Plan of Care  []  See progress note/recertification  []  See Discharge Summary         Progress towards goals / Updated goals:  Short Term Goals: To be accomplished in 3 treatments:  1. Pt will report compliance and independence to [facility] to help the pt manage their pain and symptoms. Eval: Established  Long Term Goals: To be accomplished in 10 treatments:  1. Pt will improve PROM right shoulder flex to 120 degs, ABD to 95 degs, ER to 55 degs (at 35 degs ABD) to improve ease of dressing. Eval: flex 93 degs, ABD 79 degs, ER 43 degs (at 35 degs ABD)  2. Pt will increase AROM right shoulder flex to at least 90 degs, ABD to 70 degs to improve ability to reach with more ease with the right shoulder with cooking activities. Eval: unable to perform AROM right shoulder flex/ABD secondary to weakness and pain. 3. Pt will report at least 50% improvement in pain/symptoms to improve independence with daily activities. Eval: 0%  4. Pt will report being able to reaching into a shoulder height shelf with mild to minimal increased pain or difficulty with the right UE to improve ability to tolerance to household chores.   Eval: unable to actively reach with the right UE secondary to pain and weakness.        PLAN  [x]  Upgrade activities as tolerated     [x]  Continue plan of care  [x]  Update interventions per flow sheet       []  Discharge due to:_  []  Other:_      Ivana Seay, PT 1/13/2020  11:31 AM    Future Appointments   Date Time Provider Heri Beal   1/13/2020 10:30 AM Ankit Gaston, PT [facility]   1/15/2020  9:30 AM Narendra Memory, PT PZFOKKH [facility]   1/16/2020  9:30 AM HBV FAST TRACK NURSE [facility]   1/16/2020 10:45 AM Viveca Ok, PA V[facility] BOONE SCHED   1/17/2020  9:30 AM Edelmiralobito Egan, PTA [facility]   1/20/2020 10:00 AM Edelmira Bill, PTA [facility]   1/22/2020  9:30 AM Narendra Memory, PT [facility]   1/24/2020  9:30 AM Edelmira Egan, PTA [facility]   1/27/2020 10:30 AM Edelmira Bill, PTA [facility]   1/29/2020  9:00 AM Narendra Memory, PT [facility]   1/31/2020  9:30 AM Edelmira Bill, PTA [facility]   2/3/2020  9:30 AM Edelmira Egan, PTA [facility]   2/3/2020 12:45 PM [facility] IMAGING 1 2VV BOONE SCHED   2/5/2020  9:00 AM Narendra Memory, PT [facility]   2/7/2020  9:00 AM Edelmira Bill, PTA [facility]   2/7/2020 10:45 AM Caro Casanova, [address] [facility]

## 2020-01-13 NOTE — PROGRESS NOTES
[facility] - [facility] [employer] [facility] [employer] [facility]  [address] [place] [address]  [phone] fax    Plan of Care/ Statement of Necessity for Physical Therapy Services  Patient name: Dean Sharma Start of Care: 2020   Referral source: Delon Manning, [address] : 1940    Medical Diagnosis: Pain in right shoulder [M25.511]   Onset Date: 1/10/2020    Treatment Diagnosis: right shoulder pain   Prior Hospitalization: see medical history Provider#: [id number]   Medications: Verified on Patient summary List    Comorbidities: DVT, Arthritis, HTN, COPD, pt report of hx surgery in her neck.   Prior Level of Function: Right Hand Dominant. Previous DVT. Lives with .      The Plan of Care and following information is based on the information from the initial evaluation. Assessment/ key information:   Pt is a 78year old female who presents to therapy today with right shoulder pain. Pt was being seen for therapy for her right TSR DOS 2019. She reported increased pain in her right shoulder in 2019 when lifting to reach for on object in the microwave at home. Pt had an arthroscopic lysis of adhesions and debridement of the right shoulder DOS 1/10/2020. Pt states that there was nothing torn, just a lot of scar tissue. Pt reports she is unable to raise her right shoulder at this time secondary to pain and weakness. Pt demonstrated decreased AROM/PROM, muscle tightness, and impaired mobility. Bandaids ar noted over the incisions with no drainage noted on them. Pt would benefit from physical therapy to improve the above impairments to help the pt return to performing ADLs and functional activities.      Evaluation Complexity History HIGH Complexity :3+ comorbidities / personal factors will impact the outcome/ POC ; Examination MEDIUM Complexity : 3 Standardized tests and measures addressing body structure, function, activity limitation and / or participation in recreation  ;Presentation LOW Complexity : Stable, uncomplicated  ;Clinical Decision Making HIGH Complexity : FOTO score of 1- 25   Overall Complexity Rating: LOW   Problem List: pain affecting function, decrease ROM, decrease strength, edema affecting function, decrease ADL/ functional abilitiies, decrease activity tolerance, decrease flexibility/ joint mobility and decrease transfer abilities   Treatment Plan may include any combination of the following: Therapeutic exercise, Therapeutic activities, Neuromuscular re-education, Physical agent/modality, Manual therapy, Patient education, Self Care training, Functional mobility training and Home safety training  Patient / Family readiness to learn indicated by: asking questions, trying to perform skills and interest  Persons(s) to be included in education: patient (P)  Barriers to Learning/Limitations: None  Patient Goal (s): no pain, good moving with arm  Patient Self Reported Health Status: good  Rehabilitation Potential: good    Short Term Goals: To be accomplished in 3 treatments:  1. Pt will report compliance and independence to [facility] to help the pt manage their pain and symptoms. Eval: Established  Long Term Goals: To be accomplished in 10 treatments:  1. Pt will improve PROM right shoulder flex to 120 degs, ABD to 95 degs, ER to 55 degs (at 35 degs ABD) to improve ease of dressing. Eval: flex 93 degs, ABD 79 degs, ER 43 degs (at 35 degs ABD)  2. Pt will increase AROM right shoulder flex to at least 90 degs, ABD to 70 degs to improve ability to reach with more ease with the right shoulder with cooking activities. Eval: unable to perform AROM right shoulder flex/ABD secondary to weakness and pain. 3. Pt will report at least 50% improvement in pain/symptoms to improve independence with daily activities. Eval: 0%  4.  Pt will report being able to reaching into a shoulder height shelf with mild to minimal increased pain or difficulty with the right UE to improve ability to tolerance to household chores. Eval: unable to actively reach with the right UE secondary to pain and weakness. Frequency / Duration: Patient to be seen 2-3 times per week for 10 treatments. Patient/ Caregiver education and instruction: Diagnosis, prognosis, self care, activity modification, brace/ splint application and exercises   [x]  Plan of care has been reviewed with PTA    Certification Period: 1/13/2020 - 2/11/2020  Ivana Seay, PT 1/13/2020 11:28 AM  _____________________________________________________________________  I certify that the above Therapy Services are being furnished while the patient is under my care. I agree with the treatment plan and certify that this therapy is necessary.     Physician's Signature:____________________  Date:__________Time:______    Please sign and return to [facility] [employer] [facility]  [address] [place] [address]  [phone] fax

## 2020-01-14 LAB
BACTERIA SPEC ANAEROBE CULT: NORMAL
SPECIMEN SOURCE: NORMAL

## 2020-01-15 ENCOUNTER — HOSPITAL ENCOUNTER (OUTPATIENT)
Dept: PHYSICAL THERAPY | Age: 80
Discharge: HOME | End: 2020-01-15
Payer: MEDICARE

## 2020-01-15 ENCOUNTER — APPOINTMENT (OUTPATIENT)
Dept: PHYSICAL THERAPY | Age: 80
End: 2020-01-15
Payer: MEDICARE

## 2020-01-15 PROCEDURE — 97110 THERAPEUTIC EXERCISES: CPT

## 2020-01-15 NOTE — PROGRESS NOTES
PT DAILY TREATMENT NOTE 10-18    Patient Name: Suyapa Kwon  Date:1/15/2020  : 1940  [x]  Patient  Verified  Payor: Carlos Gonzalez / Plan: [place] MEDICARE PART A & B / Product Type: Medicare /    In time: 9:30  Out time: 10:18  Total Treatment Time (min): 48  Visit #: 2 of 10    Medicare/BCBS Only   Total Timed Codes (min):  33 1:1 Treatment Time:  33       Treatment Area: Pain in right shoulder [M25.511]    SUBJECTIVE  Pain Level (0-10 scale): 2/10  Any medication changes, allergies to medications, adverse drug reactions, diagnosis change, or new procedure performed?: [x] No    [] Yes (see summary sheet for update)  Subjective functional status/changes:   [] No changes reported  Pt. Reports she is doing a little better with lifting her arm out to the side, but continues to have difficulty reaching fwd.      OBJECTIVE    Modality rationale: decrease pain and increase tissue extensibility to improve the patients ability to increase ease of ADLs   Min Type Additional Details    [] Estim:  []Unatt       []IFC  []Premod                        []Other:  []w/ice   []w/heat  Position:  Location:    [] Estim: []Att    []TENS instruct  []NMES                    []Other:  []w/US   []w/ice   []w/heat  Position:  Location:    []  Traction: [] Cervical       []Lumbar                       [] Prone          []Supine                       []Intermittent   []Continuous Lbs:  [] before manual  [] after manual    []  Ultrasound: []Continuous   [] Pulsed                           []1MHz   []3MHz W/cm2:  Location:    []  Iontophoresis with dexamethasone         Location: [] Take home patch   [] In clinic   15 []  Ice     [x]  heat  []  Ice massage  []  Laser   []  Anodyne Position: seated  Location: right shoulder    []  Laser with stim  []  Other:  Position:  Location:    []  Vasopneumatic Device Pressure:       [] lo [] med [] hi   Temperature: [] lo [] med [] hi   [x] Skin assessment post-treatment:  [x]intact []redness- no adverse reaction    []redness - adverse reaction:     33 min Therapeutic Exercise:  [x] See flow sheet :   Rationale: increase ROM and increase strength to improve the patients ability to increase ease of ambulation           With   [x] TE   [] TA   [] neuro   [] other: Patient Education: [x] Review HEP    [] Progressed/Changed HEP based on:   [] positioning   [] body mechanics   [] transfers   [] heat/ice application    [] other:      Other Objective/Functional Measures:   Pt. Tolerated PT well but does have mild pain with coming back down during PROM  Firm end feel for flexion and scaption  Poor mobility during wall wipes  Pt. Was challenged with side lying flexion     Pain Level (0-10 scale) post treatment: 0/10    ASSESSMENT/Changes in Function:  Pt. Is progressing slowly towards goals. She is having less pain but continues to demonstrate decreased right shoulder PROM and AROM. She continues to have significant limitations with shoulder flexion. Patient will continue to benefit from skilled PT services to modify and progress therapeutic interventions, address functional mobility deficits, address ROM deficits, address strength deficits, analyze and address soft tissue restrictions, analyze and cue movement patterns, analyze and modify body mechanics/ergonomics and assess and modify postural abnormalities to attain remaining goals. Progress towards goals / Updated goals:  Short Term Goals: To be accomplished in 3 treatments:  1. Pt will report compliance and independence to HEP to help the pt manage their pain and symptoms. Eval: Established  Progressing: semi-compliant (1/15/20)    Long Term Goals: To be accomplished in 10 treatments:  1. Pt will improve PROM right shoulder flex to 120 degs, ABD to 95 degs, ER to 55 degs (at 35 degs ABD) to improve ease of dressing. Eval: flex 93 degs, ABD 79 degs, ER 43 degs (at 35 degs ABD)  2.  Pt will increase AROM right shoulder flex to at least 90 degs, ABD to 70 degs to improve ability to reach with more ease with the right shoulder with cooking activities. Eval: unable to perform AROM right shoulder flex/ABD secondary to weakness and pain. 3. Pt will report at least 50% improvement in pain/symptoms to improve independence with daily activities. Eval: 0%  4. Pt will report being able to reaching into a shoulder height shelf with mild to minimal increased pain or difficulty with the right UE to improve ability to tolerance to household chores. Eval: unable to actively reach with the right UE secondary to pain and weakness.      PLAN  []  Upgrade activities as tolerated     [x]  Continue plan of care  []  Update interventions per flow sheet       []  Discharge due to:_  []  Other:_      Faustina Patino PT 1/15/2020  7:41 AM    Future Appointments   Date Time Provider Heri Beal   1/15/2020  9:30 AM Ирина Shay, PT [facility]   1/16/2020 10:45 AM Sandra Del Cid PA [facility] BOONE SCHED   1/17/2020  9:30 AM Beattytori Caballero, PTA [facility]   1/17/2020  3:00 PM HBV FAST TRACK NURSE [facility] HBV   1/20/2020 10:00 AM Bouchra Bianchi, PT [facility]   1/22/2020  9:30 AM Ирина Shay, PT [facility]   1/24/2020  9:30 AM Beattytori Caballero, PTA [facility]   1/27/2020 10:30 AM Beattytori Caballero, PTA [facility]   1/29/2020  9:00 AM Ирина Shay, PT [facility]   1/31/2020  9:30 AM Beattytori Caballero, PTA [facility]   2/3/2020  9:30 AM Beattytori Caballero, PTA [facility]   2/3/2020 12:45 PM [facility] IMAGING 1 2VV BOONE SCHED   2/5/2020  9:00 AM Ирина Shay, PT [facility]   2/7/2020  9:00 AM Karla Caballero, PTA [facility]   2/7/2020 10:45 AM Kya Simmons, [address] [facility]

## 2020-01-16 ENCOUNTER — HOSPITAL ENCOUNTER (OUTPATIENT)
Dept: INFUSION THERAPY | Age: 80
End: 2020-01-16
Payer: MEDICARE

## 2020-01-16 ENCOUNTER — OFFICE VISIT (OUTPATIENT)
Dept: ORTHOPEDIC SURGERY | Age: 80
End: 2020-01-16

## 2020-01-16 VITALS
OXYGEN SATURATION: 97 % | SYSTOLIC BLOOD PRESSURE: 109 MMHG | RESPIRATION RATE: 14 BRPM | HEIGHT: 65 IN | WEIGHT: 128.2 LBS | DIASTOLIC BLOOD PRESSURE: 55 MMHG | BODY MASS INDEX: 21.36 KG/M2 | HEART RATE: 60 BPM | TEMPERATURE: 96.2 F

## 2020-01-16 DIAGNOSIS — M12.811 ROTATOR CUFF ARTHROPATHY, RIGHT: Primary | ICD-10-CM

## 2020-01-16 RX ORDER — TRAMADOL HYDROCHLORIDE 50 MG/1
50 TABLET ORAL
Qty: 28 TAB | Refills: 0 | Status: SHIPPED | OUTPATIENT
Start: 2020-01-16 | End: 2020-01-23

## 2020-01-16 NOTE — PROGRESS NOTES
Robert Brewer  1940     HISTORY OF PRESENT ILLNESS  Violet Ramirez is a 78 y.o. female who presents today for evaluation s/p Right shoulder arthroscopic lysis of adhesions and debridement on 1/10/20. Patient has been to PT, went to her eval. Describes pain as a 3/10. Has been taking Norco 10 for pain. She states she takes the pain medication at night, has been making her feel drowsy, depressed, and even suicidal. Still has night pain. There is minimal swelling in the hand today, but patient notes swelling in her feet and legs. Pt reports she has a \"different\" pain today, is not as bad as it was with her previous surgery. She notes trouble with lifting her arm and some numbness in the hand today. 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 Patient denies any fever, chills, chest pain, shortness of breath or calf pain. There are no new illness or injuries to report since last seen in the office. PHYSICAL EXAM:   Visit Vitals  /55 (BP 1 Location: Left arm, BP Patient Position: Sitting)   Pulse 60   Temp 96.2 °F (35.7 °C) (Oral)   Resp 14   Ht 5' 5\" (1.651 m)   Wt 128 lb 3.2 oz (58.2 kg)   LMP  (LMP Unknown)   SpO2 97%   BMI 21.33 kg/m²      The patient is a well-developed, well-nourished female in no acute distress. The patient is alert and oriented times three. The patient appears to be well groomed. Mood and affect are normal.  ORTHOPEDIC EXAM of right shoulder:  Inspection: swelling present,  Bruising present  Incision, clean, dry, intact, sutures in place  Passive glenohumeral abduction 0-40 degrees  Stability: Stable  Strength: 3/5  2+ distal pulses  1+pitting edema in bilateral lower extremities    IMPRESSION:  S/P Right shoulder arthroscopic lysis of adhesions and debridement on 1/10/20    PLAN: Pt presents today s/p Right shoulder arthroscopic lysis of adhesions and debridement on 1/10/20. Surgery discussed at length today.  Incisions cleaned, steri strips applied. Patient to continue with PROM and PT. Continue wearing sling. She complains of leg swelling today and I advised her to follow up with her PCP on Monday if this does not resolve over the weekend. She was given a prescription for Tramadol today. Patient given pain medication for short term acute pain relief. Goal is to treat patient according to above plan and to ultimately have patient off all pain medications once appropriate. If chronic pain management is required beyond what is expected for current orthopedic problem, will refer patient to pain management.   was reviewed and will be reviewed with every medication refill request.           RTC 3  weeks    Scribed by Rivera Armijo [facility] [address]) as dictated by DARCIE Terry PA-C [phone]

## 2020-01-16 NOTE — PROGRESS NOTES
1. Have you been to the ER, urgent care clinic since your last visit? Hospitalized since your last visit? No    2. Have you seen or consulted any other health care providers outside of the [address] [place] [address] since your last visit? Include any pap smears or colon screening.  No

## 2020-01-16 NOTE — OP NOTES
[facility]  OPERATIVE REPORT    Name:  Yuri Al  MR#:   [id number]  :  1940  ACCOUNT #:  [de-identified]  DATE OF SERVICE:  01/10/2020    PREOPERATIVE DIAGNOSIS:  Arthrofibrosis, right shoulder, status post reverse total shoulder arthroplasty. POSTOPERATIVE DIAGNOSIS:  Arthrofibrosis, right shoulder, status post reverse total shoulder arthroplasty. PROCEDURE PERFORMED:  Arthroscopic lysis of adhesions, right shoulder. SURGEON:  Eren Paulino M.D.    ASSISTANT:  Eilzabeth Baumann. ANESTHESIA:  General.    COMPLICATIONS:  None. SPECIMENS REMOVED:  Deep cultures. IMPLANTS:  None. ESTIMATED BLOOD LOSS:  Minimal.    BRIEF HISTORY:  The patient has had significant amount of problems with the right shoulder, no response to conservative treatment, felt that something snapped and her shoulder has had increasing amount of pain. Consented for surgery after having discussed at length possible risks and complications of surgery including infection, bleeding, recurrence of pain among other possible problems. PROCEDURE IN DETAIL:  The patient was taken to the operating room, placed under general endotracheal anesthesia by the Anesthesia staff, placed in a lateral decubitus position, left arm was prepped with ChloraPrep solution and draped as a free sterile field. right shoulder. Once the arthroscope was in place, there were significant adhesions noted throughout the subacromial space and posteriorly; using a lateral portal, no evidence of inflammation or tearing and the prosthesis appeared to be in good position. All the adhesions were released using a shaver and a cautery wand. Deep cultures were obtained and tissue for culture was obtained as well. Fluid was suctioned out. Portals were closed with 4-0 Monocryl. Sterile dressings were applied. The patient tolerated the procedure well and was taken to recovery room without problems.       Carlos Sullivan, MD OCHOA/[email or web address]  D:  01/15/2020 13:14  T:  01/16/2020 0:22  JOB #:  [id number]

## 2020-01-17 ENCOUNTER — HOSPITAL ENCOUNTER (OUTPATIENT)
Dept: PHYSICAL THERAPY | Age: 80
Discharge: HOME | End: 2020-01-17
Payer: MEDICARE

## 2020-01-17 ENCOUNTER — HOSPITAL ENCOUNTER (OUTPATIENT)
Dept: INFUSION THERAPY | Age: 80
End: 2020-01-17
Payer: MEDICARE

## 2020-01-17 PROCEDURE — 97110 THERAPEUTIC EXERCISES: CPT

## 2020-01-17 PROCEDURE — 97140 MANUAL THERAPY 1/> REGIONS: CPT

## 2020-01-17 NOTE — PROGRESS NOTES
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 Reports she is feeling about the same today. She continues to have difficulty raising her arm up. OBJECTIVE    25 min Therapeutic Exercise:  [x] See flow sheet :   Rationale: increase ROM and increase strength to improve the patients ability to increase ease of ADLs    10 min Manual Therapy:  scap mobs, GH clocks, STM to infraspinatus    Rationale: decrease pain, increase ROM and increase tissue extensibility to increase ease of ADLs          With   [x] TE   [] TA   [] neuro   [] other: Patient Education: [x] Review HEP    [] Progressed/Changed HEP based on:   [] positioning   [] body mechanics   [] transfers   [] heat/ice application    [] other:      Other Objective/Functional Measures:   Right shoulder PROM: flex: 95 abd: 106 degrees  Pt. Tolerated PT well with no reports of increased pain  She continues to have significant decrease in right shoulder AROM     Pain Level (0-10 scale) post treatment: 2/10    ASSESSMENT/Changes in Function:  Pt. Is progressing slowly towards goals. She continues to have significant decrease in right shoulder AROM but PROM is improving. Continues to have firm end feel during PROM.      Patient will continue to benefit from skilled PT services to modify and progress therapeutic interventions, address functional mobility deficits, address ROM deficits and address strength deficits to attain remaining goals. Progress towards goals / Updated goals:  Short Term Goals: To be accomplished in 3 treatments:  1. Pt will report compliance and independence to HEP to help the pt manage their pain and symptoms.             Eval: Established  Progressing: semi-compliant (1/15/20)     Long Term Goals: To be accomplished in 10 treatments:  1. Pt will improve PROM right shoulder flex to 120 degs, ABD to 95 degs, ER to 55 degs (at 35 degs ABD) to improve ease of dressing. Eval: flex 93 degs, ABD 79 degs, ER 43 degs (at 35 degs ABD)  Progressing: flex: 95 degrees abd: 106 degrees (1/17/20)  2. Pt will increase AROM right shoulder flex to at least 90 degs, ABD to 70 degs to improve ability to reach with more ease with the right shoulder with cooking activities. Eval: unable to perform AROM right shoulder flex/ABD secondary to weakness and pain. 3. Pt will report at least 50% improvement in pain/symptoms to improve independence with daily activities. Eval: 0%  4. Pt will report being able to reaching into a shoulder height shelf with mild to minimal increased pain or difficulty with the right UE to improve ability to tolerance to household chores.   Eval: unable to actively reach with the right UE secondary to pain and weakness.     PLAN  []  Upgrade activities as tolerated     [x]  Continue plan of care  []  Update interventions per flow sheet       []  Discharge due to:_  []  Other:_      Holger Mays, PT 1/17/2020  8:29 AM    Future Appointments   Date Time Provider Heri Beal   1/17/2020  9:30 AM Estevan Pak, PT [facility]   1/20/2020 10:00 AM Lizet Downs PT [facility]   1/21/2020  3:00 PM [facility] FAST TRACK NURSE [facility]   1/22/2020  9:30 AM Estevan Pak, PT [facility]   1/24/2020  9:30 AM Roseline Wall PTA [facility]   1/27/2020 10:30 AM Karla Caballero, PTA [facility]   1/29/2020  9:00 AM Ирина Shay, PT [facility]   1/30/2020 12:45 PM [facility]   1/31/2020  8:15 AM Kya Simmons MD [facility] SCH[facility]   1/31/2020  9:30 AM Karla Caballero, PTA [facility]   2/3/2020  9:30 AM Karla Caballero, PTA [facility]   2/5/2020  9:00 AM Ирина Shay, PT [facility]   2/5/2020  2:45 PM MATTHEW [address]   2/7/2020  9:00 AM Karla Caballero, PTA [facility]

## 2020-01-20 ENCOUNTER — HOSPITAL ENCOUNTER (OUTPATIENT)
Dept: PHYSICAL THERAPY | Age: 80
Discharge: HOME | End: 2020-01-20
Payer: MEDICARE

## 2020-01-20 PROCEDURE — 97140 MANUAL THERAPY 1/> REGIONS: CPT

## 2020-01-20 PROCEDURE — 97110 THERAPEUTIC EXERCISES: CPT

## 2020-01-20 NOTE — PROGRESS NOTES
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 She is trying to use her arm as much as she can at home. She can reach to the side and back, but she still can't reach overhead to get the salt and pepper shaker in the cabinet.       OBJECTIVE    Modality rationale: decrease pain and increase tissue extensibility to improve the patients ability to tolerate daily tasks    Min Type Additional Details    [] Estim:  []Unatt       []IFC  []Premod                        []Other:  []w/ice   []w/heat  Position:  Location:    [] Estim: []Att    []TENS instruct  []NMES                    []Other:  []w/US   []w/ice   []w/heat  Position:  Location:    []  Traction: [] Cervical       []Lumbar                       [] Prone          []Supine                       []Intermittent   []Continuous Lbs:  [] before manual  [] after manual    []  Ultrasound: []Continuous   [] Pulsed                           []1MHz   []3MHz W/cm2:  Location:    []  Iontophoresis with dexamethasone         Location: [] Take home patch   [] In clinic   15 []  Ice     [x]  heat  []  Ice massage  []  Laser   []  Anodyne Position: seated   Location: right shoulder     []  Laser with stim  []  Other:  Position:  Location:    [] Vasopneumatic Device Pressure:       [] lo [] med [] hi   Temperature: [] lo [] med [] hi   [x] Skin assessment post-treatment:  [x]intact []redness- no adverse reaction    []redness - adverse reaction:       21 min Therapeutic Exercise:  [x] See flow sheet :   Rationale: increase ROM and increase strength to improve the patients ability to improve ease of reaching with ADLs       9 min Manual Therapy:  PROM flexion/abduction/scaption with oscillations, shoulder clocks     Rationale: decrease pain, increase ROM and increase tissue extensibility to improve ease of reaching with ADLs                      With   [] TE   [] TA   [] neuro   [] other: Patient Education: [x] Review HEP    [] Progressed/Changed HEP based on:   [] positioning   [] body mechanics   [] transfers   [] heat/ice application    [] other:      Other Objective/Functional Measures:     Finger Ladder #18    Right shoulder flexion AROM in sidelyin*  Challenged with right shoulder flexion AROM in gravity eliminated position     Reduced pain reported with manual    Pain Level (0-10 scale) post treatment: 2/10    ASSESSMENT/Changes in Function:     Pt is making slow, steady progress towards initial goals in therapy. She continues to be challenged with AROM against gravity; however, improving range is noted with AROM in gravity-eliminated position. Will continue to address strength, ROM, and flexibility deficits in order to improve ease/abiltiy with reaching and increase functional use of right UE. Patient will continue to benefit from skilled PT services to modify and progress therapeutic interventions, address ROM deficits, address strength deficits, analyze and address soft tissue restrictions, analyze and cue movement patterns, analyze and modify body mechanics/ergonomics, assess and modify postural abnormalities and instruct in home and community integration to attain remaining goals.     Progress towards goals / Updated goals:  Short Term Goals: To be accomplished in 3 treatments:  1. Pt will report compliance and independence to HEP to help the pt manage their pain and symptoms.             Eval: Established  Current: Goal met. Pt reports compliance 1/20/20      Long Term Goals: To be accomplished in 10 treatments:  1. Pt will improve PROM right shoulder flex to 120 degs, ABD to 95 degs, ER to 55 degs (at 35 degs ABD) to improve ease of dressing. Eval: flex 93 degs, ABD 79 degs, ER 43 degs (at 35 degs ABD)  Progressing: flex: 95 degrees abd: 106 degrees (1/17/20)  2. Pt will increase AROM right shoulder flex to at least 90 degs, ABD to 70 degs to improve ability to reach with more ease with the right shoulder with cooking activities. Eval: unable to perform AROM right shoulder flex/ABD secondary to weakness and pain. Current:  Progressing. Right shoulder AROM flexion 90* in gravity-eliminated position 1/20/20  3. Pt will report at least 50% improvement in pain/symptoms to improve independence with daily activities. Eval: 0%  Current: Slowly progressing. Reports 5% improvement 1/20/20   4. Pt will report being able to reaching into a shoulder height shelf with mild to minimal increased pain or difficulty with the right UE to improve ability to tolerance to household chores.   Eval: unable to actively reach with the right UE secondary to pain and weakness.        PLAN  []  Upgrade activities as tolerated     [x]  Continue plan of care  []  Update interventions per flow sheet       []  Discharge due to:_  []  Other:_      Solitario Stafford, PT 1/20/2020  10:03 AM    Future Appointments   Date Time Provider Heri Beal   1/21/2020  3:00 PM [facility] FAST TRACK NURSE [facility]   1/22/2020  9:30 AM Narendra Goldberg, PT [facility]   1/24/2020  9:30 AM Edelmira Egan PTA [facility]   1/27/2020 10:30 AM Edelmira Egan PTA [facility]   1/29/2020  9:00 AM Narendra Goldberg PT [facility]   1/30/2020 12:45 PM [facility]   1/31/2020  8:15 AM Misael Mosley MD [facility] SCH[facility]   1/31/2020  9:30 AM Jacob Whitman, PTA [facility] [address]   2/3/2020  9:30 AM Jacob Whitman, PTA [facility] [address]   2/5/2020  9:00 AM Tariq Gomez, PT [facility] [address]   2/5/2020  2:45 PM MATTHEW Gu   2/7/2020  9:00 AM Jacob Whitman, PTA [facility] [address]

## 2020-01-21 ENCOUNTER — HOSPITAL ENCOUNTER (OUTPATIENT)
Dept: INFUSION THERAPY | Age: 80
Discharge: HOME | End: 2020-01-21
Payer: MEDICARE

## 2020-01-21 LAB
ALBUMIN SERPL-MCNC: 2.2 G/DL (ref 3.4–5)
ALBUMIN/GLOB SERPL: 0.9 {RATIO} (ref 0.8–1.7)
ALP SERPL-CCNC: 52 U/L (ref 45–117)
ALT SERPL-CCNC: 17 U/L (ref 13–56)
ANION GAP SERPL CALC-SCNC: 4 MMOL/L (ref 3–18)
AST SERPL-CCNC: 16 U/L (ref 10–38)
BASO+EOS+MONOS # BLD AUTO: 0.7 K/UL (ref 0–2.3)
BASO+EOS+MONOS NFR BLD AUTO: 10 % (ref 0.1–17)
BILIRUB DIRECT SERPL-MCNC: <0.1 MG/DL (ref 0–0.2)
BILIRUB SERPL-MCNC: 0.2 MG/DL (ref 0.2–1)
BUN SERPL-MCNC: 17 MG/DL (ref 7–18)
BUN/CREAT SERPL: 29 (ref 12–20)
CALCIUM SERPL-MCNC: 8.1 MG/DL (ref 8.5–10.1)
CHLORIDE SERPL-SCNC: 108 MMOL/L (ref 100–111)
CO2 SERPL-SCNC: 31 MMOL/L (ref 21–32)
CREAT SERPL-MCNC: 0.59 MG/DL (ref 0.6–1.3)
DIFFERENTIAL METHOD BLD: ABNORMAL
ERYTHROCYTE [DISTWIDTH] IN BLOOD BY AUTOMATED COUNT: 14.2 % (ref 11.5–14.5)
GLOBULIN SER CALC-MCNC: 2.5 G/DL (ref 2–4)
GLUCOSE SERPL-MCNC: 88 MG/DL (ref 74–99)
HCT VFR BLD AUTO: 40.6 % (ref 36–48)
HGB BLD-MCNC: 13.2 G/DL (ref 12–16)
LYMPHOCYTES # BLD: 1 K/UL (ref 1.1–5.9)
LYMPHOCYTES NFR BLD: 13 % (ref 14–44)
MAGNESIUM SERPL-MCNC: 2 MG/DL (ref 1.6–2.6)
MCH RBC QN AUTO: 31.1 PG (ref 25–35)
MCHC RBC AUTO-ENTMCNC: 32.5 G/DL (ref 31–37)
MCV RBC AUTO: 95.8 FL (ref 78–102)
NEUTS SEG # BLD: 5.9 K/UL (ref 1.8–9.5)
NEUTS SEG NFR BLD: 78 % (ref 40–70)
PHOSPHATE SERPL-MCNC: 4.2 MG/DL (ref 2.5–4.9)
PLATELET # BLD AUTO: 347 K/UL (ref 140–440)
POTASSIUM SERPL-SCNC: 4 MMOL/L (ref 3.5–5.5)
PROT SERPL-MCNC: 4.7 G/DL (ref 6.4–8.2)
RBC # BLD AUTO: 4.24 M/UL (ref 4.1–5.1)
SODIUM SERPL-SCNC: 143 MMOL/L (ref 136–145)
WBC # BLD AUTO: 7.6 K/UL (ref 4.5–13)

## 2020-01-21 PROCEDURE — 80076 HEPATIC FUNCTION PANEL: CPT

## 2020-01-21 PROCEDURE — 84100 ASSAY OF PHOSPHORUS: CPT

## 2020-01-21 PROCEDURE — 83735 ASSAY OF MAGNESIUM: CPT

## 2020-01-21 PROCEDURE — 80048 BASIC METABOLIC PNL TOTAL CA: CPT

## 2020-01-21 PROCEDURE — 85025 COMPLETE CBC W/AUTO DIFF WBC: CPT

## 2020-01-21 PROCEDURE — 36415 COLL VENOUS BLD VENIPUNCTURE: CPT

## 2020-01-22 ENCOUNTER — HOSPITAL ENCOUNTER (OUTPATIENT)
Dept: PHYSICAL THERAPY | Age: 80
Discharge: HOME | End: 2020-01-22
Payer: MEDICARE

## 2020-01-22 PROCEDURE — 97110 THERAPEUTIC EXERCISES: CPT

## 2020-01-22 PROCEDURE — 97140 MANUAL THERAPY 1/> REGIONS: CPT

## 2020-01-22 NOTE — PROGRESS NOTES
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 Reports she is doing a little better today. She reports she continues to have mild pain.      OBJECTIVE    Modality rationale: decrease pain and increase tissue extensibility to improve the patients ability to increase ease of ADLs   Min Type Additional Details    [] Estim:  []Unatt       []IFC  []Premod                        []Other:  []w/ice   []w/heat  Position:  Location:    [] Estim: []Att    []TENS instruct  []NMES                    []Other:  []w/US   []w/ice   []w/heat  Position:  Location:    []  Traction: [] Cervical       []Lumbar                       [] Prone          []Supine                       []Intermittent   []Continuous Lbs:  [] before manual  [] after manual    []  Ultrasound: []Continuous   [] Pulsed                           []1MHz   []3MHz W/cm2:  Location:    []  Iontophoresis with dexamethasone         Location: [] Take home patch   [] In clinic   15 []  Ice     [x]  heat  []  Ice massage  []  Laser   []  Anodyne Position: seated  Location: right shoulder    []  Laser with stim  []  Other:  Position:  Location:    []  Vasopneumatic Device Pressure:       [] lo [] med [] hi   Temperature: [] lo [] med [] hi   [x] Skin assessment post-treatment:  [x]intact []redness- no adverse reaction    []redness - adverse reaction:     15 min Therapeutic Exercise:  [x] See flow sheet :   Rationale: increase ROM and increase strength to improve the patients ability to perform ADLs    10 min Manual Therapy:  scap mobs, STM to rhomboids, infraspinatus, and deltoid   Rationale: decrease pain, increase ROM and increase tissue extensibility to increase ease of reaching          With   [x] TE   [] TA   [] neuro   [] other: Patient Education: [x] Review HEP    [] Progressed/Changed HEP based on:   [] positioning   [] body mechanics   [] transfers   [] heat/ice application    [] other:      Other Objective/Functional Measures:   Pt. Tolerated PT well with no reports of increased pain  She has firm end feel during PROM  Unable to perform stick flexion secondary to difficulty with eccentric lowering, so switched to stick bend press     Pain Level (0-10 scale) post treatment: 2/10    ASSESSMENT/Changes in Function:  Pt. Is progressing slowly towards goals. She demonstrates some improvement in right should flexion mobility but continue to have significant limitations. She also continues to have mild pain that is constant. Patient will continue to benefit from skilled PT services to modify and progress therapeutic interventions, address functional mobility deficits, address ROM deficits, address strength deficits, analyze and address soft tissue restrictions, analyze and cue movement patterns and analyze and modify body mechanics/ergonomics to attain remaining goals. Progress towards goals / Updated goals:  Short Term Goals: To be accomplished in 3 treatments:  1. Pt will report compliance and independence to HEP to help the pt manage their pain and symptoms.             Eval: Established  Current: Goal met.   Pt reports compliance 1/20/20      Long Term Goals: To be accomplished in 10 treatments:  1. Pt will improve PROM right shoulder flex to 120 degs, ABD to 95 degs, ER to 55 degs (at 35 degs ABD) to improve ease of dressing. Eval: flex 93 degs, ABD 79 degs, ER 43 degs (at 35 degs ABD)  Progressing: flex: 95 degrees abd: 106 degrees (1/17/20)  2. Pt will increase AROM right shoulder flex to at least 90 degs, ABD to 70 degs to improve ability to reach with more ease with the right shoulder with cooking activities. Eval: unable to perform AROM right shoulder flex/ABD secondary to weakness and pain. Current:  Progressing. Right shoulder AROM flexion 90* in gravity-eliminated position 1/20/20  3. Pt will report at least 50% improvement in pain/symptoms to improve independence with daily activities. Eval: 0%  Current: Slowly progressing. Reports 5% improvement 1/20/20   4. Pt will report being able to reaching into a shoulder height shelf with mild to minimal increased pain or difficulty with the right UE to improve ability to tolerance to household chores.   Eval: unable to actively reach with the right UE secondary to pain and weakness.     PLAN  []  Upgrade activities as tolerated     [x]  Continue plan of care  []  Update interventions per flow sheet       []  Discharge due to:_  []  Other:_      Sky Barfield, PT 1/22/2020  7:46 AM    Future Appointments   Date Time Provider Heri Lian   1/22/2020  9:30 AM Gaby Mg, PT [facility]   1/24/2020  9:30 AM Danis Solis PTA [facility]   1/27/2020 10:30 AM Danis Solis PTA [facility]   1/29/2020  9:00 AM Gaby Mg PT [facility]   1/30/2020 12:45 PM [facility] SCH[facility]   1/31/2020  8:15 AM Alexander Lim [place] [id number]   1/31/2020  9:30 AM Danis Solis PTA [facility]   2/3/2020  9:30 AM Danis Solis PTA [facility]   2/5/2020  9:00 AM Gaby Mg, PT [facility]   2/5/2020  2:45 PM MATTHEW Ontiveros [address] [address]   2/7/2020  9:00 AM Danis Solis, PTA [facility]   7/16/2020 11:00 AM [facility] FAST TRACK NURSE [facility]

## 2020-01-24 ENCOUNTER — HOSPITAL ENCOUNTER (OUTPATIENT)
Dept: PHYSICAL THERAPY | Age: 80
Discharge: HOME | End: 2020-01-24
Payer: MEDICARE

## 2020-01-24 PROCEDURE — 97110 THERAPEUTIC EXERCISES: CPT

## 2020-01-24 NOTE — PROGRESS NOTES
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 She hasn't been stretching much at home just doing the basic arm swings.      OBJECTIVE    Modality rationale: decrease pain and increase tissue extensibility to improve the patients ability to increase ease of ADLs   Min Type Additional Details    [] Estim:  []Unatt       []IFC  []Premod                        []Other:  []w/ice   []w/heat  Position:  Location:    [] Estim: []Att    []TENS instruct  []NMES                    []Other:  []w/US   []w/ice   []w/heat  Position:  Location:    []  Traction: [] Cervical       []Lumbar                       [] Prone          []Supine                       []Intermittent   []Continuous Lbs:  [] before manual  [] after manual    []  Ultrasound: []Continuous   [] Pulsed                           []1MHz   []3MHz W/cm2:  Location:    []  Iontophoresis with dexamethasone         Location: [] Take home patch   [] In clinic   15 []  Ice     [x]  heat  []  Ice massage  []  Laser   []  Anodyne Position: seated  Location: right shoulder    []  Laser with stim  []  Other:  Position:  Location:    []  Vasopneumatic Device Pressure:       [] lo [] med [] hi   Temperature: [] lo [] med [] hi   [x] Skin assessment post-treatment:  [x]intact []redness- no adverse reaction    []redness - adverse reaction:     27 min Therapeutic Exercise:  [x] See flow sheet :   Rationale: increase ROM and increase strength to improve the patients ability to perform ADLs              With   [x] TE   [] TA   [] neuro   [] other: Patient Education: [x] Review HEP    [] Progressed/Changed HEP based on:   [] positioning   [] body mechanics   [] transfers   [] heat/ice application    [] other:      Other Objective/Functional Measures:  Educated on adding supine AAROM with assist of stick or left arm to work on stretching end range  Instructed in side lying abduction to perform at home  Able to achieve about 90 degrees in standing with light assist  No increased pain during session      Pain Level (0-10 scale) post treatment: 2/10    ASSESSMENT/Changes in Function:  Pt is making slow progress regarding AROM. She has overall decrease in pain to the right shoulder. She continues to have just over 90 degrees flexion. Will continue to progress eccentric strengthening for ease of OH reaching into cabinets. Progress towards goals / Updated goals:  Short Term Goals: To be accomplished in 3 treatments:  1. Pt will report compliance and independence to HEP to help the pt manage their pain and symptoms.             Eval: Established  Current: Goal met. Pt reports compliance 1/20/20    Long Term Goals: To be accomplished in 10 treatments:  1. Pt will improve PROM right shoulder flex to 120 degs, ABD to 95 degs, ER to 55 degs (at 35 degs ABD) to improve ease of dressing. Eval: flex 93 degs, ABD 79 degs, ER 43 degs (at 35 degs ABD)  Progressing: flex: 95 degrees abd: 106 degrees (1/17/20)  2. Pt will increase AROM right shoulder flex to at least 90 degs, ABD to 70 degs to improve ability to reach with more ease with the right shoulder with cooking activities.   Eval: unable to perform AROM right shoulder flex/ABD secondary to weakness and pain.   Current:  Progressing. Right shoulder AROM flexion 90* in gravity-eliminated position 1/20/20  3. Pt will report at least 50% improvement in pain/symptoms to improve independence with daily activities. Eval: 0%  Current: Slowly progressing. Reports 5% improvement 1/20/20   4. Pt will report being able to reaching into a shoulder height shelf with mild to minimal increased pain or difficulty with the right UE to improve ability to tolerance to household chores.   Eval: unable to actively reach with the right UE secondary to pain and weakness.     PLAN  [x]  Upgrade activities as tolerated     [x]  Continue plan of care  []  Update interventions per flow sheet       []  Discharge due to:_  []  Other:_      Kaitlynn Rhodes PTA 1/24/2020  7:46 AM    Future Appointments   Date Time Provider Heri Beal   1/24/2020  9:30 AM Ann Lane, PTA [facility]   1/27/2020 10:30 AM Ann Lane, PTA [facility]   1/27/2020  2:30 PM [facility] FAST TRACK NURSE [facility]   1/29/2020  9:00 AM Kit Mustard, PT [facility]   1/30/2020 12:45 PM [facility] SCH[facility]   1/31/2020  8:15 AM Alexia Boxer, MD [facility] SCHED   1/31/2020  9:30 AM Ann Lane, PTA [facility]   2/3/2020  9:30 AM Ann Lane, PTA [facility]   2/5/2020  9:00 AM Kit Mustard, PT [facility]   2/5/2020  2:45 PM Shay Urban [place] [id number]   2/7/2020  9:00 AM Ann Lane, PTA [facility]   7/16/2020 11:00 AM HBV FAST TRACK NURSE [facility]

## 2020-01-27 ENCOUNTER — HOSPITAL ENCOUNTER (OUTPATIENT)
Dept: PHYSICAL THERAPY | Age: 80
Discharge: HOME | End: 2020-01-27
Payer: MEDICARE

## 2020-01-27 ENCOUNTER — HOSPITAL ENCOUNTER (OUTPATIENT)
Dept: INFUSION THERAPY | Age: 80
Discharge: HOME | End: 2020-01-27
Payer: MEDICARE

## 2020-01-27 VITALS
RESPIRATION RATE: 16 BRPM | TEMPERATURE: 97.9 F | OXYGEN SATURATION: 96 % | HEART RATE: 71 BPM | DIASTOLIC BLOOD PRESSURE: 65 MMHG | SYSTOLIC BLOOD PRESSURE: 101 MMHG

## 2020-01-27 DIAGNOSIS — M81.8 IDIOPATHIC OSTEOPOROSIS: Primary | ICD-10-CM

## 2020-01-27 PROCEDURE — 74011250636 HC RX REV CODE- 250/636: Performed by: FAMILY MEDICINE

## 2020-01-27 PROCEDURE — 96372 THER/PROPH/DIAG INJ SC/IM: CPT

## 2020-01-27 PROCEDURE — 97110 THERAPEUTIC EXERCISES: CPT

## 2020-01-27 PROCEDURE — 97140 MANUAL THERAPY 1/> REGIONS: CPT

## 2020-01-27 RX ORDER — ALBUTEROL SULFATE 0.83 MG/ML
2.5 SOLUTION RESPIRATORY (INHALATION) AS NEEDED
Status: CANCELLED
Start: 2020-01-31

## 2020-01-27 RX ORDER — DIPHENHYDRAMINE HYDROCHLORIDE 50 MG/ML
50 INJECTION, SOLUTION INTRAMUSCULAR; INTRAVENOUS AS NEEDED
Status: CANCELLED
Start: 2020-01-31

## 2020-01-27 RX ORDER — EPINEPHRINE 1 MG/ML
0.3 INJECTION, SOLUTION, CONCENTRATE INTRAVENOUS AS NEEDED
Status: CANCELLED | OUTPATIENT
Start: 2020-01-31

## 2020-01-27 RX ORDER — ONDANSETRON 2 MG/ML
8 INJECTION INTRAMUSCULAR; INTRAVENOUS AS NEEDED
Status: CANCELLED | OUTPATIENT
Start: 2020-01-31

## 2020-01-27 RX ORDER — ACETAMINOPHEN 325 MG/1
650 TABLET ORAL AS NEEDED
Status: CANCELLED
Start: 2020-01-31

## 2020-01-27 RX ORDER — HYDROCORTISONE SODIUM SUCCINATE 100 MG/2ML
100 INJECTION, POWDER, FOR SOLUTION INTRAMUSCULAR; INTRAVENOUS AS NEEDED
Status: CANCELLED | OUTPATIENT
Start: 2020-01-31

## 2020-01-27 RX ADMIN — DENOSUMAB 60 MG: 60 INJECTION SUBCUTANEOUS at 14:46

## 2020-01-27 NOTE — PROGRESS NOTES
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 She notes a constant ache that won't quite go away.      OBJECTIVE    Modality rationale: decrease pain and increase tissue extensibility to improve the patients ability to increase ease of ADLs   Min Type Additional Details    [] Estim:  []Unatt       []IFC  []Premod                        []Other:  []w/ice   []w/heat  Position:  Location:    [] Estim: []Att    []TENS instruct  []NMES                    []Other:  []w/US   []w/ice   []w/heat  Position:  Location:    []  Traction: [] Cervical       []Lumbar                       [] Prone          []Supine                       []Intermittent   []Continuous Lbs:  [] before manual  [] after manual    []  Ultrasound: []Continuous   [] Pulsed                           []1MHz   []3MHz W/cm2:  Location:    []  Iontophoresis with dexamethasone         Location: [] Take home patch   [] In clinic   15 []  Ice     [x]  heat  []  Ice massage  []  Laser   []  Anodyne Position: seated  Location: right shoulder    []  Laser with stim  []  Other:  Position:  Location:    []  Vasopneumatic Device Pressure:       [] lo [] med [] hi   Temperature: [] lo [] med [] hi   [x] Skin assessment post-treatment:  [x]intact []redness- no adverse reaction    []redness - adverse reaction:     30 min Therapeutic Exercise:  [x] See flow sheet :   Rationale: increase ROM and increase strength to improve the patients ability to perform ADLs    12 minutes of manual to work on PROM with oscillations and contract relax stretching OH for ease of overhead ADLs              With   [x] TE   [] TA   [] neuro   [] other: Patient Education: [x] Review HEP    [] Progressed/Changed HEP based on:   [] positioning   [] body mechanics   [] transfers   [] heat/ice application    [] other:      Other Objective/Functional Measures:  Supine self assisted AAROM flexion 110 degrees  Supine AROM ~85 degrees with posterior shoulder pain end range  Educated to work on supine AROM as far as she can go and then left assist to end range  Continues with trigger points in deltoid muscle  Firm end feels for flexion and scaption ROM    Pain Level (0-10 scale) post treatment: 2-3/10    ASSESSMENT/Changes in Function:  Pt is progressing with improving AAROM and supine AROM. She continues to have deltoid and superior shoulder pain at end ranges but is having less pain during AROM. Will continue to progress mobility and strength once allowed per MD for ease of OH reaching into cabinets. Progress towards goals / Updated goals:  Short Term Goals: To be accomplished in 3 treatments:  1. Pt will report compliance and independence to HEP to help the pt manage their pain and symptoms.             Eval: Established  Current: Goal met. Pt reports compliance 1/20/20    Long Term Goals: To be accomplished in 10 treatments:  1. Pt will improve PROM right shoulder flex to 120 degs, ABD to 95 degs, ER to 55 degs (at 35 degs ABD) to improve ease of dressing. Eval: flex 93 degs, ABD 79 degs, ER 43 degs (at 35 degs ABD)  Progressing: flex: 95 degrees abd: 106 degrees (1/17/20)  AAROM supine 110 degrees  2.  Pt will increase AROM right shoulder flex to at least 90 degs, ABD to 70 degs to improve ability to reach with more ease with the right shoulder with cooking activities. Eval: unable to perform AROM right shoulder flex/ABD secondary to weakness and pain. Current:  Progressing. Right shoulder AROM flexion 90* in gravity-eliminated position 1/20/20  Supine 85 degrees (1/27/20)  3. Pt will report at least 50% improvement in pain/symptoms to improve independence with daily activities. Eval: 0%  Current: Slowly progressing. Reports 5% improvement 1/20/20   4. Pt will report being able to reaching into a shoulder height shelf with mild to minimal increased pain or difficulty with the right UE to improve ability to tolerance to household chores.   Eval: unable to actively reach with the right UE secondary to pain and weakness.     PLAN  [x]  Upgrade activities as tolerated     [x]  Continue plan of care  []  Update interventions per flow sheet       []  Discharge due to:_  []  Other:_      Silver Tabares PTA 1/27/2020  7:46 AM    Future Appointments   Date Time Provider Heri Beal   1/27/2020  2:30 PM [facility] FAST TRACK NURSE [facility]   1/29/2020  9:00 AM Dylan Schwarz, PT [id number]   1/30/2020 12:45 PM [facility] 1 [facility] SCHED   1/31/2020  8:15 AM Inocencio Onofre MD [facility] SCHED   1/31/2020  9:30 AM Cristy Ho PTA [facility]   2/3/2020  9:30 AM Cristy Ho PTA [facility]   2/5/2020  9:00 AM Dylan Schwarz, PT [facility]   2/5/2020  2:45 PM Bethel Del Cid PA [facility] SCHED   2/7/2020  9:00 AM Cristy Ho PTA [facility]   2/10/2020  9:00 AM Cristy Ho PTA [facility]   2/12/2020  9:00 AM Dylan Schwarz, PT [facility]   2/14/2020  9:00 AM Cristy Ho, PTA [facility]   2/17/2020 10:00 AM Cristy Ho, PTA [facility]   2/19/2020  9:00 AM Cristy Ho, PTA [facility]   2/21/2020  9:00 AM Cristy Ho, PTA [facility]   2/24/2020  9:00 AM Ani Souza, PTA [facility]   2/26/2020  9:00 AM Bita Hancock, PT [facility]   2/28/2020  9:00 AM Ani Souza, DAREK [facility]   7/16/2020 11:00 AM HBV FAST TRACK NURSE [facility]

## 2020-01-27 NOTE — PROGRESS NOTES
[facility] Progress Note    Date: 2020    Name: Janeen Torres    MRN: [id number]         : 1940      Ms. Raudel Murrieta arrived in the [facility] today at [phone], in stable condition, here for Q 6 Month, SQ Prolia Injection. She was assessed and education was provided. Ms. Kimi Villaseñor vitals were reviewed. Visit Vitals  /65 (BP 1 Location: Left arm, BP Patient Position: Sitting)   Pulse 71   Temp 97.9 °F (36.6 °C)   Resp 16   SpO2 96%   Breastfeeding No             Her most recent CMP, Magnesium, & Phosphorus, Levels from 20 were reviewed, and were as follows:          Results for Savage Back (MRN [id number])    Ref. Range 2020 11:35   Sodium Latest Ref Range: 136 - 145 mmol/L 143   Potassium Latest Ref Range: 3.5 - 5.5 mmol/L 4.0   Chloride Latest Ref Range: 100 - 111 mmol/L 108   CO2 Latest Ref Range: 21 - 32 mmol/L 31   Anion gap Latest Ref Range: 3.0 - 18 mmol/L 4   Glucose Latest Ref Range: 74 - 99 mg/dL 88   BUN Latest Ref Range: 7.0 - 18 MG/DL 17   Creatinine Latest Ref Range: 0.6 - 1.3 MG/DL 0.59 (L)   BUN/Creatinine ratio Latest Ref Range: 12 - 20   29 (H)   Calcium Latest Ref Range: 8.5 - 10.1 MG/DL 8.1 (L)   Phosphorus Latest Ref Range: 2.5 - 4.9 MG/DL 4.2   Magnesium Latest Ref Range: 1.6 - 2.6 mg/dL 2.0   GFR est non-AA Latest Ref Range: >60 ml/min/1.73m2 >60   GFR est AA Latest Ref Range: >60 ml/min/1.73m2 >60   Bilirubin, total Latest Ref Range: 0.2 - 1.0 MG/DL 0.2   Bilirubin, direct Latest Ref Range: 0.0 - 0.2 MG/DL <0.1   Protein, total Latest Ref Range: 6.4 - 8.2 g/dL 4.7 (L)   Albumin Latest Ref Range: 3.4 - 5.0 g/dL 2.2 (L)   Globulin Latest Ref Range: 2.0 - 4.0 g/dL 2.5   A-G Ratio Latest Ref Range: 0.8 - 1.7   0.9   ALT (SGPT) Latest Ref Range: 13 - 56 U/L 17   AST Latest Ref Range: 10 - 38 U/L 16   Alk.  phosphatase Latest Ref Range: 45 - 117 U/L 52         The low serum calcium result listed above, was discussed with [facility] pharmacist Lara.     Corrected Calcium Result was calculated to be 9.5, which was satisfactory for Prolia administration today, and this calculation was also verified with [facility] pharmacist Lara. Prolia (denosumab) 60 mg, was administered SQ, in her left arm at 1446, per order, and without incident.   ;      Ms. Amirah Cotton tolerated well, and had no complaints. Ms. Amirah Cotton was discharged from [place] [address] in stable condition at 1450. Danuta Capps She is to return in 6 months, on Monday, 7-27-20, at 1430, for her next appointment, for her next Prolia Injection.  Griselda Olp, RN  January 27, 2020  2:42 PM

## 2020-01-28 LAB
BACTERIA SPEC CULT: NORMAL
SOURCE, RSRC56: NORMAL

## 2020-01-29 ENCOUNTER — HOSPITAL ENCOUNTER (OUTPATIENT)
Dept: PHYSICAL THERAPY | Age: 80
Discharge: HOME | End: 2020-01-29
Payer: MEDICARE

## 2020-01-29 PROCEDURE — 97140 MANUAL THERAPY 1/> REGIONS: CPT

## 2020-01-29 NOTE — PROGRESS NOTES
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 Reports she is late because the automated call told her to come at 9:30. She reports she has been doing her HEP regularly.      OBJECTIVE    Modality rationale: decrease pain and increase tissue extensibility to improve the patients ability to increase ease of ADLs   Min Type Additional Details    [] Estim:  []Unatt       []IFC  []Premod                        []Other:  []w/ice   []w/heat  Position:  Location:    [] Estim: []Att    []TENS instruct  []NMES                    []Other:  []w/US   []w/ice   []w/heat  Position:  Location:    []  Traction: [] Cervical       []Lumbar                       [] Prone          []Supine                       []Intermittent   []Continuous Lbs:  [] before manual  [] after manual    []  Ultrasound: []Continuous   [] Pulsed                           []1MHz   []3MHz W/cm2:  Location:    []  Iontophoresis with dexamethasone         Location: [] Take home patch   [] In clinic   15 []  Ice     [x]  heat  []  Ice massage  []  Laser   []  Anodyne Position: seated   Location: right shoulder    []  Laser with stim  []  Other:  Position:  Location:    []  Vasopneumatic Device Pressure:       [] lo [] med [] hi   Temperature: [] lo [] med [] hi   [x] Skin assessment post-treatment:  [x]intact []redness- no adverse reaction    []redness - adverse reaction:     29 min Therapeutic Exercise:  [x] See flow sheet :   Rationale: increase ROM and increase strength to improve the patients ability to increase ease of ADLs     8 min Manual Therapy:  scap mobs, trigger point release to teres minor and deltoid   Rationale: decrease pain, increase ROM and increase tissue extensibility to increase ease of ADLs          With   [x] TE   [] TA   [] neuro   [] other: Patient Education: [x] Review HEP    [] Progressed/Changed HEP based on:   [] positioning   [] body mechanics   [] transfers   [] heat/ice application    [] other:      Other Objective/Functional Measures:   Right shoulder AROM flexion in supine: 88 degrees  She continues to require left UE assistance with supine flexion  Improving eccentric control with supine flexion and side lying abduction  Pt. Tolerated PT well with no reports of increased pain     Pain Level (0-10 scale) post treatment: 2/10    ASSESSMENT/Changes in Function:  Pt. Is progressing slowly towards goals. She is compliant with her HEP. She also demonstrates improving strength with improving eccentric control during AROM activities. She continues to demonstrate significant decrease in right shoulder AROM. Patient will continue to benefit from skilled PT services to modify and progress therapeutic interventions, address functional mobility deficits, address ROM deficits, address strength deficits, analyze and address soft tissue restrictions, analyze and cue movement patterns, analyze and modify body mechanics/ergonomics and assess and modify postural abnormalities to attain remaining goals. Progress towards goals / Updated goals:  Short Term Goals: To be accomplished in 3 treatments:  1. Pt will report compliance and independence to HEP to help the pt manage their pain and symptoms.             Eval: Established  Current: Goal met.  Pt reports compliance 1/20/20      Long Term Goals: To be accomplished in 10 treatments:  1. Pt will improve PROM right shoulder flex to 120 degs, ABD to 95 degs, ER to 55 degs (at 35 degs ABD) to improve ease of dressing. Eval: flex 93 degs, ABD 79 degs, ER 43 degs (at 35 degs ABD)  Progressing: flex: 95 degrees abd: 106 degrees (1/17/20)  AAROM supine 110 degrees  2. Pt will increase AROM right shoulder flex to at least 90 degs, ABD to 70 degs to improve ability to reach with more ease with the right shoulder with cooking activities. Eval: unable to perform AROM right shoulder flex/ABD secondary to weakness and pain.   Not met: in supine: 88 degrees (1/29/20)  3. Pt will report at least 50% improvement in pain/symptoms to improve independence with daily activities. Eval: 0%  Current: Slowly progressing.  Reports 5% improvement 1/20/20   4. Pt will report being able to reaching into a shoulder height shelf with mild to minimal increased pain or difficulty with the right UE to improve ability to tolerance to household chores.   Eval: unable to actively reach with the right UE secondary to pain and weakness.     PLAN  []  Upgrade activities as tolerated     [x]  Continue plan of care  []  Update interventions per flow sheet       []  Discharge due to:_  []  Other:_      Sky Barfield, PT 1/29/2020  7:45 AM    Future Appointments   Date Time Provider Heri Dela Cruzi   1/29/2020  9:00 AM Gaby Mg, PT [facility]   1/30/2020 12:45 PM [facility] 1 [facility] SCHED   1/31/2020  8:15 AM Alexander Lim MD [id number] [address]   1/31/2020  9:30 AM Danis Solis PTA [facility]   2/3/2020  9:30 AM Danis Solis PTA [facility]   2/5/2020  9:00 AM Gaby Mg PT [facility]   2/5/2020  2:45 PM MATTHEW Lynn [facility] SCHED   2/7/2020  9:00 AM Danis Solis, DAREK [facility]   2/10/2020  9:00 AM Danis Solis, PTA [facility]   2/12/2020  9:00 AM Gaby Mg, PT [facility]   2/14/2020 9:00 AM Mynor Whitehead, PTA [facility]   2/17/2020 10:00 AM Mynor Whitehead, PTA [facility]   2/19/2020  9:00 AM Mynor Whitehead, PTA [facility]   2/21/2020  9:00 AM Mynor Whitehead, PTA [facility]   2/24/2020  9:00 AM Mynor Whitehead, PTA [facility]   2/26/2020  9:00 AM Fredo Lavon, PT [facility]   2/28/2020  9:00 AM Mynor Whitehead, PTA [facility]   7/27/2020  2:30 PM HBV FAST TRACK NURSE [facility]

## 2020-01-31 ENCOUNTER — OFFICE VISIT (OUTPATIENT)
Dept: VASCULAR SURGERY | Age: 80
End: 2020-01-31

## 2020-01-31 ENCOUNTER — HOSPITAL ENCOUNTER (OUTPATIENT)
Dept: PHYSICAL THERAPY | Age: 80
Discharge: HOME | End: 2020-01-31
Payer: MEDICARE

## 2020-01-31 VITALS
WEIGHT: 128 LBS | OXYGEN SATURATION: 96 % | HEART RATE: 76 BPM | SYSTOLIC BLOOD PRESSURE: 108 MMHG | RESPIRATION RATE: 17 BRPM | DIASTOLIC BLOOD PRESSURE: 70 MMHG | BODY MASS INDEX: 21.33 KG/M2 | HEIGHT: 65 IN

## 2020-01-31 DIAGNOSIS — M79.605 LEFT LEG PAIN: ICD-10-CM

## 2020-01-31 DIAGNOSIS — R60.0 BILATERAL EDEMA OF LOWER EXTREMITY: Primary | ICD-10-CM

## 2020-01-31 PROCEDURE — 97140 MANUAL THERAPY 1/> REGIONS: CPT

## 2020-01-31 PROCEDURE — 97110 THERAPEUTIC EXERCISES: CPT

## 2020-01-31 NOTE — PROGRESS NOTES
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 She feels her shoulder is really hard and tight. She states she is doing her HEP and has an easier time lifting her arm but still painful.      OBJECTIVE    Modality rationale: decrease pain and increase tissue extensibility to improve the patients ability to increase ease of ADLs   Min Type Additional Details    [] Estim:  []Unatt       []IFC  []Premod                        []Other:  []w/ice   []w/heat  Position:  Location:    [] Estim: []Att    []TENS instruct  []NMES                    []Other:  []w/US   []w/ice   []w/heat  Position:  Location:    []  Traction: [] Cervical       []Lumbar                       [] Prone          []Supine                       []Intermittent   []Continuous Lbs:  [] before manual  [] after manual    []  Ultrasound: []Continuous   [] Pulsed                           []1MHz   []3MHz W/cm2:  Location:    []  Iontophoresis with dexamethasone         Location: [] Take home patch   [] In clinic   15 []  Ice     [x]  heat  []  Ice massage  []  Laser   []  Anodyne Position: seated   Location: right shoulder    []  Laser with stim  []  Other:  Position:  Location:    []  Vasopneumatic Device Pressure:       [] lo [] med [] hi   Temperature: [] lo [] med [] hi   [x] Skin assessment post-treatment:  [x]intact []redness- no adverse reaction    []redness - adverse reaction:     24 min Therapeutic Exercise:  [x] See flow sheet :   Rationale: increase ROM and increase strength to improve the patients ability to increase ease of ADLs     15 min Manual Therapy: STM right deltoid   Rationale: decrease pain, increase ROM and increase tissue extensibility to increase ease of ADLs          With   [x] TE   [] TA   [] neuro   [] other: Patient Education: [x] Review HEP    [] Progressed/Changed HEP based on:   [] positioning   [] body mechanics   [] transfers   [] heat/ice application    [] other:      Other Objective/Functional Measures:   Educated on taking a rest break as she could be causing deltoid DOMS  Instructed on heat and gentle massage  Hypertonicity noted throughout deltoid with massage  Improving AAROM and AROM    Pain Level (0-10 scale) post treatment: 2/10    ASSESSMENT/Changes in Function:  Pt making slow progress at reducing constant pain in the right shoulder. She has deltoid hypertonicity likely from overuse due to decreased pectoral strength. Will work to improve activation of shoulder flexors to reduce pain caused in deltoid for ease of performing ADLs. Progress towards goals / Updated goals:  Short Term Goals: To be accomplished in 3 treatments:  1. Pt will report compliance and independence to HEP to help the pt manage their pain and symptoms.             Eval: Established  Current: Goal met.  Pt reports compliance 1/20/20      Long Term Goals: To be accomplished in 10 treatments:  1. Pt will improve PROM right shoulder flex to 120 degs, ABD to 95 degs, ER to 55 degs (at 35 degs ABD) to improve ease of dressing. Eval: flex 93 degs, ABD 79 degs, ER 43 degs (at 35 degs ABD)  Progressing: flex: 95 degrees abd: 106 degrees (1/17/20)  AAROM supine 110 degrees  2.  Pt will increase AROM right shoulder flex to at least 90 degs, ABD to 70 degs to improve ability to reach with more ease with the right shoulder with cooking activities. Eval: unable to perform AROM right shoulder flex/ABD secondary to weakness and pain.   Not met: in supine: 88 degrees (1/29/20)  3. Pt will report at least 50% improvement in pain/symptoms to improve independence with daily activities. Eval: 0%  Current: Slowly progressing.  Reports 5% improvement 1/20/20   4. Pt will report being able to reaching into a shoulder height shelf with mild to minimal increased pain or difficulty with the right UE to improve ability to tolerance to household chores.   Eval: unable to actively reach with the right UE secondary to pain and weakness.     PLAN  [x]  Upgrade activities as tolerated     [x]  Continue plan of care  []  Update interventions per flow sheet       []  Discharge due to:_  []  Other:_      Wilmar Ruiz PTA 1/31/2020  7:45 AM    Future Appointments   Date Time Provider Heri Beal   1/31/2020  9:30 AM Jagdish Wen, PTA [facility]   2/3/2020  9:30 AM Maryl Fallen, PTA [facility]   2/5/2020  9:00 AM Vlad Sanford, PT [facility]   2/5/2020  2:45 PM MATTHEW Ozuna [facility]   2/7/2020  9:00 AM Maryjuan luis Fallen, PTA [facility]   2/10/2020  9:00 AM Maryl Fallen, PTA [facility]   2/12/2020  9:00 AM Vlad Sanford, PT [facility]   2/14/2020  9:00 AM Maryl Fallen, PTA [facility]   2/17/2020 10:00 AM Maryl Fallen, PTA [facility]   2/19/2020  9:00 AM Maryl Fallen, PTA [facility]   2/21/2020  9:00 AM Maryl Fallen, PTA [facility]   2/24/2020  9:00 AM Jagdish Wen, PTA [facility]   2/26/2020  9:00 AM Vlad Sanford, PT [facility]   2/28/2020  9:00 AM Corijuan luis Naifmacey, PTA [facility]   7/27/2020  2:30 PM [facility] FAST TRACK NURSE [facility]

## 2020-01-31 NOTE — PROGRESS NOTES
1. Have you been to an emergency room or urgent care clinic since your last visit? NO    Hospitalized since your last visit? If yes, where, when, and reason for visit? no  2. Have you seen or consulted any other health care providers outside of the [facility] since your last visit including any procedures, health maintenance items. If yes, where, when and reason for visit?  Outpatient hospital surgery [facility]

## 2020-01-31 NOTE — PROGRESS NOTES
Too Emery    Chief Complaint   Patient presents with    Leg Pain       History and Physical    70-year-old female here today for follow-up from her venous testing regarding lower extremity swelling. Tells me she even wakes up with swelling at the ankles and feet and it progressively gets worse throughout the day. She wears compression stockings daily. She has no skin ulcerations no fevers she is just recovering from her right shoulder surgery which she goes to physical therapy 3 times a week. 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    Right shoulder pain M25.511    Postlaminectomy syndrome M96.1    Aorta aneurysm I71.9    Chronic cholecystitis with calculus K80.10    Rotator cuff tear arthropathy of right shoulder M75.101, M12.811    Idiopathic osteoporosis M81.8     Past Surgical History:   Procedure Laterality Date    GASTROJEJUNOSTOMY      12/10 Bilroth II    GASTROJEJUNOSTOMY      12/10 Lia en Y (after Bilroth II)    HX HERNIA REPAIR      NEUROLOGICAL PROCEDURE UNLISTED  2006    neck surgery     Current Outpatient Medications   Medication Sig Dispense Refill    baclofen (LIORESAL) 10 mg tablet Take 1 Tab by mouth three (3) times daily as needed for Other (spasms). 90 Tab 0    ferrous sulfate 325 mg (65 mg iron) tablet Take 325 mg by mouth two (2) times a day.  gabapentin (NEURONTIN) 300 mg capsule nightly as needed.   [facility] EXTRACT (CRANBERRY EXTRACT PO) Take  by mouth daily.  metoprolol succinate (TOPROL-XL) 25 mg XL tablet TAKE 1 TABLET BY MOUTH TWICE DAILY 180 Tab 3    PRADAXA 150 mg capsule Take 1 Cap by mouth two (2) times a day. 180 Cap 3    cyanocobalamin (VITAMIN B12) 1,000 mcg/mL injection Taken weekly on Saturdays  1    digoxin (DIGOX) 0.125 mg tablet TAKE 1 TABLET BY MOUTH ONCE DAILY 90 Tab 3    tiotropium (SPIRIVA WITH HANDIHALER) 18 mcg inhalation capsule Take 1 Cap by inhalation daily. 20 Cap 0    OV-KM-HE-Fe-Min-Lycopen-Lutein (CENTRUM) 0.4-162-18 mg tab Take  by mouth daily.  pantoprazole (PROTONIX) 40 mg tablet Take 40 mg by mouth daily.  BIOTIN PO Take  by mouth two (2) times a day.  folic acid 114 mcg tablet Take 400 mcg by mouth daily.  CALCIUM PO Take  by mouth daily.  montelukast (SINGULAIR) 10 mg tablet Take 10 mg by mouth daily. Allergies   Allergen Reactions    Codeine Nausea Only     Patient states not allergic    Diltiazem Other (comments)     Patient states not allergic       Review of Systems    A full review of systems was completed times ten organ systems and was deemed negative unless otherwise mentioned in the HPI. Physical   Visit Vitals  /70 (BP 1 Location: Left arm, BP Patient Position: Sitting)   Pulse 76   Resp 17   Ht 5' 5\" (1.651 m)   Wt 128 lb (58.1 kg)   LMP  (LMP Unknown)   SpO2 96%   BMI 21.30 kg/m²       She appears well today she has limited mobility from her right shoulder but she is says this is improving. Head is normocephalic and atraumatic  Neck no JVD  Chest is clear  Cardiac regular  Abdomen soft flat nontender  Lower extremities I see this swelling at the ankle and foot there is no skin ulceration  Her venous testing shows no definable dilation of veins or significant reflux. Her tibial vessels are multiphasic      Impression/Plan:     ICD-10-CM ICD-9-CM    1. Bilateral edema of lower extremity R60.0 782.3    2.  Left leg pain M79.605 729.5      No orders of the defined types were placed in this encounter. Regarding her swelling I think she should practice good elevation compression stockings and making sure she props up her feet at night. She tells me she has some nighttime leg cramps I recommended tonic water. I think her swelling is more medically related she can trial a diuretic she is going to see her doctor Dr. Fatou Huerta next. Follow-up and Dispositions    · Return if symptoms worsen or fail to improve. Jaylyn Howard MD    PLEASE NOTE:  This document has been produced using voice recognition software. Unrecognized errors in transcription may be present.

## 2020-02-03 ENCOUNTER — HOSPITAL ENCOUNTER (OUTPATIENT)
Dept: PHYSICAL THERAPY | Age: 80
Discharge: HOME | End: 2020-02-03
Payer: MEDICARE

## 2020-02-03 PROCEDURE — 97110 THERAPEUTIC EXERCISES: CPT

## 2020-02-03 PROCEDURE — 97112 NEUROMUSCULAR REEDUCATION: CPT

## 2020-02-03 NOTE — PROGRESS NOTES
PT DAILY TREATMENT NOTE 10-18    Patient Name: Wang Singh  Date:2/3/2020  : 1940  [x]  Patient  Verified  Payor: [place] MEDICARE / Plan: [place] MEDICARE PART A & B / Product Type: Medicare /    In time: 9:32  Out time: 10:33  Total Treatment Time (min): 61  Visit #: 10 of 10    Medicare/BCBS Only   Total Timed Codes (min):  46 1:1 Treatment Time: 30       Treatment Area: Pain in right shoulder [M25.511]    SUBJECTIVE  Pain Level (0-10 scale): 2/10  Any medication changes, allergies to medications, adverse drug reactions, diagnosis change, or new procedure performed?: [x] No    [] Yes (see summary sheet for update)  Subjective functional status/changes:   [] No changes reported  Pt states the muscle isn't as tight and it felt better resting it for the weekend. She isn't sure why there is still so much pain when trying to lift her arm.      OBJECTIVE    Modality rationale: decrease pain and increase tissue extensibility to improve the patients ability to increase ease of ADLs   Min Type Additional Details    [] Estim:  []Unatt       []IFC  []Premod                        []Other:  []w/ice   []w/heat  Position:  Location:    [] Estim: []Att    []TENS instruct  []NMES                    []Other:  []w/US   []w/ice   []w/heat  Position:  Location:    []  Traction: [] Cervical       []Lumbar                       [] Prone          []Supine                       []Intermittent   []Continuous Lbs:  [] before manual  [] after manual    []  Ultrasound: []Continuous   [] Pulsed                           []1MHz   []3MHz W/cm2:  Location:    []  Iontophoresis with dexamethasone         Location: [] Take home patch   [] In clinic   15 []  Ice     [x]  heat  []  Ice massage  []  Laser   []  Anodyne Position: seated   Location: right shoulder    []  Laser with stim  []  Other:  Position:  Location:    []  Vasopneumatic Device Pressure:       [] lo [] med [] hi   Temperature: [] lo [] med [] hi   [x] Skin assessment post-treatment:  [x]intact []redness- no adverse reaction    []redness - adverse reaction:     16 min Therapeutic Exercise:  [x] See flow sheet :   Rationale: increase ROM and increase strength to improve the patients ability to increase ease of ADLs    20 minutes of Neuromuscular re-education working to increase right pectoral firing with shoulder elevation to decrease overuse of deltoid and bicep; performed in sidelying, sitting and supine to activate pecs with pt using left hand to feel activation on right pectorals     10 NC min Manual Therapy: ATC to KT inhibit the right deltoid   Rationale: decrease pain, increase ROM and increase tissue extensibility to increase ease of ADLs          With   [x] TE   [] TA   [] neuro   [] other: Patient Education: [x] Review HEP    [] Progressed/Changed HEP based on:   [] positioning   [] body mechanics   [] transfers   [] heat/ice application    [] other:      Other Objective/Functional Measures:   Decreased hypertonicity of right deltoid today  Pain with shoulder flexion initially but lessened with more pectoral activation but weak contraction  Educated on posterior shoulder stretch to perform   Educated on removing tape if increased pain or itching    Pain Level (0-10 scale) post treatment: 2/10    ASSESSMENT/Changes in Function:  Pt continues to make slow progress at reducing pain levels in the posterior deltoid averaging a constant 2/10. She does have improving shoulder elevation (~90 degrees)  but lacks good pectoral strength and activation with overcompensation of the upper trap, deltoid and bicep. Will continue to progress strength and mobility with decreased pain to improve ease of completing [place] ADLs. Progress towards goals / Updated goals:  Short Term Goals: To be accomplished in 3 treatments:  1. Pt will report compliance and independence to HEP to help the pt manage their pain and symptoms.             Eval: Established  Current: Goal met.  Pt reports compliance 1/20/20    Long Term Goals: To be accomplished in 10 treatments:  1. Pt will improve PROM right shoulder flex to 120 degs, ABD to 95 degs, ER to 55 degs (at 35 degs ABD) to improve ease of dressing. Eval: flex 93 degs, ABD 79 degs, ER 43 degs (at 35 degs ABD)  Progressing: flex: 95 degrees abd: 106 degrees (1/17/20)  AAROM supine 110 degrees  2. Pt will increase AROM right shoulder flex to at least 90 degs, ABD to 70 degs to improve ability to reach with more ease with the right shoulder with cooking activities. Eval: unable to perform AROM right shoulder flex/ABD secondary to weakness and pain.   Not met: in supine: 88 degrees (1/29/20)  3. Pt will report at least 50% improvement in pain/symptoms to improve independence with daily activities. Eval: 0%  Current: Slowly progressing.  Reports 5% improvement 1/20/20   4. Pt will report being able to reaching into a shoulder height shelf with mild to minimal increased pain or difficulty with the right UE to improve ability to tolerance to household chores.   Eval: unable to actively reach with the right UE secondary to pain and weakness.   Unable without increased pain    PLAN  [x]  Upgrade activities as tolerated     [x]  Continue plan of care  []  Update interventions per flow sheet       []  Discharge due to:_  []  Other:_      Claude Paniagua PTA 2/3/2020  7:45 AM    Future Appointments   Date Time Provider Heri Beal   2/5/2020  9:00 AM Ailin Mcgee PT [facility] [address]   2/5/2020  2:45 PM MATTHEW Jackson [facility] SCHED   2/7/2020  9:00 AM Izell Hatchet, PTA [facility] [address]   2/10/2020  9:00 AM Izell Hatchet, PTA [facility] [address]   2/12/2020  9:00 AM Ailin Mcgee PT [facility] [address]   2/14/2020  9:00 AM Izell Hatchet, PTA [facility] [address]   2/17/2020 10:00 AM Izell Hatchet, PTA [facility] [address]   2/19/2020  9:00 AM Izell Hatchet, PTA [facility] [address]   2/21/2020  9:00 AM Izell Hatchet, PTA [facility] [address]   2/24/2020  9:00 AM Ani Souza, PTA [facility]   2/26/2020  9:00 AM Bita Hancock, PT [facility]   2/28/2020  9:00 AM Ani Souza, DAREK [facility]   7/27/2020  2:30 PM [facility] FAST TRACK NURSE [facility]

## 2020-02-03 NOTE — PROGRESS NOTES
[facility] [employer] [facility]  [address] [place] [address]  [phone] fax    Continued Plan of Care/ Re-certification for Physical Therapy Services    Patient name: Lizz Cisneros Start of Care: 2020   [facility] 1940               Medical Diagnosis: Pain in right shoulder [M25.511]    Onset Date: 1/10/2020               Treatment Diagnosis: right shoulder pain   Prior Hospitalization: see medical history Provider#: [id number]   Medications: Verified on Patient summary List    Comorbidities: DVT, Arthritis, HTN, COPD, pt report of hx surgery in her neck.   Prior Level of Function: Right Hand Dominant. Previous DVT. Lives with .     Visits from Start of Care: 10    Missed Visits: 0    The Plan of Care and following information is based on the patient's current status:  1. Pt will improve PROM right shoulder flex to 120 degs, ABD to 95 degs, ER to 55 degs (at 35 degs ABD) to improve ease of dressing. Eval: flex 93 degs, ABD 79 degs, ER 43 degs (at 35 degs ABD)  Current status:not met  2. Pt will increase AROM right shoulder flex to at least 90 degs, ABD to 70 degs to improve ability to reach with more ease with the right shoulder with cooking activities. Eval: unable to perform AROM right shoulder flex/ABD secondary to weakness and pain. Current status: not met:   3. Pt will report at least 50% improvement in pain/symptoms to improve independence with daily activities. Eval: 0%  Current status: not met  4. Pt will report being able to reaching into a shoulder height shelf with mild to minimal increased pain or difficulty with the right UE to improve ability to tolerance to household chores. Eval: unable to actively reach with the right UE secondary to pain and weakness.    Current status: not met    Key functional changes: improving right shoulder PROM and AAROM mobility       Problems/ barriers to goal attainment: pain and decreased right shoulder strength     Problem List: pain affecting function, decrease ROM, decrease strength, impaired gait/ balance, decrease ADL/ functional abilitiies, decrease activity tolerance, decrease flexibility/ joint mobility and decrease transfer abilities    Treatment Plan: Therapeutic exercise, Therapeutic activities, Neuromuscular re-education, Physical agent/modality, Gait/balance training, Manual therapy, Patient education, Self Care training and Functional mobility training     Patient Goal (s) has been updated and includes: to move shoulder better     Goals for this certification period to be accomplished in 4 weeks:  1. Patient will improve FOTO score by 21 points in order to demonstrate a significant improvement in function. 2. Patient will improve right shoulder PROM flex: abd: to 120 degrees in order to increase ease of ADLs. 3. Patient will improve right shoulder AROM flexion/abduction to 90 degrees in order to increase ease of cooking. 4. Patient will increase behind back reaching to L4 in order to increase ease of getting dressed. Frequency / Duration: Patient to be seen 2-3times per week for 4 weeks:    Assessment / Recommendations: pt. Is making slow progress towards goals. She report having 5% improvement in her symptoms. She continues to have pain at 2/10 in deltoid. She continues ot have decreased right shoulder PROM flexion: 95 degrees abd: 106 degrees. Right shoulder flexion AROM in supine is 88 degrees. She continues to have excessive UT use and decreased pec strength during shoulder flexion. Skilled PT is medically necessary in order to improve right shoulder mobility and strength for increased ease of ADLs and improved quality of life.       Certification Period: 2/3/20-3/4/20    Jose Zamarripa, PT 2/3/2020 4:41 PM    ________________________________________________________________________  I certify that the above Therapy Services are being furnished while the patient is under my care. I agree with the treatment plan and certify that this therapy is necessary. [] I have read the above and request that my patient continue as recommended.   [] I have read the above report and request that my patient continue therapy with the following changes/special instructions: _______________________________________  [] I have read the above report and request that my patient be discharged from therapy    Physician's Signature:____________Date:_________TIME:________    ** Signature, Date and Time must be completed for valid certification **    Please sign and return to [facility]  [address] [facility]  [phone] fax

## 2020-02-05 ENCOUNTER — HOSPITAL ENCOUNTER (OUTPATIENT)
Dept: PHYSICAL THERAPY | Age: 80
Discharge: HOME | End: 2020-02-05
Payer: MEDICARE

## 2020-02-05 ENCOUNTER — OFFICE VISIT (OUTPATIENT)
Dept: ORTHOPEDIC SURGERY | Age: 80
End: 2020-02-05

## 2020-02-05 VITALS
TEMPERATURE: 97 F | SYSTOLIC BLOOD PRESSURE: 113 MMHG | OXYGEN SATURATION: 97 % | DIASTOLIC BLOOD PRESSURE: 47 MMHG | BODY MASS INDEX: 20.29 KG/M2 | HEART RATE: 71 BPM | HEIGHT: 65 IN | WEIGHT: 121.8 LBS | RESPIRATION RATE: 16 BRPM

## 2020-02-05 DIAGNOSIS — M12.811 ROTATOR CUFF ARTHROPATHY, RIGHT: Primary | ICD-10-CM

## 2020-02-05 PROCEDURE — 97110 THERAPEUTIC EXERCISES: CPT

## 2020-02-05 NOTE — PROGRESS NOTES
1. Have you been to the ER, urgent care clinic since your last visit? Hospitalized since your last visit? No    2. Have you seen or consulted any other health care providers outside of the [address] [place] [address] since your last visit? Include any pap smears or colon screening.  No

## 2020-02-05 NOTE — PROGRESS NOTES
PT DAILY TREATMENT NOTE 10-18    Patient Name: Leydi Montoya  Date:2020  : 1940  [x]  Patient  Verified  Payor: [place] MEDICARE / Plan: [place] MEDICARE PART A & B / Product Type: Medicare /    In time: 10:02  Out time: 10:47  Total Treatment Time (min): 45  Visit #: 1 of 12    Medicare/BCBS Only   Total Timed Codes (min):  30 1:1 Treatment Time: 30       Treatment Area: Pain in right shoulder [M25.511]    SUBJECTIVE  Pain Level (0-10 scale): 2/10  Any medication changes, allergies to medications, adverse drug reactions, diagnosis change, or new procedure performed?: [x] No    [] Yes (see summary sheet for update)  Subjective functional status/changes:   [] No changes reported  Pt reports feeling like the tape kept the muscle from getting as tight. She still has a hard time lifting her arm. She goes back to see the MD today.      OBJECTIVE    Modality rationale: decrease pain and increase tissue extensibility to improve the patients ability to increase ease of ADLs   Min Type Additional Details    [] Estim:  []Unatt       []IFC  []Premod                        []Other:  []w/ice   []w/heat  Position:  Location:    [] Estim: []Att    []TENS instruct  []NMES                    []Other:  []w/US   []w/ice   []w/heat  Position:  Location:    []  Traction: [] Cervical       []Lumbar                       [] Prone          []Supine                       []Intermittent   []Continuous Lbs:  [] before manual  [] after manual    []  Ultrasound: []Continuous   [] Pulsed                           []1MHz   []3MHz W/cm2:  Location:    []  Iontophoresis with dexamethasone         Location: [] Take home patch   [] In clinic   15 []  Ice     [x]  heat  []  Ice massage  []  Laser   []  Anodyne Position: seated   Location: right shoulder    []  Laser with stim  []  Other:  Position:  Location:    []  Vasopneumatic Device Pressure:       [] lo [] med [] hi   Temperature: [] lo [] med [] hi   [x] Skin assessment post-treatment:  [x]intact []redness- no adverse reaction    []redness - adverse reaction:     25 min Therapeutic Exercise:  [x] See flow sheet :   Rationale: increase ROM and increase strength to improve the patients ability to increase ease of ADLs     5 NC min Manual Therapy: ATC to KT inhibit the right deltoid   Rationale: decrease pain, increase ROM and increase tissue extensibility to increase ease of ADLs          With   [x] TE   [] TA   [] neuro   [] other: Patient Education: [x] Review HEP    [] Progressed/Changed HEP based on:   [] positioning   [] body mechanics   [] transfers   [] heat/ice application    [] other:      Other Objective/Functional Measures:   Progressed exercises per hard card  No pain with eccentric shoulder extension  Challenged with powder board incline presses but improved pectoral activation  Decreased AROM of the right shoulder with posterior shoulder hypertonicity    Pain Level (0-10 scale) post treatment: 2/10    ASSESSMENT/Changes in Function:  Pt with some improvement in deltoid hypertonicity with trial of KT. She has decreased right shoulder AROM with decreased pectoral strength. Will work to further improve mobility and work on eccentric strengthening for decreased pain with elevation for ADLs. Goals for this certification period to be accomplished in 4 weeks:  1. Patient will improve FOTO score by 21 points in order to demonstrate a significant improvement in function. 2. Patient will improve right shoulder PROM flex: abd: to 120 degrees in order to increase ease of ADLs. 3. Patient will improve right shoulder AROM flexion/abduction to 90 degrees in order to increase ease of cooking. 4. Patient will increase behind back reaching to L4 in order to increase ease of getting dressed.      PLAN  [x]  Upgrade activities as tolerated     [x]  Continue plan of care  []  Update interventions per flow sheet       []  Discharge due to:_  []  Other:_      Sheilda Castleman, PTA 2/5/2020  7:45 AM    Future Appointments   Date Time Provider Heri Beal   2/5/2020  2:45 PM Cherelle West, [address]   2/7/2020  9:00 AM Darci Suggs, PTA [facility]   2/10/2020  9:00 AM Darci Suggs, PTA [facility]   2/12/2020  9:00 AM Yokasta Hilton, PT [facility]   2/14/2020  9:00 AM Darci Suggs, PTA [facility]   2/17/2020 10:00 AM Darci Suggs, PTA [facility]   2/19/2020  9:00 AM Darci Suggs, PTA [facility]   2/21/2020  9:00 AM Darci Suggs, PTA [facility]   2/24/2020  9:00 AM Darci Suggs, PTA [facility]   2/26/2020  9:00 AM Yokasta Hilton, PT [facility]   2/28/2020  9:00 AM Darci Suggs, PTA [facility]   7/27/2020  2:30 PM [facility] FAST TRACK NURSE [facility]

## 2020-02-05 NOTE — PROGRESS NOTES
Daron Ganser [place]viaacometkitty  1940     HISTORY OF PRESENT ILLNESS  Violet Piek is a 78 y.o. female who presents today for evaluation s/p Right shoulder arthroscopic lysis of adhesions and debridement on 1/10/20. Patient has been to PT. Overall feels like she is improving. Describes pain as a 2/10. Has been taking Norco 10 for pain only at night. 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 Patient denies any fever, chills, chest pain, shortness of breath or calf pain. There are no new illness or injuries to report since last seen in the office. PHYSICAL EXAM:   Visit Vitals  /47   Pulse 71   Temp 97 °F (36.1 °C) (Oral)   Resp 16   Ht 5' 5\" (1.651 m)   Wt 121 lb 12.8 oz (55.2 kg)   LMP  (LMP Unknown)   SpO2 97%   BMI 20.27 kg/m²      The patient is a well-developed, well-nourished female in no acute distress. The patient is alert and oriented times three. The patient appears to be well groomed. Mood and affect are normal.  ORTHOPEDIC EXAM of right shoulder:  Inspection: swelling present,  Bruising present  Incisions well healed  Passive glenohumeral abduction 0-90 degrees  Stability: Stable  Strength: 3+/5      IMPRESSION:  S/P Right shoulder arthroscopic lysis of adhesions and debridement on 1/10/20    PLAN:   1. Patient improving post operatively  2. Will cont with PT working on strengthening  3.  Gradually start increasing activities based on pain    RTC 4 weeks    [facility]

## 2020-02-07 ENCOUNTER — HOSPITAL ENCOUNTER (OUTPATIENT)
Dept: PHYSICAL THERAPY | Age: 80
Discharge: HOME | End: 2020-02-07
Payer: MEDICARE

## 2020-02-07 PROCEDURE — 97110 THERAPEUTIC EXERCISES: CPT

## 2020-02-07 NOTE — PROGRESS NOTES
PT DAILY TREATMENT NOTE 10-18    Patient Name: Wang Singh  Date:2020  : 1940  [x]  Patient  Verified  Payor: [place] MEDICARE / Plan: VA MEDICARE PART A & B / Product Type: Medicare /    In time: 10:00  Out time: 11:04  Total Treatment Time (min): 64  Visit #: 2 of 12    Medicare/BCBS Only   Total Timed Codes (min):  49 1:1 Treatment Time: 35       Treatment Area: Pain in right shoulder [M25.511]    SUBJECTIVE  Pain Level (0-10 scale): 3/10  Any medication changes, allergies to medications, adverse drug reactions, diagnosis change, or new procedure performed?: [x] No    [] Yes (see summary sheet for update)  Subjective functional status/changes:   [] No changes reported  Pt reports still pain at rest and with movement on the back of her right shoulder. She states the MD told her to start strengthening now. She goes back  In another 4 weeks for a follow up.      OBJECTIVE    Modality rationale: decrease pain and increase tissue extensibility to improve the patients ability to increase ease of ADLs   Min Type Additional Details    [] Estim:  []Unatt       []IFC  []Premod                        []Other:  []w/ice   []w/heat  Position:  Location:    [] Estim: []Att    []TENS instruct  []NMES                    []Other:  []w/US   []w/ice   []w/heat  Position:  Location:    []  Traction: [] Cervical       []Lumbar                       [] Prone          []Supine                       []Intermittent   []Continuous Lbs:  [] before manual  [] after manual    []  Ultrasound: []Continuous   [] Pulsed                           []1MHz   []3MHz W/cm2:  Location:    []  Iontophoresis with dexamethasone         Location: [] Take home patch   [] In clinic   15 []  Ice     [x]  heat  []  Ice massage  []  Laser   []  Anodyne Position: seated   Location: right shoulder    []  Laser with stim  []  Other:  Position:  Location:    []  Vasopneumatic Device Pressure:       [] lo [] med [] hi   Temperature: [] lo [] med [] hi   [x] Skin assessment post-treatment:  [x]intact []redness- no adverse reaction    []redness - adverse reaction:     44 min Therapeutic Exercise:  [x] See flow sheet :   Rationale: increase ROM and increase strength to improve the patients ability to increase ease of ADLs     5 NC by ATC min Manual Therapy:  Facilitate posterior deltoid with KT   Rationale: decrease pain, increase ROM and increase tissue extensibility to increase ease of ADLs          With   [x] TE   [] TA   [] neuro   [] other: Patient Education: [x] Review HEP    [] Progressed/Changed HEP based on:   [] positioning   [] body mechanics   [] transfers   [] heat/ice application    [] other:      Other Objective/Functional Measures:   Improved eccentric control of posterior deltoid  Decreased t/s mobility  End range stretching pain with firm end feel  Hypertonicity in posterior deltoid  Educated on supine stretching over the weekend  Trial of facilitation with KT versus inhibition to note differences  Added weightbearing stability to help with general shoulder stability for next session    Pain Level (0-10 scale) post treatment: 0/10    ASSESSMENT/Changes in Function:  Pt making slow progress with continued posterior deltoid pain with elevation. She has firm end feel with decreased t/s mobility. She has decreased pectoral strength and overuse of the deltoid for all movements. Will work to stretch and eccentrically strengthen the deltoid for decreased pain and chance of re-injury while also working on bicep and pectoral strength to aide with flexion mobility for ease of dressing and performing ADLs. Goals for this certification period to be accomplished in 4 weeks:  1. Patient will improve FOTO score by 21 points in order to demonstrate a significant improvement in function. 2. Patient will improve right shoulder PROM flex: abd: to 120 degrees in order to increase ease of ADLs.    3. Patient will improve right shoulder AROM flexion/abduction to 90 degrees in order to increase ease of cooking. 45 degrees in standing but able to eccentrically lower from 90 degrees  4. Patient will increase behind back reaching to L4 in order to increase ease of getting dressed.      PLAN  [x]  Upgrade activities as tolerated     [x]  Continue plan of care  []  Update interventions per flow sheet       []  Discharge due to:_  []  Other:_      [place] Galina, PTA 2/7/2020  7:45 AM    Future Appointments   Date Time Provider Heri Beal   2/7/2020  9:00 AM Christine Seeds, PTA [facility]   2/10/2020  9:00 AM Christine Seeds, PTA [facility]   2/12/2020  9:00 AM Aruna Cumins, PT [facility]   2/14/2020  9:00 AM Christine Seeds, PTA [facility]   2/17/2020 10:00 AM Christine Seeds, PTA [facility]   2/19/2020  9:00 AM Christine Seeds, PTA [facility]   2/21/2020  9:00 AM Christine Seeds, PTA [facility]   2/24/2020  9:00 AM Christine Seeds, PTA [facility]   2/26/2020  9:00 AM Aruna Cumins, PT [facility]   2/28/2020  9:00 AM Christine Seeds, PTA [facility]   3/4/2020  2:45 PM MAT[address]   7/27/2020  2:30 PM [facility] FAST TRACK NUR[facility]

## 2020-02-10 ENCOUNTER — HOSPITAL ENCOUNTER (OUTPATIENT)
Dept: PHYSICAL THERAPY | Age: 80
Discharge: HOME | End: 2020-02-10
Payer: MEDICARE

## 2020-02-10 LAB
FUNGUS (MYCOLOGY) CULTURE, FUNGCT: NORMAL
RESULT 1, 080094: NORMAL
SPECIMEN SOURCE: NORMAL

## 2020-02-10 PROCEDURE — 97110 THERAPEUTIC EXERCISES: CPT

## 2020-02-10 NOTE — PROGRESS NOTES
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 She states little improvements with movement but mainly pain trying to lift her arm down the side and back. She was able to sweep and  gumballs over the weekend.      OBJECTIVE    25 min Therapeutic Exercise:  [x] See flow sheet :   Rationale: increase ROM and increase strength to improve the patients ability to increase ease of ADLs     5 min Manual Therapy:  Facilitate posterior deltoid with KT   Rationale: decrease pain, increase ROM and increase tissue extensibility to increase ease of ADLs          With   [x] TE   [] TA   [] neuro   [] other: Patient Education: [x] Review HEP    [] Progressed/Changed HEP based on:   [] positioning   [] body mechanics   [] transfers   [] heat/ice application    [] other:      Other Objective/Functional Measures:   Assistance with holding weight of arm during eric chest press and horizontal adduction with eccentric return  Cues with eric bicep/triceps to keep elbow at side  Worked on back against wall shoulder flexion with assist to end range and eccentric lower; pt able to perform from 90 degrees but can only elevate independently about 45 degrees  No increased pain during session  Had to leave for another appt so declined heat today but able to tape prior to leaving  Added stability exercises for global shoulder strength  Tight posterior shoulder noted with flexion limiting greater ROM    Pain Level (0-10 scale) post treatment: 2/10    ASSESSMENT/Changes in Function:  Pt continues with constant 2/10 pain mainly increasing with shoulder elevation into flexion along the posterior deltoid. She does have improving standing AROM to approximately 45 degrees and can eccentrically lower from 90 degrees. Will continue working on eccentric strengthening to reduce pain and further work on improving AROM for ease of performing ADLs. Goals for this certification period to be accomplished in 4 weeks:  1. Patient will improve FOTO score by 21 points in order to demonstrate a significant improvement in function. 2. Patient will improve right shoulder PROM flex: abd: to 120 degrees in order to increase ease of ADLs. 3. Patient will improve right shoulder AROM flexion/abduction to 90 degrees in order to increase ease of cooking. 45 degrees in standing but able to eccentrically lower from 90 degrees  4. Patient will increase behind back reaching to L4 in order to increase ease of getting dressed.      PLAN  [x]  Upgrade activities as tolerated     [x]  Continue plan of care  []  Update interventions per flow sheet       []  Discharge due to:_  []  Other:_      Edi Diane PTA 2/10/2020  7:45 AM    Future Appointments   Date Time Provider Heri Beal   2/10/2020  9:00 AM Héctor Rabago PTA [facility]   2/12/2020  9:00 AM Scarlett aDniel, PT [facility]   2/14/2020  9:00 AM Héctor Rabago PTA [facility]   2/17/2020 10:00 AM Héctor Rabago PTA [facility]   2/19/2020  9:00 AM Héctor Rabago PTA [facility]   2/21/2020  9:00 AM Héctor Rabago PTA [facility]   2/24/2020  9:00 AM Héctor Rabago PTA [facility]   2/26/2020  9:00 AM Scarlett Daniel, PT [facility]   2/28/2020  9:00 AM Estrellita Snellen, Job Staggazeb, PTA [facility]   3/4/2020  2:45 PM [address], [address]   7/27/2020  2:30 PM [facility] FAST TRACK NUR[facility]

## 2020-02-12 ENCOUNTER — HOSPITAL ENCOUNTER (OUTPATIENT)
Dept: PHYSICAL THERAPY | Age: 80
Discharge: HOME | End: 2020-02-12
Payer: MEDICARE

## 2020-02-12 PROCEDURE — 97110 THERAPEUTIC EXERCISES: CPT

## 2020-02-12 PROCEDURE — 97140 MANUAL THERAPY 1/> REGIONS: CPT

## 2020-02-12 NOTE — PROGRESS NOTES
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 Reports she is feeling about the same. She reports the tape might be helping some.      OBJECTIVE    Modality rationale: decrease pain and increase tissue extensibility to improve the patients ability to increase ease of ADLs   Min Type Additional Details    [] Estim:  []Unatt       []IFC  []Premod                        []Other:  []w/ice   []w/heat  Position:  Location:    [] Estim: []Att    []TENS instruct  []NMES                    []Other:  []w/US   []w/ice   []w/heat  Position:  Location:    []  Traction: [] Cervical       []Lumbar                       [] Prone          []Supine                       []Intermittent   []Continuous Lbs:  [] before manual  [] after manual    []  Ultrasound: []Continuous   [] Pulsed                           []1MHz   []3MHz W/cm2:  Location:    []  Iontophoresis with dexamethasone         Location: [] Take home patch   [] In clinic   15 []  Ice     [x]  heat  []  Ice massage  []  Laser   []  Anodyne Position: seated  Location: right shoulder    []  Laser with stim  []  Other:  Position:  Location:    []  Vasopneumatic Device Pressure:       [] lo [] med [] hi   Temperature: [] lo [] med [] hi   [x] Skin assessment post-treatment:  [x]intact []redness- no adverse reaction    []redness - adverse reaction:     32 min Therapeutic Exercise:  [x] See flow sheet :   Rationale: increase ROM and increase strength to improve the patients ability to increase ease of ADLs    10 min Manual Therapy:  Trigger point release to infraspinatus, subscap, and posterior deltoid   Rationale: increase ROM and increase tissue extensibility to increase ease of ADLs          With   [x] TE   [] TA   [] neuro   [] other: Patient Education: [x] Review HEP    [] Progressed/Changed HEP based on:   [] positioning   [] body mechanics   [] transfers   [] heat/ice application    [] other:      Other Objective/Functional Measures:   Pt. Tolerated PT well with no reports of increased pain  She continues to have firm end feel during PROM  Pt. Was challenged with eric ER/IR     Pain Level (0-10 scale) post treatment: 2/10    ASSESSMENT/Changes in Function: pt. Is progressing slowly towards goals. She continues to have decreased right shoulder PROM and AROM with painful and firm end feel. She has some improvement in mobility with ability to reach faucet and light switch better. Patient will continue to benefit from skilled PT services to modify and progress therapeutic interventions, address functional mobility deficits, address ROM deficits, address strength deficits, analyze and address soft tissue restrictions, analyze and cue movement patterns and analyze and modify body mechanics/ergonomics to attain remaining goals. Progress towards goals / Updated goals:  1. Patient will improve FOTO score by 21 points in order to demonstrate a significant improvement in function. 2. Patient will improve right shoulder PROM flex: abd: to 120 degrees in order to increase ease of ADLs. 3. Patient will improve right shoulder AROM flexion/abduction to 90 degrees in order to increase ease of cooking. 45 degrees in standing but able to eccentrically lower from 90 degrees  4.  Patient will increase behind back reaching to L4 in order to increase ease of getting dressed.   Not met: pt.  Continues to have difficulty with reaching behind her back (2/12/20)    PLAN  []  Upgrade activities as tolerated     [x]  Continue plan of care  []  Update interventions per flow sheet       []  Discharge due to:_  []  Other:_      Wesley Argueta, PT 2/12/2020  7:40 AM    Future Appointments   Date Time Provider Heri Beal   2/12/2020  9:00 AM Izabela Pruitt, PT [facility] [address]   2/14/2020  9:00 AM Larry Valencia, PTA [facility] [address]   2/17/2020 10:00 AM Larry Valencia, PTA [facility] [address]   2/19/2020  9:00 AM Larry Valencia, PTA [facility] [address]   2/21/2020  9:00 AM Larry Valencia, PTA [facility] [address]   2/24/2020  9:00 AM Larry Valencia, PTA [facility] [address]   2/26/2020  9:00 AM Izabela Pruitt, PT [facility] [address]   2/28/2020  9:00 AM Larry Valencia, PTA [facility] [address]   3/4/2020  2:45 PM [facility]   7/27/2020  2:30 PM [facility] FAST TRACK NUR[facility]

## 2020-02-14 ENCOUNTER — HOSPITAL ENCOUNTER (OUTPATIENT)
Dept: PHYSICAL THERAPY | Age: 80
Discharge: HOME | End: 2020-02-14
Payer: MEDICARE

## 2020-02-14 PROCEDURE — 97140 MANUAL THERAPY 1/> REGIONS: CPT

## 2020-02-14 PROCEDURE — 97110 THERAPEUTIC EXERCISES: CPT

## 2020-02-17 ENCOUNTER — HOSPITAL ENCOUNTER (OUTPATIENT)
Dept: PHYSICAL THERAPY | Age: 80
Discharge: HOME | End: 2020-02-17
Payer: MEDICARE

## 2020-02-17 PROCEDURE — 97140 MANUAL THERAPY 1/> REGIONS: CPT

## 2020-02-17 NOTE — PROGRESS NOTES
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 She does note improvement in lifting her arm. She has right hand numbness like it is asleep since the surgery.      OBJECTIVE    Modality rationale: decrease pain and increase tissue extensibility to improve the patients ability to increase ease of ADLs   Min Type Additional Details    [] Estim:  []Unatt       []IFC  []Premod                        []Other:  []w/ice   []w/heat  Position:  Location:    [] Estim: []Att    []TENS instruct  []NMES                    []Other:  []w/US   []w/ice   []w/heat  Position:  Location:    []  Traction: [] Cervical       []Lumbar                       [] Prone          []Supine                       []Intermittent   []Continuous Lbs:  [] before manual  [] after manual    []  Ultrasound: []Continuous   [] Pulsed                           []1MHz   []3MHz W/cm2:  Location:    []  Iontophoresis with dexamethasone         Location: [] Take home patch   [] In clinic   15 []  Ice     [x]  heat  []  Ice massage  []  Laser   []  Anodyne Position: seated  Location: right shoulder    []  Laser with stim  []  Other:  Position:  Location:    []  Vasopneumatic Device Pressure:       [] lo [] med [] hi   Temperature: [] lo [] med [] hi   [x] Skin assessment post-treatment:  [x]intact []redness- no adverse reaction    []redness - adverse reaction:     19 min Therapeutic Exercise:  [x] See flow sheet :   Rationale: increase ROM and increase strength to improve the patients ability to increase ease of ADLs    25 min Manual Therapy:  DTM to teres major/minor; TPR to subscapularis and lats; GH stretching with scapula held down; DTM right UT and posterior deltoid and triceps   Rationale: increase ROM and increase tissue extensibility to increase ease of ADLs          With   [x] TE   [] TA   [] neuro   [] other: Patient Education: [x] Review HEP    [] Progressed/Changed HEP based on:   [] positioning   [] body mechanics   [] transfers   [] heat/ice application    [] other:      Other Objective/Functional Measures:   Educated to inform MD of right hand numbness/cold  Increased posterior shoulder tightness   Decreased GH mobility due to teres major/minor and lat tightness  Improving AROM    Pain Level (0-10 scale) post treatment: 2/10    ASSESSMENT/Changes in Function: Pt making slow, steady progress with improving right shoulder AROM. She continues with posterior shoulder tightness resulting increased [address] mobility. Will continue to work on improving ROM with decreased pain for ease of performing ADLs. Progress towards goals / Updated goals:  1. Patient will improve FOTO score by 21 points in order to demonstrate a significant improvement in function. 2. Patient will improve right shoulder PROM flex: abd: to 120 degrees in order to increase ease of ADLs. 3. Patient will improve right shoulder AROM flexion/abduction to 90 degrees in order to increase ease of cooking. 45 degrees in standing but able to eccentrically lower from 90 degrees  4. Patient will increase behind back reaching to L4 in order to increase ease of getting dressed.   Not met: pt.  Continues to have difficulty with reaching behind her back (2/12/20)    PLAN  [x]  Upgrade activities as tolerated     [x]  Continue plan of care  []  Update interventions per flow sheet       []  Discharge due to:_  []  Other:_      Sheilda Castleman, PTA 2/17/2020  7:40 AM    Future Appointments   Date Time Provider Heri Beal   2/17/2020 10:00 AM [facility], PTA [facility] [address]   2/19/2020  9:00 AM [facility], PTA [facility] [address]   2/21/2020  9:00 AM [facility], PTA [facility] [address]   2/24/2020  9:00 AM [facility], PTA [facility] [address]   2/25/2020  9:00 AM [facility], PTA [facility] [address]   2/28/2020  9:00 AM Annalise Rogersr [facility] [address]   3/4/2020  2:45 PM MATTHEW Rin[address] 69   7/27/2020  2:30 PM [facility] FAST TRACK NURSE [facility]

## 2020-02-19 ENCOUNTER — HOSPITAL ENCOUNTER (OUTPATIENT)
Dept: PHYSICAL THERAPY | Age: 80
Discharge: HOME | End: 2020-02-19
Payer: MEDICARE

## 2020-02-19 PROCEDURE — 97140 MANUAL THERAPY 1/> REGIONS: CPT

## 2020-02-19 PROCEDURE — 97110 THERAPEUTIC EXERCISES: CPT

## 2020-02-19 NOTE — PROGRESS NOTES
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 She thinks she liked the tape more to help her shoulder.        OBJECTIVE    Modality rationale: decrease pain and increase tissue extensibility to improve the patients ability to increase ease of ADLs   Min Type Additional Details    [] Estim:  []Unatt       []IFC  []Premod                        []Other:  []w/ice   []w/heat  Position:  Location:    [] Estim: []Att    []TENS instruct  []NMES                    []Other:  []w/US   []w/ice   []w/heat  Position:  Location:    []  Traction: [] Cervical       []Lumbar                       [] Prone          []Supine                       []Intermittent   []Continuous Lbs:  [] before manual  [] after manual    []  Ultrasound: []Continuous   [] Pulsed                           []1MHz   []3MHz W/cm2:  Location:    []  Iontophoresis with dexamethasone         Location: [] Take home patch   [] In clinic   15 []  Ice     [x]  heat  []  Ice massage  []  Laser   []  Anodyne Position: seated  Location: right shoulder    []  Laser with stim  []  Other:  Position:  Location:    []  Vasopneumatic Device Pressure:       [] lo [] med [] hi   Temperature: [] lo [] med [] hi   [x] Skin assessment post-treatment:  [x]intact []redness- no adverse reaction    []redness - adverse reaction:     30 min Manual therapy : myofascial release to posterior right shoulder across teres into posterior deltoid; TPR triceps origin, teres minor/major, posterior deltoid; KT to inhibit teres and posterior deltoid   Rationale: increase ROM and increase strength to improve the patients ability to increase ease of ADLs    15 minutes of Therapeutic Exercise for education regarding stretching to do at home and more strengthening that will be done in therapy            With   [x] TE   [] TA   [] neuro   [] other: Patient Education: [x] Review HEP    [] Progressed/Changed HEP based on:   [] positioning   [] body mechanics   [] transfers   [] heat/ice application    [] other:      Other Objective/Functional Measures:   Good myofascial release with posterior shoulder with decreased pain  Excessive deltoid use for all shoulder mobility  Hypertonic teres minor  UT hiking and forward shoulder posture with shoulder elevation    Pain Level (0-10 scale) post treatment: 2/10    ASSESSMENT/Changes in Function: Pt making slow progress at reducing pain but does have improving AROM. She continues with excessive deltoid use for shoulder elevation due to reverse shoulder mechanics and decreased pectoral strength. She has hypertonic posterior shoulder musculature including the teres group and posterior deltoid. Will continue to focus on anterior strengthening for ease of OH reaching and eccentric strengthening and stretching of the posterior shoulder for ease of OH reaching and ADLs. Progress towards goals / Updated goals:  1. Patient will improve FOTO score by 21 points in order to demonstrate a significant improvement in function. 2. Patient will improve right shoulder PROM flex: abd: to 120 degrees in order to increase ease of ADLs.    3. Patient will improve right shoulder AROM flexion/abduction to 90 degrees in order to increase ease of cooking. 45 degrees in standing but able to eccentrically lower from 90 degrees  4. Patient will increase behind back reaching to L4 in order to increase ease of getting dressed.   Not met: pt.  Continues to have difficulty with reaching behind her back (2/12/20)    PLAN  [x]  Upgrade activities as tolerated     [x]  Continue plan of care  []  Update interventions per flow sheet       []  Discharge due to:_  []  Other:_      Pradeep Gutierres PTA 2/19/2020  7:40 AM    Future Appointments   Date Time Provider Heri Beal   2/19/2020  9:00 AM Brady Lim PTA [facility]   2/21/2020  9:00 AM Brady Lim PTA [facility]   2/24/2020  9:00 AM Brady Lim PTA [facility]   2/25/2020  9:00 AM Brady Lim PTA [facility]   2/28/2020  9:00 AM Diane Goss [facility]   3/4/2020  2:45 PM MATTHEW [address]op 69   7/27/2020  2:30 PM [facility] FAST TRACK NURSE [facility]

## 2020-02-21 ENCOUNTER — APPOINTMENT (OUTPATIENT)
Dept: PHYSICAL THERAPY | Age: 80
End: 2020-02-21
Payer: MEDICARE

## 2020-02-21 ENCOUNTER — HOSPITAL ENCOUNTER (OUTPATIENT)
Dept: PHYSICAL THERAPY | Age: 80
Discharge: HOME | End: 2020-02-21
Payer: MEDICARE

## 2020-02-21 PROCEDURE — 97110 THERAPEUTIC EXERCISES: CPT

## 2020-02-21 NOTE — PROGRESS NOTES
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 She doesn't hurt when she isn't trying to lift it.      OBJECTIVE    Modality rationale: decrease pain and increase tissue extensibility to improve the patients ability to increase ease of ADLs   Min Type Additional Details    [] Estim:  []Unatt       []IFC  []Premod                        []Other:  []w/ice   []w/heat  Position:  Location:    [] Estim: []Att    []TENS instruct  []NMES                    []Other:  []w/US   []w/ice   []w/heat  Position:  Location:    []  Traction: [] Cervical       []Lumbar                       [] Prone          []Supine                       []Intermittent   []Continuous Lbs:  [] before manual  [] after manual    []  Ultrasound: []Continuous   [] Pulsed                           []1MHz   []3MHz W/cm2:  Location:    []  Iontophoresis with dexamethasone         Location: [] Take home patch   [] In clinic   15 []  Ice     [x]  heat  []  Ice massage  []  Laser   []  Anodyne Position: seated  Location: right shoulder    []  Laser with stim  []  Other:  Position:  Location:    []  Vasopneumatic Device Pressure:       [] lo [] med [] hi   Temperature: [] lo [] med [] hi   [x] Skin assessment post-treatment:  [x]intact []redness- no adverse reaction    []redness - adverse reaction:     40 min Therapeutic Exercise :   Rationale: increase ROM and increase strength to improve the patients ability to increase ease of ADLs     With   [x] TE   [] TA   [] neuro   [] other: Patient Education: [x] Review HEP    [] Progressed/Changed HEP based on:   [] positioning   [] body mechanics   [] transfers   [] heat/ice application    [] other:      Other Objective/Functional Measures:   PROM flexion: 110 degrees  PROM abduction: 86 degrees  AROM flexion: 70 degrees  AROM abduction: 59 degrees   Behind the back reaching: right glute  Challenged with eccentric posterior shoulder strengthening    Pain Level (0-10 scale) post treatment: 2/10    ASSESSMENT/Changes in Function: Pt progressing with PROM and AROM of the right shoulder. She continues with posterior shoulder pain performing elevation along the posterior deltoid. Will continue with stretching and eccentric strengthening for decreased pain and improved ease of performing ADLs and reaching OH into cabinets. Progress towards goals / Updated goals:  1. Patient will improve FOTO score by 21 points in order to demonstrate a significant improvement in function. 2. Patient will improve right shoulder PROM flex: abd: to 120 degrees in order to increase ease of ADLs. PROM flexion: 110 degrees  PROM abduction: 86 degrees  3. Patient will improve right shoulder AROM flexion/abduction to 90 degrees in order to increase ease of cooking. 45 degrees in standing but able to eccentrically lower from 90 degrees  AROM flexion: 70 degrees  AROM abduction: 59 degrees   4. Patient will increase behind back reaching to L4 in order to increase ease of getting dressed.   Not met: pt.  Continues to have difficulty with reaching behind her back (2/12/20)  Behind the back reaching: right glute    PLAN  [x]  Upgrade activities as tolerated     [x]  Continue plan of care  []  Update interventions per flow sheet       [] Discharge due to:_  []  Other:_      Daylin Leggett, PTA 2/21/2020  7:40 AM    Future Appointments   Date Time Provider Heri Beal   2/21/2020 11:00 AM Joie Cho [facility]   2/24/2020  9:00 AM Morgan Thornton PTA [facility]   2/25/2020  9:00 AM Morgan Thornton PTA [facility]   2/28/2020  9:00 AM Marium Salters [facility]   3/4/2020  2:45 PM MATTHEW [address] 69   7/27/2020  2:30 PM [facility] FAST TRACK NURSE [facility]

## 2020-02-23 LAB
ACID FAST STN SPEC: NEGATIVE
MYCOBACTERIUM SPEC QL CULT: NEGATIVE
SPECIMEN PREPARATION: NORMAL
SPECIMEN SOURCE: NORMAL

## 2020-02-24 ENCOUNTER — HOSPITAL ENCOUNTER (OUTPATIENT)
Dept: PHYSICAL THERAPY | Age: 80
Discharge: HOME | End: 2020-02-24
Payer: MEDICARE

## 2020-02-24 PROCEDURE — 97110 THERAPEUTIC EXERCISES: CPT

## 2020-02-24 NOTE — PROGRESS NOTES
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     OBJECTIVE    Modality rationale: decrease pain and increase tissue extensibility to improve the patients ability to increase ease of ADLs   Min Type Additional Details    [] Estim:  []Unatt       []IFC  []Premod                        []Other:  []w/ice   []w/heat  Position:  Location:    [] Estim: []Att    []TENS instruct  []NMES                    []Other:  []w/US   []w/ice   []w/heat  Position:  Location:    []  Traction: [] Cervical       []Lumbar                       [] Prone          []Supine                       []Intermittent   []Continuous Lbs:  [] before manual  [] after manual    []  Ultrasound: []Continuous   [] Pulsed                           []1MHz   []3MHz W/cm2:  Location:    []  Iontophoresis with dexamethasone         Location: [] Take home patch   [] In clinic   15 []  Ice     [x]  heat  []  Ice massage  []  Laser   []  Anodyne Position: seated  Location: right shoulder    []  Laser with stim  []  Other:  Position:  Location:    []  Vasopneumatic Device Pressure:       [] lo [] med [] hi   Temperature: [] lo [] med [] hi   [x] Skin assessment post-treatment: [x]intact []redness- no adverse reaction    []redness - adverse reaction:     40 min Therapeutic Exercise :   Rationale: increase ROM and increase strength to improve the patients ability to increase ease of ADLs     With   [x] TE   [] TA   [] neuro   [] other: Patient Education: [x] Review HEP    [] Progressed/Changed HEP based on:   [] positioning   [] body mechanics   [] transfers   [] heat/ice application    [] other:      Other Objective/Functional Measures:   Improved pectoral activation with left hand on chest and focusing on pushing motion versus  Lifting for flexion  Decreased deltoid pain with better pectoral activation  Challenged with prone Ts and fatigue with isometric flexion holds at various levels  No increased pain during session      Pain Level (0-10 scale) post treatment: 2/10    ASSESSMENT/Changes in Function: Pt making slow progress towards goals of decreasing pain but does have improving AROM. She has decreased pectoral strength and activation contributing to overuse of the deltoid with resultant fatigue and pain and UT hiking. Will work to focus on pectoral activation with lifting for ease of OH reaching into cabinets. Progress towards goals / Updated goals:  1. Patient will improve FOTO score by 21 points in order to demonstrate a significant improvement in function. 2. Patient will improve right shoulder PROM flex: abd: to 120 degrees in order to increase ease of ADLs. PROM flexion: 110 degrees  PROM abduction: 86 degrees  3. Patient will improve right shoulder AROM flexion/abduction to 90 degrees in order to increase ease of cooking. 45 degrees in standing but able to eccentrically lower from 90 degrees  AROM flexion: 70 degrees  AROM abduction: 59 degrees   4. Patient will increase behind back reaching to L4 in order to increase ease of getting dressed.   Not met: pt.  Continues to have difficulty with reaching behind her back (2/12/20)  Behind the back reaching: right luze    PLAN  [x]  Upgrade activities as tolerated     [x]  Continue plan of care  []  Update interventions per flow sheet       []  Discharge due to:_  []  Other:_      Shola Leong, DAREK 2/24/2020  7:40 AM    Future Appointments   Date Time Provider Heri Lian   2/24/2020  9:00 AM Jr Gravel, PTA [facility]   2/25/2020  9:00 AM Jr Gravel, PTA [facility]   2/28/2020  9:00 AM Ebb Silence [facility]   3/2/2020  9:00 AM Dierdre Dumont, PT [facility]   3/4/2020  9:30 AM Jr Gravel, PTA [facility]   3/4/2020  2:45 PM Kevin Hercules, PA [facility]   3/6/2020  9:30 AM Jr Gravel, PTA [facility]   3/9/2020  9:00 AM Dierdre Dumont, PT [facility]   3/11/2020  9:00 AM Jr Gravel, PTA [facility]   3/13/2020  9:00 AM Jr Gravel, PTA [facility]   3/16/2020 10:00 AM Jr Gravel, PTA [facility]   3/18/2020  9:00 AM Jr Gravel, PTA [facility]   3/20/2020  9:00 AM Jr Gravel, PTA [facility]   3/23/2020  9:00 AM Jr Gravel, PTA [facility]   3/25/2020  9:00 AM Jr Gravel, PTA [facility]   3/27/2020  9:30 AM Jr Gravel, PTA [facility]   3/30/2020  9:00 AM Jr Gravel, PTA [facility]   7/27/2020  2:30 PM [facility] FAST TRACK NURSE [facility]

## 2020-02-25 ENCOUNTER — HOSPITAL ENCOUNTER (OUTPATIENT)
Dept: PHYSICAL THERAPY | Age: 80
Discharge: HOME | End: 2020-02-25
Payer: MEDICARE

## 2020-02-25 PROCEDURE — 97110 THERAPEUTIC EXERCISES: CPT

## 2020-02-25 NOTE — PROGRESS NOTES
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 She still has the \"asleep/numb\" feeling in her right last 2 fingers. She is going to ask the MD about it when she goes back to the doctor.        OBJECTIVE    Modality rationale: decrease pain and increase tissue extensibility to improve the patients ability to increase ease of ADLs   Min Type Additional Details    [] Estim:  []Unatt       []IFC  []Premod                        []Other:  []w/ice   []w/heat  Position:  Location:    [] Estim: []Att    []TENS instruct  []NMES                    []Other:  []w/US   []w/ice   []w/heat  Position:  Location:    []  Traction: [] Cervical       []Lumbar                       [] Prone          []Supine                       []Intermittent   []Continuous Lbs:  [] before manual  [] after manual    []  Ultrasound: []Continuous   [] Pulsed                           []1MHz   []3MHz W/cm2:  Location:    []  Iontophoresis with dexamethasone         Location: [] Take home patch   [] In clinic   15 []  Ice     [x]  heat  []  Ice massage  []  Laser   []  Anodyne Position: seated  Location: right shoulder    []  Laser with stim  []  Other:  Position:  Location:    [] Vasopneumatic Device Pressure:       [] lo [] med [] hi   Temperature: [] lo [] med [] hi   [x] Skin assessment post-treatment:  [x]intact []redness- no adverse reaction    []redness - adverse reaction:     30 min Therapeutic Exercise :   Rationale: increase ROM and increase strength to improve the patients ability to increase ease of ADLs     With   [x] TE   [] TA   [] neuro   [] other: Patient Education: [x] Review HEP    [] Progressed/Changed HEP based on:   [] positioning   [] body mechanics   [] transfers   [] heat/ice application    [] other:      Other Objective/Functional Measures:   Held manual to focus on strengthening for AROM  Educated pt about potential D/C due to lack of progress with pain decrease and independence with strengthening with home program  Will need to address with PA/MD to see what else can be done for pt at this time  Challenged with pectoral strengthening on the powder board  Cues to prevent hiking shoulder which irritates posterior shoulder due to lack of pectoral strength    Pain Level (0-10 scale) post treatment: 2/10    ASSESSMENT/Changes in Function: Pt making slow progress at reducing pain levels but does have improving AROM. She has most relief of pain when focusing on a pushing motion than lifting motion for better pectoral activation. When she hikes the shoulder and increases use of the deltoid she has increased pain. Will focus on correct pectoral strength and activation to improve ease of [place] reaching with decreased pain. Progress towards goals / Updated goals:  1. Patient will improve FOTO score by 21 points in order to demonstrate a significant improvement in function. TC to complete  2. Patient will improve right shoulder PROM flex: abd: to 120 degrees in order to increase ease of ADLs. PROM flexion: 110 degrees  PROM abduction: 86 degrees  3. Patient will improve right shoulder AROM flexion/abduction to 90 degrees in order to increase ease of cooking.  45 degrees in standing but able to eccentrically lower from 90 degrees  AROM flexion: 70 degrees  AROM abduction: 59 degrees   4. Patient will increase behind back reaching to L4 in order to increase ease of getting dressed.   Not met: pt.  Continues to have difficulty with reaching behind her back (2/12/20)  Behind the back reaching: right glute    PLAN  [x]  Upgrade activities as tolerated     [x]  Continue plan of care  []  Update interventions per flow sheet       []  Discharge due to:_  []  Other:_      Mukul Ellis PTA 2/25/2020  7:40 AM    Future Appointments   Date Time Provider Heri Beal   2/25/2020  9:00 AM Drew Broderick PTA [facility]   2/28/2020  9:00 AM Rosalind Santos [id number]   3/2/2020  9:00 AM Blayne Sorto, PT [facility]   3/4/2020  9:30 AM Drew Broderick, PTA [facility]   3/4/2020  2:45 PM Niya Horowitz, MATTHEW [address]   3/6/2020  9:30 AM Drew Broderick, PTA [facility]   3/9/2020  9:00 AM Blayne Sorto, PT [facility]   3/11/2020  9:00 AM Drew Broderick, PTA [facility]   3/13/2020  9:00 AM Drew Broderick, PTA [facility]   3/16/2020 10:00 AM Drew Broderick, PTA [facility]   3/18/2020  9:00 AM Drew Broderick, PTA [facility]   3/20/2020  9:00 AM Drew Broderick, PTA [facility]   3/23/2020  9:00 AM Drew Broderick, PTA [facility]   3/25/2020  9:00 AM Drew Broderick, PTA [facility]   3/27/2020  9:30 AM Drew Broderick, PTA [facility]   3/30/2020  9:00 AM Drew Broderick, PTA [facility]   7/27/2020  2:30 PM [facility] FAST TRACK NURSE [facility]

## 2020-02-26 ENCOUNTER — APPOINTMENT (OUTPATIENT)
Dept: PHYSICAL THERAPY | Age: 80
End: 2020-02-26
Payer: MEDICARE

## 2020-02-26 NOTE — PROGRESS NOTES
[facility]  [address] [place] [address]  [phone] fax    Continued Plan of Care/ Re-certification for Physical Therapy Services    Patient name: [facility] Start of Care: 2020   Referral Osiris Pierre : 1940               Medical Diagnosis: Pain in right shoulder [M25.511]    Onset Date: 1/10/2020               Treatment Diagnosis: right shoulder pain   Prior Hospitalization: see medical history Provider#: [id number]   Medications: Verified on Patient summary List    Comorbidities: DVT, Arthritis, HTN, COPD, pt report of hx surgery in her neck.   Prior Level of Function: Right Hand Dominant. Previous DVT. Lives with .     Visits from Start of Care: 20    Missed Visits: 0    The Plan of Care and following information is based on the patient's current status:  Goal: Patient will improve FOTO score by 21 points in order to demonstrate a significant improvement in function. Status at last note/certification: 38  Current Status: not met    Goal: Patient will improve right shoulder PROM flex: abd: to 120 degrees in order to increase ease of ADLs. Status at last note/certification: flex: 95 degrees   Current Status: not met    Goal: Patient will improve right shoulder AROM flexion/abduction to 90 degrees in order to increase ease of cooking. Status at last note/certification: in supine flexion: 88 degrees  Current Status: not met    Goal: Patient will increase behind back reaching to L4 in order to increase ease of getting dressed. Status at last note/certification: unable   Current Status: not met    Key functional changes: pt. Has some improvement in PROM mobility.        Problems/ barriers to goal attainment: none      Problem List: pain affecting function, decrease ROM, decrease strength, impaired gait/ balance, decrease ADL/ functional abilitiies, decrease activity tolerance, decrease flexibility/ joint mobility and decrease transfer abilities    Treatment Plan: Therapeutic exercise, Therapeutic activities, Neuromuscular re-education, Physical agent/modality, Gait/balance training, Manual therapy, Patient education, Self Care training, Functional mobility training and Home safety training     Patient Goal (s) has been updated and includes: to have less pain and to move arm better     Goals for this certification period to be accomplished in 4 weeks:  1. Patient will improve FOTO score by 21 points in order to demonstrate a significant improvement in function. 2. Patient will improve right shoulder AROM flexion/scaption to 90 degrees in order to increase ease of cooking. 3. Patient will improve right behind back reaching to L4 in order to increase ease of getting dressed  4. Patient will demonstrate good understanding of HEP in order to continue to progress following D/C. Frequency / Duration: Patient to be seen 2 times per week for 4 weeks:    Assessment / Recommendations: pt. Is making limited progress towards goals. She continues to have complaints of pain in posterior shoulder at 2-3/10. She also continues to have significant limitations in right shoulder mobility. Right shoulder PROM flexion: 110 degrees abd: 86 degrees. Right shoulder AROM in standing flexion: 70 degrees abduction: 59 degrees. Behind back reaching is to right glutes. Skilled PT is medically necessary in order to continue to improve right shoulder mobility and strength for increased ease of ADLs and improved quality of life. Certification Period:  2/25/20-3/26/20    Hannah Murrieta, PT 2/26/2020 6:14 PM    ________________________________________________________________________  I certify that the above Therapy Services are being furnished while the patient is under my care. I agree with the treatment plan and certify that this therapy is necessary.     [] I have read the above and request that my patient continue as recommended.   [] I have read the above report and request that my patient continue therapy with the following changes/special instructions: _______________________________________  [] I have read the above report and request that my patient be discharged from therapy    Physician's Signature:____________Date:_________TIME:________    ** Signature, Date and Time must be completed for valid certification **    Please sign and return to [facility]  [address] [place] [address]  [phone] fax

## 2020-02-28 ENCOUNTER — HOSPITAL ENCOUNTER (OUTPATIENT)
Dept: PHYSICAL THERAPY | Age: 80
Discharge: HOME | End: 2020-02-28
Payer: MEDICARE

## 2020-02-28 PROCEDURE — 97110 THERAPEUTIC EXERCISES: CPT

## 2020-02-28 PROCEDURE — 97140 MANUAL THERAPY 1/> REGIONS: CPT

## 2020-02-28 NOTE — PROGRESS NOTES
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 She has a hard time reaching into cabinets and styling her hair.  2      OBJECTIVE    Modality rationale: decrease pain and increase tissue extensibility to improve the patients ability to increase ease of ADLs   Min Type Additional Details    [] Estim:  []Unatt       []IFC  []Premod                        []Other:  []w/ice   []w/heat  Position:  Location:    [] Estim: []Att    []TENS instruct  []NMES                    []Other:  []w/US   []w/ice   []w/heat  Position:  Location:    []  Traction: [] Cervical       []Lumbar                       [] Prone          []Supine                       []Intermittent   []Continuous Lbs:  [] before manual  [] after manual    []  Ultrasound: []Continuous   [] Pulsed                           []1MHz   []3MHz W/cm2:  Location:    []  Iontophoresis with dexamethasone         Location: [] Take home patch   [] In clinic   15 []  Ice     [x]  heat  []  Ice massage  []  Laser   []  Anodyne Position: seated  Location: right shoulder    []  Laser with stim  []  Other:  Position:  Location:    []  Vasopneumatic Device Pressure:       [] lo [] med [] hi   Temperature: [] lo [] med [] hi   [x] Skin assessment post-treatment:  [x]intact []redness- no adverse reaction    []redness - adverse reaction:     29 min Therapeutic Exercise :   Rationale: increase ROM and increase strength to improve the patients ability to increase ease of ADLs    15 min Manual Therapy : DTM right shoulder/deltoid; PROM with stretching overpressure prior to exercises   Rationale: increase ROM and increase strength to improve the patients ability to increase ease of ADLs     With   [x] TE   [] TA   [] neuro   [] other: Patient Education: [x] Review HEP    [] Progressed/Changed HEP based on:   [] positioning   [] body mechanics   [] transfers   [] heat/ice application    [] other:      Other Objective/Functional Measures:   Improved pectoral activation and less deltoid pain when manually pushing into therapist hand as resistance during exercises for flexion versus lifting  Educated on hands behind head stretch to help with reaching for styling her hair  Able to reach into cabinet with can in hand with less difficulty today  TTP posterior deltoid    Pain Level (0-10 scale) post treatment: 2/10    ASSESSMENT/Changes in Function: Pt making slow progress at decreasing pain but does have improved AROM. She has better awareness of utilizing pectorals to assist with flexion than solely deltoid. Will continue with functional strengthening for styling her hair and reaching into cabinets at home. Goals for this certification period to be accomplished in 4 weeks:  1. Patient will improve FOTO score by 21 points in order to demonstrate a significant improvement in function. 2. Patient will improve right shoulder AROM flexion/scaption to 90 degrees in order to increase ease of cooking. 3. Patient will improve right behind back reaching to L4 in order to increase ease of getting dressed  4. Patient will demonstrate good understanding of HEP in order to continue to progress following D/C.      PLAN  [x]  Upgrade activities as tolerated     [x] Continue plan of care  []  Update interventions per flow sheet       []  Discharge due to:_  []  Other:_      Daylin Leggett, PTA 2/28/2020  7:40 AM    Future Appointments   Date Time Provider Heri Beal   3/2/2020  9:00 AM Cindia Level, PT [facility]   3/4/2020  9:30 AM Morgan Lovings, PTA [facility]   3/4/2020  2:45 PM Burak Bridges, MATTHEW Weber   3/6/2020  9:30 AM Morgan Lovings, PTA [facility]   3/9/2020  9:00 AM Cindia Level, PT [facility]   3/11/2020  9:00 AM Morgan Lovings, PTA [facility]   3/13/2020  9:00 AM Morgan Lovings, PTA [facility]   3/16/2020 10:00 AM Morgan Lovings, PTA [facility]   3/18/2020  9:00 AM Morgan Lovings, PTA [facility]   3/20/2020  9:00 AM Morgan Lovings, PTA [facility]   3/23/2020  9:00 AM Morgan Lovings, PTA [facility]   3/25/2020  9:00 AM Morgan Lovings, PTA [facility]   3/27/2020  9:30 AM Morgan Lovings, [facility]   3/30/2020  9:00 AM Morgan Lovings, PTA [facility]   7/27/2020  2:30 PM [facility] FAST TRACK NURSE [facility]

## 2020-03-02 ENCOUNTER — HOSPITAL ENCOUNTER (OUTPATIENT)
Dept: PHYSICAL THERAPY | Age: 80
Discharge: HOME | End: 2020-03-02
Payer: MEDICARE

## 2020-03-02 PROCEDURE — 97110 THERAPEUTIC EXERCISES: CPT

## 2020-03-02 PROCEDURE — 97140 MANUAL THERAPY 1/> REGIONS: CPT

## 2020-03-02 NOTE — PROGRESS NOTES
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 Reports her pain in her shoulder is the same.      OBJECTIVE    Modality rationale: decrease pain and increase tissue extensibility to improve the patients ability to increase ease of ADLs   Min Type Additional Details    [] Estim:  []Unatt       []IFC  []Premod                        []Other:  []w/ice   []w/heat  Position:  Location:    [] Estim: []Att    []TENS instruct  []NMES                    []Other:  []w/US   []w/ice   []w/heat  Position:  Location:    []  Traction: [] Cervical       []Lumbar                       [] Prone          []Supine                       []Intermittent   []Continuous Lbs:  [] before manual  [] after manual    []  Ultrasound: []Continuous   [] Pulsed                           []1MHz   []3MHz W/cm2:  Location:    []  Iontophoresis with dexamethasone         Location: [] Take home patch   [] In clinic   15 []  Ice     [x]  heat  []  Ice massage  []  Laser   []  Anodyne Position: seated  Location: right shoulder     []  Laser with stim  []  Other:  Position:  Location:    []  Vasopneumatic Device Pressure:       [] lo [] med [] hi   Temperature: [] lo [] med [] hi   [x] Skin assessment post-treatment:  [x]intact []redness- no adverse reaction    []redness - adverse reaction: 28 min Therapeutic Exercise:  [x] See flow sheet :   Rationale: increase ROM and increase strength to improve the patients ability to increase ease of ADLs    10 min Manual Therapy:  DMT to posterior deltoid and infraspinatus   Rationale: decrease pain, increase ROM and increase tissue extensibility to increase ease of ADLs          With   [x] TE   [] TA   [] neuro   [] other: Patient Education: [x] Review HEP    [] Progressed/Changed HEP based on:   [] positioning   [] body mechanics   [] transfers   [] heat/ice application    [] other:      Other Objective/Functional Measures:   Pt. Was challenged with isometrics at end ranges of motion  She has less upper trap compensation with cues to push into the motion vs. Lifting  Pt. Was able to get hand behind head in supine position today with no increase in pain  Pt. Continues to complain of pain in right deltoid area that does not change with manual or exercises    Pain Level (0-10 scale) post treatment: 2/10    ASSESSMENT/Changes in Function:  Pt. Continues to make slow progress towards goals. She continues to have decreased right shoulder AROM secondary to decreased flexibility and strength. She also continues to complain of pain at 2/10 in right deltoid area. She demonstrates improving mobility and muscle activation in supine and side lying positions    Patient will continue to benefit from skilled PT services to modify and progress therapeutic interventions, address functional mobility deficits, address ROM deficits, address strength deficits, analyze and address soft tissue restrictions, analyze and cue movement patterns, analyze and modify body mechanics/ergonomics and assess and modify postural abnormalities to attain remaining goals. Progress towards goals / Updated goals:  1. Patient will improve FOTO score by 21 points in order to demonstrate a significant improvement in function.    2. Patient will improve right shoulder AROM flexion/scaption to 90 degrees in order to increase ease of cooking. 3. Patient will improve right behind back reaching to L4 in order to increase ease of getting dressed  Pt. Has improving mobility with stick behind back stretch (3/2/20)  4.  Patient will demonstrate good understanding of HEP in order to continue to progress following D/C.     PLAN  []  Upgrade activities as tolerated     [x]  Continue plan of care  []  Update interventions per flow sheet       []  Discharge due to:_  []  Other:_      Jose Duckworth, PT 3/2/2020  7:46 AM    Future Appointments   Date Time Provider Heri Beal   3/2/2020  9:00 AM Dylan Schwarz, PT [facility]   3/4/2020  9:30 AM Cristy Ho, PTA [facility]   3/4/2020  2:45 PM Александр Woods [address]   3/6/2020  9:30 AM Cristy Ho, PTA [facility]   3/9/2020  9:00 AM Dylan Schwarz, PT [facility]   3/11/2020  9:00 AM Cristy Ho, PTA [facility]   3/13/2020  9:00 AM Cristy Ho, PTA [facility]   3/16/2020 10:00 AM Cristy Ho, PTA [facility]   3/18/2020  9:00 AM Cristy Ho, PTA [facility]   3/20/2020  9:00 AM Cristy Montenegrog, PTA [facility]   3/23/2020  9:00 AM Cristy Montenegrog, PTA [facility]   3/25/2020  9:00 AM Cristy Ho, PTA [facility]   3/27/2020  9:30 AM Cristy Ho, PTA [facility]   3/30/2020  9:00 AM Cristy Ho, DAREK [facility]   7/27/2020  2:30 PM [facility] FAST TRACK NURSE [facility]

## 2020-03-04 ENCOUNTER — HOSPITAL ENCOUNTER (OUTPATIENT)
Dept: PHYSICAL THERAPY | Age: 80
Discharge: HOME | End: 2020-03-04
Payer: MEDICARE

## 2020-03-04 ENCOUNTER — OFFICE VISIT (OUTPATIENT)
Dept: ORTHOPEDIC SURGERY | Age: 80
End: 2020-03-04

## 2020-03-04 VITALS
SYSTOLIC BLOOD PRESSURE: 125 MMHG | DIASTOLIC BLOOD PRESSURE: 64 MMHG | HEIGHT: 65 IN | WEIGHT: 127.4 LBS | RESPIRATION RATE: 16 BRPM | OXYGEN SATURATION: 99 % | TEMPERATURE: 96.4 F | HEART RATE: 76 BPM | BODY MASS INDEX: 21.23 KG/M2

## 2020-03-04 DIAGNOSIS — M12.811 ROTATOR CUFF ARTHROPATHY, RIGHT: Primary | ICD-10-CM

## 2020-03-04 DIAGNOSIS — R20.2 NUMBNESS AND TINGLING IN RIGHT HAND: ICD-10-CM

## 2020-03-04 DIAGNOSIS — R20.0 NUMBNESS AND TINGLING IN RIGHT HAND: ICD-10-CM

## 2020-03-04 PROCEDURE — 97110 THERAPEUTIC EXERCISES: CPT

## 2020-03-04 NOTE — PROGRESS NOTES
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 She notes her last 3 fingers still feel asleep and she is going to ask the MD about it. She is just concerned about the constant aching as well.      OBJECTIVE    Modality rationale: decrease pain and increase tissue extensibility to improve the patients ability to increase ease of ADLs   Min Type Additional Details    [] Estim:  []Unatt       []IFC  []Premod                        []Other:  []w/ice   []w/heat  Position:  Location:    [] Estim: []Att    []TENS instruct  []NMES                    []Other:  []w/US   []w/ice   []w/heat  Position:  Location:    []  Traction: [] Cervical       []Lumbar                       [] Prone          []Supine                       []Intermittent   []Continuous Lbs:  [] before manual  [] after manual    []  Ultrasound: []Continuous   [] Pulsed                           []1MHz   []3MHz W/cm2:  Location:    []  Iontophoresis with dexamethasone         Location: [] Take home patch   [] In clinic   15 []  Ice     [x]  heat  []  Ice massage  []  Laser   []  Anodyne Position: seated  Location: right shoulder     []  Laser with stim  []  Other: Position:  Location:    []  Vasopneumatic Device Pressure:       [] lo [] med [] hi   Temperature: [] lo [] med [] hi   [x] Skin assessment post-treatment:  [x]intact []redness- no adverse reaction    []redness - adverse reaction:     45 min Therapeutic Exercise:  [x] See flow sheet :   Rationale: increase ROM and increase strength to improve the patients ability to increase ease of ADLs          With   [x] TE   [] TA   [] neuro   [] other: Patient Education: [x] Review HEP    [] Progressed/Changed HEP based on:   [] positioning   [] body mechanics   [] transfers   [] heat/ice application    [] other:      Other Objective/Functional Measures:   Challenged with pectoral strengthening  Able to reach with initial assist to first shelf in the cabinet; used pushing activation to reach to second shelf for decreased deltoid overuse  Discussed decreasing frequency to 2 x week to wean from PT and focus at home; pt states she will not likely workout at home and will see what the PA/MD says today  Worked on reaching to her head with PNF focus for ease of styling and grooming her hair on the right    Pain Level (0-10 scale) post treatment: 2/10    ASSESSMENT/Changes in Function:  Pt continues with 2/10 pain levels in the posterior deltoid region with UT hiking and overcompensation for OH reaching due to decreased pectoral strength and posterior shoulder flexibility. She has achieved slightly higher ROM since most recent surgery but has new onset numbness along the 3-5 digits on the right. Will await PA/MD recommendations regarding PT but would likely need to transition to home for continued independent progression due to limited progression in therapy at this time. Progress towards goals / Updated goals:  1. Patient will improve FOTO score by 21 points in order to demonstrate a significant improvement in function.    2. Patient will improve right shoulder AROM flexion/scaption to 90 degrees in order to increase ease of cooking. 3. Patient will improve right behind back reaching to L4 in order to increase ease of getting dressed  Pt. Has improving mobility with stick behind back stretch (3/2/20)  4.  Patient will demonstrate good understanding of HEP in order to continue to progress following D/C.     PLAN  [x]  Upgrade activities as tolerated     [x]  Continue plan of care  []  Update interventions per flow sheet       []  Discharge due to:_  []  Other:_      Jyothi Blanchard PTA 3/4/2020  7:46 AM    Future Appointments   Date Time Provider Heri Beal   3/4/2020  2:45 PM Osiris Brooks   3/6/2020  9:30 AM Nichelle Deana, PTA [facility] [address]   3/9/2020  9:00 AM Ivin Snipe, PT [facility] [address]   3/11/2020  9:00 AM Nichelle Deana, PTA [facility] [address]   3/13/2020  9:00 AM Nichelle Deana, PTA [facility] [address]   3/16/2020 10:00 AM Nichelle Deana, PTA [facility] [address]   3/18/2020  9:00 AM Nichelle Deana, PTA [facility] [address]   3/20/2020  9:00 AM Nichelle Deana, PTA [facility] [address]   3/23/2020  9:00 AM Nichelle Deana, PTA [facility] [address]   3/25/2020  9:00 AM Nichelle Deana, PTA [facility] [address]   3/27/2020  9:30 AM Nichelle Deana, PTA [facility] [address]   3/30/2020  9:00 AM Nichelle Deana, PTA [facility] [address]   7/27/2020  2:30 PM [facility] FAST TRACK NURSE [facility]

## 2020-03-04 NOTE — PROGRESS NOTES
1. Have you been to the ER, urgent care clinic since your last visit? Hospitalized since your last visit? No    2. Have you seen or consulted any other health care providers outside of the [address] [place] [address] since your last visit? Include any pap smears or colon screening.  No

## 2020-03-04 NOTE — PROGRESS NOTES
Enoch Lemuel Brewer  1940     HISTORY OF PRESENT ILLNESS  Violet Wheeler is a 78 y.o. female who presents today for evaluation s/p Right shoulder arthroscopic lysis of adhesions and debridement on 1/10/20. Patient has been going to PT. Describes pain as a 2/10. Pt notes a constant ache in the shoulder today. She reports pain with lifting her arm and tenderness near the pec major tendon today. She does have improved ROM today. She states she has been able to turn on the faucet. 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 Pt has an additional complaint of constant numbness in the middle, ring, and pinky fingers of the right hand. She states she has trouble with typing due to the numbness. Pt notes this has been going on for awhile but the sensation in her fingers has gradually decreased. Patient denies any fever, chills, chest pain, shortness of breath or calf pain. There are no new illness or injuries to report since last seen in the office. Pain Assessment  3/4/2020   Location of Pain Shoulder   Location Modifiers Right   Severity of Pain 2   Quality of Pain Dull;Aching   Quality of Pain Comment -   Duration of Pain Persistent   Duration of Pain Comment -   Frequency of Pain Constant   Aggravating Factors (No Data)   Aggravating Factors Comment Movement    Limiting Behavior -   Relieving Factors Rest;Heat   Relieving Factors Comment -   Result of Injury No     PHYSICAL EXAM:   Visit Vitals  /64   Pulse 76   Temp 96.4 °F (35.8 °C) (Oral)   Resp 16   Ht 5' 5\" (1.651 m)   Wt 127 lb 6.4 oz (57.8 kg)   LMP  (LMP Unknown)   SpO2 99%   BMI 21.20 kg/m²      The patient is a well-developed, well-nourished female in no acute distress. The patient is alert and oriented times three. The patient appears to be well groomed.  Mood and affect are normal.  ORTHOPEDIC EXAM of right shoulder:  Inspection: swelling not present,  Bruising present  Incisions well healed  Passive glenohumeral abduction 0-90 degrees, able to reach up to top of her head  Stability: Stable  Strength: 4+/5    Examination Right Hand   Skin Intact   Deformity -   Swelling -   Tenderness -   Tenderness A1 Pulley -   Finger flexion Full passive motion   Finger extension Full   Thenar Eminence Atrophy -   Sensation Decreased sensation 4-5th fingers   Capillary refill -   Heberden's nodes -   Dupuytren's -   Decreased  strength right hand vs left    Examination Right Wrist   Skin Intact   Tenderness -   Flexion 60   Extension 60   Deformity -   Effusion -   Tinnel's sign -   Phalen's test -   Finklestein maneuver -   Pain with thumb abduction -       IMPRESSION:  S/P Right shoulder arthroscopic lysis of adhesions and debridement on 1/10/20    Encounter Diagnoses   Name Primary?  Rotator cuff arthropathy, right Yes    Numbness and tingling in right hand          PLAN:   1. Patient continues to improve post operatively. 2. Will cont with PT. Continue strengthening and add Iontophoresis to long head of biceps tendon near pec major tendon. 3. Gradually start increasing with activities based on pain  4. Pt also complains of numbness in the right middle, ring, and pinky fingers and I am ordering an EMG of the RUE to assess the decreased sensation in these fingers.     RTC after EMG    Scribed by Matt Valdivia[address]) as dictated by DARCIE Dnenis PA-C [facility]

## 2020-03-06 ENCOUNTER — HOSPITAL ENCOUNTER (OUTPATIENT)
Dept: PHYSICAL THERAPY | Age: 80
Discharge: HOME | End: 2020-03-06
Payer: MEDICARE

## 2020-03-06 ENCOUNTER — DOCUMENTATION ONLY (OUTPATIENT)
Dept: ORTHOPEDIC SURGERY | Age: 80
End: 2020-03-06

## 2020-03-06 PROCEDURE — 97110 THERAPEUTIC EXERCISES: CPT

## 2020-03-06 PROCEDURE — 97033 APP MDLTY 1+IONTPHRSIS EA 15: CPT

## 2020-03-06 NOTE — PROGRESS NOTES
Called patient 03/05/20 to schedule EMG, patient called back on 03/06/20. I offered patient first available 04/01/20 with Dr. Margy Saavedra and patient said that was to far out for her to be in pain everyday. Patient requested to go else where. I have faxed to Dr. Do Mathews office requesting appt.   Tel# [phone] fax # [phone]

## 2020-03-06 NOTE — PROGRESS NOTES
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 She is to continue with PT and add iontophoresis. She is going to get an EMG done for her last three finger numbness.      OBJECTIVE    Modality rationale: decrease pain and increase tissue extensibility to improve the patients ability to increase ease of ADLs   Min Type Additional Details    [] Estim:  []Unatt       []IFC  []Premod                        []Other:  []w/ice   []w/heat  Position:  Location:    [] Estim: []Att    []TENS instruct  []NMES                    []Other:  []w/US   []w/ice   []w/heat  Position:  Location:    []  Traction: [] Cervical       []Lumbar                       [] Prone          []Supine                       []Intermittent   []Continuous Lbs:  [] before manual  [] after manual    []  Ultrasound: []Continuous   [] Pulsed                           []1MHz   []3MHz W/cm2:  Location:   8 [x]  Iontophoresis with dexamethasone         Location: right shoulder  [x] Take home patch   [] In clinic   15 before ionto []  Ice     [x]  heat  []  Ice massage  []  Laser   []  Anodyne Position: seated  Location: right shoulder     []  Laser with stim  []  Other:  Position:  Location:    [] Vasopneumatic Device Pressure:       [] lo [] med [] hi   Temperature: [] lo [] med [] hi   [x] Skin assessment post-treatment:  [x]intact []redness- no adverse reaction    []redness - adverse reaction:     27 min Therapeutic Exercise:  [x] See flow sheet :   Rationale: increase ROM and increase strength to improve the patients ability to increase ease of ADLs          With   [x] TE   [] TA   [] neuro   [] other: Patient Education: [x] Review HEP    [] Progressed/Changed HEP based on:   [] positioning   [] body mechanics   [] transfers   [] heat/ice application    [] other:      Other Objective/Functional Measures:   Worked on functional strengthening in standing using mirror  Worked on [facility] presses with the dumbbell holding on with both UEs  Worked on isometric flexion holds in all angles  Added reaching to head in front of mirror first with left assist then independently with right arm  Added OH reaching with eccentric lowering with therapist assist with end range flexion stretch in standing  Gave pt education form on iontophoresis and signed    Pain Level (0-10 scale) post treatment: 2/10    ASSESSMENT/Changes in Function:  Pt progressing with functional strengthening of the right shoulder for OH reaching into cabinets and for styling her hair. She continues with constant posterior shoulder pain and is going to trial use of iontophoresis. She is having an EMG done to diagnose C8 dermatome symptoms. Will continue to progress functional strength and mobility to improve use of Right UE for ADLs. Progress towards goals / Updated goals:  1. Patient will improve FOTO score by 21 points in order to demonstrate a significant improvement in function. 2. Patient will improve right shoulder AROM flexion/scaption to 90 degrees in order to increase ease of cooking. 3. Patient will improve right behind back reaching to L4 in order to increase ease of getting dressed  Pt.  Has improving mobility with stick behind back stretch (3/2/20)  4.  Patient will demonstrate good understanding of HEP in order to continue to progress following D/C.     PLAN  [x]  Upgrade activities as tolerated     [x]  Continue plan of care  []  Update interventions per flow sheet       []  Discharge due to:_  []  Other:_      Jesenia Leyva, DAREK 3/6/2020  7:46 AM    Future Appointments   Date Time Provider Heri Beal   3/6/2020  9:30 AM Darci Suggs, PTA [facility]   3/9/2020  9:00 AM Yokasta Hilton, PT [facility]   3/11/2020  9:00 AM Darci Suggs, PTA [facility]   3/13/2020  9:00 AM Darci Suggs, PTA [facility]   3/16/2020 10:00 AM Darci Suggs, PTA [facility]   3/18/2020  9:00 AM Darci Suggs, PTA [facility]   3/20/2020  9:00 AM Darci Suggs, PTA [facility]   3/23/2020  9:00 AM Darci Suggs, PTA [facility]   3/25/2020  9:00 AM Darci Suggs, PTA [facility]   3/27/2020  9:30 AM Darci Suggs, PTA [facility]   3/30/2020  9:00 AM Darci Suggs, PTA [facility]   7/27/2020  2:30 PM [facility] FAST TRACK NURSE [facility]

## 2020-03-09 ENCOUNTER — HOSPITAL ENCOUNTER (OUTPATIENT)
Dept: PHYSICAL THERAPY | Age: 80
Discharge: HOME | End: 2020-03-09
Payer: MEDICARE

## 2020-03-09 PROCEDURE — 97033 APP MDLTY 1+IONTPHRSIS EA 15: CPT

## 2020-03-09 PROCEDURE — 97110 THERAPEUTIC EXERCISES: CPT

## 2020-03-09 NOTE — PROGRESS NOTES
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 Reports she is doing ok today. She reports the patch seemed to help a lot.      OBJECTIVE    Modality rationale: decrease pain and increase tissue extensibility to improve the patients ability to increase ease of ADLs   Min Type Additional Details    [] Estim:  []Unatt       []IFC  []Premod                        []Other:  []w/ice   []w/heat  Position:  Location:    [] Estim: []Att    []TENS instruct  []NMES                    []Other:  []w/US   []w/ice   []w/heat  Position:  Location:    []  Traction: [] Cervical       []Lumbar                       [] Prone          []Supine                       []Intermittent   []Continuous Lbs:  [] before manual  [] after manual    []  Ultrasound: []Continuous   [] Pulsed                           []1MHz   []3MHz W/cm2:  Location:   8 [x]  Iontophoresis with dexamethasone         Location: right shoulder [x] Take home patch   [] In clinic   15 []  Ice     [x]  heat  []  Ice massage  []  Laser   []  Anodyne Position: seated  Location: right shoulder     []  Laser with stim  []  Other:  Position:  Location:    []  Vasopneumatic Device Pressure:       [] lo [] med [] hi   Temperature: [] lo [] med [] hi   [x] Skin assessment post-treatment:  [x]intact []redness- no adverse reaction    []redness - adverse reaction:     30 min Therapeutic Exercise:  [x] See flow sheet :   Rationale: increase ROM and increase strength to improve the patients ability to increase ease of ADLs          With   [x] TE   [] TA   [] neuro   [] other: Patient Education: [x] Review HEP    [] Progressed/Changed HEP based on:   [] positioning   [] body mechanics   [] transfers   [] heat/ice application    [] other:      Other Objective/Functional Measures:   Behind back reaching: sacrum   Pt. Required UE support to bring cone into cabinet  She was challenged with increased resistance during side lying ER    Pain Level (0-10 scale) post treatment: 2/10    ASSESSMENT/Changes in Function:  Pt. Is progressing slowly towards goals. She demonstrates some improvement in right shoulder mobility but continues to be limited by decreased strength and pain. She continues to have difficulty with ADLs secondary to her limited shoulder mobility. Patient will continue to benefit from skilled PT services to modify and progress therapeutic interventions, address functional mobility deficits, address ROM deficits, address strength deficits, analyze and address soft tissue restrictions, analyze and cue movement patterns and analyze and modify body mechanics/ergonomics to attain remaining goals. Progress towards goals / Updated goals:  1. Patient will improve FOTO score by 21 points in order to demonstrate a significant improvement in function. 2. Patient will improve right shoulder AROM flexion/scaption to 90 degrees in order to increase ease of cooking. 3. Patient will improve right behind back reaching to L4 in order to increase ease of getting dressed  Progressing: sacrum (3/9/20)  4.  Patient will demonstrate good understanding of HEP in order to continue to progress following D/C.     PLAN  []  Upgrade activities as tolerated     [x]  Continue plan of care  []  Update interventions per flow sheet       [] Discharge due to:_  []  Other:_      Kaia Ria, PT 3/9/2020  7:47 AM    Future Appointments   Date Time Provider Heri Beal   3/9/2020  9:00 AM Jose Martin Woods, PT [facility]   3/10/2020 11:00 AM Caralyn Commons, PTA [facility]   3/11/2020 10:00 AM Sandoval Dietz  [address]   3/13/2020  9:00 AM Caralyn Commons, PTA [facility]   3/16/2020 10:00 AM Caralyn Commons, PTA [facility]   3/16/2020  3:40 PM Zachary Thapa MD [address]   3/18/2020  9:00 AM Caralyn Commons, PTA [facility]   3/20/2020  9:00 AM Caralyn Commons, PTA [facility]   3/23/2020  9:00 AM Caralyn Commons, PTA [facility]   3/25/2020  9:00 AM Caralyn Commons, PTA [facility]   3/27/2020  9:30 AM Caralyn Commons, PTA [facility]   3/30/2020  9:00 AM Caralyn Commons, PTA [facility]   7/27/2020  2:30 PM [facility] FAST TRACK NURSE [facility]

## 2020-03-10 ENCOUNTER — HOSPITAL ENCOUNTER (OUTPATIENT)
Dept: PHYSICAL THERAPY | Age: 80
Discharge: HOME | End: 2020-03-10
Payer: MEDICARE

## 2020-03-10 PROCEDURE — 97140 MANUAL THERAPY 1/> REGIONS: CPT

## 2020-03-10 PROCEDURE — 97110 THERAPEUTIC EXERCISES: CPT

## 2020-03-10 PROCEDURE — 97033 APP MDLTY 1+IONTPHRSIS EA 15: CPT

## 2020-03-10 NOTE — PROGRESS NOTES
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 She has her EMG study tomorrow.      OBJECTIVE    Modality rationale: decrease pain and increase tissue extensibility to improve the patients ability to increase ease of ADLs   Min Type Additional Details    [] Estim:  []Unatt       []IFC  []Premod                        []Other:  []w/ice   []w/heat  Position:  Location:    [] Estim: []Att    []TENS instruct  []NMES                    []Other:  []w/US   []w/ice   []w/heat  Position:  Location:    []  Traction: [] Cervical       []Lumbar                       [] Prone          []Supine                       []Intermittent   []Continuous Lbs:  [] before manual  [] after manual    []  Ultrasound: []Continuous   [] Pulsed                           []1MHz   []3MHz W/cm2:  Location:   8 [x]  Iontophoresis with dexamethasone         Location: right shoulder [x] Take home patch   [] In clinic   15 []  Ice     [x]  heat  []  Ice massage  []  Laser   []  Anodyne Position: seated  Location: right shoulder     []  Laser with stim  []  Other:  Position:  Location:    []  Vasopneumatic Device Pressure:       [] lo [] med [] hi   Temperature: [] lo [] med [] hi   [x] Skin assessment post-treatment:  [x]intact []redness- no adverse reaction    []redness - adverse reaction:     16 min Therapeutic Exercise:  [x] See flow sheet :   Rationale: increase ROM and increase strength to improve the patients ability to increase ease of ADLs    20 min Manual Therapy:  [x] See flow sheet : DTM right levator scap and UT; TPR posterior deltoid and triceps   Rationale: increase ROM and increase strength to improve the patients ability to increase ease of ADLs                    With   [x] TE   [] TA   [] neuro   [] other: Patient Education: [x] Review HEP    [] Progressed/Changed HEP based on:   [] positioning   [] body mechanics   [] transfers   [] heat/ice application    [] other:      Other Objective/Functional Measures:   Held strengthening exercises today due to increased soreness from yesterday's session  Focused on stretching and TPR for decreased pain  Educated on rest the next day or two  Reapplied ionto; pt seems to feel benefit with it on  Multiple TPs in triceps and shoulder extensors    Pain Level (0-10 scale) post treatment: 2/10    ASSESSMENT/Changes in Function: Pt progressing with AROM but limited progress with decreasing pain. She seems to feel benefit with ionto use but has not been reflective in numerical pain scale. Will continue to progress functional strengthening for reaching into cabinets and to her hair for ease of ADLs. Progress towards goals / Updated goals:  1. Patient will improve FOTO score by 21 points in order to demonstrate a significant improvement in function. 2. Patient will improve right shoulder AROM flexion/scaption to 90 degrees in order to increase ease of cooking. 3. Patient will improve right behind back reaching to L4 in order to increase ease of getting dressed  Progressing: sacrum (3/9/20)  4.  Patient will demonstrate good understanding of HEP in order to continue to progress following D/C.     PLAN  [x]  Upgrade activities as tolerated     [x]  Continue plan of care  []  Update interventions per flow sheet       []  Discharge due to:_  []  Other:_      Sheilda Castleman, DAREK 3/10/2020  7:47 AM    Future Appointments   Date Time Provider Heri Lian   3/10/2020  8:00 AM Dias Mc, PTA [facility]   3/11/2020 10:00 AM Jairo Polo  [address]   3/13/2020  9:00 AM Arun Church, PTA [facility]   3/16/2020 10:00 AM Arun , PTA [facility]   3/16/2020  3:40 PM Hetal Stallings MD [place] [id number]   3/18/2020  9:00 AM Dias Mc, PTA [facility]   3/20/2020  9:00 AM Arun , PTA [facility]   3/23/2020  9:00 AM Arun , PTA [facility]   3/25/2020  9:00 AM Arun , PTA [facility]   3/27/2020  9:30 AM Arun Church, PTA [facility]   3/30/2020  9:00 AM Arun , PTA [facility]   7/27/2020  2:30 PM [facility] FAST TRACK NURSE [facility]

## 2020-03-11 ENCOUNTER — OFFICE VISIT (OUTPATIENT)
Dept: ORTHOPEDIC SURGERY | Age: 80
End: 2020-03-11

## 2020-03-11 ENCOUNTER — APPOINTMENT (OUTPATIENT)
Dept: PHYSICAL THERAPY | Age: 80
End: 2020-03-11
Payer: MEDICARE

## 2020-03-11 VITALS
TEMPERATURE: 98.1 F | DIASTOLIC BLOOD PRESSURE: 67 MMHG | HEART RATE: 77 BPM | HEIGHT: 65 IN | WEIGHT: 124.2 LBS | BODY MASS INDEX: 20.69 KG/M2 | RESPIRATION RATE: 24 BRPM | OXYGEN SATURATION: 96 % | SYSTOLIC BLOOD PRESSURE: 106 MMHG

## 2020-03-11 DIAGNOSIS — R20.0 NUMBNESS AND TINGLING IN RIGHT HAND: Primary | ICD-10-CM

## 2020-03-11 DIAGNOSIS — R20.2 NUMBNESS AND TINGLING IN RIGHT HAND: Primary | ICD-10-CM

## 2020-03-11 DIAGNOSIS — R94.131 ABNORMAL EMG: ICD-10-CM

## 2020-03-11 NOTE — PROGRESS NOTES
[address]  [address], [address]  [address]  Phone: [phone]  Fax: [phone]        Elsi Oliveira  : 1940  PCP: Hung Waggoner MD  3/11/2020    ELECTROMYOGRAPHY AND NERVE CONDUCTION STUDIES    Violet Dawson was referred by MATTHEW Terry & Dr. Taisha Laguerre for electrodiagnostic evaluation of right hand numbness and tingling. NCV & EMG Findings:  Evaluation of the right ulnar motor nerve showed decreased conduction velocity (A Elbow-B Elbow, 40 m/s). All remaining nerves (as indicated in the following tables) were within normal limits. All examined muscles (as indicated in the following table) showed no evidence of electrical instability. INTERPRETATION  This is an abnormal electrodiagnostic examination. These findings may be consistent with:   1. 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 CLINICAL INTERPRETATION  It is plausible that her electrodiagnostic findings consistent with an ulnar mononeuropathy could be related to her medial hand symptoms. There are no electrodiagnostic signs consistent with cervical radiculopathy or brachial plexopathy. HISTORY OF PRESENT ILLNESS  Violet Dawson is a 78 y.o. female. Pt presents today for RUE EMG evaluation of right hand numbness and tingling into the middle, ring, and pinky fingers. Pt reports difficulty turning on a faucet and typing. She underwent a right shoulder arthroscopic lysis of adhesions and debridement on 1/10/2020. Pt notes that she had a cervical fusion of C4-7 many years ago for spinal stenosis by Dr. Demond Gudino.      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 MEDICATIONS    Current Outpatient Medications   Medication Sig Dispense Refill    baclofen (LIORESAL) 10 mg tablet Take 1 Tab by mouth three (3) times daily as needed for Other (spasms). 90 Tab 0    ferrous sulfate 325 mg (65 mg iron) tablet Take 325 mg by mouth two (2) times a day.  gabapentin (NEURONTIN) 300 mg capsule nightly as needed. 1    CRANBERRY FRUIT EXTRACT (CRANBERRY EXTRACT PO) Take  by mouth daily.  metoprolol succinate (TOPROL-XL) 25 mg XL tablet TAKE 1 TABLET BY MOUTH TWICE DAILY 180 Tab 3    PRADAXA 150 mg capsule Take 1 Cap by mouth two (2) times a day. 180 Cap 3    cyanocobalamin (VITAMIN B12) 1,000 mcg/mL injection Taken weekly on Saturdays  1    digoxin (DIGOX) 0.125 mg tablet TAKE 1 TABLET BY MOUTH ONCE DAILY 90 Tab 3    tiotropium (SPIRIVA WITH HANDIHALER) 18 mcg inhalation capsule Take 1 Cap by inhalation daily. 20 Cap 0    IE-YB-TW-Fe-Min-Lycopen-Lutein (CENTRUM) 0.4-162-18 mg tab Take  by mouth daily.  pantoprazole (PROTONIX) 40 mg tablet Take 40 mg by mouth daily.  BIOTIN PO Take  by mouth two (2) times a day.  folic acid 591 mcg tablet Take 400 mcg by mouth daily.  CALCIUM PO Take  by mouth daily.  montelukast (SINGULAIR) 10 mg tablet Take 10 mg by mouth daily.           ALLERGIES  Allergies   Allergen Reactions    Codeine Nausea Only     Patient states not allergic    Diltiazem Other (comments) Patient states not allergic          SOCIAL HISTORY    Social History     Socioeconomic History    Marital status:      Spouse name: Not on file    Number of children: Not on file    Years of education: Not on file    Highest education level: Not on file   Tobacco Use    Smoking status: Former Smoker     Last attempt to quit: 1981     Years since quittin.1    Smokeless tobacco: Never Used   Substance and Sexual Activity    Alcohol use: Yes     Alcohol/week: 0.0 standard drinks     Comment: wine nightly    Drug use: No    Sexual activity: Not Currently   Other Topics Concern       FAMILY HISTORY  Family History   Problem Relation Age of Onset    Heart Attack Father     Breast Cancer Sister     Breast Cancer Sister     Breast Cancer Other         Grandmother - nos         PHYSICAL EXAMINATION  Visit Vitals  /67 (BP 1 Location: Right arm, BP Patient Position: Sitting)   Pulse 77   Temp 98.1 °F (36.7 °C) (Oral)   Resp 24   Ht 5' 5\" (1.651 m)   Wt 124 lb 3.2 oz (56.3 kg)   LMP  (LMP Unknown)   SpO2 96%   BMI 20.67 kg/m²       Pain Assessment  3/11/2020   Location of Pain Arm   Location Modifiers -   Severity of Pain 4   Quality of Pain Dull;Aching   Quality of Pain Comment \"numbness,tingling,weakness\"   Duration of Pain -   Duration of Pain Comment -   Frequency of Pain Intermittent   Aggravating Factors -   Aggravating Factors Comment -   Limiting Behavior -   Relieving Factors -   Relieving Factors Comment -   Result of Injury No           Constitutional:  Well developed, well nourished, in no acute distress. Psychiatric: Affect and mood are appropriate. Integumentary: No rashes or abrasions noted on exposed areas.           NCV & EMG Findings:  Nerve Conduction Studies  Anti Sensory Summary Table     Stim Site NR Peak (ms) Norm Peak (ms) P-T Amp (µV) Norm P-T Amp Site1 Site2 Delta-P (ms) Dist (cm) Miguel (m/s) Norm Miguel (m/s)   Right Median Anti Sensory (2nd Digit)   Wrist    2.9 <3.6 32.1 >10 Wrist 2nd Digit 2.9 14.0 48 >39   Right Radial Anti Sensory (Base 1st Digit)   Wrist    2.1 <3.1 36.2  Wrist Base 1st Digit 2.1 0.0     Right Ulnar Anti Sensory (5th Digit)   Wrist    3.3 <3.7 23.9 >15.0 Wrist 5th Digit 3.3 14.0 42 >38     Motor Summary Table     Stim Site NR Onset (ms) Norm Onset (ms) O-P Amp (mV) Norm O-P Amp Site1 Site2 Delta-0 (ms) Dist (cm) Miguel (m/s) Norm Miguel (m/s)   Right Median Motor (Abd Poll Brev)   Wrist    3.7 <4.2 6.1 >5 Elbow Wrist 0.3 0.0  >50   Elbow    4.0  3.8  Axilla Elbow 3.5 0.0     Axilla    7.5  2.5          Right Ulnar Motor (Abd Dig Min)   Wrist    3.4 <4.2 6.8 >3 B Elbow Wrist 3.1 18.5 60 >53   B Elbow    6.5  5.2  A Elbow B Elbow 2.5 10.0 40 >53   A Elbow    9.0  4.4            EMG     Side Muscle Nerve Root Ins Act Fibs Psw Amp Dur Poly Recrt Int Elena Coop Comment   Right Biceps Musculocut C5-6 Nml Nml Nml Nml Nml 0 Nml Nml    Right Triceps Radial C6-7-8 Nml Nml Nml Nml Nml 0 Nml Nml    Right PronatorTeres Median C6-7 Nml Nml Nml Nml Nml 0 Nml Nml    Right Abd Poll Brev Median C8-T1 Nml Nml Nml Nml Nml 0 Nml Nml    Right 1stDorInt Ulnar C8-T1 Nml Nml Nml Nml Nml 0 Nml Nml    Right FlexCarpiUln Ulnar C8,T1 Nml Nml Nml Nml Nml 0 Nml Nml        Nerve Conduction Studies  Anti Sensory Left/Right Comparison     Stim Site L Lat (ms) R Lat (ms) L-R Lat (ms) L Amp (µV) R Amp (µV) L-R Amp (%) Site1 Site2 L Miguel (m/s) R Miguel (m/s) L-R Miguel (m/s)   Median Anti Sensory (2nd Digit)   Wrist  2.9   32.1  Wrist 2nd Digit  48    Radial Anti Sensory (Base 1st Digit)   Wrist  2.1   36.2  Wrist Base 1st Digit      Ulnar Anti Sensory (5th Digit)   Wrist  3.3   23.9  Wrist 5th Digit  42      Motor Left/Right Comparison     Stim Site L Lat (ms) R Lat (ms) L-R Lat (ms) L Amp (mV) R Amp (mV) L-R Amp (%) Site1 Site2 L Miguel (m/s) R Miguel (m/s) L-R Miguel (m/s)   Median Motor (Abd Poll Brev)   Wrist  3.7   6.1  Elbow Wrist      Elbow  4.0   3.8  Axilla Elbow      Axilla  7.5   2.5         Ulnar Motor (Abd Dig Min)   Wrist  3.4   6.8  B Elbow Wrist  60    B Elbow  6.5   5.2  A Elbow B Elbow  40    A Elbow  9.0   4.4               Waveforms:                     [facility]  OFFICE PROCEDURE PROGRESS NOTE        Chart reviewed for the following:   Sloan TAVERA, have reviewed the History, Physical and updated the Allergic reactions for [address] performed immediately prior to start of procedure:   Sloan TAVERA, have performed the following reviews on [address] prior to the start of the procedure:            * Patient was identified by name and date of birth   * Agreement on procedure being performed was verified  * Risks and Benefits explained to the patient  * Procedure site verified and marked as necessary  * Patient was positioned for comfort  * Consent was signed and verified     Time: 10:33 AM    Date of procedure: 3/11/2020    Procedure performed by:  Gonzales Betancur MD    Provider accompanied by: Johanne. Patient accompanied by: Spouse.     How tolerated by patient: tolerated the procedure well with no complications    Post Procedural Pain Scale: 0 - No Hurt    Comments: none    Written by Letty Schroeder, [address] as dictated by Sloan Joseph MD

## 2020-03-11 NOTE — LETTER
3/12/20 Patient: Lizz Cisneros YOB: 1940 Date of Visit: 3/11/2020 Kee Ramírez MD 
[address] [facility] [address] [address] VIA Facsimile: [phone] Wanda Wellington MD 
[address] [address] [address] [address] VIA In Basket Arsen Ramey [facility] [address] [address] [address] [address] VIA In Basket Dear MD Wanda Ortiz MD Pauleen Dies, PA, Thank you for referring Ms. Cherylene Poli to [facility] for evaluation. My notes for this consultation are attached. If you have questions, please do not hesitate to call me. I look forward to following your patient along with you.  
 
 
Sincerely, 
 
Ale Gamez MD

## 2020-03-13 ENCOUNTER — HOSPITAL ENCOUNTER (OUTPATIENT)
Dept: PHYSICAL THERAPY | Age: 80
Discharge: HOME | End: 2020-03-13
Payer: MEDICARE

## 2020-03-13 PROCEDURE — 97110 THERAPEUTIC EXERCISES: CPT

## 2020-03-13 PROCEDURE — 97033 APP MDLTY 1+IONTPHRSIS EA 15: CPT

## 2020-03-13 NOTE — PROGRESS NOTES
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 She states the EMG technician told her it was coming from her elbow. She follows up with the MD next week for the EMG results.        OBJECTIVE    Modality rationale: decrease pain and increase tissue extensibility to improve the patients ability to increase ease of ADLs   Min Type Additional Details    [] Estim:  []Unatt       []IFC  []Premod                        []Other:  []w/ice   []w/heat  Position:  Location:    [] Estim: []Att    []TENS instruct  []NMES                    []Other:  []w/US   []w/ice   []w/heat  Position:  Location:    []  Traction: [] Cervical       []Lumbar                       [] Prone          []Supine                       []Intermittent   []Continuous Lbs:  [] before manual  [] after manual    []  Ultrasound: []Continuous   [] Pulsed                           []1MHz   []3MHz W/cm2:  Location:   8 [x]  Iontophoresis with dexamethasone         Location: right shoulder [x] Take home patch   [] In clinic   15 []  Ice     [x]  heat  []  Ice massage  []  Laser   []  Anodyne Position: seated  Location: right shoulder     []  Laser with stim  []  Other:  Position:  Location:    []  Vasopneumatic Device Pressure:       [] lo [] med [] hi   Temperature: [] lo [] med [] hi   [x] Skin assessment post-treatment:  [x]intact []redness- no adverse reaction    []redness - adverse reaction:     27 min Therapeutic Exercise:  [x] See flow sheet :   Rationale: increase ROM and increase strength to improve the patients ability to increase ease of ADLs                With   [x] TE   [] TA   [] neuro   [] other: Patient Education: [x] Review HEP    [] Progressed/Changed HEP based on:   [] positioning   [] body mechanics   [] transfers   [] heat/ice application    [] other:      Other Objective/Functional Measures:  UT hiking with OH reaching  Improving AROM  Continues to struggle with prolonged elevation of her right arm for styling her hair  Added OH 30\" endurance exercise to simulate curling hair  Challenged with cabinet shelf reaches but able to perform to the 1st shelf unassisted and with assist to the 2nd shelf      Pain Level (0-10 scale) post treatment: 2/10    ASSESSMENT/Changes in Function: Pt making slow progress with decreased pain but has had some improvement with use of ionto. She continues with decreased AROM mobility and pectoral strength. She is able to reach to her head but has difficulty with using a curling iron or brush for styling. Will continue with functional strengthening to improve right UE use at home. Progress towards goals / Updated goals:  1. Patient will improve FOTO score by 21 points in order to demonstrate a significant improvement in function. 2. Patient will improve right shoulder AROM flexion/scaption to 90 degrees in order to increase ease of cooking. 3. Patient will improve right behind back reaching to L4 in order to increase ease of getting dressed  Progressing: sacrum (3/9/20)  4.  Patient will demonstrate good understanding of HEP in order to continue to progress following D/C.     PLAN  [x]  Upgrade activities as tolerated     [x]  Continue plan of care  []  Update interventions per flow sheet       [] Discharge due to:_  []  Other:_      Mariana Bartholomew, PTA 3/13/2020  7:47 AM    Future Appointments   Date Time Provider Heri Beal   3/13/2020  9:00 AM Gwendolyn Patient, PTA [facility]   3/16/2020 10:00 AM Gwendolyn Patient, PTA [facility]   3/16/2020  3:40 PM MD Nicolas Vasquez   3/18/2020  9:00 AM Gwendolyn Patient, PTA [facility]   3/20/2020  9:00 AM Gwendolyn Patient, PTA [facility]   3/23/2020  9:00 AM Gwendolyn Patient, PTA [facility]   3/25/2020  9:00 AM Gwendolyn Patient, PTA [facility]   3/27/2020  9:30 AM Gwendolyn Patient, PTA [facility]   3/30/2020  9:00 AM Gwendolyn Patient, PTA [facility]   7/27/2020  2:30 PM [facility] FAST TRACK NURSE [facility]

## 2020-03-16 ENCOUNTER — OFFICE VISIT (OUTPATIENT)
Dept: ORTHOPEDIC SURGERY | Age: 80
End: 2020-03-16

## 2020-03-16 ENCOUNTER — HOSPITAL ENCOUNTER (OUTPATIENT)
Dept: PHYSICAL THERAPY | Age: 80
Discharge: HOME | End: 2020-03-16
Payer: MEDICARE

## 2020-03-16 VITALS
HEART RATE: 67 BPM | OXYGEN SATURATION: 96 % | BODY MASS INDEX: 20.83 KG/M2 | TEMPERATURE: 98.4 F | SYSTOLIC BLOOD PRESSURE: 100 MMHG | HEIGHT: 65 IN | DIASTOLIC BLOOD PRESSURE: 49 MMHG | WEIGHT: 125 LBS

## 2020-03-16 DIAGNOSIS — G56.21 ULNAR NEUROPATHY OF RIGHT UPPER EXTREMITY: Primary | ICD-10-CM

## 2020-03-16 DIAGNOSIS — M12.811 ROTATOR CUFF ARTHROPATHY, RIGHT: ICD-10-CM

## 2020-03-16 PROCEDURE — 97110 THERAPEUTIC EXERCISES: CPT

## 2020-03-16 PROCEDURE — 97033 APP MDLTY 1+IONTPHRSIS EA 15: CPT

## 2020-03-16 RX ORDER — METHYLPREDNISOLONE 4 MG/1
TABLET ORAL
Qty: 1 DOSE PACK | Refills: 0 | Status: SHIPPED | OUTPATIENT
Start: 2020-03-16

## 2020-03-16 NOTE — PROGRESS NOTES
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 She still feels increased pain when moving her arm. She goes to see the MD in regards to her EMG study.        OBJECTIVE    Modality rationale: decrease pain and increase tissue extensibility to improve the patients ability to increase ease of ADLs   Min Type Additional Details    [] Estim:  []Unatt       []IFC  []Premod                        []Other:  []w/ice   []w/heat  Position:  Location:    [] Estim: []Att    []TENS instruct  []NMES                    []Other:  []w/US   []w/ice   []w/heat  Position:  Location:    []  Traction: [] Cervical       []Lumbar                       [] Prone          []Supine                       []Intermittent   []Continuous Lbs:  [] before manual  [] after manual    []  Ultrasound: []Continuous   [] Pulsed                           []1MHz   []3MHz W/cm2:  Location:   8 [x]  Iontophoresis with dexamethasone         Location: right shoulder [x] Take home patch   [] In clinic   15 []  Ice     [x]  heat  []  Ice massage  []  Laser   []  Anodyne Position: seated  Location: right shoulder     []  Laser with stim  []  Other:  Position:  Location:    []  Vasopneumatic Device Pressure:       [] lo [] med [] hi   Temperature: [] lo [] med [] hi   [x] Skin assessment post-treatment:  [x]intact []redness- no adverse reaction    []redness - adverse reaction:     37 min Therapeutic Exercise:  [x] See flow sheet :   Rationale: increase ROM and increase strength to improve the patients ability to increase ease of ADLs                With   [x] TE   [] TA   [] neuro   [] other: Patient Education: [x] Review HEP    [] Progressed/Changed HEP based on:   [] positioning   [] body mechanics   [] transfers   [] heat/ice application    [] other:      Other Objective/Functional Measures:  Shoulder hiking with OH elevation   Challenged with pectoral strengthening  Extensor group tightness limiting increased AROM and PROM  Challenged with head at head gripper to simulate ADLs      Pain Level (0-10 scale) post treatment: 2/10    ASSESSMENT/Changes in Function: Pt beginning to plateau with progress made in therapy. She has some decrease in pain with use of ionto but continues to have pain with AROM. She has limited AROM due to tightness and weakness. She continues to struggle reaching into cabinets and styling/curling her hair due to pain. Progress towards goals / Updated goals:  1. Patient will improve FOTO score by 21 points in order to demonstrate a significant improvement in function. 2. Patient will improve right shoulder AROM flexion/scaption to 90 degrees in order to increase ease of cooking. 3. Patient will improve right behind back reaching to L4 in order to increase ease of getting dressed  Progressing: sacrum (3/9/20)  4.  Patient will demonstrate good understanding of HEP in order to continue to progress following D/C.     PLAN  [x]  Upgrade activities as tolerated     [x]  Continue plan of care  []  Update interventions per flow sheet       []  Discharge due to:_  []  Other:_      Pradeep Gutierres PTA 3/16/2020  7:47 AM    Future Appointments   Date Time Provider Heri Beal   3/16/2020 10:00 AM Luzmaria Harman Amber Simmons, PTA [facility]   3/16/2020  3:40 PM Logan Jones MD [place] [id number]   3/18/2020  9:00 AM Maria T Barfield, PTA [facility]   3/20/2020  9:00 AM Maria T Barfield, PTA [facility]   3/23/2020  9:00 AM Maria T Barfield, PTA [facility]   3/25/2020  9:00 AM Maria T Barfield, PTA [facility]   3/27/2020  9:30 AM Maria T Barfield, PTA [facility]   3/30/2020  9:00 AM Maria T Barfield, PTA [facility]   7/27/2020  2:30 PM [facility] NUR[facility]

## 2020-03-16 NOTE — PROGRESS NOTES
Gilford Mowers Gidanielkitty  1940     HISTORY OF PRESENT ILLNESS  Violet Krishna is a 78 y.o. female who presents today for evaluation s/p Right shoulder arthroscopic lysis of adhesions and debridement on 1/10/20. She is here today for an EMG review. Patient has been going to PT with some relief. Describes pain as a 2/10 today. Pt notes a constant ache in the shoulder today. She reports pain with lifting her arm and tenderness near the pec major tendon today. She states she has been able to turn on the faucet and has been able to do more activities. 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 Pt has an additional complaint of constant numbness in the middle, ring, and pinky fingers of the right hand. She states she has trouble with typing due to the numbness. Pt notes this has been going on for awhile but the sensation in her fingers has gradually decreased. Patient denies any fever, chills, chest pain, shortness of breath or calf pain. There are no new illness or injuries to report since last seen in the office. Pain Assessment  3/16/2020   Location of Pain Hand;Shoulder   Location Modifiers Right   Severity of Pain 2   Quality of Pain Other (Comment)   Quality of Pain Comment -   Duration of Pain Persistent   Duration of Pain Comment -   Frequency of Pain Constant   Aggravating Factors -   Aggravating Factors Comment -   Limiting Behavior Yes   Relieving Factors Nothing   Relieving Factors Comment -   Result of Injury No     PHYSICAL EXAM:   Visit Vitals  /49 (BP 1 Location: Left arm, BP Patient Position: Sitting)   Pulse 67   Temp 98.4 °F (36.9 °C)   Ht 5' 5\" (1.651 m)   Wt 125 lb (56.7 kg)   LMP  (LMP Unknown)   SpO2 96%   BMI 20.80 kg/m²      The patient is a well-developed, well-nourished female in no acute distress. The patient is alert and oriented times three. The patient appears to be well groomed.  Mood and affect are normal.  ORTHOPEDIC EXAM of right shoulder:  Inspection: swelling not present,  Bruising present  Incisions well healed  Passive glenohumeral abduction 0-90 degrees, able to reach up to top of her head  Stability: Stable  Strength: 4+/5    Examination Right Hand   Skin Intact   Deformity -   Swelling -   Tenderness -   Tenderness A1 Pulley -   Finger flexion Full passive motion   Finger extension Full   Thenar Eminence Atrophy -   Sensation Decreased sensation 4-5th fingers   Capillary refill -   Heberden's nodes -   Dupuytren's -   Decreased  strength right hand vs left    Examination Right Wrist   Skin Intact   Tenderness -   Flexion 60   Extension 60   Deformity -   Effusion -   Tinnel's sign -   Phalen's test -   Finklestein maneuver -   Pain with thumb abduction -     IMAGING: EMG of RUE dated 3/11/2020 reviewed and read by Dr. Sasha Pang:  IMPRESSION:  This is an abnormal electrodiagnostic examination. These findings may be consistent with:   1. 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 IMPRESSION:  S/P Right shoulder arthroscopic lysis of adhesions and debridement on 1/10/20    Encounter Diagnoses   Name Primary?  Ulnar neuropathy of right upper extremity Yes    Rotator cuff arthropathy, right          PLAN:   1. Patient continues to improve post operatively. Will continue with PT.  2. Was given a prescription for MDP today due to EMG-documented ulnar neuropathy. We will see her back in 2 months. RTC 2 months    Scribed by Samuel Correa) as dictated by Wanda Wellington MD    I, Dr. Wanda Wellington, confirm that all documentation is accurate.     Wanda Wellington M.D.   [facility]

## 2020-03-18 ENCOUNTER — HOSPITAL ENCOUNTER (OUTPATIENT)
Dept: PHYSICAL THERAPY | Age: 80
Discharge: HOME | End: 2020-03-18
Payer: MEDICARE

## 2020-03-18 PROCEDURE — 97110 THERAPEUTIC EXERCISES: CPT

## 2020-03-18 PROCEDURE — 97033 APP MDLTY 1+IONTPHRSIS EA 15: CPT

## 2020-03-18 NOTE — PROGRESS NOTES
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 She has been trying to reach into cabinets at home but still struggles.        OBJECTIVE    Modality rationale: decrease pain and increase tissue extensibility to improve the patients ability to increase ease of ADLs   Min Type Additional Details    [] Estim:  []Unatt       []IFC  []Premod                        []Other:  []w/ice   []w/heat  Position:  Location:    [] Estim: []Att    []TENS instruct  []NMES                    []Other:  []w/US   []w/ice   []w/heat  Position:  Location:    []  Traction: [] Cervical       []Lumbar                       [] Prone          []Supine                       []Intermittent   []Continuous Lbs:  [] before manual  [] after manual    []  Ultrasound: []Continuous   [] Pulsed                           []1MHz   []3MHz W/cm2:  Location:   8 [x]  Iontophoresis with dexamethasone         Location: right shoulder [x] Take home patch   [] In clinic   15 []  Ice     [x]  heat  []  Ice massage  []  Laser   []  Anodyne Position: seated  Location: right shoulder     []  Laser with stim  []  Other:  Position:  Location:    []  Vasopneumatic Device Pressure:       [] lo [] med [] hi Temperature: [] lo [] med [] hi   [x] Skin assessment post-treatment:  [x]intact []redness- no adverse reaction    []redness - adverse reaction:     27 min Therapeutic Exercise:  [x] See flow sheet :   Rationale: increase ROM and increase strength to improve the patients ability to increase ease of ADLs                With   [x] TE   [] TA   [] neuro   [] other: Patient Education: [x] Review HEP    [] Progressed/Changed HEP based on:   [] positioning   [] body mechanics   [] transfers   [] heat/ice application    [] other:      Other Objective/Functional Measures:  Supine flexion 94 degrees  Standing flexion with hiking 85 degrees  Continues with deltoid and UT compensations with shoulder elevation  Restriction of posterior shoulder musculature  Progressed with better ability to perform hand at head movement to simulate styling her hair    Pain Level (0-10 scale) post treatment: 2/10    ASSESSMENT/Changes in Function:  Pt progressing with right shoulder AROM but continues to have UT and deltoid compensations due to weak pectorals and limited shoulder PROM. Will continue with functional strengthening and trial of ionto to decrease posterior deltoid pain with active movement for ease of reaching into cabinets and performing BADLs. Progress towards goals / Updated goals:  1. Patient will improve FOTO score by 21 points in order to demonstrate a significant improvement in function. 2. Patient will improve right shoulder AROM flexion/scaption to 90 degrees in order to increase ease of cooking. Supine flexion 94 degrees  Standing flexion with hiking 85 degrees  3. Patient will improve right behind back reaching to L4 in order to increase ease of getting dressed  Progressing: sacrum (3/9/20)  4.  Patient will demonstrate good understanding of HEP in order to continue to progress following D/C.     PLAN  [x]  Upgrade activities as tolerated     [x]  Continue plan of care  []  Update interventions per flow sheet []  Discharge due to:_  []  Other:_      Pradeep Gutierres, PTA 3/18/2020  7:47 AM    Future Appointments   Date Time Provider Heri Dela Cruzi   3/18/2020  9:00 AM Brady Lim, PTA [facility]   3/20/2020  9:00 AM Brady Lim, PTA [facility]   3/23/2020  9:00 AM Brady Lim, PTA [facility]   3/25/2020  9:00 AM Brady Lim, PTA [facility]   3/27/2020  9:30 AM Brady Lim, PTA [facility]   3/30/2020  9:00 AM Brady Lim, PTA [facility]   5/18/2020  1:00 PM Dmitriy Ashby MD [place] [id number]   7/27/2020  2:30 PM [facility] NUR[facility]

## 2020-03-20 ENCOUNTER — HOSPITAL ENCOUNTER (OUTPATIENT)
Dept: PHYSICAL THERAPY | Age: 80
Discharge: HOME | End: 2020-03-20
Payer: MEDICARE

## 2020-03-20 PROCEDURE — 97140 MANUAL THERAPY 1/> REGIONS: CPT

## 2020-03-20 PROCEDURE — 97033 APP MDLTY 1+IONTPHRSIS EA 15: CPT

## 2020-03-20 NOTE — PROGRESS NOTES
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 She was able to reach into her microwave yesterday.      OBJECTIVE    Modality rationale: decrease pain and increase tissue extensibility to improve the patients ability to increase ease of ADLs   Min Type Additional Details    [] Estim:  []Unatt       []IFC  []Premod                        []Other:  []w/ice   []w/heat  Position:  Location:    [] Estim: []Att    []TENS instruct  []NMES                    []Other:  []w/US   []w/ice   []w/heat  Position:  Location:    []  Traction: [] Cervical       []Lumbar                       [] Prone          []Supine                       []Intermittent   []Continuous Lbs:  [] before manual  [] after manual    []  Ultrasound: []Continuous   [] Pulsed                           []1MHz   []3MHz W/cm2:  Location:   8 [x]  Iontophoresis with dexamethasone         Location: right shoulder [x] Take home patch   [] In clinic   15 []  Ice     [x]  heat  []  Ice massage  []  Laser   []  Anodyne Position: seated  Location: right shoulder     []  Laser with stim  []  Other:  Position:  Location:    []  Vasopneumatic Device Pressure:       [] lo [] med [] hi   Temperature: [] lo [] med [] hi   [x] Skin assessment post-treatment:  [x]intact []redness- no adverse reaction    []redness - adverse reaction:     32 min Manual Therapy:  [x] See flow sheet : DTM teres major/minor; triceps, UT levator scapula; PROM with oscillations and end range overpressure   Rationale: increase ROM and increase strength to improve the patients ability to increase ease of ADLs                With   [x] TE   [] TA   [] neuro   [] other: Patient Education: [x] Review HEP    [] Progressed/Changed HEP based on:   [] positioning   [] body mechanics   [] transfers   [] heat/ice application    [] other:      Other Objective/Functional Measures:  PROM 130 degrees  Multiple TPs in triceps and teres and levator scapula  Educated on stick stretching at home to maintain gained ROM    Pain Level (0-10 scale) post treatment: 2/10    ASSESSMENT/Changes in Function:  Pt progressing with PROM to 130 degrees flexion. She was able to reach into her microwave. She continues to have posterior shoulder pain and restrictions along with weakness limiting ease of OH ADLs. Progress towards goals / Updated goals:  1. Patient will improve FOTO score by 21 points in order to demonstrate a significant improvement in function. 2. Patient will improve right shoulder AROM flexion/scaption to 90 degrees in order to increase ease of cooking. Supine flexion 94 degrees  Standing flexion with hiking 85 degrees  3. Patient will improve right behind back reaching to L4 in order to increase ease of getting dressed  Progressing: sacrum (3/9/20)  4.  Patient will demonstrate good understanding of HEP in order to continue to progress following D/C.     PLAN  [x]  Upgrade activities as tolerated     [x]  Continue plan of care  []  Update interventions per flow sheet       []  Discharge due to:_  []  Other:_      Madeline Morrison PTA 3/20/2020  7:47 AM    Future Appointments   Date Time Provider Heri Beal   3/20/2020  9:00 AM Maria T Barfield PTA [facility]   3/23/2020  9:00 AM Britton Hof, PTA [facility]   3/25/2020  9:00 AM Britton Hof, PTA [facility]   3/27/2020  9:30 AM Britton Hof, PTA [facility]   3/30/2020  9:00 AM Britton Hof, PTA [facility]   5/18/2020  1:00 PM Cara Erwin MD [place] [id number]   7/27/2020  2:30 PM [facility] TRACK NUR[facility]

## 2020-03-23 ENCOUNTER — APPOINTMENT (OUTPATIENT)
Dept: PHYSICAL THERAPY | Age: 80
End: 2020-03-23
Payer: MEDICARE

## 2020-03-25 ENCOUNTER — APPOINTMENT (OUTPATIENT)
Dept: PHYSICAL THERAPY | Age: 80
End: 2020-03-25
Payer: MEDICARE

## 2020-03-27 ENCOUNTER — APPOINTMENT (OUTPATIENT)
Dept: PHYSICAL THERAPY | Age: 80
End: 2020-03-27
Payer: MEDICARE

## 2020-03-30 ENCOUNTER — APPOINTMENT (OUTPATIENT)
Dept: PHYSICAL THERAPY | Age: 80
End: 2020-03-30
Payer: MEDICARE

## 2020-04-29 NOTE — PROGRESS NOTES
[facility] - [facility] [employer]  [address]  [phone] fax    Physical Therapy Discharge Summary    Patient name: [facility] Start of Care: 2020   Referral Osiris Major : 1940               Medical Diagnosis: Pain in right shoulder [M25.511]    Onset Date: 1/10/2020               Treatment Diagnosis: right shoulder pain   Prior Hospitalization: see medical history Provider#: [id number]   Medications: Verified on Patient summary List    Comorbidities: DVT, Arthritis, HTN, COPD, pt report of hx surgery in her neck.   Prior Level of Function: Right Hand Dominant. Previous DVT. Lives with .     Visits from Start of Care: 30    Missed Visits: 0    Reporting Period : 20 to 3/20/20    Summary of Care:  Goal:  Patient will improve FOTO score by 21 points in order to demonstrate a significant improvement in function. Status at last note/certification: 38  Status at discharge: not met    Goal: Patient will improve right shoulder AROM flexion/scaption to 90 degrees in order to increase ease of cooking. Status at last note/certification: flex: 70 degrees abd: 59 degrees  Status at discharge: not met    Goal: Patient will improve right behind back reaching to L4 in order to increase ease of getting dressed  Status at last note/certification: unable   Status at discharge: not met    Goal:  Patient will demonstrate good understanding of HEP in order to continue to progress following D/C.   Status at last note/certification: n/a  Status at discharge: not met    Unable to further assess due to COVID-19 restrictions. Pt declined telehealth services and will be discharged from outpatient therapy at this time.       ASSESSMENT/RECOMMENDATIONS:  [x]Discontinue therapy: []Patient has reached or is progressing toward set goals      []Patient is non-compliant or has abdicated      []Due to lack of appreciable progress towards set goals       X: due to [place], PT 4/29/2020 8:00 AM

## 2020-05-07 ENCOUNTER — TELEPHONE (OUTPATIENT)
Dept: ORTHOPEDIC SURGERY | Age: 80
End: 2020-05-07

## 2020-05-07 DIAGNOSIS — M12.811 ROTATOR CUFF ARTHROPATHY, RIGHT: Primary | ICD-10-CM

## 2020-05-07 NOTE — TELEPHONE ENCOUNTER
Patient called stating that she needs a new physical therapy order sent to [facility] because her last one was cancelled because of the COVID-19.  Please advise patient at [phone]

## 2020-06-06 ENCOUNTER — ANESTHESIA EVENT (OUTPATIENT)
Dept: ENDOSCOPY | Age: 80
End: 2020-06-06
Payer: MEDICARE

## 2020-06-08 ENCOUNTER — ANESTHESIA (OUTPATIENT)
Dept: ENDOSCOPY | Age: 80
End: 2020-06-08
Payer: MEDICARE

## 2020-06-08 ENCOUNTER — HOSPITAL ENCOUNTER (OUTPATIENT)
Age: 80
Setting detail: OUTPATIENT SURGERY
Discharge: HOME | End: 2020-06-08
Attending: INTERNAL MEDICINE | Admitting: INTERNAL MEDICINE
Payer: MEDICARE

## 2020-06-08 VITALS
RESPIRATION RATE: 24 BRPM | DIASTOLIC BLOOD PRESSURE: 73 MMHG | TEMPERATURE: 98.2 F | HEART RATE: 68 BPM | SYSTOLIC BLOOD PRESSURE: 137 MMHG | OXYGEN SATURATION: 98 % | WEIGHT: 121 LBS | BODY MASS INDEX: 20.16 KG/M2 | HEIGHT: 65 IN

## 2020-06-08 PROCEDURE — 74011000250 HC RX REV CODE- 250: Performed by: NURSE ANESTHETIST, CERTIFIED REGISTERED

## 2020-06-08 PROCEDURE — 77030019988 HC FCPS ENDOSC DISP BSC -B: Performed by: INTERNAL MEDICINE

## 2020-06-08 PROCEDURE — 74011250636 HC RX REV CODE- 250/636: Performed by: NURSE ANESTHETIST, CERTIFIED REGISTERED

## 2020-06-08 PROCEDURE — 88305 TISSUE EXAM BY PATHOLOGIST: CPT

## 2020-06-08 PROCEDURE — 76040000019: Performed by: INTERNAL MEDICINE

## 2020-06-08 PROCEDURE — 77030018846 HC SOL IRR STRL H20 ICUM -A: Performed by: INTERNAL MEDICINE

## 2020-06-08 PROCEDURE — 88342 IMHCHEM/IMCYTCHM 1ST ANTB: CPT

## 2020-06-08 PROCEDURE — 77030008565 HC TBNG SUC IRR ERBE -B: Performed by: INTERNAL MEDICINE

## 2020-06-08 PROCEDURE — 76060000031 HC ANESTHESIA FIRST 0.5 HR: Performed by: INTERNAL MEDICINE

## 2020-06-08 RX ORDER — SODIUM CHLORIDE 0.9 % (FLUSH) 0.9 %
5-40 SYRINGE (ML) INJECTION EVERY 8 HOURS
Status: DISCONTINUED | OUTPATIENT
Start: 2020-06-08 | End: 2020-06-08 | Stop reason: HOSPADM

## 2020-06-08 RX ORDER — SODIUM CHLORIDE, SODIUM LACTATE, POTASSIUM CHLORIDE, CALCIUM CHLORIDE 600; 310; 30; 20 MG/100ML; MG/100ML; MG/100ML; MG/100ML
75 INJECTION, SOLUTION INTRAVENOUS CONTINUOUS
Status: DISCONTINUED | OUTPATIENT
Start: 2020-06-08 | End: 2020-06-08 | Stop reason: HOSPADM

## 2020-06-08 RX ORDER — SODIUM CHLORIDE 0.9 % (FLUSH) 0.9 %
5-40 SYRINGE (ML) INJECTION AS NEEDED
Status: DISCONTINUED | OUTPATIENT
Start: 2020-06-08 | End: 2020-06-08 | Stop reason: HOSPADM

## 2020-06-08 RX ORDER — LIDOCAINE HYDROCHLORIDE 20 MG/ML
INJECTION, SOLUTION EPIDURAL; INFILTRATION; INTRACAUDAL; PERINEURAL AS NEEDED
Status: DISCONTINUED | OUTPATIENT
Start: 2020-06-08 | End: 2020-06-08 | Stop reason: HOSPADM

## 2020-06-08 RX ORDER — SODIUM CHLORIDE, SODIUM LACTATE, POTASSIUM CHLORIDE, CALCIUM CHLORIDE 600; 310; 30; 20 MG/100ML; MG/100ML; MG/100ML; MG/100ML
25 INJECTION, SOLUTION INTRAVENOUS CONTINUOUS
Status: DISCONTINUED | OUTPATIENT
Start: 2020-06-08 | End: 2020-06-08 | Stop reason: HOSPADM

## 2020-06-08 RX ORDER — LIDOCAINE HYDROCHLORIDE 10 MG/ML
0.1 INJECTION, SOLUTION EPIDURAL; INFILTRATION; INTRACAUDAL; PERINEURAL AS NEEDED
Status: DISCONTINUED | OUTPATIENT
Start: 2020-06-08 | End: 2020-06-08 | Stop reason: HOSPADM

## 2020-06-08 RX ORDER — PROPOFOL 10 MG/ML
INJECTION, EMULSION INTRAVENOUS AS NEEDED
Status: DISCONTINUED | OUTPATIENT
Start: 2020-06-08 | End: 2020-06-08 | Stop reason: HOSPADM

## 2020-06-08 RX ADMIN — SODIUM CHLORIDE, SODIUM LACTATE, POTASSIUM CHLORIDE, AND CALCIUM CHLORIDE 75 ML/HR: 600; 310; 30; 20 INJECTION, SOLUTION INTRAVENOUS at 12:17

## 2020-06-08 RX ADMIN — FAMOTIDINE 20 MG: 10 INJECTION, SOLUTION INTRAVENOUS at 12:01

## 2020-06-08 RX ADMIN — PROPOFOL 40 MG: 10 INJECTION, EMULSION INTRAVENOUS at 13:51

## 2020-06-08 RX ADMIN — PROPOFOL 60 MG: 10 INJECTION, EMULSION INTRAVENOUS at 13:49

## 2020-06-08 RX ADMIN — LIDOCAINE HYDROCHLORIDE 40 MG: 20 INJECTION, SOLUTION EPIDURAL; INFILTRATION; INTRACAUDAL; PERINEURAL at 13:49

## 2020-06-08 NOTE — ANESTHESIA POSTPROCEDURE EVALUATION
Procedure(s):  UPPER ENDOSCOPY e bx's. MAC    Anesthesia Post Evaluation      Multimodal analgesia: multimodal analgesia used between 6 hours prior to anesthesia start to PACU discharge  Patient location during evaluation: PACU  Patient participation: complete - patient participated  Level of consciousness: awake and alert  Pain management: adequate  Airway patency: patent  Anesthetic complications: no  Cardiovascular status: acceptable  Respiratory status: acceptable  Hydration status: acceptable  Post anesthesia nausea and vomiting:  controlled  Final Post Anesthesia Temperature Assessment:  Normothermia (36.0-37.5 degrees C)      INITIAL Post-op Vital signs:   Vitals Value Taken Time   /73 6/8/2020  2:21 PM   Temp 36.8 °C (98.2 °F) 6/8/2020  2:02 PM   Pulse 73 6/8/2020  2:22 PM   Resp 24 6/8/2020  2:22 PM   SpO2 97 % 6/8/2020  2:22 PM   Vitals shown include unvalidated device data.

## 2020-06-08 NOTE — ANESTHESIA PREPROCEDURE EVALUATION
Relevant Problems   No relevant active problems       Anesthetic History               Review of Systems / Medical History      Pulmonary    COPD: mild    Sleep apnea: No treatment    Asthma : well controlled       Neuro/Psych              Cardiovascular    Hypertension: well controlled        Dysrhythmias : atrial fibrillation           GI/Hepatic/Renal                Endo/Other        Arthritis     Other Findings              Physical Exam    Airway  Mallampati: II  TM Distance: 4 - 6 cm  Neck ROM: normal range of motion   Mouth opening: Normal     Cardiovascular    Rhythm: irregular  Rate: normal         Dental    Dentition: Full upper dentures     Pulmonary  Breath sounds clear to auscultation               Abdominal         Other Findings            Anesthetic Plan    ASA: 3  Anesthesia type: MAC            Anesthetic plan and risks discussed with: Patient

## 2020-06-08 NOTE — H&P
[facility]    [email or web address]      Impression:   1.hx gastric ca    Plan:     1. egd mac all risks benfits and alt discussed       Chief Complaint: surviellance       HPI:  Rosetta Kevin is a 78 y.o. female who is being seen on consult for surviellance. 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    GASTROJEJUNOSTOMY      12/10 Lia en Y (after Bilroth II)    HX HERNIA REPAIR      NEUROLOGICAL PROCEDURE UNLISTED      neck surgery       Social HX:   Social History     Socioeconomic History    Marital status:      Spouse name: Not on file    Number of children: Not on file    Years of education: Not on file    Highest education level: Not on file   Occupational History    Not on file   Social Needs    Financial resource strain: Not on file    Food insecurity     Worry: Not on file     Inability: Not on file    Transportation needs     Medical: Not on file     Non-medical: Not on file   Tobacco Use    Smoking status: Former Smoker     Last attempt to quit: 1981     Years since quittin.4    Smokeless tobacco: Never Used   Substance and Sexual Activity    Alcohol use:  Yes     Alcohol/week: 0.0 standard drinks     Comment: wine nightly    Drug use: No    Sexual activity: Not Currently   Lifestyle    Physical activity     Days per week: Not on file     Minutes per session: Not on file    Stress: Not on file   Relationships    Social connections     Talks on phone: Not on file     Gets together: Not on file     Attends Anglican service: Not on file     Active member of club or organization: Not on file     Attends meetings of clubs or organizations: Not on file     Relationship status: Not on file    Intimate partner violence     Fear of current or ex partner: Not on file     Emotionally abused: Not on file     Physically abused: Not on file     Forced sexual activity: Not on file   Other Topics Concern     Service Not Asked    Blood Transfusions Not Asked    Caffeine Concern Not Asked    Occupational Exposure Not Asked   [employer] Hazards Not Asked    Sleep Concern Not Asked    Stress Concern Not Asked    Weight Concern Not Asked    Special Diet Not Asked    Back Care Not Asked    Exercise Not Asked    Bike Helmet Not Asked   [address] Not Asked    Self-Exams Not Asked   Social History Narrative    Not on file       FHX:   Family History   Problem Relation Age of Onset    Heart Attack Father     Breast Cancer Sister     Breast Cancer Sister     Breast Cancer Other         Grandmother - nos       Allergy:   Allergies   Allergen Reactions    Codeine Nausea Only     Patient states not allergic    Diltiazem Other (comments)     Patient states not allergic       Home Medications:     Medications Prior to Admission   Medication Sig    methylPREDNISolone (MEDROL DOSEPACK) 4 mg tablet Per dose pack instructions    baclofen (LIORESAL) 10 mg tablet Take 1 Tab by mouth three (3) times daily as needed for Other (spasms).  ferrous sulfate 325 mg (65 mg iron) tablet Take 325 mg by mouth two (2) times a day.  gabapentin (NEURONTIN) 300 mg capsule nightly as needed.  CRANBERRY FRUIT EXTRACT (CRANBERRY EXTRACT PO) Take  by mouth daily.  metoprolol succinate (TOPROL-XL) 25 mg XL tablet TAKE 1 TABLET BY MOUTH TWICE DAILY    PRADAXA 150 mg capsule Take 1 Cap by mouth two (2) times a day.     cyanocobalamin (VITAMIN B12) 1,000 mcg/mL injection Taken weekly on Saturdays    digoxin (DIGOX) 0.125 mg tablet TAKE 1 TABLET BY MOUTH ONCE DAILY    tiotropium (SPIRIVA WITH HANDIHALER) 18 mcg inhalation capsule Take 1 Cap by inhalation daily.  PB-GD-MU-Fe-Min-Lycopen-Lutein (CENTRUM) 0.4-162-18 mg tab Take  by mouth daily.  pantoprazole (PROTONIX) 40 mg tablet Take 40 mg by mouth daily.  BIOTIN PO Take  by mouth two (2) times a day.  folic acid 264 mcg tablet Take 400 mcg by mouth daily.  CALCIUM PO Take  by mouth daily.  montelukast (SINGULAIR) 10 mg tablet Take 10 mg by mouth daily. Review of Systems:     Constitutional: No fevers, chills, weight loss, fatigue. Skin: No rashes, pruritis, jaundice, ulcerations, erythema. HENT: No headaches, nosebleeds, sinus pressure, rhinorrhea, sore throat. Eyes: No visual changes, blurred vision, eye pain, photophobia, jaundice. Cardiovascular: No chest pain, heart palpitations. Respiratory: No cough, SOB, wheezing, chest discomfort, orthopnea. Gastrointestinal: Neg    Genitourinary: No dysuria, bleeding, discharge, pyuria. Musculoskeletal: No weakness, arthralgias, wasting. Endo: No sweats. Heme: No bruising, easy bleeding. Allergies: As noted. Neurological: Cranial nerves intact. Alert and oriented. Gait not assessed. Psychiatric:  No anxiety, depression, hallucinations. Visit Vitals  LMP  (LMP Unknown)   Breastfeeding No       Physical Assessment:     constitutional: appearance: well developed, well nourished, normal habitus, no deformities, in no acute distress. skin: inspection: no rashes, ulcers, icterus or other lesions; no clubbing or telangiectasias. palpation: no induration or subcutaneos nodules. eyes: inspection: normal conjunctivae and lids; no jaundice pupils: symmetrical, normoreactive to light, normal accommodation and size. ENMT: mouth: normal oral mucosa,lips and gums; good dentition.  oropharynx: normal tongue, hard and soft palate; posterior pharynx without erythema, exudate or lesions. neck: no masses organomegaly or tenderness. respiratory: effort: normal chest excursion; no intercostal retraction or accessory muscle use. cardiovascular: abdominal aorta: normal size and position; no bruits. palpation: PMI of normal size and position; normal rhythm; no thrill or murmurs. abdominal: abdomen: normal consistency; no tenderness or masses. hernias: no hernias appreciated. liver: normal size and consistency. spleen: not palpable. rectal: hemoccult/guaiac: not performed. musculoskeletal: no deformities or muscle wasting   lymphatic: axilae: not palpable. groin: not palpable. neck: within normal limits. other: not palpable. neurologic: cranial nerves: II-XII normal.   psychiatric: judgement/insight: within normal limits. memory: within normal limits for recent and remote events. mood and affect: no evidence of depression, anxiety or agitation. orientation: oriented to time, space and person. Basic Metabolic Profile   No results for input(s): NA, K, CL, CO2, BUN, GLU, CA, MG, PHOS in the last 72 hours. No lab exists for component: CREAT      CBC w/Diff    No results for input(s): WBC, RBC, HGB, HCT, MCV, MCH, MCHC, RDW, PLT, HGBEXT, HCTEXT, PLTEXT in the last 72 hours. No lab exists for component: MPV No results for input(s): GRANS, LYMPH, EOS, PRO, MYELO, METAS, BLAST in the last 72 hours. No lab exists for component: MONO, BASO     Hepatic Function   No results for input(s): ALB, TP, TBILI, AP, AML, LPSE in the last 72 hours. No lab exists for component: DBILI, GPT, SGOT       Stoney Davidson MD, M.D. [facility] Anupam Cortez Milli 1947, [address] [facility]  [email or web address]

## 2020-06-08 NOTE — DISCHARGE INSTRUCTIONS
DISCHARGE SUMMARY from Nurse    PATIENT INSTRUCTIONS:    After general anesthesia or intravenous sedation, for 24 hours or while taking prescription Narcotics:  · Limit your activities  · Do not drive and operate hazardous machinery  · Do not make important personal or business decisions  · Do  not drink alcoholic beverages  · If you have not urinated within 8 hours after discharge, please contact your surgeon on call. Report the following to your surgeon:  · Excessive pain, swelling, redness or odor of or around the surgical area  · Temperature over 100.5  · Nausea and vomiting lasting longer than 4 hours or if unable to take medications  · Any signs of decreased circulation or nerve impairment to extremity: change in color, persistent  numbness, tingling, coldness or increase pain  · Any questions    What to do at Home:  Recommended activity: Activity as tolerated and no driving for today. *  Please give a list of your current medications to your Primary Care Provider. *  Please update this list whenever your medications are discontinued, doses are      changed, or new medications (including over-the-counter products) are added. *  Please carry medication information at all times in case of emergency situations. These are general instructions for a healthy lifestyle:    No smoking/ No tobacco products/ Avoid exposure to second hand smoke  Surgeon General's Warning:  Quitting smoking now greatly reduces serious risk to your health. Obesity, smoking, and sedentary lifestyle greatly increases your risk for illness    A healthy diet, regular physical exercise & weight monitoring are important for maintaining a healthy lifestyle    You may be retaining fluid if you have a history of heart failure or if you experience any of the following symptoms:  Weight gain of 3 pounds or more overnight or 5 pounds in a week, increased swelling in our hands or feet or shortness of breath while lying flat in bed. Please call your doctor as soon as you notice any of these symptoms; do not wait until your next office visit. Patient Education        Upper GI Endoscopy: What to Expect at [address] had an upper GI endoscopy. Your doctor used a thin, lighted tube that bends to look at the inside of your esophagus, your stomach, and the first part of the small intestine, called the duodenum. After you have an endoscopy, you will stay at the hospital or clinic for 1 to 2 hours. This will allow the medicine to wear off. You will be able to go home after your doctor or nurse checks to make sure that you're not having any problems. You may have to stay overnight if you had treatment during the test. You may have a sore throat for a day or two after the test.  This care sheet gives you a general idea about what to expect after the test.  How can you care for yourself at home? Activity   · Rest as much as you need to after you go home. · You should be able to go back to your usual activities the day after the test.  Diet   · Follow your doctor's directions for eating after the test.  · Drink plenty of fluids (unless your doctor has told you not to). Medications   · If you have a sore throat the day after the test, use an over-the-counter spray to numb your throat. Follow-up care is a key part of your treatment and safety. Be sure to make and go to all appointments, and call your doctor if you are having problems. It's also a good idea to know your test results and keep a list of the medicines you take. When should you call for help? [id number] anytime you think you may need emergency care. For example, call if:  · You passed out (loses consciousness). · You have trouble breathing. · You pass maroon or bloody stools. Call your doctor now or seek immediate medical care if:  · You have pain that does not get better after your take pain medicine. · You have new or worse belly pain.   · You have blood in your stools. · You are sick to your stomach and cannot keep fluids down. · You have a fever. · You cannot pass stools or gas. Watch closely for changes in your health, and be sure to contact your doctor if:  · Your throat still hurts after a day or two. · You do not get better as expected. Where can you learn more? Go to http://bety-norma.info/  Enter J454 in the search box to learn more about \"Upper GI Endoscopy: What to Expect at Home. \"  Current as of: August 12, 2019               Content Version: 12.5  © 9685-1581 [employer], [employer]. Care instructions adapted under license by [employer] (which disclaims liability or warranty for this information). If you have questions about a medical condition or this instruction, always ask your healthcare professional. [address] any warranty or liability for your use of this information. The discharge information has been reviewed with the patient. The patient verbalized understanding. Discharge medications reviewed with the patient and appropriate educational materials and side effects teaching were provided.   ___________________________________________________________________________________________________________________________________

## 2020-06-09 NOTE — PROCEDURES
[place]  [facility], [address]    Procedure Note    Patient: Miles Saldana MRN: [id number]  SSN: [id number]    YOB: 1940  Age: 78 y.o. Sex: female      Date/Time:  2020 12:46 PM    Esophagogastroduodenoscopy (EGD) Procedure Note    Procedure: Esophagogastroduodenoscopy with biopsy    Impression:    -normal post gastrectomy appearnace     Recommendations:  -check bxs -repeat in 3 years     Indication: hx of gastric cancer   Pre-operative Diagnosis: see indication above  Post-operative Diagnosis: see findings below  :  Kevin Ortega MD  Referring Provider:   Stefani Cruz MD    Exam:  Airway: clear, no airway problems anticipated  Heart: RRR, without gallops or rubs  Lungs: clear bilaterally without wheezes, crackles, or rhonchi  Abdomen: soft, nontender, nondistended, bowel sounds present  Mental Status: awake, alert and oriented to person, place and time     Anethesia/Sedation:  MAC anesthesia Propofol  Procedure Details   After informed consent was obtained for the procedure, with all risks and benefits of procedure explained the patient was taken to the endoscopy suite and placed in the left lateral decubitus position. Following sequential administration of sedation as per above, the [id number] gastroscope was inserted into the mouth and advanced under direct vision to third portion of the duodenum. A careful inspection was made as the gastroscope was withdrawn, including a retroflexed view of the proximal stomach; findings and interventions are described below. Findings: normal post gastrectomy anatomy     Therapies:  none  Specimens: gastric remnant bxs   Estimated blood loss:  None  Surgical assistants none  Implants none            Complications:   None; patient tolerated the procedure well.     Discharge disposition:  Home in the company of  when able to ambulate    Lynn Juarez MD

## 2020-07-16 ENCOUNTER — APPOINTMENT (OUTPATIENT)
Dept: INFUSION THERAPY | Age: 80
End: 2020-07-16
Payer: MEDICARE

## 2020-07-27 ENCOUNTER — HOSPITAL ENCOUNTER (OUTPATIENT)
Dept: INFUSION THERAPY | Age: 80
Discharge: HOME | End: 2020-07-27
Payer: MEDICARE

## 2020-07-27 ENCOUNTER — HOSPITAL ENCOUNTER (OUTPATIENT)
Dept: INFUSION THERAPY | Age: 80
End: 2020-07-27
Payer: MEDICARE

## 2020-07-27 LAB
ALBUMIN SERPL-MCNC: 2.3 G/DL (ref 3.4–5)
ALBUMIN/GLOB SERPL: 1 {RATIO} (ref 0.8–1.7)
ALP SERPL-CCNC: 49 U/L (ref 45–117)
ALT SERPL-CCNC: 24 U/L (ref 13–56)
ANION GAP SERPL CALC-SCNC: 6 MMOL/L (ref 3–18)
AST SERPL-CCNC: 16 U/L (ref 10–38)
BILIRUB SERPL-MCNC: 0.3 MG/DL (ref 0.2–1)
BUN SERPL-MCNC: 19 MG/DL (ref 7–18)
BUN/CREAT SERPL: 26 (ref 12–20)
CALCIUM SERPL-MCNC: 8.3 MG/DL (ref 8.5–10.1)
CHLORIDE SERPL-SCNC: 110 MMOL/L (ref 100–111)
CO2 SERPL-SCNC: 27 MMOL/L (ref 21–32)
CREAT SERPL-MCNC: 0.74 MG/DL (ref 0.6–1.3)
GLOBULIN SER CALC-MCNC: 2.4 G/DL (ref 2–4)
GLUCOSE SERPL-MCNC: 98 MG/DL (ref 74–99)
MAGNESIUM SERPL-MCNC: 1.9 MG/DL (ref 1.6–2.6)
PHOSPHATE SERPL-MCNC: 4.2 MG/DL (ref 2.5–4.9)
POTASSIUM SERPL-SCNC: 3.8 MMOL/L (ref 3.5–5.5)
PROT SERPL-MCNC: 4.7 G/DL (ref 6.4–8.2)
SODIUM SERPL-SCNC: 143 MMOL/L (ref 136–145)

## 2020-07-27 PROCEDURE — 80053 COMPREHEN METABOLIC PANEL: CPT

## 2020-07-27 PROCEDURE — 83735 ASSAY OF MAGNESIUM: CPT

## 2020-07-27 PROCEDURE — 36415 COLL VENOUS BLD VENIPUNCTURE: CPT

## 2020-07-27 PROCEDURE — 84100 ASSAY OF PHOSPHORUS: CPT

## 2020-07-27 NOTE — PROGRESS NOTES
[facility] Lab Visit:    Maxim Asp  1940  [id number]    6171:  Pt arrived ambulatory. Labs drawn peripherally in left ac and sent for processing. Pt is scheduled to return on 8/3/20 for treatment. Departed [facility] ambulatory and in no distress. There were no vitals taken for this visit.

## 2020-07-28 ENCOUNTER — APPOINTMENT (OUTPATIENT)
Dept: INFUSION THERAPY | Age: 80
End: 2020-07-28
Payer: MEDICARE

## 2020-07-30 RX ORDER — ONDANSETRON 2 MG/ML
8 INJECTION INTRAMUSCULAR; INTRAVENOUS AS NEEDED
Status: CANCELLED | OUTPATIENT
Start: 2020-08-03

## 2020-07-30 RX ORDER — EPINEPHRINE 1 MG/ML
0.3 INJECTION, SOLUTION, CONCENTRATE INTRAVENOUS AS NEEDED
Status: CANCELLED | OUTPATIENT
Start: 2020-08-03

## 2020-07-30 RX ORDER — HYDROCORTISONE SODIUM SUCCINATE 100 MG/2ML
100 INJECTION, POWDER, FOR SOLUTION INTRAMUSCULAR; INTRAVENOUS AS NEEDED
Status: CANCELLED | OUTPATIENT
Start: 2020-08-03

## 2020-07-30 RX ORDER — ALBUTEROL SULFATE 0.83 MG/ML
2.5 SOLUTION RESPIRATORY (INHALATION) AS NEEDED
Status: CANCELLED
Start: 2020-08-03

## 2020-07-30 RX ORDER — DIPHENHYDRAMINE HYDROCHLORIDE 50 MG/ML
25 INJECTION, SOLUTION INTRAMUSCULAR; INTRAVENOUS AS NEEDED
Status: CANCELLED
Start: 2020-08-03

## 2020-07-30 RX ORDER — DIPHENHYDRAMINE HYDROCHLORIDE 50 MG/ML
50 INJECTION, SOLUTION INTRAMUSCULAR; INTRAVENOUS AS NEEDED
Status: CANCELLED
Start: 2020-08-03

## 2020-07-30 RX ORDER — ACETAMINOPHEN 325 MG/1
650 TABLET ORAL AS NEEDED
Status: CANCELLED
Start: 2020-08-03

## 2020-08-03 ENCOUNTER — HOSPITAL ENCOUNTER (OUTPATIENT)
Dept: INFUSION THERAPY | Age: 80
Discharge: HOME | End: 2020-08-03
Payer: MEDICARE

## 2020-08-03 VITALS
RESPIRATION RATE: 20 BRPM | TEMPERATURE: 98.4 F | HEART RATE: 68 BPM | OXYGEN SATURATION: 97 % | SYSTOLIC BLOOD PRESSURE: 109 MMHG | DIASTOLIC BLOOD PRESSURE: 71 MMHG

## 2020-08-03 DIAGNOSIS — M81.8 IDIOPATHIC OSTEOPOROSIS: ICD-10-CM

## 2020-08-03 DIAGNOSIS — M81.8 IDIOPATHIC OSTEOPOROSIS: Primary | ICD-10-CM

## 2020-08-03 PROCEDURE — 96372 THER/PROPH/DIAG INJ SC/IM: CPT

## 2020-08-03 PROCEDURE — 74011250636 HC RX REV CODE- 250/636: Performed by: FAMILY MEDICINE

## 2020-08-03 RX ORDER — ACETAMINOPHEN 325 MG/1
650 TABLET ORAL AS NEEDED
Status: CANCELLED
Start: 2021-02-01

## 2020-08-03 RX ORDER — ALBUTEROL SULFATE 0.83 MG/ML
2.5 SOLUTION RESPIRATORY (INHALATION) AS NEEDED
Status: CANCELLED
Start: 2021-02-01

## 2020-08-03 RX ORDER — HYDROCORTISONE SODIUM SUCCINATE 100 MG/2ML
100 INJECTION, POWDER, FOR SOLUTION INTRAMUSCULAR; INTRAVENOUS AS NEEDED
Status: CANCELLED | OUTPATIENT
Start: 2021-02-01

## 2020-08-03 RX ORDER — DIPHENHYDRAMINE HYDROCHLORIDE 50 MG/ML
50 INJECTION, SOLUTION INTRAMUSCULAR; INTRAVENOUS AS NEEDED
Status: CANCELLED
Start: 2021-02-01

## 2020-08-03 RX ORDER — ONDANSETRON 2 MG/ML
8 INJECTION INTRAMUSCULAR; INTRAVENOUS AS NEEDED
Status: CANCELLED | OUTPATIENT
Start: 2021-02-01

## 2020-08-03 RX ORDER — DIPHENHYDRAMINE HYDROCHLORIDE 50 MG/ML
25 INJECTION, SOLUTION INTRAMUSCULAR; INTRAVENOUS AS NEEDED
Status: CANCELLED
Start: 2021-02-01

## 2020-08-03 RX ORDER — EPINEPHRINE 1 MG/ML
0.3 INJECTION, SOLUTION, CONCENTRATE INTRAVENOUS AS NEEDED
Status: CANCELLED | OUTPATIENT
Start: 2021-02-01

## 2020-08-03 RX ADMIN — DENOSUMAB 60 MG: 60 INJECTION SUBCUTANEOUS at 10:54

## 2020-08-03 NOTE — PROGRESS NOTES
[facility] Progress Note    Date: August 3, 2020    Name: Lilo Vaughn    MRN: [id number]         : 1940      Ms. Conchita Day arrived in the [facility] today at [id number], in stable condition, here for Q 6 Month, SQ Prolia Injection. She was assessed and education was provided. Ms. Patricia Hernández vitals were reviewed. Visit Vitals  /71 (BP 1 Location: Left arm, BP Patient Position: Sitting)   Pulse 68   Temp 98.4 °F (36.9 °C)   Resp 20   SpO2 97%   Breastfeeding No             Her most recent CMP, Magnesium, & Phosphorus, Levels from 20 were reviewed, and were as follows:      Results for Swetha Jimenes (MRN [id number])    Ref. Range 2020 14:30   Sodium Latest Ref Range: 136 - 145 mmol/L 143   Potassium Latest Ref Range: 3.5 - 5.5 mmol/L 3.8   Chloride Latest Ref Range: 100 - 111 mmol/L 110   CO2 Latest Ref Range: 21 - 32 mmol/L 27   Anion gap Latest Ref Range: 3.0 - 18 mmol/L 6   Glucose Latest Ref Range: 74 - 99 mg/dL 98   BUN Latest Ref Range: 7.0 - 18 MG/DL 19 (H)   Creatinine Latest Ref Range: 0.6 - 1.3 MG/DL 0.74   BUN/Creatinine ratio Latest Ref Range: 12 - 20   26 (H)   Calcium Latest Ref Range: 8.5 - 10.1 MG/DL 8.3 (L)   Phosphorus Latest Ref Range: 2.5 - 4.9 MG/DL 4.2   Magnesium Latest Ref Range: 1.6 - 2.6 mg/dL 1.9   GFR est non-AA Latest Ref Range: >60 ml/min/1.73m2 >60   GFR est AA Latest Ref Range: >60 ml/min/1.73m2 >60   Bilirubin, total Latest Ref Range: 0.2 - 1.0 MG/DL 0.3   Protein, total Latest Ref Range: 6.4 - 8.2 g/dL 4.7 (L)   Albumin Latest Ref Range: 3.4 - 5.0 g/dL 2.3 (L)   Globulin Latest Ref Range: 2.0 - 4.0 g/dL 2.4   A-G Ratio Latest Ref Range: 0.8 - 1.7   1.0   ALT Latest Ref Range: 13 - 56 U/L 24   AST Latest Ref Range: 10 - 38 U/L 16   Alk. phosphatase Latest Ref Range: 45 - 117 U/L 49         The low serum calcium result listed above, was discussed with [facility] pharmacist [facility].      Corrected Calcium Result was calculated to be 9.66, which was satisfactory for Prolia administration today, and this calculation was also verified with [facility] pharmacist [facility]. Prolia (denosumab) 60 mg, was administered SQ, in her left arm at 1054, per order, and without incident.   ;      Ms. Lauren Dillard tolerated well, and had no complaints. Ms. Lauren Dillard was discharged from [place] [address] in stable condition at 1100. Jarrett Tucker She is to return in 6 months, on Monday, 1-25-21, at 1000, for her next appointment, for pre-Prolia Labs. And then, her next Prolia Injection is scheduled for Monday, 2-1-21, at 1000. . .     Dylan Oreilly RN  August 3, 2020  2:42 PM

## 2020-08-28 ENCOUNTER — OFFICE VISIT (OUTPATIENT)
Dept: VASCULAR SURGERY | Age: 80
End: 2020-08-28

## 2020-08-28 VITALS
SYSTOLIC BLOOD PRESSURE: 122 MMHG | WEIGHT: 121 LBS | HEIGHT: 65 IN | DIASTOLIC BLOOD PRESSURE: 70 MMHG | HEART RATE: 68 BPM | RESPIRATION RATE: 18 BRPM | OXYGEN SATURATION: 99 % | BODY MASS INDEX: 20.16 KG/M2

## 2020-08-28 DIAGNOSIS — R60.0 BILATERAL EDEMA OF LOWER EXTREMITY: Primary | ICD-10-CM

## 2020-08-28 NOTE — PROGRESS NOTES
Mandy Hernandez    Chief Complaint   Patient presents with    Leg Swelling    Leg Pain       History and Physical    60-year-old female with progressive swelling of both legs. Her legs become more uncomfortable than before. She has had cardiac work-up by Dr. Pooja Underwood. Her lab work-up done overall health is been stable. She tells me sometimes has a hard time getting up because of discomfort in the legs. She has swelling right side more than left and just a general feeling of fullness and discomfort in her legs all the time. No shortness of breath no injury no chest pain no feeling of fever they have been sheltering at home well.     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    Right shoulder pain M25.511    Postlaminectomy syndrome M96.1    Aorta aneurysm I71.9    Chronic cholecystitis with calculus K80.10    Rotator cuff tear arthropathy of right shoulder M75.101, M12.811    Idiopathic osteoporosis M81.8     Past Surgical History:   Procedure Laterality Date    GASTROJEJUNOSTOMY      12/10 Bilroth II    GASTROJEJUNOSTOMY      12/10 Lia en Y (after Bilroth II)    HX HERNIA REPAIR      NEUROLOGICAL PROCEDURE UNLISTED  2006    neck surgery     Current Outpatient Medications   Medication Sig Dispense Refill    methylPREDNISolone (MEDROL DOSEPACK) 4 mg tablet Per dose pack instructions 1 Dose Pack 0    baclofen (LIORESAL) 10 mg tablet Take 1 Tab by mouth three (3) times daily as needed for Other (spasms). 90 Tab 0    ferrous sulfate 325 mg (65 mg iron) tablet Take 325 mg by mouth two (2) times a day.  gabapentin (NEURONTIN) 300 mg capsule nightly as needed. 1    CRANBERRY FRUIT EXTRACT (CRANBERRY EXTRACT PO) Take  by mouth daily.  metoprolol succinate (TOPROL-XL) 25 mg XL tablet TAKE 1 TABLET BY MOUTH TWICE DAILY 180 Tab 3    PRADAXA 150 mg capsule Take 1 Cap by mouth two (2) times a day. 180 Cap 3    cyanocobalamin (VITAMIN B12) 1,000 mcg/mL injection Taken weekly on Saturdays  1    digoxin (DIGOX) 0.125 mg tablet TAKE 1 TABLET BY MOUTH ONCE DAILY 90 Tab 3    tiotropium (SPIRIVA WITH HANDIHALER) 18 mcg inhalation capsule Take 1 Cap by inhalation daily. 20 Cap 0    PV-DH-FZ-Fe-Min-Lycopen-Lutein (CENTRUM) 0.4-162-18 mg tab Take  by mouth daily.  pantoprazole (PROTONIX) 40 mg tablet Take 40 mg by mouth daily.  BIOTIN PO Take  by mouth two (2) times a day.  folic acid 596 mcg tablet Take 400 mcg by mouth daily.  CALCIUM PO Take  by mouth daily.  montelukast (SINGULAIR) 10 mg tablet Take 10 mg by mouth daily. Allergies   Allergen Reactions    Codeine Nausea Only     Patient states not allergic    Diltiazem Other (comments)     Patient states not allergic       Review of Systems    A full review of systems was completed times ten organ systems and was deemed negative unless otherwise mentioned in the HPI.     Physical   Visit Vitals  /70 (BP 1 Location: Left arm, BP Patient Position: Sitting)   Pulse 68   Resp 18   Ht 5' 5\" (1.651 m)   Wt 121 lb (54.9 kg)   LMP  (LMP Unknown)   SpO2 99%   BMI 20.14 kg/m²       Most pleasant female in no distress  Head is normocephalic and atraumatic  Neck no JVD  Chest is clear  Cardiac regular  Abdomen soft flat nontender  Lower extremities do have edema right side worse than left moderate in nature  Scattered varicose veins more pronounced  No signs of arterial deficit she has multiphasic pulses by handheld Doppler  No skin ulcerations    Impression/Plan:     ICD-10-CM ICD-9-CM    1. Bilateral edema of lower extremity  R60.0 782.3 DUPLEX LOWER EXT VENOUS BILAT     No orders of the defined types were placed in this encounter. Her complaints do mimic that of venous insufficiency on a center for reflux study  She is had negative testing in the past  She struggles with compression as she feels that they did not offer her much help. Follow-up and Dispositions    · Return in about 4 weeks (around 9/25/2020). Clarence Luna MD    PLEASE NOTE:  This document has been produced using voice recognition software. Unrecognized errors in transcription may be present.

## 2020-11-03 ENCOUNTER — OFFICE VISIT (OUTPATIENT)
Dept: SURGERY | Age: 80
End: 2020-11-03
Payer: MEDICARE

## 2020-11-03 VITALS
TEMPERATURE: 98 F | OXYGEN SATURATION: 99 % | DIASTOLIC BLOOD PRESSURE: 66 MMHG | RESPIRATION RATE: 18 BRPM | SYSTOLIC BLOOD PRESSURE: 128 MMHG | HEIGHT: 65 IN | WEIGHT: 112 LBS | HEART RATE: 66 BPM | BODY MASS INDEX: 18.66 KG/M2

## 2020-11-03 DIAGNOSIS — K43.2 RECURRENT INCISIONAL HERNIA: Primary | ICD-10-CM

## 2020-11-03 PROCEDURE — 99204 OFFICE O/P NEW MOD 45 MIN: CPT | Performed by: SURGERY

## 2020-11-03 RX ORDER — LIDOCAINE 50 MG/G
PATCH TOPICAL EVERY 24 HOURS
COMMUNITY

## 2020-11-03 RX ORDER — ATORVASTATIN CALCIUM 40 MG/1
TABLET, FILM COATED ORAL
COMMUNITY
Start: 2020-10-15

## 2020-11-03 RX ORDER — ALBUTEROL SULFATE 90 UG/1
2 AEROSOL, METERED RESPIRATORY (INHALATION)
COMMUNITY
Start: 2020-09-20

## 2020-11-03 NOTE — PROGRESS NOTES
Consult    Patient: Lupe Esquivel MRN: [id number]  SSN: [id number]    YOB: 1940  Age: 78 y.o. Sex: female      Subjective:      Lupe Esquivel is a 78 y.o. who presents in referral for evaluation of a bulge present for the last 8 years in the right lateral quadrant abdominal location which is slowly increasing in size and associated symptomatology. The patient significantly has had a history of multiple breast surgeries apparently in  required flap reconstruction of her right breast by Dr. Tyrell Garcia with what appears to have been a right rectus donor. Apparently patient developed a hernia which was repaired by Dr. Tyrell Garcia subsequent to that procedure and had done well until last 8 years as noted. Patient denies obstructive GI  and pulmonary symptoms  Allergies: Codeine, diltiazem  Current medications: See medication list  Past medical history:  1. Acute on chronic congestive heart failure with a recent ejection fraction of 30%  2. Cardiomyopathy  3. Coronary disease  4. Obstructive sleep apnea  5. COPD  6. Hypertension  7. Hypercholesterolemia  8. Atrial fibrillation  9. Gastroesophageal reflux disease  10. Depression  11. Thoracic aneurysm  12. History of right subclavian DVT  13. 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 Right shoulder replacement  2. Laminectomy/fusion 2020  3. Multiple bilateral breast biopsies  4. Right shoulder replacement  5. Right breast reconstruction with questionable rectus free flap   6. Repair of right lower quadrant abdominal hernia and region of rectus harvest  7.   Antrectomy/Billroth II reconstruction  Social history:  Quit smoking in  with a prior history of 1 pack of cigarettes daily for 10 years  Alcohol: Approximately 1 ounce weekly  Family history:  Father  60s-unknown etiology  Mother  82-traumatic intracranial hematoma  Sister  54-breast carcinoma  Sister  52-malignancy unknown etiology  Allergies   Allergen Reactions    Codeine Nausea Only     Patient states not allergic    Diltiazem Other (comments)     Patient states not allergic     Current Outpatient Medications   Medication Sig Dispense Refill    albuterol (PROVENTIL HFA, VENTOLIN HFA, PROAIR HFA) 90 mcg/actuation inhaler Take 2 Puffs by inhalation every four (4) hours as needed.  lidocaine (LIDODERM) 5 % by TransDERmal route every twenty-four (24) hours. Apply patch to the affected area for 12 hours a day and remove for 12 hours a day.  baclofen (LIORESAL) 10 mg tablet Take 1 Tab by mouth three (3) times daily as needed for Other (spasms). 90 Tab 0    ferrous sulfate 325 mg (65 mg iron) tablet Take 325 mg by mouth two (2) times a day.  metoprolol succinate (TOPROL-XL) 25 mg XL tablet TAKE 1 TABLET BY MOUTH TWICE DAILY 180 Tab 3    PRADAXA 150 mg capsule Take 1 Cap by mouth two (2) times a day. 180 Cap 3    cyanocobalamin (VITAMIN B12) 1,000 mcg/mL injection Taken weekly on   1    digoxin (DIGOX) 0.125 mg tablet TAKE 1 TABLET BY MOUTH ONCE DAILY 90 Tab 3    tiotropium (SPIRIVA WITH HANDIHALER) 18 mcg inhalation capsule Take 1 Cap by inhalation daily. 20 Cap 0    MB-TP-UN-Fe-Min-Lycopen-Lutein (CENTRUM) 0.4-162-18 mg tab Take  by mouth daily.  pantoprazole (PROTONIX) 40 mg tablet Take 40 mg by mouth daily.  BIOTIN PO Take  by mouth two (2) times a day.  CALCIUM PO Take  by mouth daily.  montelukast (SINGULAIR) 10 mg tablet Take 10 mg by mouth daily.  atorvastatin (LIPITOR) 40 mg tablet TK 1 T PO QHS      methylPREDNISolone (MEDROL DOSEPACK) 4 mg tablet Per dose pack instructions 1 Dose Pack 0    gabapentin (NEURONTIN) 300 mg capsule nightly as needed. 1    CRANBERRY FRUIT EXTRACT (CRANBERRY EXTRACT PO) Take  by mouth daily.       folic acid 670 mcg tablet Take 400 mcg by mouth daily. Past Medical History:   Diagnosis Date    Aorta aneurysm     ascending 4.0 cm (CTA 5/15)    Arthritis     Asthma     Atrial fibrillation     CHADS score 1 (-CHF, +HTN, -AGE, -DM, -CVA)    CAD (coronary artery disease)     Cardiomyopathy ([address]     COPD     Depression     nos    DVT     12/10  R Subclavian (PICC associated)    Dyslipidemia     Hypertension     Hypertension     Long term current use of anticoagulant therapy     Papillary adenocarcinoma     gastric    Pernicious anemia     Sleep apnea      Past Surgical History:   Procedure Laterality Date    GASTROJEJUNOSTOMY      12/10 Bilroth II    GASTROJEJUNOSTOMY      12/10 [facility] (after Bilroth II)    HX GI      portion of stomach removed    HX HERNIA REPAIR      right lower abd    NEUROLOGICAL PROCEDURE UNLISTED      neck surgery    NEUROLOGICAL PROCEDURE UNLISTED      right shoulder replacement then had scar tissue removed    NEUROLOGICAL PROCEDURE UNLISTED      two fusions       Social History     Tobacco Use    Smoking status: Former Smoker     Last attempt to quit: 1981     Years since quittin.8    Smokeless tobacco: Never Used   Substance Use Topics    Alcohol use: Yes     Alcohol/week: 0.0 standard drinks     Frequency: 2-4 times a month     Comment: wine nightly     Family History   Problem Relation Age of Onset    Heart Attack Father     Breast Cancer Sister     Breast Cancer Sister     Breast Cancer Other         Grandmother - nos           Review of Systems:    General: Denies fevers, chills, night sweats, fatigue, weight loss, or weight gain.     HEENT: Denies changes in auditory or visual acuity, recurrent pharyngitis, epistaxis, chronic rhinorrhea, vertigo    Respiratory: Denies increasing shortness of breath, productive cough, hemoptysis    Cardiac: Denies known history of cardiac disease, heart murmur, palpitations    GI: Denies dysphagia, recurrent emesis, hematemesis, changes in bowel habits, hematochezia, melena    : Denies hematuria frequency urgency dysuria    Musculoskeletal: Denies fractures, dislocations    Neurologic: Denies history of CVA, paralysis paresthesias, recurrent cephalgia, seizures    Endocrine: Denies polyuria, polydipsia, polyphagia, heat and cold intolerance    Lymph/heme: Denies a history of malignancy, anemia, bruising, blood transfusions    Integumentary: Negative for dermatitis     Objective:     Vitals:    11/03/20 0838   BP: 128/66   Pulse: 66   Resp: 18   Temp: 98 °F (36.7 °C)   SpO2: 99%   Weight: 50.8 kg (112 lb)   Height: 5' 5\" (1.651 m)        General: no acute distress, nontoxic in appearance. Head: Normocephalic, atraumatic  Mouth: Clear, no overt lesions, oral mucosa is pink and moist.  Neck: Supple, no masses, no adenopathy or carotid bruits, trachea midline  Resp: Clear to auscultation bilaterally, no wheezing, rhonchi, or rales, excursions normal and symmetrical.  Cardio: Regular rate and rhythm, no murmurs, clicks, gallops, or rubs. Abdomen: soft, nontender, nondistended, normoactive bowel sounds, well-healed incision extending from the centimeters of the pubic symphysis and are up laterally and just superior to the right anterior superior iliac spine there is diffuse bulging of the area with a poorly appreciated fascial defect of approximately 8 cm with subcutaneous component of 15 cm containing enteric contents  Extremities: Warm, well perfused, no tenderness or swelling, normal gait/station, without edema or varicosities  Neuro: Sensation and strength grossly intact and symmetrical.  Psych: Alert and oriented to person, place, and time.           Assessment:     Recurrent incisional hernia      Plan:     Abdominal/pelvic  CT to assist in characterization of the right lower quad abdominal bulge clinically recurrent hernia  Follow-up after above for discussion and formulation of surgical intervention    Signed By: Latrice Hernandez Cinthia Ca, DO     November 3, 2020

## 2020-11-10 DIAGNOSIS — K43.2 RECURRENT INCISIONAL HERNIA: ICD-10-CM

## 2020-11-13 ENCOUNTER — HOSPITAL ENCOUNTER (OUTPATIENT)
Dept: PHYSICAL THERAPY | Age: 80
Discharge: HOME | End: 2020-11-13
Payer: MEDICARE

## 2020-11-13 PROCEDURE — 97110 THERAPEUTIC EXERCISES: CPT

## 2020-11-13 PROCEDURE — 97162 PT EVAL MOD COMPLEX 30 MIN: CPT
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
## 2020-11-18 ENCOUNTER — HOSPITAL ENCOUNTER (OUTPATIENT)
Dept: CT IMAGING | Age: 80
Discharge: HOME | End: 2020-11-18
Attending: SURGERY
Payer: MEDICARE

## 2020-11-18 PROCEDURE — 74176 CT ABD & PELVIS W/O CONTRAST: CPT

## 2020-11-19 ENCOUNTER — HOSPITAL ENCOUNTER (OUTPATIENT)
Dept: PHYSICAL THERAPY | Age: 80
Discharge: HOME | End: 2020-11-19
Payer: MEDICARE

## 2020-11-19 PROCEDURE — 97110 THERAPEUTIC EXERCISES: CPT

## 2020-11-19 NOTE — PROGRESS NOTES
PT DAILY TREATMENT NOTE     Patient Name: Will Wren  Date:2020  : 1940  [x]  Patient  Verified  Payor: [place] MEDICARE / Plan: [place] MEDICARE PART A & B / Product Type: Medicare /    In time: 2:15  Out time: 3:15  Total Treatment Time (min): 60  Visit #: 2 of 8    Medicare/BCBS Only   Total Timed Codes (min):  50 1:1 Treatment Time:  50       Treatment Area: Lower back pain [M54.5]    SUBJECTIVE  Pain Level (0-10 scale): 5/10  Any medication changes, allergies to medications, adverse drug reactions, diagnosis change, or new procedure performed?: [x] No    [] Yes (see summary sheet for update)  Subjective functional status/changes:   [] No changes reported  Pt reports her pain is gradually getting better in her back but she thinks the exercises irritated it some. She reports difficulty getting off the ground now requiring her arms more to pull her up.      OBJECTIVE    Modality rationale: decrease pain and increase tissue extensibility to improve the patients ability to improve ease of ambulation   Min Type Additional Details    [] Estim:  []Unatt       []IFC  []Premod                        []Other:  []w/ice   []w/heat  Position:  Location:    [] Estim: []Att    []TENS instruct  []NMES                    []Other:  []w/US   []w/ice   []w/heat  Position:  Location:    []  Traction: [] Cervical       []Lumbar                       [] Prone          []Supine                       []Intermittent   []Continuous Lbs:  [] before manual  [] after manual    []  Ultrasound: []Continuous   [] Pulsed                           []1MHz   []3MHz W/cm2:  Location:    []  Iontophoresis with dexamethasone         Location: [] Take home patch   [] In clinic   10 []  Ice     [x]  heat  []  Ice massage  []  Laser   []  Anodyne Position:seated  Location: l/s    []  Laser with stim  []  Other:  Position:  Location:    []  Vasopneumatic Device Pressure:       [] lo [] med [] hi   Temperature: [] lo [] med [] hi   [x] Skin assessment post-treatment:  [x]intact []redness- no adverse reaction    []redness  adverse reaction:     50 min Therapeutic Exercise:  [x] See flow sheet :   Rationale: increase ROM and increase strength to improve the patients ability to improve ease of ambulation            With   [] TE   [] TA   [] neuro   [] other: Patient Education: [x] Review HEP    [] Progressed/Changed HEP based on:   [] positioning   [] body mechanics   [] transfers   [] heat/ice application    [] other:      Other Objective/Functional Measures:   T/s kyphosis and trunk extension posture  Decreased core strength  B LE swelling right>left  Left upslip noted in supine  Challenged with sit to stands from the table with decreased anterior weight shift    Pain Level (0-10 scale) post treatment: 4/10    ASSESSMENT/Changes in Function: Initiated exercise program per POC. Pt with increased trunk extension and t/s kyphotic posture. She has decreased B LE strength and core strength. She is struggling with floor to stand transfers to perform daily chores. Will continue to progress core strength, back mobility, posture and leg strength for ease of completing ADLs, chores and standing with decreased pain. Patient will continue to benefit from skilled PT services to modify and progress therapeutic interventions, address functional mobility deficits, address ROM deficits, address strength deficits, analyze and address soft tissue restrictions, analyze and cue movement patterns, analyze and modify body mechanics/ergonomics, assess and modify postural abnormalities, address imbalance/dizziness and instruct in home and community integration to attain remaining goals. [x]  See Plan of Care  []  See progress note/recertification  []  See Discharge Summary         Progress towards goals / Updated goals:  Short Term Goals: To be accomplished in 1 weeks:  1.  Patient will demonstrate compliance with HEP in order to improve LE strength for increase ease of transfers at home. [facility]  Long Term Goals: To be accomplished in 4 weeks:  1. Patient will improve FOTO score by 13 points in order to demonstrate a significant improvement in function. 2. Patient will improve B hip abduction MMT to 4/5 in order to increase ease of ambulation. 3. Patient will improve 30 second sit to stand test to 8x in order to increase ease of transfers at home.    4. Patient will report pain at 2/10 or less during ADLs in order to improve quality of life.        PLAN  [x]  Upgrade activities as tolerated     [x]  Continue plan of care  []  Update interventions per flow sheet       []  Discharge due to:_  []  Other:_      Shelia Moser PTA 11/19/2020  8:59 AM    Future Appointments   Date Time Provider Heri Beal   11/19/2020  2:15 PM Linn Porter PTA [facility]   11/20/2020  8:15 AM Linn Porter PTA [facility]   11/23/2020 12:45 PM Linn Porter PTA [facility]   11/24/2020 10:00 AM DO VIVI CurielSN BS AMB   1/25/2021 10:00 AM [facility] INFUSION PHLEBOTOMIST [facility]   2/1/2021 10:00 AM [facility] FAST TRACK NURSE [facility]

## 2020-11-20 ENCOUNTER — HOSPITAL ENCOUNTER (OUTPATIENT)
Dept: PHYSICAL THERAPY | Age: 80
Discharge: HOME | End: 2020-11-20
Payer: MEDICARE

## 2020-11-20 PROCEDURE — 97110 THERAPEUTIC EXERCISES: CPT

## 2020-11-20 NOTE — PROGRESS NOTES
PT DAILY TREATMENT NOTE     Patient Name: Redd Overton  Date:2020  : 1940  [x]  Patient  Verified  Payor: [place] MEDICARE / Plan: [place] MEDICARE PART A & B / Product Type: Medicare /    In time: 9:51  Out time: 10:51  Total Treatment Time (min): 60  Visit #: 3 of 8    Medicare/BCBS Only   Total Timed Codes (min): 45 1:1 Treatment Time: 45       Treatment Area: Lower back pain [M54.5]    SUBJECTIVE  Pain Level (0-10 scale): 310  Any medication changes, allergies to medications, adverse drug reactions, diagnosis change, or new procedure performed?: [x] No    [] Yes (see summary sheet for update)  Subjective functional status/changes:   [] No changes reported  Pt states pain is doing better and mostly stiff when she first gets up. She reports her balance is off when walking. She feels like her legs are weaker. She is hoping to have improvement by February so she can go to [facility].      OBJECTIVE    Modality rationale: decrease pain and increase tissue extensibility to improve the patients ability to improve ease of ambulation   Min Type Additional Details    [] Estim:  []Unatt       []IFC  []Premod                        []Other:  []w/ice   []w/heat  Position:  Location:    [] Estim: []Att    []TENS instruct  []NMES                    []Other:  []w/US   []w/ice   []w/heat  Position:  Location:    []  Traction: [] Cervical       []Lumbar                       [] Prone          []Supine                       []Intermittent   []Continuous Lbs:  [] before manual  [] after manual    []  Ultrasound: []Continuous   [] Pulsed                           []1MHz   []3MHz W/cm2:  Location:    []  Iontophoresis with dexamethasone         Location: [] Take home patch   [] In clinic   15 []  Ice     [x]  heat  []  Ice massage  []  Laser   []  Anodyne Position:seated  Location: l/s    []  Laser with stim  []  Other:  Position:  Location:    []  Vasopneumatic Device Pressure:       [] lo [] med [] hi Temperature: [] lo [] med [] hi   [x] Skin assessment post-treatment:  [x]intact []redness- no adverse reaction    []redness  adverse reaction:     45 min Therapeutic Exercise:  [x] See flow sheet :   Rationale: increase ROM and increase strength to improve the patients ability to improve ease of ambulation            With   [] TE   [] TA   [] neuro   [] other: Patient Education: [x] Review HEP    [] Progressed/Changed HEP based on:   [] positioning   [] body mechanics   [] transfers   [] heat/ice application    [] other:      Other Objective/Functional Measures:   T/s kyphosis and trunk extension posture  Right>left LE swelling  Challenged and fatigue with lunges and step ups with increased breathing  Unable to perform romberg foam EC but able on the floor  Decreased ankle DF AROM due to calf tightness  Tailbone pain in supine due to no girth of glutes    Pain Level (0-10 scale) post treatment: 3/10    ASSESSMENT/Changes in Function: Pt with continued B LE strength deficits making functional tasks like getting up off the floor and stairs difficult. She continues with increased t/s posture and l/s extension contributing to back tightness. Will continue to progress strength and balance for decreased pain with ADLs and decreased fall risk. Patient will continue to benefit from skilled PT services to modify and progress therapeutic interventions, address functional mobility deficits, address ROM deficits, address strength deficits, analyze and address soft tissue restrictions, analyze and cue movement patterns, analyze and modify body mechanics/ergonomics, assess and modify postural abnormalities, address imbalance/dizziness and instruct in home and community integration to attain remaining goals. [x]  See Plan of Care  []  See progress note/recertification  []  See Discharge Summary         Progress towards goals / Updated goals:  Short Term Goals: To be accomplished in 1 weeks:  1.  Patient will demonstrate compliance with HEP in order to improve LE strength for increase ease of transfers at home. [facility]  Long Term Goals: To be accomplished in 4 weeks:  1. Patient will improve FOTO score by 13 points in order to demonstrate a significant improvement in function. 2. Patient will improve B hip abduction MMT to 4/5 in order to increase ease of ambulation. 3. Patient will improve 30 second sit to stand test to 8x in order to increase ease of transfers at home.    4. Patient will report pain at 2/10 or less during ADLs in order to improve quality of life.        PLAN  [x]  Upgrade activities as tolerated     [x]  Continue plan of care  []  Update interventions per flow sheet       []  Discharge due to:_  []  Other:_      Amber Gutierrez PTA 11/20/2020  8:59 AM    Future Appointments   Date Time Provider Heri Beal   11/23/2020 12:45 PM Teri Rios, PTA [facility] [address]   11/24/2020 10:00 AM DO VIVI MotnoyaSN BS AMB   11/30/2020 12:45 PM Akiko Langley, PT [id number] [address]   12/2/2020 10:30 AM Akiko Langley, PT [facility] [address]   12/7/2020  2:15 PM Teri Rios, PTA [facility] [address]   12/9/2020  2:15 PM Teri Lamae, PTA [facility] [address]   12/14/2020 10:30 AM Akiko Langley, PT [facility] [address]   12/16/2020 10:30 AM Teri Rios, PTA [facility] [address]   12/21/2020 10:30 AM Teri Rios, PTA [facility] [address]   12/23/2020  3:45 PM Akiko Langley, PT [facility] [address]   12/28/2020 11:15 AM Teri Rios, PTA [facility] [address]   12/30/2020 10:30 AM Terimarietta Rios, PTA [facility] [address]   1/25/2021 10:00 AM [facility] INFUSION PHLEBOTOMIST [facility]   2/1/2021 10:00 AM HBV FAST TRACK NURSE [facility]

## 2020-11-23 ENCOUNTER — HOSPITAL ENCOUNTER (OUTPATIENT)
Dept: PHYSICAL THERAPY | Age: 80
Discharge: HOME | End: 2020-11-23
Payer: MEDICARE

## 2020-11-23 PROCEDURE — 97110 THERAPEUTIC EXERCISES: CPT

## 2020-11-23 NOTE — PROGRESS NOTES
PT DAILY TREATMENT NOTE     Patient Name: Don Owens  Date:2020  : 1940  [x]  Patient  Verified  Payor: [place] MEDICARE / Plan: [place] MEDICARE PART A & B / Product Type: Medicare /    In time: 12:45  Out time: 1:42  Total Treatment Time (min): 57  Visit #: 4 of 8    Medicare/BCBS Only   Total Timed Codes (min): 47 1:1 Treatment Time: 47       Treatment Area: Lower back pain [M54.5]    SUBJECTIVE  Pain Level (0-10 scale): 0/10  Any medication changes, allergies to medications, adverse drug reactions, diagnosis change, or new procedure performed?: [x] No    [] Yes (see summary sheet for update)  Subjective functional status/changes:   [] No changes reported  Pt reports her back hurts as the day goes on to where she has to lay down and can barely stand up. She reports the stomach cancer in the past and current hernia makes her core weak. She felt an easier time getting up from the floor after strengthening her legs last session even though she was really sore all weekend.        OBJECTIVE    Modality rationale: decrease pain and increase tissue extensibility to improve the patients ability to improve ease of ambulation   Min Type Additional Details    [] Estim:  []Unatt       []IFC  []Premod                        []Other:  []w/ice   []w/heat  Position:  Location:    [] Estim: []Att    []TENS instruct  []NMES                    []Other:  []w/US   []w/ice   []w/heat  Position:  Location:    []  Traction: [] Cervical       []Lumbar                       [] Prone          []Supine                       []Intermittent   []Continuous Lbs:  [] before manual  [] after manual    []  Ultrasound: []Continuous   [] Pulsed                           []1MHz   []3MHz W/cm2:  Location:    []  Iontophoresis with dexamethasone         Location: [] Take home patch   [] In clinic   10 []  Ice     [x]  heat  []  Ice massage  []  Laser   []  Anodyne Position:seated  Location: l/s    []  Laser with stim  [] Other:  Position:  Location:    []  Vasopneumatic Device Pressure:       [] lo [] med [] hi   Temperature: [] lo [] med [] hi   [x] Skin assessment post-treatment:  [x]intact []redness- no adverse reaction    []redness  adverse reaction:     47 min Therapeutic Exercise:  [x] See flow sheet :   Rationale: increase ROM and increase strength to improve the patients ability to improve ease of ambulation            With   [] TE   [] TA   [] neuro   [] other: Patient Education: [x] Review HEP    [] Progressed/Changed HEP based on:   [] positioning   [] body mechanics   [] transfers   [] heat/ice application    [] other:      Other Objective/Functional Measures: Added core strengthening per flow sheet  Increased PPT noted in standing  Challenged with SLR and up/up/down/down core marches in supine  Added seated trunk flexion for back stretching to alleviate pressure to low back in supine  Challenged with open books with decreased t/s mobility noted    Pain Level (0-10 scale) post treatment: 3/10    ASSESSMENT/Changes in Function: Progressing core strength to reduce l/s compensation. She continues with increased kyphotic posture. She has improving leg strength with increasing ease of sit to stand transfers and was able to get up from the floor at home. Will continue focusing on core endurance to improve ease of performing ADLs and household chores with decreased pain and decreased need of a rest break. Patient will continue to benefit from skilled PT services to modify and progress therapeutic interventions, address functional mobility deficits, address ROM deficits, address strength deficits, analyze and address soft tissue restrictions, analyze and cue movement patterns, analyze and modify body mechanics/ergonomics, assess and modify postural abnormalities, address imbalance/dizziness and instruct in home and community integration to attain remaining goals.      [x]  See Plan of Care  []  See progress note/recertification  []  See Discharge Summary         Progress towards goals / Updated goals:  Short Term Goals: To be accomplished in 1 weeks:  1. Patient will demonstrate compliance with HEP in order to improve LE strength for increase ease of transfers at home. [facility]  Long Term Goals: To be accomplished in 4 weeks:  1. Patient will improve FOTO score by 13 points in order to demonstrate a significant improvement in function. 2. Patient will improve B hip abduction MMT to 4/5 in order to increase ease of ambulation. 3. Patient will improve 30 second sit to stand test to 8x in order to increase ease of transfers at home.    4. Patient will report pain at 2/10 or less during ADLs in order to improve quality of life.        PLAN  [x]  Upgrade activities as tolerated     [x]  Continue plan of care  []  Update interventions per flow sheet       []  Discharge due to:_  []  Other:_      Amber Gutierrez PTA 11/23/2020  8:59 AM    Future Appointments   Date Time Provider Heri Beal   11/23/2020 12:45 PM Teri Rios, PTA [facility]   11/24/2020 10:00 AM Kameron Lucio DO BSSSN BS AMB   11/30/2020 12:45 PM Akiko Langley, PT [facility]   12/2/2020 10:30 AM Shanel Curiel PTA [facility]   12/7/2020  2:15 PM Teri Rios, PTA [facility]   12/9/2020  2:15 PM Teri Rios, PTA [facility]   12/14/2020 10:30 AM Akiko Langley, PT [facility]   12/16/2020 10:30 AM Teri Rios, PTA [facility]   12/21/2020 10:30 AM Teri Rios, PTA [facility]   12/23/2020  3:45 PM Akiko Langley, PT [facility]   12/28/2020 11:15 AM Teri Rios PTA [facility]   12/30/2020 10:30 AM Teri Rios PTA [facility]   1/25/2021 10:00 AM HBV INFUSION PHLEBOTOMIST [facility]   2/1/2021 10:00 AM HBV FAST TRACK NURSE [facility]

## 2020-11-24 ENCOUNTER — OFFICE VISIT (OUTPATIENT)
Dept: SURGERY | Age: 80
End: 2020-11-24
Payer: MEDICARE

## 2020-11-24 VITALS
TEMPERATURE: 97.1 F | SYSTOLIC BLOOD PRESSURE: 140 MMHG | DIASTOLIC BLOOD PRESSURE: 80 MMHG | RESPIRATION RATE: 20 BRPM | OXYGEN SATURATION: 100 % | BODY MASS INDEX: 19.66 KG/M2 | WEIGHT: 118 LBS | HEIGHT: 65 IN | HEART RATE: 68 BPM

## 2020-11-24 DIAGNOSIS — R19.8 LAXITY OF ABDOMINAL WALL: Primary | ICD-10-CM

## 2020-11-24 PROCEDURE — 99213 OFFICE O/P EST LOW 20 MIN: CPT | Performed by: SURGERY

## 2020-11-24 RX ORDER — FUROSEMIDE 20 MG/1
20 TABLET ORAL AS NEEDED
COMMUNITY
Start: 2020-10-08

## 2020-11-24 RX ORDER — NITROGLYCERIN 0.4 MG/1
0.4 TABLET SUBLINGUAL
COMMUNITY
Start: 2020-09-25

## 2020-11-24 NOTE — PROGRESS NOTES
Surgical Follow-Up Evaluation    Violet Delacruz is a 78 y.o. white female initially evaluated in this office November 3, 2020 in regard to the right lower quadrant abdominal wall bulge. The patient has a significant history of a previous right rectus harvest for breast reconstruction. A CT was requested which was accomplished on 18 November 2020 and the patient presents today for discussion of the findings        Allergies   Allergen Reactions    Codeine Nausea Only     Patient states not allergic    Diltiazem Other (comments)     Patient states not allergic       Current Outpatient Medications on File Prior to Visit   Medication Sig Dispense Refill    albuterol (PROVENTIL HFA, VENTOLIN HFA, PROAIR HFA) 90 mcg/actuation inhaler Take 2 Puffs by inhalation every four (4) hours as needed.  atorvastatin (LIPITOR) 40 mg tablet TK 1 T PO QHS      lidocaine (LIDODERM) 5 % by TransDERmal route every twenty-four (24) hours. Apply patch to the affected area for 12 hours a day and remove for 12 hours a day.  baclofen (LIORESAL) 10 mg tablet Take 1 Tab by mouth three (3) times daily as needed for Other (spasms). 90 Tab 0    ferrous sulfate 325 mg (65 mg iron) tablet Take 325 mg by mouth two (2) times a day.  metoprolol succinate (TOPROL-XL) 25 mg XL tablet TAKE 1 TABLET BY MOUTH TWICE DAILY 180 Tab 3    PRADAXA 150 mg capsule Take 1 Cap by mouth two (2) times a day. 180 Cap 3    cyanocobalamin (VITAMIN B12) 1,000 mcg/mL injection Taken weekly on Saturdays  1    digoxin (DIGOX) 0.125 mg tablet TAKE 1 TABLET BY MOUTH ONCE DAILY 90 Tab 3    tiotropium (SPIRIVA WITH HANDIHALER) 18 mcg inhalation capsule Take 1 Cap by inhalation daily. 20 Cap 0    LL-IH-YI-Fe-Min-Lycopen-Lutein (CENTRUM) 0.4-162-18 mg tab Take  by mouth daily.  BIOTIN PO Take  by mouth two (2) times a day.  CALCIUM PO Take  by mouth daily.  montelukast (SINGULAIR) 10 mg tablet Take 10 mg by mouth daily.       furosemide (LASIX) 20 mg tablet Take 20 mg by mouth as needed.  nitroglycerin (NITROSTAT) 0.4 mg SL tablet 0.4 mg by SubLINGual route.  methylPREDNISolone (MEDROL DOSEPACK) 4 mg tablet Per dose pack instructions 1 Dose Pack 0    gabapentin (NEURONTIN) 300 mg capsule nightly as needed. 1    CRANBERRY FRUIT EXTRACT (CRANBERRY EXTRACT PO) Take  by mouth daily.  pantoprazole (PROTONIX) 40 mg tablet Take 40 mg by mouth daily.  folic acid 750 mcg tablet Take 400 mcg by mouth daily. No current facility-administered medications on file prior to visit. Past Medical History:   Diagnosis Date    Aorta aneurysm     ascending 4.0 cm (CTA 5/15)    Aortic aneurysm (HCC)     Arthritis     Asthma     Atrial fibrillation     CHADS score 1 (-CHF, +HTN, -AGE, -DM, -CVA)    CAD (coronary artery disease)     Cardiomyopathy ([facility] [address]     COPD     Depression     nos    DVT     12/10  R Subclavian (PICC associated)    Dyslipidemia     Hypercholesterolemia     Hypertension     Hypertension     Long term current use of anticoagulant therapy     Papillary adenocarcinoma     gastric    Pernicious anemia     Sleep apnea        Past Surgical History:   Procedure Laterality Date    GASTROJEJUNOSTOMY      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    GASTROJEJUNOSTOMY      12/10 L[facility] (after Bilroth II)    HX GI      portion of stomach removed    HX HERNIA REPAIR      right lower abd    NEUROLOGICAL PROCEDURE UNLISTED      neck surgery    NEUROLOGICAL PROCEDURE UNLISTED      right shoulder replacement then had scar tissue removed    NEUROLOGICAL PROCEDURE UNLISTED      two fusions        Social History     Tobacco Use    Smoking status: Former Smoker     Last attempt to quit: 1981     Years since quittin.8    Smokeless tobacco: Never Used   Substance Use Topics    Alcohol use: Yes     Alcohol/week: 0.0 standard drinks     Frequency: 2-4 times a month     Comment: wine nightly    Drug use:  No Family History   Problem Relation Age of Onset    Heart Attack Father     Breast Cancer Sister     Breast Cancer Sister     Breast Cancer Other         Grandmother - nos       ROS:  General: Negative for fevers, chills, night sweats, fatigue, weight loss, or weight gain. GI: Negative for abdominal pain, change in bowel habits, hematochezia, melena, or GERD. Integumentary: Negative for dermatitis or abnormal moles. HEENT: Negative for visual changes, vertigo, epistaxis, dysphagia, or hoarseness    Cardiac: Negative for chest pain, palpitations, hypertension, edema, or varicosities    Resp: Negative for cough, shortness of breath, wheezing, hemoptysis, snoring, or reactive airway disease    : Negative for hematuria, dysuria, frequency, urgency, nocturia, or stress urinary incontinence    MSK:  Negative for weakness, joint pain, or arthritis    Endocrine: Negative for diabetes, thyroid disease, polyuria, polydipsia, polyphagia, poor wound, heat intolerance, or cold intolerance. Lymph/Heme: Negative for anemia, bruising, or history of blood transfusions. Neuro:  Negative for dizziness, headache, fainting, seizures, and stroke. Psychiatry:  Negative for anxiety or depression    Physical Exam    Visit Vitals  BP (!) 140/80   Pulse 68   Temp 97.1 °F (36.2 °C)   Resp 20   Ht 5' 5\" (1.651 m)   Wt 53.5 kg (118 lb)   LMP  (LMP Unknown)   SpO2 100%   BMI 19.64 kg/m²       Nursing note reviewed. General: 78 y.o. female is in no acute distress. Resp: Clear to auscultation bilaterally, no wheezing, rhonchi, or rales, excursions normal and symmetrical.  Cardio: Regular rate and rhythm, no murmurs, clicks, gallops, or rubs. No edema or varicosities.   Abdomen:, soft, nontender, nondistended, normoactive bowel sounds, no hepatosplenomegaly, diffuse right lower quadrant abdominal bulge with Valsalva or elevation without palpable hernia defect  Psych: Alert and oriented to person, place, and time.    November 18, 2020 abdominal CT: No evidence of abdominal wall hernia, evidence of a previous right rectus harvest    Impression    Right lower quadrant abdominal fascial laxity status post right rectus harvest for breast reconstruction with no fascial defect or incisional hernia      Plan    Discussed the findings with the patient. Discussed that there is no definite surgical need for repair.   I did offer the patient a plastic surgical consultation if she desired but the patient declined and wishes to treat symptomatically

## 2020-11-24 NOTE — PROGRESS NOTES
Chief Complaint   Patient presents with    Follow-up     here to discuss ct results   1. Have you been to the ER, urgent care clinic since your last visit? Hospitalized since your last visit? No    2. Have you seen or consulted any other health care providers outside of the [address] [place] [address] since your last visit? Include any pap smears or colon screening.  No

## 2020-11-30 ENCOUNTER — HOSPITAL ENCOUNTER (OUTPATIENT)
Dept: PHYSICAL THERAPY | Age: 80
Discharge: HOME | End: 2020-11-30
Payer: MEDICARE

## 2020-11-30 PROCEDURE — 97110 THERAPEUTIC EXERCISES: CPT

## 2020-11-30 NOTE — PROGRESS NOTES
PT DAILY TREATMENT NOTE     Patient Name: Lavelle Mccormack  Date:2020  : 1940  [x]  Patient  Verified  Payor: Nora Rivero / Plan: [place] MEDICARE PART A & B / Product Type: Medicare /    In time: 12:45  Out time: 1:39  Total Treatment Time (min): 54  Visit #: 5 of 8    Medicare/BCBS Only   Total Timed Codes (min):  44 1:1 Treatment Time:  44       Treatment Area: Lower back pain [M54.5]    SUBJECTIVE  Pain Level (0-10 scale): 0/10  Any medication changes, allergies to medications, adverse drug reactions, diagnosis change, or new procedure performed?: [x] No    [] Yes (see summary sheet for update)  Subjective functional status/changes:   [] No changes reported  Pt. Reports she normally has pain later in the day, but it is taking longer for it to come now.      OBJECTIVE    Modality rationale: decrease pain and increase tissue extensibility to improve the patients ability to increase ease of ADLs   Min Type Additional Details    [] Estim:  []Unatt       []IFC  []Premod                        []Other:  []w/ice   []w/heat  Position:  Location:    [] Estim: []Att    []TENS instruct  []NMES                    []Other:  []w/US   []w/ice   []w/heat  Position:  Location:    []  Traction: [] Cervical       []Lumbar                       [] Prone          []Supine                       []Intermittent   []Continuous Lbs:  [] before manual  [] after manual    []  Ultrasound: []Continuous   [] Pulsed                           []1MHz   []3MHz W/cm2:  Location:    []  Iontophoresis with dexamethasone         Location: [] Take home patch   [] In clinic   10 [x]  Ice     []  heat  []  Ice massage  []  Laser   []  Anodyne Position: seated  Location: low back    []  Laser with stim  []  Other:  Position:  Location:    []  Vasopneumatic Device Pressure:       [] lo [] med [] hi   Temperature: [] lo [] med [] hi   [x] Skin assessment post-treatment:  [x]intact []redness- no adverse reaction    []redness  adverse reaction:     44 min Therapeutic Exercise:  [x] See flow sheet :   Rationale: increase ROM and increase strength to improve the patients ability to increase ease of ADLs          With   [x] TE   [] TA   [] neuro   [] other: Patient Education: [x] Review HEP    [] Progressed/Changed HEP based on:   [] positioning   [] body mechanics   [] transfers   [] heat/ice application    [] other:      Other Objective/Functional Measures:   Excessive genu valgus bilaterality during sit to stand transfers   She was provided with green band for HEP  She was challenged with johnathan disc activities      Pain Level (0-10 scale) post treatment:  0/10    ASSESSMENT/Changes in Function:  Pt. Is progressing slowly towards goals. She is having less pain overall, but it continues to increase later in the day. She also reports she continues to have decreased strength and balance, but this is also getting a little better. Patient will continue to benefit from skilled PT services to modify and progress therapeutic interventions, address functional mobility deficits, address ROM deficits, address strength deficits, analyze and address soft tissue restrictions, analyze and cue movement patterns, analyze and modify body mechanics/ergonomics and assess and modify postural abnormalities to attain remaining goals. Progress towards goals / Updated goals:  Short Term Goals: To be accomplished in 1 weeks:  1. Patient will demonstrate compliance with HEP in order to improve LE strength for increase ease of transfers at [facility] be accomplished in 4 weeks:  1. Patient will improve FOTO score by 13 points in order to demonstrate a significant improvement in function. 2. Patient will improve B hip abduction MMT to 4/5 in order to increase ease of ambulation. 3. Patient will improve 30 second sit to stand test to 8x in order to increase ease of transfers at home.    4. Patient will report pain at 2/10 or less during ADLs in order to improve quality of life.   Progressing: taking longer for pain to increase (11/30/20)    PLAN  []  Upgrade activities as tolerated     [x]  Continue plan of care  []  Update interventions per flow sheet       []  Discharge due to:_  []  Other:_      Tl Bacon, PT 11/30/2020  7:54 AM    Future Appointments   Date Time Provider Heri Beal   11/30/2020 12:45 PM Calin Osullivan, PT [facility]   12/2/2020 10:30 AM Gilbert Weldon, PTA [facility]   12/7/2020  2:15 PM Kelsea Rolling, PTA [facility]   12/9/2020  2:15 PM Kelsea Rolling, PTA [facility]   12/14/2020 10:30 AM Calin Osullivan, PT [facility]   12/16/2020 10:30 AM Kelsea Rolling, PTA [facility]   12/21/2020 10:30 AM Kelsea Rolling, PTA [facility]   12/22/2020  9:45 AM Calin Osullivan, PT [facility]   12/28/2020 11:15 AM Kelsea Rolling, PTA [facility]   12/30/2020 10:30 AM Kelsea Rolling, PTA [facility]   1/25/2021 10:00 AM HBV INFUSION PHLEBOTOMIST [facility]   2/1/2021 10:00 AM HBV FAST TRACK NURSE [facility]

## 2020-12-02 ENCOUNTER — HOSPITAL ENCOUNTER (OUTPATIENT)
Dept: PHYSICAL THERAPY | Age: 80
Discharge: HOME | End: 2020-12-02
Payer: MEDICARE

## 2020-12-02 PROCEDURE — 97110 THERAPEUTIC EXERCISES: CPT

## 2020-12-02 NOTE — PROGRESS NOTES
PT DAILY TREATMENT NOTE     Patient Name: Guzman Gong  Date:2020  : 1940  [x]  Patient  Verified  Payor: [place] MEDICARE / Plan: [place] MEDICARE PART A & B / Product Type: Medicare /    In time: 10:34  Out time: 11:32  Total Treatment Time (min): 58  Visit #: 6 of 8    Medicare/BCBS Only   Total Timed Codes (min):  43 1:1 Treatment Time:  39       Treatment Area: Lower back pain [M54.5]    SUBJECTIVE  Pain Level (0-10 scale): 5/10  Any medication changes, allergies to medications, adverse drug reactions, diagnosis change, or new procedure performed?: [x] No    [] Yes (see summary sheet for update)  Subjective functional status/changes:   [] No changes reported  Pt. Reports she is doing pretty good today.      OBJECTIVE    Modality rationale: decrease pain and increase tissue extensibility to improve the patients ability to increase ease of ADLs   Min Type Additional Details    [] Estim:  []Unatt       []IFC  []Premod                        []Other:  []w/ice   []w/heat  Position:  Location:    [] Estim: []Att    []TENS instruct  []NMES                    []Other:  []w/US   []w/ice   []w/heat  Position:  Location:    []  Traction: [] Cervical       []Lumbar                       [] Prone          []Supine                       []Intermittent   []Continuous Lbs:  [] before manual  [] after manual    []  Ultrasound: []Continuous   [] Pulsed                           []1MHz   []3MHz W/cm2:  Location:    []  Iontophoresis with dexamethasone         Location: [] Take home patch   [] In clinic   15 []  Ice     [x]  heat  []  Ice massage  []  Laser   []  Anodyne Position: seated  Location: Low back    []  Laser with stim  []  Other:  Position:  Location:    []  Vasopneumatic Device Pressure:       [] lo [] med [] hi   Temperature: [] lo [] med [] hi   [x] Skin assessment post-treatment:  [x]intact []redness- no adverse reaction    []redness  adverse reaction:     43 min Therapeutic Exercise:  [x] See flow sheet :   Rationale: increase ROM and increase strength to improve the patients ability to increase ease of ADLs          With   [x] TE   [] TA   [] neuro   [] other: Patient Education: [x] Review HEP    [] Progressed/Changed HEP based on:   [] positioning   [] body mechanics   [] transfers   [] heat/ice application    [] other:      Other Objective/Functional Measures:   Pt. Tolerated PT well with no reports of increased pain  She continues to be challenged with eyes closed balance on foam  She also continues to require UE support during lunges     Pain Level (0-10 scale) post treatment: 2/10    ASSESSMENT/Changes in Function:  Pt. Is progressing slowly towards goals. She continues to have complaints of back pain that worsens later in the day. She also continues to demonstrate decreased B hip strength and decreased balance with eyes closed. She continues to report compliance with HEP. Patient will continue to benefit from skilled PT services to modify and progress therapeutic interventions, address functional mobility deficits, address ROM deficits, address strength deficits, analyze and address soft tissue restrictions, analyze and cue movement patterns, analyze and modify body mechanics/ergonomics and assess and modify postural abnormalities to attain remaining goals. Progress towards goals / Updated goals:  Short Term Goals: To be accomplished in 1 weeks:  1. Patient will demonstrate compliance with HEP in order to improve LE strength for increase ease of transfers at [address] be accomplished in 4 weeks:  1. Patient will improve FOTO score by 13 points in order to demonstrate a significant improvement in function. 2. Patient will improve B hip abduction MMT to 4/5 in order to increase ease of ambulation. 3. Patient will improve 30 second sit to stand test to 8x in order to increase ease of transfers at home.    4. Patient will report pain at 2/10 or less during ADLs in order to improve quality of life.   Progressing: taking longer for pain to increase (11/30/20)    PLAN  []  Upgrade activities as tolerated     [x]  Continue plan of care  []  Update interventions per flow sheet       []  Discharge due to:_  []  Other:_      Julia Bagley, PT 12/2/2020  10:41 AM    Future Appointments   Date Time Provider Heri Beal   12/7/2020  2:15 PM Darlys Speak, PTA [facility]   12/9/2020  2:15 PM Darlys Speak, PTA [facility]   12/14/2020 10:30 AM Nikko Andersen, PT [facility]   12/16/2020 10:30 AM Darlys Speak, PTA [facility]   12/21/2020 10:30 AM Darlys Speak, PTA [facility]   12/22/2020  9:45 AM Nikko Andersen, PT [facility]   12/28/2020 11:15 AM Darlys Speak, PTA [facility]   12/30/2020 10:30 AM Darlys Speak, PTA [facility]   1/25/2021 10:00 AM [facility]LEBOTOM[facility]   2/1/2021 10:00 AM [facility] FAST TRACK NURSE [facility]

## 2020-12-07 ENCOUNTER — HOSPITAL ENCOUNTER (OUTPATIENT)
Dept: PHYSICAL THERAPY | Age: 80
Discharge: HOME | End: 2020-12-07
Payer: MEDICARE

## 2020-12-07 PROCEDURE — 97110 THERAPEUTIC EXERCISES: CPT

## 2020-12-07 NOTE — PROGRESS NOTES
PT DAILY TREATMENT NOTE     Patient Name: Dorian Pickering  Date:2020  : 1940  [x]  Patient  Verified  Payor: [place] MEDICARE / Plan: [place] MEDICARE PART A & B / Product Type: Medicare /    In time: 2:18   Out time: 3:10  Total Treatment Time (min): 52  Visit #: 7 of 8    Medicare/BCBS Only   Total Timed Codes (min):  42 1:1 Treatment Time:  42       Treatment Area: Lower back pain [M54.5]    SUBJECTIVE  Pain Level (0-10 scale): 4/10  Any medication changes, allergies to medications, adverse drug reactions, diagnosis change, or new procedure performed?: [x] No    [] Yes (see summary sheet for update)  Subjective functional status/changes:   [] No changes reported  Pt reports continued back pain across the center of her back. She is hoping she will be able to golf in [place] when she goes. She still has a lot of pain at the end of the day. She feels off balance when walking at times.        OBJECTIVE    Modality rationale: decrease pain and increase tissue extensibility to improve the patients ability to increase ease of ADLs   Min Type Additional Details    [] Estim:  []Unatt       []IFC  []Premod                        []Other:  []w/ice   []w/heat  Position:  Location:    [] Estim: []Att    []TENS instruct  []NMES                    []Other:  []w/US   []w/ice   []w/heat  Position:  Location:    []  Traction: [] Cervical       []Lumbar                       [] Prone          []Supine                       []Intermittent   []Continuous Lbs:  [] before manual  [] after manual    []  Ultrasound: []Continuous   [] Pulsed                           []1MHz   []3MHz W/cm2:  Location:    []  Iontophoresis with dexamethasone         Location: [] Take home patch   [] In clinic   10 []  Ice     [x]  heat  []  Ice massage  []  Laser   []  Anodyne Position: seated  Location: Low back    []  Laser with stim  []  Other:  Position:  Location:    []  Vasopneumatic Device Pressure:       [] lo [] med [] hi Temperature: [] lo [] med [] hi   [x] Skin assessment post-treatment:  [x]intact []redness- no adverse reaction    []redness  adverse reaction:     40 min Therapeutic Exercise:  [x] See flow sheet :   Rationale: increase ROM and increase strength to improve the patients ability to increase ease of ADLs    2 minutes of manual for leg lengthening for left upslip and MET for right AI followed by shotgun to see if that would aide in improved balance for ambulation          With   [x] TE   [] TA   [] neuro   [] other: Patient Education: [x] Review HEP    [] Progressed/Changed HEP based on:   [] positioning   [] body mechanics   [] transfers   [] heat/ice application    [] other:      Other Objective/Functional Measures:   Cues for form with lunges to reduce compensations  Challenged with genu valgus during sit to stands  No increased pain during session  Cued for TA hold throughout exercises  Decreased hip adduction strength  Increased kyphotic posture with l/s extension compensation     Pain Level (0-10 scale) post treatment:0/10     ASSESSMENT/Changes in Function:  Pt making slow, steady progress towards goals. She overall has improvement in her back pain but still feels increased pain by the end of the day. She is improving with lunges needed to increase ease of standing from the floor. She still has some imbalance but had an upslip that may have been contributing. Will continue with core and leg strengthening for ease of performing household chores, golfing and other ADLs without increased pain or fatigue. Patient will continue to benefit from skilled PT services to modify and progress therapeutic interventions, address functional mobility deficits, address ROM deficits, address strength deficits, analyze and address soft tissue restrictions, analyze and cue movement patterns, analyze and modify body mechanics/ergonomics and assess and modify postural abnormalities to attain remaining goals.           Progress towards goals / Updated goals:  Short Term Goals: To be accomplished in 1 weeks:  1. Patient will demonstrate compliance with HEP in order to improve LE strength for increase ease of transfers at [address] be accomplished in 4 weeks:  1. Patient will improve FOTO score by 13 points in order to demonstrate a significant improvement in function. 2. Patient will improve B hip abduction MMT to 4/5 in order to increase ease of ambulation. 3. Patient will improve 30 second sit to stand test to 8x in order to increase ease of transfers at home.    4. Patient will report pain at 2/10 or less during ADLs in order to improve quality of life.   Progressing: taking longer for pain to increase (11/30/20)    PLAN  [x]  Upgrade activities as tolerated     [x]  Continue plan of care  []  Update interventions per flow sheet       []  Discharge due to:_  []  Other:_      Denisa Cosme, PTA 12/7/2020  10:41 AM    Future Appointments   Date Time Provider Heri Beal   12/7/2020  2:15 PM Barry Pass, PTA [facility]   12/14/2020 10:30 AM Mata Cha, PT [facility]   12/16/2020 10:30 AM Barry Pass, PTA [facility]   12/21/2020 10:30 AM Barry Pass, PTA [facility]   12/22/2020  9:45 AM Daylin Christianson, PT [id number]   12/28/2020 11:15 AM Barry Pass, PTA [facility]   12/30/2020 10:30 AM Barry Pass, PTA [facility]   1/25/2021 10:00 AM [facility]LEBOTOM[facility]   2/1/2021 10:00 AM [facility] FAST TRACK NURSE [facility]

## 2020-12-09 ENCOUNTER — APPOINTMENT (OUTPATIENT)
Dept: PHYSICAL THERAPY | Age: 80
End: 2020-12-09
Payer: MEDICARE

## 2020-12-10 ENCOUNTER — HOSPITAL ENCOUNTER (OUTPATIENT)
Dept: PHYSICAL THERAPY | Age: 80
Discharge: HOME | End: 2020-12-10
Payer: MEDICARE

## 2020-12-10 PROCEDURE — 97140 MANUAL THERAPY 1/> REGIONS: CPT

## 2020-12-10 PROCEDURE — 97110 THERAPEUTIC EXERCISES: CPT

## 2020-12-10 NOTE — PROGRESS NOTES
PT DAILY TREATMENT NOTE     Patient Name: Alex Lambert  Date:12/10/2020  : 1940  [x]  Patient  Verified  Payor: [place] MEDICARE / Plan: VA MEDICARE PART A & B / Product Type: Medicare /    In time: 9:30   Out time: 10:24  Total Treatment Time (min): 54  Visit #: 8 of 8    Medicare/BCBS Only   Total Timed Codes (min):  39 1:1 Treatment Time:  39       Treatment Area: Lower back pain [M54.5]    SUBJECTIVE  Pain Level (0-10 scale): 0/10  Any medication changes, allergies to medications, adverse drug reactions, diagnosis change, or new procedure performed?: [x] No    [] Yes (see summary sheet for update)  Subjective functional status/changes:   [] No changes reported        OBJECTIVE    Modality rationale: decrease pain and increase tissue extensibility to improve the patients ability to increase ease of ADLs   Min Type Additional Details    [] Estim:  []Unatt       []IFC  []Premod                        []Other:  []w/ice   []w/heat  Position:  Location:    [] Estim: []Att    []TENS instruct  []NMES                    []Other:  []w/US   []w/ice   []w/heat  Position:  Location:    []  Traction: [] Cervical       []Lumbar                       [] Prone          []Supine                       []Intermittent   []Continuous Lbs:  [] before manual  [] after manual    []  Ultrasound: []Continuous   [] Pulsed                           []1MHz   []3MHz W/cm2:  Location:    []  Iontophoresis with dexamethasone         Location: [] Take home patch   [] In clinic   15 []  Ice     [x]  heat  []  Ice massage  []  Laser   []  Anodyne Position: seated  Location: Low back    []  Laser with stim  []  Other:  Position:  Location:    []  Vasopneumatic Device Pressure:       [] lo [] med [] hi   Temperature: [] lo [] med [] hi   [x] Skin assessment post-treatment:  [x]intact []redness- no adverse reaction    []redness  adverse reaction:     31 min Therapeutic Exercise:  [x] See flow sheet :   Rationale: increase ROM and increase strength to improve the patients ability to increase ease of ADLs    8 minutes of manual for leg lengthening for left upslip and MET for left AI followed by shotgun to see if that would aide in improved balance for ambulation; measured LLD and pt right was shorter than left by 1/4 inch; trial of heel lift with much improved gait post inserting          With   [x] TE   [] TA   [] neuro   [] other: Patient Education: [x] Review HEP    [] Progressed/Changed HEP based on:   [] positioning   [] body mechanics   [] transfers   [] heat/ice application    [] other:      Other Objective/Functional Measures: FOTO: 52  Hip abduction MMT: left 4/5 right 4-/5  Sit to stand test 30 seconds: 9 times  Pain during ADLs: 3-4/10 during the day 0/10 in the morning and up to 6/10 at night tightness/aching to the low back  LLD during gait with manual correction; measured differences 1/4 inch right shorter than left; trial of a heel lift and pt felt improvement in balance and much improved gait with decreased right side bend and drop    Pain Level (0-10 scale) post treatment: 0/10    ASSESSMENT/Changes in Function:  Pt progressing in therapy with improving LE strength and decreasing pain. She continues to have increased t/s kyphosis and trunk extended posture which is likely contributing to ongoing pain with prolonged standing and walking during the day. She has decreased gluteus medius strength and genu valgus with sit to stands. She tends to have 0/10 pain in the morning, 3-4/10 during the day and 6/10 or greater at night. She does have improved awareness of keeping her core engaged more during the day. She continues to have difficulty performing floor to stand transfers to performing household cleaning and decorating activities. She continues to notice her balance being off during gait but greatly improved with addition of heel lift on the right to address LLD.  Will continue to benefit from PT to improve core and hip strength/endurance for ease of ambulating, standing and performing ADLs with decreased pain. Patient will continue to benefit from skilled PT services to modify and progress therapeutic interventions, address functional mobility deficits, address ROM deficits, address strength deficits, analyze and address soft tissue restrictions, analyze and cue movement patterns, analyze and modify body mechanics/ergonomics and assess and modify postural abnormalities to attain remaining goals. Progress towards goals / Updated goals:  Short Term Goals: To be accomplished in 1 weeks:  1. Patient will demonstrate compliance with HEP in order to improve LE strength for increase ease of transfers at [address] be accomplished in 4 weeks:  1. Patient will improve FOTO score by 13 points in order to demonstrate a significant improvement in function. Not met declined 5 points  2. Patient will improve B hip abduction MMT to 4/5 in order to increase ease of ambulation. Post pelvic correction left 4/5 right 4-/5 measured at distal thigh  3. Patient will improve 30 second sit to stand test to 8x in order to increase ease of transfers at home. Met 9x  4.  Patient will report pain at 2/10 or less during ADLs in order to improve quality of life.   Progressing: taking longer for pain to increase (11/30/20)  Pain during ADLs: 3-4/10 during the day 0/10 in the morning and up to 6/10 at night tightness/aching to the low back    PLAN  [x]  Upgrade activities as tolerated     [x]  Continue plan of care  []  Update interventions per flow sheet       []  Discharge due to:_  []  Other:_      Kev Zabala PTA 12/10/2020  10:41 AM    Future Appointments   Date Time Provider Heri Beal   12/10/2020  9:45 AM Joaquín Delvalle PTA [facility]   12/14/2020 10:30 AM Faby Lee PT [facility]   12/16/2020 10:30 AM Joaquín Delvalle PTA [facility]   12/21/2020 10:30 AM Joaquín Delvalle PTA [facility]   12/22/2020  9:45 AM Powers Hipps, PT [id number]   12/28/2020 11:15 AM Kelsea Rolling, PTA [facility]   12/30/2020 10:30 AM Kelsea Rolling, PTA [facility]   1/25/2021 10:00 AM [facility] INF[facility]ION PHLEBOTOMIST [facility]   2/1/2021 10:00 AM [facility] FAST TRACK NURSE [facility]

## 2020-12-11 NOTE — PROGRESS NOTES
[facility]  [employer] [facility]  [address] [place] [address]  [phone] fax    Continued Plan of Care/ Re-certification for Physical Therapy Services    Patient name: Redd Overton Start of Care: 2020   Referral source: MATTHEW Tang : 1940               Medical Diagnosis: Lower back pain [M54.5]  Payor: [employer] [place] / Plan: VA MEDICARE PART A & B / Product Type: Medicare /  Onset Date: 20               Treatment Diagnosis: LBP   Prior Hospitalization: see medical history Provider#: [id number]   Medications: Verified on Patient summary List    Comorbidities:  Heart disease, osteoporosis, diabetes, latex, asthma, cancer, history of right shoulder arthroplasty    Prior Level of Function: Ind with ADLs, Ind with ambulation     Visits from Start of Care: 8    Missed Visits: 0    The Plan of Care and following information is based on the patient's current status:  Goal: Patient will improve FOTO score by 13 points in order to demonstrate a significant improvement in function. Status at last note/certification: 57  Current Status: not met    Goal: Patient will improve B hip abduction MMT to 4/5 in order to increase ease of ambulation. Status at last note/certification: B: 4-/5  Current Status: not met    Goal: Patient will improve 30 second sit to stand test to 8x in order to increase ease of transfers at home. Status at last note/certification: 5x  Current Status: met    Goal: Patient will report pain at 2/10 or less during ADLs in order to improve quality of life.    Status at last note/certification: increased pain  Current Status: not met    Key functional changes:  Improving LE strength with improved 30 second sit to stand test      Problems/ barriers to goal attainment: none     Problem List: pain affecting function, decrease ROM, decrease strength, impaired gait/ balance, decrease ADL/ functional abilitiies, decrease activity tolerance, decrease flexibility/ joint mobility and decrease transfer abilities    Treatment Plan: Therapeutic exercise, Therapeutic activities, Neuromuscular re-education, Physical agent/modality, Gait/balance training, Manual therapy, Patient education, Self Care training, Functional mobility training, Home safety training and Stair training     Patient Goal (s) has been updated and includes: to have less pain and to get stronger     Goals for this certification period to be accomplished in 4 weeks:  1. Patient will improve FOTO score by 13 points in order to demonstrate a significant improvement in function. 2. Patient will improve B hip abduction MMT to 4/5 in order to increase ease of ambulation. 3. Patient will report pain at 2/10 or less during ADLs in order to improve quality of life. Frequency / Duration: Patient to be seen 2 times per week for 4 weeks:    Assessment / Recommendations: pt. Is progressing slowly towards goals despite decrease in FOTO score to 52 points. She reports less pain overall but continues to have pain with prolonged standing and walking, and pain increases to 6/10 in the evening. She also continues to have difficulty with floor to stand transfers. Left hip abduction MMT improved to 4/5 and right is 4-/5. 30 second sit to stand test improved significantly to 9x. Skilled PT is medically necessary in order to improve LE strength and core stability for increased ease of ADLs and improved quality of life. Certification Period: 12/10/20-1/9/20    Gael Matson, PT 12/11/2020 8:07 AM    ________________________________________________________________________  I certify that the above Therapy Services are being furnished while the patient is under my care. I agree with the treatment plan and certify that this therapy is necessary. [] I have read the above and request that my patient continue as recommended.   [] I have read the above report and request that my patient continue therapy with the following changes/special instructions: _______________________________________  [] I have read the above report and request that my patient be discharged from therapy    Physician's Signature:____________Date:_________TIME:________    ** Signature, Date and Time must be completed for valid certification **    Please sign and return to [facility] [address]  [address] [facility]  [phone] fax

## 2020-12-14 ENCOUNTER — HOSPITAL ENCOUNTER (OUTPATIENT)
Dept: PHYSICAL THERAPY | Age: 80
Discharge: HOME | End: 2020-12-14
Payer: MEDICARE

## 2020-12-14 PROCEDURE — 97110 THERAPEUTIC EXERCISES: CPT

## 2020-12-14 NOTE — PROGRESS NOTES
PT DAILY TREATMENT NOTE     Patient Name: Taylor Anderson  Date:2020  : 1940  [x]  Patient  Verified  Payor: Rosalind Palacios / Plan: [place] / Product Type: Medicare /    In time: 11:15   Out time: 12:12  Total Treatment Time (min): 57  Visit #:  1 of 8    Medicare/BCBS Only   Total Timed Codes (min): 47 1:1 Treatment Time:  47       Treatment Area: Lower back pain [M54.5]    SUBJECTIVE  Pain Level (0-10 scale): 3/10  Any medication changes, allergies to medications, adverse drug reactions, diagnosis change, or new procedure performed?: [x] No    [] Yes (see summary sheet for update)  Subjective functional status/changes:   [] No changes reported  Pt reports feeling like her balance has been better with the lift. She tried keeping her core tight all weekend. She feels overall improvement and is prepping for all her holiday cooking. She goes this week for her back injections to help with pain.         OBJECTIVE    Modality rationale: decrease pain and increase tissue extensibility to improve the patients ability to increase ease of ADLs   Min Type Additional Details    [] Estim:  []Unatt       []IFC  []Premod                        []Other:  []w/ice   []w/heat  Position:  Location:    [] Estim: []Att    []TENS instruct  []NMES                    []Other:  []w/US   []w/ice   []w/heat  Position:  Location:    []  Traction: [] Cervical       []Lumbar                       [] Prone          []Supine                       []Intermittent   []Continuous Lbs:  [] before manual  [] after manual    []  Ultrasound: []Continuous   [] Pulsed                           []1MHz   []3MHz W/cm2:  Location:    []  Iontophoresis with dexamethasone         Location: [] Take home patch   [] In clinic   10 []  Ice     [x]  heat  []  Ice massage  []  Laser   []  Anodyne Position: seated  Location: Low back    []  Laser with stim  []  Other:  Position:  Location:    []  Vasopneumatic Device Pressure:       [] lo [] med [] hi   Temperature: [] lo [] med [] hi   [x] Skin assessment post-treatment:  [x]intact []redness- no adverse reaction    []redness  adverse reaction:     47 min Therapeutic Exercise:  [x] See flow sheet :   Rationale: increase ROM and increase strength to improve the patients ability to increase ease of ADLs          With   [x] TE   [] TA   [] neuro   [] other: Patient Education: [x] Review HEP    [] Progressed/Changed HEP based on:   [] positioning   [] body mechanics   [] transfers   [] heat/ice application    [] other:      Other Objective/Functional Measures:  No pelvic malalignment today  Increased B LE swelling; reviewed donning compression socks in the morning for better control  Fatigue with SLR with cues to maintain without extensor lag  Challenged with core strengthening   T/s kyphosis but improved core awareness  Improved gait pattern  Mirror for visual cues to prevent genu valgus with sit to stands    Pain Level (0-10 scale) post treatment: 0/10    ASSESSMENT/Changes in Function:  Pt is progressing with improved posture awareness and core engagement with ADLs. She has decreased hip strength but improving ease of sit to stands. She is working on core endurance throughout the day but needs further LE strength for ease of floor to stand transfers. Will continue working on LE and core strength for ease of completing household chores and returning to golfing with decreased pain. Patient will continue to benefit from skilled PT services to modify and progress therapeutic interventions, address functional mobility deficits, address ROM deficits, address strength deficits, analyze and address soft tissue restrictions, analyze and cue movement patterns, analyze and modify body mechanics/ergonomics and assess and modify postural abnormalities to attain remaining goals.           Progress towards goals / Updated goals:  Short Term Goals: To be accomplished in 1 weeks:  1. Patient will demonstrate compliance with HEP in order to improve LE strength for increase ease of transfers at [address] be accomplished in 4 weeks:  1. Patient will improve FOTO score by 13 points in order to demonstrate a significant improvement in function. Not met declined 5 points  2. Patient will improve B hip abduction MMT to 4/5 in order to increase ease of ambulation. Post pelvic correction left 4/5 right 4-/5 measured at distal thigh  3. Patient will improve 30 second sit to stand test to 8x in order to increase ease of transfers at home. Met 9x  4.  Patient will report pain at 2/10 or less during ADLs in order to improve quality of life.   Progressing: taking longer for pain to increase (11/30/20)  Pain during ADLs: 3-4/10 during the day 0/10 in the morning and up to 6/10 at night tightness/aching to the low back    PLAN  [x]  Upgrade activities as tolerated     [x]  Continue plan of care  []  Update interventions per flow sheet       []  Discharge due to:_  []  Other:_      Felice Ruelas PTA 12/14/2020  10:41 AM    Future Appointments   Date Time Provider Heri Beal   12/14/2020 11:15 AM Navi Reel, PTA [facility]   12/16/2020 10:30 AM Navi Reel, PTA [facility]   12/21/2020 10:30 AM Navi Reel, PTA [facility]   12/22/2020  9:45 AM Angy Knight, PT [id number]   12/28/2020 11:15 AM Navi Reel, PTA [facility]   12/30/2020 10:30 AM Navi Reel, PTA [facility]   1/25/2021 10:00 AM [facility]   2/1/2021 10:00 AM [facility] FAST TRACK NURSE [facility]

## 2020-12-16 ENCOUNTER — HOSPITAL ENCOUNTER (OUTPATIENT)
Dept: PHYSICAL THERAPY | Age: 80
Discharge: HOME | End: 2020-12-16
Payer: MEDICARE

## 2020-12-16 PROCEDURE — 97110 THERAPEUTIC EXERCISES: CPT

## 2020-12-16 NOTE — PROGRESS NOTES
PT DAILY TREATMENT NOTE     Patient Name: Roberto   Date:2020  : 1940  [x]  Patient  Verified  Payor: [place] MEDIC[place] / Plan: [place] MEDICARE PART A & B / Product Type: Medicare /    In time: 9:45    Out time: 10:40  Total Treatment Time (min): 55  Visit #:  2 of 8    Medicare/BCBS Only   Total Timed Codes (min): 45 1:1 Treatment Time:  45       Treatment Area: Lower back pain [M54.5]    SUBJECTIVE  Pain Level (0-10 scale): 3/10   Any medication changes, allergies to medications, adverse drug reactions, diagnosis change, or new procedure performed?: [x] No    [] Yes (see summary sheet for update)  Subjective functional status/changes:   [] No changes reported  Pt reports she is going to get her injections done today in her back. She states she had a really bad day yesterday with her back and isn't sure if it was the weather. She notes inconsistent times her balance is off, sometimes if she stands too quickly and other times if she has more swelling.     OBJECTIVE    Modality rationale: decrease pain and increase tissue extensibility to improve the patients ability to increase ease of ADLs   Min Type Additional Details    [] Estim:  []Unatt       []IFC  []Premod                        []Other:  []w/ice   []w/heat  Position:  Location:    [] Estim: []Att    []TENS instruct  []NMES                    []Other:  []w/US   []w/ice   []w/heat  Position:  Location:    []  Traction: [] Cervical       []Lumbar                       [] Prone          []Supine                       []Intermittent   []Continuous Lbs:  [] before manual  [] after manual    []  Ultrasound: []Continuous   [] Pulsed                           []1MHz   []3MHz W/cm2:  Location:    []  Iontophoresis with dexamethasone         Location: [] Take home patch   [] In clinic   10 []  Ice     [x]  heat  []  Ice massage  []  Laser   []  Anodyne Position: seated  Location: Low back    []  Laser with stim  []  Other: Position:  Location:    []  Vasopneumatic Device Pressure:       [] lo [] med [] hi   Temperature: [] lo [] med [] hi   [x] Skin assessment post-treatment:  [x]intact []redness- no adverse reaction    []redness  adverse reaction:     45 min Therapeutic Exercise:  [x] See flow sheet :   Rationale: increase ROM and increase strength to improve the patients ability to increase ease of ADLs          With   [x] TE   [] TA   [] neuro   [] other: Patient Education: [x] Review HEP    [] Progressed/Changed HEP based on:   [] positioning   [] body mechanics   [] transfers   [] heat/ice application    [] other:      Other Objective/Functional Measures:  Improving form with lunges  Educated on holding on heat after her injections to allow them to work  Folkstr with johnathan disc core strengthening  Added calf stretching for improved foot clearance during gait  Decreased TKE in standing    Pain Level (0-10 scale) post treatment: 0/10    ASSESSMENT/Changes in Function:  Pt progressing with core strengthening and LE strengthening with improving form and ease of lunges. She continues with fluctuating back pain due to level of activity and her posture that contributes to a flattening of the lordosis with increased PPT. She will continue to benefit from core strengthening and decreasing LE tightness while assisting with strength gains for ease of getting up and down from the floor. Patient will continue to benefit from skilled PT services to modify and progress therapeutic interventions, address functional mobility deficits, address ROM deficits, address strength deficits, analyze and address soft tissue restrictions, analyze and cue movement patterns, analyze and modify body mechanics/ergonomics and assess and modify postural abnormalities to attain remaining goals.           Progress towards goals / Updated goals:  Short Term Goals: To be accomplished in 1 weeks:  1. Patient will demonstrate compliance with HEP in order to improve LE strength for increase ease of transfers at [address] be accomplished in 4 weeks:  1. Patient will improve FOTO score by 13 points in order to demonstrate a significant improvement in function. Not met declined 5 points  2. Patient will improve B hip abduction MMT to 4/5 in order to increase ease of ambulation. Post pelvic correction left 4/5 right 4-/5 measured at distal thigh  3. Patient will improve 30 second sit to stand test to 8x in order to increase ease of transfers at home. Met 9x  4.  Patient will report pain at 2/10 or less during ADLs in order to improve quality of life.   Progressing: taking longer for pain to increase (11/30/20)  Pain during ADLs: 3-4/10 during the day 0/10 in the morning and up to 6/10 at night tightness/aching to the low back    PLAN  [x]  Upgrade activities as tolerated     [x]  Continue plan of care  []  Update interventions per flow sheet       []  Discharge due to:_  []  Other:_      Griffin Jenkins PTA 12/16/2020  10:41 AM    Future Appointments   Date Time Provider Heri Beal   12/16/2020  9:45 AM Lea Yoon PTA [facility]   12/21/2020 10:30 AM Lea Yoon PTA [facility]   12/22/2020  9:45 AM Destin Freedman PT [id number]   12/28/2020 11:15 AM Lea Yoon PTA [facility]   12/30/2020 10:30 AM Lea Yoon PTA [facility]   1/25/2021 10:00 AM [facility]   2/1/2021 10:00 AM [facility] FAST TRACK NURSE [facility]

## 2020-12-21 ENCOUNTER — HOSPITAL ENCOUNTER (OUTPATIENT)
Dept: PHYSICAL THERAPY | Age: 80
Discharge: HOME | End: 2020-12-21
Payer: MEDICARE

## 2020-12-21 PROCEDURE — 97110 THERAPEUTIC EXERCISES: CPT

## 2020-12-21 NOTE — PROGRESS NOTES
PT DAILY TREATMENT NOTE     Patient Name: Parish Roy  Date:2020  : 1940  [x]  Patient  Verified  Payor: Deon Gan / Plan: [place] MEDICARE PART A & B / Product Type: Medicare /    In time: 10:30    Out time: 11:30  Total Treatment Time (min): 60  Visit #:  3 of 8    Medicare/BCBS Only   Total Timed Codes (min): 45 1:1 Treatment Time:  45       Treatment Area: Lower back pain [M54.5]    SUBJECTIVE  Pain Level (0-10 scale): 0/10  Any medication changes, allergies to medications, adverse drug reactions, diagnosis change, or new procedure performed?: [x] No    [] Yes (see summary sheet for update)  Subjective functional status/changes:   [] No changes reported  Pt reports no current pain and got two injections on Friday. She states she tends to still be achy and sore by the end of the day. She isn't sure how all the cooking is going to play out for the holidays.        OBJECTIVE    Modality rationale: decrease pain and increase tissue extensibility to improve the patients ability to increase ease of ADLs   Min Type Additional Details    [] Estim:  []Unatt       []IFC  []Premod                        []Other:  []w/ice   []w/heat  Position:  Location:    [] Estim: []Att    []TENS instruct  []NMES                    []Other:  []w/US   []w/ice   []w/heat  Position:  Location:    []  Traction: [] Cervical       []Lumbar                       [] Prone          []Supine                       []Intermittent   []Continuous Lbs:  [] before manual  [] after manual    []  Ultrasound: []Continuous   [] Pulsed                           []1MHz   []3MHz W/cm2:  Location:    []  Iontophoresis with dexamethasone         Location: [] Take home patch   [] In clinic   15 []  Ice     [x]  heat  []  Ice massage  []  Laser   []  Anodyne Position: seated  Location: Low back    []  Laser with stim  []  Other:  Position:  Location:    []  Vasopneumatic Device Pressure:       [] lo [] med [] hi   Temperature: [] lo [] med [] hi   [x] Skin assessment post-treatment:  [x]intact []redness- no adverse reaction    []redness  adverse reaction:     45 min Therapeutic Exercise:  [x] See flow sheet :   Rationale: increase ROM and increase strength to improve the patients ability to increase ease of ADLs          With   [x] TE   [] TA   [] neuro   [] other: Patient Education: [x] Review HEP    [] Progressed/Changed HEP based on:   [] positioning   [] body mechanics   [] transfers   [] heat/ice application    [] other:      Other Objective/Functional Measures:  Improving ease with lunges  Added leg press for strengthening  Challenged with wobble board for ankle strength to not use body momentum  Decreased calf flexibility  B knee flexion during gait  Increased trunk extension and t/s kyphosis in standing  Educated about propping a leg on a stool while cooking to lessen the extension moment of her low back to see if that would help get through all the cooking  Challenged with foam EO romberg and floor EC balance  Instructed on heel/toe walking for better foot clearance and balance    Pain Level (0-10 scale) post treatment: 0/10    ASSESSMENT/Changes in Function:  Pt progressing with reduction in back pain since getting injections. She continues to have decreased core strength and increased kyphotic posture contributing to achy pain in her low back by the end of the day. Will continue with core endurance conditioning, postural work and improved LE strength for ease of completing household chores, balance and decreased pain for ADLs. Patient will continue to benefit from skilled PT services to modify and progress therapeutic interventions, address functional mobility deficits, address ROM deficits, address strength deficits, analyze and address soft tissue restrictions, analyze and cue movement patterns, analyze and modify body mechanics/ergonomics and assess and modify postural abnormalities to attain remaining goals. Progress towards goals / Updated goals:  Short Term Goals: To be accomplished in 1 weeks:  1. Patient will demonstrate compliance with HEP in order to improve LE strength for increase ease of transfers at [address] be accomplished in 4 weeks:  1. Patient will improve FOTO score by 13 points in order to demonstrate a significant improvement in function. Not met declined 5 points  2. Patient will improve B hip abduction MMT to 4/5 in order to increase ease of ambulation. Post pelvic correction left 4/5 right 4-/5 measured at distal thigh  3. Patient will improve 30 second sit to stand test to 8x in order to increase ease of transfers at home. Met 9x  4.  Patient will report pain at 2/10 or less during ADLs in order to improve quality of life.   Progressing: taking longer for pain to increase (11/30/20)  Pain during ADLs: 3-4/10 during the day 0/10 in the morning and up to 6/10 at night tightness/aching to the low back    PLAN  [x]  Upgrade activities as tolerated     [x]  Continue plan of care  []  Update interventions per flow sheet       []  Discharge due to:_  []  Other:_      Gino Vaughn PTA 12/21/2020  10:41 AM    Future Appointments   Date Time Provider Heri Beal   12/22/2020  9:45 AM Orville Liz, PT [facility]   12/28/2020 11:15 AM Bull Nicole PTA [facility]   12/30/2020 10:30 AM Bull Nicole PTA [facility]   1/25/2021 10:00 AM [facility]   2/1/2021 10:00 AM [facility] FAST TRACK NURSE [facility]

## 2020-12-22 ENCOUNTER — HOSPITAL ENCOUNTER (OUTPATIENT)
Dept: PHYSICAL THERAPY | Age: 80
Discharge: HOME | End: 2020-12-22
Payer: MEDICARE

## 2020-12-22 PROCEDURE — 97110 THERAPEUTIC EXERCISES: CPT

## 2020-12-22 NOTE — PROGRESS NOTES
PT DAILY TREATMENT NOTE     Patient Name: Will Wren  Date:2020  : 1940  [x]  Patient  Verified  Payor: [place] MEDICARE / Plan: [place] MEDICARE PART A & B / Product Type: Medicare /    In time: 9:45  Out time: 10:41  Total Treatment Time (min): 56  Visit #: 4 of 8    Medicare/BCBS Only   Total Timed Codes (min):  41 1:1 Treatment Time:  41       Treatment Area: Lower back pain [M54.5]    SUBJECTIVE  Pain Level (0-10 scale):  1/10  Any medication changes, allergies to medications, adverse drug reactions, diagnosis change, or new procedure performed?: [x] No    [] Yes (see summary sheet for update)  Subjective functional status/changes:   [] No changes reported  Pt. Reports she isn't feeling too bad today. She reports she usually has increased pain later in the day.      OBJECTIVE    Modality rationale: decrease pain and increase tissue extensibility to improve the patients ability to increase ease of ADLs   Min Type Additional Details    [] Estim:  []Unatt       []IFC  []Premod                        []Other:  []w/ice   []w/heat  Position:  Location:    [] Estim: []Att    []TENS instruct  []NMES                    []Other:  []w/US   []w/ice   []w/heat  Position:  Location:    []  Traction: [] Cervical       []Lumbar                       [] Prone          []Supine                       []Intermittent   []Continuous Lbs:  [] before manual  [] after manual    []  Ultrasound: []Continuous   [] Pulsed                           []1MHz   []3MHz W/cm2:  Location:    []  Iontophoresis with dexamethasone         Location: [] Take home patch   [] In clinic   15 []  Ice     [x]  heat  []  Ice massage  []  Laser   []  Anodyne Position: seated  Location: low back    []  Laser with stim  []  Other:  Position:  Location:    []  Vasopneumatic Device Pressure:       [] lo [] med [] hi   Temperature: [] lo [] med [] hi   [x] Skin assessment post-treatment:  [x]intact []redness- no adverse reaction []redness  adverse reaction:     41 min Therapeutic Exercise:  [x] See flow sheet :   Rationale: increase ROM and increase strength to improve the patients ability to increase ease of ADLs          With   [x] TE   [] TA   [] neuro   [] other: Patient Education: [x] Review HEP    [] Progressed/Changed HEP based on:   [] positioning   [] body mechanics   [] transfers   [] heat/ice application    [] other:      Other Objective/Functional Measures:   Pt. Required cues to maintain knee extension during SLR   No difficulty with green band during hip abduction  Tolerated increased resistance during leg press well    Pain Level (0-10 scale) post treatment: 0/10    ASSESSMENT/Changes in Function: pt. Is progressing slowly towards goals. She is having less pain overall and demonstrates improving transfers. She continues to have decreased core stability and decreased LE strength. Patient will continue to benefit from skilled PT services to modify and progress therapeutic interventions, address functional mobility deficits, address ROM deficits, address strength deficits, analyze and address soft tissue restrictions, analyze and cue movement patterns, analyze and modify body mechanics/ergonomics and assess and modify postural abnormalities to attain remaining goals. Progress towards goals / Updated goals:  Short Term Goals: To be accomplished in 1 weeks:  1. Patient will demonstrate compliance with HEP in order to improve LE strength for increase ease of transfers at [address] be accomplished in 4 weeks:  1. Patient will improve FOTO score by 13 points in order to demonstrate a significant improvement in function. Not met declined 5 points  2. Patient will improve B hip abduction MMT to 4/5 in order to increase ease of ambulation. Post pelvic correction left 4/5 right 4-/5 measured at distal thigh  3.  Patient will improve 30 second sit to stand test to 8x in order to increase ease of transfers at home. Met 9x  4.  Patient will report pain at 2/10 or less during ADLs in order to improve quality of life.   Progressing: reports less pain overall but continues to have increased pain at night (12/22/20)    PLAN  []  Upgrade activities as tolerated     [x]  Continue plan of care  []  Update interventions per flow sheet       []  Discharge due to:_  []  Other:_      Romulo Archer, PT 12/22/2020  7:54 AM    Future Appointments   Date Time Provider Heri Beal   12/22/2020  9:45 AM Giorgi Ritchie, PT [facility]   12/28/2020 11:15 AM Sarah Marquez PTA [facility]   12/30/2020 10:30 AM Sarah Marquez PTA [facility]   1/25/2021 10:00 AM [facility]BOTOM[facility]   2/1/2021 10:00 AM [facility] FAST TRACK NURSE [facility]

## 2020-12-23 ENCOUNTER — APPOINTMENT (OUTPATIENT)
Dept: PHYSICAL THERAPY | Age: 80
End: 2020-12-23
Payer: MEDICARE

## 2020-12-28 ENCOUNTER — HOSPITAL ENCOUNTER (OUTPATIENT)
Dept: PHYSICAL THERAPY | Age: 80
Discharge: HOME | End: 2020-12-28
Payer: MEDICARE

## 2020-12-28 PROCEDURE — 97110 THERAPEUTIC EXERCISES: CPT

## 2020-12-28 NOTE — PROGRESS NOTES
PT DAILY TREATMENT NOTE     Patient Name: Oscar Dooley  Date:2020  : 1940  [x]  Patient  Verified  Payor: Izzy Patrick / Plan: [place] MEDICARE PART A & B / Product Type: Medicare /    In time: 11:15  Out time: 12:15  Total Treatment Time (min): 60  Visit #: 5 of 8    Medicare/BCBS Only   Total Timed Codes (min):  45 1:1 Treatment Time:  45       Treatment Area: Lower back pain [M54.5]    SUBJECTIVE  Pain Level (0-10 scale):  210  Any medication changes, allergies to medications, adverse drug reactions, diagnosis change, or new procedure performed?: [x] No    [] Yes (see summary sheet for update)  Subjective functional status/changes:   [] No changes reported  Pt reports the injections have since worn off in her back. She states she was miserable the night after cooking Sherri dinner to where she couldn't even stand up without bending forward. She reports her back continues to cause her problems that makes her need to sit or lay down to relieve it.      OBJECTIVE    Modality rationale: decrease pain and increase tissue extensibility to improve the patients ability to increase ease of ADLs   Min Type Additional Details    [] Estim:  []Unatt       []IFC  []Premod                        []Other:  []w/ice   []w/heat  Position:  Location:    [] Estim: []Att    []TENS instruct  []NMES                    []Other:  []w/US   []w/ice   []w/heat  Position:  Location:    []  Traction: [] Cervical       []Lumbar                       [] Prone          []Supine                       []Intermittent   []Continuous Lbs:  [] before manual  [] after manual    []  Ultrasound: []Continuous   [] Pulsed                           []1MHz   []3MHz W/cm2:  Location:    []  Iontophoresis with dexamethasone         Location: [] Take home patch   [] In clinic   15 []  Ice     [x]  heat  []  Ice massage  []  Laser   []  Anodyne Position: seated  Location: low back    []  Laser with stim  []  Other: Position:  Location:    []  Vasopneumatic Device Pressure:       [] lo [] med [] hi   Temperature: [] lo [] med [] hi   [x] Skin assessment post-treatment:  [x]intact []redness- no adverse reaction    []redness - adverse reaction:     45 min Therapeutic Exercise:  [x] See flow sheet :   Rationale: increase ROM and increase strength to improve the patients ability to increase ease of ADLs          With   [x] TE   [] TA   [] neuro   [] other: Patient Education: [x] Review HEP    [] Progressed/Changed HEP based on:   [] positioning   [] body mechanics   [] transfers   [] heat/ice application    [] other:      Other Objective/Functional Measures:   Continues with increased t/s kyphosis and PPT in standing with B knees flexed  Increased hamstring tightness noted and challenged with anterior pelvic tilt posture  Worked on t/s mobility to reduce hypertonicity in the l/s  Challenged with 1/2 foam at wall to encourage erect posture with trunk extension  Added open books and seated t/s extensions  Added hamstring stretching    Pain Level (0-10 scale) post treatment: 0/10    ASSESSMENT/Changes in Function: Pt with plateau in progress due to continued postural abnormalities of increased t/s kyphosis and increased PPT contributing to ongoing l/s hypertonicity. She has decreased hip flexor strength and increased hamstring tightness as well. Will work to improve l/s and core endurance and progress program to more standing exercises with emphasis on correct posture to see if there is better carryover of pain relief and ease of completing prolonged standing activities like cooking and dishes.      Patient will continue to benefit from skilled PT services to modify and progress therapeutic interventions, address functional mobility deficits, address ROM deficits, address strength deficits, analyze and address soft tissue restrictions, analyze and cue movement patterns, analyze and modify body mechanics/ergonomics and assess and modify postural abnormalities to attain remaining goals. Progress towards goals / Updated goals:  Short Term Goals: To be accomplished in 1 weeks:  1. Patient will demonstrate compliance with HEP in order to improve LE strength for increase ease of transfers at [address] [place] [address] Road be accomplished in 4 weeks:  1. Patient will improve FOTO score by 13 points in order to demonstrate a significant improvement in function. Not met declined 5 points  2. Patient will improve B hip abduction MMT to 4/5 in order to increase ease of ambulation. Post pelvic correction left 4/5 right 4-/5 measured at distal thigh  3. Patient will improve 30 second sit to stand test to 8x in order to increase ease of transfers at home. Met 9x  4.  Patient will report pain at 2/10 or less during ADLs in order to improve quality of life.   Progressing: reports less pain overall but continues to have increased pain at night (12/22/20)    PLAN  [x]  Upgrade activities as tolerated     [x]  Continue plan of care  []  Update interventions per flow sheet       []  Discharge due to:_  []  Other:_      Flip Newsome PTA 12/28/2020  7:54 AM    Future Appointments   Date Time Provider Heri Beal   12/28/2020 11:15 AM Zaida Palacios PTA [facility]   1/4/2021  9:45 AM Zaida Palacios PTA [facility]   1/6/2021  9:45 AM J Luis Carroll, PT [facility]   1/11/2021  9:45 AM Zaida Palacios PTA [facility]   1/13/2021  9:45 AM J Luis Carroll, PT [facility]   1/18/2021  9:45 AM Zaida Palacios PTA [facility]   1/20/2021  9:45 AM J Luis Carroll, PT [facility]   1/25/2021 10:00 AM [facility]   1/25/2021 12:00 PM J Luis Carroll, PT [facility]   1/27/2021  9:45 AM Zaida Palacios PTA [facility]   2/1/2021 10:00 AM [facility] TRACK NUR[facility]

## 2020-12-30 ENCOUNTER — APPOINTMENT (OUTPATIENT)
Dept: PHYSICAL THERAPY | Age: 80
End: 2020-12-30
Payer: MEDICARE

## 2021-01-06 ENCOUNTER — HOSPITAL ENCOUNTER (OUTPATIENT)
Dept: PHYSICAL THERAPY | Age: 81
Discharge: HOME | End: 2021-01-06
Payer: MEDICARE

## 2021-01-06 PROCEDURE — 97110 THERAPEUTIC EXERCISES: CPT

## 2021-01-06 NOTE — PROGRESS NOTES
PT DAILY TREATMENT NOTE     Patient Name: Gurinder Gomez  Date:2021  : 1940  [x]  Patient  Verified  Payor: VA MEDICARE / Plan: [place] MEDICARE PART A & B / Product Type: Medicare /    In time: 9:45  Out time: 10:40  Total Treatment Time (min): 55  Visit #: 6 of 8    Medicare/BCBS Only   Total Timed Codes (min):  40 1:1 Treatment Time:  40       Treatment Area: Lower back pain [M54.5]    SUBJECTIVE  Pain Level (0-10 scale): 2/10  Any medication changes, allergies to medications, adverse drug reactions, diagnosis change, or new procedure performed?: [x] No    [] Yes (see summary sheet for update)  Subjective functional status/changes:   [] No changes reported  Pt. Reports she has increased pain. She reports she can't do a walking program or exercises at home because she is busy putting up curated.by decorations.      OBJECTIVE    Modality rationale: decrease pain and increase tissue extensibility to improve the patients ability to increase ease of ADLs   Min Type Additional Details    [] Estim:  []Unatt       []IFC  []Premod                        []Other:  []w/ice   []w/heat  Position:  Location:    [] Estim: []Att    []TENS instruct  []NMES                    []Other:  []w/US   []w/ice   []w/heat  Position:  Location:    []  Traction: [] Cervical       []Lumbar                       [] Prone          []Supine                       []Intermittent   []Continuous Lbs:  [] before manual  [] after manual    []  Ultrasound: []Continuous   [] Pulsed                           []1MHz   []3MHz W/cm2:  Location:    []  Iontophoresis with dexamethasone         Location: [] Take home patch   [] In clinic   15 []  Ice     [x]  heat  []  Ice massage  []  Laser   []  Anodyne Position: seated  Location: low back    []  Laser with stim  []  Other:  Position:  Location:    []  Vasopneumatic Device Pressure:       [] lo [] med [] hi   Temperature: [] lo [] med [] hi   [x] Skin assessment post-treatment: [x]intact []redness- no adverse reaction    []redness  adverse reaction:     40 min Therapeutic Exercise:  [x] See flow sheet :   Rationale: increase ROM and increase strength to improve the patients ability to increase ease of ADLs          With   [x] TE   [] TA   [] neuro   [] other: Patient Education: [x] Review HEP    [] Progressed/Changed HEP based on:   [] positioning   [] body mechanics   [] transfers   [] heat/ice application    [] other:      Other Objective/Functional Measures: FOTO: 44 points  Hip abd MMT: right: 4-/5 left: 4-/5  Pt.  Was educated on importance of HEP  She tolerated PT well with no reports of increased pain     Pain Level (0-10 scale) post treatment: 0/10    ASSESSMENT/Changes in Function:      []  See Plan of Care  [x]  See progress note/recertification  []  See Discharge Summary         Progress towards goals / Updated goals:  See progress note    PLAN  []  Upgrade activities as tolerated     [x]  Continue plan of care  []  Update interventions per flow sheet       []  Discharge due to:_  []  Other:_      Nolia Buerger, PT 1/6/2021  7:11 AM    Future Appointments   Date Time Provider Heri Beal   1/6/2021  9:45 AM Genna Nunez, PT [facility]   1/11/2021  9:45 AM Laurel Cardenas, PTA [facility]   1/13/2021  9:45 AM Genna Nunez, PT [facility]   1/18/2021  9:45 AM Laurel Cardenas PTA [facility]   1/20/2021  9:45 AM Genna Nunez, PT [facility]   1/25/2021 10:00 AM [facility]   1/25/2021 12:00 PM Genna Nunez PT [facility]   1/27/2021  9:45 AM Laurel Cardenas PTA [facility]   2/1/2021 10:00 AM [facility] FAST TRACK NUR[facility]

## 2021-01-06 NOTE — PROGRESS NOTES
[facility]  [facility]  [address] [place] [address]  [phone] fax    Continued Plan of Care/ Re-certification for Physical Therapy Services    Patient name: Violet Polanco Start of Care: 2020   Referral source: Genna [place] : 1940               Medical Diagnosis: Lower back pain [M54.5]  Payor: [place] MEDICARE / Plan: [address] [place] [address] / Product Type: Medicare /  Onset Date: 20               Treatment Diagnosis: LBP   Prior Hospitalization: see medical history Provider#: [id number]   Medications: Verified on Patient summary List    Comorbidities:  Heart disease, osteoporosis, diabetes, latex, asthma, cancer, history of right shoulder arthroplasty    Prior Level of Function: Ind with ADLs, Ind with ambulation     Visits from Start of Care: 14    Missed Visits: 1    The Plan of Care and following information is based on the patient's current status:  Goal: Patient will improve FOTO score by 13 points in order to demonstrate a significant improvement in function. Status at last note/certification: 52  Current Status: not met    Goal: Patient will improve B hip abduction MMT to 4/5 in order to increase ease of ambulation. Status at last note/certification: 4-/5  Current Status: not met    Goal: Patient will report pain at 2/10 or less during ADLs in order to improve quality of life.    Status at last note/certification: fluctuating pain  Current Status: not met      Key functional changes:  Improving transfers      Problems/ barriers to goal attainment: none     Problem List: pain affecting function, decrease ROM, decrease strength, impaired gait/ balance, decrease ADL/ functional abilitiies, decrease activity tolerance, decrease flexibility/ joint mobility and decrease transfer abilities    Treatment Plan: Therapeutic exercise, Therapeutic activities, Neuromuscular re-education, Physical agent/modality, Gait/balance training, Manual therapy, Patient education and Self Care training     Patient Goal (s) has been updated and includes: to have less pain     Goals for this certification period to be accomplished in 4 weeks:  1. Patient will improve FOTO score by 13 points in order to demonstrate a significant improvement in function. 2. Patient will improve B hip abduction strength to 4/5 in order to increase ease of ambulation. 3. Patient will report pain at 2/10 or less during ADLs in order to improve quality of life. Frequency / Duration: Patient to be seen 2 times per week for 4 weeks:    Assessment / Recommendations:pt. Is progressing slowly with physical therapy. Progress has been limited by decreased compliance with HEP. She continues to have worse pain throughout the day and increased pain with prolonged standing. B hip abduction MMT is 4-/5. She also demonstrates decreased core stability and decreased flexibility. She had relief with injection for a couple of days then her pain returned. Skilled PT is medically necessary in order to improving strength and mobility for decreased pain and improved quality of life. Certification Period: 1/6/21-2/5/21    Elie Magallanes, PT 1/6/2021 1:49 PM    ________________________________________________________________________  I certify that the above Therapy Services are being furnished while the patient is under my care. I agree with the treatment plan and certify that this therapy is necessary. [] I have read the above and request that my patient continue as recommended.   [] I have read the above report and request that my patient continue therapy with the following changes/special instructions: _______________________________________  [] I have read the above report and request that my patient be discharged from therapy    Physician's Signature:____________Date:_________TIME:________    ** Signature, Date and Time must be completed for valid certification **    Please sign and return to [facility]  [address]  [phone] fax

## 2021-01-11 ENCOUNTER — HOSPITAL ENCOUNTER (OUTPATIENT)
Dept: PHYSICAL THERAPY | Age: 81
Discharge: HOME | End: 2021-01-11
Payer: MEDICARE

## 2021-01-11 PROCEDURE — 97110 THERAPEUTIC EXERCISES: CPT

## 2021-01-11 NOTE — PROGRESS NOTES
PT DAILY TREATMENT NOTE     Patient Name: Zhane Brought  Date:2021  : 1940  [x]  Patient  Verified  Payor: VA MEDICARE / Plan: VA MEDICARE PART A & B / Product Type: Medicare /    In time: 9:45   Out time: 10:43   Total Treatment Time (min): 58  Visit #: 1 of 8    Medicare/BCBS Only   Total Timed Codes (min): 43 1:1 Treatment Time: 43       Treatment Area: Lower back pain [M54.5]    SUBJECTIVE  Pain Level (0-10 scale): 2/10   Any medication changes, allergies to medications, adverse drug reactions, diagnosis change, or new procedure performed?: [x] No    [] Yes (see summary sheet for update)  Subjective functional status/changes:   [] No changes reported  Pt reports she is going to get hold of the MD to see about more injections. She is trying to be more aware of her posture and keeping her core tight during the day. She got all her [place] decorations put away.        OBJECTIVE    Modality rationale: decrease pain and increase tissue extensibility to improve the patients ability to increase ease of ADLs   Min Type Additional Details    [] Estim:  []Unatt       []IFC  []Premod                        []Other:  []w/ice   []w/heat  Position:  Location:    [] Estim: []Att    []TENS instruct  []NMES                    []Other:  []w/US   []w/ice   []w/heat  Position:  Location:    []  Traction: [] Cervical       []Lumbar                       [] Prone          []Supine                       []Intermittent   []Continuous Lbs:  [] before manual  [] after manual    []  Ultrasound: []Continuous   [] Pulsed                           []1MHz   []3MHz W/cm2:  Location:    []  Iontophoresis with dexamethasone         Location: [] Take home patch   [] In clinic   15 []  Ice     [x]  heat  []  Ice massage  []  Laser   []  Anodyne Position: seated  Location: low back    []  Laser with stim  []  Other:  Position:  Location:    []  Vasopneumatic Device Pressure:       [] lo [] med [] hi Temperature: [] lo [] med [] hi   [x] Skin assessment post-treatment:  [x]intact []redness- no adverse reaction    []redness  adverse reaction:     43 min Therapeutic Exercise:  [x] See flow sheet :   Rationale: increase ROM and increase strength to improve the patients ability to increase ease of ADLs          With   [x] TE   [] TA   [] neuro   [] other: Patient Education: [x] Review HEP    [] Progressed/Changed HEP based on:   [] positioning   [] body mechanics   [] transfers   [] heat/ice application    [] other:      Other Objective/Functional Measures:  Educated on importance of t/s mobility to reduce l/s pain  Added open books and HS stretching to home program  Educated on ribs down with increased core activation to reduce PPT in standing  No increased pain during session     Pain Level (0-10 scale) post treatment: 0/10    ASSESSMENT/Changes in Function: Pt continues with slow progress and reducing back pain due to habitual swayback posture with increased t/s kyphosis. She has hypertonicity of the l/s and hypomobility of the t/s. She has hamstring and calf tightness and core weakness. Will continue to progress core stability and correcting posture for reduced back pain for ease of completing standing and walking tolerance for ADLs and household chores. []  See Plan of Care  [x]  See progress note/recertification  []  See Discharge Summary         Goals for this certification period to be accomplished in 4 weeks:  1. Patient will improve FOTO score by 13 points in order to demonstrate a significant improvement in function. 2. Patient will improve B hip abduction strength to 4/5 in order to increase ease of ambulation. 3. Patient will report pain at 2/10 or less during ADLs in order to improve quality of life.      PLAN  [x]  Upgrade activities as tolerated     [x]  Continue plan of care  []  Update interventions per flow sheet       []  Discharge due to:_  []  Other:_      Flip Berryal, PTA 1/11/2021  7:11 AM    Future Appointments   Date Time Provider Heri Lian   1/11/2021  9:45 AM Oksana Richardson, PTA [facility]   1/13/2021  9:45 AM Germain Pina, PT [facility]   1/18/2021  9:45 AM Oksana Richardson, PTA [facility]   1/20/2021  9:45 AM Germain Pina, PT [facility]   1/25/2021 10:00 AM [facility]   1/25/2021 12:00 PM Germain Pina, PT [id number]   1/29/2021 10:30 AM Germain Pina, PT [facility]   2/1/2021 10:00 AM [facility] TRACK NUR[facility]

## 2021-01-13 ENCOUNTER — HOSPITAL ENCOUNTER (OUTPATIENT)
Dept: PHYSICAL THERAPY | Age: 81
Discharge: HOME | End: 2021-01-13
Payer: MEDICARE

## 2021-01-13 PROCEDURE — 97110 THERAPEUTIC EXERCISES: CPT

## 2021-01-13 PROCEDURE — 97530 THERAPEUTIC ACTIVITIES: CPT

## 2021-01-13 NOTE — PROGRESS NOTES
PT DAILY TREATMENT NOTE     Patient Name: Rosetta Kevin  Date:2021  : 1940  [x]  Patient  Verified  Payor: VA MEDICARE / Plan: VA MEDICARE PART A & B / Product Type: Medicare /    In time:9:37  Out time:10:48  Total Treatment Time (min): 61  Visit #: 2 of 8    Medicare/BCBS Only   Total Timed Codes (min):  46 1:1 Treatment Time:  46       Treatment Area: Lower back pain [M54.5]    SUBJECTIVE  Pain Level (0-10 scale): 2/10  Any medication changes, allergies to medications, adverse drug reactions, diagnosis change, or new procedure performed?: [x] No    [] Yes (see summary sheet for update)  Subjective functional status/changes:   [] No changes reported  Pt was pleasant and willing to work today showing great motivation to improve function. OBJECTIVE    Modality rationale: decrease pain to improve the patients ability to carry out acts of daily living.     Min Type Additional Details    [] Estim:  []Unatt       []IFC  []Premod                        []Other:  []w/ice   []w/heat  Position:  Location:    [] Estim: []Att    []TENS instruct  []NMES                    []Other:  []w/US   []w/ice   []w/heat  Position:  Location:    []  Traction: [] Cervical       []Lumbar                       [] Prone          []Supine                       []Intermittent   []Continuous Lbs:  [] before manual  [] after manual    []  Ultrasound: []Continuous   [] Pulsed                           []1MHz   []3MHz W/cm2:  Location:    []  Iontophoresis with dexamethasone         Location: [] Take home patch   [] In clinic   15 []  Ice     [x]  heat  []  Ice massage  []  Laser   []  Anodyne Position:seated  Location: LB    []  Laser with stim  []  Other:  Position:  Location:    []  Vasopneumatic Device Pressure:       [] lo [] med [] hi   Temperature: [] lo [] med [] hi   [x] Skin assessment post-treatment:  [x]intact []redness- no adverse reaction    []redness  adverse reaction:       23 min Therapeutic Exercise:  [] See flow sheet : t/s mobility, hip strength and core stability    Rationale: increase ROM and increase strength to improve the patients ability to carry out ADL's with decreased pain. 23 min Therapeutic Activity:  []  See flow sheet : dynamic stepping balance with foam pad as obstacle. Rationale: improve coordination, improve balance and increase proprioception  to improve the patients ability to carry out ADLS's with decreased pain. With   [x] TE   [] TA   [] neuro   [] other: Patient Education: [x] Review HEP    [] Progressed/Changed HEP based on:   [] positioning   [] body mechanics   [] transfers   [] heat/ice application    [] other:      Other Objective/Functional Measures:   Pt noted decreased pain following exercise   Added Pallot Press with cable machine  Pt showed increase hip abduction strength across sets of resisted clam shells. Progress standing balance to single leg with toe touch opposite on foam pad. Pain Level (0-10 scale) post treatment: 0/10    ASSESSMENT/Changes in Function: Pt showed great motivation to work and improve function. Pt has decrease rotational mobility in t/s and kyphosis of upper t/s. Pt showed moderate swaying to pt L side with eyes closed dynamic balance on foam pad. Patient will continue to benefit from skilled PT services to address functional mobility deficits, address strength deficits and assess and modify postural abnormalities to attain remaining goals. Progress towards goals / Updated goals:  1. Patient will improve FOTO score by 13 points in order to demonstrate a significant improvement in function. 2. Patient will improve B hip abduction strength to 4/5 in order to increase ease of ambulation.    3. Patient will report pain at 2/10 or less during ADLs in order to improve quality of life.   Not Met: pt reported increased pain later in day after lots of activity. (1/13/2021)    PLAN  []  Upgrade activities as tolerated [x]  Continue plan of care  []  Update interventions per flow sheet       []  Discharge due to:_  []  Other:_      Moodyjoliemichael Watkins 1/13/2021  9:17 AM    I was present during the entire treatment, directing and participating in the treatment.    Yeni Torres DPT      Future Appointments   Date Time Provider Heri Beal   1/13/2021  9:45 AM Yeimy Alvarez, PT [facility]   1/18/2021  9:45 AM Corrine Whiting PTA [facility]   1/20/2021  9:45 AM Yeimy Alvarez, PT [facility]   1/25/2021 10:00 AM [facility]OTOM[facility]   1/25/2021 12:00 PM Yeimy Alvarez, PT [id number]   1/29/2021 10:30 AM Yeimy Alvarez PT [facility]   2/1/2021 10:00 AM [facility] TRACK NUR[facility]

## 2021-01-18 ENCOUNTER — HOSPITAL ENCOUNTER (OUTPATIENT)
Dept: PHYSICAL THERAPY | Age: 81
Discharge: HOME | End: 2021-01-18
Payer: MEDICARE

## 2021-01-18 PROCEDURE — 97110 THERAPEUTIC EXERCISES: CPT

## 2021-01-18 NOTE — PROGRESS NOTES
PT DAILY TREATMENT NOTE 11    Patient Name: Garth Siddiqui  Date:2021  : 1940  [x]  Patient  Verified  Payor: Clifford Medici / Plan: [place] MEDICARE PART A & B / Product Type: Medicare /    In time: 10:30  Out time: 11:31  Total Treatment Time (min): 61  Visit #: 3 of 8    Medicare/BCBS Only   Total Timed Codes (min): 46 1:1 Treatment Time:  46       Treatment Area: Lower back pain [M54.5]    SUBJECTIVE  Pain Level (0-10 scale): 2/10  Any medication changes, allergies to medications, adverse drug reactions, diagnosis change, or new procedure performed?: [x] No    [] Yes (see summary sheet for update)  Subjective functional status/changes:   [] No changes reported  Pt reports she only had to use the [facility] outside to help stand up twice outside. She states still feeling off balance but trying to  her feet when she walks more. She is going to get more injections in her back soon. Overall she feels improvement since starting with less pain but still needs to lay down or use heat after prolonged activities involving standing, walking or yard work. OBJECTIVE    Modality rationale: decrease pain to improve the patients ability to carry out acts of daily living.     Min Type Additional Details    [] Estim:  []Unatt       []IFC  []Premod                        []Other:  []w/ice   []w/heat  Position:  Location:    [] Estim: []Att    []TENS instruct  []NMES                    []Other:  []w/US   []w/ice   []w/heat  Position:  Location:    []  Traction: [] Cervical       []Lumbar                       [] Prone          []Supine                       []Intermittent   []Continuous Lbs:  [] before manual  [] after manual    []  Ultrasound: []Continuous   [] Pulsed                           []1MHz   []3MHz W/cm2:  Location:    []  Iontophoresis with dexamethasone         Location: [] Take home patch   [] In clinic   15 []  Ice     [x]  heat  []  Ice massage  []  Laser   []  Anodyne Position:seated  Location: l/s    []  Laser with stim  []  Other:  Position:  Location:    []  Vasopneumatic Device Pressure:       [] lo [] med [] hi   Temperature: [] lo [] med [] hi   [x] Skin assessment post-treatment:  [x]intact []redness- no adverse reaction    []redness  adverse reaction:       46 min Therapeutic Exercise:  [x] See flow sheet :   Rationale: increase ROM and increase strength to improve the patients ability to carry out ADL's with decreased pain. With   [x] TE   [] TA   [] neuro   [] other: Patient Education: [x] Review HEP    [] Progressed/Changed HEP based on:   [] positioning   [] body mechanics   [] transfers   [] heat/ice application    [] other:      Other Objective/Functional Measures:  Continues with hypomobility in the t/s causing hypermobility and increased extension moment in the l/s  Improving foot clearance during ambulation but continues to hold knees in flexion with tight hamstrings and calves  Challenged with johnathan disc marches and dumbbell pressouts  Verbal cues to reduce genu valgus with sit to stands  No LOB with EO/EC foam romberg but increased sway with EC    Pain Level (0-10 scale) post treatment: 0/10    ASSESSMENT/Changes in Function: Pt is progressing overall in therapy with reduced pain and improved ease of performing floor<>stand transfers. She does continue to have decreased core endurance requiring sitting/lying rest breaks after prolonged standing, walking or household chores due to l/s pain. She likely continues to have pain due the extension moment in her l/s from t/s kyphosis and tight posterior chain reducing the ease of an anterior pelvic tilt. Will continue to work on postural correction, hip strength and core stability for progression of decreased pain with ADLs and household chores and return to PLOF.       Patient will continue to benefit from skilled PT services to address functional mobility deficits, address strength deficits and assess and modify postural abnormalities to attain remaining goals. Progress towards goals / Updated goals:  1. Patient will improve FOTO score by 13 points in order to demonstrate a significant improvement in function. 2. Patient will improve B hip abduction strength to 4/5 in order to increase ease of ambulation.    3. Patient will report pain at 2/10 or less during ADLs in order to improve quality of life.   Not Met: pt reported increased pain later in day after lots of activity. (1/13/2021)    PLAN  [x]  Upgrade activities as tolerated     [x]  Continue plan of care  []  Update interventions per flow sheet       []  Discharge due to:_  []  Other:_      Lashaun Chapman PTA 1/18/2021  9:17 AM        Future Appointments   Date Time Provider Heri Beal   1/18/2021 10:30 AM Derrell Booker PTA [facility]   1/20/2021  9:45 AM Claryce Powhatan, PT [facility]   1/25/2021 10:00 AM [facility]LEBOTOMIST [facility]   1/25/2021 12:00 PM Claryce Powhatan, PT [facility]   1/29/2021 10:30 AM Claryce Powhatan, PT [facility]   2/1/2021 10:00 AM [facility] FAST TRACK NURSE [facility]

## 2021-01-20 ENCOUNTER — HOSPITAL ENCOUNTER (OUTPATIENT)
Dept: PHYSICAL THERAPY | Age: 81
Discharge: HOME | End: 2021-01-20
Payer: MEDICARE

## 2021-01-20 PROCEDURE — 97112 NEUROMUSCULAR REEDUCATION: CPT

## 2021-01-20 PROCEDURE — 97530 THERAPEUTIC ACTIVITIES: CPT

## 2021-01-20 PROCEDURE — 97110 THERAPEUTIC EXERCISES: CPT

## 2021-01-20 NOTE — PROGRESS NOTES
PT DAILY TREATMENT NOTE     Patient Name: Bettie Del Cid  Date:2021  : 1940  [x]  Patient  Verified  Payor: Rachael Sam / Plan: [place] MEDICARE PART A & B / Product Type: Medicare /    In time:9:45 Out time: 10:55  Total Treatment Time (min): 70  Visit #: 4 of 8    Medicare/BCBS Only   Total Timed Codes (min):  55 1:1 Treatment Time:  55       Treatment Area: Lower back pain [M54.5]    SUBJECTIVE  Pain Level (0-10 scale): 210  Any medication changes, allergies to medications, adverse drug reactions, diagnosis change, or new procedure performed?: [x] No    [] Yes (see summary sheet for update)  Subjective functional status/changes:   [] No changes reported  Pt reports she continues to feel good in the morning and pain increases as the day goes on.      OBJECTIVE    Modality rationale: decrease pain to improve the patients ability to carry out ADL's   Min Type Additional Details    [] Estim:  []Unatt       []IFC  []Premod                        []Other:  []w/ice   []w/heat  Position:  Location:    [] Estim: []Att    []TENS instruct  []NMES                    []Other:  []w/US   []w/ice   []w/heat  Position:  Location:    []  Traction: [] Cervical       []Lumbar                       [] Prone          []Supine                       []Intermittent   []Continuous Lbs:  [] before manual  [] after manual    []  Ultrasound: []Continuous   [] Pulsed                           []1MHz   []3MHz W/cm2:  Location:    []  Iontophoresis with dexamethasone         Location: [] Take home patch   [] In clinic   15 []  Ice     [x]  heat  []  Ice massage  []  Laser   []  Anodyne Position:  Location:    []  Laser with stim  []  Other:  Position:  Location:    []  Vasopneumatic Device Pressure:       [] lo [] med [] hi   Temperature: [] lo [] med [] hi   [] Skin assessment post-treatment:  []intact []redness- no adverse reaction    []redness  adverse reaction:       35 min Therapeutic Exercise:  [x] See flow sheet : LE strength and trunk rotational strength    Rationale: increase ROM and increase strength to improve the patients ability to carry out ADL's    10 min Therapeutic Activity:  [x]  See flow sheet : thoracic mobility for reaching outside of SAAD   Rationale: increase ROM  to improve the patients ability to carry out ADL's     10 min Neuromuscular Re-education:  [x]  See flow sheet : balance on altered surface    Rationale: improve coordination and improve balance  to improve the patients ability to carry out ADL's          With   [x] TE   [] TA   [] neuro   [] other: Patient Education: [x] Review HEP    [] Progressed/Changed HEP based on:   [] positioning   [] body mechanics   [] transfers   [] heat/ice application    [] other:      Other Objective/Functional Measures:   Resisted open books with OTB, pt responded well. Showed 4-/5 strength in hip abd. Pain Level (0-10 scale) post treatment: 2/10    ASSESSMENT/Changes in Function:   Pt was pleasant and showed great motivation to increase function. Overall, pt responded well to increased thoracic ROM exercise and showed decreased knee valgus with manual cueing. Pt will benefit from continued LE strengthening exercise and thoracic mobility exercise. Patient will continue to benefit from skilled PT services to modify and progress therapeutic interventions and address functional mobility deficits to attain remaining goals. Progress towards goals / Updated goals:  1. Patient will improve FOTO score by 13 points in order to demonstrate a significant improvement in function. 2. Patient will improve B hip abduction strength to 4/5 in order to increase ease of ambulation. Not Met: 4-/5 B (1/20/21), tested in side lying  3.  Patient will report pain at 2/10 or less during ADLs in order to improve quality of life.   Not Met: pt reported increased pain later in day after lots of activity. (1/13/2021)        PLAN  []  Upgrade activities as tolerated     [x]  Continue plan of care  []  Update interventions per flow sheet       []  Discharge due to:_  []  Other:_      Skippy Border 1/20/2021  8:54 AM    I was present during the entire treatment, directing and participating in the treatment.    Adam Crisostomo DPT      Future Appointments   Date Time Provider Heri Beal   1/20/2021  9:45 AM Elayne Allred, PT [facility]   1/25/2021 10:00 AM [facility]OTOM[facility]   1/25/2021 12:00 PM Elayne Allred, PT [id number]   1/29/2021 10:30 AM Elayne Allred PT [facility]   2/1/2021 10:00 AM [facility] FAST TRACK NURSE [facility]

## 2021-01-25 ENCOUNTER — HOSPITAL ENCOUNTER (OUTPATIENT)
Dept: INFUSION THERAPY | Age: 81
Discharge: HOME | End: 2021-01-25
Payer: MEDICARE

## 2021-01-25 ENCOUNTER — HOSPITAL ENCOUNTER (OUTPATIENT)
Dept: PHYSICAL THERAPY | Age: 81
Discharge: HOME | End: 2021-01-25
Payer: MEDICARE

## 2021-01-25 VITALS
DIASTOLIC BLOOD PRESSURE: 79 MMHG | TEMPERATURE: 98.2 F | HEART RATE: 81 BPM | SYSTOLIC BLOOD PRESSURE: 132 MMHG | OXYGEN SATURATION: 96 %

## 2021-01-25 LAB
ALBUMIN SERPL-MCNC: 2.9 G/DL (ref 3.4–5)
ALBUMIN/GLOB SERPL: 0.9 {RATIO} (ref 0.8–1.7)
ALP SERPL-CCNC: 68 U/L (ref 45–117)
ALT SERPL-CCNC: 25 U/L (ref 13–56)
ANION GAP SERPL CALC-SCNC: 6 MMOL/L (ref 3–18)
AST SERPL-CCNC: 16 U/L (ref 10–38)
BILIRUB SERPL-MCNC: 0.4 MG/DL (ref 0.2–1)
BUN SERPL-MCNC: 16 MG/DL (ref 7–18)
BUN/CREAT SERPL: 28 (ref 12–20)
CALCIUM SERPL-MCNC: 8.7 MG/DL (ref 8.5–10.1)
CHLORIDE SERPL-SCNC: 108 MMOL/L (ref 100–111)
CO2 SERPL-SCNC: 29 MMOL/L (ref 21–32)
CREAT SERPL-MCNC: 0.58 MG/DL (ref 0.6–1.3)
GLOBULIN SER CALC-MCNC: 3.2 G/DL (ref 2–4)
GLUCOSE SERPL-MCNC: 99 MG/DL (ref 74–99)
MAGNESIUM SERPL-MCNC: 2 MG/DL (ref 1.6–2.6)
PHOSPHATE SERPL-MCNC: 4.3 MG/DL (ref 2.5–4.9)
POTASSIUM SERPL-SCNC: 3.6 MMOL/L (ref 3.5–5.5)
PROT SERPL-MCNC: 6.1 G/DL (ref 6.4–8.2)
SODIUM SERPL-SCNC: 143 MMOL/L (ref 136–145)

## 2021-01-25 PROCEDURE — 36415 COLL VENOUS BLD VENIPUNCTURE: CPT

## 2021-01-25 PROCEDURE — 84100 ASSAY OF PHOSPHORUS: CPT

## 2021-01-25 PROCEDURE — 97112 NEUROMUSCULAR REEDUCATION: CPT

## 2021-01-25 PROCEDURE — 97530 THERAPEUTIC ACTIVITIES: CPT

## 2021-01-25 PROCEDURE — 83735 ASSAY OF MAGNESIUM: CPT

## 2021-01-25 PROCEDURE — 80053 COMPREHEN METABOLIC PANEL: CPT

## 2021-01-25 PROCEDURE — 97110 THERAPEUTIC EXERCISES: CPT

## 2021-01-25 NOTE — PROGRESS NOTES
[facility] Progress Note    Date: 2021    Name: Violet Brewer    MRN: [id number]         : 1940    Peripheral Lab Draw      Ms. Brewer to [facility], ambulatory at 1010 accompanied by self. Pt was assessed and education was provided.     Ms. Brewer's vitals were reviewed and patient was observed for 5 minutes prior to treatment.   Visit Vitals  /79 (BP 1 Location: Right arm, BP Patient Position: Sitting)   Pulse 81   Temp 98.2 °F (36.8 °C)   SpO2 96%     Recent Results (from the past 12 hour(s))   METABOLIC PANEL, COMPREHENSIVE    Collection Time: 21 10:17 AM   Result Value Ref Range    Sodium 143 136 - 145 mmol/L    Potassium 3.6 3.5 - 5.5 mmol/L    Chloride 108 100 - 111 mmol/L    CO2 29 21 - 32 mmol/L    Anion gap 6 3.0 - 18 mmol/L    Glucose 99 74 - 99 mg/dL    BUN 16 7.0 - 18 MG/DL    Creatinine 0.58 (L) 0.6 - 1.3 MG/DL    BUN/Creatinine ratio 28 (H) 12 - 20      GFR est AA >60 >60 ml/min/1.73m2    GFR est non-AA >60 >60 ml/min/1.73m2    Calcium 8.7 8.5 - 10.1 MG/DL    Bilirubin, total 0.4 0.2 - 1.0 MG/DL    ALT (SGPT) 25 13 - 56 U/L    AST (SGOT) 16 10 - 38 U/L    Alk. phosphatase 68 45 - 117 U/L    Protein, total 6.1 (L) 6.4 - 8.2 g/dL    Albumin 2.9 (L) 3.4 - 5.0 g/dL    Globulin 3.2 2.0 - 4.0 g/dL    A-G Ratio 0.9 0.8 - 1.7     MAGNESIUM    Collection Time: 21 10:17 AM   Result Value Ref Range    Magnesium 2.0 1.6 - 2.6 mg/dL   PHOSPHORUS    Collection Time: 21 10:17 AM   Result Value Ref Range    Phosphorus 4.3 2.5 - 4.9 MG/DL       Blood obtained peripherally from RT AC first attempt with butterfly needle and sent to lab for CMP, Magnesium, and Phosphrous per written orders. No bleeding or hematoma noted at site. Gauze and coban applied.     Ms. Brewer tolerated the venipuncture, and had no complaints. Patient armband removed and shredded.    [facility] at 1020.     Hamida  [facility] Phlebotomist [facility]  January 25, 2021  3:33 PM

## 2021-01-25 NOTE — PROGRESS NOTES
PT DAILY TREATMENT NOTE     Patient Name: Maru Schwarz  Date:2021  : 1940  [x]  Patient  Verified  Payor: [place] MEDICARE / Plan: [place] MEDICARE PART A & B / Product Type: Medicare /    In time:12:03  Out time:12:57  Total Treatment Time (min): 54  Visit #: 5 of 8    Medicare/BCBS Only   Total Timed Codes (min):  44 1:1 Treatment Time:  44       Treatment Area: Lower back pain [M54.5]    SUBJECTIVE  Pain Level (0-10 scale): 2/10  Any medication changes, allergies to medications, adverse drug reactions, diagnosis change, or new procedure performed?: [x] No    [] Yes (see summary sheet for update)  Subjective functional status/changes:   [] No changes reported  Pt reported she feels about the same as last time.      OBJECTIVE    Modality rationale: decrease pain to improve the patients ability to carry out ADL's   Min Type Additional Details    [] Estim:  []Unatt       []IFC  []Premod                        []Other:  []w/ice   []w/heat  Position:  Location:    [] Estim: []Att    []TENS instruct  []NMES                    []Other:  []w/US   []w/ice   []w/heat  Position:  Location:    []  Traction: [] Cervical       []Lumbar                       [] Prone          []Supine                       []Intermittent   []Continuous Lbs:  [] before manual  [] after manual    []  Ultrasound: []Continuous   [] Pulsed                           []1MHz   []3MHz W/cm2:  Location:    []  Iontophoresis with dexamethasone         Location: [] Take home patch   [] In clinic   10 []  Ice     [x]  heat  []  Ice massage  []  Laser   []  Anodyne Position:Seated  Location: back     []  Laser with stim  []  Other:  Position:  Location:    []  Vasopneumatic Device Pressure:       [] lo [] med [] hi   Temperature: [] lo [] med [] hi   [x] Skin assessment post-treatment:  [x]intact []redness- no adverse reaction    []redness  adverse reaction:       25 min Therapeutic Exercise:  [x] See flow sheet : LE strengthening and thoracic ROM    Rationale: increase ROM and increase strength to improve the patients ability to carry out ADL's    10 min Therapeutic Activity:  []  See flow sheet : farmers carrying for increase balance and strength when doing ADL's   Rationale: increase ROM, improve coordination and improve balance  to improve the patients ability to carry out ADL's     9 min Neuromuscular Re-education:  [x]  See flow sheet : increased balance with varied surface eyes open and closed. Rationale: improve coordination, improve balance and increase proprioception  to improve the patients ability to carry out ADL's     With   [x] TE   [x] TA   [] neuro   [] other: Patient Education: [x] Review HEP    [] Progressed/Changed HEP based on:   [] positioning   [] body mechanics   [] transfers   [] heat/ice application    [] other:      Other Objective/Functional Measures:   Pt responded well to resisted knee valgus sit to stands   Worked on pt abdominal bracing with knees bent 10sec on/ 10 sec off for several rounds. Pt also did well with single arm farmers carries with #7 DB     Pain Level (0-10 scale) post treatment: 1/10    ASSESSMENT/Changes in Function:   Pt did well showing great motivation to increase function. Overall, pt stated she feels about the same at the start of therapy but reported she felt better by the end with no pain. Pt responded well to increased core strengthening exercises and more functional activities. Patient will continue to benefit from skilled PT services to modify and progress therapeutic interventions to attain remaining goals. Progress towards goals / Updated goals:  1. Patient will improve FOTO score by 13 points in order to demonstrate a significant improvement in function. 2. Patient will improve B hip abduction strength to 4/5 in order to increase ease of ambulation. Not Met: 4-/5 B (1/20/21), tested in side lying  3.  Patient will report pain at 2/10 or less during ADLs in order to improve quality of life.   Not Met: pt reported increased pain later in day after lots of activity. (1/13/2021)    PLAN  []  Upgrade activities as tolerated     [x]  Continue plan of care  []  Update interventions per flow sheet       []  Discharge due to:_  []  Other:_      Willian Uribe 1/25/2021  12:10 PM    I was present during the entire treatment, directing and participating in the treatment.    Elle Price DPT      Future Appointments   Date Time Provider Heri Beal   1/29/2021 10:30 AM Kiko Schulz, PT [facility]   2/1/2021 10:00 AM [facility] FAST TRACK NURSE [facility]   7/26/2021 10:15 AM [facility] PHLEBOTOMIST [facility]   8/2/2021 10:00 AM [facility] FAST TRACK NURSE [facility]

## 2021-01-27 ENCOUNTER — APPOINTMENT (OUTPATIENT)
Dept: PHYSICAL THERAPY | Age: 81
End: 2021-01-27
Payer: MEDICARE

## 2021-01-29 ENCOUNTER — HOSPITAL ENCOUNTER (OUTPATIENT)
Dept: PHYSICAL THERAPY | Age: 81
Discharge: HOME | End: 2021-01-29
Payer: MEDICARE

## 2021-01-29 PROCEDURE — 97530 THERAPEUTIC ACTIVITIES: CPT

## 2021-01-29 PROCEDURE — 97112 NEUROMUSCULAR REEDUCATION: CPT

## 2021-01-29 PROCEDURE — 97110 THERAPEUTIC EXERCISES: CPT

## 2021-01-29 NOTE — PROGRESS NOTES
PT DAILY TREATMENT NOTE     Patient Name: Omaira Thibodeaux  Date:2021  : 1940  [x]  Patient  Verified  Payor: [place] MEDICARE / Plan: [place] MEDICARE PART A & B / Product Type: Medicare /    In time:10:30  Out time:11:23  Total Treatment Time (min): 48  Visit #: 6 of 8    Medicare/BCBS Only   Total Timed Codes (min):  43 1:1 Treatment Time:  39       Treatment Area: Lower back pain [M54.5]    SUBJECTIVE  Pain Level (0-10 scale): 1/10  Any medication changes, allergies to medications, adverse drug reactions, diagnosis change, or new procedure performed?: [x] No    [] Yes (see summary sheet for update)  Subjective functional status/changes:   [] No changes reported  Pt reported she received the shots in her back and \"so good so far\"    OBJECTIVE    Modality rationale: decrease pain to improve the patients ability to carry out ADL's   Min Type Additional Details    [] Estim:  []Unatt       []IFC  []Premod                        []Other:  []w/ice   []w/heat  Position:  Location:    [] Estim: []Att    []TENS instruct  []NMES                    []Other:  []w/US   []w/ice   []w/heat  Position:  Location:    []  Traction: [] Cervical       []Lumbar                       [] Prone          []Supine                       []Intermittent   []Continuous Lbs:  [] before manual  [] after manual    []  Ultrasound: []Continuous   [] Pulsed                           []1MHz   []3MHz W/cm2:  Location:    []  Iontophoresis with dexamethasone         Location: [] Take home patch   [] In clinic   10 []  Ice     [x]  heat  []  Ice massage  []  Laser   []  Anodyne Position:Seated   Location: back     []  Laser with stim  []  Other:  Position:  Location:    []  Vasopneumatic Device Pressure:       [] lo [] med [] hi   Temperature: [] lo [] med [] hi   [] Skin assessment post-treatment:  []intact []redness- no adverse reaction    []redness  adverse reaction:       23 min Therapeutic Exercise:  [x] See flow sheet : hip strengthening, thoracic ROM, squats. Rationale: increase ROM and increase strength to improve the patients ability to carry out ADL's    10 min Therapeutic Activity:  [x]  See flow sheet : DB carries   Rationale: increase ROM, increase strength, improve coordination and improve balance  to improve the patients ability to carry out ADL's     10 min Neuromuscular Re-education:  [x]  See flow sheet : Single leg stance on altered surface. Rationale: improve coordination and improve balance  to improve the patients ability to carry out ADL's          With   [] TE   [] TA   [] neuro   [] other: Patient Education: [x] Review HEP    [] Progressed/Changed HEP based on:   [] positioning   [] body mechanics   [] transfers   [] heat/ice application    [] other:      Other Objective/Functional Measures:    Continuing to work on thoracic mobility with stretching and strengthening. Added TRX squats today, pt responded well, needing cues for increased forward trunk lean. Pain Level (0-10 scale) post treatment: 0/10    ASSESSMENT/Changes in Function:   Pt is progressing toward meeting physical therapy goals. Pt has reported decreased pain following back injections. Patient will continue to benefit from skilled PT services to modify and progress therapeutic interventions, address functional mobility deficits, address ROM deficits, address strength deficits and assess and modify postural abnormalities to attain remaining goals. Progress towards goals / Updated goals:  1. Patient will improve FOTO score by 13 points in order to demonstrate a significant improvement in function. 2. Patient will improve B hip abduction strength to 4/5 in order to increase ease of ambulation.   Not Met: 4-/5 B (1/20/21), tested in side lying  3. Patient will report pain at 2/10 or less during ADLs in order to improve quality of life.   Not Met: pt reported increased pain later in day after lots of activity. (1/13/2021)       PLAN  []  Upgrade activities as tolerated     [x]  Continue plan of care  []  Update interventions per flow sheet       []  Discharge due to:_  []  Other:_      Maria Ines Opolis 1/29/2021  10:36 AM    I was present during the entire treatment, directing and participating in the treatment.    Carrillo Bar DPT      Future Appointments   Date Time Provider Heri Beal   2/1/2021 10:00 AM [facility] FAST TRACK NURSE [facility]   2/8/2021  9:45 AM Luke Henle, PTA [facility]   2/10/2021  9:45 AM Luke Henle, PTA [facility]   2/15/2021  9:45 AM Luke Henle, PTA [facility]   2/18/2021 10:30 AM [place] Starlas, PT [facility]   2/22/2021  9:45 AM Luke Henle, PTA [facility]   2/24/2021  9:45 AM Luke Henle, PTA [facility]   3/1/2021  9:45 AM Munir Starlas, PT [facility]   3/3/2021  9:45 AM Luke Henle, PTA [facility]   3/8/2021  9:45 AM Munir Starlas, PT [facility]   3/10/2021  9:45 AM Luke Henle, PTA [facility]   3/15/2021  9:45 AM [place] Peppers, PT [facility]   3/17/2021  9:45 AM Luke Henle, PTA [facility]   7/26/2021 10:15 AM [facility] INFUSION PHLEBOTOMIST [facility]   8/2/2021 10:00 AM [facility] FAST TRACK NURSE [facility]

## 2021-02-01 ENCOUNTER — HOSPITAL ENCOUNTER (OUTPATIENT)
Dept: INFUSION THERAPY | Age: 81
Discharge: HOME | End: 2021-02-01
Payer: MEDICARE

## 2021-02-01 VITALS
HEART RATE: 72 BPM | TEMPERATURE: 98.5 F | OXYGEN SATURATION: 96 % | SYSTOLIC BLOOD PRESSURE: 126 MMHG | DIASTOLIC BLOOD PRESSURE: 74 MMHG | RESPIRATION RATE: 18 BRPM

## 2021-02-01 DIAGNOSIS — M81.8 IDIOPATHIC OSTEOPOROSIS: Primary | ICD-10-CM

## 2021-02-01 PROCEDURE — 96372 THER/PROPH/DIAG INJ SC/IM: CPT

## 2021-02-01 PROCEDURE — 74011250636 HC RX REV CODE- 250/636: Performed by: FAMILY MEDICINE

## 2021-02-01 RX ORDER — EPINEPHRINE 1 MG/ML
0.3 INJECTION, SOLUTION, CONCENTRATE INTRAVENOUS AS NEEDED
Status: CANCELLED | OUTPATIENT
Start: 2021-08-01

## 2021-02-01 RX ORDER — HYDROCORTISONE SODIUM SUCCINATE 100 MG/2ML
100 INJECTION, POWDER, FOR SOLUTION INTRAMUSCULAR; INTRAVENOUS AS NEEDED
Status: CANCELLED | OUTPATIENT
Start: 2021-08-01

## 2021-02-01 RX ORDER — ALBUTEROL SULFATE 0.83 MG/ML
2.5 SOLUTION RESPIRATORY (INHALATION) AS NEEDED
Status: CANCELLED
Start: 2021-08-01

## 2021-02-01 RX ORDER — DIPHENHYDRAMINE HYDROCHLORIDE 50 MG/ML
25 INJECTION, SOLUTION INTRAMUSCULAR; INTRAVENOUS AS NEEDED
Status: CANCELLED
Start: 2021-08-01

## 2021-02-01 RX ORDER — ACETAMINOPHEN 325 MG/1
650 TABLET ORAL AS NEEDED
Status: CANCELLED
Start: 2021-08-01

## 2021-02-01 RX ORDER — ONDANSETRON 2 MG/ML
8 INJECTION INTRAMUSCULAR; INTRAVENOUS AS NEEDED
Status: CANCELLED | OUTPATIENT
Start: 2021-08-01

## 2021-02-01 RX ORDER — DIPHENHYDRAMINE HYDROCHLORIDE 50 MG/ML
50 INJECTION, SOLUTION INTRAMUSCULAR; INTRAVENOUS AS NEEDED
Status: CANCELLED
Start: 2021-08-01

## 2021-02-01 RX ADMIN — DENOSUMAB 60 MG: 60 INJECTION SUBCUTANEOUS at 10:18

## 2021-02-01 NOTE — PROGRESS NOTES
[facility] Progress Note    Date: 2021    Name: Claudia Cody    MRN: [id number]         : 1940      Ms. Inga Waite arrived in the [facility] today at 1005, in stable condition, here for Q 6 Month, SQ Prolia Injection. She was assessed and education was provided. Ms. Vicenta Gonzales vitals were reviewed. Visit Vitals  /74 (BP 1 Location: Left upper arm, BP Patient Position: Sitting)   Pulse 72   Temp 98.5 °F (36.9 °C)   Resp 18   SpO2 96%   Breastfeeding No         Her most recent CMP, Magnesium, & Phosphorus, Levels from 21 were reviewed. CA 8.7    Prolia (denosumab) 60 mg, was administered SQ, in her left arm per order, and without incident. Band aid applied to site. Ms. Inga Waite tolerated well, and had no complaints. Ms. Inga Waite was discharged from [place] [address] in stable condition at 1020. She is to return in 6 months, on 21 at 36 for pre-prolia labs.     Stone Sherwood 70  2021  2:42 PM

## 2021-02-03 ENCOUNTER — HOSPITAL ENCOUNTER (OUTPATIENT)
Dept: PHYSICAL THERAPY | Age: 81
Discharge: HOME | End: 2021-02-03
Payer: MEDICARE

## 2021-02-03 PROCEDURE — 97110 THERAPEUTIC EXERCISES: CPT

## 2021-02-03 PROCEDURE — 97112 NEUROMUSCULAR REEDUCATION: CPT

## 2021-02-03 NOTE — PROGRESS NOTES
PT DAILY TREATMENT NOTE     Patient Name: Ana Mistry  Date:2/3/2021  : 1940  [x]  Patient  Verified  Payor: Mariposa Bustos / Plan: [place] MED[place] A & B / Product Type: Medicare /    In time: 9:45  Out time: 10:43  Total Treatment Time (min): 58  Visit #: 7 of 8    Medicare/BCBS Only   Total Timed Codes (min):  43 1:1 Treatment Time: 43       Treatment Area: Lower back pain [M54.5]    SUBJECTIVE  Pain Level (0-10 scale): 2/10  Any medication changes, allergies to medications, adverse drug reactions, diagnosis change, or new procedure performed?: [x] No    [] Yes (see summary sheet for update)  Subjective functional status/changes:   [] No changes reported  Pt reports the injections still seem to be working. She hasn't had as much swelling in her legs. She feels she is improving. Her daughter is coming in from [facility] and she is helping her find a house.      OBJECTIVE    Modality rationale: decrease pain to improve the patients ability to carry out ADL's   Min Type Additional Details    [] Estim:  []Unatt       []IFC  []Premod                        []Other:  []w/ice   []w/heat  Position:  Location:    [] Estim: []Att    []TENS instruct  []NMES                    []Other:  []w/US   []w/ice   []w/heat  Position:  Location:    []  Traction: [] Cervical       []Lumbar                       [] Prone          []Supine                       []Intermittent   []Continuous Lbs:  [] before manual  [] after manual    []  Ultrasound: []Continuous   [] Pulsed                           []1MHz   []3MHz W/cm2:  Location:    []  Iontophoresis with dexamethasone         Location: [] Take home patch   [] In clinic   15 []  Ice     [x]  heat  []  Ice massage  []  Laser   []  Anodyne Position:Seated   Location: back     []  Laser with stim  []  Other:  Position:  Location:    []  Vasopneumatic Device Pressure:       [] lo [] med [] hi   Temperature: [] lo [] med [] hi   [x] Skin assessment post-treatment: [x]intact []redness- no adverse reaction    []redness  adverse reaction:       33 min Therapeutic Exercise:  [x] See flow sheet : hip strengthening, thoracic ROM, squats. Rationale: increase ROM and increase strength to improve the patients ability to carry out ADLs     10 min Neuromuscular Re-education:  [x]  See flow sheet : core stability in 90/90 positioning   Rationale: improve coordination and improve balance  to improve the patients ability to carry out ADLs          With   [] TE   [] TA   [] neuro   [] other: Patient Education: [x] Review HEP    [] Progressed/Changed HEP based on:   [] positioning   [] body mechanics   [] transfers   [] heat/ice application    [] other:      Other Objective/Functional Measures: Added in 90/90 core strengthening to improve hip flexor strength and neutral spine to reduce l/s extension compensation in standing  Decreased glute med strength as she was challenged with side lying hip abduction  Improving t/s mobility with open books  No increased pain during session    Pain Level (0-10 scale) post treatment: 0/10    ASSESSMENT/Changes in Function: Pt progressing with decreased back pain and decreased LE swelling. She continues to need progression of t/s mobility and core strengthening to reduce extension moment of the l/s. She continues to need work on hip strength for stability, balance and ease of ambulation and household chores with decreased pain. Patient will continue to benefit from skilled PT services to modify and progress therapeutic interventions, address functional mobility deficits, address ROM deficits, address strength deficits and assess and modify postural abnormalities to attain remaining goals. Progress towards goals / Updated goals:  1. Patient will improve FOTO score by 13 points in order to demonstrate a significant improvement in function.    2. Patient will improve B hip abduction strength to 4/5 in order to increase ease of ambulation.   Not Met: 4-/5 B (1/20/21), tested in side lying  3.  Patient will report pain at 2/10 or less during ADLs in order to improve quality of life.   Not Met: pt reported increased pain later in day after lots of activity. (1/13/2021)       PLAN  [x]  Upgrade activities as tolerated     [x]  Continue plan of care  []  Update interventions per flow sheet       []  Discharge due to:_  []  Other:_      Alex Bender, PTA 2/3/2021  10:36 AM    Future Appointments   Date Time Provider Heri Beal   2/3/2021  9:45 AM Elizebeth Homes, PTA [facility]   2/8/2021  9:45 AM Elizebeth Homes, PTA [facility]   2/10/2021  9:45 AM Elizebeth Homes, PTA [facility]   2/15/2021  9:45 AM Elizebeth Homes, PTA [facility]   2/19/2021  9:45 AM Elizebeth Homes, PTA [facility]   2/22/2021  9:45 AM Elizebeth Homes, PTA [facility]   2/24/2021  9:45 AM Elizebeth Homes, PTA [facility]   3/1/2021  9:45 AM Evalina Raspberry, PT [facility]   3/3/2021  9:45 AM Elizebeth Homes, PTA [facility]   3/8/2021  9:45 AM Evalina Raspberry, PT [facility]   3/10/2021  9:45 AM Elizebeth Homes, PTA [facility]   3/15/2021  9:45 AM Evalina Raspberry, PT [facility]   3/17/2021  9:45 AM Oksana Richardson PTA [facility]   7/26/2021 10:15 AM [facility]   8/2/2021 10:00 AM [facility] FAST TRACK NURSE [facility]

## 2021-02-08 ENCOUNTER — HOSPITAL ENCOUNTER (OUTPATIENT)
Dept: PHYSICAL THERAPY | Age: 81
End: 2021-02-08
Payer: MEDICARE

## 2021-02-10 ENCOUNTER — HOSPITAL ENCOUNTER (OUTPATIENT)
Dept: PHYSICAL THERAPY | Age: 81
Discharge: HOME | End: 2021-02-10
Payer: MEDICARE

## 2021-02-10 PROCEDURE — 97112 NEUROMUSCULAR REEDUCATION: CPT

## 2021-02-10 PROCEDURE — 97110 THERAPEUTIC EXERCISES: CPT

## 2021-02-10 NOTE — PROGRESS NOTES
PT DAILY TREATMENT NOTE     Patient Name: Marcia Atkinson  Date:2/10/2021  : 1940  [x]  Patient  Verified  Payor: Megan Castillo / Plan: [place] MEDICARE PART A & B / Product Type: Medicare /    In time: 9:45   Out time: 10:43  Total Treatment Time (min): 58  Visit #: 8 of 8    Medicare/BCBS Only   Total Timed Codes (min): 43  1:1 Treatment Time: 43       Treatment Area: Lower back pain [M54.5]    SUBJECTIVE  Pain Level (0-10 scale): 1/10  Any medication changes, allergies to medications, adverse drug reactions, diagnosis change, or new procedure performed?: [x] No    [] Yes (see summary sheet for update)  Subjective functional status/changes:   [] No changes reported  Pt reports she was able to get up and down from the ground yesterday when weeding her flower beds and was very excited she didn't have to crawl anywhere to help her up. She would like to continue a little longer with therapy to get stronger. She averages 4/10 pain and usually worse at the end of the day but feels improvement overall.      OBJECTIVE    Modality rationale: decrease pain to improve the patients ability to carry out ADL's   Min Type Additional Details    [] Estim:  []Unatt       []IFC  []Premod                        []Other:  []w/ice   []w/heat  Position:  Location:    [] Estim: []Att    []TENS instruct  []NMES                    []Other:  []w/US   []w/ice   []w/heat  Position:  Location:    []  Traction: [] Cervical       []Lumbar                       [] Prone          []Supine                       []Intermittent   []Continuous Lbs:  [] before manual  [] after manual    []  Ultrasound: []Continuous   [] Pulsed                           []1MHz   []3MHz W/cm2:  Location:    []  Iontophoresis with dexamethasone         Location: [] Take home patch   [] In clinic   15 []  Ice     [x]  heat  []  Ice massage  []  Laser   []  Anodyne Position:Seated   Location: back     []  Laser with stim  []  Other:  Position:  Location: []  Vasopneumatic Device Pressure:       [] lo [] med [] hi   Temperature: [] lo [] med [] hi   [x] Skin assessment post-treatment:  [x]intact []redness- no adverse reaction    []redness  adverse reaction:       28 min Therapeutic Exercise:  [x] See flow sheet : hip strengthening, thoracic ROM, squats. Rationale: increase ROM and increase strength to improve the patients ability to carry out ADLs     15 min Neuromuscular Re-education:  [x]  See flow sheet : core stability in 90/90 positioning   Rationale: improve coordination and improve balance  to improve the patients ability to carry out ADLs          With   [] TE   [] TA   [] neuro   [] other: Patient Education: [x] Review HEP    [] Progressed/Changed HEP based on:   [] positioning   [] body mechanics   [] transfers   [] heat/ice application    [] other:      Other Objective/Functional Measures: FOTO: 56  Hip abduction MMT: left 4/5 right 4/5  Pain during ADLs: 4/10 average  Challenged with LE fatigue maintaining 90/90 position towards end with pallof presses  Tight calves so will add incline stretch next time  Added TB for sit to stands for better glute engagement    Pain Level (0-10 scale) post treatment: 0/10     ASSESSMENT/Changes in Function: Pt progressing in physical therapy with improved ease of performing floor<>stand transfers in order to weed her flower beds. She had a 1 point decline in her FOTO score but it has improved since her last 2 assessments. She continues to average 4/10 pain with ADLs but the number is steadily declining. She has improving hip strength to 4/5. She continues to have increased l/s extension in standing with t/s kyphosis but overall mobility is improving with exercise program. Will continue with PT for core/hip strengthening to improve posture and decrease pain with ADLs and household chores.      Patient will continue to benefit from skilled PT services to modify and progress therapeutic interventions, address functional mobility deficits, address ROM deficits, address strength deficits and assess and modify postural abnormalities to attain remaining goals. Progress towards goals / Updated goals:  1. Patient will improve FOTO score by 13 points in order to demonstrate a significant improvement in function. Declined 1 point but improved since last assessment (2/10/21)  2. Patient will improve B hip abduction strength to 4/5 in order to increase ease of ambulation.   Not Met: 4-/5 B (1/20/21), tested in side lying  Distal thigh 4/5 B tested in side lying (2/10/21)  3.  Patient will report pain at 2/10 or less during ADLs in order to improve quality of life.   Not Met: pt reported increased pain later in day after lots of activity. (1/13/2021)  Progressing average 4/10 (2/10/21)       PLAN  [x]  Upgrade activities as tolerated     [x]  Continue plan of care  []  Update interventions per flow sheet       []  Discharge due to:_  []  Other:_      Yvonne Yu PTA 2/10/2021  10:36 AM    Future Appointments   Date Time Provider Heri Beal   2/10/2021  9:45 AM Andreina Arrieta PTA [facility]   2/15/2021  9:45 AM Andreina Arrieta, PTA [facility]   2/19/2021  9:45 AM Andreina Arrieta, PTA [facility]   2/22/2021  9:45 AM Andreina Arrieta, PTA [facility]   2/24/2021  9:45 AM Andreina Arrieta, PTA [facility]   3/1/2021  9:45 AM Vane Gavin, PT [facility]   3/3/2021  9:45 AM Andreina Arrieta, PTA [facility]   3/8/2021  9:45 AM Vane Gavin, PT [facility]   3/10/2021  9:45 AM Ludie Maidens, PTA [facility]   3/15/2021  9:45 AM Vane Gavin, PT [facility]   3/17/2021  9:45 AM Andreina Arrieta, PTA [facility]   7/26/2021 10:15 AM [facility]LEBOTOM[facility]   8/2/2021 10:00 AM [facility] FAST TRACK NURSE [facility]

## 2021-02-10 NOTE — PROGRESS NOTES
[facility]  [employer] [facility]  [address] [place] [address]  [phone] fax    Continued Plan of Care/ Re-certification for Physical Therapy Services    Patient name: Violet Haley Start of Care: 2020   Referral source: [place] : 1940               Medical Diagnosis: Lower back pain [M54.5]  Payor: [place] MEDICARE / Plan: [facility] / Product Type: Medicare /  Onset Date: 20               Treatment Diagnosis: LBP   Prior Hospitalization: see medical history Provider#: [id number]   Medications: Verified on Patient summary List    Comorbidities:  Heart disease, osteoporosis, diabetes, latex, asthma, cancer, history of right shoulder arthroplasty    Prior Level of Function: Ind with ADLs, Ind with ambulation        Visits from Start of Care:  22    Missed Visits: 2    The Plan of Care and following information is based on the patient's current status:  Goal: Patient will improve FOTO score by 13 points in order to demonstrate a significant improvement in function. Status at last note/certification: 52  Current Status: not met    Goal: Patient will improve B hip abduction strength to 4/5 in order to increase ease of ambulation. Status at last note/certification: 4-/5  Current Status: met    Goal: Patient will report pain at 2/10 or less during ADLs in order to improve quality of life. Status at last note/certification: increased pain at end of day.   Current Status: not met    Key functional changes: to have less      Problems/ barriers to goal attainment: none     Problem List: pain affecting function, decrease ROM, decrease strength, impaired gait/ balance, decrease ADL/ functional abilitiies, decrease activity tolerance, decrease flexibility/ joint mobility and decrease transfer abilities    Treatment Plan: Therapeutic exercise, Therapeutic activities, Neuromuscular re-education, Physical agent/modality, Gait/balance training, Manual therapy, Patient education, Self Care training and Functional mobility training     Patient Goal (s) has been updated and includes: to have less pain     Goals for this certification period to be accomplished in 4 weeks:  1. Patient will improve FOTO score by 13 points in order to demonstrate a significant improvement in function. 2. Patient will report pain at 2/10 or less during ADLs in order to improve quality of life. 3. Patient will improve B hip abduction strength to 4+/5 in order to increase ease of ambulation. Frequency / Duration: Patient to be seen 2 times per week for 4 weeks:    Assessment / Recommendations: pt. Is progressing with physical therapy despite no significant change in her FOTO score. She has increased ease of ground to stand transfers when gardening, with decreased reliance on UE support. She continues to report increased pain averaging 4/10 during ADLs and reports worsening symptoms as the day goes on. She demonstrates improving B hip abduction strength at 4/5. Skilled PT is medically necessary in order to continue to improve strength and mobility for increased ease of ADLs and improved quality of life. Certification Period: 2/10/21-3/10/21    Karmen Beltran, PT 2/10/2021 11:54 AM    ________________________________________________________________________  I certify that the above Therapy Services are being furnished while the patient is under my care. I agree with the treatment plan and certify that this therapy is necessary. [] I have read the above and request that my patient continue as recommended.   [] I have read the above report and request that my patient continue therapy with the following changes/special instructions: _______________________________________  [] I have read the above report and request that my patient be discharged from therapy    Physician's Signature:____________Date:_________TIME:________     Kia Carey PA  ** Signature, Date and Time must be completed for valid certification **    Please sign and return to [facility]  [facility]  [address] [facility]  [phone] fax

## 2021-02-15 ENCOUNTER — HOSPITAL ENCOUNTER (OUTPATIENT)
Dept: PHYSICAL THERAPY | Age: 81
Discharge: HOME | End: 2021-02-15
Payer: MEDICARE

## 2021-02-15 PROCEDURE — 97112 NEUROMUSCULAR REEDUCATION: CPT

## 2021-02-15 PROCEDURE — 97110 THERAPEUTIC EXERCISES: CPT

## 2021-02-15 NOTE — PROGRESS NOTES
PT DAILY TREATMENT NOTE     Patient Name: Marcia Atkinson  Date:2/15/2021  : 1940  [x]  Patient  Verified  Payor: Megan Castillo / Plan: [place] MEDICARE PART A & B / Product Type: Medicare /    In time: 9:45   Out time: 10:41  Total Treatment Time (min): 56  Visit #: 1 of 8    Medicare/BCBS Only   Total Timed Codes (min): 41 1:1 Treatment Time:41       Treatment Area: Lower back pain [M54.5]    SUBJECTIVE  Pain Level (0-10 scale): 1/10  Any medication changes, allergies to medications, adverse drug reactions, diagnosis change, or new procedure performed?: [x] No    [] Yes (see summary sheet for update)  Subjective functional status/changes:   [] No changes reported  Pt reports always a little bit of back pain that the shot only helps some. She states the front of her legs were sore after her last session. She hasn't had a lot of leg swelling.      OBJECTIVE    Modality rationale: decrease pain to improve the patients ability to carry out ADL's   Min Type Additional Details    [] Estim:  []Unatt       []IFC  []Premod                        []Other:  []w/ice   []w/heat  Position:  Location:    [] Estim: []Att    []TENS instruct  []NMES                    []Other:  []w/US   []w/ice   []w/heat  Position:  Location:    []  Traction: [] Cervical       []Lumbar                       [] Prone          []Supine                       []Intermittent   []Continuous Lbs:  [] before manual  [] after manual    []  Ultrasound: []Continuous   [] Pulsed                           []1MHz   []3MHz W/cm2:  Location:    []  Iontophoresis with dexamethasone         Location: [] Take home patch   [] In clinic   15 []  Ice     [x]  heat  []  Ice massage  []  Laser   []  Anodyne Position:Seated   Location: back     []  Laser with stim  []  Other:  Position:  Location:    []  Vasopneumatic Device Pressure:       [] lo [] med [] hi   Temperature: [] lo [] med [] hi   [x] Skin assessment post-treatment:  [x]intact []redness- no adverse reaction    []redness  adverse reaction:       31 min Therapeutic Exercise:  [x] See flow sheet :   Rationale: increase ROM and increase strength to improve the patients ability to carry out ADLs     10 min Neuromuscular Re-education:  [x]  See flow sheet : core activation exercises and postural re-ed   Rationale: improve coordination and improve balance  to improve the patients ability to carry out ADLs          With   [] TE   [] TA   [] neuro   [] other: Patient Education: [x] Review HEP    [] Progressed/Changed HEP based on:   [] positioning   [] body mechanics   [] transfers   [] heat/ice application    [] other:      Other Objective/Functional Measures: Added incline and hamstring stretching for PPT posture to improve gait  Improving glute strength and improving t/s mobility noted during open book exercise  Continues to be challenged with 90/90 type core strengthening working both core and hip flexors for postural re-ed to reduce swayback    Pain Level (0-10 scale) post treatment:0/10    ASSESSMENT/Changes in Function: Pt continues to progress in therapy with improving strength and maintaining decreased pain. Most of her low back pain is likely postural in nature due to increased l/s extension and t/s kyphosis. Will continue working on hip and core strengthening to improve ease of floor<>stand transfers for yard work and ease of cleaning/cooking without increased LBP. Patient will continue to benefit from skilled PT services to modify and progress therapeutic interventions, address functional mobility deficits, address ROM deficits, address strength deficits and assess and modify postural abnormalities to attain remaining goals. Progress towards goals / Updated goals:  1. Patient will improve FOTO score by 13 points in order to demonstrate a significant improvement in function. Declined 1 point but improved since last assessment (2/10/21)  2.  Patient will improve B hip abduction strength to 4/5 in order to increase ease of ambulation.   Not Met: 4-/5 B (1/20/21), tested in side lying  Distal thigh 4/5 B tested in side lying (2/10/21)  3.  Patient will report pain at 2/10 or less during ADLs in order to improve quality of life.   Not Met: pt reported increased pain later in day after lots of activity. (1/13/2021)  Progressing average 4/10 (2/10/21)       PLAN  [x]  Upgrade activities as tolerated     [x]  Continue plan of care  []  Update interventions per flow sheet       []  Discharge due to:_  []  Other:_      Alex Dec, PTA 2/15/2021  10:36 AM    Future Appointments   Date Time Provider Heri Beal   2/15/2021  9:45 AM Elizebeth Homes, PTA [facility]   2/19/2021  9:45 AM Elizebeth Homes, PTA [facility]   2/22/2021  9:45 AM Elizebeth Homes, PTA [facility]   2/24/2021  9:45 AM Elizebeth Homes, PTA [facility]   3/1/2021  9:45 AM Evalina Raspberry, PT [facility]   3/3/2021  9:45 AM Elizebeth Homes, PTA [facility]   3/8/2021  9:45 AM Evalina Raspberry, PT [facility]   3/10/2021  9:45 AM Elizebeth Homes, PTA [facility]   3/15/2021  9:45 AM Evalina Raspberry, PT [facility]   3/17/2021  9:45 AM Elizebeth Homes, PTA [facility]   7/26/2021 10:15 AM [facility]LEBOTOM[facility]   8/2/2021 10:00 AM [facility] FAST TRACK NURSE [facility]

## 2021-02-18 ENCOUNTER — APPOINTMENT (OUTPATIENT)
Dept: PHYSICAL THERAPY | Age: 81
End: 2021-02-18
Payer: MEDICARE

## 2021-02-19 ENCOUNTER — HOSPITAL ENCOUNTER (OUTPATIENT)
Dept: PHYSICAL THERAPY | Age: 81
Discharge: HOME | End: 2021-02-19
Payer: MEDICARE

## 2021-02-19 PROCEDURE — 97112 NEUROMUSCULAR REEDUCATION: CPT

## 2021-02-19 PROCEDURE — 97110 THERAPEUTIC EXERCISES: CPT

## 2021-02-19 NOTE — PROGRESS NOTES
PT DAILY TREATMENT NOTE     Patient Name: Catrachito Lopez  Date:2021  : 1940  [x]  Patient  Verified  Payor: Alma Rosa De La Torre / Plan: [place] & B / Product Type: Medicare /    In time: 9:45   Out time: 10:45  Total Treatment Time (min): 60  Visit #: 2 of 8    Medicare/BCBS Only   Total Timed Codes (min): 45 1:1 Treatment Time: 40       Treatment Area: Lower back pain [M54.5]    SUBJECTIVE  Pain Level (0-10 scale): 3/10  Any medication changes, allergies to medications, adverse drug reactions, diagnosis change, or new procedure performed?: [x] No    [] Yes (see summary sheet for update)  Subjective functional status/changes:   [] No changes reported  Pt reports waxing and cleaning her floor yesterday and increased low back pain that is tight and aching. She states easier time getting up and down from the floor. She states she isn't doing exercises at home.      OBJECTIVE    Modality rationale: decrease pain to improve the patients ability to carry out ADL's   Min Type Additional Details    [] Estim:  []Unatt       []IFC  []Premod                        []Other:  []w/ice   []w/heat  Position:  Location:    [] Estim: []Att    []TENS instruct  []NMES                    []Other:  []w/US   []w/ice   []w/heat  Position:  Location:    []  Traction: [] Cervical       []Lumbar                       [] Prone          []Supine                       []Intermittent   []Continuous Lbs:  [] before manual  [] after manual    []  Ultrasound: []Continuous   [] Pulsed                           []1MHz   []3MHz W/cm2:  Location:    []  Iontophoresis with dexamethasone         Location: [] Take home patch   [] In clinic   15 []  Ice     [x]  heat  []  Ice massage  []  Laser   []  Anodyne Position:Seated   Location: back     []  Laser with stim  []  Other:  Position:  Location:    []  Vasopneumatic Device Pressure:       [] lo [] med [] hi   Temperature: [] lo [] med [] hi   [x] Skin assessment post-treatment:  [x]intact []redness- no adverse reaction    []redness  adverse reaction:       30 min Therapeutic Exercise:  [x] See flow sheet :   Rationale: increase ROM and increase strength to improve the patients ability to carry out ADLs     15 min Neuromuscular Re-education:  [x]  See flow sheet : core activation exercises and postural re-ed   Rationale: improve coordination and improve balance  to improve the patients ability to carry out ADLs          With   [] TE   [] TA   [] neuro   [] other: Patient Education: [x] Review HEP    [] Progressed/Changed HEP based on:   [] positioning   [] body mechanics   [] transfers   [] heat/ice application    [] other:      Other Objective/Functional Measures:  Continues with swayback posture in standing but lessening degree of t/s kyphosis  Challenged maintaining 90/90 hold with fatigue by end of session   Improving heel strike during ambulation with less verbal cueing  Sit to stands continues with genu valgus    Pain Level (0-10 scale) post treatment: 0/10    ASSESSMENT/Changes in Function: Pt progressing in therapy with improving awareness of heel strike during gait and improved ability to perform floor<>stand transfers with decreased UE assistance. Pt continues to have swayback posture from core/hip weakness and hamstring/calf/low back tightness. She continues to have fluctuating pain levels in the low back due to posture and weaknesses associated with household chores such as cleaning the floor. Patient will continue to benefit from skilled PT services to modify and progress therapeutic interventions, address functional mobility deficits, address ROM deficits, address strength deficits and assess and modify postural abnormalities to attain remaining goals. Progress towards goals / Updated goals:  1. Patient will improve FOTO score by 13 points in order to demonstrate a significant improvement in function.  Declined 1 point but improved since last assessment (2/10/21)  2. Patient will improve B hip abduction strength to 4/5 in order to increase ease of ambulation.   Not Met: 4-/5 B (1/20/21), tested in side lying  Distal thigh 4/5 B tested in side lying (2/10/21)  3.  Patient will report pain at 2/10 or less during ADLs in order to improve quality of life.   Not Met: pt reported increased pain later in day after lots of activity. (1/13/2021)  Progressing average 4/10 (2/10/21)       PLAN  [x]  Upgrade activities as tolerated     [x]  Continue plan of care  []  Update interventions per flow sheet       []  Discharge due to:_  []  Other:_      Alex Bender, PTA 2/19/2021  10:36 AM    Future Appointments   Date Time Provider Heri Beal   2/19/2021  9:45 AM Elizebeth Homes, PTA [facility]   2/22/2021  [facility]   2/24/2021  9:45 AM Elizebeth Homes, PTA [facility]   3/1/2021  9:45 AM Evalina Raspberry, PT [facility]   3/3/2021  9:45 AM Elizebeth Homes, PTA [facility]   3/8/2021  9:45 AM Evalina Raspberry, PT [facility]   3/10/2021  9:45 AM Elizebeth Homes, [facility]   3/15/2021  9:45 AM Evalina Raspberry, PT [facility]   3/17/2021  9:45 AM Elizebeth Homes, PTA [facility]   7/26/2021 10:15 AM [facility]   8/2/2021 10:00 AM [facility] TRACK NUR[facility]

## 2021-02-22 ENCOUNTER — HOSPITAL ENCOUNTER (OUTPATIENT)
Dept: PHYSICAL THERAPY | Age: 81
Discharge: HOME | End: 2021-02-22
Payer: MEDICARE

## 2021-02-22 PROCEDURE — 97110 THERAPEUTIC EXERCISES: CPT

## 2021-02-22 PROCEDURE — 97140 MANUAL THERAPY 1/> REGIONS: CPT

## 2021-02-22 NOTE — PROGRESS NOTES
PT DAILY TREATMENT NOTE     Patient Name: Fredo De Los Santos  Date:2021  : 1940  [x]  Patient  Verified  Payor: Cortney Kevin / Plan: [place] MEDICARE PART A & B / Product Type: Medicare /    In time: 9:48  Out time: 10:45  Total Treatment Time (min): 62  Visit #: 3 of 8    Medicare/BCBS Only   Total Timed Codes (min): 42 1:1 Treatment Time:42       Treatment Area: Lower back pain [M54.5]    SUBJECTIVE  Pain Level (0-10 scale): 0/10  Any medication changes, allergies to medications, adverse drug reactions, diagnosis change, or new procedure performed?: [x] No    [] Yes (see summary sheet for update)  Subjective functional status/changes:   [] No changes reported  Pt reports no current pain but soreness in her left buttocks. She states she had left sciatica bad last night that ran down to the bottom of her foot and her right low back is sore too.     OBJECTIVE    Modality rationale: decrease pain to improve the patients ability to carry out ADL's   Min Type Additional Details    [] Estim:  []Unatt       []IFC  []Premod                        []Other:  []w/ice   []w/heat  Position:  Location:    [] Estim: []Att    []TENS instruct  []NMES                    []Other:  []w/US   []w/ice   []w/heat  Position:  Location:    []  Traction: [] Cervical       []Lumbar                       [] Prone          []Supine                       []Intermittent   []Continuous Lbs:  [] before manual  [] after manual    []  Ultrasound: []Continuous   [] Pulsed                           []1MHz   []3MHz W/cm2:  Location:    []  Iontophoresis with dexamethasone         Location: [] Take home patch   [] In clinic   15 []  Ice     [x]  heat  []  Ice massage  []  Laser   []  Anodyne Position:Seated   Location: back     []  Laser with stim  []  Other:  Position:  Location:    []  Vasopneumatic Device Pressure:       [] lo [] med [] hi   Temperature: [] lo [] med [] hi   [x] Skin assessment post-treatment:  [x]intact []redness- no adverse reaction    []redness  adverse reaction:       32 min Therapeutic Exercise:  [x] See flow sheet :   Rationale: increase ROM and increase strength to improve the patients ability to carry out ADLs     10 min Manual Therapy: leg lengthening for left upslip; MET for left AI; shotgun technique; MET for left/left sacral torsion and MET for FRSL L3-L5   Rationale: improve coordination and improve balance  to improve the patients ability to carry out ADLs          With   [] TE   [] TA   [] neuro   [] other: Patient Education: [x] Review HEP    [] Progressed/Changed HEP based on:   [] positioning   [] body mechanics   [] transfers   [] heat/ice application    [] other:      Other Objective/Functional Measures: Added piriformis stretch and glute strengthening to address alignment and sciatic concerns  Pt felt no pain post stretching and correction in left buttocks  Right piriformis > left piriformis hypertonicity  Added bridges and may want to add hip IR/ER  Decreased hip adduction strength    Pain Level (0-10 scale) post treatment: 0/10    ASSESSMENT/Changes in Function: Pt progressing in general with decreased back pain but has continued flare ups of left sciatic pain. She did have a pelvic obliquity that may have been an ongoing contributing factor along with general glute/hip weakness. Will continue to progress improved postural awareness with core/hip strengthening to reduce l/s extension moment for ease of completing household chores without increased low back pain. Patient will continue to benefit from skilled PT services to modify and progress therapeutic interventions, address functional mobility deficits, address ROM deficits, address strength deficits and assess and modify postural abnormalities to attain remaining goals. Progress towards goals / Updated goals:  1. Patient will improve FOTO score by 13 points in order to demonstrate a significant improvement in function. Declined 1 point but improved since last assessment (2/10/21)  2. Patient will improve B hip abduction strength to 4/5 in order to increase ease of ambulation.   Not Met: 4-/5 B (1/20/21), tested in side lying  Distal thigh 4/5 B tested in side lying (2/10/21)  3.  Patient will report pain at 2/10 or less during ADLs in order to improve quality of life.   Not Met: pt reported increased pain later in day after lots of activity. (1/13/2021)  Progressing average 4/10 (2/10/21)       PLAN  [x]  Upgrade activities as tolerated     [x]  Continue plan of care  []  Update interventions per flow sheet       []  Discharge due to:_  []  Other:_      Shreya Alvarez PTA 2/22/2021  10:36 AM    Future Appointments   Date Time Provider Heri Beal   2/22/2021  9:45 AM Davee Catching, PTA [facility]   2/24/2021  9:45 AM Davee Catching, PTA [facility]   3/1/2021  9:45 AM Ethelene Ink, PT [facility]   3/3/2021  9:45 AM Davee Catching, PTA [facility]   3/8/2021  9:45 AM Ethelene Ink, PT [facility]   3/10/2021  9:45 AM Davee Catching, PTA [facility]   3/15/2021  9:45 AM Ethelene Ink, PT [facility]   3/17/2021  9:45 AM Davee Catching, PTA [facility]   7/26/2021 10:15 AM [facility]   8/2/2021 10:00 AM [facility] FAST TRACK NUR[facility]

## 2021-02-24 ENCOUNTER — HOSPITAL ENCOUNTER (OUTPATIENT)
Dept: PHYSICAL THERAPY | Age: 81
Discharge: HOME | End: 2021-02-24
Payer: MEDICARE

## 2021-02-24 PROCEDURE — 97112 NEUROMUSCULAR REEDUCATION: CPT

## 2021-02-24 PROCEDURE — 97140 MANUAL THERAPY 1/> REGIONS: CPT

## 2021-02-24 PROCEDURE — 97110 THERAPEUTIC EXERCISES: CPT

## 2021-02-24 NOTE — PROGRESS NOTES
PT DAILY TREATMENT NOTE     Patient Name: Lynn Vale  Date:2021  : 1940  [x]  Patient  Verified  Payor: Maude Carter / Plan: [place] MEDICARE PART A & B / Product Type: Medicare /    In time:9:42  Out time:10:20  Total Treatment Time (min): 42   Visit #: 4 of 8    Medicare/BCBS Only   Total Timed Codes (min):  42 1:1 Treatment Time:  42       Treatment Area: Lower back pain [M54.5]    SUBJECTIVE  Pain Level (0-10 scale): 2/10  Any medication changes, allergies to medications, adverse drug reactions, diagnosis change, or new procedure performed?: [x] No    [] Yes (see summary sheet for update)  Subjective functional status/changes:   [] No changes reported  Pt reported she felt better after last appointment. OBJECTIVE    12 min Therapeutic Exercise:  [x] See flow sheet :   Rationale: increase ROM to improve the patients ability to carry out ADL's    10 min Neuromuscular Re-education:  []  See flow sheet : sit to stands with TB   Rationale: increase strength, improve coordination and improve balance  to improve the patients ability to carry out ADL;s    20 min Manual Therapy:  Contract relax stretching, agonist contract PNF. The manual therapy interventions were performed at a separate and distinct time from the therapeutic activities interventions. Rationale: increase ROM and increase tissue extensibility to carry out ADL's          With   [x] TE   [] TA   [] neuro   [] other: Patient Education: [x] Review HEP    [] Progressed/Changed HEP based on:   [] positioning   [] body mechanics   [] transfers   [] heat/ice application    [] other:      Other Objective/Functional Measures: Added PNF contract/relax, contract antagonist to stretching exercises. Folded once GTB for sit to stands, needing constant VC for pushing knees out.    Added Sit to stand focusing on power, cueing pt to stand as fast as possible, 2 sets of 6    Pain Level (0-10 scale) post treatment: 2/10    ASSESSMENT/Changes in Function:   Pt continues to show slow improvement in symptoms. Pt reports having an ear ache for about a week and declined heat on this date. Pt shows improvement in LE strength displaying ability to work on power coming off mat table. Continue to strengthen LE and add TRX squats possibility on next visit. Patient will continue to benefit from skilled PT services to modify and progress therapeutic interventions, address functional mobility deficits, address ROM deficits and address strength deficits to attain remaining goals. Progress towards goals / Updated goals:  1. Patient will improve FOTO score by 13 points in order to demonstrate a significant improvement in function. Declined 1 point but improved since last assessment (2/10/21)  2. Patient will improve B hip abduction strength to 4/5 in order to increase ease of ambulation.   Not Met: 4-/5 B (1/20/21), tested in side lying  Distal thigh 4/5 B tested in side lying (2/10/21)  3. Patient will report pain at 2/10 or less during ADLs in order to improve quality of life.   Not Met: pt reported increased pain later in day after lots of activity. (1/13/2021)  Progressing average 4/10 (2/10/21)    PLAN  []  Upgrade activities as tolerated     [x]  Continue plan of care  []  Update interventions per flow sheet       []  Discharge due to:_  []  Other:_      Skippy Border, SPT 2/24/2021  9:26 AM    I was present during the entire treatment, directing and participating in the treatment.    Adam Crisostomo DPT      Future Appointments   Date Time Provider Heri Beal   2/24/2021  9:45 AM Elayne Allred, PT [facility]   3/1/2021  9:45 AM Alfdelle Chalino, PT [facility]   3/3/2021  9:45 AM Ilana Short, PTA [facility]   3/8/2021  9:45 AM Alfchristy Allred, PT [facility]   3/10/2021  9:45 AM Ilana Short, PTA [facility]   3/15/2021  9:45 AM Elayne Allred PT [facility]   3/17/2021  9:45 AM Finn Kwok, PTA [facility]   7/26/2021 10:15 AM [facility]LEBOTOM[facility]   8/2/2021 10:00 AM [facility] FAST TRACK NURSE [facility]

## 2021-02-25 ENCOUNTER — APPOINTMENT (OUTPATIENT)
Dept: PHYSICAL THERAPY | Age: 81
End: 2021-02-25
Payer: MEDICARE

## 2021-03-01 ENCOUNTER — HOSPITAL ENCOUNTER (OUTPATIENT)
Dept: PHYSICAL THERAPY | Age: 81
Discharge: HOME | End: 2021-03-01
Payer: MEDICARE

## 2021-03-01 PROCEDURE — 97110 THERAPEUTIC EXERCISES: CPT

## 2021-03-01 NOTE — PROGRESS NOTES
PT DAILY TREATMENT NOTE     Patient Name: Archana Umanzor  Date:3/1/2021  : 1940  [x]  Patient  Verified  Payor: [place] MEDICARE / Plan: [place] MEDICARE PART A & B / Product Type: Medicare /    In time: 9:35  Out time: 10:45  Total Treatment Time (min): 79'   Visit #: 5 of 8    Medicare/BCBS Only   Total Timed Codes (min):  55 1:1 Treatment Time:  55       Treatment Area: Lower back pain [M54.5]    SUBJECTIVE  Pain Level (0-10 scale): 2/10  Any medication changes, allergies to medications, adverse drug reactions, diagnosis change, or new procedure performed?: [x] No    [] Yes (see summary sheet for update)  Subjective functional status/changes:   [] No changes reported  Patient states she is having pain in her lower back today. She states that she is still feeling the worst by the end of the day to the point where she cannot stand up straight and rates the pain 15/10. Patient reports she still has a hard time standing for long periods of time, such as when cooking in her kitchen. OBJECTIVE    Modality rationale: decrease pain and increase tissue extensibility to improve the patients ability to perform functional limitations with greater ease.    Min Type Additional Details    [] Estim:  []Unatt       []IFC  []Premod                        []Other:  []w/ice   []w/heat  Position:  Location:    [] Estim: []Att    []TENS instruct  []NMES                    []Other:  []w/US   []w/ice   []w/heat  Position:  Location:    []  Traction: [] Cervical       []Lumbar                       [] Prone          []Supine                       []Intermittent   []Continuous Lbs:  [] before manual  [] after manual    []  Ultrasound: []Continuous   [] Pulsed                           []1MHz   []3MHz W/cm2:  Location:    []  Iontophoresis with dexamethasone         Location: [] Take home patch   [] In clinic   15 []  Ice     [x]  heat  []  Ice massage  []  Laser   []  Anodyne Position: short sitting in chair  Location: along the back    []  Laser with stim  []  Other:  Position:  Location:    []  Vasopneumatic Device Pressure:       [] lo [] med [] hi   Temperature: [] lo [] med [] hi   [x] Skin assessment post-treatment:  [x]intact []redness- no adverse reaction    []redness  adverse reaction:     55 min Therapeutic Exercise:  [x] See flow sheet :   Rationale: increase ROM and increase strength to improve the patients ability to carry out ADLs            With   [] TE   [] TA   [] neuro   [] other: Patient Education: [x] Review HEP    [] Progressed/Changed HEP based on:   [] positioning   [] body mechanics   [] transfers   [] heat/ice application    [] other:      Other Objective/Functional Measures:   Patient was unable to perform 90/90 marches in supine due to pain in her lower back  Needed verbal cues when performing walkouts due to lack of endurance and core/back strength  Added timed step ups and side shuffles with TB to work on pt endurance  Patient tolerated new exercises well    Pain Level (0-10 scale) post treatment: 0/10    ASSESSMENT/Changes in Function:   Patient continues to progress towards therapy goals as she tolerates an increase in intensity during her exercise well with decreased back pain. She continues to show decreased endurance and strength in her core during exercise. Patient still has difficulty standing for long periods of time, and by the end of the day experiences the most pain. Patient will continue to benefit from skilled PT services to modify and progress therapeutic interventions, address functional mobility deficits, address ROM deficits, address strength deficits, analyze and cue movement patterns, analyze and modify body mechanics/ergonomics, assess and modify postural abnormalities and instruct in home and community integration to attain remaining goals.             Progress towards goals / Updated goals:  1. Patient will improve FOTO score by 13 points in order to demonstrate a significant improvement in function. Declined 1 point but improved since last assessment (2/10/21)  2. Patient will improve B hip abduction strength to 4/5 in order to increase ease of ambulation.   Not Met: 4-/5 B (1/20/21), tested in side lying  Distal thigh 4/5 B tested in side lying (2/10/21)  3. Patient will report pain at 2/10 or less during ADLs in order to improve quality of life.   Not met: continues to have increased pain at night (3/1/21)    PLAN  []  Upgrade activities as tolerated     [x]  Continue plan of care  []  Update interventions per flow sheet       []  Discharge due to:_  []  Other:_      Gay Bansal 3/1/2021  9:34 AM    I was present during the entire treatment, directing and participating in the treatment.    Mellissa Herman DPT      Future Appointments   Date Time Provider Heri Beal   3/1/2021  9:45 AM Marta Mcgee, PT [facility]   3/3/2021  9:45 AM Tristen Mediate, PTA [facility]   3/8/2021  9:45 AM Marta Case, PT [facility]   3/10/2021  9:45 AM Tristen Mediate, PTA [facility]   3/15/2021  9:45 AM Marta Case, PT [facility]   3/17/2021  9:45 AM Tristen Mediate, PTA [facility]   7/26/2021 10:15 AM [facility] PHLEBOTOMIST [facility]   8/2/2021 10:00 AM [facility] FAST TRACK NURSE [facility]

## 2021-03-03 ENCOUNTER — HOSPITAL ENCOUNTER (OUTPATIENT)
Dept: PHYSICAL THERAPY | Age: 81
Discharge: HOME | End: 2021-03-03
Payer: MEDICARE

## 2021-03-03 PROCEDURE — 97110 THERAPEUTIC EXERCISES: CPT

## 2021-03-03 NOTE — PROGRESS NOTES
PT DAILY TREATMENT NOTE     Patient Name: Marcia Atkinson  Date:3/3/2021  : 1940  [x]  Patient  Verified  Payor: [place] MEDICARE / Plan: [place] MEDICARE PART A & B / Product Type: Medicare /    In time:9:45   Out time: 11:00  Total Treatment Time (min): 75  Visit #: 6 of 8    Medicare/BCBS Only   Total Timed Codes (min): 60 1:1 Treatment Time: 50       Treatment Area: Lower back pain [M54.5]    SUBJECTIVE  Pain Level (0-10 scale): 2/10  Any medication changes, allergies to medications, adverse drug reactions, diagnosis change, or new procedure performed?: [x] No    [] Yes (see summary sheet for update)  Subjective functional status/changes:   [] No changes reported  Pt reports she should be getting the nerve ablation scheduled soon. She has most pain after prolonged standing like with cooking and doing yard work in her low back causing her to need to lay down to rest her back. OBJECTIVE    Modality rationale: decrease pain and increase tissue extensibility to improve the patients ability to perform functional limitations with greater ease.    Min Type Additional Details    [] Estim:  []Unatt       []IFC  []Premod                        []Other:  []w/ice   []w/heat  Position:  Location:    [] Estim: []Att    []TENS instruct  []NMES                    []Other:  []w/US   []w/ice   []w/heat  Position:  Location:    []  Traction: [] Cervical       []Lumbar                       [] Prone          []Supine                       []Intermittent   []Continuous Lbs:  [] before manual  [] after manual    []  Ultrasound: []Continuous   [] Pulsed                           []1MHz   []3MHz W/cm2:  Location:    []  Iontophoresis with dexamethasone         Location: [] Take home patch   [] In clinic   15 []  Ice     [x]  heat  []  Ice massage  []  Laser   []  Anodyne Position: short sitting in chair  Location: along the back    []  Laser with stim  []  Other:  Position:  Location:    []  Vasopneumatic Device Pressure:       [] lo [] med [] hi   Temperature: [] lo [] med [] hi   [x] Skin assessment post-treatment:  [x]intact []redness- no adverse reaction    []redness  adverse reaction:     60 min Therapeutic Exercise:  [x] See flow sheet :   Rationale: increase ROM and increase strength to improve the patients ability to carry out ADLs            With   [] TE   [] TA   [] neuro   [] other: Patient Education: [x] Review HEP    [] Progressed/Changed HEP based on:   [] positioning   [] body mechanics   [] transfers   [] heat/ice application    [] other:      Other Objective/Functional Measures:   Worked on standing posture to reduce l/s extension moment versus scap retraction for posture  Increased t/s kyphosis  Added pec stretching to assist  Reviewed open books and 1/2 foam ext  Educated on standing with foot on stool when cooking to reduce l/s extension as well to see if that would help and educated using mirror of reduced rib flare for better core engagement and l/s posture      Pain Level (0-10 scale) post treatment: 0/10    ASSESSMENT/Changes in Function: Pt continues with increased l/s pain with prolonged standing and activity due to increased t/s kyphosis causing a l/s extension moment in order to achieve upright posture. She has t/s hypomobility and pec tightness. She continues with increased l/s tightness and pain due to posture. Will work on functional tasks with improved awareness of posture to reduce pain and ease of performing cooking and yard work tasks. Patient will continue to benefit from skilled PT services to modify and progress therapeutic interventions, address functional mobility deficits, address ROM deficits, address strength deficits, analyze and cue movement patterns, analyze and modify body mechanics/ergonomics, assess and modify postural abnormalities and instruct in home and community integration to attain remaining goals.             Progress towards goals / Updated goals:  1. Patient will improve FOTO score by 13 points in order to demonstrate a significant improvement in function. Declined 1 point but improved since last assessment (2/10/21)  2. Patient will improve B hip abduction strength to 4/5 in order to increase ease of ambulation.   Not Met: 4-/5 B (1/20/21), tested in side lying  Distal thigh 4/5 B tested in side lying (2/10/21)  3.  Patient will report pain at 2/10 or less during ADLs in order to improve quality of life.   Not met: continues to have increased pain at night (3/1/21)    PLAN  [x]  Upgrade activities as tolerated     [x]  Continue plan of care  []  Update interventions per flow sheet       []  Discharge due to:_  []  Other:_      Main Villaseñor PTA 3/3/2021  9:34 AM        Future Appointments   Date Time Provider Heri Beal   3/8/2021  9:45 AM David Soliz PT [facility]   3/10/2021  9:45 AM Rolando Mccarty PTA [facility]   3/15/2021  9:45 AM David Soliz PT [facility]   3/17/2021  9:45 AM Rolando Mccarty PTA [facility]   7/26/2021 10:15 AM [facility] PHLEBOTOMIST CARLA HUTCHINS   8/2/2021 10:00 AM [facility] FAST TRACK NURSE CARLA HUTCHINS

## 2021-03-08 ENCOUNTER — HOSPITAL ENCOUNTER (OUTPATIENT)
Dept: PHYSICAL THERAPY | Age: 81
Discharge: HOME | End: 2021-03-08
Payer: MEDICARE

## 2021-03-08 PROCEDURE — 97110 THERAPEUTIC EXERCISES: CPT

## 2021-03-08 NOTE — PROGRESS NOTES
PT DAILY TREATMENT NOTE     Patient Name: Archana Umanzor  Date:3/8/2021  : 1940  [x]  Patient  Verified  Payor: Fredis Alicea / Plan: [place] MEDICARE PART A & B / Product Type: Medicare /    In time:9:45  Out time:10:45  Total Treatment Time (min): 60  Visit #: 7 of 8    Medicare/BCBS Only   Total Timed Codes (min):  45 1:1 Treatment Time:  45       Treatment Area: Lower back pain [M54.5]    SUBJECTIVE  Pain Level (0-10 scale): 2/10  Any medication changes, allergies to medications, adverse drug reactions, diagnosis change, or new procedure performed?: [x] No    [] Yes (see summary sheet for update)  Subjective functional status/changes:   [] No changes reported  Patient states she was gardening this weekend and continues to have pain in her low back to where she cannot even get up. She states she still has trouble standing for awhile, especially when cooking. She did not try putting a stool under her foot when cooking because she forgot.   OBJECTIVE    Modality rationale: decrease pain and increase tissue extensibility to improve the patients ability to perform functional limitations with greater ease   Min Type Additional Details    [] Estim:  []Unatt       []IFC  []Premod                        []Other:  []w/ice   []w/heat  Position:  Location:    [] Estim: []Att    []TENS instruct  []NMES                    []Other:  []w/US   []w/ice   []w/heat  Position:  Location:    []  Traction: [] Cervical       []Lumbar                       [] Prone          []Supine                       []Intermittent   []Continuous Lbs:  [] before manual  [] after manual    []  Ultrasound: []Continuous   [] Pulsed                           []1MHz   []3MHz W/cm2:  Location:    []  Iontophoresis with dexamethasone         Location: [] Take home patch   [] In clinic   15 []  Ice     [x]  heat  []  Ice massage  []  Laser   []  Anodyne Position: short sitting in chair  Location: along the back    []  Laser with stim  []  Other:  Position:  Location:    []  Vasopneumatic Device Pressure:       [] lo [] med [] hi   Temperature: [] lo [] med [] hi   [x] Skin assessment post-treatment:  [x]intact []redness- no adverse reaction    []redness  adverse reaction:     45 min Therapeutic Exercise:  [x] See flow sheet :   Rationale: increase ROM and increase strength to improve the patients ability to carry out ADLs          With   [x] TE   [] TA   [] neuro   [] other: Patient Education: [x] Review HEP    [] Progressed/Changed HEP based on:   [] positioning   [] body mechanics   [] transfers   [] heat/ice application    [] other:      Other Objective/Functional Measures:   Patient still unable to perform 90/90 marches due to pain in low back, modified to PPT with marches   Needed verbal cues when performing walkouts due to lack of endurance and core/back strength    Pain Level (0-10 scale) post treatment: 0/10    ASSESSMENT/Changes in Function:   Pt continues to have pain in low back with prolonged standing and gardening. Her progression is slowed due to pt being inconsistent with HEP at home. She still presents with increased t/s kyphosis creating poor posture and tightness. Patient is progressing in strength as she is able to get up and down from the floor with greater ease. Patient will continue to benefit from skilled PT services to modify and progress therapeutic interventions, address functional mobility deficits, address ROM deficits, address strength deficits, analyze and cue movement patterns, analyze and modify body mechanics/ergonomics, assess and modify postural abnormalities and instruct in home and community integration to attain remaining goals. Progress towards goals / Updated goals:  1. Patient will improve FOTO score by 13 points in order to demonstrate a significant improvement in function. Declined 1 point but improved since last assessment (2/10/21)  2.  Patient will improve B hip abduction strength to 4/5 in order to increase ease of ambulation.   Not Met: 4-/5 B (1/20/21), tested in side lying  Distal thigh 4/5 B tested in side lying (2/10/21)  3. Patient will report pain at 2/10 or less during ADLs in order to improve quality of life.   Not met: continues to have increased pain at night (3/1/21)    PLAN  []  Upgrade activities as tolerated     [x]  Continue plan of care  []  Update interventions per flow sheet       []  Discharge due to:_  []  Other:_      Gay Bansal 3/8/2021  8:12 AM    I was present during the entire treatment, directing and participating in the treatment.    Mellissa Herman DPT      Future Appointments   Date Time Provider Heri Beal   3/8/2021  9:45 AM Marta Case, PT [facility]   3/12/2021 11:15 AM Marta Case, PT [facility]   3/15/2021  9:45 AM Marta Case, PT [facility]   3/17/2021  9:45 AM Tristen Mediate, PTA [facility]   3/22/2021 11:15 AM Tristen Mediate, PTA [facility]   3/24/2021 12:45 PM Tristen Mediate, PTA [facility]   3/29/2021  9:00 AM Tristen Mediate, PTA [facility]   3/31/2021  9:00 AM Tristen Mediate, PTA [facility]   4/5/2021  9:45 AM Tristen Mediate, PTA [facility]   4/7/2021  9:45 AM Tristen Mediate, PTA [facility]   4/12/2021  9:45 AM Tristen Mediate, PTA [facility]   4/14/2021  9:45 AM Tristen Mediate, PTA [facility]   4/19/2021  9:45 AM Tristen Mediate, PTA [facility]   4/21/2021  9:45 AM Tristen Mediate, PTA [facility]   7/26/2021 10:15 AM [facility]LE[facility]OT[facility]   8/2/2021 10:00 AM [facility] FAST TRACK NUR[facility]

## 2021-03-10 ENCOUNTER — APPOINTMENT (OUTPATIENT)
Dept: PHYSICAL THERAPY | Age: 81
End: 2021-03-10
Payer: MEDICARE

## 2021-03-12 ENCOUNTER — HOSPITAL ENCOUNTER (OUTPATIENT)
Dept: PHYSICAL THERAPY | Age: 81
Discharge: HOME | End: 2021-03-12
Payer: MEDICARE

## 2021-03-12 PROCEDURE — 97110 THERAPEUTIC EXERCISES: CPT

## 2021-03-12 NOTE — PROGRESS NOTES
[facility] [employer] [facility] [employer]  [address] [facility]  [phone] fax    Continued Plan of Care/ Re-certification for Physical Therapy Services    Patient name: Violet De Diso Start of Care: 2020   Referral source: Neha [place] : 1940               Medical Diagnosis: Lower back pain [M54.5]  Payor: [place] MEDICARE / [facility] / Product Type: Medicare /  Onset Date: 20               Treatment Diagnosis: LBP   Prior Hospitalization: see medical history Provider#: [id number]   Medications: Verified on Patient summary List    Comorbidities:  Heart disease, osteoporosis, diabetes, latex, asthma, cancer, history of right shoulder arthroplasty    Prior Level of Function: Ind with ADLs, Ind with ambulation     Visits from Start of Care: 29    Missed Visits: 2    The Plan of Care and following information is based on the patient's current status:  Goal: Patient will improve FOTO score by 13 points in order to demonstrate a significant improvement in function. Status at last note/certification: 56 points  Current Status: not met    Goal: Patient will report pain at 2/10 or less during ADLs in order to improve quality of life. Status at last note/certification: increased pain at night  Current Status: not met    Goal: Patient will improve B hip abduction strength to 4+/5 in order to increase ease of ambulation.    Status at last note/certification: 4/5  Current Status: not met    Key functional changes: improving hip abduction strength and ability to stand up from the floor      Problems/ barriers to goal attainment: none reported     Problem List: pain affecting function, decrease ROM, decrease strength, impaired gait/ balance, decrease ADL/ functional abilitiies, decrease activity tolerance, decrease flexibility/ joint mobility and decrease transfer abilities    Treatment Plan: Therapeutic exercise, Therapeutic activities, Neuromuscular re-education, Physical agent/modality, Gait/balance training, Manual therapy, Patient education, Self Care training, Functional mobility training and Home safety training     Patient Goal (s) has been updated and includes: to have less pain     Goals for this certification period to be accomplished in 4 weeks:  1. Patient will improve FOTO score by 13 points in order to demonstrate a significant improvement in function. 2. Patient will report pain at 2/10 or less during ADLs in order to improve quality of life. 3. Patient will demonstrate good understanding of HEP in order to continue to improve following D/C. Frequency / Duration: Patient to be seen 2 times per week for 4 weeks:    Assessment / Recommendations: pt. Is progressing slowly towards goals. She has increased ease of floor to standing transfers and demonstrates improving hip strength. No significant change in FOTO score at 60 points and B hip abduction MMT is 4/5. She continues to have pain with prolonged standing activities such as cooking and at the end of the day. She continues to demonstrate thoracic hypomobility and decreased core stability. Skilled PT is medically necessary in order to improve hip strength and core stability for increased ease of ADLs and improved quality of life. Certification Period: 3/12/21-4/11/21    Gian Canas, PT 3/12/2021 11:37 AM    ________________________________________________________________________  I certify that the above Therapy Services are being furnished while the patient is under my care. I agree with the treatment plan and certify that this therapy is necessary. [] I have read the above and request that my patient continue as recommended.   [] I have read the above report and request that my patient continue therapy with the following changes/special instructions: _______________________________________  [] I have read the above report and request that my patient be discharged from therapy    Physician's Signature:____________Date:_________TIME:________     MATTHEW Youssef  ** Signature, Date and Time must be completed for valid certification **    Please sign and return to [facility] [employer] [facility]  [address] [place] [address]  [phone] fax

## 2021-03-12 NOTE — PROGRESS NOTES
PT DAILY TREATMENT NOTE     Patient Name: Neale Councilman  Date:3/12/2021  : 1940  [x]  Patient  Verified  Payor: VA MEDICARE / Plan: VA MEDICARE PART A & B / Product Type: Medicare /    In time:11:13  Out time:12:18  Total Treatment Time (min): 65  Visit #: 8 of 8    Medicare/BCBS Only   Total Timed Codes (min):  50 1:1 Treatment Time:  50       Treatment Area: Lower back pain [M54.5]    SUBJECTIVE  Pain Level (0-10 scale): 3/10  Any medication changes, allergies to medications, adverse drug reactions, diagnosis change, or new procedure performed?: [x] No    [] Yes (see summary sheet for update)  Subjective functional status/changes:   [] No changes reported  Patient states she is having the same pain with prolonged standing and when gardening. She would like to try to play golf.     OBJECTIVE    Modality rationale: decrease pain and increase tissue extensibility to improve the patients ability to perform functional limitation with greater ease   Min Type Additional Details    [] Estim:  []Unatt       []IFC  []Premod                        []Other:  []w/ice   []w/heat  Position:  Location:    [] Estim: []Att    []TENS instruct  []NMES                    []Other:  []w/US   []w/ice   []w/heat  Position:  Location:    []  Traction: [] Cervical       []Lumbar                       [] Prone          []Supine                       []Intermittent   []Continuous Lbs:  [] before manual  [] after manual    []  Ultrasound: []Continuous   [] Pulsed                           []1MHz   []3MHz W/cm2:  Location:    []  Iontophoresis with dexamethasone         Location: [] Take home patch   [] In clinic   15 []  Ice     [x]  heat  []  Ice massage  []  Laser   []  Anodyne Position: short sitting in chair  Location: along back    []  Laser with stim  []  Other:  Position:  Location:    []  Vasopneumatic Device Pressure:       [] lo [] med [] hi   Temperature: [] lo [] med [] hi   [x] Skin assessment post-treatment:  [x]intact []redness- no adverse reaction    []redness  adverse reaction:     50 min Therapeutic Exercise:  [x] See flow sheet :   Rationale: increase ROM, increase strength and improve coordination to improve the patients ability to carry out ADLs      With   [] TE   [] TA   [] neuro   [] other: Patient Education: [x] Review HEP    [] Progressed/Changed HEP based on:   [] positioning   [] body mechanics   [] transfers   [] heat/ice application    [] other:      Other Objective/Functional Measures:   FOTO score of 60   MMT B hip abd: 4/5  Discussed D/C from therapy at end of the month and patient agreed    Pain Level (0-10 scale) post treatment: 2/10    ASSESSMENT/Changes in Function:   See progress note. Progress towards goals / Updated goals:  1. Patient will improve FOTO score by 13 points in order to demonstrate a significant improvement in function. Not met: FOTO score of 60  2. Patient will improve B hip abduction strength to 4/5 in order to increase ease of ambulation.   MET: 4/5 B hip abd  3. Patient will report pain at 2/10 or less during ADLs in order to improve quality of life.   Progressing- patient reports 3/10    PLAN  []  Upgrade activities as tolerated     [x]  Continue plan of care  []  Update interventions per flow sheet       []  Discharge due to:_  []  Other:_      Katalina Fofanamarietta 3/12/2021  9:20 AM  I was present during the entire treatment, directing and participating in the treatment.    Makayla Tom DPT      Future Appointments   Date Time Provider Heri Beal   3/12/2021 11:15 AM Robert Mukherjee, PT [facility]   3/15/2021  9:45 AM Robert Mukherjee, PT [facility]   3/17/2021  9:45 AM Trudi Prior, PTA [facility]   3/22/2021 11:15 AM Trudi Prior, PTA [facility]   3/24/2021 12:45 PM Trudi Prior, PTA [facility]   3/29/2021  9:00 AM Trudi Prior, PTA [facility]   3/31/2021  9:00 AM Trudi Prior, PTA [facility]   4/5/2021 9:45 AM Kristen Fuentes, PTA [facility]   4/7/2021  9:45 AM Kristen Fuentes, PTA [facility]   4/12/2021  9:45 AM Kristen Fuentes, PTA [facility]   4/14/2021  9:45 AM Kristen Fuentes, PTA [facility]   4/19/2021  9:45 AM Kristen Fuentes, PTA [facility]   4/21/2021  9:45 AM Kristen Fuentes, PTA [facility]   7/26/2021 10:15 AM [facility]LE[facility]OTOM[facility]   8/2/2021 10:00 AM [facility] TRACK NUR[facility]

## 2021-03-15 ENCOUNTER — HOSPITAL ENCOUNTER (OUTPATIENT)
Dept: PHYSICAL THERAPY | Age: 81
Discharge: HOME | End: 2021-03-15
Payer: MEDICARE

## 2021-03-15 PROCEDURE — 97110 THERAPEUTIC EXERCISES: CPT

## 2021-03-15 NOTE — PROGRESS NOTES
PT DAILY TREATMENT NOTE     Patient Name: Carlos Patel  Date:3/15/2021  : 1940  [x]  Patient  Verified  Payor: Mere Members / Plan: [place] MED[place] & B / Product Type: Medicare /    In time:9:45  Out time: 10:48  Total Treatment Time (min): 63  Visit #: 1 of 8    Medicare/BCBS Only   Total Timed Codes (min):  48 1:1 Treatment Time:  48       Treatment Area: Lower back pain [M54.5]    SUBJECTIVE  Pain Level (0-10 scale):  2/10  Any medication changes, allergies to medications, adverse drug reactions, diagnosis change, or new procedure performed?: [x] No    [] Yes (see summary sheet for update)  Subjective functional status/changes:   [] No changes reported  Pt. Reports she didn't try the open books at home because she had too much yard work to do.      OBJECTIVE    Modality rationale: decrease pain and increase tissue extensibility to improve the patients ability to increase ease of ADLs   Min Type Additional Details    [] Estim:  []Unatt       []IFC  []Premod                        []Other:  []w/ice   []w/heat  Position:  Location:    [] Estim: []Att    []TENS instruct  []NMES                    []Other:  []w/US   []w/ice   []w/heat  Position:  Location:    []  Traction: [] Cervical       []Lumbar                       [] Prone          []Supine                       []Intermittent   []Continuous Lbs:  [] before manual  [] after manual    []  Ultrasound: []Continuous   [] Pulsed                           []1MHz   []3MHz W/cm2:  Location:    []  Iontophoresis with dexamethasone         Location: [] Take home patch   [] In clinic   15 []  Ice     [x]  heat  []  Ice massage  []  Laser   []  Anodyne Position: seated  Location: low back    []  Laser with stim  []  Other:  Position:  Location:    []  Vasopneumatic Device Pressure:       [] lo [] med [] hi   Temperature: [] lo [] med [] hi   [x] Skin assessment post-treatment:  [x]intact []redness- no adverse reaction    []redness  adverse reaction:     48 min Therapeutic Exercise:  [x] See flow sheet :   Rationale: increase ROM and increase strength to improve the patients ability to increase ease of ADLs           With   [x] TE   [] TA   [] neuro   [] other: Patient Education: [x] Review HEP    [] Progressed/Changed HEP based on:   [] positioning   [] body mechanics   [] transfers   [] heat/ice application    [] other:      Other Objective/Functional Measures:   Pt. Was unable to perform Palof press at 3#   Pt. Required cues to perform hip abduction correctly  Challenged with green band for lateral band walks    Pain Level (0-10 scale) post treatment:  2/10    ASSESSMENT/Changes in Function:  Pt. Is progressing slowly towards goals. She continues to have poor compliance with her HEP. She has increased pain with yard work and towards end of day at home. She continues to demonstrate decreased B hip strength. Patient will continue to benefit from skilled PT services to modify and progress therapeutic interventions, address functional mobility deficits, address ROM deficits, address strength deficits, analyze and address soft tissue restrictions, analyze and cue movement patterns, analyze and modify body mechanics/ergonomics and assess and modify postural abnormalities to attain remaining goals. Progress towards goals / Updated goals:  1. Patient will improve FOTO score by 13 points in order to demonstrate a significant improvement in function. 2. Patient will report pain at 2/10 or less during ADLs in order to improve quality of life. 3. Patient will demonstrate good understanding of HEP in order to continue to improve following D/C.      PLAN  []  Upgrade activities as tolerated     [x]  Continue plan of care  []  Update interventions per flow sheet       []  Discharge due to:_  []  Other:_      Janet Okeefe, PT 3/15/2021  7:54 AM    Future Appointments   Date Time Provider Heri Beal   3/15/2021  9:45 AM Reno Fredy, PT [facility]   3/17/2021  9:45 AM Behrens, Laura M, PTA [facility]   3/22/2021 11:15 AM Behrens, Laura M, PTA [facility]   3/24/2021 12:45 PM Behrens, Laura M, PTA [facility]   3/29/2021  9:00 AM Behrens, Laura M, PTA [facility]   3/31/2021  9:00 AM Behrens, Laura M, PTA [facility]   4/5/2021  9:45 AM Behrens, Laura M, PTA [facility]   4/7/2021  9:45 AM Behrens, Laura M, PTA [facility]   4/12/2021  9:45 AM Behrens, Laura M, PTA [facility]   4/14/2021  9:45 AM Behrens, Laura M, PTA [facility]   4/19/2021  9:45 AM Behrens, Laura M, PTA [facility]   4/21/2021  9:45 AM Behrens, Laura M, PTA [facility]   7/26/2021 10:15 AM [facility]   8/2/2021 10:00 AM [facility] FAST TRACK NURSE [facility]

## 2021-03-17 ENCOUNTER — HOSPITAL ENCOUNTER (OUTPATIENT)
Dept: PHYSICAL THERAPY | Age: 81
Discharge: HOME | End: 2021-03-17
Payer: MEDICARE

## 2021-03-17 PROCEDURE — 97110 THERAPEUTIC EXERCISES: CPT

## 2021-03-17 NOTE — PROGRESS NOTES
PT DAILY TREATMENT NOTE     Patient Name: Carlos Patel  Date:3/17/2021  : 1940  [x]  Patient  Verified  Payor: [employer] Members / Plan: [place] MEDICARE PART A & B / Product Type: Medicare /    In time: 9:45  Out time: 10:44  Total Treatment Time (min): 59  Visit #: 2 of 8    Medicare/BCBS Only   Total Timed Codes (min): 44 1:1 Treatment Time: 44       Treatment Area: Lower back pain [M54.5]    SUBJECTIVE  Pain Level (0-10 scale): 2/10   Any medication changes, allergies to medications, adverse drug reactions, diagnosis change, or new procedure performed?: [x] No    [] Yes (see summary sheet for update)  Subjective functional status/changes:   [] No changes reported  Pt reports still having some low back pain but okay with being done at the end of the  Month. She is hoping the nerve ablation on Friday will help with remaining pain. She feels she can do the hamstring stretching and open books at home for exercise.        OBJECTIVE    Modality rationale: decrease pain and increase tissue extensibility to improve the patients ability to increase ease of ADLs   Min Type Additional Details    [] Estim:  []Unatt       []IFC  []Premod                        []Other:  []w/ice   []w/heat  Position:  Location:    [] Estim: []Att    []TENS instruct  []NMES                    []Other:  []w/US   []w/ice   []w/heat  Position:  Location:    []  Traction: [] Cervical       []Lumbar                       [] Prone          []Supine                       []Intermittent   []Continuous Lbs:  [] before manual  [] after manual    []  Ultrasound: []Continuous   [] Pulsed                           []1MHz   []3MHz W/cm2:  Location:    []  Iontophoresis with dexamethasone         Location: [] Take home patch   [] In clinic   15 []  Ice     [x]  heat  []  Ice massage  []  Laser   []  Anodyne Position: seated  Location: low back    []  Laser with stim  []  Other:  Position:  Location:    []  Vasopneumatic Device Pressure: [] lo [] med [] hi   Temperature: [] lo [] med [] hi   [x] Skin assessment post-treatment:  [x]intact []redness- no adverse reaction    []redness  adverse reaction:     44 min Therapeutic Exercise:  [x] See flow sheet :   Rationale: increase ROM and increase strength to improve the patients ability to increase ease of ADLs           With   [x] TE   [] TA   [] neuro   [] other: Patient Education: [x] Review HEP    [] Progressed/Changed HEP based on:   [] positioning   [] body mechanics   [] transfers   [] heat/ice application    [] other:      Other Objective/Functional Measures:  Left>right hamstring tightness with long sit stretch; educated to perform at home  Continues with swayback posture in standing   Challenged with keeping core tucked/engaged during pallof press  No increased pain during session  Confirmed okay to D/C at end of month with final home program    Pain Level (0-10 scale) post treatment:  2/10    ASSESSMENT/Changes in Function:   Pt progressing towards final goals with improving hip strength and maintaining lower pain levels. She continues with most difficulty with prolonged standing due to low back pain from increased l/s extension with swayback posture. She has been busy with household chores and ADLs so does not perform a HEP. Will make focus of finding a few stretches she will be able to do at home upon D/C to help with mobility to keep pain levels low with cooking and cleaning tasks. Patient will continue to benefit from skilled PT services to modify and progress therapeutic interventions, address functional mobility deficits, address ROM deficits, address strength deficits, analyze and address soft tissue restrictions, analyze and cue movement patterns, analyze and modify body mechanics/ergonomics and assess and modify postural abnormalities to attain remaining goals. Progress towards goals / Updated goals:  1.  Patient will improve FOTO score by 13 points in order to demonstrate a significant improvement in function. 2. Patient will report pain at 2/10 or less during ADLs in order to improve quality of life. 3. Patient will demonstrate good understanding of HEP in order to continue to improve following D/C.      PLAN  [x]  Upgrade activities as tolerated     [x]  Continue plan of care  []  Update interventions per flow sheet       []  Discharge due to:_  []  Other:_      Nichole , PTA 3/17/2021  7:54 AM    Future Appointments   Date Time Provider Heri Beal   3/17/2021  9:45 AM Trudi Prior, PTA [facility]   3/22/2021 11:15 AM Trudi Prior, PTA [facility]   3/24/2021 12:45 PM Trudi Prior, PTA [facility]   3/29/2021  9:00 AM Trudi Prior, PTA [facility]   3/31/2021  9:00 AM Trudi Prior, PTA [facility]   4/5/2021  9:45 AM Trudi Prior, PTA [facility]   4/7/2021  9:45 AM Trudi Prior, PTA [facility]   4/12/2021  9:45 AM Trudi Prior, PTA [facility]   4/14/2021  9:45 AM Trudi Prior, PTA [facility]   4/19/2021  9:45 AM Trudi Prior, PTA [facility]   4/21/2021  9:45 AM Trudi Prior, PTA [facility]   7/26/2021 10:15 AM [facility]   8/2/2021 10:00 AM [facility] FAST TRACK NURSE [facility]

## 2021-03-22 ENCOUNTER — HOSPITAL ENCOUNTER (OUTPATIENT)
Dept: PHYSICAL THERAPY | Age: 81
Discharge: HOME | End: 2021-03-22
Payer: MEDICARE

## 2021-03-22 PROCEDURE — 97110 THERAPEUTIC EXERCISES: CPT

## 2021-03-22 NOTE — PROGRESS NOTES
PT DAILY TREATMENT NOTE     Patient Name: Erik Krishnamurthy  Date:3/22/2021  : 1940  [x]  Patient  Verified  Payor: Mandy Martinez / Plan: [place] MEDICARE PART A & B / Product Type: Medicare /    In time: 11:18  Out time: 12:15  Total Treatment Time (min): 62  Visit #: 3 of 8    Medicare/BCBS Only   Total Timed Codes (min): 42 1:1 Treatment Time:42       Treatment Area: Lower back pain [M54.5]    SUBJECTIVE  Pain Level (0-10 scale): 1/10   Any medication changes, allergies to medications, adverse drug reactions, diagnosis change, or new procedure performed?: [x] No    [] Yes (see summary sheet for update)  Subjective functional status/changes:   [] No changes reported  Pt reports she got the nerve ablation on Friday and was told to take it easy but it was sore after. She is feeling okay today and working on her posture. She didn't stretch over the weekend and she needs to clean her baseboards today; she is going to kneel and scoot along versus bend over.       OBJECTIVE    Modality rationale: decrease pain and increase tissue extensibility to improve the patients ability to increase ease of ADLs   Min Type Additional Details    [] Estim:  []Unatt       []IFC  []Premod                        []Other:  []w/ice   []w/heat  Position:  Location:    [] Estim: []Att    []TENS instruct  []NMES                    []Other:  []w/US   []w/ice   []w/heat  Position:  Location:    []  Traction: [] Cervical       []Lumbar                       [] Prone          []Supine                       []Intermittent   []Continuous Lbs:  [] before manual  [] after manual    []  Ultrasound: []Continuous   [] Pulsed                           []1MHz   []3MHz W/cm2:  Location:    []  Iontophoresis with dexamethasone         Location: [] Take home patch   [] In clinic   15 []  Ice     [x]  heat  []  Ice massage  []  Laser   []  Anodyne Position: seated  Location: low back    []  Laser with stim  []  Other:  Position:  Location: []  Vasopneumatic Device Pressure:       [] lo [] med [] hi   Temperature: [] lo [] med [] hi   [x] Skin assessment post-treatment:  [x]intact []redness- no adverse reaction    []redness  adverse reaction:     42 min Therapeutic Exercise:  [x] See flow sheet :   Rationale: increase ROM and increase strength to improve the patients ability to increase ease of ADLs           With   [x] TE   [] TA   [] neuro   [] other: Patient Education: [x] Review HEP    [] Progressed/Changed HEP based on:   [] positioning   [] body mechanics   [] transfers   [] heat/ice application    [] other:      Other Objective/Functional Measures:  Improving t/s mobility  Improved awareness of shoulders back  Added eric rows/ext for posture  Challenged with increased pallof press resistance  Continues with increased HS/calf tightness    Pain Level (0-10 scale) post treatment:  1/10    ASSESSMENT/Changes in Function: Pt progressing towards final goals in therapy. She has improving posture awareness and t/s mobility. She continues with decreased core endurance/strength. She performs a lot of repetitive household chores that cause increased LBP. Will continue a few more sessions to ensure good understanding of final HEP for self progression and pain control upon D/C. Patient will continue to benefit from skilled PT services to modify and progress therapeutic interventions, address functional mobility deficits, address ROM deficits, address strength deficits, analyze and address soft tissue restrictions, analyze and cue movement patterns, analyze and modify body mechanics/ergonomics and assess and modify postural abnormalities to attain remaining goals. Progress towards goals / Updated goals:  1. Patient will improve FOTO score by 13 points in order to demonstrate a significant improvement in function. 2. Patient will report pain at 2/10 or less during ADLs in order to improve quality of life.    3. Patient will demonstrate good understanding of HEP in order to continue to improve following D/C.      PLAN  [x]  Upgrade activities as tolerated     [x]  Continue plan of care  []  Update interventions per flow sheet       []  Discharge due to:_  []  Other:_      Lashaun Chapman, DAREK 3/22/2021  7:54 AM    Future Appointments   Date Time Provider Heri Beal   3/22/2021 11:15 AM Zygmunt Ade, PTA [facility] [address]   3/24/2021 12:45 PM Zygmunt Ade, PTA [facility] [address]   3/29/2021  9:00 AM Zygmunt Ade, PTA [facility] [address]   3/31/2021  9:00 AM Zygmunt Ade, PTA [facility] [address]   4/5/2021  9:45 AM Zygmunt Ade, PTA [facility] [address]   4/7/2021  9:45 AM Zygmunt Ade, PTA [facility] [address]   4/12/2021  9:45 AM Zygmunt Ade, PTA [facility] [address]   4/14/2021  9:45 AM Zygmunt Ade, PTA [facility] [address]   4/19/2021  9:45 AM Zygmunt Ade, PTA [facility] [address]   4/21/2021  9:45 AM Zygmunt Ade, PTA [facility] [address]   7/26/2021 10:15 AM [facility]   8/2/2021 10:00 AM [facility] FAST TRACK NURSE [facility]

## 2021-03-24 ENCOUNTER — HOSPITAL ENCOUNTER (OUTPATIENT)
Dept: PHYSICAL THERAPY | Age: 81
Discharge: HOME | End: 2021-03-24
Payer: MEDICARE

## 2021-03-24 PROCEDURE — 97110 THERAPEUTIC EXERCISES: CPT

## 2021-03-24 NOTE — PROGRESS NOTES
PT DAILY TREATMENT NOTE     Patient Name: Patricia Montgomery  Date:3/24/2021  : 1940  [x]  Patient  Verified  Payor: Yumiko Hazard / Plan: [place] MEDICARE PART A & B / Product Type: Medicare /    In time: 12:45    Out time: 1:44  Total Treatment Time (min): 61  Visit #: 4 of 8    Medicare/BCBS Only   Total Timed Codes (min):44 1:1 Treatment Time: 44       Treatment Area: Lower back pain [M54.5]    SUBJECTIVE  Pain Level (0-10 scale): 2/10  Any medication changes, allergies to medications, adverse drug reactions, diagnosis change, or new procedure performed?: [x] No    [] Yes (see summary sheet for update)  Subjective functional status/changes:   [] No changes reported  Pt reports doing well since the nerve ablation and can't get another one for 6 months. She cleaned her baseboard the other day and has to clean her living room today. She states she takes her time and feels like the stretching really helps her pain along with the heat.        OBJECTIVE    Modality rationale: decrease pain and increase tissue extensibility to improve the patients ability to increase ease of ADLs   Min Type Additional Details    [] Estim:  []Unatt       []IFC  []Premod                        []Other:  []w/ice   []w/heat  Position:  Location:    [] Estim: []Att    []TENS instruct  []NMES                    []Other:  []w/US   []w/ice   []w/heat  Position:  Location:    []  Traction: [] Cervical       []Lumbar                       [] Prone          []Supine                       []Intermittent   []Continuous Lbs:  [] before manual  [] after manual    []  Ultrasound: []Continuous   [] Pulsed                           []1MHz   []3MHz W/cm2:  Location:    []  Iontophoresis with dexamethasone         Location: [] Take home patch   [] In clinic   15 []  Ice     [x]  heat  []  Ice massage  []  Laser   []  Anodyne Position: seated  Location: low back    []  Laser with stim  []  Other:  Position:  Location:    []  Vasopneumatic Device Pressure:       [] lo [] med [] hi   Temperature: [] lo [] med [] hi   [x] Skin assessment post-treatment:  [x]intact []redness- no adverse reaction    []redness  adverse reaction:     44 min Therapeutic Exercise:  [x] See flow sheet :   Rationale: increase ROM and increase strength to improve the patients ability to increase ease of ADLs           With   [x] TE   [] TA   [] neuro   [] other: Patient Education: [x] Review HEP    [] Progressed/Changed HEP based on:   [] positioning   [] body mechanics   [] transfers   [] heat/ice application    [] other:      Other Objective/Functional Measures:  Left>right hamstring tightness  Continues with swayback posture  Improving t/s mobility and gluteus medius strength  No increased pain during sessionr  Reviewed planned D/C next week    Pain Level (0-10 scale) post treatment: 0/10     ASSESSMENT/Changes in Function: Pt progressing towards final goals in therapy. She has improving t/s mobility and hip strength. She continues with swayback posture that is likely the contributing factor to ongoing LBP. She will benefit from one final week of therapy to initiate a final HEP and progress towards D/C. Patient will continue to benefit from skilled PT services to modify and progress therapeutic interventions, address functional mobility deficits, address ROM deficits, address strength deficits, analyze and address soft tissue restrictions, analyze and cue movement patterns, analyze and modify body mechanics/ergonomics and assess and modify postural abnormalities to attain remaining goals. Progress towards goals / Updated goals:  1. Patient will improve FOTO score by 13 points in order to demonstrate a significant improvement in function. 2. Patient will report pain at 2/10 or less during ADLs in order to improve quality of life. 3. Patient will demonstrate good understanding of HEP in order to continue to improve following D/C.      PLAN  [x]  Upgrade activities as tolerated     [x]  Continue plan of care  []  Update interventions per flow sheet       []  Discharge due to:_  []  Other:_      Martha Mckeon, DAREK 3/24/2021  7:54 AM    Future Appointments   Date Time Provider Heri Beal   3/29/2021  9:00 AM Laurel Cardenas, PTA [facility]   3/31/2021  9:00 AM Laurel Cardenas, PTA [facility]   4/5/2021  9:45 AM Laurel Cardenas, PTA [facility]   4/7/2021  9:45 AM Laurel Cardenas, PTA [facility]   4/12/2021  9:45 AM Laurel Cardenas, PTA [facility]   4/14/2021  9:45 AM Laurel Cardenas, PTA [facility]   4/19/2021  9:45 AM Laurel Cardenas, PTA [facility]   4/21/2021  9:45 AM Laurel Cardenas, PTA [facility]   7/26/2021 10:15 AM [facility]BOTOM[facility]   8/2/2021 10:00 AM [facility] TRACK NUR[facility]

## 2021-03-29 ENCOUNTER — HOSPITAL ENCOUNTER (OUTPATIENT)
Dept: PHYSICAL THERAPY | Age: 81
Discharge: HOME | End: 2021-03-29
Payer: MEDICARE

## 2021-03-29 PROCEDURE — 97110 THERAPEUTIC EXERCISES: CPT

## 2021-03-29 NOTE — PROGRESS NOTES
PT DAILY TREATMENT NOTE     Patient Name: Karsten Bucio  Date:3/29/2021  : 1940  [x]  Patient  Verified  Payor: Jimy Armendariz / Plan: [place] MED[place] & B / Product Type: Medicare /    In time:  9:04   Out time: 10:05  Total Treatment Time (min): 61  Visit #: 5 of 8    Medicare/BCBS Only   Total Timed Codes (min): 46 1:1 Treatment Time: 46       Treatment Area: Lower back pain [M54.5]    SUBJECTIVE  Pain Level (0-10 scale): 2/10  Any medication changes, allergies to medications, adverse drug reactions, diagnosis change, or new procedure performed?: [x] No    [] Yes (see summary sheet for update)  Subjective functional status/changes:   [] No changes reported  Pt reports cleaning her home over the weekend and completing hip abduction exercises at home. Pt reports aching p! in low back. Pt reports no change in activity tolerance following nn ablation procedure. Pt reports stomach discomfort prior to Rx.      OBJECTIVE    Modality rationale: decrease pain and increase tissue extensibility to improve the patients ability to increase ease of ADLs   Min Type Additional Details    [] Estim:  []Unatt       []IFC  []Premod                        []Other:  []w/ice   []w/heat  Position:  Location:    [] Estim: []Att    []TENS instruct  []NMES                    []Other:  []w/US   []w/ice   []w/heat  Position:  Location:    []  Traction: [] Cervical       []Lumbar                       [] Prone          []Supine                       []Intermittent   []Continuous Lbs:  [] before manual  [] after manual    []  Ultrasound: []Continuous   [] Pulsed                           []1MHz   []3MHz W/cm2:  Location:    []  Iontophoresis with dexamethasone         Location: [] Take home patch   [] In clinic   15 []  Ice     [x]  heat  []  Ice massage  []  Laser   []  Anodyne Position: seated  Location: low back    []  Laser with stim  []  Other:  Position:  Location:    []  Vasopneumatic Device Pressure: [] lo [] med [] hi   Temperature: [] lo [] med [] hi   [x] Skin assessment post-treatment:  [x]intact []redness- no adverse reaction    []redness  adverse reaction:     46 min Therapeutic Exercise:  [x] See flow sheet :   Rationale: increase ROM and increase strength to improve the patients ability to increase ease of ADLs           With   [x] TE   [] TA   [] neuro   [] other: Patient Education: [x] Review HEP    [] Progressed/Changed HEP based on:   [] positioning   [] body mechanics   [] transfers   [] heat/ice application    [] other:      Other Objective/Functional Measures:  Held some abdominal exercises secondary to stomach discomfort. Verbal and tactile cues provided to prevent right shoulder compensation during pallof press at [place] column. Patient continues to present with excessive T/S kyphosis and L/S extension. Pain Level (0-10 scale) post treatment:  0/10    ASSESSMENT/Changes in Function:      Pt pre-Rx p! levels remain consistent with prior visits. Pt has been able to continue with household tasks but continues to experience fatigue with prolonged exertion. Pt continues to present with excessive T/S kyphosis and L/S lordosis likely contributing to pt p! Complaints. Pt will benefit from final session to address goals and a comprehensive HEP in order to D/C. Patient will continue to benefit from skilled PT services to modify and progress therapeutic interventions, address functional mobility deficits, address ROM deficits, address strength deficits, analyze and address soft tissue restrictions, analyze and cue movement patterns, analyze and modify body mechanics/ergonomics and assess and modify postural abnormalities to attain remaining goals. Progress towards goals / Updated goals:  1. Patient will improve FOTO score by 13 points in order to demonstrate a significant improvement in function.    2. Patient will report pain at 2/10 or less during ADLs in order to improve quality of life. 3. Patient will demonstrate good understanding of HEP in order to continue to improve following D/C.      PLAN  [x]  Upgrade activities as tolerated     [x]  Continue plan of care  []  Update interventions per flow sheet       []  Discharge due to:_  []  Other:_      Mendel Carter, PTA 3/29/2021  7:54 AM    Future Appointments   Date Time Provider Heri Beal   3/29/2021  9:00 AM Diane Jabs, PTA [facility]   3/31/2021  9:00 AM Diane Jabs, PTA [facility]   4/5/2021  9:45 AM Diane Jabs, PTA [facility]   4/7/2021  9:45 AM Diane Jabs, PTA [facility]   4/12/2021  9:45 AM Diane Jabs, PTA [facility]   4/14/2021  9:45 AM Diane Jabs, PTA [facility]   4/19/2021  9:45 AM Diane Jabs, PTA [facility]   4/21/2021  9:45 AM Diane Jabs, PTA [facility]   7/26/2021 10:15 AM [facility]LEBOTOM[facility]   8/2/2021 10:00 AM [facility] FAST TRACK NURSE [facility]

## 2021-03-31 ENCOUNTER — HOSPITAL ENCOUNTER (OUTPATIENT)
Dept: PHYSICAL THERAPY | Age: 81
Discharge: HOME | End: 2021-03-31
Payer: MEDICARE

## 2021-03-31 PROCEDURE — 97530 THERAPEUTIC ACTIVITIES: CPT

## 2021-03-31 PROCEDURE — 97110 THERAPEUTIC EXERCISES: CPT

## 2021-03-31 NOTE — PROGRESS NOTES
PT DAILY TREATMENT NOTE     Patient Name: Ira Gama  Date:3/31/2021  : 1940  [x]  Patient  Verified  Payor: Keo Shaw / Plan: [place] MEDICARE PART A & B / Product Type: Medicare /    In time: 9:02 Out time:10:05  Total Treatment Time (min): 63  Visit #: 6 of 8    Medicare/BCBS Only   Total Timed Codes (min):  43 1:1 Treatment Time:  43       Treatment Area: Lower back pain [M54.5]    SUBJECTIVE  Pain Level (0-10 scale): 2/10  Any medication changes, allergies to medications, adverse drug reactions, diagnosis change, or new procedure performed?: [x] No    [] Yes (see summary sheet for update)  Subjective functional status/changes:   [] No changes reported  Pt reports pain at worst during daily activities at 6/10 and at best is 2/10. Pt notes that pain is less in the morning at an average of 2/10 but progresses with activity to an average of 5/10 by the afternoon. Pt reports confidence in ability to maintain current state with self initiated HEP following DC.      OBJECTIVE    Modality rationale: decrease pain and increase tissue extensibility to improve the patients ability to increase ease of ADLs   Min Type Additional Details    [] Estim:  []Unatt       []IFC  []Premod                        []Other:  []w/ice   []w/heat  Position:  Location:    [] Estim: []Att    []TENS instruct  []NMES                    []Other:  []w/US   []w/ice   []w/heat  Position:  Location:    []  Traction: [] Cervical       []Lumbar                       [] Prone          []Supine                       []Intermittent   []Continuous Lbs:  [] before manual  [] after manual    []  Ultrasound: []Continuous   [] Pulsed                           []1MHz   []3MHz W/cm2:  Location:    []  Iontophoresis with dexamethasone         Location: [] Take home patch   [] In clinic   20 []  Ice     [x]  heat  []  Ice massage  []  Laser   []  Anodyne Position:Seated  Location: L/S    []  Laser with stim  []  Other: Position:  Location:    []  Vasopneumatic Device Pressure:       [] lo [] med [] hi   Temperature: [] lo [] med [] hi   [x] Skin assessment post-treatment:  [x]intact []redness- no adverse reaction    []redness  adverse reaction:   25 min Therapeutic Exercise:  [x]? See flow sheet :    Rationale: increase ROM and increase strength to improve the patients ability to increase ease of ADLs    18 min Therapeutic Activity:  [x]? See flow sheet : Goal reassessment, FOTO   Rationale: assess and prepare for DC          With   [] TE   [] TA   [] neuro   [] other: Patient Education: [x] Review HEP    [] Progressed/Changed HEP based on:   [] positioning   [] body mechanics   [] transfers   [] heat/ice application    [] other:      Other Objective/Functional Measures:      Demonstrates min difficulty with therex program and consistently performs more reps than indicated by therapist.     Maryjo [place]: 60  Pain average: 2/10 morning; 5/10 afternoon  Initiated final HEP    Pain Level (0-10 scale) post treatment: 0/10    ASSESSMENT/Changes in Function:     Despite 3 point overall FOTO improvement from [facility], pt answered very high satisfaction with therapeutic outcomes in assessment. Minimal point improvement may be due to questionnaire specificity. Pt pain has overall improved to a low level of 2/10 in the morning and only increases excess activity to an average of 5/10. DC is deemed appropriate at this time as pt is independent with pain management and HEP. []  See Plan of Care  []  See progress note/recertification  [x]  See Discharge Summary         Progress towards goals / Updated goals:  1. Patient will improve FOTO score by 13 points in order to demonstrate a significant improvement in function. 2. Patient will report pain at 2/10 or less during ADLs in order to improve quality of life.    3. Patient will demonstrate good understanding of HEP in order to continue to improve following D/C.     PLAN  []  Upgrade activities as tolerated     []  Continue plan of care  []  Update interventions per flow sheet       [x]  Discharge due to: independent with final HEP  []  Other:_      LUIS Camarillo 3/31/2021  8:58 AM    I was present during the entire treatment, directing and participating in the treatment.    ELMIRA Asher    Future Appointments   Date Time Provider Department Center   3/31/2021  9:00 AM Gm Gaspar, [place] [facility]   4/5/2021  9:45 AM Gm Milling, PTA [facility]   4/7/2021  9:45 AM Gm Milling, PTA [facility]   4/12/2021  9:45 AM Gm Milling, PTA [facility]   4/14/2021  9:45 AM Gm Milling, PTA [facility]   4/19/2021  9:45 AM Gm Milling, PTA [facility]   4/21/2021  9:45 AM Gm Milling, PTA [facility]   7/26/2021 10:15 AM [facility]LEBOTOM[facility]   8/2/2021 10:00 AM [facility] FAST TRACK NURSE [facility]

## 2021-04-05 ENCOUNTER — APPOINTMENT (OUTPATIENT)
Dept: PHYSICAL THERAPY | Age: 81
End: 2021-04-05

## 2021-04-07 ENCOUNTER — APPOINTMENT (OUTPATIENT)
Dept: PHYSICAL THERAPY | Age: 81
End: 2021-04-07

## 2021-04-12 ENCOUNTER — APPOINTMENT (OUTPATIENT)
Dept: PHYSICAL THERAPY | Age: 81
End: 2021-04-12

## 2021-04-14 ENCOUNTER — APPOINTMENT (OUTPATIENT)
Dept: PHYSICAL THERAPY | Age: 81
End: 2021-04-14

## 2021-04-19 ENCOUNTER — APPOINTMENT (OUTPATIENT)
Dept: PHYSICAL THERAPY | Age: 81
End: 2021-04-19

## 2021-04-21 ENCOUNTER — APPOINTMENT (OUTPATIENT)
Dept: PHYSICAL THERAPY | Age: 81
End: 2021-04-21

## 2021-07-26 ENCOUNTER — HOSPITAL ENCOUNTER (OUTPATIENT)
Dept: INFUSION THERAPY | Age: 81
Discharge: HOME | End: 2021-07-26
Payer: MEDICARE

## 2021-07-26 VITALS
TEMPERATURE: 98 F | HEART RATE: 57 BPM | SYSTOLIC BLOOD PRESSURE: 99 MMHG | RESPIRATION RATE: 16 BRPM | OXYGEN SATURATION: 96 % | DIASTOLIC BLOOD PRESSURE: 62 MMHG

## 2021-07-26 LAB
ALBUMIN SERPL-MCNC: 2.2 G/DL (ref 3.4–5)
ALBUMIN/GLOB SERPL: 1 {RATIO} (ref 0.8–1.7)
ALP SERPL-CCNC: 46 U/L (ref 45–117)
ALT SERPL-CCNC: 23 U/L (ref 13–56)
ANION GAP SERPL CALC-SCNC: 4 MMOL/L (ref 3–18)
AST SERPL-CCNC: 15 U/L (ref 10–38)
BILIRUB SERPL-MCNC: 0.3 MG/DL (ref 0.2–1)
BUN SERPL-MCNC: 18 MG/DL (ref 7–18)
BUN/CREAT SERPL: 31 (ref 12–20)
CALCIUM SERPL-MCNC: 7.8 MG/DL (ref 8.5–10.1)
CHLORIDE SERPL-SCNC: 110 MMOL/L (ref 100–111)
CO2 SERPL-SCNC: 29 MMOL/L (ref 21–32)
CREAT SERPL-MCNC: 0.58 MG/DL (ref 0.6–1.3)
GLOBULIN SER CALC-MCNC: 2.2 G/DL (ref 2–4)
GLUCOSE SERPL-MCNC: 95 MG/DL (ref 74–99)
MAGNESIUM SERPL-MCNC: 2.1 MG/DL (ref 1.6–2.6)
PHOSPHATE SERPL-MCNC: 3.5 MG/DL (ref 2.5–4.9)
POTASSIUM SERPL-SCNC: 4.7 MMOL/L (ref 3.5–5.5)
PROT SERPL-MCNC: 4.4 G/DL (ref 6.4–8.2)
SODIUM SERPL-SCNC: 143 MMOL/L (ref 136–145)

## 2021-07-26 PROCEDURE — 83735 ASSAY OF MAGNESIUM: CPT

## 2021-07-26 PROCEDURE — 80053 COMPREHEN METABOLIC PANEL: CPT

## 2021-07-26 PROCEDURE — 84100 ASSAY OF PHOSPHORUS: CPT

## 2021-07-26 PROCEDURE — 36415 COLL VENOUS BLD VENIPUNCTURE: CPT

## 2021-07-26 NOTE — PROGRESS NOTES
[facility] Progress Note    Date: 2021    Name: Issac Gooden    MRN: [id number]         : 1940      Ms. Marine Valentin was assessed and education was provided. Ms. Paige Scott vitals were reviewed and patient was observed for 5 minutes prior to treatment. Visit Vitals  BP 99/62 (BP 1 Location: Left arm, BP Patient Position: Sitting)   Pulse (!) 57   Temp 98 °F (36.7 °C)   Resp 16   SpO2 96%       Pre Prolia CMP. Mag and phos drawn and sent to hospital lab for testing. Patient armband removed and shredded. Ms. Marine Valentin was discharged from [place] [address] in stable condition at 1025. She is to return on 2021 at 1000 for her next appointment.     Geovany Montoya RN  2021  10:28 AM

## 2021-08-02 ENCOUNTER — HOSPITAL ENCOUNTER (OUTPATIENT)
Dept: INFUSION THERAPY | Age: 81
Discharge: HOME | End: 2021-08-02
Payer: MEDICARE

## 2021-08-02 VITALS
TEMPERATURE: 98.6 F | OXYGEN SATURATION: 97 % | HEART RATE: 69 BPM | RESPIRATION RATE: 16 BRPM | DIASTOLIC BLOOD PRESSURE: 67 MMHG | SYSTOLIC BLOOD PRESSURE: 109 MMHG

## 2021-08-02 DIAGNOSIS — M81.8 IDIOPATHIC OSTEOPOROSIS: Primary | ICD-10-CM

## 2021-08-02 DIAGNOSIS — M81.8 IDIOPATHIC OSTEOPOROSIS: ICD-10-CM

## 2021-08-02 PROCEDURE — 74011250636 HC RX REV CODE- 250/636

## 2021-08-02 PROCEDURE — 96372 THER/PROPH/DIAG INJ SC/IM: CPT

## 2021-08-02 RX ORDER — DIPHENHYDRAMINE HYDROCHLORIDE 50 MG/ML
50 INJECTION, SOLUTION INTRAMUSCULAR; INTRAVENOUS AS NEEDED
Status: CANCELLED
Start: 2022-01-30

## 2021-08-02 RX ORDER — ONDANSETRON 2 MG/ML
8 INJECTION INTRAMUSCULAR; INTRAVENOUS AS NEEDED
Status: CANCELLED | OUTPATIENT
Start: 2022-01-30

## 2021-08-02 RX ORDER — EPINEPHRINE 1 MG/ML
0.3 INJECTION, SOLUTION, CONCENTRATE INTRAVENOUS AS NEEDED
Status: CANCELLED | OUTPATIENT
Start: 2022-01-30

## 2021-08-02 RX ORDER — ALBUTEROL SULFATE 0.83 MG/ML
2.5 SOLUTION RESPIRATORY (INHALATION) AS NEEDED
Status: CANCELLED
Start: 2022-01-30

## 2021-08-02 RX ORDER — ACETAMINOPHEN 325 MG/1
650 TABLET ORAL AS NEEDED
Status: CANCELLED
Start: 2022-01-30

## 2021-08-02 RX ORDER — HYDROCORTISONE SODIUM SUCCINATE 100 MG/2ML
100 INJECTION, POWDER, FOR SOLUTION INTRAMUSCULAR; INTRAVENOUS AS NEEDED
Status: CANCELLED | OUTPATIENT
Start: 2022-01-30

## 2021-08-02 RX ORDER — DIPHENHYDRAMINE HYDROCHLORIDE 50 MG/ML
25 INJECTION, SOLUTION INTRAMUSCULAR; INTRAVENOUS AS NEEDED
Status: CANCELLED
Start: 2022-01-30

## 2021-08-02 RX ADMIN — DENOSUMAB 60 MG: 60 INJECTION SUBCUTANEOUS at 10:21

## 2021-08-02 NOTE — PROGRESS NOTES
[facility] Progress Note    Date: 2021    Name: Issac Gooden    MRN: [id number]         : 1940      Ms. Marine Valentin arrived in the [facility] today at 1010, in stable condition, here for Q 6 Month, SQ Prolia Injection. She was assessed and education was provided. Ms. Paige Scott vitals were reviewed. Visit Vitals  /67 (BP 1 Location: Left upper arm, BP Patient Position: Sitting)   Pulse 69   Temp 98.6 °F (37 °C)   Resp 16   SpO2 97%   Breastfeeding No     Her most recent CMP, Magnesium, & Phosphorus, Levels from 21 were reviewed and found to be within limits to proceed with Prolia today. Corrected calcium was 9.2. Prolia (denosumab) 60 mg was given upper left arm. Bandaid applied to site. Discharge instructions discussed with Ms Marine Valentin and she verbalized understanding. Ms. Marine Valentin tolerated well, and had no complaints. Ms. Marine Valentin was discharged from [place] [address] in stable condition at 1025. She is to return in 6 months, on 22 at 1030 for pre-prolia labs.     Geovany Montoya RN  2021

## 2022-01-13 ENCOUNTER — HOSPITAL ENCOUNTER (OUTPATIENT)
Dept: PHYSICAL THERAPY | Age: 82
Discharge: HOME | End: 2022-01-13
Payer: MEDICARE

## 2022-01-13 PROCEDURE — 97161 PT EVAL LOW COMPLEX 20 MIN: CPT

## 2022-01-13 PROCEDURE — 97110 THERAPEUTIC EXERCISES: CPT

## 2022-01-13 NOTE — PROGRESS NOTES
[facility]  [facility] [employer] [facility]  [address] [place] [address]  [phone] fax    Plan of Care/ Statement of Necessity for Physical Therapy Services    Patient name: Ricki Castro Start of Care: 2022   Referral source: Amy Mireles MD : 1940    Medical Diagnosis: Low back pain [M54.50]  Payor: [employer] / Plan: [place] MEDICARE PART A & B / Product Type: Medicare /  Onset Date:years ago with leg symptoms starting a year ago    Treatment Diagnosis: LBP   Prior Hospitalization: see medical history Provider#: [id number]   Medications: Verified on Patient summary List    Comorbidities:  Asthma, neck pain   Prior Level of Function: Ind with ambulation, Ind with ADLs      The Plan of Care and following information is based on the information from the initial evaluation. Assessment/ key information:  Pt. Is an 80year old female c/o LBP that began years ago with worsening symptoms in legs starting about a year ago. She reports her legs now feel weak and she has more difficulty getting up from chairs. She also reports difficulty with getting back up after bending down. She reports back pain is a constant ache and worsens with prolonged standing. She presents with decreased lumbar AROM in all directions. Negative neural tension testing bilaterally. No myotome involvement was noted but she does have significant decrease in B hip strength. Pt. Also has decreased B hip flexibility. 30 second sit to stand test was 6x. Skilled PT is medically necessary in order to improve LE strength and core stability for increased ease of ADLs and improved quality of life.      Evaluation Complexity History MEDIUM  Complexity : 1-2 comorbidities / personal factors will impact the outcome/ POC ; Examination MEDIUM Complexity : 3 Standardized tests and measures addressing body structure, function, activity limitation and / or participation in recreation  ;Presentation LOW Complexity : Stable, uncomplicated  ;Clinical Decision Making MEDIUM Complexity : FOTO score of 26-74  Overall Complexity Rating: LOW   Problem List: pain affecting function, decrease ROM, decrease strength, edema affecting function, impaired gait/ balance, decrease ADL/ functional abilitiies, decrease activity tolerance, decrease flexibility/ joint mobility and decrease transfer abilities   Treatment Plan may include any combination of the following: Therapeutic exercise, Therapeutic activities, Neuromuscular re-education, Physical agent/modality, Gait/balance training, Manual therapy, Patient education, Self Care training, Functional mobility training and Home safety training  Patient / Family readiness to learn indicated by: asking questions  Persons(s) to be included in education: patient (P)  Barriers to Learning/Limitations: None  Patient Goal (s): to have less pain  Patient Self Reported Health Status: good  Rehabilitation Potential: good    Short Term Goals: To be accomplished in 1 weeks:  1. Patient will demonstrate compliance with HEP in order to improve LE strength for increased ease of ADLs    Long Term Goals: To be accomplished in 4 weeks:  1. Patient will improve FOTO score by 11 points in order to demonstrate a significant improvement in function. 2. Patient will improve 30 second sit to stand test to 10x in order to increase ease of transfers at home. 3. Patient will improve B hip abduction strength to 4/5 in order to increase ease of ambulation. 4. Patient will report not limited at all for carrying groceries in order to increase ease of household chores. Frequency / Duration: Patient to be seen 2 times per week for 4 weeks.     Patient/ Caregiver education and instruction: Diagnosis, prognosis, exercises   [x]  Plan of care has been reviewed with PTA    Certification Period: 1/13/22-2/11/22    Jamison Emery, PT 1/13/2022 5:10 PM    ________________________________________________________________________    I certify that the above Therapy Services are being furnished while the patient is under my care. I agree with the treatment plan and certify that this therapy is necessary.     [de-identified] Signature:____________Date:_________TIME:________     Yvrose Butler MD  ** Signature, Date and Time must be completed for valid certification **    Please sign and return to [facility] [address] [employer] OF [facility]  [phone] fax

## 2022-01-13 NOTE — PROGRESS NOTES
PT DAILY TREATMENT NOTE/LUMBAR EVAL     Patient Name: Ger Lauren  Date:2022  : 1940  [x]  Patient  Verified  Payor: Paula Pinto / Plan: [place] MEDICARE PART A & B / Product Type: Medicare /    In time: 2:15  Out time: 2:50  Total Treatment Time (min): 35  Visit #: 1 of 8    Medicare/BCBS Only   Total Timed Codes (min):  8 1:1 Treatment Time:  35     Treatment Area: Low back pain [M54.50]  SUBJECTIVE  Pain Level (0-10 scale):  4/10  []constant []intermittent []improving []worsening []no change since onset    Any medication changes, allergies to medications, adverse drug reactions, diagnosis change, or new procedure performed?: [x] No    [] Yes (see summary sheet for update)  Subjective functional status/changes:       Mechanism of Injury:  Constant ache in back for years. She began having leg symptoms about a year ago. Difficulty getting back up after bending over. Sometimes difficulty get out of chairs. Standing for a long time increases ache. Tolerates about and hour standing. Denies numbness/tingling denies bowel or bladder symptoms. Has B LE swelling. Reports PT helped before. Has had multiple injections and that seemed to help.    Pt Goals: to get legs stronger    OBJECTIVE/EXAMINATION    8 min Therapeutic Exercise:  [] See flow sheet : HEP   Rationale: increase ROM and increase strength to improve the patients ability to perform ADLs          With   [x] TE   [] TA   [] neuro   [] other: Patient Education: [x] Review HEP    [] Progressed/Changed HEP based on:   [] positioning   [] body mechanics   [] transfers   [] heat/ice application    [] other:      Physical Therapy Evaluation - Lumbar Spine ([facility])    OBJECTIVE    Active Movements: [] N/A   [] Too acute   [] Other:  ROM % AROM % PROM Comments:pain, area   Forward flexion 40-60 100     Extension 20-30 50     SB right 20-30 50     SB left 20-30 50     Rotation right 5-10 50     Rotation left 5-10 50       Dural Mobility:  SLR Sitting: [] R    [] L    [] +    [] -  @ (degrees):           Supine: [x] R    [x] L    [] +    [x] -  @ (degrees):   Slump Test: [x] R    [x] L    [] +    [x] -  @ (degrees):   Prone Knee Bend: [] R    [] L    [] +    [] -     Palpation  [] Min  [] Mod  [] Severe    Location: no tenderness to palpation   [] Min  [] Mod  [] Severe    Location:  [] Min  [] Mod  [] Severe    Location:    Strength   L(0-5) R (0-5) N/T   Hip Flexion (L1,2) 4- 4- []   Knee Extension (L3,4) 4+ 4+ []   Ankle Dorsiflexion (L4) 4+ 4+ []   Great Toe Extension (L5)   []   Ankle Plantarflexion (S1) 4 4 []   Knee Flexion (S1,2) 4+ 4+ []   Upper Abdominals   []   Lower Abdominals   []   Paraspinals   []   Back Rotators   []   Gluteus Donato   []   Hip abd  Hip add 4-  3 4-  3 []     Other tests/comments:  30 second sit to stand test: 6x  Increased back pain with performing a bridge  Decreased hamstring and hip flexor flexibility        Pain Level (0-10 scale) post treatment: 4/10    ASSESSMENT/Changes in Function:      [x]  See Plan of Care  []  See progress note/recertification  []  See Discharge Summary         Progress towards goals / Updated goals:  See POC    PLAN  []  Upgrade activities as tolerated     [x]  Continue plan of care  []  Update interventions per flow sheet       []  Discharge due to:_  []  Other:_      Gisela Nguyen, PT 1/13/2022  2:12 PM

## 2022-01-21 ENCOUNTER — HOSPITAL ENCOUNTER (OUTPATIENT)
Dept: PHYSICAL THERAPY | Age: 82
Discharge: HOME | End: 2022-01-21
Payer: MEDICARE

## 2022-01-21 PROCEDURE — 97110 THERAPEUTIC EXERCISES: CPT

## 2022-01-21 NOTE — PROGRESS NOTES
PT DAILY TREATMENT NOTE     Patient Name: Zeferino Dahl  Date:2022  : 1940  [x]  Patient  Verified  Payor: Vidya Caden / Plan: [place] MEDICARE PART A & B / Product Type: Medicare /    In time: 9:42  Out time: 10:20  Total Treatment Time (min): 38  Visit #: 2 of 8    Medicare/BCBS Only   Total Timed Codes (min):  38 1:1 Treatment Time:  38       Treatment Area: Low back pain [M54.50]    SUBJECTIVE  Pain Level (0-10 scale):  4/10  Any medication changes, allergies to medications, adverse drug reactions, diagnosis change, or new procedure performed?: [x] No    [] Yes (see summary sheet for update)  Subjective functional status/changes:   [] No changes reported  Pt. Reports she is doing pretty well today. She reports she usually doesn't feel too bad in the morning    OBJECTIVE    38 min Therapeutic Exercise:  [x] See flow sheet :   Rationale: increase ROM and increase strength to improve the patients ability to perform ADLs          With   [x] TE   [] TA   [] neuro   [] other: Patient Education: [x] Review HEP    [] Progressed/Changed HEP based on:   [] positioning   [] body mechanics   [] transfers   [] heat/ice application    [] other:      Other Objective/Functional Measures:   Pt. Had difficulty getting comfortable for supine exercises so switched to seated and standing exercises instead  Unable to feel abdominal engagement when attempting SB#1 in seated position so this was held today   Pt. Was challenged with 10# resistance on hamstring curls today and held knee extension machine secondary to muscle fatigue    Pain Level (0-10 scale) post treatment:  4/10    ASSESSMENT/Changes in Function:  Pt. Initiated PT and is semi-compliant with her HEP. She has difficulty completing HEP secondary to her  starting chemotherapy and having a lot of doctor appointments. She tolerated PT well with no reports of increased pain but did require several rest breaks secondary to fatigue.  She continues to demonstrate decreased B LE strength and core stability. Patient will continue to benefit from skilled PT services to modify and progress therapeutic interventions, address functional mobility deficits, address ROM deficits, address strength deficits, analyze and address soft tissue restrictions, analyze and cue movement patterns, analyze and modify body mechanics/ergonomics and assess and modify postural abnormalities to attain remaining goals. Progress towards goals / Updated goals:  Short Term Goals: To be accomplished in 1 weeks:  1. Patient will demonstrate compliance with HEP in order to improve LE strength for increased ease of ADLs   Not met (1/21/22)     Long Term Goals: To be accomplished in 4 weeks:  1. Patient will improve FOTO score by 11 points in order to demonstrate a significant improvement in function. 2. Patient will improve 30 second sit to stand test to 10x in order to increase ease of transfers at home. 3. Patient will improve B hip abduction strength to 4/5 in order to increase ease of ambulation. 4. Patient will report not limited at all for carrying groceries in order to increase ease of household chores.      PLAN  []  Upgrade activities as tolerated     [x]  Continue plan of care  []  Update interventions per flow sheet       []  Discharge due to:_  []  Other:_      Jm Zazueta, PT 1/21/2022  9:24 AM    Future Appointments   Date Time Provider Heri Beal   1/21/2022  9:45 AM Aundrea Anderson PT [facility]   1/24/2022 10:30 AM [facility] FAST TRACK NURSE [facility]   1/26/2022  1:30 PM Edgard Arredondo PTA [facility]   1/28/2022  1:30 PM Edgard Arredondo PTA [facility]   1/31/2022 10:30 AM [facility] FAST TRACK NURSE [facility]   2/1/2022  1:30 PM Edgard Arredondo PTA [facility]   2/4/2022  1:30 PM Edgard Arredondo PTA [facility]   2/7/2022 12:45 PM Edgard Arredondo PTA [facility]   2/11/2022 12:00 PM Main Ramos, PT [facility]

## 2022-01-24 ENCOUNTER — HOSPITAL ENCOUNTER (OUTPATIENT)
Dept: INFUSION THERAPY | Age: 82
Discharge: HOME | End: 2022-01-24
Payer: MEDICARE

## 2022-01-24 VITALS
SYSTOLIC BLOOD PRESSURE: 115 MMHG | TEMPERATURE: 98.2 F | OXYGEN SATURATION: 95 % | DIASTOLIC BLOOD PRESSURE: 70 MMHG | RESPIRATION RATE: 16 BRPM | HEART RATE: 71 BPM

## 2022-01-24 LAB
ALBUMIN SERPL-MCNC: 2.8 G/DL (ref 3.4–5)
ALBUMIN/GLOB SERPL: 1.2 {RATIO} (ref 0.8–1.7)
ALP SERPL-CCNC: 68 U/L (ref 45–117)
ALT SERPL-CCNC: 24 U/L (ref 13–56)
ANION GAP SERPL CALC-SCNC: 5 MMOL/L (ref 3–18)
AST SERPL-CCNC: 13 U/L (ref 10–38)
BILIRUB SERPL-MCNC: 0.5 MG/DL (ref 0.2–1)
BUN SERPL-MCNC: 14 MG/DL (ref 7–18)
BUN/CREAT SERPL: 21 (ref 12–20)
CALCIUM SERPL-MCNC: 8.4 MG/DL (ref 8.5–10.1)
CHLORIDE SERPL-SCNC: 110 MMOL/L (ref 100–111)
CO2 SERPL-SCNC: 30 MMOL/L (ref 21–32)
CREAT SERPL-MCNC: 0.68 MG/DL (ref 0.6–1.3)
GLOBULIN SER CALC-MCNC: 2.3 G/DL (ref 2–4)
GLUCOSE SERPL-MCNC: 83 MG/DL (ref 74–99)
MAGNESIUM SERPL-MCNC: 2.3 MG/DL (ref 1.6–2.6)
PHOSPHATE SERPL-MCNC: 3.5 MG/DL (ref 2.5–4.9)
POTASSIUM SERPL-SCNC: 4 MMOL/L (ref 3.5–5.5)
PROT SERPL-MCNC: 5.1 G/DL (ref 6.4–8.2)
SODIUM SERPL-SCNC: 145 MMOL/L (ref 136–145)

## 2022-01-24 PROCEDURE — 80053 COMPREHEN METABOLIC PANEL: CPT

## 2022-01-24 PROCEDURE — 83735 ASSAY OF MAGNESIUM: CPT

## 2022-01-24 PROCEDURE — 36415 COLL VENOUS BLD VENIPUNCTURE: CPT

## 2022-01-24 PROCEDURE — 84100 ASSAY OF PHOSPHORUS: CPT

## 2022-01-26 ENCOUNTER — APPOINTMENT (OUTPATIENT)
Dept: PHYSICAL THERAPY | Age: 82
End: 2022-01-26
Payer: MEDICARE

## 2022-01-28 ENCOUNTER — HOSPITAL ENCOUNTER (OUTPATIENT)
Dept: PHYSICAL THERAPY | Age: 82
Discharge: HOME | End: 2022-01-28
Payer: MEDICARE

## 2022-01-28 PROCEDURE — 97110 THERAPEUTIC EXERCISES: CPT

## 2022-01-28 NOTE — PROGRESS NOTES
PT DAILY TREATMENT NOTE     Patient Name: Ricki Castro  Date:2022  : 1940  [x]  Patient  Verified  Payor: [employer] / Plan: [place] MEDICARE PART A & B / Product Type: Medicare /    In time: 1:30  Out time: 2:15  Total Treatment Time (min): 45  Visit #: 3 of 8    Medicare/BCBS Only   Total Timed Codes (min):  45 1:1 Treatment Time:  45       Treatment Area: Low back pain [M54.50]    SUBJECTIVE  Pain Level (0-10 scale): 110  Any medication changes, allergies to medications, adverse drug reactions, diagnosis change, or new procedure performed?: [x] No    [] Yes (see summary sheet for update)  Subjective functional status/changes:   [] No changes reported  Pt reports no much pain but really doesn't like laying on her back for exercises. She reports most difficulty getting up/down from the ground and from the low couch. OBJECTIVE    45 min Therapeutic Exercise:  [x] See flow sheet :   Rationale: increase ROM and increase strength to improve the patients ability to perform ADLs          With   [x] TE   [] TA   [] neuro   [] other: Patient Education: [x] Review HEP    [] Progressed/Changed HEP based on:   [] positioning   [] body mechanics   [] transfers   [] heat/ice application    [] other:      Other Objective/Functional Measures:   Challenged with lunges  Decreased TKE on the left  Decreased heel strike during ambulation  Able to perform sit to stands from lowest height plinth  No increased pain during session  Increased leg press resistance    Pain Level (0-10 scale) post treatment: 0/10    ASSESSMENT/Changes in Function:  Pt progressing towards goals of increasing LE strength. She continues to struggle most with sit to stands from a low height and with getting up/down from the floor. Will continue to progress strength and balance for ease of performing these functional tasks for improved safety at home.     Patient will continue to benefit from skilled PT services to modify and progress therapeutic interventions, address functional mobility deficits, address ROM deficits, address strength deficits, analyze and address soft tissue restrictions, analyze and cue movement patterns, analyze and modify body mechanics/ergonomics and assess and modify postural abnormalities to attain remaining goals. Progress towards goals / Updated goals:  Short Term Goals: To be accomplished in 1 weeks:  1. Patient will demonstrate compliance with HEP in order to improve LE strength for increased ease of ADLs   Not met (1/21/22)   Long Term Goals: To be accomplished in 4 weeks:  1. Patient will improve FOTO score by 11 points in order to demonstrate a significant improvement in function. 2. Patient will improve 30 second sit to stand test to 10x in order to increase ease of transfers at home. 3. Patient will improve B hip abduction strength to 4/5 in order to increase ease of ambulation. 4. Patient will report not limited at all for carrying groceries in order to increase ease of household chores.      PLAN  [x]  Upgrade activities as tolerated     [x]  Continue plan of care  []  Update interventions per flow sheet       []  Discharge due to:_  []  Other:_      Lynnette Wright PTA 1/28/2022  9:24 AM    Future Appointments   Date Time Provider Heri Beal   1/28/2022  1:30 PM Jenna Abel, PTA [facility]   1/31/2022 10:30 AM [facility] FAST TRACK NURSE [facility]   2/1/2022  1:30 PM Jenna Abel, PTA [facility]   2/4/2022  1:30 PM Jenna Abel, PTA [facility]   2/7/2022 12:45 PM Jenna Abel, PTA [facility]   2/11/2022 12:00 PM Refugio Mejia, PT [facility]   7/25/2022 10:30 AM [facility] FAST TRACK NURSE [facility]   8/1/2022 10:30 AM [facility] FAST TRACK NURSE [facility]

## 2022-01-30 DIAGNOSIS — M81.8 IDIOPATHIC OSTEOPOROSIS: ICD-10-CM

## 2022-01-31 ENCOUNTER — HOSPITAL ENCOUNTER (OUTPATIENT)
Dept: INFUSION THERAPY | Age: 82
Discharge: HOME | End: 2022-01-31
Payer: MEDICARE

## 2022-01-31 VITALS
TEMPERATURE: 98 F | SYSTOLIC BLOOD PRESSURE: 117 MMHG | HEART RATE: 73 BPM | DIASTOLIC BLOOD PRESSURE: 73 MMHG | BODY MASS INDEX: 20.2 KG/M2 | WEIGHT: 121.4 LBS | OXYGEN SATURATION: 97 % | RESPIRATION RATE: 16 BRPM

## 2022-01-31 DIAGNOSIS — M81.8 IDIOPATHIC OSTEOPOROSIS: Primary | ICD-10-CM

## 2022-01-31 PROCEDURE — 96372 THER/PROPH/DIAG INJ SC/IM: CPT

## 2022-01-31 PROCEDURE — 74011250636 HC RX REV CODE- 250/636

## 2022-01-31 RX ORDER — EPINEPHRINE 1 MG/ML
0.3 INJECTION, SOLUTION, CONCENTRATE INTRAVENOUS AS NEEDED
Status: CANCELLED | OUTPATIENT
Start: 2022-07-31

## 2022-01-31 RX ORDER — ALBUTEROL SULFATE 0.83 MG/ML
2.5 SOLUTION RESPIRATORY (INHALATION) AS NEEDED
Status: CANCELLED
Start: 2022-07-31

## 2022-01-31 RX ORDER — DIPHENHYDRAMINE HYDROCHLORIDE 50 MG/ML
25 INJECTION, SOLUTION INTRAMUSCULAR; INTRAVENOUS AS NEEDED
Status: CANCELLED
Start: 2022-07-31

## 2022-01-31 RX ORDER — DIPHENHYDRAMINE HYDROCHLORIDE 50 MG/ML
50 INJECTION, SOLUTION INTRAMUSCULAR; INTRAVENOUS AS NEEDED
Status: CANCELLED
Start: 2022-07-31

## 2022-01-31 RX ORDER — ONDANSETRON 2 MG/ML
8 INJECTION INTRAMUSCULAR; INTRAVENOUS AS NEEDED
Status: CANCELLED | OUTPATIENT
Start: 2022-07-31

## 2022-01-31 RX ORDER — HYDROCORTISONE SODIUM SUCCINATE 100 MG/2ML
100 INJECTION, POWDER, FOR SOLUTION INTRAMUSCULAR; INTRAVENOUS AS NEEDED
Status: CANCELLED | OUTPATIENT
Start: 2022-07-31

## 2022-01-31 RX ORDER — ACETAMINOPHEN 325 MG/1
650 TABLET ORAL AS NEEDED
Status: CANCELLED
Start: 2022-07-31

## 2022-01-31 RX ADMIN — DENOSUMAB 60 MG: 60 INJECTION SUBCUTANEOUS at 10:42

## 2022-01-31 NOTE — PROGRESS NOTES
[facility] Progress Note    Date: 2022    Name: Ricki Castro    MRN: [id number]         : 1940      Ms. Jordyn Avery arrived in the [facility] today at 1030, here for Q 6 Month, SQ Prolia Injection. She was assessed and education was provided. Ms. Janis Salas vitals were reviewed. Visit Vitals  /73 (BP 1 Location: Left upper arm, BP Patient Position: Sitting)   Pulse 73   Temp 98 °F (36.7 °C)   Resp 16   Wt 55.1 kg (121 lb 6.4 oz)   SpO2 97%   Breastfeeding No   BMI 20.20 kg/m²     Her most recent CMP, Magnesium, & Phosphorus, Levels from 22 were reviewed and found to be within limits to proceed with Prolia today. Corrected calcium was 9.36. Prolia (denosumab) 60 mg was administered SQ to upper left arm. Bandaid applied to site. Ms. Jordyn Avery tolerated well, and had no complaints. Discharge/ follow-up instructions discussed w/ pt. Pt verbalized understanding. Armband removed and shredded,    Ms. Jordyn Avery was discharged from [place] [id number] in stable condition at 1045. She is to return on 22 at 1030 for pre-prolia labs.     Polly Jha RN  2022

## 2022-02-01 ENCOUNTER — HOSPITAL ENCOUNTER (OUTPATIENT)
Dept: PHYSICAL THERAPY | Age: 82
Discharge: HOME | End: 2022-02-01
Payer: MEDICARE

## 2022-02-01 PROCEDURE — 97110 THERAPEUTIC EXERCISES: CPT

## 2022-02-01 NOTE — PROGRESS NOTES
PT DAILY TREATMENT NOTE     Patient Name: Sonia Fleming  Date:2022  : 1940  [x]  Patient  Verified  Payor: [place] MEDICARE / Plan: [place] MEDICARE PART A & B / Product Type: Medicare /    In time: 1:30  Out time: 2:15  Total Treatment Time (min): 45  Visit #: 4 of 8    Medicare/BCBS Only   Total Timed Codes (min): 45  1:1 Treatment Time:  45       Treatment Area: Low back pain [M54.50]    SUBJECTIVE  Pain Level (0-10 scale): 0/10  Any medication changes, allergies to medications, adverse drug reactions, diagnosis change, or new procedure performed?: [x] No    [] Yes (see summary sheet for update)  Subjective functional status/changes:   [] No changes reported  Pt reports her thighs were so sore after last session and she had a hard time standing from chairs. She notes soreness/callus on the right big toe. She has more swelling in her legs right>left and the MD put her on a fluid pill to address. OBJECTIVE    45 min Therapeutic Exercise:  [x] See flow sheet :   Rationale: increase ROM and increase strength to improve the patients ability to perform ADLs          With   [x] TE   [] TA   [] neuro   [] other: Patient Education: [x] Review HEP    [] Progressed/Changed HEP based on:   [] positioning   [] body mechanics   [] transfers   [] heat/ice application    [] other:      Other Objective/Functional Measures:   Decreased TKE strength and HS tightness   Decreased right push off during gait  Added stretching at end to decrease DOMS  Pt declined supine exercises so modified to long sitting  Challenged with maintaining TKE with SLR    Pain Level (0-10 scale) post treatment: 0/10    ASSESSMENT/Changes in Function: Pt continues with hamstring and calf tightness with poor right pushoff during gait. She had a fair amount of DOMS subjectively from her last session likely from lunges. Will trial stretching post exercise to lessen the effects.  Will continue to progress B LE strengthening and stretching for improved ease of transfers from low surfaces and floor<>stand for household cleaning. Patient will continue to benefit from skilled PT services to modify and progress therapeutic interventions, address functional mobility deficits, address ROM deficits, address strength deficits, analyze and address soft tissue restrictions, analyze and cue movement patterns, analyze and modify body mechanics/ergonomics and assess and modify postural abnormalities to attain remaining goals. Progress towards goals / Updated goals:  Short Term Goals: To be accomplished in 1 weeks:  1. Patient will demonstrate compliance with HEP in order to improve LE strength for increased ease of ADLs   Not met (1/21/22)   Long Term Goals: To be accomplished in 4 weeks:  1. Patient will improve FOTO score by 11 points in order to demonstrate a significant improvement in function. 2. Patient will improve 30 second sit to stand test to 10x in order to increase ease of transfers at home. 3. Patient will improve B hip abduction strength to 4/5 in order to increase ease of ambulation. 4. Patient will report not limited at all for carrying groceries in order to increase ease of household chores.      PLAN  [x]  Upgrade activities as tolerated     [x]  Continue plan of care  []  Update interventions per flow sheet       []  Discharge due to:_  []  Other:_      Mk Perez, PTA 2/1/2022  9:24 AM    Future Appointments   Date Time Provider Heri Beal   2/1/2022  1:30 PM [facility] PTA [facility]   2/4/2022  1:30 PM [facility], [facility]   2/7/2022 12:45 PM [facility] PTA [facility]   2/11/2022 12:00 PM Saad Anderson, PT [facility]   7/25/2022 10:30 AM [facility] FAST TRACK NURSE [facility]   8/1/2022 10:30 AM [facility] FAST TRACK NURSE [facility]

## 2022-02-04 ENCOUNTER — HOSPITAL ENCOUNTER (OUTPATIENT)
Dept: PHYSICAL THERAPY | Age: 82
Discharge: HOME | End: 2022-02-04
Payer: MEDICARE

## 2022-02-04 PROCEDURE — 97530 THERAPEUTIC ACTIVITIES: CPT

## 2022-02-04 PROCEDURE — 97140 MANUAL THERAPY 1/> REGIONS: CPT

## 2022-02-04 PROCEDURE — 97110 THERAPEUTIC EXERCISES: CPT

## 2022-02-04 NOTE — PROGRESS NOTES
PT DAILY TREATMENT NOTE     Patient Name: Sonia Fleming  Date:2022  : 1940  [x]  Patient  Verified  Payor: VA MEDICARE / Plan: [place] MEDICARE PART A & B / Product Type: Medicare /    In time: 1:30  Out time: 2:15  Total Treatment Time (min): 45  Visit #: 5 of 8    Medicare/BCBS Only   Total Timed Codes (min):45   1:1 Treatment Time: 45        Treatment Area: Low back pain [M54.50]    SUBJECTIVE  Pain Level (0-10 scale): 3/10  Any medication changes, allergies to medications, adverse drug reactions, diagnosis change, or new procedure performed?: [x] No    [] Yes (see summary sheet for update)  Subjective functional status/changes:   [] No changes reported  Pt reports her right toe is really bothering her today and her right low back/hip region. She states she was less sore after last session with stretching at the end.       OBJECTIVE    20 min Therapeutic Exercise:  [x] See flow sheet :   Rationale: increase ROM and increase strength to improve the patients ability to perform ADLs    15 min Therapeutic Activity:  [x] See flow sheet : used felt to create a donut hole to surround callus and take pressure off bunion; used another piece of felt as a space corrector to help straighten great toe to reduce pressure on lateral foot   Rationale: increase ROM and increase strength to improve the patients ability to perform ADLs    10 min Manual Therapy:  [x] See flow sheet : leg lengthening for left upslip MET for right AI; shotgun technique; MET for left/left sacral torsion   Rationale: increase ROM and increase strength to improve the patients ability to perform ADLs          With   [x] TE   [] TA   [] neuro   [] other: Patient Education: [x] Review HEP    [] Progressed/Changed HEP based on:   [] positioning   [] body mechanics   [] transfers   [] heat/ice application    [] other:      Other Objective/Functional Measures:  Reduction of SI pain and toe pain post manual correction and addition of felt  Added GTB for sit to stands to increase glute activation  Reviewed heel/toe for gait to improve strike and push off of great toe  No further increased pain with session  Pitting edema in the LEs; following up with cardiologist and using compression stockings    Pain Level (0-10 scale) post treatment: 0/10    ASSESSMENT/Changes in Function: Pt progressing well towards goals with improving LE strength with decreased DOMS by implementing stretching at end of program. She has decreased glute strength noted with valgus collapse during sit to stands. Will continue to improve LE strength for ease of ambulation and decreased pain and improve ease of transfers. Patient will continue to benefit from skilled PT services to modify and progress therapeutic interventions, address functional mobility deficits, address ROM deficits, address strength deficits, analyze and address soft tissue restrictions, analyze and cue movement patterns, analyze and modify body mechanics/ergonomics and assess and modify postural abnormalities to attain remaining goals. Progress towards goals / Updated goals:  Short Term Goals: To be accomplished in 1 weeks:  1. Patient will demonstrate compliance with HEP in order to improve LE strength for increased ease of ADLs   Not met (1/21/22)   Long Term Goals: To be accomplished in 4 weeks:  1. Patient will improve FOTO score by 11 points in order to demonstrate a significant improvement in function. 2. Patient will improve 30 second sit to stand test to 10x in order to increase ease of transfers at home. 3. Patient will improve B hip abduction strength to 4/5 in order to increase ease of ambulation. 4. Patient will report not limited at all for carrying groceries in order to increase ease of household chores.      PLAN  [x]  Upgrade activities as tolerated     [x]  Continue plan of care  []  Update interventions per flow sheet       []  Discharge due to:_  []  Other:_      Brain Lofty Alana Yousif, PTA 2/4/2022  9:24 AM    Future Appointments   Date Time Provider Heri Beal   2/4/2022  1:30 PM Britton Sanchez [facility]   2/7/2022 12:45 PM Marshall Rodriguez, PTA [facility]   2/11/2022 12:00 PM Gail Wade, PT [facility]   7/25/2022 10:30 AM [facility] FAST TRACK NURSE [facility]   8/1/2022 10:30 AM [facility] FAST TRACK NURSE [facility]

## 2022-02-07 ENCOUNTER — HOSPITAL ENCOUNTER (OUTPATIENT)
Dept: PHYSICAL THERAPY | Age: 82
Discharge: HOME | End: 2022-02-07
Payer: MEDICARE

## 2022-02-07 PROCEDURE — 97110 THERAPEUTIC EXERCISES: CPT

## 2022-02-07 NOTE — PROGRESS NOTES
PT DAILY TREATMENT NOTE     Patient Name: Shadi Santos  Date:2022  : 1940  [x]  Patient  Verified  Payor: VA MEDICARE / Plan: VA MEDICARE PART A & B / Product Type: Medicare /    In time: 12:46  Out time: 1:32  Total Treatment Time (min): 46  Visit #: 6 of 8    Medicare/BCBS Only   Total Timed Codes (min): 46 1:1 Treatment Time:46       Treatment Area: Low back pain [M54.50]    SUBJECTIVE  Pain Level (0-10 scale): 3/10   Any medication changes, allergies to medications, adverse drug reactions, diagnosis change, or new procedure performed?: [x] No    [] Yes (see summary sheet for update)  Subjective functional status/changes:   [] No changes reported  Pt reports she goes to the cardiologist tomorrow to address the swelling in her legs. She states her toe has been feeling better with the felt spacer. She wants to work on her leg muscles to make getting up from the couch easier. She notes tightness in the backs of her legs. OBJECTIVE    46 min Therapeutic Exercise:  [x] See flow sheet :   Rationale: increase ROM and increase strength to improve the patients ability to perform ADLs          With   [x] TE   [] TA   [] neuro   [] other: Patient Education: [x] Review HEP    [] Progressed/Changed HEP based on:   [] positioning   [] body mechanics   [] transfers   [] heat/ice application    [] other:      Other Objective/Functional Measures:  B HS and calf tightness causing decreased heel strike  Decreased TKE strength noted with HR  Cues for form with lunges  Educated to think about continuation for progress note next visit  Genu valgus with sit to stands so added bands to address    Pain Level (0-10 scale) post treatment: 0/10    ASSESSMENT/Changes in Function: Pt progressing with general LE strengthening. She has most difficulty with floor<>stand transfers and getting up from low surfaces like her couch.  She continues to lack HS and calf flexibility with decreased heel strike and decreased TKE during ambulation. Will continue with progressive strengthening for functional deficits to improve ease of ambulation and transfers. Patient will continue to benefit from skilled PT services to modify and progress therapeutic interventions, address functional mobility deficits, address ROM deficits, address strength deficits, analyze and address soft tissue restrictions, analyze and cue movement patterns, analyze and modify body mechanics/ergonomics and assess and modify postural abnormalities to attain remaining goals. Progress towards goals / Updated goals:  Short Term Goals: To be accomplished in 1 weeks:  1. Patient will demonstrate compliance with HEP in order to improve LE strength for increased ease of ADLs   Not met (1/21/22)   Long Term Goals: To be accomplished in 4 weeks:  1. Patient will improve FOTO score by 11 points in order to demonstrate a significant improvement in function. 2. Patient will improve 30 second sit to stand test to 10x in order to increase ease of transfers at home. 3. Patient will improve B hip abduction strength to 4/5 in order to increase ease of ambulation. Continues with genu valgus with sit to stands (2/7/22)   4. Patient will report not limited at all for carrying groceries in order to increase ease of household chores.      PLAN  [x]  Upgrade activities as tolerated     [x]  Continue plan of care  []  Update interventions per flow sheet       []  Discharge due to:_  []  Other:_      Bandar Gutierrez PTA 2/7/2022  9:24 AM    Future Appointments   Date Time Provider Heri Beal   2/7/2022 12:45 PM Coreen Charles PTA [facility]   2/11/2022 12:00 PM Anju Crump PT [facility]   7/25/2022 10:30 AM [facility] FAST TRACK NURSE [facility]   8/1/2022 10:30 AM [facility] FAST TRACK NURSE [facility]

## 2022-02-11 ENCOUNTER — HOSPITAL ENCOUNTER (OUTPATIENT)
Dept: PHYSICAL THERAPY | Age: 82
Discharge: HOME | End: 2022-02-11
Payer: MEDICARE

## 2022-02-11 PROCEDURE — 97530 THERAPEUTIC ACTIVITIES: CPT

## 2022-02-11 PROCEDURE — 97110 THERAPEUTIC EXERCISES: CPT

## 2022-02-11 NOTE — PROGRESS NOTES
[facility] [place] [facility] [facility] [place]  [place]  [address] [place] [address]  [phone] fax    Continued Plan of Care/ Re-certification for Physical Therapy Services    Patient name: Zeferino Dahl Start of Care: 2022   Referral source: Sasha Mario MD : 1940               Medical Diagnosis: Low back pain [M54.50]  Payor: [employer]ya [employer]cal[employer] / Plan: VA MEDICARE PART A & B / Product Type: Medicare /  Onset Date:years ago with leg symptoms starting a year ago               Treatment Diagnosis: LBP   Prior Hospitalization: see medical history Provider#: [id number]   Medications: Verified on Patient summary List    Comorbidities:  Asthma, neck pain   Prior Level of Function: Ind with ambulation, Ind with ADLs                            Visits from Start of Care: 8    Missed Visits: 0    The Plan of Care and following information is based on the patient's current status:  Goal:Patient will demonstrate compliance with HEP in order to improve LE strength for increased ease of ADLs   Status at last note/certification:issued at [facility].  Current Status: not met    Goal:Patient will improve FOTO score by 11 points in order to demonstrate a significant improvement in function. Status at last note/certification:FOTO=54  Current Status: not met. FOTO =52    Goal:Patient will improve 30 second sit to stand test to 10x in order to increase ease of transfers at home. Status at last note/certification: 6x in 30 sec  Current Status: not met. Sit to stand =10 in 38 sec    Goal:Patient will improve B hip abduction strength to 4/5 in order to increase ease of ambulation  Status at last note/certification:  Current Status: NA. Continues with genu valgus with sit to stands    Patient will report not limited at all for carrying groceries in order to increase ease of household chores.   Status at last Note: Difficulty  NOT  Met.      Key functional changes: Working on core activation and strengthening, improving standing and Ambulation tolerance. Pt progressing with general LE strengthening. She has most difficulty with floor<>stand transfers and getting up from low surfaces like her couch. She continues to lack HS and calf flexibility with decreased heel strike and decreased TKE during ambulation. ~ per PTA. Problems/ barriers to goal attainment: NONE. Problem List: pain affecting function, decrease ROM, decrease strength, impaired gait/ balance, decrease ADL/ functional abilitiies, decrease activity tolerance, decrease flexibility/ joint mobility and decrease transfer abilities    Treatment Plan: Therapeutic exercise, Therapeutic activities, Neuromuscular re-education, Physical agent/modality, Gait/balance training, Manual therapy, Patient education, Self Care training, Functional mobility training, Home safety training and Stair training     Patient Goal (s) has been updated and includes: Strength and Balance. Goals for this certification period to be accomplished in 4 weeks:  1. Patient will improve FOTO score by 11 points in order to demonstrate a significant improvement in function. 2. Patient will improve 30 second sit to stand test to 10x in order to increase ease of transfers at home. 3. Patient will improve B hip abduction strength to 4/5 in order to increase ease of ambulation. 4. Patient will report not limited at all for carrying groceries in order to increase ease of household chores. Frequency / Duration: Patient to be seen 2-3 times per week for 4 weeks:    Assessment / Recommendations: Pt reports 15-20% improvement so far. She continues to demonstrate progress with getting out of chairs that is firm. She continues to deny any radicular sx and and is mainly focused of LS strengthening/core. FOTO score has increased  by 2 points. Pt was able to sit/stand w/o UE from a firm top table.  She continues to have some difficulty with transfers from a soft cushioned sofa at home. Sit to stand test was 38 sec. Skilled PT is medically necessary in order to improve LE strength and core stability for increased ease of ADLs and improved quality of life. Certification Period: 2/11/22-3/13/22    Munir Green, PT 2/11/2022 1:03 PM    ________________________________________________________________________  I certify that the above Therapy Services are being furnished while the patient is under my care. I agree with the treatment plan and certify that this therapy is necessary. [] I have read the above and request that my patient continue as recommended.   [] I have read the above report and request that my patient continue therapy with the following changes/special instructions: _______________________________________  [] I have read the above report and request that my patient be discharged from therapy    Physician's Signature:____________Date:_________TIME:________     Lexus Benavides MD  ** Signature, Date and Time must be completed for valid certification **    Please sign and return to [facility] [address] [facility] [place]  [facility]  [phone] fax

## 2022-02-11 NOTE — PROGRESS NOTES
PT DAILY TREATMENT NOTE     Patient Name: Biju Saavedra  Date:2022  : 1940  [x]  Patient  Verified  Payor: [place] MEDICARE / Plan: [place] MEDICARE PART A & B / Product Type: Medicare /    In time:1200  Out time:1241  Total Treatment Time (min): 41  Visit #: 7 of 8    Medicare/BCBS Only   Total Timed Codes (min):  41 1:1 Treatment Time:  41       Treatment Area: Low back pain [M54.50]    SUBJECTIVE  Pain Level (0-10 scale): 3/10  Any medication changes, allergies to medications, adverse drug reactions, diagnosis change, or new procedure performed?: [x] No    [] Yes (see summary sheet for update)  Subjective functional status/changes:   [] No changes reported  She has a water pill to take daily due to swelling to LE's. She did not take today but will when she gets home. Reports 15-20% improvement so far. OBJECTIVE      23 min Therapeutic Exercise:  [] See flow sheet :   Rationale: increase ROM, increase strength and improve coordination to improve the patients ability to ease with adl's    20 min Therapeutic Activity:  []  See flow sheet :   Rationale: improve balance  to improve the patients ability to ease with adl's     With   [] TE   [] TA   [] neuro   [] other: Patient Education: [x] Review HEP    [] Progressed/Changed HEP based on:   [] positioning   [] body mechanics   [] transfers   [] heat/ice application    [] other:      Other Objective/Functional Measures: sit to stand x 10 + 10=38sec (first 10). Clicking to right knee during sit/stand from low bed, no pain reported. FOTO=56, increased by 2 pts. Continues to wear her LS brace most times.     Pain Level (0-10 scale) post treatment: 0/10    ASSESSMENT/Changes in Function: See PN    Patient will continue to benefit from skilled PT services to modify and progress therapeutic interventions, address functional mobility deficits, address ROM deficits, address strength deficits, analyze and address soft tissue restrictions, analyze and cue movement patterns, analyze and modify body mechanics/ergonomics, assess and modify postural abnormalities and address imbalance/dizziness to attain remaining goals. []  See Plan of Care  [x]  See progress note/recertification  []  See Discharge Summary         Progress towards goals / Updated goals:   1. Patient will demonstrate compliance with HEP in order to improve LE strength for increased ease of ADLs   Not met (1/21/22)   Long Term Goals: To be accomplished in 4 weeks:  1. Patient will improve FOTO score by 11 points in order to demonstrate a significant improvement in function. Not met. 2/11/22  2. Patient will improve 30 second sit to stand test to 10x in order to increase ease of transfers at home. Not met. 38 sec  3. Patient will improve B hip abduction strength to 4/5 in order to increase ease of ambulation. Continues with genu valgus with sit to stands (2/7/22)   4. Patient will report not limited at all for carrying groceries in order to increase ease of household chores.   Not met .  2/11/22    PLAN  [x]  Upgrade activities as tolerated     [x]  Continue plan of care  []  Update interventions per flow sheet       []  Discharge due to:_  []  Other:_      Luis Gillette, PT 2/11/2022  11:57 AM    Future Appointments   Date Time Provider Heri Beal   2/11/2022 12:00 PM Gail Wade, PT [facility] [address]   7/25/2022 10:30 AM [facility] FAST TRACK NURSE [facility]   8/1/2022 10:30 AM [facility] FAST TRACK NURSE [facility]

## 2022-02-23 ENCOUNTER — HOSPITAL ENCOUNTER (OUTPATIENT)
Dept: PHYSICAL THERAPY | Age: 82
Discharge: HOME | End: 2022-02-23
Payer: MEDICARE

## 2022-02-23 PROCEDURE — 97110 THERAPEUTIC EXERCISES: CPT

## 2022-02-23 NOTE — PROGRESS NOTES
PT DAILY TREATMENT NOTE     Patient Name: Delia Cruz  Date:2022  : 1940  [x]  Patient  Verified  Payor: Sadiq Casas / Plan: [place] MEDICARE PART A & B / Product Type: Medicare /    In time: 2:15 Out time: 3:05  Total Treatment Time (min): 50  Visit #: 1 of 12    Medicare/BCBS Only   Total Timed Codes (min):  50 1:1 Treatment Time:  38       Treatment Area: Low back pain [M54.50]    SUBJECTIVE  Pain Level (0-10 scale): 2-3/10  Any medication changes, allergies to medications, adverse drug reactions, diagnosis change, or new procedure performed?: [x] No    [] Yes (see summary sheet for update)  Subjective functional status/changes:   [] No changes reported  Pt reports some tightness today but not much pain. She states still having difficulty getting up from low chairs and the floor. She picked up gumballs before therapy today. She notes the cardiologist said all her numbers look good and to take the 20mg water pill every day. OBJECTIVE    50 min Therapeutic Exercise:  [] See flow sheet :   Rationale: increase ROM, increase strength and improve coordination to improve the patients ability to ease with adls     With   [] TE   [] TA   [] neuro   [] other: Patient Education: [x] Review HEP    [] Progressed/Changed HEP based on:   [] positioning   [] body mechanics   [] transfers   [] heat/ice application    [] other:      Other Objective/Functional Measures:  Challenged with sit to stands with BTB to reduce valgus  B knees held in slight flexion; reviewed HS and calf stretching  Added TKE with BTB in standing to improve TKE strength  Improving girth of thighs noted from strength training  Improved gait pattern with better heel strike    Pain Level (0-10 scale) post treatment: 0/10    ASSESSMENT/Changes in Function: Pt progressing with improving LE strength and girth noted with increased ease of sit to stand transfers and improved gait pattern.  She has most difficulty with floor<>stand transfers such as when performing yard work or household cleaning. Will continue with general strength conditioning to improve ease of cleaning and performing ADLs. Patient will continue to benefit from skilled PT services to modify and progress therapeutic interventions, address functional mobility deficits, address ROM deficits, address strength deficits, analyze and address soft tissue restrictions, analyze and cue movement patterns, analyze and modify body mechanics/ergonomics, assess and modify postural abnormalities and address imbalance/dizziness to attain remaining goals. [x]  See Plan of Care  [x]  See progress note/recertification  []  See Discharge Summary         Goals for this certification period to be accomplished in 4 weeks:  1. Patient will improve FOTO score by 11 points in order to demonstrate a significant improvement in function. 2. Patient will improve 30 second sit to stand test to 10x in order to increase ease of transfers at home. 3. Patient will improve B hip abduction strength to 4/5 in order to increase ease of ambulation.    4. Patient will report not limited at all for carrying groceries in order to increase ease of household chores.     PLAN  [x]  Upgrade activities as tolerated     [x]  Continue plan of care  []  Update interventions per flow sheet       []  Discharge due to:_  []  Other:_      Yandel Wilson, PTA 2/23/2022  11:57 AM    Future Appointments   Date Time Provider Heri Beal   2/23/2022  2:15 PM Junita Dienes, PTA [facility]   2/25/2022  1:30 PM Junita Dienes, PTA [facility]   2/28/2022 10:30 AM Junita Dienes, PTA [facility]   3/4/2022  9:45 AM Junita Dienes, PTA [facility]   3/7/2022 11:15 AM Everlean Bridegroom, PT [facility]   3/9/2022 10:30 AM Junita Dienes, PTA [facility]   7/25/2022 10:30 AM [facility] FAST TRACK NURSE [facility]   8/1/2022 10:30 AM [facility] FAST TRACK NURSE [facility]

## 2022-02-25 ENCOUNTER — HOSPITAL ENCOUNTER (OUTPATIENT)
Dept: PHYSICAL THERAPY | Age: 82
Discharge: HOME | End: 2022-02-25
Payer: MEDICARE

## 2022-02-25 PROCEDURE — 97110 THERAPEUTIC EXERCISES: CPT

## 2022-02-25 NOTE — PROGRESS NOTES
PT DAILY TREATMENT NOTE     Patient Name: Jannie Serrano  Date:2022  : 1940  [x]  Patient  Verified  Payor: [place] MEDICARE / Plan: [place] MEDICARE PART A & B / Product Type: Medicare /    In time: 1:35 Out time: 2:15  Total Treatment Time (min): 40  Visit #: 2 of 12    Medicare/BCBS Only   Total Timed Codes (min): 40  1:1 Treatment Time:  40       Treatment Area: Low back pain [M54.50]    SUBJECTIVE  Pain Level (0-10 scale): 2/10  Any medication changes, allergies to medications, adverse drug reactions, diagnosis change, or new procedure performed?: [x] No    [] Yes (see summary sheet for update)  Subjective functional status/changes:   [] No changes reported  Pt reports some l/s discomfort today but that her legs are feeling pretty good. She has some friends coming for dinner tonight and her  has chemo on Wednesday of next week. She states improving LE strength and better balance when walking. OBJECTIVE    40 min Therapeutic Exercise:  [x] See flow sheet :   Rationale: increase ROM, increase strength and improve coordination to improve the patients ability to ease with adls     With   [] TE   [] TA   [] neuro   [] other: Patient Education: [x] Review HEP    [] Progressed/Changed HEP based on:   [] positioning   [] body mechanics   [] transfers   [] heat/ice application    [] other:      Other Objective/Functional Measures:  Improving LE flexibility of the calves and HS  Challenged with sitting marches  Continues to lack some TKE strength right weaker than left  Fatigue with lunges and sit to stands with TB around knees to reduce valgus    Pain Level (0-10 scale) post treatment: 0/10    ASSESSMENT/Changes in Function: Pt progressing with LE strengthening and improving balance. She demonstrates most weakness in the quads and hip flexors due to time spent in swayback posture with decreased engagement of this musculature.  She demonstrates improvement with sit to stands from lower surfaces but continues to need to work on lunges for floor<>stand transfers. Will also continue to work on calf and HS flexibility to improve heel strike during gait and standing with TKE. Patient will continue to benefit from skilled PT services to modify and progress therapeutic interventions, address functional mobility deficits, address ROM deficits, address strength deficits, analyze and address soft tissue restrictions, analyze and cue movement patterns, analyze and modify body mechanics/ergonomics, assess and modify postural abnormalities and address imbalance/dizziness to attain remaining goals. [x]  See Plan of Care  [x]  See progress note/recertification  []  See Discharge Summary         Goals for this certification period to be accomplished in 4 weeks:  1. Patient will improve FOTO score by 11 points in order to demonstrate a significant improvement in function. 2. Patient will improve 30 second sit to stand test to 10x in order to increase ease of transfers at home. 3. Patient will improve B hip abduction strength to 4/5 in order to increase ease of ambulation.    4. Patient will report not limited at all for carrying groceries in order to increase ease of household chores.     PLAN  [x]  Upgrade activities as tolerated     [x]  Continue plan of care  []  Update interventions per flow sheet       []  Discharge due to:_  []  Other:_      Omayra Steel PTA 2/25/2022  11:57 AM    Future Appointments   Date Time Provider Heri Beal   2/25/2022  1:30 PM Marco A Ward PTA [facility]   2/28/2022 10:30 AM Marco A Ward PTA [facility]   3/4/2022  9:45 AM Marco A Ward PTA [facility]   3/7/2022 11:15 AM Susana Ortiz PT [facility]   3/9/2022 10:30 AM Marco A Ward PTA [facility]   7/25/2022 10:30 AM [facility] FAST TRACK NUR[facility]   8/1/2022 10:30 AM [facility] FAST TRACK NUR[facility]

## 2022-02-28 ENCOUNTER — HOSPITAL ENCOUNTER (OUTPATIENT)
Dept: PHYSICAL THERAPY | Age: 82
Discharge: HOME | End: 2022-02-28
Payer: MEDICARE

## 2022-02-28 PROCEDURE — 97110 THERAPEUTIC EXERCISES: CPT

## 2022-02-28 NOTE — PROGRESS NOTES
PT DAILY TREATMENT NOTE     Patient Name: Biju Saavedra  Date:2022  : 1940  [x]  Patient  Verified  Payor: [place] MEDICARE / Plan: [place] MEDICARE PART A & B / Product Type: Medicare /    In time: 10:32 Out time: 11:11  Total Treatment Time (min): 39  Visit #: 3 of 12    Medicare/BCBS Only   Total Timed Codes (min):  39 1:1 Treatment Time:  39       Treatment Area: Low back pain [M54.50]    SUBJECTIVE  Pain Level (0-10 scale): 3/10  Any medication changes, allergies to medications, adverse drug reactions, diagnosis change, or new procedure performed?: [x] No    [] Yes (see summary sheet for update)  Subjective functional status/changes:   [] No changes reported  Pt reports her low back is bothering her some today. She states she is taking the water pill but hasn't noticed that she is going to the bathroom more like she thought she would. She states she is trying to be aware of picking up her feet with walking. She forgets to work on stretches at home as she is very busy helping [place] through his chemo. OBJECTIVE    39 min Therapeutic Exercise:  [x] See flow sheet :   Rationale: increase ROM, increase strength and improve coordination to improve the patients ability to ease with adls     With   [] TE   [] TA   [] neuro   [] other: Patient Education: [x] Review HEP    [] Progressed/Changed HEP based on:   [] positioning   [] body mechanics   [] transfers   [] heat/ice application    [] other:      Other Objective/Functional Measures:  Slightly crouched gait during ambulation with decreased heel strike and increased knee flexion  Hypertonic l/s paraspinals  Weak hip flexors  Improving hip abduction with cues using TB for resistance  No increased pain during session      Pain Level (0-10 scale) post treatment: 0/10    ASSESSMENT/Changes in Function: Pt making slow, steady progress towards goals of improving LE strength.  She ambulates with slightly crouched gait with increased knee flexion and decreased heel strike. She has increased l/s hypertonicity from swayback posture with decreased hip flexor and quad strength. She demonstrates improving ease of lunges and sit to stand transfers but still requires UE assistance to help. Will continue with gradual strengthening with decreased UE support for ease of couch transfers and floor<>stand transfers for cleaning at home. Patient will continue to benefit from skilled PT services to modify and progress therapeutic interventions, address functional mobility deficits, address ROM deficits, address strength deficits, analyze and address soft tissue restrictions, analyze and cue movement patterns, analyze and modify body mechanics/ergonomics, assess and modify postural abnormalities and address imbalance/dizziness to attain remaining goals. [x]  See Plan of Care  [x]  See progress note/recertification  []  See Discharge Summary         Goals for this certification period to be accomplished in 4 weeks:  1. Patient will improve FOTO score by 11 points in order to demonstrate a significant improvement in function. 2. Patient will improve 30 second sit to stand test to 10x in order to increase ease of transfers at home. Progressing to lower height table (2/28/22)   3. Patient will improve B hip abduction strength to 4/5 in order to increase ease of ambulation. BTB for hip abduction exercises (2/28/22)  4.  Patient will report not limited at all for carrying groceries in order to increase ease of household chores.     PLAN  [x]  Upgrade activities as tolerated     [x]  Continue plan of care  []  Update interventions per flow sheet       []  Discharge due to:_  []  Other:_      Delaney Sanchez PTA 2/28/2022  11:57 AM    Future Appointments   Date Time Provider Heri Beal   2/28/2022 10:30 AM Dominique Borja PTA [facility]   3/4/2022  9:45 AM Dominique Borja PTA [facility]   3/7/2022 11:15 AM Reynold Aranda PT [facility]   3/9/2022 10:30 AM Shimon Benites, PTA [facility]   7/25/2022 10:30 AM [facility] FAST TRACK NUR[facility]   8/1/2022 10:30 AM [facility] FAST TRACK NUR[facility]

## 2022-03-02 ENCOUNTER — APPOINTMENT (OUTPATIENT)
Dept: PHYSICAL THERAPY | Age: 82
End: 2022-03-02
Payer: MEDICARE

## 2022-03-04 ENCOUNTER — HOSPITAL ENCOUNTER (OUTPATIENT)
Dept: PHYSICAL THERAPY | Age: 82
Discharge: HOME | End: 2022-03-04
Payer: MEDICARE

## 2022-03-04 PROCEDURE — 97110 THERAPEUTIC EXERCISES: CPT

## 2022-03-04 NOTE — PROGRESS NOTES
PT DAILY TREATMENT NOTE     Patient Name: Rula Phillips  Date:3/4/2022  : 1940  [x]  Patient  Verified  Payor: VA MEDICARE / Plan: VA MEDICARE PART A & B / Product Type: Medicare /    In time: 9:50  Out time: 10:34  Total Treatment Time (min): 44  Visit #: 4 of 12    Medicare/BCBS Only   Total Timed Codes (min): 44 1:1 Treatment Time:  43       Treatment Area: Low back pain [M54.50]    SUBJECTIVE  Pain Level (0-10 scale): 0/10  Any medication changes, allergies to medications, adverse drug reactions, diagnosis change, or new procedure performed?: [x] No    [] Yes (see summary sheet for update)  Subjective functional status/changes:   [] No changes reported  Pt reports rest relieves her back pain but if she is doing a lot she gets sharp pains in her back. She states under a lot of stress with calling MDs for [facility] Smoke and getting insurance straight as well as follow up MD visits. She notes the fluid pills don't seem to be helping but hasn't had any time to call the doctor for herself. She has been conscious of picking up her feet with walking.     OBJECTIVE    44 min Therapeutic Exercise:  [x] See flow sheet :   Rationale: increase ROM, increase strength and improve coordination to improve the patients ability to ease with adls     With   [] TE   [] TA   [] neuro   [] other: Patient Education: [x] Review HEP    [] Progressed/Changed HEP based on:   [] positioning   [] body mechanics   [] transfers   [] heat/ice application    [] other:      Other Objective/Functional Measures:  Increased reps of lunges  Improving knee extension in standing  Cues with TKE with TB to keep foot flat on the ground and only flex the knee on the side being worked  Pitney Shon with sit to stands from standard chair with valgus upon fatigue  Added SB flexion for l/s tightness    Pain Level (0-10 scale) post treatment: 0/10    ASSESSMENT/Changes in Function: Pt progressing with updated goals with improving strength evidenced by increased reps for lunges and lower heights for sit to stands. She demonstrates improved heel strike and knee extension at initial contact. She still has increased back pain with activity but is very busy caring and assisting with her  whom is undergoing chemo currently. Will continue to progress LE strength for ease of transfers and floor<>stand transfers with improved ease. Patient will continue to benefit from skilled PT services to modify and progress therapeutic interventions, address functional mobility deficits, address ROM deficits, address strength deficits, analyze and address soft tissue restrictions, analyze and cue movement patterns, analyze and modify body mechanics/ergonomics, assess and modify postural abnormalities and address imbalance/dizziness to attain remaining goals. [x]  See Plan of Care  [x]  See progress note/recertification  []  See Discharge Summary         Goals for this certification period to be accomplished in 4 weeks:  1. Patient will improve FOTO score by 11 points in order to demonstrate a significant improvement in function. 2. Patient will improve 30 second sit to stand test to 10x in order to increase ease of transfers at home. Progressing to lower height table (2/28/22)   3. Patient will improve B hip abduction strength to 4/5 in order to increase ease of ambulation. BTB for hip abduction exercises (2/28/22)  4.  Patient will report not limited at all for carrying groceries in order to increase ease of household chores.     PLAN  [x]  Upgrade activities as tolerated     [x]  Continue plan of care  []  Update interventions per flow sheet       []  Discharge due to:_  []  Other:_      Donato Guerrero PTA 3/4/2022  11:57 AM    Future Appointments   Date Time Provider Heri Beal   3/4/2022  9:45 AM Ramon Leigh PTA [facility]   3/7/2022 11:15 AM Arjun Paige PT [facility]   3/9/2022 10:30 AM Ramon Leigh PTA [facility]   7/25/2022 10:30 AM [facility] FAST TRACK NURSE [facility]   8/1/2022 10:30 AM [facility] FAST TRACK NUR[facility]

## 2022-03-07 ENCOUNTER — HOSPITAL ENCOUNTER (OUTPATIENT)
Dept: PHYSICAL THERAPY | Age: 82
Discharge: HOME | End: 2022-03-07
Payer: MEDICARE

## 2022-03-07 PROCEDURE — 97110 THERAPEUTIC EXERCISES: CPT

## 2022-03-07 NOTE — PROGRESS NOTES
PT DAILY TREATMENT NOTE     Patient Name: Ger Lauren  Date:3/7/2022  : 1940  [x]  Patient  Verified  Payor: [place] MEDICARE / Plan: VA MEDICARE PART A & B / Product Type: Medicare /    In time:1115  Out time:1200  Total Treatment Time (min): 45  Visit #: 5 of 12    Medicare/BCBS Only   Total Timed Codes (min):  45 1:1 Treatment Time:  45       Treatment Area: Low back pain [M54.50]    SUBJECTIVE  Pain Level (0-10 scale): 0/10  Any medication changes, allergies to medications, adverse drug reactions, diagnosis change, or new procedure performed?: [x] No    [] Yes (see summary sheet for update)  Subjective functional status/changes:   [] No changes reported  Reports she is stressed from he  throwing up blood this weekend from his Chemo treatment. OBJECTIVE    45 min Therapeutic Exercise:  [] See flow sheet :   Rationale: increase ROM and increase strength to improve the patients ability to ease with adl's       With   [] TE   [] TA   [] neuro   [] other: Patient Education: [x] Review HEP    [] Progressed/Changed HEP based on:   [] positioning   [] body mechanics   [] transfers   [] heat/ice application    [] other:      Other Objective/Functional Measures:   Pitting edema to right >left LE, around ankles  EW=285/87  Pt exhibits resting hand tremor during ex's. Sit to stand w/o UE's, 9/10 times from regular chair. Pain Level (0-10 scale) post treatment: 0/10    ASSESSMENT/Changes in Function: Pt continues to have concerns about getting up down off the  floor when she has to clean up spills and in yard when she has to do yard work and  clippings. She is seeing a Neurologist to R/I or R/O Parkinson's  Follow up with Cardiologist tomorrow.      Patient will continue to benefit from skilled PT services to modify and progress therapeutic interventions, address functional mobility deficits, address ROM deficits, address strength deficits, analyze and address soft tissue restrictions, analyze and cue movement patterns, analyze and modify body mechanics/ergonomics, assess and modify postural abnormalities and address imbalance/dizziness to attain remaining goals. []  See Plan of Care  [x]  See progress note/recertification  []  See Discharge Summary         Progress towards goals / Updated goals:  Goals for this certification period to be accomplished in 4 weeks:  1. Patient will improve FOTO score by 11 points in order to demonstrate a significant improvement in function. 2. Patient will improve 30 second sit to stand test to 10x in order to increase ease of transfers at home. Progressing to lower height table (2/28/22)   3. Patient will improve B hip abduction strength to 4/5 in order to increase ease of ambulation. BTB for hip abduction exercises (2/28/22)  4.  Patient will report not limited at all for carrying groceries in order to increase ease of household chores.     PLAN  [x]  Upgrade activities as tolerated     [x]  Continue plan of care  []  Update interventions per flow sheet       []  Discharge due to:_  []  Other:_      Mis Monroy PT 3/7/2022  10:12 AM    Future Appointments   Date Time Provider Heri Beal   3/7/2022 11:15 AM Leah Cohen, PT [facility]   3/9/2022 10:30 AM Filippo Marvin PTA [facility]   7/25/2022 10:30 AM [facility] FAST TRACK NUR[facility]   8/1/2022 10:30 AM [facility] TRACK NUR[facility]

## 2022-03-08 ENCOUNTER — TRANSCRIBE ORDER (OUTPATIENT)
Dept: SCHEDULING | Age: 82
End: 2022-03-08

## 2022-03-08 DIAGNOSIS — R60.0 BILATERAL EDEMA OF LOWER EXTREMITY: Primary | ICD-10-CM

## 2022-03-09 ENCOUNTER — HOSPITAL ENCOUNTER (OUTPATIENT)
Dept: PHYSICAL THERAPY | Age: 82
Discharge: HOME | End: 2022-03-09
Payer: MEDICARE

## 2022-03-09 PROCEDURE — 97110 THERAPEUTIC EXERCISES: CPT

## 2022-03-09 NOTE — PROGRESS NOTES
PT DAILY TREATMENT NOTE     Patient Name: Olga Fishman  Date:3/9/2022  : 1940  [x]  Patient  Verified  Payor: [place] MEDICARE / Plan: [place] MEDICARE PART A & B / Product Type: Medicare /    In time: 10:32  Out time: 11:15  Total Treatment Time (min): 43  Visit #: 6 of 12    Medicare/BCBS Only   Total Timed Codes (min):  43 1:1 Treatment Time:  43       Treatment Area: Low back pain [M54.50]    SUBJECTIVE  Pain Level (0-10 scale): 2/10  Any medication changes, allergies to medications, adverse drug reactions, diagnosis change, or new procedure performed?: [x] No    [] Yes (see summary sheet for update)  Subjective functional status/changes:   [] No changes reported  Pt reports her back is bothering her today. She saw her PCP who is sending her to get an US of her legs. He states the fluid pills weren't going to work because the fluid is likely from the veins and that's not what the pills work on. She states her SpO2 was also low at the MD and dropped with walking so she may have to use a little oxygen for walking to help with endurance and breathing. She states the compression stockings help the most with fluid.      OBJECTIVE    43 min Therapeutic Exercise:  [x] See flow sheet :   Rationale: increase ROM and increase strength to improve the patients ability to ease with adl's       With   [] TE   [] TA   [] neuro   [] other: Patient Education: [x] Review HEP    [] Progressed/Changed HEP based on:   [] positioning   [] body mechanics   [] transfers   [] heat/ice application    [] other:      Other Objective/Functional Measures:   Manual BP: 136/72  SpO2: 92% but increased to 96% with pursed lip breathing  Decreased back pain post hamstring and calf stretching for longer periods  Continues with sway back posture  Provided size D tubing sock for longer length    Pain Level (0-10 scale) post treatment: 0/10    ASSESSMENT/Changes in Function: Pt progressing with LE strength but continues to have comorbidities of LE swelling and difficulty with breathing being addressed by her PCP. She has reduction of back pain with stretching of the calves and hamstrings to reduce the PPT from swayback posture. Will continue with gentle strengthening and stretching for ease of transfers and floor<>stand transfers for household cleaning. Patient will continue to benefit from skilled PT services to modify and progress therapeutic interventions, address functional mobility deficits, address ROM deficits, address strength deficits, analyze and address soft tissue restrictions, analyze and cue movement patterns, analyze and modify body mechanics/ergonomics, assess and modify postural abnormalities and address imbalance/dizziness to attain remaining goals. []  See Plan of Care  [x]  See progress note/recertification  []  See Discharge Summary         Progress towards goals / Updated goals:  Goals for this certification period to be accomplished in 4 weeks:  1. Patient will improve FOTO score by 11 points in order to demonstrate a significant improvement in function. 2. Patient will improve 30 second sit to stand test to 10x in order to increase ease of transfers at home. Progressing to lower height table (2/28/22)   3. Patient will improve B hip abduction strength to 4/5 in order to increase ease of ambulation. BTB for hip abduction exercises (2/28/22)  4.  Patient will report not limited at all for carrying groceries in order to increase ease of household chores.     PLAN  [x]  Upgrade activities as tolerated     [x]  Continue plan of care  []  Update interventions per flow sheet       []  Discharge due to:_  []  Other:_      Demetria Panchal PTA 3/9/2022  10:12 AM    Future Appointments   Date Time Provider Heri Dela Cruzi   3/9/2022 10:30 AM Lina Langley PTA [facility]   7/25/2022 10:30 AM [facility] FAST TRACK NURSE [facility]   8/1/2022 10:30 AM [facility] FAST TRACK NURSE [facility]

## 2022-03-11 ENCOUNTER — HOSPITAL ENCOUNTER (OUTPATIENT)
Dept: VASCULAR SURGERY | Age: 82
Discharge: HOME | End: 2022-03-11
Attending: FAMILY MEDICINE
Payer: MEDICARE

## 2022-03-11 DIAGNOSIS — R60.0 BILATERAL EDEMA OF LOWER EXTREMITY: ICD-10-CM

## 2022-03-11 PROCEDURE — 93970 EXTREMITY STUDY: CPT

## 2022-03-16 ENCOUNTER — HOSPITAL ENCOUNTER (OUTPATIENT)
Dept: PHYSICAL THERAPY | Age: 82
Discharge: HOME | End: 2022-03-16
Payer: MEDICARE

## 2022-03-16 PROCEDURE — 97530 THERAPEUTIC ACTIVITIES: CPT

## 2022-03-16 PROCEDURE — 97110 THERAPEUTIC EXERCISES: CPT

## 2022-03-16 NOTE — PROGRESS NOTES
PT DAILY TREATMENT NOTE     Patient Name: Olga Fishman  Date:3/16/2022  : 1940  [x]  Patient  Verified  Payor: Ty Fry / Plan: [place] MEDICARE PART A & B / Product Type: Medicare /    In time: 10:33  Out time: 11:15  Total Treatment Time (min): 42  Visit #: 1 of 12    Medicare/BCBS Only   Total Timed Codes (min):  42 1:1 Treatment Time:  42       Treatment Area: Low back pain [M54.50]    SUBJECTIVE  Pain Level (0-10 scale): 3/10  Any medication changes, allergies to medications, adverse drug reactions, diagnosis change, or new procedure performed?: [x] No    [] Yes (see summary sheet for update)  Subjective functional status/changes:   [] No changes reported  Pt reports low back pain and right SI pain today. She states she felt great after last session and doing more stretching. She notes most difficulty still getting up from the floor. She would like to continue to progress her leg strength some more. OBJECTIVE    32 min Therapeutic Exercise:  [x] See flow sheet :   Rationale: increase ROM and increase strength to improve the patients ability to ease with adl's    10 min Therapeutic Activity:  [x] See flow sheet : FOTO; goal reassessment   Rationale: increase ROM and increase strength to improve the patients ability to ease with adl's       With   [] TE   [] TA   [] neuro   [] other: Patient Education: [x] Review HEP    [] Progressed/Changed HEP based on:   [] positioning   [] body mechanics   [] transfers   [] heat/ice application    [] other:      Other Objective/Functional Measures:   Right piriformis tightness compared to left  Left upslip noted corrected with leg lengthening  Reduced tightness in back with leg stretches  Sit to stand test: 9.5x  FOTO: 48/100  Genu valgus with sit to stands    Pain Level (0-10 scale) post treatment: 1/10    ASSESSMENT/Changes in Function: See Recertification.     Patient will continue to benefit from skilled PT services to modify and progress therapeutic interventions, address functional mobility deficits, address ROM deficits, address strength deficits, analyze and address soft tissue restrictions, analyze and cue movement patterns, analyze and modify body mechanics/ergonomics, assess and modify postural abnormalities and address imbalance/dizziness to attain remaining goals. []  See Plan of Care  [x]  See progress note/recertification  []  See Discharge Summary         Goals for this certification period to be accomplished in 4 weeks:  1. Patient will improve FOTO score by 11 points in order to demonstrate a significant improvement in function. 2. Patient will improve 30 second sit to stand test to 10x in order to increase ease of transfers at home. Progressing to lower height table (2/28/22) Progressing  3. Patient will improve B hip abduction strength to 4/5 in order to increase ease of ambulation. BTB for hip abduction exercises (2/28/22)  4.  Patient will report not limited at all for carrying groceries in order to increase ease of household chores. Progressing    PLAN  [x]  Upgrade activities as tolerated     [x]  Continue plan of care  []  Update interventions per flow sheet       []  Discharge due to:_  []  Other:_      Olman Kenny PTA 3/16/2022  10:12 AM    Future Appointments   Date Time Provider Heri Beal   3/16/2022 10:30 AM Misael Matthews PTA [facility]   3/18/2022  2:15 PM Misael Matthews PTA [facility]   3/22/2022  3:00 PM Dinesh Conde, PT [facility]   3/24/2022  4:30 PM Misael Matthews PTA [facility]   3/28/2022 10:30 AM Dinesh Conde, PT [facility]   3/30/2022 10:30 AM Misael Matthews PTA [facility]   4/4/2022 12:45 PM Misael Matthews, PTA [facility]   4/7/2022  2:15 PM Misael Matthews, PTA [facility]   4/12/2022 10:30 AM Dinesh Conde, PT [facility]   4/15/2022 10:30 AM Misael Matthews, PTA [facility]   4/18/2022 10:30 AM Misael Matthews, DAREK [facility]   4/22/2022  2:15 PM Misael Matthews, PTA [id number]   7/25/2022 10:30 AM [facility] FAST TRACK NURSE [facility]   8/1/2022 10:30 AM [facility] FAST TRACK NURSE [facility]

## 2022-03-16 NOTE — PROGRESS NOTES
[facility] [address]  [phone] fax    Continued Plan of Care/ Re-certification for Physical Therapy Services    Patient name: Ger Lauren Start of Care: 2022   Referral source: Kvng Gonzalez MD : 1940   Medical/Treatment Diagnosis: Low back pain [M54.50]  Payor: [place] MEDICARE / Plan: VA MEDICARE PART A & B / Product Type: Medicare /  Onset Date: years ago with leg symptoms starting a year ago                 Prior Hospitalization: see medical history Provider#: [id number]   Medications: Verified on Patient Summary List    Comorbidities:  Asthma, neck pain  Prior Level of Function: Ind with ambulation, Ind with ADLs  Visits from Start of Care: 14    Missed Visits: 0    The Plan of Care and following information is based on the patient's current status:  Goal: Patient will improve FOTO score by 11 points in order to demonstrate a significant improvement in function. Status at last note/certification: 79/034  Current Status: not met 48/100    Goal: Patient will improve 30 second sit to stand test to 10x in order to increase ease of transfers at home. Status at last note/certification: 38 seconds for 10  Current Status: not met 9.5x    Goal: Patient will improve B hip abduction strength to 4/5 in order to increase ease of ambulation.    Status at last note/certification: genu valgus with sit to stands  Current Status: not met 4-/5    Goal: Patient will report not limited at all for carrying groceries in order to increase ease of household chores.   Status at last note/certification: limited a little  Current Status: met    Key functional changes: Improved sit to stand test, improved strength    Problems/ barriers to goal attainment: back pain     Problem List: pain affecting function, decrease ROM, decrease strength, edema affecting function, impaired gait/ balance, decrease ADL/ functional abilitiies, decrease activity tolerance, decrease flexibility/ joint mobility and decrease transfer abilities    Treatment Plan: Therapeutic exercise, Therapeutic activities, Neuromuscular re-education, Physical agent/modality, Gait/balance training, Manual therapy, Patient education, Self Care training, Functional mobility training, Home safety training and Stair training     Patient Goal (s) has been updated and includes: \"be able to get up from the floor\"     Goals for this certification period to be accomplished in 4 weeks:  1. Patient will improve FOTO score by 11 points in order to demonstrate a significant improvement in function. 2. Patient will report \"little to no difficulty\" when asked on the FOTO questionnaire, \"How much difficulty do you have performing your usual work, house work or school activities? \" to demonstrate improved ease of performing household chores. 3. Patient will be able to perform floor<>stand transfer with just 1 UE to demonstrate improved LE strength for ease of transfers at home. 4. Patient will improve B hip abduction strength to 4/5 in order to increase ease of ambulation. Frequency / Duration: Patient to be seen 2-3 times per week for 4 weeks:    Assessment / Recommendations: Mrs. Peter Hashimoto is making slow, steady progress towards goals in therapy. She subjectively reports improvement despite her FOTO declining to 48/100. Her sit to stand test in 30 seconds increased from 6x at her evaluation to 9.5x indicating improvement in LE strength. She continues to struggle with floor to stand transfers when performing household chores at home. She has improvement in hip abduction strength but still demonstrates genu valgus during her sit to stands. She has reduction of back pain post hamstring, calf, and l/s stretching due to her natural swayback posture.  Skilled PT remains medically necessary to progress LE strength and flexibility for ease of ambulation, transfers and household chores with decreased pain and decreased fall risk. Certification Period: 3/16/2022 to 4/15/2022     Angel Fernandez PTA 3/16/2022 3:10 PM    ________________________________________________________________________  I certify that the above Therapy Services are being furnished while the patient is under my care. I agree with the treatment plan and certify that this therapy is necessary. [] I have read the above and request that my patient continue as recommended.   [] I have read the above report and request that my patient continue therapy with the following changes/special instructions: _______________________________________  [] I have read the above report and request that my patient be discharged from therapy    Physician's Signature:____________Date:_________TIME:________     Lexus Benavides MD  ** Signature, Date and Time must be completed for valid certification **    Please sign and return to [facility] [address]  [phone] fax

## 2022-03-18 ENCOUNTER — HOSPITAL ENCOUNTER (OUTPATIENT)
Dept: PHYSICAL THERAPY | Age: 82
Discharge: HOME | End: 2022-03-18
Payer: MEDICARE

## 2022-03-18 PROCEDURE — 97110 THERAPEUTIC EXERCISES: CPT

## 2022-03-18 NOTE — PROGRESS NOTES
PT DAILY TREATMENT NOTE     Patient Name: Teddy Santoyo  Date:3/18/2022  : 1940  [x]  Patient  Verified  Payor: Kennycj Nones / Plan: [place] MEDICARE PART A & B / Product Type: Medicare /    In time: 2:16  Out time: 3:00  Total Treatment Time (min): 44  Visit #: 2 of 12    Medicare/BCBS Only   Total Timed Codes (min): 44  1:1 Treatment Time:  44       Treatment Area: Low back pain [M54.50]    SUBJECTIVE  Pain Level (0-10 scale): 2/10  Any medication changes, allergies to medications, adverse drug reactions, diagnosis change, or new procedure performed?: [x] No    [] Yes (see summary sheet for update)  Subjective functional status/changes:   [] No changes reported  Pt reports the stretches really help her pain. She wants to continue with strengthening to help with getting up off the floor. She states the lower part of her back is feeling better today. OBJECTIVE    44 min Therapeutic Exercise:  [x] See flow sheet :   Rationale: increase ROM and increase strength to improve the patients ability to ease with adls       With   [] TE   [] TA   [] neuro   [] other: Patient Education: [x] Review HEP    [] Progressed/Changed HEP based on:   [] positioning   [] body mechanics   [] transfers   [] heat/ice application    [] other:      Other Objective/Functional Measures:  BP: 138/74  HR: 79 bpm  SpO2%: 97%  Left quad stronger than right  Right piriformis tighter than left  Left HS tighter than right  Improved back pain with leg stretches  Educated on progressing with lunges and strengthening next session  Right LE more swollen compared to left    Pain Level (0-10 scale) post treatment: 1/10    ASSESSMENT/Changes in Function: Pt progressing with pain reduction with added stretches for the legs and right hip restrictions. She needs progression of quad strengthening to assist with floor<>stand transfers.  She demonstrates more left quad strength compared to right and will likely due best with left forward lunge for transfers. Will continue to monitor swelling and posture to reduce l/s pain. Patient will continue to benefit from skilled PT services to modify and progress therapeutic interventions, address functional mobility deficits, address ROM deficits, address strength deficits, analyze and address soft tissue restrictions, analyze and cue movement patterns, analyze and modify body mechanics/ergonomics, assess and modify postural abnormalities and address imbalance/dizziness to attain remaining goals. []  See Plan of Care  [x]  See progress note/recertification  []  See Discharge Summary         Goals for this certification period to be accomplished in 4 weeks:  1. Patient will improve FOTO score by 11 points in order to demonstrate a significant improvement in function. 2. Patient will report \"little to no difficulty\" when asked on the FOTO questionnaire, \"How much difficulty do you have performing your usual work, house work or school activities? \" to demonstrate improved ease of performing household chores. 3. Patient will be able to perform floor<>stand transfer with just 1 UE to demonstrate improved LE strength for ease of transfers at home. 4. Patient will improve B hip abduction strength to 4/5 in order to increase ease of ambulation.       PLAN  [x]  Upgrade activities as tolerated     [x]  Continue plan of care  []  Update interventions per flow sheet       []  Discharge due to:_  []  Other:_      Dorita Delacruz PTA 3/18/2022  10:12 AM    Future Appointments   Date Time Provider Heri Beal   3/18/2022  2:15 PM Shelagh Number, PTA [facility]   3/22/2022  3:00 PM Faina Murillo, PT [facility]   3/24/2022  4:30 PM Shelagh Number, PTA [facility]   3/28/2022 10:30 AM Shelagh Number, PTA [facility]   3/30/2022 10:30 AM Shelagh Number, PTA [facility]   4/4/2022 12:45 PM Shelagh Number, PTA [facility]   4/7/2022  2:15 PM Shelagh Number, PTA [facility]   4/12/2022 10:30 AM Allie Sanches, PT [facility]   4/15/2022 10:30 AM Jv Dodson, PTA [facility]   4/18/2022 10:30 AM Jv Dodson, PTA [facility]   4/22/2022  2:15 PM Jv Dodson, PTA [facility]   7/25/2022 10:30 AM [facility] FAST TRACK NURSE [facility]   8/1/2022 10:30 AM [facility] FAST TRACK NURSE [facility]

## 2022-03-19 PROBLEM — M12.811 ROTATOR CUFF TEAR ARTHROPATHY OF RIGHT SHOULDER: Status: ACTIVE | Noted: 2019-07-31

## 2022-03-19 PROBLEM — M75.101 ROTATOR CUFF TEAR ARTHROPATHY OF RIGHT SHOULDER: Status: ACTIVE | Noted: 2019-07-31

## 2022-03-19 PROBLEM — M81.8 IDIOPATHIC OSTEOPOROSIS: Status: ACTIVE | Noted: 2020-01-09

## 2022-03-22 ENCOUNTER — HOSPITAL ENCOUNTER (OUTPATIENT)
Dept: PHYSICAL THERAPY | Age: 82
Discharge: HOME | End: 2022-03-22
Payer: MEDICARE

## 2022-03-22 PROCEDURE — 97110 THERAPEUTIC EXERCISES: CPT

## 2022-03-22 NOTE — PROGRESS NOTES
PT DAILY TREATMENT NOTE     Patient Name: Samir Otero  Date:3/22/2022  : 1940  [x]  Patient  Verified  Payor: VA MEDICARE / Plan: VA MEDICARE PART A & B / Product Type: Medicare /    In time:300  Out time:343  Total Treatment Time (min): 43  Visit #: 3 of 12    Medicare/BCBS Only   Total Timed Codes (min):  43 1:1 Treatment Time:  43       Treatment Area: Low back pain [M54.50]    SUBJECTIVE  Pain Level (0-10 scale): 10  Any medication changes, allergies to medications, adverse drug reactions, diagnosis change, or new procedure performed?: [x] No    [] Yes (see summary sheet for update)  Subjective functional status/changes:   [] No changes reported  Reports she has pain/discomfort along LS . It sometimes move up her back. OBJECTIVE    43 min Therapeutic Exercise:  [] See flow sheet :   Rationale: increase ROM, increase strength and improve coordination to improve the patients ability to ease with adl's          With   [] TE   [] TA   [] neuro   [] other: Patient Education: [x] Review HEP    [] Progressed/Changed HEP based on:   [] positioning   [] body mechanics   [] transfers   [] heat/ice application    [] other:      Other Objective/Functional Measures: Ball massage rolls to T/S and LS. Pt reported ease of muscle tension during ball rolls. Included seated piriformis stretch, figure 4 to decrease stiffness, improve hip mobility. Pain Level (0-10 scale) post treatment: 1/10    ASSESSMENT/Changes in Function: Pt continues to have increased  Hip tightness to right >left hip. Evident during Hip ER stretch.      Patient will continue to benefit from skilled PT services to modify and progress therapeutic interventions, address functional mobility deficits, address ROM deficits, address strength deficits, analyze and address soft tissue restrictions, analyze and cue movement patterns, analyze and modify body mechanics/ergonomics, assess and modify postural abnormalities and address imbalance/dizziness to attain remaining goals. []  See Plan of Care  [x]  See progress note/recertification  []  See Discharge Summary         Progress towards goals / Updated goals:  1. Patient will improve FOTO score by 11 points in order to demonstrate a significant improvement in function.   2. Patient will report \"little to no difficulty\" when asked on the FOTO questionnaire, \"How much difficulty do you have performing your usual work, house work or school activities? \" to demonstrate improved ease of performing household chores. 3. Patient will be able to perform floor<>stand transfer with just 1 UE to demonstrate improved LE strength for ease of transfers at home.   4. Patient will improve B hip abduction strength to 4/5 in order to increase ease of ambulation.      PLAN  [x]  Upgrade activities as tolerated     [x]  Continue plan of care  []  Update interventions per flow sheet       []  Discharge due to:_  []  Other:_      John Blackwood, PT 3/22/2022  3:07 PM    Future Appointments   Date Time Provider Heri Beal   3/24/2022  4:30 PM Soto Sandeep, PTA [facility]   3/28/2022 10:30 AM Soto [place], PTA [facility]   3/30/2022 10:30 AM Soto Sandeep, PTA [facility]   4/4/2022 12:45 PM Soto [place], PTA [facility]   4/7/2022  2:15 PM Soto [place], PTA [facility]   4/12/2022 10:30 AM Aaron Morgan, PT [facility]   4/15/2022 10:30 AM Soto [place], PTA [facility]   4/18/2022 10:30 AM Soto [place], PTA [facility]   4/22/2022  2:15 PM Soto [place], PTA [facility]   7/25/2022 10:30 AM [facility] FAST TRACK NUR[facility]   8/1/2022 10:30 AM [facility] FAST TRACK NUR[facility]

## 2022-03-25 ENCOUNTER — HOSPITAL ENCOUNTER (OUTPATIENT)
Dept: PHYSICAL THERAPY | Age: 82
End: 2022-03-25
Payer: MEDICARE

## 2022-03-28 ENCOUNTER — HOSPITAL ENCOUNTER (OUTPATIENT)
Dept: PHYSICAL THERAPY | Age: 82
Discharge: HOME | End: 2022-03-28
Payer: MEDICARE

## 2022-03-28 PROCEDURE — 97110 THERAPEUTIC EXERCISES: CPT

## 2022-03-28 NOTE — PROGRESS NOTES
PT DAILY TREATMENT NOTE     Patient Name: Joaquín Torres  Date:3/28/2022  : 1940  [x]  Patient  Verified  Payor: [place] MEDICARE / Plan: [place] MEDICARE PART A & B / Product Type: Medicare /    In time: 10:34  Out time: 11:18  Total Treatment Time (min): 44  Visit #: 4 of 12    Medicare/BCBS Only   Total Timed Codes (min):  44 1:1 Treatment Time:  30       Treatment Area: Low back pain [M54.50]    SUBJECTIVE  Pain Level (0-10 scale): 4/10  Any medication changes, allergies to medications, adverse drug reactions, diagnosis change, or new procedure performed?: [x] No    [] Yes (see summary sheet for update)  Subjective functional status/changes:   [] No changes reported  Pt reports a lot of soreness in her right SI region today. She states she had to cancel her appt on Friday because of a coughing spell. She reports she hasn't been out in her yard recently. OBJECTIVE    44 min Therapeutic Exercise:  [x] See flow sheet :   Rationale: increase ROM, increase strength and improve coordination to improve the patients ability to ease with adl's          With   [] TE   [] TA   [] neuro   [] other: Patient Education: [x] Review HEP    [] Progressed/Changed HEP based on:   [] positioning   [] body mechanics   [] transfers   [] heat/ice application    [] other:      Other Objective/Functional Measures:   TTP right SI  Reviewed ice for the SI joint and potential shot if needed  Educated to continue working on piriformis stretch at home  Decreased pain post stretching and light strengthening  Held lunges due to increased back pain today    Pain Level (0-10 scale) post treatment: 0/10     ASSESSMENT/Changes in Function: Pt continues to progress in therapy with strength of the LEs but struggles with SI pain due to glute weakness and decreased right hip mobility. She benefits most from stretching for the back pain and SI pain.  Will continue with lunge and quad strength progression for ease of floor to stand transfers. Patient will continue to benefit from skilled PT services to modify and progress therapeutic interventions, address functional mobility deficits, address ROM deficits, address strength deficits, analyze and address soft tissue restrictions, analyze and cue movement patterns, analyze and modify body mechanics/ergonomics, assess and modify postural abnormalities and address imbalance/dizziness to attain remaining goals. []  See Plan of Care  [x]  See progress note/recertification  []  See Discharge Summary         Progress towards goals / Updated goals:  1. Patient will improve FOTO score by 11 points in order to demonstrate a significant improvement in function.   2. Patient will report \"little to no difficulty\" when asked on the FOTO questionnaire, \"How much difficulty do you have performing your usual work, house work or school activities? \" to demonstrate improved ease of performing household chores. 3. Patient will be able to perform floor<>stand transfer with just 1 UE to demonstrate improved LE strength for ease of transfers at home.   4. Patient will improve B hip abduction strength to 4/5 in order to increase ease of ambulation.      PLAN  [x]  Upgrade activities as tolerated     [x]  Continue plan of care  []  Update interventions per flow sheet       []  Discharge due to:_  []  Other:_      Bradley Silva, DAREK 3/28/2022  3:07 PM    Future Appointments   Date Time Provider Heri Beal   3/28/2022 10:30 AM Alphonse Nunez, PTA [facility] [address]   3/30/2022 10:30 AM Alphonse Nunez, PTA [facility] [address]   4/4/2022 12:45 PM Alphonse Nunez, PTA [facility] [address]   4/7/2022  2:15 PM Alphonse Nunez, PTA [facility] [address]   4/12/2022 10:30 AM Jose Francisco Arcos PT [facility] [address]   4/15/2022 10:30 AM Alphonse Nunez, PTA [facility] [address]   4/18/2022 10:30 AM Alphonse Nunez, PTA [facility] [address]   4/22/2022  2:15 PM Alphonse Nunez, PTA [facility] [address]   7/25/2022 10:30 AM [facility] FAST TRACK NUR[facility]   8/1/2022 10:30 AM [facility] FAST TRACK NUR[facility]

## 2022-04-04 ENCOUNTER — HOSPITAL ENCOUNTER (OUTPATIENT)
Dept: PHYSICAL THERAPY | Age: 82
Discharge: HOME | End: 2022-04-04
Payer: MEDICARE

## 2022-04-04 PROCEDURE — 97140 MANUAL THERAPY 1/> REGIONS: CPT

## 2022-04-04 PROCEDURE — 97110 THERAPEUTIC EXERCISES: CPT

## 2022-04-04 NOTE — PROGRESS NOTES
PT DAILY TREATMENT NOTE     Patient Name: Jeanne Fishman  Date:2022  : 1940  [x]  Patient  Verified  Payor: Celena Douglass / Plan: [place] MEDICARE PART A & B / Product Type: Medicare /    In time: 12:45    Out time: 1:30  Total Treatment Time (min): 45  Visit #: 6 of 12    Medicare/BCBS Only   Total Timed Codes (min): 45  1:1 Treatment Time: 40       Treatment Area: Low back pain [M54.50]    SUBJECTIVE  Pain Level (0-10 scale): 3-4/10  Any medication changes, allergies to medications, adverse drug reactions, diagnosis change, or new procedure performed?: [x] No    [] Yes (see summary sheet for update)  Subjective functional status/changes:   [] No changes reported  Pt reports sharp pain along the right buttocks/hip joint like always. She states she wakes up with it. She didn't get a lot of time to stretch but has noticed increased ease of crossing her right leg over her left.        OBJECTIVE    35 min Therapeutic Exercise:  [x] See flow sheet :   Rationale: increase ROM, increase strength and improve coordination to improve the patients ability to ease with adls    10 min Manual therapy:  [x] See flow sheet : leg lengthening for left upslip; MET for left AI; shotgun technique; MET for left/left sacral torsion    Trial of \" lift to left shoe for tibial differences   Rationale: increase ROM, increase strength and improve coordination to improve the patients ability to ease with adls          With   [] TE   [] TA   [] neuro   [] other: Patient Education: [x] Review HEP    [] Progressed/Changed HEP based on:   [] positioning   [] body mechanics   [] transfers   [] heat/ice application    [] other:      Other Objective/Functional Measures:   BP: 126/74  HR: 70 bpm  Initiated progressive glute strengthening   Cues to reduce genu valgus with sit to stands; added BTB to improve form  Added TB IR/ER for deep hip strengthening; unable to perform with OTB so manual resist for ER and AROM for IR  Educated to keep lift in left shoe to assess effectiveness  Will return to lunges next session    Pain Level (0-10 scale) post treatment: 0/10    ASSESSMENT/Changes in Function: Pt continues to make slow, steady progress towards goals. She continues with pelvic obliquity due to deep hip weakness and a left LLD. She demonstrates genu valgus with sit to stands due to glute weakness. She needs continued strengthening for ease of floor<>stand transfers. Patient will continue to benefit from skilled PT services to modify and progress therapeutic interventions, address functional mobility deficits, address ROM deficits, address strength deficits, analyze and address soft tissue restrictions, analyze and cue movement patterns, analyze and modify body mechanics/ergonomics, assess and modify postural abnormalities and address imbalance/dizziness to attain remaining goals. [x]  See Plan of Care  [x]  See progress note/recertification  []  See Discharge Summary         Progress towards goals / Updated goals:  1. Patient will improve FOTO score by 11 points in order to demonstrate a significant improvement in function.   2. Patient will report \"little to no difficulty\" when asked on the FOTO questionnaire, \"How much difficulty do you have performing your usual work, house work or school activities? \" to demonstrate improved ease of performing household chores. 3. Patient will be able to perform floor<>stand transfer with just 1 UE to demonstrate improved LE strength for ease of transfers at home.   4. Patient will improve B hip abduction strength to 4/5 in order to increase ease of ambulation.  Continues to demonstrate genu valgus (4/4/22)    PLAN  [x]  Upgrade activities as tolerated     [x]  Continue plan of care  []  Update interventions per flow sheet       []  Discharge due to:_  []  Other:_      Alessandra Simons, DAREK 4/4/2022  3:07 PM    Future Appointments   Date Time Provider Heri Beal   4/4/2022 12:45 PM Jake Echols, PTA [facility]   4/7/2022  2:15 PM Jake Echols, PTA [facility]   4/12/2022 10:30 AM Nate Nunez, PT [facility]   4/15/2022 10:30 AM Jake Echols, PTA [facility]   4/18/2022 10:30 AM Jake Echols, PTA [facility]   4/22/2022  2:15 PM Jake Echols, PTA [facility]   7/25/2022 10:30 AM [facility] FAST TRACK NUR[facility]   8/1/2022 10:30 AM [facility] FAST TRACK NUR[facility]

## 2022-04-07 ENCOUNTER — APPOINTMENT (OUTPATIENT)
Dept: PHYSICAL THERAPY | Age: 82
End: 2022-04-07
Payer: MEDICARE

## 2022-04-12 ENCOUNTER — HOSPITAL ENCOUNTER (OUTPATIENT)
Dept: PHYSICAL THERAPY | Age: 82
Discharge: HOME | End: 2022-04-12
Payer: MEDICARE

## 2022-04-12 PROCEDURE — 97140 MANUAL THERAPY 1/> REGIONS: CPT

## 2022-04-12 PROCEDURE — 97110 THERAPEUTIC EXERCISES: CPT

## 2022-04-12 NOTE — PROGRESS NOTES
PT DAILY TREATMENT NOTE     Patient Name: Gerard Shin  Date:2022  : 1940  [x]  Patient  Verified  Payor: [place] MEDICARE / Plan: [place] MEDICARE PART A & B / Product Type: Medicare /    In [id number]:[id number]  Out time:1113  Total Treatment Time (min): 42  Visit #: 7 of 12    Medicare/BCBS Only   Total Timed Codes (min):  42 1:1 Treatment Time:  38       Treatment Area: Low back pain [M54.50]    SUBJECTIVE  Pain Level (0-10 scale): 3.5-4/10  Any medication changes, allergies to medications, adverse drug reactions, diagnosis change, or new procedure performed?: [x] No    [] Yes (see summary sheet for update)  Subjective functional status/changes:   [] No changes reported  Pt reports she continues to have LS pain and tenderness to touch in her Low back , right side. OBJECTIVE    34 min Therapeutic Exercise:  [] See flow sheet :   Rationale: increase ROM, increase strength and improve coordination to improve the patients ability to ease with functional adl's    8 min Manual Therapy:  Ball massage to LS  Paraspinals/right glute   The manual therapy interventions were performed at a separate and distinct time from the therapeutic activities interventions. Rationale: decrease pain, increase tissue extensibility and decrease trigger points to ease with activity tolerance. With   [] TE   [] TA   [] neuro   [] other: Patient Education: [x] Review HEP    [] Progressed/Changed HEP based on:   [] positioning   [] body mechanics   [] transfers   [] heat/ice application    [] other:      Other Objective/Functional Measures: DC=929/69; HR=74  -Pt was able to tolerate increased weight/ resistance for seated ex's well.   -sit to stand x 15 w/o UE's. Pain Level (0-10 scale) post treatment: 0/10    ASSESSMENT/Changes in Function: Pt making slow steady progress. She continues to have TTP to lower LS /right side that was relieved with ball rolls at end of session.    TC allowed for seated ex's today which pt tolerated well with increased weight/resistance. .    Patient will continue to benefit from skilled PT services to modify and progress therapeutic interventions, address functional mobility deficits, address ROM deficits, address strength deficits, analyze and address soft tissue restrictions, analyze and cue movement patterns, analyze and modify body mechanics/ergonomics, assess and modify postural abnormalities and address imbalance/dizziness to attain remaining goals. []  See Plan of Care  [x]  See progress note/recertification  []  See Discharge Summary         Progress towards goals / Updated goals:  1. Patient will improve FOTO score by 11 points in order to demonstrate a significant improvement in function.   2. Patient will report \"little to no difficulty\" when asked on the FOTO questionnaire, \"How much difficulty do you have performing your usual work, house work or school activities? \" to demonstrate improved ease of performing household chores. 3. Patient will be able to perform floor<>stand transfer with just 1 UE to demonstrate improved LE strength for ease of transfers at home.   4. Patient will improve B hip abduction strength to 4/5 in order to increase ease of ambulation.  Continues to demonstrate genu valgus (4/4/22)    PLAN  [x]  Upgrade activities as tolerated     [x]  Continue plan of care  []  Update interventions per flow sheet       []  Discharge due to:_  []  Other:_      Zenaida Cannon, PT 4/12/2022  2:19 PM    Future Appointments   Date Time Provider Heri Beal   4/15/2022 10:30 AM Haseeb Rosario PTA [facility]   4/18/2022 10:30 AM Haseeb Rosario PTA [facility]   4/22/2022  2:15 PM Haseeb Rosario PTA [facility]   7/25/2022 10:30 AM [facility] FAST TRACK NUR[facility]   8/1/2022 10:30 AM [facility] FAST TRACK NUR[facility]

## 2022-04-15 ENCOUNTER — HOSPITAL ENCOUNTER (OUTPATIENT)
Dept: PHYSICAL THERAPY | Age: 82
Discharge: HOME | End: 2022-04-15
Payer: MEDICARE

## 2022-04-15 PROCEDURE — 97530 THERAPEUTIC ACTIVITIES: CPT

## 2022-04-15 PROCEDURE — 97140 MANUAL THERAPY 1/> REGIONS: CPT

## 2022-04-15 PROCEDURE — 97110 THERAPEUTIC EXERCISES: CPT

## 2022-04-15 NOTE — PROGRESS NOTES
[facility] [place]  [address] [facility]  [phone] fax    Continued Plan of Care/ Re-certification for Physical Therapy Services    Patient name: Bela Pineda Start of Care: 2022   Referral source: Gabby Whitaker MD : 1940   Medical/Treatment Diagnosis: Low back pain [M54.50]  Payor: [place] MEDICARE / Plan: [place] MEDICARE PART A & B / Product Type: Medicare /  Onset Date: years ago with leg symptoms starting a year ago      Prior Hospitalization: see medical history Provider#: [id number]   Medications: Verified on Patient Summary List    Comorbidities:  Asthma, neck pain  Prior Level of Function: Ind with ambulation, Ind with ADLs  Visits from Start of Care: 22    Missed Visits: 1    The Plan of Care and following information is based on the patient's current status:  Goal: Patient will improve FOTO score by 11 points in order to demonstrate a significant improvement in function. Status at last note/certification: 46/808  Current Status: not met 42/100    Goal:Patient will report \"little to no difficulty\" when asked on the FOTO questionnaire, \"How much difficulty do you have performing your usual work, house work or school activities? \" to demonstrate improved ease of performing household chores. Status at last note/certification: extreme difficulty  Current Status: not met \"moderate difficulty\"    Goal: Patient will be able to perform floor<>stand transfer with just 1 UE to demonstrate improved LE strength for ease of transfers at home. Status at last note/certification: increased difficulty with B UE use  Current Status: met    Goal: Patient will improve B hip abduction strength to 4/5 in order to increase ease of ambulation.     Status at last note/certification: 4-/5  Current Status: met 4/5    Key functional changes: increased hip abduction strength, improving ease of floor<>stand transfers with 1 UE    Problems/ barriers to goal attainment: HEP compliance; caring for     Problem List: pain affecting function, decrease ROM, decrease strength, edema affecting function, impaired gait/ balance, decrease ADL/ functional abilitiies, decrease activity tolerance, decrease flexibility/ joint mobility and decrease transfer abilities    Treatment Plan: Therapeutic exercise, Therapeutic activities, Neuromuscular re-education, Physical agent/modality, Gait/balance training, Manual therapy, Patient education, Self Care training, Functional mobility training, Home safety training and Stair training     Patient Goal (s) has been updated and includes: \"decrease pain and increase strength\"     Goals for this certification period to be accomplished in 4 weeks:  1. Patient will improve FOTO score by 11 points in order to demonstrate a significant improvement in function. 2. Patient will report \"little to no difficulty\" when asked on the FOTO questionnaire, \"How much difficulty do you have performing your usual work, house work or school activities? \" to demonstrate improved ease of performing household chores. 3. Patient will report <2/10 pain in the right SI and low back region to improve ease of performing household chores and ADLs. 4. Patient will improve B hip IR/ER MMT to 4+/5 to demonstrate improve stability for ease of transfers and ambulation. Frequency / Duration: Patient to be seen 2 times per week for 4 weeks:    Assessment / Recommendations: Mrs. Christa Sainz is making slow, steady progress towards goals. She has improved ease of floor<>stand transfers requiring only 1 UE at home now. She has improved hip abduction strength to 4/5 but demonstrates decreased hip IR/ER MMT at 4-/5. She had a decline in FOTO score due to recent increase in right SI pain and low back pain causing some of her answers to digress from previously.  She is performing a few stretches at home but due to the health of her  she has not been able to complete further strengthening exercises at home. Skilled PT remains medically necessary to progress LE strength and stability to decrease pain and improve ease of transfers. Certification Period: 4/15/2022 to 5/14/2022    Lajuan Kussmaul, PTA 4/15/2022 8:35 AM    ________________________________________________________________________  I certify that the above Therapy Services are being furnished while the patient is under my care. I agree with the treatment plan and certify that this therapy is necessary. [] I have read the above and request that my patient continue as recommended.   [] I have read the above report and request that my patient continue therapy with the following changes/special instructions: _______________________________________  [] I have read the above report and request that my patient be discharged from therapy    Physician's Signature:____________Date:_________TIME:________     Antione Ortega MD  ** Signature, Date and Time must be completed for valid certification **    Please sign and return to [facility] [address]  [phone] fax

## 2022-04-15 NOTE — PROGRESS NOTES
PT DAILY TREATMENT NOTE     Patient Name: Grace Pugh  Date:4/15/2022  : 1940  [x]  Patient  Verified  Payor: Harry Mcfarlane / Plan: [place] MEDICARE PART A & B / Product Type: Medicare /    In time: 10:27  Out time: 11:15  Total Treatment Time (min): 48  Visit #: 8 of 12    Medicare/BCBS Only   Total Timed Codes (min):  48 1:1 Treatment Time:  48       Treatment Area: Low back pain [M54.50]    SUBJECTIVE  Pain Level (0-10 scale): 5/10  Any medication changes, allergies to medications, adverse drug reactions, diagnosis change, or new procedure performed?: [x] No    [] Yes (see summary sheet for update)  Subjective functional status/changes:   [] No changes reported  Pt reports ongoing right SI and low back pain. She states she always feels good for a few days after therapy. She wants to get her hips and legs stronger so she has less pain. She hasn't been able to get to the MD for an injection due to her 's chemo schedule. OBJECTIVE    30 min Therapeutic Exercise:  [x] See flow sheet :   Rationale: increase ROM, increase strength and improve coordination to improve the patients ability to ease with functional adls    15 min Therapeutic Activity:  [x] See flow sheet : goal reassessment; FOTO   Rationale: increase ROM, increase strength and improve coordination to improve the patients ability to ease with functional adls    3 min Manual Therapy: MET for left/left sacral torsion and MET for left l/s rotation    The manual therapy interventions were performed at a separate and distinct time from the therapeutic activities interventions. Rationale: decrease pain, increase tissue extensibility and decrease trigger points to ease with activity tolerance.            With   [] TE   [] TA   [] neuro   [] other: Patient Education: [x] Review HEP    [] Progressed/Changed HEP based on:   [] positioning   [] body mechanics   [] transfers   [] heat/ice application    [] other:      Other Objective/Functional Measures: FOTO: 42/100  Floor<>stand transfer: 1 UE assist  Hip abduction MMT: 4/5 bilaterally    Pain Level (0-10 scale) post treatment: 3/10    ASSESSMENT/Changes in Function: See Recertification. Patient will continue to benefit from skilled PT services to modify and progress therapeutic interventions, address functional mobility deficits, address ROM deficits, address strength deficits, analyze and address soft tissue restrictions, analyze and cue movement patterns, analyze and modify body mechanics/ergonomics, assess and modify postural abnormalities and address imbalance/dizziness to attain remaining goals. [x]  See Plan of Care  [x]  See progress note/recertification  []  See Discharge Summary         Progress towards goals / Updated goals:  1. Patient will improve FOTO score by 11 points in order to demonstrate a significant improvement in function.   2. Patient will report \"little to no difficulty\" when asked on the FOTO questionnaire, \"How much difficulty do you have performing your usual work, house work or school activities? \" to demonstrate improved ease of performing household chores. 3. Patient will be able to perform floor<>stand transfer with just 1 UE to demonstrate improved LE strength for ease of transfers at home.   4. Patient will improve B hip abduction strength to 4/5 in order to increase ease of ambulation.  Continues to demonstrate genu valgus (4/4/22)    PLAN  [x]  Upgrade activities as tolerated     [x]  Continue plan of care  []  Update interventions per flow sheet       []  Discharge due to:_  []  Other:_      Arcelia Aj, DAREK 4/15/2022  2:19 PM    Future Appointments   Date Time Provider Heri Beal   4/15/2022 10:30 AM Bard Grams, PTA [facility]   4/18/2022 10:30 AM Bard Grams, PTA [facility]   4/22/2022  2:15 PM Bard Grams, PTA [facility]   7/25/2022 10:30 AM [facility] FAST TRACK NUR[facility]   8/1/2022 10:30 AM [facility] TR[facility] N[facility]

## 2022-04-18 ENCOUNTER — HOSPITAL ENCOUNTER (OUTPATIENT)
Dept: PHYSICAL THERAPY | Age: 82
Discharge: HOME | End: 2022-04-18
Payer: MEDICARE

## 2022-04-18 PROCEDURE — 97110 THERAPEUTIC EXERCISES: CPT

## 2022-04-18 PROCEDURE — 97140 MANUAL THERAPY 1/> REGIONS: CPT

## 2022-04-18 NOTE — PROGRESS NOTES
PT DAILY TREATMENT NOTE     Patient Name: Viet West  Date:2022  : 1940  [x]  Patient  Verified  Payor: [place] MEDICARE / Plan: [place] MEDICARE PART A & B / Product Type: Medicare /    In time:10:30  Out time: 11:13  Total Treatment Time (min): 43  Visit #: 1 of 8    Medicare/BCBS Only   Total Timed Codes (min): 43 1:1 Treatment Time:  43       Treatment Area: Low back pain [M54.50]    SUBJECTIVE  Pain Level (0-10 scale): 5/10  Any medication changes, allergies to medications, adverse drug reactions, diagnosis change, or new procedure performed?: [x] No    [] Yes (see summary sheet for update)  Subjective functional status/changes:   [] No changes reported  Pt reports continued pain in butt. She states she has not taken her dieretic today because she wanted to avoid using the bathroom while in Pt. She hasn't been able to get to the MD for an injection due to her 's chemo schedule. OBJECTIVE    28 min Therapeutic Exercise:  [x] See flow sheet :   Rationale: increase ROM, increase strength and improve coordination to improve the patients ability to ease with functional adls      15 min Manual Therapy: MET for left/left sacral torsion and MET for left upslip, TPR to piriformis with tennis ball. The manual therapy interventions were performed at a separate and distinct time from the therapeutic activities interventions. Rationale: decrease pain, increase tissue extensibility and decrease trigger points to ease with activity tolerance.            With   [] TE   [] TA   [] neuro   [] other: Patient Education: [x] Review HEP    [] Progressed/Changed HEP based on:   [] positioning   [] body mechanics   [] transfers   [] heat/ice application    [] other:      Other Objective/Functional Measures:   Pt presented with moderate pitting edema on right ankle  Ice to right SI joint during table exercises to reduce inflammation  Performed some stretching and light strengthening due to increased pain and irritation of the right piriformis    Pain Level (0-10 scale) post treatment: 0/10    ASSESSMENT/Changes in Function:     Visit primarily focused on light hip strengthening and stretches to prevent further piriformis irritation. Pt is progressing slow but steady with 's health a limitation to HEP compliance. Pt would benefit from continued hip strengthening exercises to increase ease of ambulation. Patient will continue to benefit from skilled PT services to modify and progress therapeutic interventions, address functional mobility deficits, address ROM deficits, address strength deficits, analyze and address soft tissue restrictions, analyze and cue movement patterns, analyze and modify body mechanics/ergonomics, assess and modify postural abnormalities and address imbalance/dizziness to attain remaining goals. [x]  See Plan of Care  [x]  See progress note/recertification  []  See Discharge Summary         Progress towards goals / Updated goals:  1. Patient will improve FOTO score by 11 points in order to demonstrate a significant improvement in function. 2. Patient will report \"little to no difficulty\" when asked on the FOTO questionnaire, \"How much difficulty do you have performing your usual work, house work or school activities? \" to demonstrate improved ease of performing household chores. 3. Patient will be able to perform floor<>stand transfer with just 1 UE to demonstrate improved LE strength for ease of transfers at home. 4. Patient will improve B hip abduction strength to 4/5 in order to increase ease of ambulation. Continues to demonstrate genu valgus (4/4/22)    PLAN  [x]  Upgrade activities as tolerated     [x]  Continue plan of care  []  Update interventions per flow sheet       []  Discharge due to:_  []  Other:_      LUIS Davis 4/18/2022  2:19 PM    I was present during the entire treatment, directing and participating in the treatment.    Aba Tsai LPTA     Future Appointments   Date Time Provider Heri Lian   4/18/2022 10:30 AM Velna Cooks, PTA [facility]   4/22/2022  2:15 PM Velna Cooks, PTA [facility]   4/27/2022 10:30 AM Velna Cooks, PTA [facility]   4/29/2022  9:00 AM Velna Cooks, PTA [facility]   5/2/2022 10:30 AM Velna Cooks, PTA [facility]   5/3/2022 12:00 PM Elex Tanja, PT [facility]   5/9/2022 10:30 AM Velna Cooks, PTA [facility]   5/11/2022 10:30 AM Velna Cooks, PTA [facility]   5/16/2022 10:30 AM Elex Kress, PT [facility]   5/20/2022 10:30 AM Velna Cooks, PTA [facility]   5/23/2022 10:30 AM Velna Cooks, PTA [facility]   5/27/2022 10:30 AM Elex Tanja, PT [facility]   7/25/2022 10:30 AM [facility]   8/1/2022 10:30 AM [facility]

## 2022-04-22 ENCOUNTER — HOSPITAL ENCOUNTER (OUTPATIENT)
Dept: PHYSICAL THERAPY | Age: 82
Discharge: HOME | End: 2022-04-22
Payer: MEDICARE

## 2022-04-22 PROCEDURE — 97110 THERAPEUTIC EXERCISES: CPT

## 2022-04-22 NOTE — PROGRESS NOTES
PT DAILY TREATMENT NOTE     Patient Name: Louann Madison  Date:2022  : 1940  [x]  Patient  Verified  Payor: Arti Sauceda / Plan: [place] MEDICARE PART A & B / Product Type: Medicare /    In time: 2:15 Out time:3:03  Total Treatment Time (min): 48  Visit #:  2 of 8    Medicare/BCBS Only   Total Timed Codes (min):  43 1:1 Treatment Time:  43       Treatment Area: Low back pain [M54.50]    SUBJECTIVE  Pain Level (0-10 scale): 4/10  Any medication changes, allergies to medications, adverse drug reactions, diagnosis change, or new procedure performed?: [x] No    [] Yes (see summary sheet for update)  Subjective functional status/changes:   [] No changes reported  Pt reports pain in her low back and either side of her hips. She states when she is gardening she doesn't always have something to hold on to. She has not had a chance to work on HEP since last visit due to doctor appointments for her . OBJECTIVE    43 min Therapeutic Exercise:  [x]  See flow sheet :   Rationale: increase ROM, increase strength, improve coordination, improve balance and increase proprioception  to improve the patients ability to complete ADLs without pain. With   [] TE   [] TA   [] neuro   [] other: Patient Education: [x] Review HEP    [] Progressed/Changed HEP based on:   [] positioning   [] body mechanics   [] transfers   [] heat/ice application    [] other:      Other Objective/Functional Measures:      Challenged with lunges in // bars to prepare for floor to stand transfers  Corrected left AI with MET, shot gun technique, and right up slip with leg lengthening technique  Challenged with SL seated IR with GTB  Progress seated hip ABD to BTB next visit    Pain Level (0-10 scale) post treatment: 0/10    ASSESSMENT/Changes in Function:    Pt is making slow but steady progress in therapy.   Today's session primarily focused on hip strengthening including addition of lunges to assist her with ease of getting up from the floor. She still struggles with pain in her low back/SI area due to posture and muscle weakness. She would benefit from continued progressions of hip, LE, and core exercises to improve posture and decrease LBP. Patient will continue to benefit from skilled PT services to modify and progress therapeutic interventions, address functional mobility deficits, address ROM deficits, address strength deficits, analyze and address soft tissue restrictions, analyze and cue movement patterns, analyze and modify body mechanics/ergonomics, assess and modify postural abnormalities, address imbalance/dizziness and instruct in home and community integration to attain remaining goals. [x]  See Plan of Care  [x]  See progress note/recertification  []  See Discharge Summary         Progress towards goals / Updated goals:  1. Patient will improve FOTO score by 11 points in order to demonstrate a significant improvement in function. 2. Patient will report \"little to no difficulty\" when asked on the FOTO questionnaire, \"How much difficulty do you have performing your usual work, house work or school activities? \" to demonstrate improved ease of performing household chores. 3. Patient will be able to perform floor<>stand transfer with just 1 UE to demonstrate improved LE strength for ease of transfers at home. Progressing lunges 2 UE in parallel bars (4/22/22)  4. Patient will improve B hip abduction strength to 4/5 in order to increase ease of ambulation. Continues to demonstrate genu valgus (4/4/22)    PLAN  [x]  Upgrade activities as tolerated     [x]  Continue plan of care  []  Update interventions per flow sheet       []  Discharge due to:_  []  Other:_      Karyle Sing, SPTA 4/22/2022  8:52 AM    I was present during the entire treatment, directing and participating in the treatment.    ELMIRA Menard     Future Appointments   Date Time Provider Heri Beal   4/22/2022  2:15 PM Samantha Dewitt, PTA [facility]   4/27/2022 10:30 AM Samantha Dewitt, PTA [facility]   4/29/2022  9:00 AM Samantha Dewitt, PTA [facility]   5/2/2022 10:30 AM Samantha Dewitt, PTA [facility]   5/3/2022 12:00 PM Germaine Aleman, PT [facility]   5/9/2022 10:30 AM Samantha Dewitt, PTA [facility]   5/11/2022 10:30 AM Samantha Valeriodwell, PTA [facility]   5/16/2022 10:30 AM Germaine Aleman, PT [facility]   5/20/2022 10:30 AM Samantha Salinaswell, PTA [facility]   5/23/2022 10:30 AM Samantha Salinaswell, PTA [facility]   5/27/2022 10:30 AM Germaine Aleman, PT [facility]   7/25/2022 10:30 AM [facility]   8/1/2022 10:30 AM [facility]

## 2022-04-27 ENCOUNTER — HOSPITAL ENCOUNTER (OUTPATIENT)
Dept: PHYSICAL THERAPY | Age: 82
Discharge: HOME | End: 2022-04-27
Payer: MEDICARE

## 2022-04-27 PROCEDURE — 97110 THERAPEUTIC EXERCISES: CPT

## 2022-04-27 NOTE — PROGRESS NOTES
PT DAILY TREATMENT NOTE     Patient Name: Yuan Valentine  Date:2022  : 1940  [x]  Patient  Verified  Payor: [place] MEDICARE / Plan: [place] MEDICARE PART A & B / Product Type: Medicare /    In time: 10:33 Out time:11:30  Total Treatment Time (min): 62  Visit #:  3 of 8    Medicare/BCBS Only   Total Timed Codes (min):  42 1:1 Treatment Time:  42       Treatment Area: Low back pain [M54.50]    SUBJECTIVE  Pain Level (0-10 scale): 2/10  Any medication changes, allergies to medications, adverse drug reactions, diagnosis change, or new procedure performed?: [x] No    [] Yes (see summary sheet for update)  Subjective functional status/changes:   [] No changes reported  Pt states her allergies and COPD is bothering her, causing her to cough more. OBJECTIVE  Modality rationale: decrease pain to improve the patients ability to ease with adls   Min Type Additional Details       []? ? Estim:  []?? Unatt       []? ?IFC  []? ?Premod                        []? ? Other:  []??w/ice   []? ?w/heat  Position:  Location:       []? ? Estim: []??Att    []? ?TENS instruct  []? ?NMES                    []? ? Other:  []??w/US   []? ?w/ice   []? ?w/heat  Position:  Location:       []? ?  Traction: []?? Cervical       []? ?Lumbar                       []? ? Prone          []? ?Supine                       []? ?Intermittent   []? ? Continuous Lbs:  []?? before manual  []? ? after manual       []? ?  Ultrasound: []??Continuous   []? ? Pulsed                           []? ?1MHz   []? ? 3MHz W/cm2:  Location:       []? ?  Iontophoresis with dexamethasone         Location: []?? Take home patch   []? ? In clinic      15 []? ?  Ice     [x]? ?  heat  []? ?  Ice massage  []? ?  Laser   []? ?  Anodyne Position: short sitting  Location: length of spine       []? ?  Laser with stim  []? ?  Other:  Position:  Location:       []? ?  Vasopneumatic Device    []? ?  Right     []? ?  Left  Pre-treatment girth:  Post-treatment girth:  Measured at (location):  Pressure:       []? ? lo []? ? med []?? hi   Temperature: []?? lo []? ? med []?? hi     [x]? ? Skin assessment post-treatment:  [x]? ? intact []? ?redness- no adverse reaction    []? ?redness  adverse        42 min Therapeutic Exercise:  [x]  See flow sheet :   Rationale: increase ROM, increase strength, improve coordination, improve balance and increase proprioception  to improve the patients ability to complete ADLs without pain. With   [x] TE   [] TA   [] neuro   [] other: Patient Education: [] Review HEP    [] Progressed/Changed HEP based on:   [] positioning   [x] body mechanics   [] transfers   [] heat/ice application    [] other:      Other Objective/Functional Measures:      Corrected left AI with MET, shot gun technique, and right up slip with leg lengthening technique  Challenged with stationary lunges in // bars with 1 UE    Pain Level (0-10 scale) post treatment: 1/10    ASSESSMENT/Changes in Function:    Pt is making slow but steady progress in therapy. Today's session primarily focused on hip strengthening exercises to assist in kneeling down to the floor for ADLs. She still struggles with pain in her low back/SI area due to posture and muscle weakness. She would benefit from continued progressions of hip, LE, and core exercises to improve posture and decrease LBP. Patient will continue to benefit from skilled PT services to modify and progress therapeutic interventions, address functional mobility deficits, address ROM deficits, address strength deficits, analyze and address soft tissue restrictions, analyze and cue movement patterns, analyze and modify body mechanics/ergonomics, assess and modify postural abnormalities, address imbalance/dizziness and instruct in home and community integration to attain remaining goals. [x]  See Plan of Care  [x]  See progress note/recertification  []  See Discharge Summary         Progress towards goals / Updated goals:  1.  Patient will improve FOTO score by 11 points in order to demonstrate a significant improvement in function. 2. Patient will report \"little to no difficulty\" when asked on the FOTO questionnaire, \"How much difficulty do you have performing your usual work, house work or school activities? \" to demonstrate improved ease of performing household chores. 3. Patient will be able to perform floor<>stand transfer with just 1 UE to demonstrate improved LE strength for ease of transfers at home. MET (4/27/22)  4. Patient will improve B hip abduction strength to 4/5 in order to increase ease of ambulation. Continues to demonstrate genu valgus (4/4/22)    PLAN  [x]  Upgrade activities as tolerated     [x]  Continue plan of care  []  Update interventions per flow sheet       []  Discharge due to:_  [x]  Other:_  Try leg press next visit    LUIS Moreira 4/27/2022  8:52 AM    I was present during the entire treatment, directing and participating in the treatment.    ELMIRA Berger     Future Appointments   Date Time Provider Heri Beal   4/29/2022  9:00 AM Thom Bhatti, PTA [facility]   5/2/2022 10:30 AM Thom Bhatti, PTA [facility]   5/3/2022 12:00 PM Lisbet Ceron, PT [facility]   5/9/2022 10:30 AM Thom Bhatti, PTA [facility]   5/11/2022  3:00 PM Thom Bhatti, PTA [facility]   5/16/2022 10:30 AM Lisbet Ceron, PT [facility]   5/20/2022 10:30 AM Thom Bhatti, PTA [facility]   5/23/2022 10:30 AM Thom Bhatti, PTA [facility]   5/27/2022 10:30 AM Lisbet Ceron, PT [facility]   7/25/2022 10:30 AM [facility]   8/1/2022 10:30 AM [facility]

## 2022-04-29 ENCOUNTER — APPOINTMENT (OUTPATIENT)
Dept: PHYSICAL THERAPY | Age: 82
End: 2022-04-29
Payer: MEDICARE

## 2022-05-02 ENCOUNTER — HOSPITAL ENCOUNTER (OUTPATIENT)
Dept: PHYSICAL THERAPY | Age: 82
Discharge: HOME | End: 2022-05-02
Payer: MEDICARE

## 2022-05-02 PROCEDURE — 97140 MANUAL THERAPY 1/> REGIONS: CPT

## 2022-05-02 PROCEDURE — 97110 THERAPEUTIC EXERCISES: CPT

## 2022-05-02 NOTE — PROGRESS NOTES
PT DAILY TREATMENT NOTE     Patient Name: Abelino Trujillo  Date:2022  : 1940  [x]  Patient  Verified  Payor: VA MEDICARE / Plan: [place] MEDICARE PART A & B / Product Type: Medicare /    In time: 10:35 Out time:11:24  Total Treatment Time (min): 49  Visit #:  4 of 8    Medicare/BCBS Only   Total Timed Codes (min):  49 1:1 Treatment Time:  49       Treatment Area: Low back pain [M54.50]    SUBJECTIVE  Pain Level (0-10 scale): 2/10  Any medication changes, allergies to medications, adverse drug reactions, diagnosis change, or new procedure performed?: [x] No    [] Yes (see summary sheet for update)  Subjective functional status/changes:   [] No changes reported  Pt states she was in the hospital for 4 days due to SOB and swelling in ankles. She has increased general soreness from bed rest. She was not treated by PT in the hospital and she states she was given verbal clearance to come back to PT. Will need to get updated medicines from pt next visit; she was going to call the MD to clarify what she should and shouldn't be taking. OBJECTIVE       39 min Therapeutic Exercise:  [x]  See flow sheet :   Rationale: increase ROM, increase strength, improve coordination, improve balance and increase proprioception  to improve the patients ability to complete ADLs without pain. 10 min Manual Therapy:  [x]  See flow sheet : MET for left AI, shot gun technique corrected left up slip with leg lengthening technique, MET for left/left sacral torsion   Rationale: increase ROM, increase strength, improve coordination, improve balance and increase proprioception  to improve the patients ability to complete ADLs without pain. With   [x] TE   [] TA   [] neuro   [] other: Patient Education: [] Review HEP    [] Progressed/Changed HEP based on:   [] positioning   [x] body mechanics   [] transfers   [] heat/ice application    [] other:      Other Objective/Functional Measures:     Added static standing hip ABD with BTB; cues to maintain squat position throughout length of exercise  added Hip ADD ball squeeze  Increased weight to 7# for LAQ, pt stated it wasn't easy but wasn't hard  Noticeably decreased B LE swelling    Pain Level (0-10 scale) post treatment: 0/10    ASSESSMENT/Changes in Function:    Pt is progressing slow but steady in therapy. She was able to tolerate addition of new exercises and increased weight. She continues to demonstrated decreased hip strength and low back tightness. She would benefit from continued hip strengthening exercises and LB stretches to improve posture and decrease pain to allow her to complete ADLs with greater ease. Patient will continue to benefit from skilled PT services to modify and progress therapeutic interventions, address functional mobility deficits, address ROM deficits, address strength deficits, analyze and address soft tissue restrictions, analyze and cue movement patterns, analyze and modify body mechanics/ergonomics, assess and modify postural abnormalities, address imbalance/dizziness and instruct in home and community integration to attain remaining goals. [x]  See Plan of Care  [x]  See progress note/recertification  []  See Discharge Summary         Progress towards goals / Updated goals:  1. Patient will improve FOTO score by 11 points in order to demonstrate a significant improvement in function. 2. Patient will report \"little to no difficulty\" when asked on the FOTO questionnaire, \"How much difficulty do you have performing your usual work, house work or school activities? \" to demonstrate improved ease of performing household chores. 3. Patient will be able to perform floor<>stand transfer with just 1 UE to demonstrate improved LE strength for ease of transfers at home. MET (4/27/22)  4. Patient will improve B hip abduction strength to 4/5 in order to increase ease of ambulation.   Continues to demonstrate genu valgus (4/4/22)    PLAN  [x]  Upgrade activities as tolerated     [x]  Continue plan of care  []  Update interventions per flow sheet       []  Discharge due to:_  [x]  Other:_  Try leg press next visit    Toro Hines, [address] [place] [address] 5/2/2022  8:52 AM    I was present during the entire treatment, directing and participating in the treatment.    ELMIRA Casillas     Future Appointments   Date Time Provider Heri Beal   5/2/2022 10:30 AM West Chavez, PTA [facility]   5/3/2022 12:00 PM Rhona Chavez, PT [facility]   5/9/2022 10:30 AM West Chavez, PTA [facility]   5/11/2022  3:00 PM West Chavez, PTA [facility]   5/16/2022 10:30 AM Rhona Chavez, PT [facility]   5/20/2022 10:30 AM West Chavez, PTA [facility]   5/23/2022 10:30 AM West Chavez, PTA [facility]   5/27/2022 10:30 AM Rhona Chavez, PT [facility]   7/25/2022 10:30 AM [facility]   8/1/2022 10:30 AM [facility]

## 2022-05-03 ENCOUNTER — HOSPITAL ENCOUNTER (OUTPATIENT)
Dept: PHYSICAL THERAPY | Age: 82
Discharge: HOME | End: 2022-05-03
Payer: MEDICARE

## 2022-05-03 PROCEDURE — 97110 THERAPEUTIC EXERCISES: CPT
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
## 2022-05-09 ENCOUNTER — HOSPITAL ENCOUNTER (OUTPATIENT)
Dept: PHYSICAL THERAPY | Age: 82
Discharge: HOME | End: 2022-05-09
Payer: MEDICARE

## 2022-05-09 PROCEDURE — 97110 THERAPEUTIC EXERCISES: CPT
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
## 2022-05-11 ENCOUNTER — HOSPITAL ENCOUNTER (OUTPATIENT)
Dept: PHYSICAL THERAPY | Age: 82
Discharge: HOME | End: 2022-05-11
Payer: MEDICARE

## 2022-05-11 PROCEDURE — 97110 THERAPEUTIC EXERCISES: CPT

## 2022-05-11 PROCEDURE — 97530 THERAPEUTIC ACTIVITIES: CPT

## 2022-05-11 NOTE — PROGRESS NOTES
PT DAILY TREATMENT NOTE     Patient Name: Tierra Isabel  Date:2022  : 1940  [x]  Patient  Verified  Payor: Zora Ill / Plan: VA MEDICARE PART A & B / Product Type: Medicare /    In time: 3:10 Out time: 3:45  Total Treatment Time (min): 35  Visit #: 7 of 8    Medicare/BCBS Only   Total Timed Codes (min): 35  1:1 Treatment Time: 30       Treatment Area: Low back pain [M54.50]    SUBJECTIVE  Pain Level (0-10 scale): 2/10  Any medication changes, allergies to medications, adverse drug reactions, diagnosis change, or new procedure performed?: [x] No    [] Yes (see summary sheet for update)  Subjective functional status/changes:   [] No changes reported  Pt reports morning pain  Averages 2/10 and at night pain at worst is 6/10 always on the right side of her low back/SI region. She is okay with finishing up remaining scheduled appts then D/C. She feels much improvement in her leg strength. OBJECTIVE    20 min Therapeutic Exercise:  [x] See flow sheet :   Rationale: to improve the patients ability to allow ease with household chores    15 min Therapeutic Activity:  [x] See flow sheet : goal reassessment   Rationale: to improve the patients ability to allow ease with household chores          With   [] TE   [] TA   [] neuro   [] other: Patient Education: [x] Review HEP    [] Progressed/Changed HEP based on:   [] positioning   [] body mechanics   [] transfers   [] heat/ice application    [] other:      Other Objective/Functional Measures:   BP: 125/70   HR: 66 bpm  FOTO: 57/100  Hip IR MMT: left 4/5 right 4/5  Hip ER MMT: left 4/5 right 4/5  Pain average performing household chores: 2/10 morning; 6/10 night    Pain Level (0-10 scale) post treatment: 0/10    ASSESSMENT/Changes in Function: See Recertification.     Patient will continue to benefit from skilled PT services to modify and progress therapeutic interventions, address functional mobility deficits, address ROM deficits, address strength deficits, analyze and address soft tissue restrictions, analyze and cue movement patterns, analyze and modify body mechanics/ergonomics, assess and modify postural abnormalities, address imbalance/dizziness and instruct in home and community integration to attain remaining goals. [x]  See Plan of Care  [x]  See progress note/recertification  []  See Discharge Summary         Goals for this certification period to be accomplished in 4 weeks:  1. Patient will improve FOTO score by 11 points in order to demonstrate a significant improvement in function. Progressing 57/100  2. Patient will report \"little to no difficulty\" when asked on the FOTO questionnaire, \"How much difficulty do you have performing your usual work, house work or school activities? \" to demonstrate improved ease of performing household chores. Met little difficulty  3. Patient will report <2/10 pain in the right SI and low back region to improve ease of performing household chores and ADLs. Progressing 2/10 morning 6/10 by evening  4.  Patient will improve B hip IR/ER MMT to 4+/5 to demonstrate improve stability for ease of transfers and ambulation. Progressing 4/5    PLAN  [x]  Upgrade activities as tolerated     [x]  Continue plan of care  []  Update interventions per flow sheet       []  Discharge due to:_  []  Other:_      Devan Hoff, DAREK 5/11/2022  10:59 AM      Future Appointments   Date Time Provider Heri Beal   5/11/2022  3:00 PM Boris Lizarraga, PTA [facility]   5/16/2022 10:30 AM Drew Frames, PT [facility]   5/20/2022 10:30 AM Boris Lizarraga, PTA [facility]   5/23/2022 10:30 AM Boris Lizarraga, PTA [facility]   5/27/2022 10:30 AM Drew Frames, PT [facility]   7/25/2022 10:30 AM [facility]ACK NUR[facility]   8/1/2022 10:30 AM [facility] FAST TRACK NUR[facility]

## 2022-05-11 NOTE — PROGRESS NOTES
[facility]  [facility]  [place]  [address]  [phone] fax    Continued Plan of Care/ Re-certification for Physical Therapy Services    Patient name: Tracey Hanson Start of Care: 2022   Referral source: Oumar Rivera MD : 1940   Medical/Treatment Diagnosis: Low back pain [M54.50]  Payor: Jacki Rogers / Plan: VA MEDICARE PART A & B / Product Type: Medicare /  Onset Date: years ago with leg symptoms starting a year ago      Prior Hospitalization: see medical history Provider#: [id number]   Medications: Verified on Patient Summary List    Comorbidities:  Asthma, neck pain  Prior Level of Function: Ind with ambulation, Ind with ADLs  Visits from Start of Care: 28    Missed Visits: 1    The Plan of Care and following information is based on the patient's current status:  Goal: Patient will improve FOTO score by 11 points in order to demonstrate a significant improvement in function.   Status at last note/certification: [id number]  Current Status: not met 57/100    Goal: Patient will report \"little to no difficulty\" when asked on the FOTO questionnaire, \"How much difficulty do you have performing your usual work, house work or school activities? \" to demonstrate improved ease of performing household chores. Status at last note/certification: \"A little bit of difficulty\"  Current Status: met \"A little bit of difficulty\"    Goal:Patient will report <2/10 pain in the right SI and low back region to improve ease of performing household chores and ADLs. Status at last note/certification: 2-  Current Status: not met 10 morning; 6/10 evening    Goal: Patient will improve B hip IR/ER MMT to 4+/5 to demonstrate improve stability for ease of transfers and ambulation.   Status at last note/certification: 4-/5 B  Current Status: not met 4/5 B    Key functional changes: decreased pain; able to perform floor<>stand transfer with only 1 UE assist; increased LE strength; increased ease of sit to stand transfers     Problems/ barriers to goal attainment: Caring for  undergoing chemo treatments    Problem List: pain affecting function, decrease ROM, decrease strength, edema affecting function, impaired gait/ balance, decrease ADL/ functional abilitiies, decrease activity tolerance, decrease flexibility/ joint mobility and decrease transfer abilities    Treatment Plan: Therapeutic exercise, Therapeutic activities, Neuromuscular re-education, Physical agent/modality, Gait/balance training, Manual therapy, Patient education, Self Care training, Functional mobility training, Home safety training and Stair training     Patient Goal (s) has been updated and includes: \"get my legs stronger and my pain down\"    Goals for this certification period to be accomplished in 4 weeks:  1. Patient will improve FOTO score by 11 points in order to demonstrate a significant improvement in function.   2. Patient will report \" no difficulty\" when asked on the FOTO questionnaire, \"How much difficulty do you have performing your usual work, house work or school activities? \" to demonstrate improved ease of performing household chores. 3. Patient will be independent with a final comprehensive HEP to maintain LE strength and decreased pain for ease of completing household chores upon D/C from therapy. Frequency / Duration: Patient to be seen 2 times per week for 4 weeks:    Assessment / Recommendations: Mrs. Syed Sanchez is making steady progress towards updated goals in therapy. She continues to struggle with right SI pain due to pelvic obliquity and decreased glute strength to reduce sacral torsion that ranges from 2/10 in the morning to 6/10 in the evening after completing daily activities and household chores.  Her overall LE strength has improved to at least 4/5 specifically hip IR/ER and she demonstrates improved ease of sit to stand transfers and floor<>stand transfers requiring decreased UE assistance. Her FOTO score improved 3 points indicating overall some improvement in functional mobility. She is not compliant with an HEP due to busy schedule of doctor's appts for her  undergoing chemo and taking care of her household. Skilled PT remains medically necessary to complete remaining scheduled appts to develop a manageable home program within time restrictions in order to maintain strength gains and promote further pain reduction with ADLs and household chores. Certification Period: 5/12/22 to 6/10/22    Heather English, PTA 5/11/2022 7:58 AM    ________________________________________________________________________  I certify that the above Therapy Services are being furnished while the patient is under my care. I agree with the treatment plan and certify that this therapy is necessary. [] I have read the above and request that my patient continue as recommended.   [] I have read the above report and request that my patient continue therapy with the following changes/special instructions: _______________________________________  [] I have read the above report and request that my patient be discharged from therapy    Physician's Signature:____________Date:_________TIME:________     Cassandra Koroma MD  ** Signature, Date and Time must be completed for valid certification **    Please sign and return to [facility]  [address]  [phone] fax

## 2022-05-16 ENCOUNTER — HOSPITAL ENCOUNTER (OUTPATIENT)
Dept: PHYSICAL THERAPY | Age: 82
End: 2022-05-16
Payer: MEDICARE

## 2022-05-20 ENCOUNTER — HOSPITAL ENCOUNTER (OUTPATIENT)
Dept: PHYSICAL THERAPY | Age: 82
Discharge: HOME | End: 2022-05-20
Payer: MEDICARE

## 2022-05-20 PROCEDURE — 97110 THERAPEUTIC EXERCISES: CPT

## 2022-05-20 NOTE — PROGRESS NOTES
PT DAILY TREATMENT NOTE     Patient Name: Florecita Roberts  Date:2022  : 1940  [x]  Patient  Verified  Payor: VA MEDICARE / Plan: VA MEDICARE PART A & B / Product Type: Medicare /    In time: 10:30  Out time: 11:15  Total Treatment Time (min): 45  Visit #: 1 of 8    Medicare/BCBS Only   Total Timed Codes (min): 45 1:1 Treatment Time: 30       Treatment Area: Low back pain [M54.50]    SUBJECTIVE  Pain Level (0-10 scale): 2/10  Any medication changes, allergies to medications, adverse drug reactions, diagnosis change, or new procedure performed?: [x] No    [] Yes (see summary sheet for update)  Subjective functional status/changes:   [] No changes reported  Pt reports she had a flare up of pain in the right SI region over the weekend but it calmed down with rest. She states less pain today and easier time getting up from the floor. OBJECTIVE    45 min Therapeutic Exercise:  [x] See flow sheet :   Rationale: to improve the patients ability to allow ease with household chores            With   [] TE   [] TA   [] neuro   [] other: Patient Education: [x] Review HEP    [] Progressed/Changed HEP based on:   [] positioning   [] body mechanics   [] transfers   [] heat/ice application    [] other:      Other Objective/Functional Measures:   BP: 123/71  Pt performed self MET for left/left sacral torsion and had pain reduction; will need to review for independence in upcoming sessions  Continues with increased calf tightness and ambulates with crouched gait  Educated on runner lunge stretch    Pain Level (0-10 scale) post treatment: 0/10    ASSESSMENT/Changes in Function: Pt progressing well towards final goals in therapy. She has improving ease of floor<>stand transfers but still demonstrates right SI pain with prolonged activities. She demonstrates B glute atrophy and decreased strength of hip IR/ER.  Will work towards independence with self correction and encourage stretching daily at home for pain reduction with ADLs and household chores. Patient will continue to benefit from skilled PT services to modify and progress therapeutic interventions, address functional mobility deficits, address ROM deficits, address strength deficits, analyze and address soft tissue restrictions, analyze and cue movement patterns, analyze and modify body mechanics/ergonomics, assess and modify postural abnormalities, address imbalance/dizziness and instruct in home and community integration to attain remaining goals. [x]  See Plan of Care  [x]  See progress note/recertification  []  See Discharge Summary         Goals for this certification period to be accomplished in 4 weeks:  1. Patient will improve FOTO score by 11 points in order to demonstrate a significant improvement in function.   2. Patient will report \" no difficulty\" when asked on the FOTO questionnaire, \"How much difficulty do you have performing your usual work, house work or school activities? \" to demonstrate improved ease of performing household chores. 3. Patient will be independent with a final comprehensive HEP to maintain LE strength and decreased pain for ease of completing household chores upon D/C from therapy.     PLAN  [x]  Upgrade activities as tolerated     [x]  Continue plan of care  []  Update interventions per flow sheet       []  Discharge due to:_  []  Other:_      Donte Johnson PTA 5/20/2022  10:59 AM      Future Appointments   Date Time Provider Heri Beal   5/20/2022 10:30 AM Emeterio Segundo PTA [facility]   5/23/2022 10:30 AM Emeterio Segundo PTA [facility]   5/27/2022 10:30 AM Janna Berrios PT [facility]   7/25/2022 10:30 AM [facility]UR[facility]   8/1/2022 10:30 AM [facility]

## 2022-05-23 ENCOUNTER — HOSPITAL ENCOUNTER (OUTPATIENT)
Dept: PHYSICAL THERAPY | Age: 82
Discharge: HOME | End: 2022-05-23
Payer: MEDICARE

## 2022-05-23 PROCEDURE — 97110 THERAPEUTIC EXERCISES: CPT

## 2022-05-23 NOTE — PROGRESS NOTES
PT DAILY TREATMENT NOTE     Patient Name: Carri Bernard  Date:2022  : 1940  [x]  Patient  Verified  Payor: [place] MEDICARE / Plan: [place] MEDICARE PART A & B / Product Type: Medicare /    In time: 10:36   Out time: 11:15  Total Treatment Time (min): 39   Visit #: 2 of 8    Medicare/BCBS Only   Total Timed Codes (min): 39 1:1 Treatment Time: 39       Treatment Area: Low back pain [M54.50]    SUBJECTIVE  Pain Level (0-10 scale): 2/10    Any medication changes, allergies to medications, adverse drug reactions, diagnosis change, or new procedure performed?: [x] No    [] Yes (see summary sheet for update)  Subjective functional status/changes:   [] No changes reported  Pt reports no pain in her SI region today. She states she is having some difficulty breathing today but didn't take her water pill yet. She notes losing weight but still staying swollen. OBJECTIVE    39 min Therapeutic Exercise:  [x] See flow sheet :   Rationale: to improve the patients ability to allow ease with household chores            With   [] TE   [] TA   [] neuro   [] other: Patient Education: [x] Review HEP    [] Progressed/Changed HEP based on:   [] positioning   [] body mechanics   [] transfers   [] heat/ice application    [] other:      Other Objective/Functional Measures:   Ambulates with crouched gait and shuffling feet  Added TKE with TB to work on strengthening  Continues to feel good stretch with incline and hamstring stretching  Still limited with right piriformis tightness  Educated on self MET for left/left sacral torsion  Able to perform sit to stands with TB around thighs to reduce valgus  Decreased UE use for lunges    Pain Level (0-10 scale) post treatment: 0/10    ASSESSMENT/Changes in Function: Pt progressing well towards final goals with decreased SI pain. She is learning self correction of hip IR/ER to reduce sacral torsion for better pain control.  She continues to have decreased glute strength but also doesn't perform exercises at home. Patient will continue to benefit from skilled PT services to modify and progress therapeutic interventions, address functional mobility deficits, address ROM deficits, address strength deficits, analyze and address soft tissue restrictions, analyze and cue movement patterns, analyze and modify body mechanics/ergonomics, assess and modify postural abnormalities, address imbalance/dizziness and instruct in home and community integration to attain remaining goals. [x]  See Plan of Care  [x]  See progress note/recertification  []  See Discharge Summary         Goals for this certification period to be accomplished in 4 weeks:  1. Patient will improve FOTO score by 11 points in order to demonstrate a significant improvement in function.   2. Patient will report \" no difficulty\" when asked on the FOTO questionnaire, \"How much difficulty do you have performing your usual work, house work or school activities? \" to demonstrate improved ease of performing household chores. 3. Patient will be independent with a final comprehensive HEP to maintain LE strength and decreased pain for ease of completing household chores upon D/C from therapy.     PLAN  [x]  Upgrade activities as tolerated     [x]  Continue plan of care  []  Update interventions per flow sheet       []  Discharge due to:_  []  Other:_      Rubi May PTA 5/23/2022  10:59 AM      Future Appointments   Date Time Provider Heri Beal   5/23/2022 10:30 AM Nikolas Flores PTA [facility]   5/27/2022 10:30 AM Oskar Patel PT [facility]   7/25/2022 10:30 AM [facility] FAST TRACK NURSE [facility]   8/1/2022 10:30 AM [facility] FAST TRACK NURSE [facility]

## 2022-05-27 ENCOUNTER — HOSPITAL ENCOUNTER (OUTPATIENT)
Dept: PHYSICAL THERAPY | Age: 82
Discharge: HOME | End: 2022-05-27
Payer: MEDICARE

## 2022-05-27 PROCEDURE — 97110 THERAPEUTIC EXERCISES: CPT

## 2022-05-27 NOTE — PROGRESS NOTES
[facility] Tamara Siskin  [address] [facility]  [phone] fax    Physical Therapy Discharge Summary    Patient name: Vic Hodge Start of Care: 2022   Referral source: Bay Sanchez MD : 1940   Medical/Treatment Diagnosis: Low back pain [M54.50]  Payor: Omar Newman / Plan: VA MEDICARE PART A & B / Product Type: Medicare /  Onset Date: years ago with leg symptoms starting a year ago       Prior Hospitalization: see medical history Provider#: [id number]   Medications: Verified on Patient Summary List     Comorbidities:  Asthma, neck pain  Prior Level of Function: Ind with ambulation, Ind with ADLs    Visits from Start of Care: 32    Missed Visits: 1    Reporting Period : 22 to 22    Summary of Care:  Goal: Patient will improve FOTO score by 11 points in order to demonstrate a significant improvement in function.   Status at last note/certification: 57  Status at discharge: not met    Goal:  Patient will report \" no difficulty\" when asked on the FOTO questionnaire, \"How much difficulty do you have performing your usual work, house work or school activities? \" to demonstrate improved ease of performing household chores. Status at last note/certification: increased difficulty   Status at discharge: met    Goal: Patient will be independent with a final comprehensive HEP to maintain LE strength and decreased pain for ease of completing household chores upon D/C from therapy. Status at last note/certification: n/a  Status at discharge: met    Pt. Has progressed well with physical therapy. She continues to have low/moderate back pain but does demonstrate a significant improvement in LE strength. She has increased ease of getting up from the ground during household chores and gardening. She also has increased ease of sit to stand transfers.  She continues to have difficulty completing HEP secondary to busy schedule with her husbands doctor appointments. She was educated on an updated HEP and was educated on importance of HEP in order to maintain progress.      ASSESSMENT/RECOMMENDATIONS:    [x]Discontinue therapy: [x]Patient has reached or is progressing toward set goals      []Patient is non-compliant or has abdicated      []Due to lack of appreciable progress towards set goals    Noel Sage, PT 5/27/2022 1:08 PM

## 2022-05-27 NOTE — PROGRESS NOTES
PT DAILY TREATMENT NOTE     Patient Name: Eduardo Aviles  Date:2022  : 1940  [x]  Patient  Verified  Payor: Rosalva Denson / Plan: VA MEDICARE PART A & B / Product Type: Medicare /    In time: 10:31  Out time: 11:11  Total Treatment Time (min): 40  Visit #: 3  of 8    Medicare/BCBS Only   Total Timed Codes (min):  40 1:1 Treatment Time:  40       Treatment Area: Low back pain [M54.50]    SUBJECTIVE  Pain Level (0-10 scale):  3/10  Any medication changes, allergies to medications, adverse drug reactions, diagnosis change, or new procedure performed?: [x] No    [] Yes (see summary sheet for update)  Subjective functional status/changes:   [] No changes reported  Pt. Reports she is doing good today. She reports he back still hurts but her legs are stronger. OBJECTIVE    40 min Therapeutic Exercise:  [x] See flow sheet :   Rationale: increase ROM and increase strength to improve the patients ability to increase ease of ADLs          With   [x] TE   [] TA   [] neuro   [] other: Patient Education: [x] Review HEP    [] Progressed/Changed HEP based on:   [] positioning   [] body mechanics   [] transfers   [] heat/ice application    [] other:      Other Objective/Functional Measures:   Pt. Has good pelvic alignment today  FOTO: 52 points  She was educated on updated HEP and she demonstrates good understanding  No difficulty with usual work, housework, or school activities      Pain Level (0-10 scale) post treatment: 0/10    ASSESSMENT/Changes in Function:     See D/C note     Progress towards goals / Updated goals:    See D/C note    PLAN  []  Upgrade activities as tolerated     []  Continue plan of care  []  Update interventions per flow sheet       [x]  Discharge due to:_ progress towards goals.    []  Other:_      Jaylen Valiente, PT 2022  7:10 AM    Future Appointments   Date Time Provider Heri Beal   2022 10:30 AM Asya Crowley, PT [facility]   2022 10:30 AM [facility] FAST TRACK NURSE [facility]   8/1/2022 10:30 AM [facility] FAST TRACK NURSE [facility]

## 2022-07-25 ENCOUNTER — HOSPITAL ENCOUNTER (OUTPATIENT)
Dept: INFUSION THERAPY | Age: 82
Discharge: HOME | End: 2022-07-25
Payer: MEDICARE

## 2022-07-25 VITALS
TEMPERATURE: 98.2 F | RESPIRATION RATE: 18 BRPM | DIASTOLIC BLOOD PRESSURE: 69 MMHG | OXYGEN SATURATION: 98 % | SYSTOLIC BLOOD PRESSURE: 144 MMHG | HEART RATE: 55 BPM

## 2022-07-25 LAB
ALBUMIN SERPL-MCNC: 3.1 G/DL (ref 3.4–5)
ALBUMIN/GLOB SERPL: 1.2 {RATIO} (ref 0.8–1.7)
ALP SERPL-CCNC: 61 U/L (ref 45–117)
ALT SERPL-CCNC: 21 U/L (ref 13–56)
ANION GAP SERPL CALC-SCNC: 6 MMOL/L (ref 3–18)
AST SERPL-CCNC: 17 U/L (ref 10–38)
BILIRUB SERPL-MCNC: 0.6 MG/DL (ref 0.2–1)
BUN SERPL-MCNC: 11 MG/DL (ref 7–18)
BUN/CREAT SERPL: 17 (ref 12–20)
CALCIUM SERPL-MCNC: 8.3 MG/DL (ref 8.5–10.1)
CHLORIDE SERPL-SCNC: 106 MMOL/L (ref 100–111)
CO2 SERPL-SCNC: 31 MMOL/L (ref 21–32)
CREAT SERPL-MCNC: 0.65 MG/DL (ref 0.6–1.3)
GLOBULIN SER CALC-MCNC: 2.5 G/DL (ref 2–4)
GLUCOSE SERPL-MCNC: 110 MG/DL (ref 74–99)
MAGNESIUM SERPL-MCNC: 1.9 MG/DL (ref 1.6–2.6)
PHOSPHATE SERPL-MCNC: 3.8 MG/DL (ref 2.5–4.9)
POTASSIUM SERPL-SCNC: 3.4 MMOL/L (ref 3.5–5.5)
PROT SERPL-MCNC: 5.6 G/DL (ref 6.4–8.2)
SODIUM SERPL-SCNC: 143 MMOL/L (ref 136–145)

## 2022-07-25 PROCEDURE — 80053 COMPREHEN METABOLIC PANEL: CPT

## 2022-07-25 PROCEDURE — 36415 COLL VENOUS BLD VENIPUNCTURE: CPT

## 2022-07-25 PROCEDURE — 83735 ASSAY OF MAGNESIUM: CPT

## 2022-07-25 PROCEDURE — 84100 ASSAY OF PHOSPHORUS: CPT

## 2022-07-25 NOTE — PROGRESS NOTES
[facility] Lab Visit:    Jody Noriega  1940  [id number]    [id number] Pt arrived ambulatory w/o assist. Labs drawn per order via Right AC venipuncture x1 attempt. Pt tolerated well without complaints. Gauze/ coban to site. Pt [facility] ambulatory and in no distress at 1040.      Visit Vitals  BP (!) 144/69 (BP 1 Location: Right upper arm, BP Patient Position: Sitting)   Pulse (!) 55   Temp 98.2 °F (36.8 °C)   Resp 18   SpO2 98%

## 2022-07-29 RX ORDER — EPINEPHRINE 1 MG/ML
0.3 INJECTION, SOLUTION, CONCENTRATE INTRAVENOUS AS NEEDED
Status: CANCELLED | OUTPATIENT
Start: 2022-08-02

## 2022-07-29 RX ORDER — ONDANSETRON 2 MG/ML
8 INJECTION INTRAMUSCULAR; INTRAVENOUS AS NEEDED
Status: CANCELLED | OUTPATIENT
Start: 2022-08-02

## 2022-07-29 RX ORDER — ALBUTEROL SULFATE 0.83 MG/ML
2.5 SOLUTION RESPIRATORY (INHALATION) AS NEEDED
Status: CANCELLED
Start: 2022-08-02

## 2022-07-29 RX ORDER — DIPHENHYDRAMINE HYDROCHLORIDE 50 MG/ML
50 INJECTION, SOLUTION INTRAMUSCULAR; INTRAVENOUS AS NEEDED
Status: CANCELLED
Start: 2022-08-02

## 2022-07-29 RX ORDER — HYDROCORTISONE SODIUM SUCCINATE 100 MG/2ML
100 INJECTION, POWDER, FOR SOLUTION INTRAMUSCULAR; INTRAVENOUS AS NEEDED
Status: CANCELLED | OUTPATIENT
Start: 2022-08-02

## 2022-07-29 RX ORDER — ACETAMINOPHEN 325 MG/1
650 TABLET ORAL AS NEEDED
Status: CANCELLED
Start: 2022-08-02

## 2022-07-29 RX ORDER — DIPHENHYDRAMINE HYDROCHLORIDE 50 MG/ML
25 INJECTION, SOLUTION INTRAMUSCULAR; INTRAVENOUS AS NEEDED
Status: CANCELLED
Start: 2022-08-02

## 2022-08-01 ENCOUNTER — HOSPITAL ENCOUNTER (OUTPATIENT)
Dept: INFUSION THERAPY | Age: 82
Discharge: HOME | End: 2022-08-01
Payer: MEDICARE

## 2022-08-01 VITALS
DIASTOLIC BLOOD PRESSURE: 80 MMHG | TEMPERATURE: 97.7 F | SYSTOLIC BLOOD PRESSURE: 170 MMHG | OXYGEN SATURATION: 93 % | RESPIRATION RATE: 16 BRPM | HEART RATE: 60 BPM

## 2022-08-01 PROCEDURE — 99211 OFF/OP EST MAY X REQ PHY/QHP: CPT

## 2022-08-01 NOTE — PROGRESS NOTES
[facility] Progress Note    Date: 2022    Name: Catrachito Lopez    MRN: [id number]         : 1940    Prolia NOT GIVEN today    Ms. Supriya Conteh arrived in the [facility] today at 1025 here for Q 6 Month SQ Prolia Injection. She was assessed and education was provided. Pt noted to have elevated blood pressure. Pt states she took her b/p meds around 9 am and has not had any breakfast.  Pt denies headache or dizziness. Pt states \"my b/p has never been this high. \"  B/P checked in each arm with same reading. Spoke with Lara in pharmacy and prolia can cause high blood pressure. Pt states she has a neurology appointment at 11:00 so she will go to that appointment and come back after for b/p recheck to see if she can receive prolia injection today or may need to reschedule. Ms. Edilma Vasquez vitals were reviewed. Visit Vitals  BP (!) 189/67 (BP 1 Location: Left upper arm, BP Patient Position: Sitting)   Pulse 60   Temp 97.7 °F (36.5 °C)   Resp 18   SpO2 93%   Breastfeeding No     Lab results from 22 within parameters to proceed with Prolia injection. Ca 8.3 and albumin 3.1. Corrected Calcium 9.3 per pharmacy. Pt returned to clinic at 1155, B/P rechecked and still too elevated to proceed with prolia. See B/P trend below. Farrukh Mancilla in pharmacy made aware. Pt denies any headache, dizziness, chest pain or any other complaints. Pt made aware to monitor at home and if develop any symptoms to go to ED. Patient Vitals for the past 12 hrs:   Temp Pulse Resp BP SpO2   22 1200 -- -- -- (!) 170/80 --   22 1157 -- -- 16 (!) 178/70 --   22 1035 -- -- -- (!) 189/67 --   22 1026 97.7 °F (36.5 °C) 60 18 (!) 184/65 93 %        Ms. Supriya Conteh was discharged from [place] [address] in stable condition at 1205. She is to return TOMORROW 22 for B/P recheck and possible prolia injection.      Julio Garza RN  2022

## 2022-08-02 ENCOUNTER — HOSPITAL ENCOUNTER (OUTPATIENT)
Dept: INFUSION THERAPY | Age: 82
Discharge: HOME | End: 2022-08-02
Payer: MEDICARE

## 2022-08-02 VITALS
RESPIRATION RATE: 16 BRPM | OXYGEN SATURATION: 92 % | DIASTOLIC BLOOD PRESSURE: 84 MMHG | SYSTOLIC BLOOD PRESSURE: 169 MMHG | HEART RATE: 56 BPM | TEMPERATURE: 98.3 F

## 2022-08-02 DIAGNOSIS — M81.8 IDIOPATHIC OSTEOPOROSIS: Primary | ICD-10-CM

## 2022-08-02 PROCEDURE — 96372 THER/PROPH/DIAG INJ SC/IM: CPT

## 2022-08-02 PROCEDURE — 74011250636 HC RX REV CODE- 250/636: Performed by: FAMILY MEDICINE

## 2022-08-02 RX ORDER — ACETAMINOPHEN 325 MG/1
650 TABLET ORAL AS NEEDED
Start: 2023-01-31

## 2022-08-02 RX ORDER — HYDROCORTISONE SODIUM SUCCINATE 100 MG/2ML
100 INJECTION, POWDER, FOR SOLUTION INTRAMUSCULAR; INTRAVENOUS AS NEEDED
OUTPATIENT
Start: 2023-01-31

## 2022-08-02 RX ORDER — DIPHENHYDRAMINE HYDROCHLORIDE 50 MG/ML
50 INJECTION, SOLUTION INTRAMUSCULAR; INTRAVENOUS AS NEEDED
Start: 2023-01-31

## 2022-08-02 RX ORDER — ALBUTEROL SULFATE 0.83 MG/ML
2.5 SOLUTION RESPIRATORY (INHALATION) AS NEEDED
Start: 2023-01-31

## 2022-08-02 RX ORDER — EPINEPHRINE 1 MG/ML
0.3 INJECTION, SOLUTION, CONCENTRATE INTRAVENOUS AS NEEDED
OUTPATIENT
Start: 2023-01-31

## 2022-08-02 RX ORDER — DIPHENHYDRAMINE HYDROCHLORIDE 50 MG/ML
25 INJECTION, SOLUTION INTRAMUSCULAR; INTRAVENOUS AS NEEDED
Start: 2023-01-31

## 2022-08-02 RX ORDER — ONDANSETRON 2 MG/ML
8 INJECTION INTRAMUSCULAR; INTRAVENOUS AS NEEDED
OUTPATIENT
Start: 2023-01-31

## 2022-08-02 RX ADMIN — DENOSUMAB 60 MG: 60 INJECTION SUBCUTANEOUS at 09:39

## 2022-08-02 NOTE — PROGRESS NOTES
[facility] Progress Note    Date: 2022    Name: Pal Perez    MRN: [id number]         : 1940      Ms. Karmen Faye arrived in the [facility] today at 31-70-28-28, here for Q 6 Month, SQ Prolia Injection. Injection not given on yesterday secondary to elevated b/p. Patient's blood pressure elevated today to 188/71 in her left arm and 177/79 in right arm. Patient verbalized that coming here makes her nervous. Patient's blood pressure retaken automatically and manually after 10 minutes. Patient's b/p improved after 10 minutes, see frequent b/p's below. Andrew Reed made aware and medication to be administered today. Patient verbalized she will be following up with her Cardiologist immediately upon leaving [facility] today. She was assessed and education was provided. Ms. Scarlett Landers vitals were reviewed. Visit Vitals  BP (!) 169/84 (BP 1 Location: Left upper arm, BP Patient Position: Sitting)   Pulse (!) 56   Temp 98.3 °F (36.8 °C)   Resp 16   SpO2 92%     Patient Vitals for the past 12 hrs:   Temp Pulse Resp BP SpO2   22 0936 -- -- -- (!) 169/84 --   22 0935 -- -- -- (!) 164/68 --   22 0926 -- -- -- (!) 177/79 --   22 0925 98.3 °F (36.8 °C) (!) 56 16 (!) 188/71 92 %   22 0900 -- -- -- -- 92 %       Her most recent CMP, Magnesium, & Phosphorus, Levels from 22 were reviewed and found to be within limits to proceed with Prolia today. Corrected calcium was 9.02. Prolia (denosumab) 60 mg was administered SQ to upper left arm. Bandaid applied to site. Ms. Karmen Faye tolerated well, and had no complaints. Discharge/ follow-up instructions discussed w/ pt. Pt verbalized understanding. Armband removed and shredded,    Ms. Karmen Faye was discharged from [address] in stable condition at [address] She is to return on 2023 at 56 for pre-prolia labs.     Lizandro Young  2022

## 2023-01-30 ENCOUNTER — HOSPITAL ENCOUNTER (OUTPATIENT)
Dept: INFUSION THERAPY | Age: 83
Discharge: HOME | End: 2023-01-30
Payer: MEDICARE

## 2023-01-30 VITALS
RESPIRATION RATE: 18 BRPM | DIASTOLIC BLOOD PRESSURE: 78 MMHG | SYSTOLIC BLOOD PRESSURE: 154 MMHG | OXYGEN SATURATION: 97 % | HEART RATE: 78 BPM | TEMPERATURE: 98.2 F

## 2023-01-30 LAB
ALBUMIN SERPL-MCNC: 3.2 G/DL (ref 3.4–5)
ALBUMIN/GLOB SERPL: 1.1 (ref 0.8–1.7)
ALP SERPL-CCNC: 62 U/L (ref 45–117)
ALT SERPL-CCNC: 23 U/L (ref 13–56)
ANION GAP SERPL CALC-SCNC: 6 MMOL/L (ref 3–18)
AST SERPL-CCNC: 19 U/L (ref 10–38)
BILIRUB SERPL-MCNC: 0.4 MG/DL (ref 0.2–1)
BUN SERPL-MCNC: 14 MG/DL (ref 7–18)
BUN/CREAT SERPL: 23 (ref 12–20)
CALCIUM SERPL-MCNC: 8.5 MG/DL (ref 8.5–10.1)
CHLORIDE SERPL-SCNC: 108 MMOL/L (ref 100–111)
CO2 SERPL-SCNC: 28 MMOL/L (ref 21–32)
CREAT SERPL-MCNC: 0.61 MG/DL (ref 0.6–1.3)
GLOBULIN SER CALC-MCNC: 3 G/DL (ref 2–4)
GLUCOSE SERPL-MCNC: 67 MG/DL (ref 74–99)
MAGNESIUM SERPL-MCNC: 2 MG/DL (ref 1.6–2.6)
PHOSPHATE SERPL-MCNC: 3.3 MG/DL (ref 2.5–4.9)
POTASSIUM SERPL-SCNC: 4 MMOL/L (ref 3.5–5.5)
PROT SERPL-MCNC: 6.2 G/DL (ref 6.4–8.2)
SODIUM SERPL-SCNC: 142 MMOL/L (ref 136–145)

## 2023-01-30 PROCEDURE — 84100 ASSAY OF PHOSPHORUS: CPT

## 2023-01-30 PROCEDURE — 80053 COMPREHEN METABOLIC PANEL: CPT

## 2023-01-30 PROCEDURE — 83735 ASSAY OF MAGNESIUM: CPT

## 2023-01-30 PROCEDURE — 36415 COLL VENOUS BLD VENIPUNCTURE: CPT

## 2023-01-30 NOTE — PROGRESS NOTES
[facility] Lab Visit:    Demetrice Rasmussen  1940  [id number]    3271 Pt arrived ambulatory w/o assist. Labs drawn per order via Left AC venipuncture x1 attempt. Pt tolerated well without complaints. Gauze/ coban to site. Pt departed [facility] ambulatory and in no distress at 1050.      Visit Vitals  BP (!) 154/78 (BP 1 Location: Left upper arm, BP Patient Position: Sitting)   Pulse 78   Temp 98.2 °F (36.8 °C)   Resp 18   SpO2 97%

## 2023-02-06 ENCOUNTER — HOSPITAL ENCOUNTER (OUTPATIENT)
Dept: INFUSION THERAPY | Age: 83
Discharge: HOME | End: 2023-02-06
Payer: MEDICARE

## 2023-02-06 VITALS
OXYGEN SATURATION: 98 % | SYSTOLIC BLOOD PRESSURE: 123 MMHG | RESPIRATION RATE: 18 BRPM | DIASTOLIC BLOOD PRESSURE: 72 MMHG | HEART RATE: 71 BPM | TEMPERATURE: 98 F

## 2023-02-06 DIAGNOSIS — M81.8 IDIOPATHIC OSTEOPOROSIS: Primary | ICD-10-CM

## 2023-02-06 PROCEDURE — 96372 THER/PROPH/DIAG INJ SC/IM: CPT

## 2023-02-06 PROCEDURE — 74011250636 HC RX REV CODE- 250/636: Performed by: FAMILY MEDICINE

## 2023-02-06 RX ORDER — DIPHENHYDRAMINE HYDROCHLORIDE 50 MG/ML
25 INJECTION, SOLUTION INTRAMUSCULAR; INTRAVENOUS AS NEEDED
Status: CANCELLED
Start: 2023-07-31

## 2023-02-06 RX ORDER — HYDROCORTISONE SODIUM SUCCINATE 100 MG/2ML
100 INJECTION, POWDER, FOR SOLUTION INTRAMUSCULAR; INTRAVENOUS AS NEEDED
Status: CANCELLED | OUTPATIENT
Start: 2023-07-31

## 2023-02-06 RX ORDER — ACETAMINOPHEN 325 MG/1
650 TABLET ORAL AS NEEDED
Status: CANCELLED
Start: 2023-07-31

## 2023-02-06 RX ORDER — EPINEPHRINE 1 MG/ML
0.3 INJECTION, SOLUTION, CONCENTRATE INTRAVENOUS AS NEEDED
Status: CANCELLED | OUTPATIENT
Start: 2023-07-31

## 2023-02-06 RX ORDER — ONDANSETRON 2 MG/ML
8 INJECTION INTRAMUSCULAR; INTRAVENOUS AS NEEDED
Status: CANCELLED | OUTPATIENT
Start: 2023-07-31

## 2023-02-06 RX ORDER — ALBUTEROL SULFATE 0.83 MG/ML
2.5 SOLUTION RESPIRATORY (INHALATION) AS NEEDED
Status: CANCELLED
Start: 2023-07-31

## 2023-02-06 RX ORDER — DIPHENHYDRAMINE HYDROCHLORIDE 50 MG/ML
50 INJECTION, SOLUTION INTRAMUSCULAR; INTRAVENOUS AS NEEDED
Status: CANCELLED
Start: 2023-07-31

## 2023-02-06 RX ADMIN — DENOSUMAB 60 MG: 60 INJECTION SUBCUTANEOUS at 10:34

## 2023-02-06 NOTE — PROGRESS NOTES
[facility] Progress Note    Date: 2023    Name: Sanchez Traylor    MRN: [id number]         : 1940      Ms. Vicki Wren arrived in the [facility] today at 1025, here for Q 6 Month, SQ Prolia Injection. She was assessed and education was provided. Ms. Pavithra Palomino vitals were reviewed. Visit Vitals  /72 (BP 1 Location: Left upper arm, BP Patient Position: Sitting)   Pulse 71   Temp 98 °F (36.7 °C)   Resp 18   SpO2 98%   Breastfeeding No     Patient Vitals for the past 12 hrs:   Temp Pulse Resp BP SpO2   23 1030 98 °F (36.7 °C) 71 18 123/72 98 %         Her most recent CMP, Magnesium, & Phosphorus, Levels from 23 were reviewed and found to be within limits to proceed with Prolia today. Latest Reference Range & Units 23 10:50   Sodium 136 - 145 mmol/L 142   Potassium 3.5 - 5.5 mmol/L 4.0   Chloride 100 - 111 mmol/L 108   CO2 21 - 32 mmol/L 28   Anion gap 3.0 - 18 mmol/L 6   Glucose 74 - 99 mg/dL 67 (L)   BUN 7.0 - 18 MG/DL 14   Creatinine 0.6 - 1.3 MG/DL 0.61   BUN/Creatinine ratio 12 - 20   23 (H)   Calcium 8.5 - 10.1 MG/DL 8.5   Phosphorus 2.5 - 4.9 MG/DL 3.3   Magnesium 1.6 - 2.6 mg/dL 2.0   eGFR >60 ml/min/1.73m2 >60   Bilirubin, total 0.2 - 1.0 MG/DL 0.4   Protein, total 6.4 - 8.2 g/dL 6.2 (L)   Albumin 3.4 - 5.0 g/dL 3.2 (L)   Globulin 2.0 - 4.0 g/dL 3.0   A-G Ratio 0.8 - 1.7   1.1   ALT 13 - 56 U/L 23   AST 10 - 38 U/L 19   Alk. phosphatase 45 - 117 U/L 62   (L): Data is abnormally low  (H): Data is abnormally high    Prolia (denosumab) 60 mg was administered SQ to upper left arm. Bandaid applied to site. Ms. Vicki Wren tolerated well, and had no complaints. Discharge/ follow-up instructions discussed w/ patient. Patient verbalized understanding. Armband removed and shredded,    Ms. Vicki Wren was discharged from [place] [address] in stable condition at [phone]. She is to return in 6 months for pre-prolia labs/prolia injections.     Payal Chaney Clifton Jurist, [address] [facility]  February 6, 2023

## 2023-05-17 ENCOUNTER — OFFICE VISIT (OUTPATIENT)
Age: 83
End: 2023-05-17
Payer: MEDICARE

## 2023-05-17 VITALS
BODY MASS INDEX: 19.83 KG/M2 | RESPIRATION RATE: 18 BRPM | SYSTOLIC BLOOD PRESSURE: 164 MMHG | OXYGEN SATURATION: 96 % | DIASTOLIC BLOOD PRESSURE: 86 MMHG | TEMPERATURE: 97.8 F | HEART RATE: 82 BPM | HEIGHT: 65 IN | WEIGHT: 119 LBS

## 2023-05-17 DIAGNOSIS — Z87.891 FORMER SMOKER: ICD-10-CM

## 2023-05-17 DIAGNOSIS — R63.4 WEIGHT LOSS, UNINTENTIONAL: ICD-10-CM

## 2023-05-17 DIAGNOSIS — J44.9 CHRONIC OBSTRUCTIVE PULMONARY DISEASE, UNSPECIFIED COPD TYPE (HCC): Primary | ICD-10-CM

## 2023-05-17 DIAGNOSIS — R05.1 ACUTE COUGH: ICD-10-CM

## 2023-05-17 PROCEDURE — 1090F PRES/ABSN URINE INCON ASSESS: CPT | Performed by: INTERNAL MEDICINE

## 2023-05-17 PROCEDURE — G8399 PT W/DXA RESULTS DOCUMENT: HCPCS | Performed by: INTERNAL MEDICINE

## 2023-05-17 PROCEDURE — G8420 CALC BMI NORM PARAMETERS: HCPCS | Performed by: INTERNAL MEDICINE

## 2023-05-17 PROCEDURE — G8427 DOCREV CUR MEDS BY ELIG CLIN: HCPCS | Performed by: INTERNAL MEDICINE

## 2023-05-17 PROCEDURE — 1123F ACP DISCUSS/DSCN MKR DOCD: CPT | Performed by: INTERNAL MEDICINE

## 2023-05-17 PROCEDURE — 3023F SPIROM DOC REV: CPT | Performed by: INTERNAL MEDICINE

## 2023-05-17 PROCEDURE — 4004F PT TOBACCO SCREEN RCVD TLK: CPT | Performed by: INTERNAL MEDICINE

## 2023-05-17 PROCEDURE — 99204 OFFICE O/P NEW MOD 45 MIN: CPT | Performed by: INTERNAL MEDICINE

## 2023-05-17 RX ORDER — BUDESONIDE, GLYCOPYRROLATE, AND FORMOTEROL FUMARATE 160; 9; 4.8 UG/1; UG/1; UG/1
2 AEROSOL, METERED RESPIRATORY (INHALATION) 2 TIMES DAILY
Qty: 1 EACH | Refills: 5 | Status: SHIPPED | OUTPATIENT
Start: 2023-05-17

## 2023-05-17 RX ORDER — BUDESONIDE, GLYCOPYRROLATE, AND FORMOTEROL FUMARATE 160; 9; 4.8 UG/1; UG/1; UG/1
2 AEROSOL, METERED RESPIRATORY (INHALATION) 2 TIMES DAILY
Qty: 1 EACH | Refills: 0 | COMMUNITY
Start: 2023-05-17

## 2023-05-17 ASSESSMENT — ENCOUNTER SYMPTOMS
EYE PAIN: 0
EYE DISCHARGE: 0
ABDOMINAL PAIN: 0
APNEA: 0
CHEST TIGHTNESS: 0
CONSTIPATION: 0
COLOR CHANGE: 0
FACIAL SWELLING: 0
NAUSEA: 0
PHOTOPHOBIA: 0
EYE ITCHING: 0
BLOOD IN STOOL: 0
CHOKING: 0
STRIDOR: 0
COUGH: 0
DIARRHEA: 0
VOICE CHANGE: 0
TROUBLE SWALLOWING: 0
EYE REDNESS: 0
SHORTNESS OF BREATH: 1
VOMITING: 0

## 2023-05-17 NOTE — PROGRESS NOTES
Jero Samples presents today for   Chief Complaint   Patient presents with    New Patient     Referred by Silvana Monroy for COPD       Is someone accompanying this pt? Daughter    Is the patient using any DME equipment during [facility]? No    -DME Company NA    Depression Screening:    No flowsheet data found. Learning Needs Questionnaire:     Who is the primary learner? Patient    What is the preferred language for health care of the primary learner? ENGLISH    How does the primary learner prefer to learn new concepts? DEMONSTRATION    Answered By Patient    Relationship to Learner SELF          Fall Risk:     No flowsheet data found. Abuse Screening:     No flowsheet data found. Coordination of Care:    1. Have you been to the ER, urgent care clinic since your last visit? Hospitalized since your last visit? No    2. Have you seen or consulted any other health care providers outside of the [address] [place] [address] since your last visit? Include any pap smears or colon screening. PCP    Medication list has been update per patient.
Abdominal:      General: Abdomen is flat. Palpations: Abdomen is soft. There is no mass. Tenderness: There is no abdominal tenderness. Musculoskeletal:         General: No swelling, tenderness, deformity or signs of injury. Cervical back: No rigidity or tenderness. Right lower leg: No edema. Left lower leg: No edema. Lymphadenopathy:      Cervical: No cervical adenopathy. Skin:     General: Skin is warm and dry. Coloration: Skin is not jaundiced or pale. Findings: No erythema, lesion or rash. Bruising: sparse BUE. Neurological:      General: No focal deficit present. Mental Status: She is alert and oriented to person, place, and time. Coordination: Coordination normal.   Psychiatric:         Mood and Affect: Mood normal.         Behavior: Behavior normal.         Thought Content: Thought content normal.         Judgment: Judgment normal.                An electronic signature was used to authenticate this note.     --Oj Yanez MD

## 2023-05-31 RX ORDER — FLUTICASONE FUROATE, UMECLIDINIUM BROMIDE AND VILANTEROL TRIFENATATE 100; 62.5; 25 UG/1; UG/1; UG/1
1 POWDER RESPIRATORY (INHALATION) DAILY
Qty: 3 EACH | Refills: 3 | Status: SHIPPED | OUTPATIENT
Start: 2023-05-31

## 2023-07-27 ENCOUNTER — HOSPITAL ENCOUNTER (OUTPATIENT)
Facility: HOSPITAL | Age: 83
Setting detail: INFUSION SERIES
End: 2023-07-27
Payer: MEDICARE

## 2023-07-27 VITALS
DIASTOLIC BLOOD PRESSURE: 60 MMHG | OXYGEN SATURATION: 99 % | RESPIRATION RATE: 16 BRPM | TEMPERATURE: 97.5 F | SYSTOLIC BLOOD PRESSURE: 106 MMHG | HEART RATE: 62 BPM

## 2023-07-27 LAB
ALBUMIN SERPL-MCNC: 2.8 G/DL (ref 3.4–5)
ALBUMIN/GLOB SERPL: 1.3 (ref 0.8–1.7)
ALP SERPL-CCNC: 45 U/L (ref 45–117)
ALT SERPL-CCNC: 32 U/L (ref 13–56)
ANION GAP SERPL CALC-SCNC: 6 MMOL/L (ref 3–18)
AST SERPL-CCNC: 16 U/L (ref 10–38)
BILIRUB SERPL-MCNC: 0.4 MG/DL (ref 0.2–1)
BUN SERPL-MCNC: 18 MG/DL (ref 7–18)
BUN/CREAT SERPL: 30 (ref 12–20)
CALCIUM SERPL-MCNC: 8.3 MG/DL (ref 8.5–10.1)
CHLORIDE SERPL-SCNC: 106 MMOL/L (ref 100–111)
CO2 SERPL-SCNC: 30 MMOL/L (ref 21–32)
CREAT SERPL-MCNC: 0.61 MG/DL (ref 0.6–1.3)
GLOBULIN SER CALC-MCNC: 2.2 G/DL (ref 2–4)
GLUCOSE SERPL-MCNC: 95 MG/DL (ref 74–99)
MAGNESIUM SERPL-MCNC: 2 MG/DL (ref 1.6–2.6)
PHOSPHATE SERPL-MCNC: 4 MG/DL (ref 2.5–4.9)
POTASSIUM SERPL-SCNC: 4.3 MMOL/L (ref 3.5–5.5)
PROT SERPL-MCNC: 5 G/DL (ref 6.4–8.2)
SODIUM SERPL-SCNC: 142 MMOL/L (ref 136–145)

## 2023-07-27 PROCEDURE — 80053 COMPREHEN METABOLIC PANEL: CPT

## 2023-07-27 PROCEDURE — 36415 COLL VENOUS BLD VENIPUNCTURE: CPT

## 2023-07-27 PROCEDURE — 83735 ASSAY OF MAGNESIUM: CPT

## 2023-07-27 PROCEDURE — 84100 ASSAY OF PHOSPHORUS: CPT

## 2023-07-28 ENCOUNTER — APPOINTMENT (OUTPATIENT)
Dept: INFUSION THERAPY | Age: 83
End: 2023-07-28

## 2023-07-31 ENCOUNTER — APPOINTMENT (OUTPATIENT)
Dept: INFUSION THERAPY | Age: 83
End: 2023-07-31

## 2023-07-31 ENCOUNTER — APPOINTMENT (OUTPATIENT)
Facility: HOSPITAL | Age: 83
End: 2023-07-31
Payer: MEDICARE

## 2023-07-31 PROBLEM — E83.42 HYPOMAGNESEMIA: Status: ACTIVE | Noted: 2023-07-31

## 2023-08-04 NOTE — PROGRESS NOTES
Latest Reference Range & Units 07/27/23 09:52   CALCIUM, SERUM, 250642 8.5 - 10.1 MG/DL 8.3 (L)   (L): Data is abnormally low    Corrected Calcium 9.26. Verified with [facility] Console in pharmacy.

## 2023-08-07 ENCOUNTER — HOSPITAL ENCOUNTER (OUTPATIENT)
Facility: HOSPITAL | Age: 83
Setting detail: INFUSION SERIES
End: 2023-08-07
Payer: MEDICARE

## 2023-08-07 VITALS
OXYGEN SATURATION: 100 % | SYSTOLIC BLOOD PRESSURE: 133 MMHG | TEMPERATURE: 97.3 F | RESPIRATION RATE: 16 BRPM | HEART RATE: 72 BPM | DIASTOLIC BLOOD PRESSURE: 81 MMHG

## 2023-08-07 DIAGNOSIS — M81.8 IDIOPATHIC OSTEOPOROSIS: ICD-10-CM

## 2023-08-07 DIAGNOSIS — E83.42 HYPOMAGNESEMIA: Primary | ICD-10-CM

## 2023-08-07 PROCEDURE — 96372 THER/PROPH/DIAG INJ SC/IM: CPT

## 2023-08-07 PROCEDURE — 6360000002 HC RX W HCPCS: Performed by: FAMILY MEDICINE

## 2023-08-07 RX ORDER — ALBUTEROL SULFATE 90 UG/1
4 AEROSOL, METERED RESPIRATORY (INHALATION) PRN
Status: DISCONTINUED | OUTPATIENT
Start: 2023-08-07 | End: 2023-08-07

## 2023-08-07 RX ORDER — EPINEPHRINE 1 MG/ML
0.3 INJECTION, SOLUTION, CONCENTRATE INTRAVENOUS PRN
OUTPATIENT
Start: 2024-02-04

## 2023-08-07 RX ORDER — SODIUM CHLORIDE 9 MG/ML
INJECTION, SOLUTION INTRAVENOUS CONTINUOUS
OUTPATIENT
Start: 2024-02-04

## 2023-08-07 RX ORDER — ONDANSETRON 2 MG/ML
8 INJECTION INTRAMUSCULAR; INTRAVENOUS
OUTPATIENT
Start: 2024-02-04

## 2023-08-07 RX ORDER — DIPHENHYDRAMINE HYDROCHLORIDE 50 MG/ML
50 INJECTION INTRAMUSCULAR; INTRAVENOUS
OUTPATIENT
Start: 2024-02-04

## 2023-08-07 RX ORDER — EPINEPHRINE 1 MG/ML
0.3 INJECTION, SOLUTION, CONCENTRATE INTRAVENOUS PRN
Status: DISCONTINUED | OUTPATIENT
Start: 2023-08-07 | End: 2023-08-07

## 2023-08-07 RX ORDER — DIPHENHYDRAMINE HYDROCHLORIDE 50 MG/ML
50 INJECTION INTRAMUSCULAR; INTRAVENOUS
Status: DISCONTINUED | OUTPATIENT
Start: 2023-08-07 | End: 2023-08-07

## 2023-08-07 RX ORDER — ALBUTEROL SULFATE 90 UG/1
4 AEROSOL, METERED RESPIRATORY (INHALATION) PRN
OUTPATIENT
Start: 2024-02-04

## 2023-08-07 RX ORDER — ONDANSETRON 2 MG/ML
8 INJECTION INTRAMUSCULAR; INTRAVENOUS
Status: DISCONTINUED | OUTPATIENT
Start: 2023-08-07 | End: 2023-08-07

## 2023-08-07 RX ORDER — ACETAMINOPHEN 325 MG/1
650 TABLET ORAL
Status: DISCONTINUED | OUTPATIENT
Start: 2023-08-07 | End: 2023-08-07

## 2023-08-07 RX ORDER — SODIUM CHLORIDE 9 MG/ML
INJECTION, SOLUTION INTRAVENOUS CONTINUOUS
Status: DISCONTINUED | OUTPATIENT
Start: 2023-08-07 | End: 2023-08-07

## 2023-08-07 RX ORDER — ACETAMINOPHEN 325 MG/1
650 TABLET ORAL
OUTPATIENT
Start: 2024-02-04

## 2023-08-07 RX ADMIN — DENOSUMAB 60 MG: 60 INJECTION SUBCUTANEOUS at 10:45

## 2023-08-07 ASSESSMENT — PAIN - FUNCTIONAL ASSESSMENT: PAIN_FUNCTIONAL_ASSESSMENT: NONE - DENIES PAIN

## 2023-08-15 ENCOUNTER — HOSPITAL ENCOUNTER (OUTPATIENT)
Facility: HOSPITAL | Age: 83
Discharge: HOME | End: 2023-08-18
Attending: FAMILY MEDICINE
Payer: MEDICARE

## 2023-08-15 DIAGNOSIS — M81.0 AGE-RELATED OSTEOPOROSIS WITHOUT CURRENT PATHOLOGICAL FRACTURE: ICD-10-CM

## 2023-08-15 PROCEDURE — 77080 DXA BONE DENSITY AXIAL: CPT

## 2023-09-18 ENCOUNTER — OFFICE VISIT (OUTPATIENT)
Age: 83
End: 2023-09-18
Payer: MEDICARE

## 2023-09-18 VITALS
HEIGHT: 65 IN | TEMPERATURE: 97.5 F | BODY MASS INDEX: 21.19 KG/M2 | HEART RATE: 69 BPM | WEIGHT: 127.2 LBS | RESPIRATION RATE: 16 BRPM | OXYGEN SATURATION: 97 % | SYSTOLIC BLOOD PRESSURE: 112 MMHG | DIASTOLIC BLOOD PRESSURE: 55 MMHG

## 2023-09-18 DIAGNOSIS — J44.9 CHRONIC OBSTRUCTIVE PULMONARY DISEASE, UNSPECIFIED COPD TYPE (HCC): Primary | ICD-10-CM

## 2023-09-18 DIAGNOSIS — I48.20 CHRONIC ATRIAL FIBRILLATION (HCC): ICD-10-CM

## 2023-09-18 DIAGNOSIS — Z87.891 FORMER SMOKER: ICD-10-CM

## 2023-09-18 PROCEDURE — G8427 DOCREV CUR MEDS BY ELIG CLIN: HCPCS | Performed by: INTERNAL MEDICINE

## 2023-09-18 PROCEDURE — G8399 PT W/DXA RESULTS DOCUMENT: HCPCS | Performed by: INTERNAL MEDICINE

## 2023-09-18 PROCEDURE — 99214 OFFICE O/P EST MOD 30 MIN: CPT | Performed by: INTERNAL MEDICINE

## 2023-09-18 PROCEDURE — G8420 CALC BMI NORM PARAMETERS: HCPCS | Performed by: INTERNAL MEDICINE

## 2023-09-18 PROCEDURE — 4004F PT TOBACCO SCREEN RCVD TLK: CPT | Performed by: INTERNAL MEDICINE

## 2023-09-18 PROCEDURE — 1123F ACP DISCUSS/DSCN MKR DOCD: CPT | Performed by: INTERNAL MEDICINE

## 2023-09-18 PROCEDURE — 1090F PRES/ABSN URINE INCON ASSESS: CPT | Performed by: INTERNAL MEDICINE

## 2023-09-18 PROCEDURE — 3023F SPIROM DOC REV: CPT | Performed by: INTERNAL MEDICINE

## 2023-09-18 RX ORDER — FAMOTIDINE 40 MG/1
1 TABLET, FILM COATED ORAL DAILY
COMMUNITY

## 2023-09-18 RX ORDER — LOSARTAN POTASSIUM 50 MG/1
50 TABLET ORAL 2 TIMES DAILY
COMMUNITY
Start: 2023-08-02

## 2023-09-18 ASSESSMENT — ENCOUNTER SYMPTOMS
NAUSEA: 0
SHORTNESS OF BREATH: 1
CHEST TIGHTNESS: 0
DIARRHEA: 0
VOMITING: 0
APNEA: 0
FACIAL SWELLING: 0
BLOOD IN STOOL: 0
TROUBLE SWALLOWING: 0
STRIDOR: 0
ABDOMINAL PAIN: 0
CHOKING: 0
EYE DISCHARGE: 0
EYE PAIN: 0
EYE REDNESS: 0
COUGH: 0
COLOR CHANGE: 0
PHOTOPHOBIA: 0
CONSTIPATION: 0
VOICE CHANGE: 0
EYE ITCHING: 0

## 2023-09-18 NOTE — PROGRESS NOTES
Kaci Glass presents today for   Chief Complaint   Patient presents with    COPD    Cough    Follow-up       Is someone accompanying this pt? Is the patient using any DME equipment during OV? No    -DME Company NA    Depression Screening:         No data to display                Learning Needs Questionnaire:     No question data found. Fall Risk:          No data to display                 Abuse Screening:          No data to display                  Coordination of Care:    1. Have you been to the ER, urgent care clinic since your last visit? Hospitalized since your last visit? 2. Have you seen or consulted any other health care providers outside of the [address] [place] [address] since your last visit? Include any pap smears or colon screening. PCP    Medication list has been update per patient.

## 2023-09-18 NOTE — PROGRESS NOTES
Mary Joshi (:  1940) is a 80 y.o. female,Established patient, here for evaluation of the following chief complaint(s):  COPD, Cough, Follow-up, and Shortness of Breath         ASSESSMENT/PLAN:  1. Chronic obstructive pulmonary disease, unspecified COPD type ([address])  2. Former smoker  3. Chronic atrial fibrillation (HCC)      Progressive shortness of breath in pt with known COPD, likely with decline in lung function. Continue Trelegy and ADRIANNA at same doses. Schedule PFT's prior to next visit. Return in about 6 months (around 3/18/2024) for schedule full PFT before next visit. Subjective   SUBJECTIVE/OBJECTIVE:  Former smoker with history of COPD, FEV1 68% in . Pt has complained of progressive shortness of breath, now with walking up 1 flight of stairs or with long walks. Pt was started on Trelegy with good response but is having difficulty with copays. Pt with a non healing ulcer of her R leg after an injury caused by a dog leash. She has been on 3 different courses of antibiotics with some response to Amoxicillin. PVL and xrays from outside are attached below. Review of Systems   Constitutional:  Negative for activity change, appetite change, chills, diaphoresis, fatigue, fever and unexpected weight change. HENT:  Negative for congestion, facial swelling, hearing loss, nosebleeds, postnasal drip, tinnitus, trouble swallowing and voice change. Eyes:  Negative for photophobia, pain, discharge, redness, itching and visual disturbance. Respiratory:  Positive for shortness of breath (with moderate to severe exertion). Negative for apnea, cough, choking, chest tightness and stridor. Wheezing: rare. Cardiovascular:  Negative for chest pain, palpitations and leg swelling. Gastrointestinal:  Negative for abdominal pain, blood in stool, constipation, diarrhea, nausea and vomiting.    Endocrine: Negative for cold intolerance, heat intolerance, polydipsia, polyphagia and

## 2023-09-27 ENCOUNTER — PROCEDURE VISIT (OUTPATIENT)
Age: 83
End: 2023-09-27

## 2023-09-27 VITALS — HEIGHT: 65 IN | BODY MASS INDEX: 21.33 KG/M2 | WEIGHT: 128 LBS

## 2023-09-27 DIAGNOSIS — J44.9 CHRONIC OBSTRUCTIVE PULMONARY DISEASE, UNSPECIFIED COPD TYPE (HCC): Primary | ICD-10-CM

## 2023-11-30 ENCOUNTER — OFFICE VISIT (OUTPATIENT)
Age: 83
End: 2023-11-30
Payer: MEDICARE

## 2023-11-30 VITALS
DIASTOLIC BLOOD PRESSURE: 62 MMHG | RESPIRATION RATE: 18 BRPM | BODY MASS INDEX: 21.26 KG/M2 | HEIGHT: 65 IN | OXYGEN SATURATION: 94 % | SYSTOLIC BLOOD PRESSURE: 139 MMHG | WEIGHT: 127.6 LBS | HEART RATE: 66 BPM | TEMPERATURE: 97.4 F

## 2023-11-30 DIAGNOSIS — R06.09 DOE (DYSPNEA ON EXERTION): ICD-10-CM

## 2023-11-30 DIAGNOSIS — Z87.891 FORMER SMOKER: ICD-10-CM

## 2023-11-30 DIAGNOSIS — J44.9 COPD, GROUP C, BY GOLD 2017 CLASSIFICATION (HCC): Primary | ICD-10-CM

## 2023-11-30 PROCEDURE — 1090F PRES/ABSN URINE INCON ASSESS: CPT | Performed by: INTERNAL MEDICINE

## 2023-11-30 PROCEDURE — G8420 CALC BMI NORM PARAMETERS: HCPCS | Performed by: INTERNAL MEDICINE

## 2023-11-30 PROCEDURE — 99214 OFFICE O/P EST MOD 30 MIN: CPT | Performed by: INTERNAL MEDICINE

## 2023-11-30 PROCEDURE — G8399 PT W/DXA RESULTS DOCUMENT: HCPCS | Performed by: INTERNAL MEDICINE

## 2023-11-30 PROCEDURE — G8484 FLU IMMUNIZE NO ADMIN: HCPCS | Performed by: INTERNAL MEDICINE

## 2023-11-30 PROCEDURE — 1123F ACP DISCUSS/DSCN MKR DOCD: CPT | Performed by: INTERNAL MEDICINE

## 2023-11-30 PROCEDURE — 3023F SPIROM DOC REV: CPT | Performed by: INTERNAL MEDICINE

## 2023-11-30 PROCEDURE — 4004F PT TOBACCO SCREEN RCVD TLK: CPT | Performed by: INTERNAL MEDICINE

## 2023-11-30 PROCEDURE — G8427 DOCREV CUR MEDS BY ELIG CLIN: HCPCS | Performed by: INTERNAL MEDICINE

## 2023-11-30 RX ORDER — LOSARTAN POTASSIUM 25 MG/1
TABLET ORAL
COMMUNITY
Start: 2023-05-18

## 2023-11-30 RX ORDER — DOXEPIN HYDROCHLORIDE 10 MG/1
10 CAPSULE ORAL NIGHTLY
COMMUNITY
Start: 2023-10-27

## 2023-11-30 RX ORDER — TIOTROPIUM BROMIDE 18 UG/1
CAPSULE ORAL; RESPIRATORY (INHALATION)
COMMUNITY

## 2023-11-30 RX ORDER — TORSEMIDE 10 MG/1
TABLET ORAL
COMMUNITY

## 2023-11-30 RX ORDER — TRAZODONE HYDROCHLORIDE 50 MG/1
50 TABLET ORAL NIGHTLY PRN
COMMUNITY
Start: 2022-11-03

## 2023-11-30 RX ORDER — FAMOTIDINE 10 MG
10 TABLET ORAL DAILY
COMMUNITY

## 2023-11-30 ASSESSMENT — ENCOUNTER SYMPTOMS
EYE REDNESS: 0
CHEST TIGHTNESS: 0
TROUBLE SWALLOWING: 0
EYE DISCHARGE: 0
CONSTIPATION: 0
STRIDOR: 0
EYE PAIN: 0
VOICE CHANGE: 0
BLOOD IN STOOL: 0
CHOKING: 0
COLOR CHANGE: 0
VOMITING: 0
PHOTOPHOBIA: 0
APNEA: 0
EYE ITCHING: 0
FACIAL SWELLING: 0
NAUSEA: 0
DIARRHEA: 0
ABDOMINAL PAIN: 0
COUGH: 0
SHORTNESS OF BREATH: 1

## 2023-11-30 NOTE — PROGRESS NOTES
Jazmin (:  1940) is a 80 y.o. female,Established patient, here for evaluation of the following chief complaint(s):  COPD and Follow-up         ASSESSMENT/PLAN:  1. COPD, group C, by GOLD 2017 classification ([facility])  -     [facility],  [facility]  2. ANDREW (dyspnea on exertion)  3. Former smoker      Progressive shortness of breath in pt with known COPD, consistent with spirometry findings. Continue Trelegy and ADRIANNA at same doses. Discussed risks and benefits of pulmonary rehab, pt agrees on referral.   Will be able to assess O2 need as well with pre rehab 6 minute walk. Return in about 6 months (around 2024). Subjective   SUBJECTIVE/OBJECTIVE:  Former smoker with history of COPD, FEV1 68% in . Pt has complained of progressive shortness of breath, now with walking up 1 flight of stairs or with long walks. Pt was started on Trelegy with good response, reports good compliance. Pt with a non healing ulcer of her R leg after an injury caused by a dog leash. She is followed by the wound clinic at [facility]. She has been on 3 different courses of antibiotics with some response to Amoxicillin. PVL and xrays from outside are attached below. Review of Systems   Constitutional:  Negative for activity change, appetite change, chills, diaphoresis, fatigue, fever and unexpected weight change. HENT:  Negative for congestion, facial swelling, hearing loss, nosebleeds, postnasal drip, tinnitus, trouble swallowing and voice change. Eyes:  Negative for photophobia, pain, discharge, redness, itching and visual disturbance. Respiratory:  Positive for shortness of breath (with moderate to severe exertion). Negative for apnea, cough, choking, chest tightness and stridor. Wheezing: rare. Cardiovascular:  Negative for chest pain, palpitations and leg swelling.    Gastrointestinal:  Negative for abdominal pain, blood in stool, constipation, diarrhea, nausea and

## 2023-11-30 NOTE — PROGRESS NOTES
Keaton Loyd presents today for   Chief Complaint   Patient presents with    COPD    Follow-up       Is someone accompanying this pt? No    Is the patient using any DME equipment during OV? No    -DME Company NA    Depression Screening:         No data to display                Learning Needs Questionnaire:     Who is the primary learner? Patient    What is the preferred language for health care of the primary learner? ENGLISH    How does the primary learner prefer to learn new concepts? DEMONSTRATION    Answered By Patient    Relationship to Learner SELF          Fall Risk:          No data to display                 Abuse Screening:          No data to display                  Coordination of Care:    1. Have you been to the ER, urgent care clinic since your last visit? Hospitalized since your last visit? No    2. Have you seen or consulted any other health care providers outside of the [address] [place] [address] since your last visit? Include any pap smears or colon screening. PCP    Medication list has been update per patient.

## 2024-02-05 ENCOUNTER — HOSPITAL ENCOUNTER (OUTPATIENT)
Facility: HOSPITAL | Age: 84
Setting detail: INFUSION SERIES
Discharge: HOME | End: 2024-02-08
Payer: MEDICARE

## 2024-02-05 VITALS
OXYGEN SATURATION: 98 % | DIASTOLIC BLOOD PRESSURE: 80 MMHG | TEMPERATURE: 98 F | RESPIRATION RATE: 16 BRPM | SYSTOLIC BLOOD PRESSURE: 132 MMHG | HEART RATE: 68 BPM

## 2024-02-05 DIAGNOSIS — E83.42 HYPOMAGNESEMIA: ICD-10-CM

## 2024-02-05 DIAGNOSIS — M81.8 IDIOPATHIC OSTEOPOROSIS: Primary | ICD-10-CM

## 2024-02-05 LAB
ANION GAP SERPL CALC-SCNC: 0 MMOL/L (ref 3–18)
BUN SERPL-MCNC: 14 MG/DL (ref 7–18)
BUN/CREAT SERPL: 20 (ref 12–20)
CALCIUM SERPL-MCNC: 8.8 MG/DL (ref 8.5–10.1)
CHLORIDE SERPL-SCNC: 109 MMOL/L (ref 100–111)
CO2 SERPL-SCNC: 32 MMOL/L (ref 21–32)
CREAT SERPL-MCNC: 0.71 MG/DL (ref 0.6–1.3)
GLUCOSE SERPL-MCNC: 73 MG/DL (ref 74–99)
MAGNESIUM SERPL-MCNC: 2 MG/DL (ref 1.6–2.6)
PHOSPHATE SERPL-MCNC: 3.5 MG/DL (ref 2.5–4.9)
POTASSIUM SERPL-SCNC: 4.2 MMOL/L (ref 3.5–5.5)
SODIUM SERPL-SCNC: 141 MMOL/L (ref 136–145)

## 2024-02-05 PROCEDURE — 80048 BASIC METABOLIC PNL TOTAL CA: CPT

## 2024-02-05 PROCEDURE — 36415 COLL VENOUS BLD VENIPUNCTURE: CPT

## 2024-02-05 PROCEDURE — 83735 ASSAY OF MAGNESIUM: CPT

## 2024-02-05 PROCEDURE — 84100 ASSAY OF PHOSPHORUS: CPT

## 2024-02-05 PROCEDURE — 6360000002 HC RX W HCPCS: Performed by: FAMILY MEDICINE

## 2024-02-05 PROCEDURE — 96372 THER/PROPH/DIAG INJ SC/IM: CPT

## 2024-02-05 RX ORDER — ALBUTEROL SULFATE 90 UG/1
4 AEROSOL, METERED RESPIRATORY (INHALATION) PRN
OUTPATIENT
Start: 2024-02-05

## 2024-02-05 RX ORDER — DIPHENHYDRAMINE HYDROCHLORIDE 50 MG/ML
50 INJECTION INTRAMUSCULAR; INTRAVENOUS
OUTPATIENT
Start: 2024-02-05

## 2024-02-05 RX ORDER — SODIUM CHLORIDE 9 MG/ML
INJECTION, SOLUTION INTRAVENOUS CONTINUOUS
OUTPATIENT
Start: 2024-02-05

## 2024-02-05 RX ORDER — ACETAMINOPHEN 325 MG/1
650 TABLET ORAL
OUTPATIENT
Start: 2024-02-05

## 2024-02-05 RX ORDER — EPINEPHRINE 1 MG/ML
0.3 INJECTION, SOLUTION, CONCENTRATE INTRAVENOUS PRN
OUTPATIENT
Start: 2024-02-05

## 2024-02-05 RX ORDER — ONDANSETRON 2 MG/ML
8 INJECTION INTRAMUSCULAR; INTRAVENOUS
OUTPATIENT
Start: 2024-02-05

## 2024-02-05 RX ADMIN — DENOSUMAB 60 MG: 60 INJECTION SUBCUTANEOUS at 10:55

## 2024-02-05 NOTE — PROGRESS NOTES
[facility] Progress Note    Date: 2024    Name: Amy Bernardo    MRN: [id number]         : 1940      Ms. Bernardo arrived to [facility] at 0847.    Ms. Bernardo was assessed and education was provided.     Ms. Bernardo's vitals were reviewed.  Vitals:    24 0905   BP: 132/80   Pulse: 68   Resp: 16   Temp: 98 °F (36.7 °C)   SpO2: 98%      Blood drawn for labs via left antecubital venipuncture     Lab results were obtained and reviewed.   Latest Reference Range & Units 24 09:14   Sodium 136 - 145 mmol/L 141   Potassium 3.5 - 5.5 mmol/L 4.2   Chloride 100 - 111 mmol/L 109   CO2 21 - 32 mmol/L 32   BUN,BUNPL 7.0 - 18 MG/DL 14   Creatinine 0.6 - 1.3 MG/DL 0.71   Bun/Cre Ratio 12 - 20   20   Anion Gap 3.0 - 18 mmol/L 0 (L)   Est, Glom Filt Rate >60 ml/min/1.73m2 >60   Magnesium 1.6 - 2.6 mg/dL 2.0   Glucose, Random 74 - 99 mg/dL 73 (L)   CALCIUM, SERUM, 193884 8.5 - 10.1 MG/DL 8.8       denosumab (PROLIA) SC injection 60 mg was administered as ordered SQ in patient's left upper arm, site was dressed with a band-aid.    Ms. Bernardo tolerated well without complaints.    Discharge/ follow-up instructions discussed with patient who verbalized understanding of all information received.    Patient's armband was removed & shredded.    [facility] at 1056.  She is to return on 24 at 0900 for her next appointment.    Eugenie Durant RN  2024

## 2024-02-21 RX ORDER — MONTELUKAST SODIUM 10 MG/1
10 TABLET ORAL DAILY
COMMUNITY
Start: 2023-12-13

## 2024-02-21 RX ORDER — DOXEPIN HYDROCHLORIDE 10 MG/1
1 CAPSULE ORAL NIGHTLY
COMMUNITY
Start: 2022-11-19

## 2024-02-21 NOTE — PROGRESS NOTES
Pressure/Heart medications.  Bring inhaler.      Any questions regarding prep, please call the office at [phone].    For any questions or concerns on the day of procedure, please call the Endo Suite at [phone].    These surgical instructions were reviewed with Amy Bernardo during the PAT phone call.

## 2024-03-05 ENCOUNTER — ANESTHESIA EVENT (OUTPATIENT)
Facility: HOSPITAL | Age: 84
End: 2024-03-05
Payer: MEDICARE

## 2024-03-06 ENCOUNTER — HOSPITAL ENCOUNTER (OUTPATIENT)
Facility: HOSPITAL | Age: 84
Setting detail: OUTPATIENT SURGERY
Discharge: HOME | End: 2024-03-06
Attending: INTERNAL MEDICINE | Admitting: INTERNAL MEDICINE
Payer: MEDICARE

## 2024-03-06 ENCOUNTER — ANESTHESIA (OUTPATIENT)
Facility: HOSPITAL | Age: 84
End: 2024-03-06
Payer: MEDICARE

## 2024-03-06 VITALS
OXYGEN SATURATION: 97 % | WEIGHT: 122.6 LBS | HEART RATE: 66 BPM | BODY MASS INDEX: 20.43 KG/M2 | RESPIRATION RATE: 18 BRPM | TEMPERATURE: 97.8 F | HEIGHT: 65 IN | DIASTOLIC BLOOD PRESSURE: 60 MMHG | SYSTOLIC BLOOD PRESSURE: 107 MMHG

## 2024-03-06 PROCEDURE — 6360000002 HC RX W HCPCS: Performed by: NURSE ANESTHETIST, CERTIFIED REGISTERED

## 2024-03-06 PROCEDURE — 3600007502: Performed by: INTERNAL MEDICINE

## 2024-03-06 PROCEDURE — 3700000000 HC ANESTHESIA ATTENDED CARE: Performed by: INTERNAL MEDICINE

## 2024-03-06 PROCEDURE — 88305 TISSUE EXAM BY PATHOLOGIST: CPT

## 2024-03-06 PROCEDURE — 3700000001 HC ADD 15 MINUTES (ANESTHESIA): Performed by: INTERNAL MEDICINE

## 2024-03-06 PROCEDURE — 2580000003 HC RX 258: Performed by: NURSE ANESTHETIST, CERTIFIED REGISTERED

## 2024-03-06 PROCEDURE — 3600007512: Performed by: INTERNAL MEDICINE

## 2024-03-06 PROCEDURE — 7100000000 HC PACU RECOVERY - FIRST 15 MIN: Performed by: INTERNAL MEDICINE

## 2024-03-06 PROCEDURE — 2500000003 HC RX 250 WO HCPCS: Performed by: NURSE ANESTHETIST, CERTIFIED REGISTERED

## 2024-03-06 PROCEDURE — 7100000010 HC PHASE II RECOVERY - FIRST 15 MIN: Performed by: INTERNAL MEDICINE

## 2024-03-06 PROCEDURE — 2709999900 HC NON-CHARGEABLE SUPPLY: Performed by: INTERNAL MEDICINE

## 2024-03-06 RX ORDER — LIDOCAINE HYDROCHLORIDE 10 MG/ML
1 INJECTION, SOLUTION EPIDURAL; INFILTRATION; INTRACAUDAL; PERINEURAL
Status: DISCONTINUED | OUTPATIENT
Start: 2024-03-06 | End: 2024-03-06 | Stop reason: HOSPADM

## 2024-03-06 RX ORDER — PROPOFOL 10 MG/ML
INJECTION, EMULSION INTRAVENOUS PRN
Status: DISCONTINUED | OUTPATIENT
Start: 2024-03-06 | End: 2024-03-06 | Stop reason: SDUPTHER

## 2024-03-06 RX ORDER — SODIUM CHLORIDE, SODIUM LACTATE, POTASSIUM CHLORIDE, CALCIUM CHLORIDE 600; 310; 30; 20 MG/100ML; MG/100ML; MG/100ML; MG/100ML
INJECTION, SOLUTION INTRAVENOUS CONTINUOUS
Status: DISCONTINUED | OUTPATIENT
Start: 2024-03-06 | End: 2024-03-06 | Stop reason: HOSPADM

## 2024-03-06 RX ADMIN — PROPOFOL 20 MG: 10 INJECTION, EMULSION INTRAVENOUS at 09:38

## 2024-03-06 RX ADMIN — DEXMEDETOMIDINE HYDROCHLORIDE 10 MCG: 100 INJECTION, SOLUTION INTRAVENOUS at 09:33

## 2024-03-06 RX ADMIN — SODIUM CHLORIDE, POTASSIUM CHLORIDE, SODIUM LACTATE AND CALCIUM CHLORIDE: 600; 310; 30; 20 INJECTION, SOLUTION INTRAVENOUS at 08:10

## 2024-03-06 RX ADMIN — PROPOFOL 50 MG: 10 INJECTION, EMULSION INTRAVENOUS at 09:37

## 2024-03-06 ASSESSMENT — PAIN - FUNCTIONAL ASSESSMENT: PAIN_FUNCTIONAL_ASSESSMENT: 0-10

## 2024-03-06 ASSESSMENT — COPD QUESTIONNAIRES: CAT_SEVERITY: MODERATE

## 2024-03-06 NOTE — ANESTHESIA PRE PROCEDURE
Department of Anesthesiology  Preprocedure Note       Name:  Amy Bernardo   Age:  83 y.o.  :  1940                                          MRN:  [id number]         Date:  3/6/2024      Surgeon: Surgeon(s):  Ramesh Goldsmith MD    Procedure: Procedure(s):  ESOPHAGOGASTRODUODENOSCOPY    Medications prior to admission:   Prior to Admission medications    Medication Sig Start Date End Date Taking? Authorizing Provider   doxepin (SINEQUAN) 10 MG capsule Take 1 capsule by mouth nightly  Patient not taking: Reported on 3/6/2024 11/19/22  Yes ProviderJeannette MD   montelukast (SINGULAIR) 10 MG tablet Take 1 tablet by mouth daily 23   Jeannette Martines MD   tiotropium (SPIRIVA HANDIHALER) 18 MCG inhalation capsule     Jeannette Martines MD   traZODone (DESYREL) 50 MG tablet Take 1 tablet by mouth nightly as needed  Patient not taking: Reported on 3/6/2024 11/3/22   Jeannette Martines MD   torsemide (DEMADEX) 10 MG tablet     ProviderJeannette MD   famotidine (PEPCID) 40 MG tablet Take 1 tablet by mouth daily    ProviderJeannette MD   losartan (COZAAR) 50 MG tablet Take 1 tablet by mouth 2 times daily 23   ProviderJeannette MD   fluticasone-umeclidin-vilant (TRELEGY ELLIPTA) 100-62.5-25 MCG/ACT AEPB inhaler Inhale 1 puff into the lungs daily 23   Kassie Freed MD   BIOTIN PO Take by mouth 2 times daily    Automatic Reconciliation, Ar   CALCIUM PO Take by mouth daily    Automatic Reconciliation, Ar   albuterol sulfate HFA (PROVENTIL;VENTOLIN;PROAIR) 108 (90 Base) MCG/ACT inhaler Inhale 2 puffs into the lungs every 4 hours as needed 20   Automatic Reconciliation, Ar   atorvastatin (LIPITOR) 40 MG tablet TK 1 T PO QHS 10/15/20   Automatic Reconciliation, Ar   cyanocobalamin 1000 MCG/ML injection Taken weekly on Saturdays 10/4/16   Automatic Reconciliation, Ar   dabigatran (PRADAXA) 150 MG capsule Take 1 capsule by mouth 2 times daily 16   Automatic

## 2024-03-06 NOTE — ANESTHESIA POSTPROCEDURE EVALUATION
Department of Anesthesiology  Postprocedure Note    Patient: Amy Bernardo  MRN: [id number]  YOB: 1940  Date of evaluation: 3/6/2024    Procedure Summary       Date: 03/06/24 Room / Location: [facility] ENDO 02 / [facility] ENDOSCOPY    Anesthesia Start: 0930 Anesthesia Stop: 0951    Procedure: ESOPHAGOGASTRODUODENOSCOPY with bxs (Upper GI Region) Diagnosis:       Dumping syndrome      Vomiting, unspecified vomiting type, unspecified whether nausea present      Pulmonary emphysema, unspecified emphysema type (HCC)      Gastric intestinal metaplasia      Malignant neoplasm of stomach, unspecified location (HCC)      (Dumping syndrome [K91.1])      (Vomiting, unspecified vomiting type, unspecified whether nausea present [R11.10])      (Pulmonary emphysema, unspecified emphysema type (HCC) [J43.9])      (Gastric intestinal metaplasia [K31.A0])      (Malignant neoplasm of stomach, unspecified location (HCC) [C16.9])    Surgeons: Ramesh Goldsmith MD Responsible Provider: Naga Cardona MD    Anesthesia Type: MAC ASA Status: 3            Anesthesia Type: MAC    Kavitha Phase I: Kavitha Score: 10    Kavitha Phase II: Kavitha Score: 10    Anesthesia Post Evaluation    Patient location during evaluation: bedside  Patient participation: complete - patient participated  Level of consciousness: responsive to verbal stimuli  Airway patency: patent  Nausea & Vomiting: no nausea  Respiratory status: acceptable  Hydration status: euvolemic    No notable events documented.

## 2024-03-06 NOTE — H&P
packs/day: 0.3 packs/day for 20.0 years (5.0 ttl pk-yrs)     Types: Cigarettes     Start date: 1961     Quit date: 1981     Years since quittin.1    Smokeless tobacco: Never   Substance and Sexual Activity    Alcohol use: Yes     Alcohol/week: 1.0 standard drink of alcohol     Types: 1 Cans of beer per week     Comment: occ    Drug use: No    Sexual activity: Not on file   Other Topics Concern    Not on file   Social History Narrative    Not on file     Social Determinants of Health     Financial Resource Strain: Not on file   Food Insecurity: Not on file   Transportation Needs: Not on file   Physical Activity: Not on file   Stress: Not on file   Social Connections: Not on file   Intimate Partner Violence: Not on file   Housing Stability: Not on file       FHX:   Family History   Problem Relation Age of Onset    Breast Cancer Sister     Breast Cancer Other         Grandmother - nos    Breast Cancer Sister     Heart Attack Father        Allergy:   Allergies   Allergen Reactions    Codeine Nausea Only     Patient states not allergic    Diltiazem Other (See Comments)     Patient states not allergic    Tramadol     Tramadol-Acetaminophen      Other reaction(s): other/intolerance       Patient Active Problem List   Diagnosis    Chronic cholecystitis with calculus    Right shoulder pain    Dyslipidemia    Rotator cuff tear arthropathy of right shoulder    Postlaminectomy syndrome    Atrial fibrillation (HCC)    Benign hypertensive heart disease without heart failure    Idiopathic osteoporosis    Aorta aneurysm (HCC)    Malignant neoplasm of stomach (HCC)    Hypomagnesemia       Home Medications:     Prior to Admission medications    Medication Sig Start Date End Date Taking? Authorizing Provider   doxepin (SINEQUAN) 10 MG capsule Take 1 capsule by mouth nightly  Patient not taking: Reported on 3/6/2024 11/19/22  Yes Provider, MD Jeannette   montelukast (SINGULAIR) 10 MG tablet Take 1 tablet by mouth daily

## 2024-03-11 ENCOUNTER — HOSPITAL ENCOUNTER (OUTPATIENT)
Facility: HOSPITAL | Age: 84
Setting detail: RECURRING SERIES
Discharge: HOME | End: 2024-03-14
Payer: MEDICARE

## 2024-03-11 VITALS — OXYGEN SATURATION: 93 %

## 2024-03-11 PROCEDURE — 94618 PULMONARY STRESS TESTING: CPT

## 2024-03-11 ASSESSMENT — EXERCISE STRESS TEST
PEAK_HR: 88
PEAK_BP: 168/87
PEAK_METS: 1.9
PEAK_RPE: 13
PEAK_BP: 168/87

## 2024-03-11 ASSESSMENT — PATIENT HEALTH QUESTIONNAIRE - PHQ9
SUM OF ALL RESPONSES TO PHQ QUESTIONS 1-9: 1
4. FEELING TIRED OR HAVING LITTLE ENERGY: 0
5. POOR APPETITE OR OVEREATING: 0
10. IF YOU CHECKED OFF ANY PROBLEMS, HOW DIFFICULT HAVE THESE PROBLEMS MADE IT FOR YOU TO DO YOUR WORK, TAKE CARE OF THINGS AT HOME, OR GET ALONG WITH OTHER PEOPLE: 0
SUM OF ALL RESPONSES TO PHQ QUESTIONS 1-9: 1
3. TROUBLE FALLING OR STAYING ASLEEP: 1
2. FEELING DOWN, DEPRESSED OR HOPELESS: 0
8. MOVING OR SPEAKING SO SLOWLY THAT OTHER PEOPLE COULD HAVE NOTICED. OR THE OPPOSITE, BEING SO FIGETY OR RESTLESS THAT YOU HAVE BEEN MOVING AROUND A LOT MORE THAN USUAL: 0
SUM OF ALL RESPONSES TO PHQ QUESTIONS 1-9: 1
SUM OF ALL RESPONSES TO PHQ QUESTIONS 1-9: 1
SUM OF ALL RESPONSES TO PHQ9 QUESTIONS 1 & 2: 0
7. TROUBLE CONCENTRATING ON THINGS, SUCH AS READING THE NEWSPAPER OR WATCHING TELEVISION: 0
9. THOUGHTS THAT YOU WOULD BE BETTER OFF DEAD, OR OF HURTING YOURSELF: 0
1. LITTLE INTEREST OR PLEASURE IN DOING THINGS: 0
6. FEELING BAD ABOUT YOURSELF - OR THAT YOU ARE A FAILURE OR HAVE LET YOURSELF OR YOUR FAMILY DOWN: 0

## 2024-03-11 ASSESSMENT — LIFESTYLE VARIABLES
ALCOHOL_AMOUNT: 1 GLASS
ALCOHOL_USE: DAILY
ALCOHOL_TYPE: WINE/BEER

## 2024-03-11 ASSESSMENT — EJECTION FRACTION: EF_VALUE: 30

## 2024-03-11 ASSESSMENT — PULMONARY FUNCTION TESTS
FEV1/FVC: 86
FEV1 (%PREDICTED): 54
FVC (LITERS): 64

## 2024-03-11 NOTE — PROGRESS NOTES
in family and community activities [] No [x] Yes      Limited coping strategies [] No [x] Yes      Inappropriate over dependence on family and friends [] No [] Yes      Stressors [x] No [] Yes  If yes, please describe:     Able to relax or perform leisure activities [] No [x] Yes   Describe leisure activities: cook, spend time with family and friend, golf, dining, cook    Able to participate in former interests/hobbies: [] No [x] Yes   Describe hobbies: bowling    Able to do yard/household activities: [] No [x] Yes   Describe activities: rake and clean deck off, yard work, laundry, make bed  What is most difficult household chore?: make bed  What is easiest household chore?:  cooking    Current mood assessed: [] No [x] Yes   Description of present mood: []  Worried  []   Sad []    []  Depressed []   Impatient  []   Frustrated  []   Anxious  []   Contented  []   Cheerful  [x]   Happy []   Other:    Needs referral to referring MD for follow up: [] No [] Yes       Aspects of family & home situation that may impact progress in Pulmonary Rehabilitation:      Barrier(s) to learning:  none     [x]  Family & home situation will enhance Pulmonary Rehabilitation, patient is a good candidate     []  Family & home situation will hinder Pulmonary Rehabilitation but patient will be accepted into the program.     ______________________________________________________________________      Patient name: Amy Bernardo : 1940         Goals Comments   1. Improve lower body strength   [x] initial  [] met                  [] not met  [] progressing     2. Learn breathing techniques [x] initial  [] met                  [] not met  [] progressing        Frequency / Duration: Patient to be seen 2 times per week for 18 weeks:          Dk Willson RCP 3/11/2024 1:55 PM      PLAN:    See ITP for Plan and Interventions during the comprehensive pulmonary rehabilitation program  
--   Tobacco Adjunct --   Tobacco Education    Resource Information Provided --   Tobacco Triggers --   Target Goal    Target Goal --   Patient Stated Tobacco Goals --   Nutrition    Stages of Change Other (comment)   Diabetes No   FBS --   FBS Date --   HgbA1c --   HgbA1c Date --   BG at Home --   BG Frequency --   Diabetes Med(s) --   Diabetes Med(s) Change --   BG in Range --   Random BG --   Lipids    Date Lipids Drawn --  [no recent lipid panel]   Total --   HDL --   LDL --   Triglycerides --   Lipid Med(s) --   Lipid Med Change(s) --   Weight Management    Weight 56.7 kg (125 lb)   Height 1.651 m (5' 5\")   BMI 20.84   Percent Body Fat --   Weight Goal 125   Waist Circumference 32.5 in   Alcohol Daily   Type wine/beer   Amount 1 glass   Nutrition Assessment Tool RYP   Rate Your Plate Total Score 64   Nutrition Intervention    Dietitian Consult --   Nurse/Patient Discussion --   Nutrition Goals --   Supplemental Nutrition --   Nutrition Class --   Diabetes Education Referral No   Lipid Clinic Referral No   Weight Management Referral No   Nutrition Education    Education Low fat saturated diet, Low sodium diet, Limit added sugars, Overall healthy eating pattern that emphasizes vegetables, fruits, whole grains, healthy proteins and non-tropical oils, Reduced calorie/portion controlled diet, Other (comment)   Comments --   Nutrition Target Goals    Target Goals --   Patient Stated Nutrition Goals --   Education    Learning Barrier Other (comment), Ready to learn   Pulmonary Total Score --   Cardiac Knowledge Total Score --   Education Intervention    Education Schedule Given --   Patient Education    Education Activities of daily living, Breaking the dyspnea cycle, Bronchial hygiene/controlled cough, DM & lung disease, Heart/lung association, Med compliance, Preventing infection, Pulmonary A&P, lung/gas exchange, Pulmonary medications, Signs/symptoms of hypo/hyperglycemia, Tobacco triggers, Other (comment)

## 2024-03-18 ENCOUNTER — HOSPITAL ENCOUNTER (OUTPATIENT)
Facility: HOSPITAL | Age: 84
Setting detail: RECURRING SERIES
Discharge: HOME | End: 2024-03-21
Payer: MEDICARE

## 2024-03-18 PROCEDURE — G0237 THERAPEUTIC PROCD STRG ENDUR: HCPCS

## 2024-03-18 PROCEDURE — G0239 OTH RESP PROC, GROUP: HCPCS

## 2024-03-18 NOTE — PROGRESS NOTES
Pulmonary/RT Note    Visit #      2/36         Amy Bernardo is a 83 y.o. female presented today for pulmonary rehab. She state that there have been no changes in medication or health since the last visit. Her blood pressures were better today, she shared she has been recording her values at home and they were similar to her pre BP. She was given a [facility]'s chronic lung resource handbook.         She has a target heart rate of . She tolerated the exercise session well and has a pain level of 0 although she had an epidural in her lower back about 4 weeks ago. Patient states RPE for session today was 12 and RPD was a 1. Today the pt did:      Nustep: 15 min w/workload of 4  Arm bike: 10 min 0 martinez  Breathing retraining w/ diaphragm breathing ed: 15 min  Recovery and monitoring: 15 min             Physician available for emergencies: Dr. Rox Willson, ARIADNEP 3/18/2024 1:53 PM

## 2024-03-20 ENCOUNTER — HOSPITAL ENCOUNTER (OUTPATIENT)
Facility: HOSPITAL | Age: 84
Setting detail: RECURRING SERIES
Discharge: HOME | End: 2024-03-23
Payer: MEDICARE

## 2024-03-20 PROCEDURE — G0237 THERAPEUTIC PROCD STRG ENDUR: HCPCS

## 2024-03-20 NOTE — PROGRESS NOTES
Pulmonary/RT Note    Visit #    3/36           Amy Bernardo is a 83 y.o. female presented today for pulmonary rehab. She state that there have been no changes in medication or health since the last visit.          She has a target heart rate of . She tolerated the exercise session well and has a pain level of 0. Patient states RPE for session today was 14 and RPD was a 3. Today the pt did:      Nustep:15 min w/workload of 4  Arm bike:15  min  Breathing retraining:15 min               Physician available for emergencies: Dr. Rox Willson, HUNTER 3/20/2024 11:20 AM

## 2024-03-25 ENCOUNTER — HOSPITAL ENCOUNTER (OUTPATIENT)
Facility: HOSPITAL | Age: 84
Setting detail: RECURRING SERIES
Discharge: HOME | End: 2024-03-28
Payer: MEDICARE

## 2024-03-25 PROCEDURE — G0239 OTH RESP PROC, GROUP: HCPCS

## 2024-03-25 PROCEDURE — G0237 THERAPEUTIC PROCD STRG ENDUR: HCPCS

## 2024-03-25 NOTE — PROGRESS NOTES
Pulmonary/RT Note    Visit #               Amy Bernardo is a 83 y.o. female presented today for pulmonary rehab. She state that there have been no changes in medication or health since the last visit.          She has a target heart rate of . She tolerated the exercise session well and has a pain level of 0. Patient states RPE for session today was 12 and RPD was a 0. Today the pt did:      Nustep:15 min Workload 4  Arm bike: 15 min  Walkin min  Resistance bands:  0 min   Breathing retraining:15 min  Bodyweight: 0 min  Recovery: 15 min             Physician available for emergencies: Dr. Atul Arboleda, RT 3/25/2024 11:17 AM

## 2024-03-28 ENCOUNTER — HOSPITAL ENCOUNTER (OUTPATIENT)
Facility: HOSPITAL | Age: 84
Setting detail: RECURRING SERIES
Discharge: HOME | End: 2024-03-31
Payer: MEDICARE

## 2024-03-28 PROCEDURE — G0237 THERAPEUTIC PROCD STRG ENDUR: HCPCS

## 2024-03-28 NOTE — PROGRESS NOTES
Pulmonary/RT Note    Visit #   5/36            Amy Bernardo is a 83 y.o. female presented today for pulmonary rehab. She state that there have been no changes in medication or health since the last visit.     Her pulse ox readings are difficult to read at times and her finger tips are cool.         She has a target heart rate of . She tolerated the exercise session well and has a pain level of 0. Patient states RPE for session today was 14 and RPD was a 3. Today the pt did:      Nustep: 15 min w/workload of 4  Arm bike: 15 min 0 martinez  Breathing retraining:15 min               Physician available for emergencies: Dr. Vicki Willson, ARIADNEP 3/28/2024 12:44 PM

## 2024-04-04 ENCOUNTER — HOSPITAL ENCOUNTER (OUTPATIENT)
Facility: HOSPITAL | Age: 84
Setting detail: RECURRING SERIES
Discharge: HOME | End: 2024-04-07
Payer: MEDICARE

## 2024-04-04 PROCEDURE — G0237 THERAPEUTIC PROCD STRG ENDUR: HCPCS

## 2024-04-04 NOTE — PROGRESS NOTES
Pulmonary/RT Note    Visit #    6/36           Amy Bernardo is a 83 y.o. female presented today for pulmonary rehab. She state that there have been no changes in medication or health since the last visit. She often has cool finger tips, I had difficulty using the pulse oximeter today.          She has a target heart rate of . She tolerated the exercise session well and has a pain level of 0. Patient states RPE for session today was 12 and RPD was a 1. Today the pt did:      Nustep:15 min w/workload of 4  Arm bike: 15 min  Breathing retraining:15 min  Recovery/monitoring: 15 min               Physician available for emergencies: Dr. Atul Willson, HUNTER 4/4/2024 1:03 PM

## 2024-04-05 ASSESSMENT — LIFESTYLE VARIABLES
ALCOHOL_TYPE: WINE/BEER
ALCOHOL_AMOUNT: 1 GLASS
ALCOHOL_USE: DAILY

## 2024-04-05 ASSESSMENT — EJECTION FRACTION: EF_VALUE: 30

## 2024-04-05 ASSESSMENT — PULMONARY FUNCTION TESTS
FEV1 (%PREDICTED): 54
FVC (LITERS): 64
FEV1/FVC: 86

## 2024-04-05 ASSESSMENT — EXERCISE STRESS TEST
PEAK_BP: 137/68
PEAK_RPE: 13
PEAK_HR: 88
PEAK_BP: 168/87
PEAK_METS: 1.9

## 2024-04-05 NOTE — PROGRESS NOTES
Amy Bernardo #[id number] (CSN:[id number]) (83 y.o. F) (Adm: 04/04/24)  [facility]  Pulmonary ITP    Flowsheet Row CARDIO PULMONARY REHAB from 4/4/2024 in [facility] PULMONARY REHAB   Treatment Diagnosis    Treatment Diagnosis 1 COPD   Treatment Diagnosis 2 --   Referral Date 11/30/23   Significant Cardiovascular History Chronic atrial fibrillation   Co-morbidities Pulmonary disease  [Asthma, FRANKO]   Oxygen Saturation / Titration    Stages of Change --   Oxygen Intervention    Oxygen Use No   Oxygen Type --   Patients Liter Flow --   O2 Sat Greater Than 90% --   Nurse/Patient Discussion --   Oxygen Education --   Oxygen Target Goals --   Patient Stated Goals --   Individual Treatment Plan    ITP Visit Type Re-assessment   1st Date of Exercise 03/18/24   ITP Next Review Date 04/08/24   Visit #/Total Visits 6/36   EF% 30 %   FVC (Liters) 64 liters   FEV1 (%PRED) 54   FEV1/FVC 86   DLCO (mL/mmHg sec) 45 ml/mmHg sec   Risk Stratification Moderate   Pulmonary ITP Exercise, Psychosocial, Tobacco, Nutrition, Education   Exercise    Christiansen Total Score --   Stages of Change --   Exercise Test Six minute walk test   Distance Walked in ft   Distance Walked (ft) --   Distance Walked (miles) --   Distance Walked in Feet (Calculated) --   Distance Walked in Meters --   Peak HR 88   Peak /87   Peak RPE 13   Peak Mets 1.9   O2 Saturation --   O2 Flow Rate (l/min) --   Stops 0   SPO2 Range --   DASI Total Score --   BMI (Calculated) --   FEV1 (%PRED) 54   ALEX Total Score --   Exercise Prescription    Mode Stepper, Bike, Track, Elliptical, Other (comment), Ergometer   Frequency per week 2 times   Duration Per Session 60 min   Intensity METS       2-3   Progression slow   SpO2 --   O2 Device Room air   O2 Flow Rate (l/min) --   FIO2 (%) --   O2 Temperature --   Comments --   Symptoms with Exercise Shortness of breath   Target Heart Rate    Resistance Training Yes   Weight --   Reps --   Assisted Devices --   Exercise

## 2024-04-08 ENCOUNTER — APPOINTMENT (OUTPATIENT)
Facility: HOSPITAL | Age: 84
End: 2024-04-08
Payer: MEDICARE

## 2024-04-09 ENCOUNTER — HOSPITAL ENCOUNTER (OUTPATIENT)
Facility: HOSPITAL | Age: 84
Setting detail: RECURRING SERIES
Discharge: HOME | End: 2024-04-12
Payer: MEDICARE

## 2024-04-09 PROCEDURE — G0237 THERAPEUTIC PROCD STRG ENDUR: HCPCS

## 2024-04-09 NOTE — PROGRESS NOTES
Pulmonary/RT Note    Visit #      7/36         Amy Bernardo is a 83 y.o. female presented today for pulmonary rehab. She state that there have been no changes in medication or health since the last visit.          She has a target heart rate of . She tolerated the exercise session well and has a pain level of 0. Patient states RPE for session today was 14 and RPD was a 3. Today the pt did:      Nustep: 15 min  Arm bike: 15 min  Breathing retraining: 15 min  Recovery/monitoring: 15 min               Physician available for emergencies: Dr. Rox Willson, ARIADNEP 4/9/2024 1:08 PM

## 2024-04-10 ENCOUNTER — APPOINTMENT (OUTPATIENT)
Facility: HOSPITAL | Age: 84
End: 2024-04-10
Payer: MEDICARE

## 2024-04-11 ENCOUNTER — HOSPITAL ENCOUNTER (OUTPATIENT)
Facility: HOSPITAL | Age: 84
Setting detail: RECURRING SERIES
Discharge: HOME | End: 2024-04-14
Payer: MEDICARE

## 2024-04-11 PROCEDURE — G0237 THERAPEUTIC PROCD STRG ENDUR: HCPCS

## 2024-04-11 NOTE — PROGRESS NOTES
Pulmonary/RT Note    Visit #      8/36         Amy Bernardo is a 83 y.o. female presented today for pulmonary rehab. She state that there have been no changes in medication or health since the last visit. She has had a 3 lb weight gain and has been slightly more short of breath today. We dicussed fluid management and plan.  She states she is hesitant to take her diuretics when driving to doctor's appointments.  She plans to start taking her medications today and will update her doctor.          She has a target heart rate of . She tolerated the exercise session well and has a pain level of 0. Patient states RPE for session today was 14 and RPD was a 3. Today the pt did:      Nustep: 15 min   Arm bike: 15 min  Breathing retraining fluid management and medication compliance : 15 min  Recovery/monitoring: 15 min             Physician available for emergencies: Dr. Vicki Willson, HUNTER 4/11/2024 1:11 PM

## 2024-04-15 ENCOUNTER — APPOINTMENT (OUTPATIENT)
Facility: HOSPITAL | Age: 84
End: 2024-04-15
Payer: MEDICARE

## 2024-04-16 ENCOUNTER — HOSPITAL ENCOUNTER (OUTPATIENT)
Facility: HOSPITAL | Age: 84
Setting detail: RECURRING SERIES
Discharge: HOME | End: 2024-04-19
Payer: MEDICARE

## 2024-04-16 PROCEDURE — G0237 THERAPEUTIC PROCD STRG ENDUR: HCPCS

## 2024-04-16 PROCEDURE — G0239 OTH RESP PROC, GROUP: HCPCS

## 2024-04-16 NOTE — PROGRESS NOTES
Pulmonary/RT Note    Visit #    9/36           Amy Bernardo is a 83 y.o. female presented today for pulmonary rehab. She state that there have been no changes in medication or health since the last visit.          She has a target heart rate of . She tolerated the exercise session well and has a pain level of 0. Patient states RPE for session today was 12 and RPD was a 1. Today the pt did:      Nustep: min  Arm bike: 15 min 5 martinez  Breathing retraining:15 min  Recovery/monitoring: 15 min             Physician available for emergencies:Dr. Rox Willson, HUNTER 4/16/2024 12:59 PM

## 2024-04-17 ENCOUNTER — APPOINTMENT (OUTPATIENT)
Facility: HOSPITAL | Age: 84
End: 2024-04-17
Payer: MEDICARE

## 2024-04-18 ENCOUNTER — HOSPITAL ENCOUNTER (OUTPATIENT)
Facility: HOSPITAL | Age: 84
Setting detail: RECURRING SERIES
Discharge: HOME | End: 2024-04-21
Payer: MEDICARE

## 2024-04-18 PROCEDURE — G0237 THERAPEUTIC PROCD STRG ENDUR: HCPCS

## 2024-04-18 PROCEDURE — G0239 OTH RESP PROC, GROUP: HCPCS

## 2024-04-18 NOTE — PROGRESS NOTES
Pulmonary/RT Note    Visit #    10/36           Amy Bernardo is a 83 y.o. female presented today for pulmonary rehab. She state that there have been no changes in medication or health since the last visit.          She has a target heart rate of . She tolerated the exercise session well and has a pain level of 0. Patient states RPE for session today was 11 and RPD was a 1. Today the pt did:      Nustep: min  Arm bike: 15 min 5 martinez  Breathing retraining:15 min  Recovery/monitoring: 15 min             Physician available for emergencies:Dr. Rox Willson, HUNTER 4/18/2024 2:11 PM

## 2024-04-22 ENCOUNTER — APPOINTMENT (OUTPATIENT)
Facility: HOSPITAL | Age: 84
End: 2024-04-22
Payer: MEDICARE

## 2024-04-23 ENCOUNTER — HOSPITAL ENCOUNTER (OUTPATIENT)
Facility: HOSPITAL | Age: 84
Setting detail: RECURRING SERIES
Discharge: HOME | End: 2024-04-26
Payer: MEDICARE

## 2024-04-23 PROCEDURE — G0237 THERAPEUTIC PROCD STRG ENDUR: HCPCS

## 2024-04-23 NOTE — PROGRESS NOTES
Pulmonary/RT Note    Visit #       11/36        Amy Bernardo is a 83 y.o. female presented today for pulmonary rehab. She state that there have been no changes in medication or health since the last visit.          She has a target heart rate of . She tolerated the exercise session well and has a pain level of 0. Patient states RPE for session today was 11 and RPD was a 1. Today the pt did:      Nustep:15 min w/workload of 4  Arm bike: 15 min 5 martinez  Breathing retraining:15 min  Recovery: 15 min             Physician available for emergencies: Dr. Jose Willson, HUNTER 4/23/2024 11:08 AM

## 2024-04-24 ENCOUNTER — APPOINTMENT (OUTPATIENT)
Facility: HOSPITAL | Age: 84
End: 2024-04-24
Payer: MEDICARE

## 2024-04-25 ENCOUNTER — HOSPITAL ENCOUNTER (OUTPATIENT)
Facility: HOSPITAL | Age: 84
Setting detail: RECURRING SERIES
Discharge: HOME | End: 2024-04-28
Payer: MEDICARE

## 2024-04-25 PROCEDURE — G0237 THERAPEUTIC PROCD STRG ENDUR: HCPCS

## 2024-04-25 NOTE — PROGRESS NOTES
Pulmonary/RT Note    Visit #       12/36        Amy Bernardo is a 83 y.o. female presented today for pulmonary rehab. She state that there have been no changes in medication or health since the last visit.          She has a target heart rate of . She tolerated the exercise session well and has a pain level of 0. Patient states RPE for session today was 11 and RPD was a 1. Today the pt did:      Nustep:15 min w/workload of 4  Arm bike: 15 min 5 martinez  Breathing retraining:15 min  Recovery: 15 min             Physician available for emergencies: Dr. Atul Willson, HUNTER 4/25/2024 12:21 PM

## 2024-04-30 ENCOUNTER — HOSPITAL ENCOUNTER (OUTPATIENT)
Facility: HOSPITAL | Age: 84
Setting detail: RECURRING SERIES
Discharge: HOME | End: 2024-05-03
Payer: MEDICARE

## 2024-04-30 PROCEDURE — G0239 OTH RESP PROC, GROUP: HCPCS

## 2024-04-30 PROCEDURE — G0237 THERAPEUTIC PROCD STRG ENDUR: HCPCS

## 2024-04-30 NOTE — PROGRESS NOTES
Pulmonary/RT Note    Visit #    13/36           Amy Bernardo is a 83 y.o. female presented today for pulmonary rehab. She state that there have been no changes in medication or health since the last visit.          She has a target heart rate of . She tolerated the exercise session well and has a pain level of 0. Patient states RPE for session today was 14 and RPD was a 3. Today the pt did:      Nustep:15  min w/ workload of 4  Arm bike: 15 min 5 martinez  Breathing retraining:15 min  Recovery: 15 min             Physician available for emergencies: Dr. Vicki Willson, HUNTER 4/30/2024 11:57 AM

## 2024-05-02 ENCOUNTER — HOSPITAL ENCOUNTER (OUTPATIENT)
Facility: HOSPITAL | Age: 84
Setting detail: RECURRING SERIES
Discharge: HOME | End: 2024-05-05
Payer: MEDICARE

## 2024-05-02 PROCEDURE — G0237 THERAPEUTIC PROCD STRG ENDUR: HCPCS

## 2024-05-02 NOTE — PROGRESS NOTES
Pulmonary/RT Note    Visit #     14/36        Amy Bernardo is a 83 y.o. female presented today for pulmonary rehab. She state that there have been no changes in medication or health since the last visit.          She has a target heart rate of . She tolerated the exercise session well and has a pain level of 2 in her hips from doing yard work this week. Patient states RPE for session today was 11 and RPD was a 0. Today the pt did:      Nustep: 18 min w/workload of 4  Arm bike: 15 min 5 martinez   Breathing retraining: 15 min  Recovery: 15 min             Physician available for emergencies: Dr. Rox Willson, HUNTER 5/2/2024 11:04 AM

## 2024-05-06 VITALS — OXYGEN SATURATION: 93 %

## 2024-05-06 ASSESSMENT — EJECTION FRACTION: EF_VALUE: 30

## 2024-05-06 ASSESSMENT — EXERCISE STRESS TEST
PEAK_HR: 88
PEAK_METS: 1.9
PEAK_BP: 134/78
PEAK_RPE: 13
PEAK_BP: 168/87

## 2024-05-06 ASSESSMENT — PULMONARY FUNCTION TESTS
FEV1/FVC: 86
FVC (LITERS): 64
FEV1 (%PREDICTED): 54

## 2024-05-06 ASSESSMENT — LIFESTYLE VARIABLES
ALCOHOL_TYPE: WINE/BEER
ALCOHOL_AMOUNT: 1 GLASS
ALCOHOL_USE: DAILY

## 2024-05-06 NOTE — PROGRESS NOTES
Amy Bernardo #[id number] (CSN:[id number]) (83 y.o. F) (Adm: 05/02/24)  [facility]  [facility]    Flowsheet Row CARDIO PULMONARY REHAB from 5/2/2024 in [facility] P[facility]   Treatment Diagnosis    Treatment Diagnosis 1 COPD   Treatment Diagnosis 2 --   Referral Date 11/30/23   Significant Cardiovascular History Chronic atrial fibrillation   Co-morbidities Pulmonary disease  [Asthma, FRANKO]   Oxygen Saturation / Titration    Stages of Change Other   Oxygen Intervention    Oxygen Use No   Individual Treatment Plan    ITP Visit Type Re-assessment   1st Date of Exercise 03/18/24   ITP Next Review Date 04/08/24   Visit #/Total Visits 14/36   EF% 30 %   FVC (Liters) 64 liters   FEV1 (%PRED) 54   FEV1/FVC 86   DLCO (mL/mmHg sec) 45 ml/mmHg sec   Risk Stratification Moderate   Pulmonary ITP Exercise, Psychosocial, Tobacco, Nutrition, Education   Exercise    Christiansen Total Score --   Stages of Change --   Exercise Test Six minute walk test   Distance Walked in ft   Distance Walked (ft) 616 ft   Distance Walked (miles) --   Distance Walked in Feet (Calculated) --   Distance Walked in Meters --   Peak HR 88   Peak /87   Peak RPE 13   Peak Mets 1.9   O2 Saturation --   O2 Flow Rate (l/min) --   Stops 0   SPO2 Range --   DASI Total Score --   BMI (Calculated) --   FEV1 (%PRED) 54   ALEX Total Score --   Exercise Prescription    Mode Stepper, Bike, Track, Elliptical, Other (comment), Ergometer   Frequency per week 2 times   Duration Per Session 60 min   Intensity METS       2-3   Progression slow   SpO2 93 %   O2 Device Room air   O2 Flow Rate (l/min) --   FIO2 (%) --   O2 Temperature --   Comments --   Symptoms with Exercise Shortness of breath   Target Heart Rate    Resistance Training Yes   Weight --   Reps --   Assisted Devices --   Exercise Blood Pressures    Resting /72   Peak /78   Is BP WDL Yes   Exercise Intervention    Type --   Frequency --   Duration --   Resistance Training --

## 2024-05-07 ENCOUNTER — HOSPITAL ENCOUNTER (OUTPATIENT)
Facility: HOSPITAL | Age: 84
Setting detail: RECURRING SERIES
Discharge: HOME | End: 2024-05-10
Payer: MEDICARE

## 2024-05-07 PROCEDURE — G0237 THERAPEUTIC PROCD STRG ENDUR: HCPCS

## 2024-05-07 PROCEDURE — G0239 OTH RESP PROC, GROUP: HCPCS

## 2024-05-07 NOTE — PROGRESS NOTES
Pulmonary/RT Note    Visit #    15/36           Amy Bernardo is a 83 y.o. female presented today for pulmonary rehab. She state that there have been no changes in medication or health since the last visit.          She has a target heart rate of . She tolerated the exercise session well and has a pain level of 2 in her hip, she ad been doing yard work this week. Patient states RPE for session today was 11 and RPD was a 0. Today the pt did:      Nustep:15  min workload of 4  Arm bike: 15 min 5 martinez  Breathing retraining: 15  Recovery: 15 min             Physician available for emergencies: Dr. Atul Willson, HUNTER 5/7/2024 1:20 PM

## 2024-05-09 ENCOUNTER — HOSPITAL ENCOUNTER (OUTPATIENT)
Facility: HOSPITAL | Age: 84
Setting detail: RECURRING SERIES
Discharge: HOME | End: 2024-05-12
Payer: MEDICARE

## 2024-05-09 PROCEDURE — G0237 THERAPEUTIC PROCD STRG ENDUR: HCPCS

## 2024-05-09 PROCEDURE — G0239 OTH RESP PROC, GROUP: HCPCS

## 2024-05-09 NOTE — PROGRESS NOTES
Pulmonary/RT Note    Visit #    16/36           Amy Bernardo is a 83 y.o. female presented today for pulmonary rehab. She state that there have been no changes in medication or health since the last visit.          She has a target heart rate of . She tolerated the exercise session well and has a pain level of 2-3 in her back, she spent a couple of hours tending to her yard over the weekend. Patient states RPE for session today was 12 and RPD was a 0. Today the pt did:      Nustep:15  min workload of 4  Arm bike: 15 min 5 martinez  Breathing retraining: 15  Recovery: 15 min             Physician available for emergencies: Dr. Atul Willson, HUNTER 5/9/2024 12:41 PM

## 2024-05-14 ENCOUNTER — APPOINTMENT (OUTPATIENT)
Facility: HOSPITAL | Age: 84
End: 2024-05-14
Payer: MEDICARE

## 2024-05-16 ENCOUNTER — HOSPITAL ENCOUNTER (OUTPATIENT)
Facility: HOSPITAL | Age: 84
Setting detail: RECURRING SERIES
Discharge: HOME | End: 2024-05-19
Payer: MEDICARE

## 2024-05-16 PROCEDURE — G0239 OTH RESP PROC, GROUP: HCPCS

## 2024-05-16 PROCEDURE — G0237 THERAPEUTIC PROCD STRG ENDUR: HCPCS

## 2024-05-16 NOTE — PROGRESS NOTES
Pulmonary/RT Note    Visit #        17/36       Amy Bernardo is a 83 y.o. female presented today for pulmonary rehab. She state that there have been no changes in medication or health since the last visit.          She has a target heart rate of . She tolerated the exercise session well and has a pain level of 0. Patient states RPE for session today was 12 and RPD was a 2. Today the pt did:      Nustep: 15 min Workload 5  Arm bike: 15 min  Walking:  min  Resistance bands:  0 min   Breathing retraining: 15 min  Bodyweight:0 min  Recovery: 15 min             Physician available for emergencies: Dr. Jose Arboleda, RT 5/16/2024 11:30 AM

## 2024-05-21 ENCOUNTER — APPOINTMENT (OUTPATIENT)
Facility: HOSPITAL | Age: 84
End: 2024-05-21
Payer: MEDICARE

## 2024-05-23 ENCOUNTER — APPOINTMENT (OUTPATIENT)
Facility: HOSPITAL | Age: 84
End: 2024-05-23
Payer: MEDICARE

## 2024-05-28 ENCOUNTER — HOSPITAL ENCOUNTER (OUTPATIENT)
Facility: HOSPITAL | Age: 84
Setting detail: RECURRING SERIES
Discharge: HOME | End: 2024-05-31
Payer: MEDICARE

## 2024-05-28 PROCEDURE — G0239 OTH RESP PROC, GROUP: HCPCS

## 2024-05-28 PROCEDURE — G0237 THERAPEUTIC PROCD STRG ENDUR: HCPCS

## 2024-05-28 NOTE — PROGRESS NOTES
Pulmonary/RT Note    Visit #     18/36          Amy Bernardo is a 83 y.o. female presented today for pulmonary rehab. She state that there have been no changes in medication or health since the last visit.          She has a target heart rate of . She tolerated the exercise session well and has a pain level of 0. Patient states RPE for session today was 14 and RPD was a 3. Today the pt did:      Nustep:15 min  Arm bike: 15 min 10 martinez  Breathing retraining: 15 min  Recovery: 15 min                 Physician available for emergencies: Dr. Atul Willson, HUNTER 5/28/2024 11:40 AM

## 2024-05-30 ENCOUNTER — HOSPITAL ENCOUNTER (OUTPATIENT)
Facility: HOSPITAL | Age: 84
Setting detail: RECURRING SERIES
End: 2024-05-30
Payer: MEDICARE

## 2024-05-30 PROCEDURE — G0237 THERAPEUTIC PROCD STRG ENDUR: HCPCS

## 2024-05-30 PROCEDURE — G0239 OTH RESP PROC, GROUP: HCPCS

## 2024-05-30 NOTE — PROGRESS NOTES
Pulmonary/RT Note    Visit #               Amy Bernardo is a 83 y.o. female presented today for pulmonary rehab. She state that there have been no changes in medication or health since the last visit.          She has a target heart rate of . She tolerated the exercise session well and has a pain level of 0. Patient states RPE for session today was 12 and RPD was a 0. Today the pt did:      Nustep: 15 min Workload 5  Arm bike:  15 min  Walkin min  Resistance bands:  0 min   Breathing retraining: 15 min  Bodyweight:  0 min  Recovery: 15 min             Physician available for emergencies: Dr. Atul Arboleda, RT 2024 11:50 AM

## 2024-06-04 ENCOUNTER — HOSPITAL ENCOUNTER (OUTPATIENT)
Facility: HOSPITAL | Age: 84
Setting detail: RECURRING SERIES
Discharge: HOME | End: 2024-06-07
Payer: MEDICARE

## 2024-06-04 PROCEDURE — G0237 THERAPEUTIC PROCD STRG ENDUR: HCPCS

## 2024-06-04 PROCEDURE — G0239 OTH RESP PROC, GROUP: HCPCS

## 2024-06-04 NOTE — PROGRESS NOTES
Pulmonary/RT Note    Visit #    10/36           Amynoah Bernardo is a 83 y.o. female presented today for pulmonary rehab. She state that there have been no changes in medication or health since the last visit.          She has a target heart rate of . She tolerated the exercise session well and has a pain level of 0. Patient states RPE for session today was 13 and RPD was a 1. Today the pt did:      Nustep: 15 min  Arm bike: 15 min  Breathing retraining: 15 min  Recovery: 15 min           Physician available for emergencies: Jose Willson RCP 6/4/2024 11:33 AM

## 2024-06-05 ASSESSMENT — LIFESTYLE VARIABLES
ALCOHOL_TYPE: WINE/BEER
ALCOHOL_AMOUNT: 1 GLASS
ALCOHOL_USE: DAILY

## 2024-06-05 ASSESSMENT — EXERCISE STRESS TEST
PEAK_METS: 1.9
PEAK_BP: 118/62
PEAK_HR: 88
PEAK_RPE: 13
PEAK_BP: 168/87

## 2024-06-05 ASSESSMENT — PULMONARY FUNCTION TESTS
FEV1 (%PREDICTED): 54
FEV1/FVC: 86
FVC (LITERS): 64

## 2024-06-05 ASSESSMENT — EJECTION FRACTION: EF_VALUE: 30

## 2024-06-05 NOTE — PROGRESS NOTES
Amy Bernardo #[id number] (CSN:[id number]) (83 y.o. F) (Adm: 06/04/24)  [facility]    Flowsheet Row CARDIO PULMONARY REHAB from 6/4/2024 in [facility] PULMON[facility] REHAB   Treatment Diagnosis    Treatment Diagnosis 1 COPD   Treatment Diagnosis 2 --   Referral Date 11/30/23   Significant Cardiovascular History Chronic atrial fibrillation   Co-morbidities Pulmonary disease  [Asthma, FRANKO]   Oxygen Saturation / Titration    Stages of Change --   Oxygen Intervention    Oxygen Use No   Individual Treatment Plan    ITP Visit Type Re-assessment   1st Date of Exercise 03/18/24   ITP Next Review Date 07/01/24   Visit #/Total Visits 20/36   EF% 30 %   FVC (Liters) 64 liters   FEV1 (%PRED) 54   FEV1/FVC 86   DLCO (mL/mmHg sec) 45 ml/mmHg sec   Risk Stratification Moderate   Pulmonary ITP Exercise, Psychosocial, Tobacco, Nutrition, Education   Exercise    Christiansen Total Score --   Stages of Change --   Exercise Test Six minute walk test   Distance Walked in ft   Distance Walked (ft) 616 ft   Distance Walked (miles) --   Distance Walked in Feet (Calculated) --   Distance Walked in Meters --   Peak HR 88   Peak /87   Peak RPE 13   Peak Mets 1.9   O2 Saturation --   O2 Flow Rate (l/min) --   Stops 0   SPO2 Range --   DASI Total Score --   BMI (Calculated) --   FEV1 (%PRED) 54   ALEX Total Score --   Exercise Prescription    Mode Stepper, Bike, Track, Elliptical, Other (comment), Ergometer   Frequency per week 2 times   Duration Per Session 60 min   Intensity METS       2-3   Progression slow   SpO2 --   O2 Device Room air   O2 Flow Rate (l/min) --   FIO2 (%) --   O2 Temperature --   Comments --   Symptoms with Exercise Shortness of breath   Target Heart Rate    Resistance Training Yes   Weight --   Reps --   Assisted Devices --   Exercise Blood Pressures    Resting /68   Peak /62   Is BP WDL Yes   Exercise Intervention    Type --   Frequency --   Duration --   Resistance Training --   Comments --

## 2024-06-06 ENCOUNTER — HOSPITAL ENCOUNTER (OUTPATIENT)
Facility: HOSPITAL | Age: 84
Setting detail: RECURRING SERIES
Discharge: HOME | End: 2024-06-09
Payer: MEDICARE

## 2024-06-06 PROCEDURE — G0239 OTH RESP PROC, GROUP: HCPCS

## 2024-06-06 PROCEDURE — G0237 THERAPEUTIC PROCD STRG ENDUR: HCPCS

## 2024-06-06 NOTE — PROGRESS NOTES
Pulmonary/RT Note    Visit #               Amy Bernardo is a 83 y.o. female presented today for pulmonary rehab. She state that there have been no changes in medication or health since the last visit.          She has a target heart rate of . She tolerated the exercise session well and has a pain level of 0. Patient states RPE for session today was 12 and RPD was a 1. Today the pt did:      Nustep: 15 min  Arm bike: 15 min  Walkin min  Resistance bands:  0 min   Breathing retraining: 15 min  Bodyweight:0  min  Recovery: 15 min             Physician available for emergencies: Dr. Rox Arboleda, RT 2024 11:04 AM

## 2024-06-11 ENCOUNTER — HOSPITAL ENCOUNTER (OUTPATIENT)
Facility: HOSPITAL | Age: 84
Setting detail: RECURRING SERIES
Discharge: HOME | End: 2024-06-14
Payer: MEDICARE

## 2024-06-11 PROCEDURE — G0239 OTH RESP PROC, GROUP: HCPCS

## 2024-06-11 PROCEDURE — G0237 THERAPEUTIC PROCD STRG ENDUR: HCPCS

## 2024-06-11 NOTE — PROGRESS NOTES
Pulmonary/RT Note    Visit #   12/36            Amy Bernardo is a 83 y.o. female presented today for pulmonary rehab. She state that there have been no changes in medication or health since the last visit.          She has a target heart rate of . She tolerated the exercise session well and has a pain level of 0. Patient states RPE for session today was 13 and RPD was a 2. Today the pt did:      Nustep:20 min  Arm bike: 15 min  Breathing retraining:10 min  Recovery and monitoring:15  min                 Physician available for emergencies: Dr. Rox Willson, HUNTER 6/11/2024 11:14 AM

## 2024-06-14 ENCOUNTER — APPOINTMENT (OUTPATIENT)
Facility: HOSPITAL | Age: 84
End: 2024-06-14
Payer: MEDICARE

## 2024-06-18 ENCOUNTER — HOSPITAL ENCOUNTER (OUTPATIENT)
Facility: HOSPITAL | Age: 84
Setting detail: RECURRING SERIES
Discharge: HOME | End: 2024-06-21
Payer: MEDICARE

## 2024-06-18 PROCEDURE — G0237 THERAPEUTIC PROCD STRG ENDUR: HCPCS

## 2024-06-18 PROCEDURE — G0239 OTH RESP PROC, GROUP: HCPCS

## 2024-06-18 NOTE — PROGRESS NOTES
Pulmonary/RT Note    Visit #    23/36           Amy Bernardo is a 83 y.o. female presented today for pulmonary rehab. She state that there have been no changes in medication or health since the last visit.          She has a target heart rate of . She tolerated the exercise session well and has a pain level of 0. Patient states RPE for session today was 12 and RPD was a 0 Today the pt did:      Nustep:15 min  Arm bike: 15 min  Breathing retraining: 15 min  Recovery and monitoring:  15 min                 Physician available for emergencies: Dr. Rox Willson, HUNTER 6/18/2024 11:40 AM

## 2024-06-20 ENCOUNTER — HOSPITAL ENCOUNTER (OUTPATIENT)
Facility: HOSPITAL | Age: 84
Setting detail: RECURRING SERIES
Discharge: HOME | End: 2024-06-23
Payer: MEDICARE

## 2024-06-20 PROCEDURE — G0239 OTH RESP PROC, GROUP: HCPCS

## 2024-06-20 PROCEDURE — G0237 THERAPEUTIC PROCD STRG ENDUR: HCPCS

## 2024-06-20 NOTE — PROGRESS NOTES
Pulmonary/RT Note    Visit #               Amy Bernardo is a 83 y.o. female presented today for pulmonary rehab. She state that there have been no changes in medication or health since the last visit.          She has a target heart rate of . She tolerated the exercise session well and has a pain level of 0. Patient states RPE for session today was 12 and RPD was a 0. Today the pt did:      Nustep: 15 min Workload 5  Arm bike: 15 min  Walkin min  Resistance bands: 0 min   Breathing retraining: 15 min  Bodyweight: 0 min  Recovery: 15 min             Physician available for emergencies: Dr. Atul Arboleda, RT 2024 11:44 AM

## 2024-06-25 ENCOUNTER — HOSPITAL ENCOUNTER (OUTPATIENT)
Facility: HOSPITAL | Age: 84
Setting detail: RECURRING SERIES
Discharge: HOME | End: 2024-06-28
Payer: MEDICARE

## 2024-06-25 PROCEDURE — G0239 OTH RESP PROC, GROUP: HCPCS

## 2024-06-25 PROCEDURE — G0237 THERAPEUTIC PROCD STRG ENDUR: HCPCS

## 2024-06-25 NOTE — PROGRESS NOTES
Pulmonary/RT Note    Visit #    25/36           Amy Bernardo is a 83 y.o. female presented today for pulmonary rehab. She state that there have been no changes in medication or health since the last visit.          She has a target heart rate of . She tolerated the exercise session well and has a pain level of 0. Patient states RPE for session today was 12 and RPD was a 0. Today the pt did:      Nustep: 15 min  Arm bike: 15 min  Breathing retraining:15 min  Recovery and monitoring:15  min                 Physician available for emergencies: Dr. Jacob Willson, HUNTER 6/25/2024 11:23 AM

## 2024-06-27 ENCOUNTER — HOSPITAL ENCOUNTER (OUTPATIENT)
Facility: HOSPITAL | Age: 84
Setting detail: RECURRING SERIES
Discharge: HOME | End: 2024-06-30
Payer: MEDICARE

## 2024-06-27 PROCEDURE — G0239 OTH RESP PROC, GROUP: HCPCS

## 2024-06-27 PROCEDURE — G0237 THERAPEUTIC PROCD STRG ENDUR: HCPCS

## 2024-06-27 NOTE — PROGRESS NOTES
Pulmonary/RT Note    Visit #               Amy Bernardo is a 83 y.o. female presented today for pulmonary rehab. She state that there have been no changes in medication or health since the last visit.          She has a target heart rate of . She tolerated the exercise session well and has a pain level of 0. Patient states RPE for session today was 12 and RPD was a 0. Today the pt did:      Nustep: 15 min Workload 5  Arm bike:  15 min  Walkin min  Resistance bands:  min   Breathing retraining: 15 min  Bodyweight: 0 min  Recovery: 15 min             Physician available for emergencies: Dr. Jacob Arboleda, RT 2024 11:09 AM

## 2024-07-02 ENCOUNTER — HOSPITAL ENCOUNTER (OUTPATIENT)
Facility: HOSPITAL | Age: 84
Setting detail: RECURRING SERIES
Discharge: HOME | End: 2024-07-05

## 2024-07-02 NOTE — PROGRESS NOTES
Pulmonary/RT Note    Visit #    27/36           Amy Bernardo is a 83 y.o. female presented today for pulmonary rehab. She state that there have been no changes in medication or health since the last visit.          She has a target heart rate of . She tolerated the exercise session well and has a pain level of 0. Patient states RPE for session today was 14 and RPD was a 3. Today the pt did:      Nustep:15 min  Arm bike: 15 min  Breathing retraining: 15 min  Recovery and monitoring:  15 min                 Physician available for emergencies: Dr. Jacob Willson, HUNTER 7/2/2024 12:07 PM

## 2024-07-03 ENCOUNTER — APPOINTMENT (OUTPATIENT)
Facility: HOSPITAL | Age: 84
End: 2024-07-03
Payer: MEDICARE

## 2024-07-09 ENCOUNTER — APPOINTMENT (OUTPATIENT)
Facility: HOSPITAL | Age: 84
End: 2024-07-09
Payer: MEDICARE

## 2024-07-11 ENCOUNTER — HOSPITAL ENCOUNTER (OUTPATIENT)
Facility: HOSPITAL | Age: 84
Setting detail: RECURRING SERIES
Discharge: HOME | End: 2024-07-14
Payer: MEDICARE

## 2024-07-11 PROCEDURE — G0237 THERAPEUTIC PROCD STRG ENDUR: HCPCS

## 2024-07-11 PROCEDURE — G0239 OTH RESP PROC, GROUP: HCPCS

## 2024-07-11 NOTE — PROGRESS NOTES
Pulmonary/RT Note    Visit #    28//36           Amy Bernardo is a 83 y.o. female presented today for pulmonary rehab. She state that there have been no changes in medication or health since the last visit.          She has a target heart rate of . She tolerated the exercise session well and has a pain level of 0. Patient states RPE for session today was 12 and RPD was a 0. Today the pt did:      Nustep: 15 min  Arm bike: 15 min  Breathing retraining: 15 min  Recovery and monitoring: 15 min                 Physician available for emergencies: Dr. Rox Willson, HUNTER 7/11/2024 11:35 AM

## 2024-07-16 ENCOUNTER — HOSPITAL ENCOUNTER (OUTPATIENT)
Facility: HOSPITAL | Age: 84
Setting detail: RECURRING SERIES
Discharge: HOME | End: 2024-07-19
Payer: MEDICARE

## 2024-07-16 PROCEDURE — G0237 THERAPEUTIC PROCD STRG ENDUR: HCPCS

## 2024-07-16 PROCEDURE — G0239 OTH RESP PROC, GROUP: HCPCS

## 2024-07-16 NOTE — PROGRESS NOTES
Pulmonary/RT Note    Visit #        29/36       Amy Bernardo is a 83 y.o. female presented today for pulmonary rehab. She states that there have been no changes in medication or health since the last visit. She takes Trelegy daily, we discussed benefits and use, pt declined demonstration, will give a handout with instructions.     Pt inquired about her medical bill, the unit coordinator and staff gave explanation, pt had difficulty understanding, she was given more explanation, staff was accommodating as the pt has cognitive barriers.          She has a target heart rate of . She tolerated the exercise session well and has a pain level of 0. Patient states RPE for session today was 12 and RPD was a 0. Today the pt did:      Nustep: 15 min  Arm bike: 15 min  Breathing retraining: 15 min  Recovery and monitoring: 15 min  Education: 5 min  Medications                  Physician available for emergencies: Dr Jacob Merchant RCP 7/16/2024 11:17 AM

## 2024-07-18 ENCOUNTER — APPOINTMENT (OUTPATIENT)
Facility: HOSPITAL | Age: 84
End: 2024-07-18
Payer: MEDICARE

## 2024-07-23 ENCOUNTER — HOSPITAL ENCOUNTER (OUTPATIENT)
Facility: HOSPITAL | Age: 84
Setting detail: RECURRING SERIES
Discharge: HOME | End: 2024-07-26
Payer: MEDICARE

## 2024-07-23 PROCEDURE — G0237 THERAPEUTIC PROCD STRG ENDUR: HCPCS

## 2024-07-23 PROCEDURE — G0239 OTH RESP PROC, GROUP: HCPCS

## 2024-07-23 NOTE — PROGRESS NOTES
Pulmonary/RT Note    Visit #        30/36       Amy Bernardo is a 83 y.o. female presented today for pulmonary rehab. She states that there have been no changes in medication or health since the last visit.          She has a target heart rate of . She tolerated the exercise session well and has a pain level of 0. Patient states RPE for session today was 13 and RPD was a 0. Today the pt did:      Nustep: 15 min  Arm bike: 15 min  Breathing retraining: 15 min  Recovery and monitoring: 15 min                 Physician available for emergencies: Dr Jacob Merchant RCP 7/23/2024 11:15 AM

## 2024-07-25 ENCOUNTER — HOSPITAL ENCOUNTER (OUTPATIENT)
Facility: HOSPITAL | Age: 84
Setting detail: RECURRING SERIES
Discharge: HOME | End: 2024-07-28
Payer: MEDICARE

## 2024-07-25 PROCEDURE — G0239 OTH RESP PROC, GROUP: HCPCS

## 2024-07-25 PROCEDURE — G0237 THERAPEUTIC PROCD STRG ENDUR: HCPCS

## 2024-07-25 NOTE — PROGRESS NOTES
Pulmonary/RT Note    Visit #               Amy Bernardo is a 83 y.o. female presented today for pulmonary rehab. She state that there have been no changes in medication or health since the last visit.          She has a target heart rate of . She tolerated the exercise session well and has a pain level of 0. Patient states RPE for session today was 13 and RPD was a 0. Today the pt did:      Nustep: 15 min Workload 5  Arm bike: 15 min  Walkin min  Resistance bands: 0 min   Breathing retraining:15 min  Bodyweight:0  min  Recovery:15 min               Physician available for emergencies: Dr. Rox Arboelda, RT 2024 11:13 AM31/

## 2024-07-30 ENCOUNTER — HOSPITAL ENCOUNTER (OUTPATIENT)
Facility: HOSPITAL | Age: 84
Setting detail: RECURRING SERIES
Discharge: HOME | End: 2024-08-02
Payer: MEDICARE

## 2024-07-30 PROCEDURE — G0239 OTH RESP PROC, GROUP: HCPCS

## 2024-07-30 PROCEDURE — G0237 THERAPEUTIC PROCD STRG ENDUR: HCPCS

## 2024-07-30 ASSESSMENT — PULMONARY FUNCTION TESTS
FEV1/FVC: 86
FEV1 (%PREDICTED): 54
FVC (LITERS): 64

## 2024-07-30 ASSESSMENT — EXERCISE STRESS TEST
PEAK_RPE: 13
PEAK_BP: 131/72
PEAK_METS: 1.9
PEAK_BP: 168/87
PEAK_HR: 88

## 2024-07-30 ASSESSMENT — LIFESTYLE VARIABLES
ALCOHOL_USE: DAILY
ALCOHOL_AMOUNT: 1 GLASS
ALCOHOL_TYPE: WINE/BEER

## 2024-07-30 ASSESSMENT — EJECTION FRACTION: EF_VALUE: 30

## 2024-07-30 NOTE — PROGRESS NOTES
Amy Bernardo #[id number] (CSN:[id number]) (83 y.o. F) (Adm: 07/30/24)  [facility]    Flowsheet Row CARDIO PULMONARY REHAB from 7/25/2024 in [facility] RE[facility]   Treatment Diagnosis    Treatment Diagnosis 1 COPD   Treatment Diagnosis 2 --   Referral Date 11/30/23   Significant Cardiovascular History Chronic atrial fibrillation   Co-morbidities Pulmonary disease  [Asthma, FRANKO]   Oxygen Saturation / Titration    Stages of Change Other   Oxygen Intervention    Oxygen Use No   Oxygen Type --   Patients Liter Flow --   O2 Sat Greater Than 90% --   Nurse/Patient Discussion --   Oxygen Education --   Oxygen Target Goals --   Patient Stated Goals --   Individual Treatment Plan    ITP Visit Type Re-assessment   1st Date of Exercise 03/18/24   ITP Next Review Date 08/27/24   Visit #/Total Visits 31/36   EF% 30 %   FVC (Liters) 64 liters   FEV1 (%PRED) 54   FEV1/FVC 86   DLCO (mL/mmHg sec) 45 ml/mmHg sec   Risk Stratification Moderate   Pulmonary ITP Exercise, Psychosocial, Tobacco, Nutrition, Education   Exercise    Christiansen Total Score --   Stages of Change --   Exercise Test Six minute walk test   Distance Walked in ft   Distance Walked (ft) --   Distance Walked (miles) --   Distance Walked in Feet (Calculated) --   Distance Walked in Meters --   Peak HR 88   Peak /87   Peak RPE 13   Peak Mets 1.9   O2 Saturation --   O2 Flow Rate (l/min) --   Stops 0   SPO2 Range --   DASI Total Score --   BMI (Calculated) --   FEV1 (%PRED) 54   ALEX Total Score --   Exercise Prescription    Mode Stepper, Bike, Track, Elliptical, Other (comment), Ergometer   Frequency per week 2 times   Duration Per Session 60 min   Intensity METS       2-3   Progression slow   SpO2 --   O2 Device Room air   O2 Flow Rate (l/min) --   FIO2 (%) --   O2 Temperature --   Comments --   Symptoms with Exercise Shortness of breath   Target Heart Rate    Resistance Training Yes   Weight --   Reps --   Assisted Devices --   Exercise

## 2024-07-30 NOTE — PROGRESS NOTES
Pulmonary/RT Note    Visit #      32/36         Amy Bernardo is a 83 y.o. female presented today for pulmonary rehab. She states that there have been no changes in medication or health since the last visit.          She has a target heart rate of . She tolerated the exercise session well and has a pain level of 2 in hips, chronic issue. Patient states RPE for session today was 12 and RPD was a 0. Today the pt did:      Nustep: 16 min  Arm bike: 15 min  Breathing retraining: 15 min  Recovery and monitoring: 15 min  Education  Self pacing and energy conservation - handout given, COPD action plan               Physician available for emergencies: Dr Rox Merchant RCP 7/30/2024 11:03 AM

## 2024-08-05 ENCOUNTER — APPOINTMENT (OUTPATIENT)
Facility: HOSPITAL | Age: 84
End: 2024-08-05
Payer: MEDICARE

## 2024-08-06 ENCOUNTER — HOSPITAL ENCOUNTER (OUTPATIENT)
Facility: HOSPITAL | Age: 84
Setting detail: INFUSION SERIES
End: 2024-08-06

## 2024-08-06 ENCOUNTER — APPOINTMENT (OUTPATIENT)
Facility: HOSPITAL | Age: 84
End: 2024-08-06
Payer: MEDICARE

## 2024-08-08 ENCOUNTER — HOSPITAL ENCOUNTER (OUTPATIENT)
Facility: HOSPITAL | Age: 84
Setting detail: RECURRING SERIES
Discharge: HOME | End: 2024-08-11
Payer: MEDICARE

## 2024-08-08 PROCEDURE — G0237 THERAPEUTIC PROCD STRG ENDUR: HCPCS

## 2024-08-08 PROCEDURE — G0239 OTH RESP PROC, GROUP: HCPCS

## 2024-08-08 NOTE — PROGRESS NOTES
Pulmonary/RT Note    Visit #     33/36          Amy Bernardo is a 83 y.o. female presented today for pulmonary rehab. She state that there have been no changes in medication or health since the last visit.          She has a target heart rate of . She tolerated the exercise session well and has a pain level of 0. Patient states RPE for session today was 12 and RPD was a 0. Today the pt did:      Nustep: 15 min  Arm bike: 15 min  Breathing retraining: 15 min  Recovery and monitoring: 15 min                 Physician available for emergencies: Dr. Rox Willson, HUNTER 8/8/2024 12:01 PM

## 2024-08-13 ENCOUNTER — APPOINTMENT (OUTPATIENT)
Facility: HOSPITAL | Age: 84
End: 2024-08-13
Payer: MEDICARE

## 2024-08-14 ENCOUNTER — APPOINTMENT (OUTPATIENT)
Facility: HOSPITAL | Age: 84
End: 2024-08-14
Payer: MEDICARE

## 2024-08-15 ENCOUNTER — HOSPITAL ENCOUNTER (OUTPATIENT)
Facility: HOSPITAL | Age: 84
Setting detail: RECURRING SERIES
Discharge: HOME | End: 2024-08-18
Payer: MEDICARE

## 2024-08-15 PROCEDURE — G0239 OTH RESP PROC, GROUP: HCPCS

## 2024-08-15 PROCEDURE — G0237 THERAPEUTIC PROCD STRG ENDUR: HCPCS

## 2024-08-15 RX ORDER — SODIUM CHLORIDE 9 MG/ML
INJECTION, SOLUTION INTRAVENOUS CONTINUOUS
Status: CANCELLED | OUTPATIENT
Start: 2024-08-15

## 2024-08-15 RX ORDER — DIPHENHYDRAMINE HYDROCHLORIDE 50 MG/ML
50 INJECTION INTRAMUSCULAR; INTRAVENOUS
Status: CANCELLED | OUTPATIENT
Start: 2024-08-15

## 2024-08-15 RX ORDER — ALBUTEROL SULFATE 90 UG/1
4 AEROSOL, METERED RESPIRATORY (INHALATION) PRN
Status: CANCELLED | OUTPATIENT
Start: 2024-08-15

## 2024-08-15 RX ORDER — ONDANSETRON 2 MG/ML
8 INJECTION INTRAMUSCULAR; INTRAVENOUS
Status: CANCELLED | OUTPATIENT
Start: 2024-08-15

## 2024-08-15 RX ORDER — ACETAMINOPHEN 325 MG/1
650 TABLET ORAL
Status: CANCELLED | OUTPATIENT
Start: 2024-08-15

## 2024-08-15 RX ORDER — EPINEPHRINE 1 MG/ML
0.3 INJECTION, SOLUTION, CONCENTRATE INTRAVENOUS PRN
Status: CANCELLED | OUTPATIENT
Start: 2024-08-15

## 2024-08-15 NOTE — PROGRESS NOTES
Pulmonary/RT Note    Visit #    34/36           Amy Bernardo is a 83 y.o. female presented today for pulmonary rehab. She state that there have been no changes in medication or health since the last visit.     We discussed normal blood pressure and SpO2 /HR ranges at each visit, pt acknowledges her memory impairment and she was provided a handout outlining normal blood pressure levels.         She has a target heart rate of . She tolerated the exercise session well and has a pain level of 0. Patient states RPE for session today was 14 and RPD was a 3. Today the pt did:      Nustep:15 min  Arm bike: 15 min  Breathing retraining: 15 min  Recovery and monitoring:10  min  Education:  Blood pressure readings               Physician available for emergencies: Dr. Rox Willson RCP 8/15/2024 11:26 AM

## 2024-08-20 ENCOUNTER — HOSPITAL ENCOUNTER (OUTPATIENT)
Facility: HOSPITAL | Age: 84
Setting detail: RECURRING SERIES
Discharge: HOME | End: 2024-08-23
Payer: MEDICARE

## 2024-08-20 PROCEDURE — G0239 OTH RESP PROC, GROUP: HCPCS

## 2024-08-20 PROCEDURE — G0237 THERAPEUTIC PROCD STRG ENDUR: HCPCS

## 2024-08-20 ASSESSMENT — PATIENT HEALTH QUESTIONNAIRE - PHQ9
9. THOUGHTS THAT YOU WOULD BE BETTER OFF DEAD, OR OF HURTING YOURSELF: NOT AT ALL
SUM OF ALL RESPONSES TO PHQ QUESTIONS 1-9: 0
SUM OF ALL RESPONSES TO PHQ9 QUESTIONS 1 & 2: 0
4. FEELING TIRED OR HAVING LITTLE ENERGY: NOT AT ALL
3. TROUBLE FALLING OR STAYING ASLEEP: NOT AT ALL
SUM OF ALL RESPONSES TO PHQ QUESTIONS 1-9: 0
7. TROUBLE CONCENTRATING ON THINGS, SUCH AS READING THE NEWSPAPER OR WATCHING TELEVISION: NOT AT ALL
6. FEELING BAD ABOUT YOURSELF - OR THAT YOU ARE A FAILURE OR HAVE LET YOURSELF OR YOUR FAMILY DOWN: NOT AT ALL
10. IF YOU CHECKED OFF ANY PROBLEMS, HOW DIFFICULT HAVE THESE PROBLEMS MADE IT FOR YOU TO DO YOUR WORK, TAKE CARE OF THINGS AT HOME, OR GET ALONG WITH OTHER PEOPLE: NOT DIFFICULT AT ALL
2. FEELING DOWN, DEPRESSED OR HOPELESS: NOT AT ALL
SUM OF ALL RESPONSES TO PHQ QUESTIONS 1-9: 0
5. POOR APPETITE OR OVEREATING: NOT AT ALL
8. MOVING OR SPEAKING SO SLOWLY THAT OTHER PEOPLE COULD HAVE NOTICED. OR THE OPPOSITE, BEING SO FIGETY OR RESTLESS THAT YOU HAVE BEEN MOVING AROUND A LOT MORE THAN USUAL: NOT AT ALL
1. LITTLE INTEREST OR PLEASURE IN DOING THINGS: NOT AT ALL
SUM OF ALL RESPONSES TO PHQ QUESTIONS 1-9: 0

## 2024-08-20 NOTE — PROGRESS NOTES
Pulmonary/RT Note    Visit #        35/36       Amy Bernardo is a 83 y.o. female presented today for pulmonary rehab. She states that there have been no changes in medication or health since the last visit.          She has a target heart rate of . She tolerated the exercise session well and has a pain level of 0. Patient states RPE for session today was 9 and RPD was a 2. Today the pt did:      Nustep: 15 min  Arm bike: 15 min  Breathing retraining: 15 min  Recovery and monitoring: 15 min                 Physician available for emergencies: Dr Atul Merchant RCP 8/20/2024 11:14 AM

## 2024-08-21 ENCOUNTER — HOSPITAL ENCOUNTER (OUTPATIENT)
Facility: HOSPITAL | Age: 84
Setting detail: INFUSION SERIES
Discharge: HOME | End: 2024-08-24
Payer: MEDICARE

## 2024-08-21 VITALS
TEMPERATURE: 97.4 F | HEART RATE: 75 BPM | RESPIRATION RATE: 16 BRPM | SYSTOLIC BLOOD PRESSURE: 119 MMHG | DIASTOLIC BLOOD PRESSURE: 73 MMHG | OXYGEN SATURATION: 95 %

## 2024-08-21 DIAGNOSIS — E83.42 HYPOMAGNESEMIA: ICD-10-CM

## 2024-08-21 DIAGNOSIS — M81.8 IDIOPATHIC OSTEOPOROSIS: Primary | ICD-10-CM

## 2024-08-21 LAB
ALBUMIN SERPL-MCNC: 3 G/DL (ref 3.4–5)
ANION GAP SERPL CALC-SCNC: 8 MMOL/L (ref 3–18)
BUN SERPL-MCNC: 16 MG/DL (ref 7–18)
BUN/CREAT SERPL: 25 (ref 12–20)
CALCIUM SERPL-MCNC: 8.2 MG/DL (ref 8.5–10.1)
CHLORIDE SERPL-SCNC: 107 MMOL/L (ref 100–111)
CO2 SERPL-SCNC: 27 MMOL/L (ref 21–32)
CREAT SERPL-MCNC: 0.63 MG/DL (ref 0.6–1.3)
GLUCOSE SERPL-MCNC: 105 MG/DL (ref 74–99)
MAGNESIUM SERPL-MCNC: 2 MG/DL (ref 1.6–2.6)
PHOSPHATE SERPL-MCNC: 4.1 MG/DL (ref 2.5–4.9)
POTASSIUM SERPL-SCNC: 4.7 MMOL/L (ref 3.5–5.5)
SODIUM SERPL-SCNC: 142 MMOL/L (ref 136–145)

## 2024-08-21 PROCEDURE — 6360000002 HC RX W HCPCS: Performed by: STUDENT IN AN ORGANIZED HEALTH CARE EDUCATION/TRAINING PROGRAM

## 2024-08-21 PROCEDURE — 36415 COLL VENOUS BLD VENIPUNCTURE: CPT

## 2024-08-21 PROCEDURE — 83735 ASSAY OF MAGNESIUM: CPT

## 2024-08-21 PROCEDURE — 96372 THER/PROPH/DIAG INJ SC/IM: CPT

## 2024-08-21 PROCEDURE — 80069 RENAL FUNCTION PANEL: CPT

## 2024-08-21 RX ORDER — ALBUTEROL SULFATE 90 UG/1
4 AEROSOL, METERED RESPIRATORY (INHALATION) PRN
OUTPATIENT
Start: 2025-02-09

## 2024-08-21 RX ORDER — EPINEPHRINE 1 MG/ML
0.3 INJECTION, SOLUTION, CONCENTRATE INTRAVENOUS PRN
OUTPATIENT
Start: 2025-02-09

## 2024-08-21 RX ORDER — SODIUM CHLORIDE 9 MG/ML
INJECTION, SOLUTION INTRAVENOUS CONTINUOUS
OUTPATIENT
Start: 2025-02-09

## 2024-08-21 RX ORDER — ONDANSETRON 2 MG/ML
8 INJECTION INTRAMUSCULAR; INTRAVENOUS
OUTPATIENT
Start: 2025-02-09

## 2024-08-21 RX ORDER — DIPHENHYDRAMINE HYDROCHLORIDE 50 MG/ML
50 INJECTION INTRAMUSCULAR; INTRAVENOUS
OUTPATIENT
Start: 2025-02-09

## 2024-08-21 RX ORDER — ACETAMINOPHEN 325 MG/1
650 TABLET ORAL
OUTPATIENT
Start: 2025-02-09

## 2024-08-21 RX ADMIN — DENOSUMAB 60 MG: 60 INJECTION SUBCUTANEOUS at 12:22

## 2024-08-21 ASSESSMENT — PAIN - FUNCTIONAL ASSESSMENT: PAIN_FUNCTIONAL_ASSESSMENT: NONE - DENIES PAIN

## 2024-08-21 NOTE — PROGRESS NOTES
[facility] Progress Note    Date: 2024    Name: Amy Bernardo    MRN: [id number]         : 1940    Ms. Bernardo arrived in the [facility] today at 1045, in stable condition, here for her PROLIA INJECTION + LABS (EVERY 6 MONTHS). She was assessed and education was provided.     Ms. Bernardo's vitals were reviewed.  Vitals:    24 1045   BP: 119/73   Pulse: 75   Resp: 16   Temp: 97.4 °F (36.3 °C)   SpO2: 95%       Ms. Bernardo presented to the infusion center today stating that she was doing fairly well, and voicing no major complaints.       She also stated that she had tolerated past PROLIA injections well, and without any problems.         Blood for the ordered PRE-PROLIA LABS (RENAL FUNCTION PANEL + MAGNESIUM) was drawn from her LEFT AC at 1055 without incident, and was sent out by STAT  to the [facility] for STAT processing.       The lab results from today were as follows:    Results for orders placed or performed during the hospital encounter of 24   Renal Function Panel   Result Value Ref Range    Sodium 142 136 - 145 mmol/L    Potassium 4.7 3.5 - 5.5 mmol/L    Chloride 107 100 - 111 mmol/L    CO2 27 21 - 32 mmol/L    Anion Gap 8 3.0 - 18 mmol/L    Glucose 105 (H) 74 - 99 mg/dL    BUN 16 7.0 - 18 MG/DL    Creatinine 0.63 0.6 - 1.3 MG/DL    BUN/Creatinine Ratio 25 (H) 12 - 20      Est, Glom Filt Rate 88 >60 ml/min/1.73m2    Calcium 8.2 (L) 8.5 - 10.1 MG/DL    Phosphorus PENDING MG/DL    Albumin 3.0 (L) 3.4 - 5.0 g/dL   Magnesium   Result Value Ref Range    Magnesium 2.0 1.6 - 2.6 mg/dL         After reviewing the labs documented above, it was noted that both her Calcium Level and her Albumin Level were low. Therefore, a Corrected Calcium was calculated.     The Corrected Calcium was calculated at 9.0, which was noted to be within normal limits. This calculation was verified with our [facility] pharmacist Abdifatah Hoff, and per JENNIFER Gardiner to proceed with the PROLIA injection

## 2024-08-22 ENCOUNTER — HOSPITAL ENCOUNTER (OUTPATIENT)
Facility: HOSPITAL | Age: 84
Setting detail: RECURRING SERIES
Discharge: HOME | End: 2024-08-25
Payer: MEDICARE

## 2024-08-22 PROCEDURE — G0237 THERAPEUTIC PROCD STRG ENDUR: HCPCS

## 2024-08-22 PROCEDURE — G0239 OTH RESP PROC, GROUP: HCPCS

## 2024-08-22 ASSESSMENT — PATIENT HEALTH QUESTIONNAIRE - PHQ9
1. LITTLE INTEREST OR PLEASURE IN DOING THINGS: NOT AT ALL
6. FEELING BAD ABOUT YOURSELF - OR THAT YOU ARE A FAILURE OR HAVE LET YOURSELF OR YOUR FAMILY DOWN: NOT AT ALL
4. FEELING TIRED OR HAVING LITTLE ENERGY: NOT AT ALL
SUM OF ALL RESPONSES TO PHQ9 QUESTIONS 1 & 2: 0
10. IF YOU CHECKED OFF ANY PROBLEMS, HOW DIFFICULT HAVE THESE PROBLEMS MADE IT FOR YOU TO DO YOUR WORK, TAKE CARE OF THINGS AT HOME, OR GET ALONG WITH OTHER PEOPLE: NOT DIFFICULT AT ALL
3. TROUBLE FALLING OR STAYING ASLEEP: NOT AT ALL
7. TROUBLE CONCENTRATING ON THINGS, SUCH AS READING THE NEWSPAPER OR WATCHING TELEVISION: NOT AT ALL
2. FEELING DOWN, DEPRESSED OR HOPELESS: NOT AT ALL
9. THOUGHTS THAT YOU WOULD BE BETTER OFF DEAD, OR OF HURTING YOURSELF: NOT AT ALL
SUM OF ALL RESPONSES TO PHQ QUESTIONS 1-9: 0
5. POOR APPETITE OR OVEREATING: NOT AT ALL
SUM OF ALL RESPONSES TO PHQ QUESTIONS 1-9: 0
SUM OF ALL RESPONSES TO PHQ QUESTIONS 1-9: 0
8. MOVING OR SPEAKING SO SLOWLY THAT OTHER PEOPLE COULD HAVE NOTICED. OR THE OPPOSITE, BEING SO FIGETY OR RESTLESS THAT YOU HAVE BEEN MOVING AROUND A LOT MORE THAN USUAL: NOT AT ALL
SUM OF ALL RESPONSES TO PHQ QUESTIONS 1-9: 0

## 2024-08-22 ASSESSMENT — EXERCISE STRESS TEST
PEAK_RPE: 11
PEAK_HR: 89
PEAK_BP: 125/73
PEAK_BP: 125/73
PEAK_METS: 2.34

## 2024-08-22 ASSESSMENT — PULMONARY FUNCTION TESTS
FVC (LITERS): 64
FEV1 (%PREDICTED): 54
FEV1/FVC: 86

## 2024-08-22 ASSESSMENT — LIFESTYLE VARIABLES
ALCOHOL_AMOUNT: 1 GLASS
ALCOHOL_TYPE: WINE/BEER
ALCOHOL_USE: DAILY

## 2024-08-22 ASSESSMENT — EJECTION FRACTION: EF_VALUE: 30

## 2024-08-22 NOTE — PROGRESS NOTES
Pulmonary/RT Note    Visit #      36/36         Amy Bernardo is a 83 y.o. female presented today for pulmonary rehab. She state that there have been no changes in medication or health since the last visit.          She has a target heart rate of . She tolerated the exercise session well and has a pain level of 0. Patient states RPE for session today was 14 and RPD was a 3. Today the pt did:      Nustep: 15 min  Arm bike: 15 min  Walkin min  Recovery and monitorin min  Education:  Home exercise packet               Physician available for emergencies: Dr. Atul Willson, HUNTER 2024 12:06 PM

## 2024-08-22 NOTE — PROGRESS NOTES
Pulmonary Rehab Routine Discharge Psychosocial Note     Program Completed: Yes        Amy Bernardo has completed 36 /36  sessions for pulmonary rehab. He had Good attendance. Shecompleted his six minute walk test, walking 306 feet further today than on the day of evaluation. She does not require supplemental oxygen with exercises. He is  tobacco free.      Amy Bernardo was encouraged to incorporate some for of home exercises at least three days per week, she plans to  walk and do regular garden work.  She feels that pulmonary rehab has helped and the most significant change is her upper and lower body strength as improved.     Amy Bernardo #[id number] (CSN:[id number]) (83 y.o. F) (Adm: 08/22/24)  [facility]  Pulmonary ITP    Flowsheet Row [facility] PULMONARY REHAB from 8/22/2024 in [facility] PULMONARY REHAB   Treatment Diagnosis    Treatment Diagnosis 1 COPD   Treatment Diagnosis 2 --   Referral Date 11/30/23   Significant Cardiovascular History Chronic atrial fibrillation   Co-morbidities Pulmonary disease  [Asthma, FRANKO]   Oxygen Saturation / Titration    Stages of Change --   Oxygen Intervention    Oxygen Use No   Individual Treatment Plan    ITP Visit Type Discharge, completed program   1st Date of Exercise 03/18/24   ITP Next Review Date --   Visit #/Total Visits 36/36   EF% 30 %   FVC (Liters) 64 liters   FEV1 (%PRED) 54   FEV1/FVC 86   DLCO (mL/mmHg sec) 45 ml/mmHg sec   Risk Stratification Moderate   Pulmonary ITP Exercise, Psychosocial, Tobacco, Nutrition, Education   Exercise    Christiansen Total Score --   Stages of Change --   Exercise Test Six minute walk test   Distance Walked in ft   Distance Walked (ft) 918 ft   Distance Walked (miles) --   Distance Walked in Feet (Calculated) --   Distance Walked in Meters --   Peak HR 89   Peak /73   Peak RPE 11   Peak Mets 2.34   O2 Saturation 92-97   O2 Flow Rate (l/min) 0 l/min   Stops 0   SPO2 Range 92-97   DASI Total Score --   BMI  care   Patient Stated Education Goals --   Physician Response    MD Response --   Add or Change to Rehab Plan --   MD Signature --     Goals Comments   1. Improve lower body strength    [] initial  [x] met                             [] not met  [] progressing  She has more control    2. Learn breathing techniques [] initial  [x] met                             [x] not met  [] progressing she sings a song without becoming moderately short of breath   3. Decrease shortness of breath [] initial  [] met                             [] not met  [] progressing  her overall shortness of breath has improved, she still has increase shortness of breath when bending      MD Signature: _______________________________________________        Date/Time:  _______________________________        Pharmacist Signature:_________________________________________        Date/Time: _______________________________

## 2024-08-22 NOTE — DISCHARGE INSTRUCTIONS
3 Breathing Exercises to Help You Relax (04:33)  Your health professional recommends that you watch this short online health video.  Here are three breathing exercises you can do when you need to relax and feel calm.   Purpose: Shows patients how to do three breathing exercises to help them relax and feel calm.  Goal: The viewer will learn how to do three breathing exercises to help them relax and feel calm.    Watch: Scan the QR code or visit the link to view video       [email or web address]  Current as of: June 24, 2023  Content Version: 14.1  © 2006-2024 [employer].   Care instructions adapted under license by [employer]. If you have questions about a medical condition or this instruction, always ask your healthcare professional. [employer] disclaims any warranty or liability for your use of this information.

## 2024-08-28 ENCOUNTER — HOSPITAL ENCOUNTER (OUTPATIENT)
Facility: HOSPITAL | Age: 84
Setting detail: RECURRING SERIES
Discharge: HOME | End: 2024-08-31
Payer: MEDICARE

## 2024-08-28 PROCEDURE — 97110 THERAPEUTIC EXERCISES: CPT

## 2024-08-28 PROCEDURE — 97161 PT EVAL LOW COMPLEX 20 MIN: CPT

## 2024-08-28 NOTE — PROGRESS NOTES
PHYSICAL / OCCUPATIONAL THERAPY - DAILY TREATMENT NOTE    Patient Name: Amy Bernardo    Date: 2024    : 1940  Insurance: Payor: MEDICARE / Plan: MEDICARE PART A AND B / Product Type: *No Product type* /      Patient  verified Yes     Visit #   Current / Total 1 24   Time   In / Out 1125 1205   Pain   In / Out 3 3   Subjective Functional Status/Changes:  Pt. Is an 83 year old female c/o LBP that began years ago with worsening symptoms where her legs feel weak. She reports her legs now feel weak and she \"walks like a Drunk\"  She also reports difficulty with getting back up after bending down from Gardening activities. She reports back pain is a constant ache and worsens with prolonged standing like when she is cooking.      TREATMENT AREA =  Other low back pain [M54.59]     OBJECTIVE         Therapeutic Procedures:    Tx Min Billable or 1:1 Min (if diff from Tx Min) Procedure, Rationale, Specifics   25  71612 Therapeutic Exercise (timed):  increase ROM, strength, coordination, balance, and proprioception to improve patient's ability to progress to PLOF and address remaining functional goals. (see flow sheet as applicable)     Details if applicable:              Details if applicable:            Details if applicable:            Details if applicable:            Details if applicable:     25  [facility] Totals Reminder: bill using total billable min of TIMED therapeutic procedures (example: do not include dry needle or estim unattended, both untimed codes, in totals to left)  8-22 min = 1 unit; 23-37 min = 2 units; 38-52 min = 3 units; 53-67 min = 4 units; 68-82 min = 5 units   Total Total     [x]  Patient Education billed concurrently with other procedures   [x] Review HEP    [] Progressed/Changed HEP, detail:    [] Other detail:       Objective Information/Functional Measures/Assessment    Negative neural tension testing bilaterally. No myotome involvement was noted but she does have

## 2024-08-28 NOTE — PROGRESS NOTES
[facility]  [address] [place] [address] Ph:[phone] Fx: [phone]  Plan of Care / Statement of Necessity for Physical Therapy Services     Patient Name: Amy Bernardo : 1940   Medical   Diagnosis: Other low back pain [M54.59] Treatment Diagnosis: M54.59  OTHER LOWER BACK PAIN      Onset Date: Ongoing for yrs Payor :  Payor: MEDICARE / Plan: MEDICARE PART A AND B / Product Type: *No Product type* /    Referral Source: Bhavik Crain MD Start of Care (SOC): 2024   Prior Hospitalization: See medical history Provider #: [id number]   Prior Level of Function: Independent Community Ambulator. Lives Alone. Does Gardening Activities and likes to Cook.    Comorbidities:  Other:   Osteoporosis, Cancer, Right Sh Arthroplasty       Assessment / key information:  Pt. Is an 83 year old female c/o LBP that began years ago with worsening symptoms where her legs feel weak. She reports her legs now feel weak and she \"walks like a Drunk\"  She also reports difficulty with getting back up after bending down from Gardening activities. She reports back pain is a constant ache and worsens with prolonged standing like when she is cooking.    She presents with decreased lumbar AROM in all directions. Negative neural tension testing bilaterally. No myotome involvement was noted but she does have significant decrease in B hip Abd and IR/ER strength. Pt. Also has decreased B hip flexibility.   30 second sit to stand test was 8x. Romberg EC x 30 sec with anterior/posterior sway.   Skilled PT is medically necessary in order to improve LE strength and core stability for increased ease of ADLs and improved quality of life.     Evaluation Complexity:  History:  MEDIUM  Complexity : 1-2 comorbidities / personal factors will impact the outcome/ POC ; Examination:  MEDIUM Complexity : 3 Standardized tests and measures addressin body structure,  function, activity limitation and / or participation in recreation  ;Presentation:  MEDIUM Complexity : Evolving with changing characteristics  ;Clinical Decision Making: Oswestry Disability Index (INDIGO) for Low Back Pain = TBD % ; (21% - 40% Moderate Disability) = MODERATE Complexity  Overall Complexity Rating: MEDIUM  Problem List: pain affecting function, decrease ROM, decrease strength, impaired gait/balance, decrease ADL/functional abilities, decrease activity tolerance, decrease flexibility/joint mobility, and decrease transfer abilities    Treatment Plan may include any combination of the followin Neuromuscular Re-Education, 06600 Manual Therapy, 82731 Therapeutic Activity, 61335 Self Care/Home Management, 20289 Electrical Stim unattended, 44857 Electrical Stim attended, 75500 Vasopneumatic Device  (Vasopnuematic compression justification:  Per bilateral girth measures taken and listed above the edema is considered significant and having an impact on the patient's strength, balance, gait, transfers, self care, and ADL's), 09643 Gait Training, and 82574 Ultrasound  Patient / Family readiness to learn indicated by: asking questions, trying to perform skills, and interest  Persons(s) to be included in education: patient (P)  Barriers to Learning/Limitations: none  Measures taken if barriers to learning present: none  Patient Goal (s):  to be able to get off the floor and work on sit to stand  Patient Self Reported Health Status: good  Rehabilitation Potential: good    Short Term Goals: To be accomplished in 4 weeks  Goal: Pt will be compliant with a HEP to improve LS function and Activity tolerance.   Status at evaluation: Issued HEP.     2.   Goal: Pt will perform Romberg EO/EC x 30 sec on a compliant surface w/o LOB to be able to perform gardening activities.   Status at evaluation: Requires CGA with EC    3.   Goal: Patient will improve The Revised Oswestry Disability Index for Low Back

## 2024-09-12 ENCOUNTER — HOSPITAL ENCOUNTER (OUTPATIENT)
Facility: HOSPITAL | Age: 84
Setting detail: RECURRING SERIES
Discharge: HOME | End: 2024-09-15
Payer: MEDICARE

## 2024-09-12 PROCEDURE — 97530 THERAPEUTIC ACTIVITIES: CPT

## 2024-09-12 PROCEDURE — 97110 THERAPEUTIC EXERCISES: CPT

## 2024-09-13 ENCOUNTER — HOSPITAL ENCOUNTER (OUTPATIENT)
Facility: HOSPITAL | Age: 84
Setting detail: RECURRING SERIES
Discharge: HOME | End: 2024-09-16
Payer: MEDICARE

## 2024-09-13 PROCEDURE — 97110 THERAPEUTIC EXERCISES: CPT

## 2024-09-13 PROCEDURE — 97112 NEUROMUSCULAR REEDUCATION: CPT

## 2024-09-17 ENCOUNTER — HOSPITAL ENCOUNTER (OUTPATIENT)
Facility: HOSPITAL | Age: 84
Setting detail: RECURRING SERIES
Discharge: HOME | End: 2024-09-20
Payer: MEDICARE

## 2024-09-17 PROCEDURE — 97530 THERAPEUTIC ACTIVITIES: CPT

## 2024-09-17 PROCEDURE — 97112 NEUROMUSCULAR REEDUCATION: CPT

## 2024-09-17 PROCEDURE — 97110 THERAPEUTIC EXERCISES: CPT

## 2024-09-19 ENCOUNTER — HOSPITAL ENCOUNTER (OUTPATIENT)
Facility: HOSPITAL | Age: 84
Setting detail: RECURRING SERIES
Discharge: HOME | End: 2024-09-22
Payer: MEDICARE

## 2024-09-19 PROCEDURE — 97110 THERAPEUTIC EXERCISES: CPT

## 2024-09-19 PROCEDURE — 97112 NEUROMUSCULAR REEDUCATION: CPT

## 2024-09-19 PROCEDURE — 97530 THERAPEUTIC ACTIVITIES: CPT

## 2024-09-25 ENCOUNTER — HOSPITAL ENCOUNTER (OUTPATIENT)
Facility: HOSPITAL | Age: 84
Setting detail: RECURRING SERIES
Discharge: HOME | End: 2024-09-28
Payer: MEDICARE

## 2024-09-25 PROCEDURE — 97530 THERAPEUTIC ACTIVITIES: CPT

## 2024-09-25 PROCEDURE — 97110 THERAPEUTIC EXERCISES: CPT

## 2024-09-25 PROCEDURE — 97112 NEUROMUSCULAR REEDUCATION: CPT

## 2024-09-27 ENCOUNTER — HOSPITAL ENCOUNTER (OUTPATIENT)
Facility: HOSPITAL | Age: 84
Setting detail: RECURRING SERIES
Discharge: HOME | End: 2024-09-30
Payer: MEDICARE

## 2024-09-27 PROCEDURE — 97530 THERAPEUTIC ACTIVITIES: CPT

## 2024-09-27 PROCEDURE — 97112 NEUROMUSCULAR REEDUCATION: CPT

## 2024-09-27 NOTE — PROGRESS NOTES
PHYSICAL / OCCUPATIONAL THERAPY - DAILY TREATMENT NOTE    Patient Name: Amy Bernardo    Date: 2024    : 1940  Insurance: Payor: MEDICARE / Plan: MEDICARE PART A AND B / Product Type: *No Product type* /      Patient  verified Yes     Visit #   Current / Total 7 24   Time   In / Out 2:00 2:30   Pain   In / Out 2/10 2/10   Subjective Functional Status/Changes: Pt reports an ache in her back and left buttocks today. She states it usually feels better when she stands up straight. She brought her cane and uses it some at the house.      TREATMENT AREA =  Other low back pain [M54.59]     OBJECTIVE      Therapeutic Procedures:    Tx Min Billable or 1:1 Min (if diff from Tx Min) Procedure, Rationale, Specifics   20  42280 Therapeutic Activity (timed):  use of dynamic activities replicating functional movements to increase ROM, strength, coordination, balance, and proprioception in order to improve patient's ability to progress to PLOF and address remaining functional goals.  (see flow sheet as applicable)     Details if applicable:  goal reassessment   10  14002 Neuromuscular Re-Education (timed):  improve balance, coordination, kinesthetic sense, posture, core stability and proprioception to improve patient's ability to develop conscious control of individual muscles and awareness of position of extremities in order to progress to PLOF and address remaining functional goals. (see flow sheet as applicable)     Details if applicable:  balance   30  [facility] Totals Reminder: bill using total billable min of TIMED therapeutic procedures (example: do not include dry needle or estim unattended, both untimed codes, in totals to left)  8-22 min = 1 unit; 23-37 min = 2 units; 38-52 min = 3 units; 53-67 min = 4 units; 68-82 min = 5 units   Total Total     [x]  Patient Education billed concurrently with other procedures   [x] Review HEP    [] Progressed/Changed HEP, detail:    [] Other detail:   
compliance     2.   Goal: Pt will perform Romberg EO/EC x 30 sec on a compliant surface w/o LOB to be able to perform gardening activities.   Status at evaluation: Requires CGA with EC  Current:  EO 30 seconds on compliant surface no LOB; EC 30 seconds unable to perform on compliant surface but able to complete on floor without LOB      3.   Goal: Patient will improve The Revised Oswestry Disability Index for Low Back Pain/Dysfunction by 5% in order to demonstrate a significant improvement in pain for increased ease of daily activities.    Status at evaluation: to be completed  Current: 18%     Long Term Goals: To be accomplished in 8 weeks  Goal: Patient will improve The Revised Oswestry Disability Index for Low Back Pain/Dysfunction by 13% in order to demonstrate a significant improvement in pain for increased ease of daily activities.    Status at evaluation: INDIGO= to be completed  Current: 18%      2.   Goal:Patient will be able to perform floor<>stand transfer with just 1 UE to demonstrate improved LE strength for ease of transfers at home.   Status at evaluation: Reports difficulty and relies heavily on pulling up on furniture.   Current: 1 UE on plinth to perform lunge     3.   Goal:Patient will improve B hip abduction strength to 4+/5 and Hip IR /ER to 4/5 in order to increase ease of ambulation.    Status at evaluation: bilateral Hip Abd=4/5, Hip ER/IR =4-/5  Current: 4/5      4. Goal: pt will perform sit to stand x 10 in 30 sec with min use of UE and demo of good form to ease with ability to transfer from her favorite chair.  Status at evaluation: sit to stand x 8 with UE/ 30 sec.   Current: 10x     5. Goal: Pt will navigate an obstacle course w/o LOB and w/o an AD to ease with safe return to community Ambulation.   Status at evaluation:n/a  Current: hesitancy with 6\" step up and with low cole step over requiring HHA to perform     Medicare, cannot change goals, cannot adjust frequency/duration, no

## 2024-09-30 ENCOUNTER — HOSPITAL ENCOUNTER (OUTPATIENT)
Facility: HOSPITAL | Age: 84
Setting detail: RECURRING SERIES
Discharge: HOME | End: 2024-10-03
Payer: MEDICARE

## 2024-09-30 PROCEDURE — 97110 THERAPEUTIC EXERCISES: CPT

## 2024-09-30 PROCEDURE — 97530 THERAPEUTIC ACTIVITIES: CPT

## 2024-09-30 PROCEDURE — 97112 NEUROMUSCULAR REEDUCATION: CPT

## 2024-09-30 NOTE — PROGRESS NOTES
/ER to 4/5 in order to increase ease of ambulation.    Status at evaluation: bilateral Hip Abd=4/5, Hip ER/IR =4-/5  Status at Progress Note: 4/5      6. Goal: Pt will navigate an obstacle course w/o LOB and w/o an AD to ease with safe return to community Ambulation.   Status at evaluation:n/a  Status at Progress Note: hesitancy with 6\" step up and with low cole step over requiring HHA to perform      Next PN: 10/26/24 RC due 11/26/24  Auth due (visit number/ date) LYNDA    PLAN  - Continue Plan of Care    Laura M Behrens, PTA    9/30/2024    6:38 AM  If an interpreting service was utilized for treatment of this patient, the contents of this document represent the material reviewed with the patient via the .     Future Appointments   Date Time Provider Department Center   9/30/2024 10:00 AM Behrens, Laura M, PTA [facility]   10/3/2024 12:40 PM Behrens, Laura M, PTA [facility]   10/8/2024  1:20 PM Behrens, Laura M, PTA [facility]   10/10/2024 12:00 PM Behrens, Laura M, PTA [facility]   10/15/2024  1:20 PM Yinka Buckner, PT [facility]   10/18/2024 12:40 PM Behrens, Laura M, PTA [facility]   10/22/2024  1:20 PM Yinka Buckner, PT [facility]   10/24/2024 12:40 PM Behrens, Laura M, PTA [facility]   10/29/2024  1:20 PM Yinka Buckner, PT [facility]   10/31/2024 12:40 PM Behrens, Laura M, PTA [facility]   11/18/2024 10:45 AM Elizabeth Chapa MD [facility] Sched   1/9/2025  9:15 AM Kassie Freed MD [facility] BS AMB   2/19/2025 11:00 AM [facility] FAST TRACK NURSE [facility]

## 2024-10-03 ENCOUNTER — HOSPITAL ENCOUNTER (OUTPATIENT)
Facility: HOSPITAL | Age: 84
Setting detail: RECURRING SERIES
Discharge: HOME | End: 2024-10-06
Payer: MEDICARE

## 2024-10-03 PROCEDURE — 97110 THERAPEUTIC EXERCISES: CPT

## 2024-10-03 PROCEDURE — 97530 THERAPEUTIC ACTIVITIES: CPT

## 2024-10-03 PROCEDURE — 97112 NEUROMUSCULAR REEDUCATION: CPT

## 2024-10-03 NOTE — PROGRESS NOTES
PHYSICAL / OCCUPATIONAL THERAPY - DAILY TREATMENT NOTE    Patient Name: Amy Bernardo    Date: 10/3/2024    : 1940  Insurance: Payor: MEDICARE / Plan: MEDICARE PART A AND B / Product Type: *No Product type* /      Patient  verified Yes     Visit #   Current / Total 9 24   Time   In / Out 12:40 1:43   Pain   In / Out 2/10 2/10   Subjective Functional Status/Changes: Pt reports ache to the low back. She notes less swelling to the lower legs.      TREATMENT AREA =  Other low back pain [M54.59]     OBJECTIVE    Modalities Rationale:     decrease inflammation, decrease pain, and increase tissue extensibility to improve patient's ability to progress to PLOF and address remaining functional goals.     min [] Estim Unattended, type/location:                                      []  w/ice    []  w/heat    min [] Estim Attended, type/location:                                     []  w/US     []  w/ice    []  w/heat    []  TENS insruct      min []  Mechanical Traction: type/lbs                   []  pro   []  sup   []  int   []  cont    []  before manual    []  after manual    min []  Ultrasound, settings/location:     10 min  unbill [x]  Ice     []  Heat    location/position: Seated to l/s    min []  Paraffin,  details:     min []  Vasopneumatic Device, press/temp:     min []  Whirlpool / Fluido:    If using vaso (only need to measure limb vaso being performed on)      pre-treatment girth :       post-treatment girth :       measured at (landmark location) :      min []  Other:    Skin assessment post-treatment:   Intact        Therapeutic Procedures:    Tx Min Billable or 1:1 Min (if diff from Tx Min) Procedure, Rationale, Specifics   13 13 01653 Therapeutic Activity (timed):  use of dynamic activities replicating functional movements to increase ROM, strength, coordination, balance, and proprioception in order to improve patient's ability to progress to PLOF and address remaining functional goals.

## 2024-10-08 ENCOUNTER — HOSPITAL ENCOUNTER (OUTPATIENT)
Facility: HOSPITAL | Age: 84
Setting detail: RECURRING SERIES
Discharge: HOME | End: 2024-10-11
Payer: MEDICARE

## 2024-10-08 PROCEDURE — 97110 THERAPEUTIC EXERCISES: CPT

## 2024-10-08 PROCEDURE — 97140 MANUAL THERAPY 1/> REGIONS: CPT

## 2024-10-08 PROCEDURE — 97530 THERAPEUTIC ACTIVITIES: CPT

## 2024-10-08 NOTE — PROGRESS NOTES
PHYSICAL / OCCUPATIONAL THERAPY - DAILY TREATMENT NOTE    Patient Name: Amy Bernardo    Date: 10/8/2024    : 1940  Insurance: Payor: MEDICARE / Plan: MEDICARE PART A AND B / Product Type: *No Product type* /      Patient  verified Yes     Visit #   Current / Total 10 24   Time   In / Out 1:20 2:21   Pain   In / Out 4/10 2/10   Subjective Functional Status/Changes: Pt reports more pain in her right low back today and is wearing a lidocaine patch. She states her balance still feels off but she tries to slow down and make sure she is safe before doing things.      TREATMENT AREA =  Other low back pain [M54.59]     OBJECTIVE    Modalities Rationale:     decrease inflammation, decrease pain, and increase tissue extensibility to improve patient's ability to progress to PLOF and address remaining functional goals.     min [] Estim Unattended, type/location:                                      []  w/ice    []  w/heat    min [] Estim Attended, type/location:                                     []  w/US     []  w/ice    []  w/heat    []  TENS insruct      min []  Mechanical Traction: type/lbs                   []  pro   []  sup   []  int   []  cont    []  before manual    []  after manual    min []  Ultrasound, settings/location:     10 min  unbill [x]  Ice     []  Heat    location/position: Seated to l/s    min []  Paraffin,  details:     min []  Vasopneumatic Device, press/temp:     min []  Whirlpool / Fluido:    If using vaso (only need to measure limb vaso being performed on)      pre-treatment girth :       post-treatment girth :       measured at (landmark location) :      min []  Other:    Skin assessment post-treatment:   Intact        Therapeutic Procedures:    Tx Min Billable or 1:1 Min (if diff from Tx Min) Procedure, Rationale, Specifics   23  83480 Therapeutic Activity (timed):  use of dynamic activities replicating functional movements to increase ROM, strength, coordination, balance,

## 2024-10-10 ENCOUNTER — HOSPITAL ENCOUNTER (OUTPATIENT)
Facility: HOSPITAL | Age: 84
Setting detail: RECURRING SERIES
Discharge: HOME | End: 2024-10-13
Payer: MEDICARE

## 2024-10-10 PROCEDURE — 97530 THERAPEUTIC ACTIVITIES: CPT

## 2024-10-10 PROCEDURE — 97112 NEUROMUSCULAR REEDUCATION: CPT

## 2024-10-10 PROCEDURE — 97110 THERAPEUTIC EXERCISES: CPT

## 2024-10-10 NOTE — PROGRESS NOTES
PHYSICAL / OCCUPATIONAL THERAPY - DAILY TREATMENT NOTE    Patient Name: Amy Bernardo    Date: 10/10/2024    : 1940  Insurance: Payor: MEDICARE / Plan: MEDICARE PART A AND B / Product Type: *No Product type* /      Patient  verified Yes     Visit #   Current / Total 11 24   Time   In / Out 12:00 12:56   Pain   In / Out 2/10 1/10   Subjective Functional Status/Changes: Pt reports back is feeling better today since last session. She stretches in the morning which feels good and was able to get more done around the house. She had veins injected this morning but was told she could still participate in therapy.      TREATMENT AREA =  Other low back pain [M54.59]     OBJECTIVE    Modalities Rationale:     decrease inflammation, decrease pain, and increase tissue extensibility to improve patient's ability to progress to PLOF and address remaining functional goals.     min [] Estim Unattended, type/location:                                      []  w/ice    []  w/heat    min [] Estim Attended, type/location:                                     []  w/US     []  w/ice    []  w/heat    []  TENS insruct      min []  Mechanical Traction: type/lbs                   []  pro   []  sup   []  int   []  cont    []  before manual    []  after manual    min []  Ultrasound, settings/location:     10 min  unbill [x]  Ice     []  Heat    location/position: Seated to l/s    min []  Paraffin,  details:     min []  Vasopneumatic Device, press/temp:     min []  Whirlpool / Fluido:    If using vaso (only need to measure limb vaso being performed on)      pre-treatment girth :       post-treatment girth :       measured at (landmark location) :      min []  Other:    Skin assessment post-treatment:   Intact        Therapeutic Procedures:    Tx Min Billable or 1:1 Min (if diff from Tx Min) Procedure, Rationale, Specifics   10  26783 Therapeutic Activity (timed):  use of dynamic activities replicating functional movements

## 2024-10-14 ENCOUNTER — HOSPITAL ENCOUNTER (OUTPATIENT)
Facility: HOSPITAL | Age: 84
Setting detail: RECURRING SERIES
Discharge: HOME | End: 2024-10-17
Payer: MEDICARE

## 2024-10-14 PROCEDURE — 97530 THERAPEUTIC ACTIVITIES: CPT

## 2024-10-14 PROCEDURE — 97110 THERAPEUTIC EXERCISES: CPT

## 2024-10-14 PROCEDURE — 97112 NEUROMUSCULAR REEDUCATION: CPT

## 2024-10-14 NOTE — PROGRESS NOTES
PHYSICAL / OCCUPATIONAL THERAPY - DAILY TREATMENT NOTE    Patient Name: mAy Bernardo    Date: 10/14/2024    : 1940  Insurance: Payor: MEDICARE / Plan: MEDICARE PART A AND B / Product Type: *No Product type* /      Patient  verified Yes     Visit #   Current / Total 12 24   Time   In / Out 10:40 11:36   Pain   In / Out 2/10 2/10   Subjective Functional Status/Changes: Pt reports after therapy she has to go home and move kitchen furniture in order to get down to scrub her floors and baseboards. She has a lidocaine patch on today.  She still feels like she needs more strengthening of her legs.      TREATMENT AREA =  Other low back pain [M54.59]     OBJECTIVE    Modalities Rationale:     decrease inflammation, decrease pain, and increase tissue extensibility to improve patient's ability to progress to PLOF and address remaining functional goals.     min [] Estim Unattended, type/location:                                      []  w/ice    []  w/heat    min [] Estim Attended, type/location:                                     []  w/US     []  w/ice    []  w/heat    []  TENS insruct      min []  Mechanical Traction: type/lbs                   []  pro   []  sup   []  int   []  cont    []  before manual    []  after manual    min []  Ultrasound, settings/location:     10 min  unbill [x]  Ice     []  Heat    location/position: Seated to l/s    min []  Paraffin,  details:     min []  Vasopneumatic Device, press/temp:     min []  Whirlpool / Fluido:    If using vaso (only need to measure limb vaso being performed on)      pre-treatment girth :       post-treatment girth :       measured at (landmark location) :      min []  Other:    Skin assessment post-treatment:   Intact        Therapeutic Procedures:    Tx Min Billable or 1:1 Min (if diff from Tx Min) Procedure, Rationale, Specifics   10  18440 Therapeutic Activity (timed):  use of dynamic activities replicating functional movements to increase ROM,

## 2024-10-15 ENCOUNTER — HOSPITAL ENCOUNTER (OUTPATIENT)
Facility: HOSPITAL | Age: 84
Setting detail: RECURRING SERIES
Discharge: HOME | End: 2024-10-18
Payer: MEDICARE

## 2024-10-15 PROCEDURE — 97110 THERAPEUTIC EXERCISES: CPT

## 2024-10-15 PROCEDURE — 97112 NEUROMUSCULAR REEDUCATION: CPT

## 2024-10-15 NOTE — PROGRESS NOTES
PHYSICAL / OCCUPATIONAL THERAPY - DAILY TREATMENT NOTE    Patient Name: Amy Bernardo    Date: 10/15/2024    : 1940  Insurance: Payor: MEDICARE / Plan: MEDICARE PART A AND B / Product Type: *No Product type* /      Patient  verified Yes     Visit #   Current / Total 13 24   Time   In / Out 1:20 2:10   Pain   In / Out 2/10 2/10   Subjective Functional Status/Changes: Pt. Reports she is doing pretty good today. She reports she is tired from not sleeping well. She was able to scrub her floor yesterday.      TREATMENT AREA =  Other low back pain [M54.59]     OBJECTIVE      Ice (UNBILLED):  location/position: seated     Min Rationale   10 decrease pain to improve patient's ability to progress to PLOF and address remaining functional goals.     Skin assessment post-treatment:   Intact      Therapeutic Procedures:    Tx Min Billable or 1:1 Min (if diff from Tx Min) Procedure, Rationale, Specifics   30  [id number] Therapeutic Exercise (timed):  increase ROM, strength, coordination, balance, and proprioception to improve patient's ability to progress to PLOF and address remaining functional goals. (see flow sheet as applicable)     Details if applicable:  see flow sheet     10  36506 Neuromuscular Re-Education (timed):  improve balance, coordination, kinesthetic sense, posture, core stability and proprioception to improve patient's ability to develop conscious control of individual muscles and awareness of position of extremities in order to progress to PLOF and address remaining functional goals. (see flow sheet as applicable)     Details if applicable:  standing balance activities,           Details if applicable:            Details if applicable:            Details if applicable:     40  [facility] Totals Reminder: bill using total billable min of TIMED therapeutic procedures (example: do not include dry needle or estim unattended, both untimed codes, in totals to left)  8-22 min = 1 unit; 23-37 min = 2

## 2024-10-18 ENCOUNTER — APPOINTMENT (OUTPATIENT)
Facility: HOSPITAL | Age: 84
End: 2024-10-18
Payer: MEDICARE

## 2024-10-21 ENCOUNTER — HOSPITAL ENCOUNTER (OUTPATIENT)
Facility: HOSPITAL | Age: 84
Setting detail: RECURRING SERIES
Discharge: HOME | End: 2024-10-24
Payer: MEDICARE

## 2024-10-21 PROCEDURE — 97110 THERAPEUTIC EXERCISES: CPT

## 2024-10-21 PROCEDURE — 97530 THERAPEUTIC ACTIVITIES: CPT

## 2024-10-21 PROCEDURE — 97112 NEUROMUSCULAR REEDUCATION: CPT

## 2024-10-21 NOTE — PROGRESS NOTES
PHYSICAL / OCCUPATIONAL THERAPY - DAILY TREATMENT NOTE    Patient Name: Amy Bernardo    Date: 10/21/2024    : 1940  Insurance: Payor: MEDICARE / Plan: MEDICARE PART A AND B / Product Type: *No Product type* /      Patient  verified Yes     Visit #   Current / Total 14 24   Time   In / Out 12:41 1:39   Pain   In / Out 2/10 1/10   Subjective Functional Status/Changes: Pt reports feeling like she is getting a little better. She states she gets pulling pain in her back and when she leans back she feels like she is stretching and comfortable.      TREATMENT AREA =  Other low back pain [M54.59]     OBJECTIVE      Ice (UNBILLED):  location/position: seated     Min Rationale   10 decrease pain to improve patient's ability to progress to PLOF and address remaining functional goals.     Skin assessment post-treatment:   Intact      Therapeutic Procedures:    Tx Min Billable or 1:1 Min (if diff from Tx Min) Procedure, Rationale, Specifics   15  29083 Therapeutic Exercise (timed):  increase ROM, strength, coordination, balance, and proprioception to improve patient's ability to progress to PLOF and address remaining functional goals. (see flow sheet as applicable)     Details if applicable:  see flow sheet     15  41664 Neuromuscular Re-Education (timed):  improve balance, coordination, kinesthetic sense, posture, core stability and proprioception to improve patient's ability to develop conscious control of individual muscles and awareness of position of extremities in order to progress to PLOF and address remaining functional goals. (see flow sheet as applicable)     Details if applicable:  standing balance activities   18  75760 Therapeutic Activity (timed):  use of dynamic activities replicating functional movements to increase ROM, strength, coordination, balance, and proprioception in order to improve patient's ability to progress to PLOF and address remaining functional goals.  (see flow sheet as

## 2024-10-22 ENCOUNTER — APPOINTMENT (OUTPATIENT)
Facility: HOSPITAL | Age: 84
End: 2024-10-22
Payer: MEDICARE

## 2024-10-24 ENCOUNTER — HOSPITAL ENCOUNTER (OUTPATIENT)
Facility: HOSPITAL | Age: 84
Setting detail: RECURRING SERIES
Discharge: HOME | End: 2024-10-27
Payer: MEDICARE

## 2024-10-24 PROCEDURE — 97535 SELF CARE MNGMENT TRAINING: CPT

## 2024-10-24 PROCEDURE — 97112 NEUROMUSCULAR REEDUCATION: CPT

## 2024-10-24 PROCEDURE — 97530 THERAPEUTIC ACTIVITIES: CPT

## 2024-10-24 NOTE — PROGRESS NOTES
applicable)     Details if applicable:  standing balance activities   20  50897 Therapeutic Activity (timed):  use of dynamic activities replicating functional movements to increase ROM, strength, coordination, balance, and proprioception in order to improve patient's ability to progress to PLOF and address remaining functional goals.  (see flow sheet as applicable)      Details if applicable:  goal reassessment   45  [facility] Totals Reminder: bill using total billable min of TIMED therapeutic procedures (example: do not include dry needle or estim unattended, both untimed codes, in totals to left)  8-22 min = 1 unit; 23-37 min = 2 units; 38-52 min = 3 units; 53-67 min = 4 units; 68-82 min = 5 units   Total Total     [x]  Patient Education billed concurrently with other procedures   [x] Review HEP    [] Progressed/Changed HEP, detail:    [] Other detail:       Objective Information/Functional Measures/Assessment  HEP compliance: not compliant but performing household chores that require LE strength  Romberg EO on foam: 30 seconds   Romberg EC on foam: 12 seconds  Hip abduction MMT: left 3+/5 right 4/5  Hip IR MMT: B 4/5  Hip ER MMT: right 4/5 left 4-/5  Obstacle course without AD: insecure with stepping up to heights >2\" or compliant surfaces without touching or holding onto something    See Progress Note.     Patient will continue to benefit from skilled PT / OT services to modify and progress therapeutic interventions, analyze and address functional mobility deficits, analyze and address ROM deficits, analyze and address strength deficits, analyze and address soft tissue restrictions, analyze and cue for proper movement patterns, analyze and modify for postural abnormalities, and analyze and address imbalance/dizziness to address functional deficits and attain remaining goals.    Progress toward goals / Updated goals:  [x]  See Progress Note/Recertification    Next PN: 11/22/24 RC due 11/26/24  Auth due (visit 
without [facility]HA    RECOMMENDATIONS  Patient would benefit from the continuation of skilled rehab interventions, per initial Plan of Care or most recent Medicare Recert, for functional progress to achieving above stated clinically significant goals.    If you have any questions/comments please contact us directly.  Thank you for allowing us to assist in the care of your patient.    Laura M Behrens, PTA       10/24/2024       11:11 AM

## 2024-10-28 ENCOUNTER — HOSPITAL ENCOUNTER (OUTPATIENT)
Facility: HOSPITAL | Age: 84
Setting detail: RECURRING SERIES
Discharge: HOME | End: 2024-10-31
Payer: MEDICARE

## 2024-10-28 PROCEDURE — 97110 THERAPEUTIC EXERCISES: CPT

## 2024-10-28 PROCEDURE — 97112 NEUROMUSCULAR REEDUCATION: CPT

## 2024-10-28 NOTE — PROGRESS NOTES
Re-Education (timed):  improve balance, coordination, kinesthetic sense, posture, core stability and proprioception to improve patient's ability to develop conscious control of individual muscles and awareness of position of extremities in order to progress to PLOF and address remaining functional goals. (see flow sheet as applicable)     Details if applicable:  standing balance activities, backward ambulation, ambulation with head turns. Obstacle course with small hurdles and foam with and without carrying water          Details if applicable:            Details if applicable:            Details if applicable:     40  [facility] Totals Reminder: bill using total billable min of TIMED therapeutic procedures (example: do not include dry needle or estim unattended, both untimed codes, in totals to left)  8-22 min = 1 unit; 23-37 min = 2 units; 38-52 min = 3 units; 53-67 min = 4 units; 68-82 min = 5 units   Total Total     [x]  Patient Education billed concurrently with other procedures   [x] Review HEP    [] Progressed/Changed HEP, detail:    [] Other detail:       Objective Information/Functional Measures/Assessment    Pt. Tolerated exercises well with mild back pain with prolonged standing activities  Challenged with eyes closed balance and balance on foam  Challenged with maintaining balance during cole step overs and stepping from foam to foam step  Slow speed during ambulation with head turns    Assessment:  Pt. Is progressing well with physical therapy. She has increased ease of gardening and with sit to stand transfers. She demonstrates improving static standing balance but continues to have difficulty with foam and eyes closed. She also continues to have difficulty with stepping over objects.     Patient will continue to benefit from skilled PT / OT services to modify and progress therapeutic interventions, analyze and address functional mobility deficits, analyze and address ROM deficits, analyze and address

## 2024-10-29 ENCOUNTER — APPOINTMENT (OUTPATIENT)
Facility: HOSPITAL | Age: 84
End: 2024-10-29
Payer: MEDICARE

## 2024-10-31 ENCOUNTER — HOSPITAL ENCOUNTER (OUTPATIENT)
Facility: HOSPITAL | Age: 84
Setting detail: RECURRING SERIES
Discharge: HOME | End: 2024-11-03
Payer: MEDICARE

## 2024-10-31 PROCEDURE — 97112 NEUROMUSCULAR REEDUCATION: CPT

## 2024-10-31 PROCEDURE — 97535 SELF CARE MNGMENT TRAINING: CPT

## 2024-10-31 PROCEDURE — 97110 THERAPEUTIC EXERCISES: CPT

## 2024-10-31 NOTE — PROGRESS NOTES
PHYSICAL / OCCUPATIONAL THERAPY - DAILY TREATMENT NOTE    Patient Name: Amy Bernardo    Date: 10/31/2024    : 1940  Insurance: Payor: MEDICARE / Plan: MEDICARE PART A AND B / Product Type: *No Product type* /      Patient  verified Yes     Visit #   Current / Total 17 24   Time   In / Out 12:40 1:49   Pain   In / Out 2/10 2/10   Subjective Functional Status/Changes: Pt reports sciatic pain in right buttocks today. She goes for an injection soon to her back region.      TREATMENT AREA =  Other low back pain [M54.59]     OBJECTIVE    Modalities Rationale:     decrease pain to improve patient's ability to progress to PLOF and address remaining functional goals.     min [] Estim Unattended, type/location:                                      []  w/ice    []  w/heat    min [] Estim Attended, type/location:                                     []  w/US     []  w/ice    []  w/heat    []  TENS insruct      min []  Mechanical Traction: type/lbs                   []  pro   []  sup   []  int   []  cont    []  before manual    []  after manual    min []  Ultrasound, settings/location:     10 min  unbill [x]  Ice     []  Heat    location/position: Seated  Low back    min []  Paraffin,  details:     min []  Vasopneumatic Device, press/temp:     min []  Whirlpool / Fluido:    If using vaso (only need to measure limb vaso being performed on)      pre-treatment girth :       post-treatment girth :       measured at (landmark location) :      min []  Other:    Skin assessment post-treatment:   Intact      Therapeutic Procedures:    Tx Min Billable or 1:1 Min (if diff from Tx Min) Procedure, Rationale, Specifics   29 25 [id number] Therapeutic Exercise (timed):  increase ROM, strength, coordination, balance, and proprioception to improve patient's ability to progress to PLOF and address remaining functional goals. (see flow sheet as applicable)     Details if applicable:  see flow sheet     02 [phone] Neuromuscular

## 2024-11-05 ENCOUNTER — HOSPITAL ENCOUNTER (OUTPATIENT)
Facility: HOSPITAL | Age: 84
Setting detail: RECURRING SERIES
Discharge: HOME | End: 2024-11-08
Payer: MEDICARE

## 2024-11-05 PROCEDURE — 97112 NEUROMUSCULAR REEDUCATION: CPT

## 2024-11-05 PROCEDURE — 97530 THERAPEUTIC ACTIVITIES: CPT

## 2024-11-05 PROCEDURE — 97110 THERAPEUTIC EXERCISES: CPT

## 2024-11-05 NOTE — PROGRESS NOTES
address remaining functional goals. (see flow sheet as applicable)     Details if applicable:  see flow sheet     15  82425 Neuromuscular Re-Education (timed):  improve balance, coordination, kinesthetic sense, posture, core stability and proprioception to improve patient's ability to develop conscious control of individual muscles and awareness of position of extremities in order to progress to PLOF and address remaining functional goals. (see flow sheet as applicable)     Details if applicable:  standing balance activities; core   15   35824 Therapeutic Activity (timed):  use of dynamic activities replicating functional movements to increase ROM, strength, coordination, balance, and proprioception in order to improve patient's ability to progress to PLOF and address remaining functional goals.  (see flow sheet as applicable)      Details if applicable: posture   46  [facility] Totals Reminder: bill using total billable min of TIMED therapeutic procedures (example: do not include dry needle or estim unattended, both untimed codes, in totals to left)  8-22 min = 1 unit; 23-37 min = 2 units; 38-52 min = 3 units; 53-67 min = 4 units; 68-82 min = 5 units   Total Total     [x]  Patient Education billed concurrently with other procedures   [x] Review HEP    [] Progressed/Changed HEP, detail:    [] Other detail:       Objective Information/Functional Measures/Assessment  Increased fatigue today  Continues with flexion bias  PPT posture with t/s kyphosis and forward head    Pt continues with flexion bias and significant l/s hypertonicity due to habitual posture of increased trunk extension to compensate for t/s kyphosis and forward head posture. She has been improving with LE strength but insomnia has played a role in pt progression in regards to further interventions. Will continue to progress core strength, LE strength and balance for decreased fall risk.     Patient will continue to benefit from skilled PT / OT services to

## 2024-11-06 ENCOUNTER — HOSPITAL ENCOUNTER (OUTPATIENT)
Facility: HOSPITAL | Age: 84
Setting detail: RECURRING SERIES
Discharge: HOME | End: 2024-11-09
Payer: MEDICARE

## 2024-11-06 PROCEDURE — 97112 NEUROMUSCULAR REEDUCATION: CPT

## 2024-11-06 PROCEDURE — 97110 THERAPEUTIC EXERCISES: CPT

## 2024-11-06 NOTE — PROGRESS NOTES
sheet     10  07263 Neuromuscular Re-Education (timed):  improve balance, coordination, kinesthetic sense, posture, core stability and proprioception to improve patient's ability to develop conscious control of individual muscles and awareness of position of extremities in order to progress to PLOF and address remaining functional goals. (see flow sheet as applicable)     Details if applicable:  standing balance activities, cole step overs and foam to foam step overs           Details if applicable:            Details if applicable:            Details if applicable:     42  [facility] Totals Reminder: bill using total billable min of TIMED therapeutic procedures (example: do not include dry needle or estim unattended, both untimed codes, in totals to left)  8-22 min = 1 unit; 23-37 min = 2 units; 38-52 min = 3 units; 53-67 min = 4 units; 68-82 min = 5 units   Total Total     [x]  Patient Education billed concurrently with other procedures   [x] Review HEP    [] Progressed/Changed HEP, detail:    [] Other detail:       Objective Information/Functional Measures/Assessment    LOB with foam Romberg with eyes open  Challenged with holding 10# weight during sit to stands  Improving balance with backward ambulation     Assessment:  Pt. Is progressing with physical therapy. She continues to have decreased balance on compliant surfaces and eyes closed but overall demonstrates improving balance during dynamic activities.     Patient will continue to benefit from skilled PT / OT services to modify and progress therapeutic interventions, analyze and address functional mobility deficits, analyze and address ROM deficits, analyze and address strength deficits, analyze and address soft tissue restrictions, analyze and cue for proper movement patterns, analyze and modify for postural abnormalities, and analyze and address imbalance/dizziness to address functional deficits and attain remaining goals.    Progress toward goals / Updated

## 2024-11-12 ENCOUNTER — HOSPITAL ENCOUNTER (OUTPATIENT)
Facility: HOSPITAL | Age: 84
Setting detail: RECURRING SERIES
Discharge: HOME | End: 2024-11-15
Payer: MEDICARE

## 2024-11-12 PROCEDURE — 97112 NEUROMUSCULAR REEDUCATION: CPT

## 2024-11-12 PROCEDURE — 97530 THERAPEUTIC ACTIVITIES: CPT

## 2024-11-12 PROCEDURE — 97110 THERAPEUTIC EXERCISES: CPT

## 2024-11-12 NOTE — PROGRESS NOTES
PHYSICAL / OCCUPATIONAL THERAPY - DAILY TREATMENT NOTE    Patient Name: Amy Bernardo    Date: 2024    : 1940  Insurance: Payor: MEDICARE / Plan: MEDICARE PART A AND B / Product Type: *No Product type* /      Patient  verified Yes     Visit #   Current / Total 20 24   Time   In / Out 10:00 11:00   Pain   In / Out 2/10 010   Subjective Functional Status/Changes: Pt. Reports she is doing ok today. She had some relief with her injection but it's starting to wear off.      TREATMENT AREA =  Other low back pain [M54.59]     OBJECTIVE    Modalities Rationale:     decrease pain to improve patient's ability to progress to PLOF and address remaining functional goals.     min [] Estim Unattended, type/location:                                      []  w/ice    []  w/heat    min [] Estim Attended, type/location:                                     []  w/US     []  w/ice    []  w/heat    []  TENS insruct      min []  Mechanical Traction: type/lbs                   []  pro   []  sup   []  int   []  cont    []  before manual    []  after manual    min []  Ultrasound, settings/location:     15 min  unbill [x]  Ice     []  Heat    location/position: Seated  Low back    min []  Paraffin,  details:     min []  Vasopneumatic Device, press/temp:     min []  Whirlpool / Fluido:    If using vaso (only need to measure limb vaso being performed on)      pre-treatment girth :       post-treatment girth :       measured at (landmark location) :      min []  Other:    Skin assessment post-treatment:   Intact      Therapeutic Procedures:    Tx Min Billable or 1:1 Min (if diff from Tx Min) Procedure, Rationale, Specifics   28  [id number] Therapeutic Exercise (timed):  increase ROM, strength, coordination, balance, and proprioception to improve patient's ability to progress to PLOF and address remaining functional goals. (see flow sheet as applicable)     Details if applicable:  see flow sheet     9  36563 Neuromuscular

## 2024-11-14 ENCOUNTER — APPOINTMENT (OUTPATIENT)
Facility: HOSPITAL | Age: 84
End: 2024-11-14
Payer: MEDICARE

## 2024-11-19 ENCOUNTER — HOSPITAL ENCOUNTER (OUTPATIENT)
Facility: HOSPITAL | Age: 84
Setting detail: RECURRING SERIES
Discharge: HOME | End: 2024-11-22
Payer: MEDICARE

## 2024-11-19 PROCEDURE — 97530 THERAPEUTIC ACTIVITIES: CPT

## 2024-11-19 PROCEDURE — 97110 THERAPEUTIC EXERCISES: CPT

## 2024-11-19 PROCEDURE — 97112 NEUROMUSCULAR REEDUCATION: CPT

## 2024-11-19 NOTE — PROGRESS NOTES
PHYSICAL / OCCUPATIONAL THERAPY - DAILY TREATMENT NOTE    Patient Name: Amy Bernardo    Date: 2024    : 1940  Insurance: Payor: MEDICARE / Plan: MEDICARE PART A AND B / Product Type: *No Product type* /      Patient  verified Yes     Visit #   Current / Total 21 24   Time   In / Out 11:20 12:00   Pain   In / Out 4/10 4/10   Subjective Functional Status/Changes: Pt reports not sure what the next step is for her back. She wants to keep working on her leg strength. She is wearing a compression stocking for her legs.      TREATMENT AREA =  Other low back pain [M54.59]     OBJECTIVE      Therapeutic Procedures:    Tx Min Billable or 1:1 Min (if diff from Tx Min) Procedure, Rationale, Specifics   10 0 [id number] Therapeutic Exercise (timed):  increase ROM, strength, coordination, balance, and proprioception to improve patient's ability to progress to PLOF and address remaining functional goals. (see flow sheet as applicable)     Details if applicable:  see flow sheet     15 15 54786 Neuromuscular Re-Education (timed):  improve balance, coordination, kinesthetic sense, posture, core stability and proprioception to improve patient's ability to develop conscious control of individual muscles and awareness of position of extremities in order to progress to PLOF and address remaining functional goals. (see flow sheet as applicable)     Details if applicable:  standing balance activities   15 15 74729 Therapeutic Activity (timed):  use of dynamic activities replicating functional movements to increase ROM, strength, coordination, balance, and proprioception in order to improve patient's ability to progress to PLOF and address remaining functional goals.  (see flow sheet as applicable)     Details if applicable:  blue step ups, sit to stands   40 30 [facility] Totals Reminder: bill using total billable min of TIMED therapeutic procedures (example: do not include dry needle or estim unattended, both untimed

## 2024-11-21 ENCOUNTER — HOSPITAL ENCOUNTER (OUTPATIENT)
Facility: HOSPITAL | Age: 84
Setting detail: RECURRING SERIES
Discharge: HOME | End: 2024-11-24
Payer: MEDICARE

## 2024-11-21 PROCEDURE — 97530 THERAPEUTIC ACTIVITIES: CPT

## 2024-11-21 PROCEDURE — 97110 THERAPEUTIC EXERCISES: CPT

## 2024-11-21 PROCEDURE — 97112 NEUROMUSCULAR REEDUCATION: CPT

## 2024-11-21 NOTE — PROGRESS NOTES
PHYSICAL / OCCUPATIONAL THERAPY - DAILY TREATMENT NOTE    Patient Name: Amy Bernardo    Date: 2024    : 1940  Insurance: Payor: MEDICARE / Plan: MEDICARE PART A AND B / Product Type: *No Product type* /      Patient  verified Yes     Visit #   Current / Total 22 24   Time   In / Out 10:40 11:38   Pain   In / Out 4/10 2/10   Subjective Functional Status/Changes: Pt reports difficulty getting up off the floor in her bedroom this week to where she had to pull up on something to help. She notes the legs feel weak today.      TREATMENT AREA =  Other low back pain [M54.59]     OBJECTIVE    Modalities Rationale:     decrease pain and increase tissue extensibility to improve patient's ability to progress to PLOF and address remaining functional goals.     min [] Estim Unattended, type/location:                                      []  w/ice    []  w/heat    min [] Estim Attended, type/location:                                     []  w/US     []  w/ice    []  w/heat    []  TENS insruct      min []  Mechanical Traction: type/lbs                   []  pro   []  sup   []  int   []  cont    []  before manual    []  after manual    min []  Ultrasound, settings/location:     15 min  unbill [x]  Ice     []  Heat    location/position: Seated l/s    min []  Paraffin,  details:     min []  Vasopneumatic Device, press/temp:     min []  Whirlpool / Fluido:    If using vaso (only need to measure limb vaso being performed on)      pre-treatment girth :       post-treatment girth :       measured at (landmark location) :      min []  Other:    Skin assessment post-treatment:   Intact      Therapeutic Procedures:    Tx Min Billable or 1:1 Min (if diff from Tx Min) Procedure, Rationale, Specifics   10  34105 Therapeutic Exercise (timed):  increase ROM, strength, coordination, balance, and proprioception to improve patient's ability to progress to PLOF and address remaining functional goals. (see flow sheet as

## 2024-11-22 NOTE — THERAPY RECERTIFICATION
[facility]  [address] [place] [address]  [phone] fax    Continued Plan of Care/ Re-certification for Physical Therapy Services    Patient Name: Amy Bernardo : 1940   Medical   Diagnosis: Other low back pain [M54.59] Treatment Diagnosis: M54.59  OTHER LOWER BACK PAIN      Onset Date: Ongoing for yrs Payor :  Payor: MEDICARE / Plan: MEDICARE PART A AND B / Product Type: *No Product type* /    Referral Source: Bhavik Crain MD Start of Care (SOC): 2024   Prior Hospitalization: See medical history Provider #: [id number]   Prior Level of Function: Independent Community Ambulator. Lives Alone. Does Gardening Activities and likes to Cook.    Comorbidities:  Other:   Osteoporosis, Cancer, Right Sh Arthroplasty     Visits from Start of Care: 22    Missed Visits: 0    The Plan of Care and following information is based on the patient's current status:  Goal: Pt will be compliant with a HEP to improve LS function and Activity tolerance.   Status at evaluation: Issued HEP.   Status at Progress Note: Partial compliance  Current Status: not met    Goal: Pt will perform Romberg EO/EC x 30 sec on a compliant surface w/o LOB to be able to perform gardening activities.   Status at Progress Note: EO 30 seconds; EC 12 seconds   Current Status: not met    Goal: Patient will improve The Revised Oswestry Disability Index for Low Back Pain/Dysfunction by 13% in order to demonstrate a significant improvement in pain for increased ease of daily activities.   Status at Progress Note: 18%   Current Status: not met    Goal:Patient will be able to perform floor<>stand transfer with just 1 UE to demonstrate improved LE strength for ease of transfers at home.   Status at Progress Note: able to perform at home per subjective reports in garden without UE assist   Current Status: met    Goal:Patient will improve B hip abduction strength to 4+/5 and Hip

## 2024-11-25 ENCOUNTER — HOSPITAL ENCOUNTER (OUTPATIENT)
Facility: HOSPITAL | Age: 84
Setting detail: RECURRING SERIES
Discharge: HOME | End: 2024-11-28
Payer: MEDICARE

## 2024-11-25 PROCEDURE — 97110 THERAPEUTIC EXERCISES: CPT

## 2024-11-25 PROCEDURE — 97112 NEUROMUSCULAR REEDUCATION: CPT

## 2024-11-25 PROCEDURE — 97530 THERAPEUTIC ACTIVITIES: CPT

## 2024-11-25 NOTE — PROGRESS NOTES
PHYSICAL / OCCUPATIONAL THERAPY - DAILY TREATMENT NOTE    Patient Name: Amy Bernardo    Date: 2024    : 1940  Insurance: Payor: MEDICARE / Plan: MEDICARE PART A AND B / Product Type: *No Product type* /      Patient  verified Yes     Visit #   Current / Total 1 16   Time   In / Out 10:40 11:39   Pain   In / Out 4/10 1/10   Subjective Functional Status/Changes: Pt. Reports she still has a lot of back pain     TREATMENT AREA =  Other low back pain [M54.59]     OBJECTIVE    Modalities Rationale:     decrease pain to improve patient's ability to progress to PLOF and address remaining functional goals.     min [] Estim Unattended, type/location:                                      []  w/ice    []  w/heat    min [] Estim Attended, type/location:                                     []  w/US     []  w/ice    []  w/heat    []  TENS insruct      min []  Mechanical Traction: type/lbs                   []  pro   []  sup   []  int   []  cont    []  before manual    []  after manual    min []  Ultrasound, settings/location:     15 min  unbill [x]  Ice     []  Heat    location/position: Seated  Low back    min []  Paraffin,  details:     min []  Vasopneumatic Device, press/temp:     min []  Whirlpool / Fluido:    If using vaso (only need to measure limb vaso being performed on)      pre-treatment girth :       post-treatment girth :       measured at (landmark location) :      min []  Other:    Skin assessment post-treatment:   Intact      Therapeutic Procedures:    Tx Min Billable or 1:1 Min (if diff from Tx Min) Procedure, Rationale, Specifics   27  26342 Therapeutic Exercise (timed):  increase ROM, strength, coordination, balance, and proprioception to improve patient's ability to progress to PLOF and address remaining functional goals. (see flow sheet as applicable)     Details if applicable:  see flow sheet     9  [id number] Neuromuscular Re-Education (timed):  improve balance, coordination,

## 2024-12-02 ENCOUNTER — HOSPITAL ENCOUNTER (OUTPATIENT)
Facility: HOSPITAL | Age: 84
Setting detail: RECURRING SERIES
Discharge: HOME | End: 2024-12-05
Payer: MEDICARE

## 2024-12-02 PROCEDURE — 97110 THERAPEUTIC EXERCISES: CPT

## 2024-12-02 PROCEDURE — 97530 THERAPEUTIC ACTIVITIES: CPT

## 2024-12-02 PROCEDURE — 97112 NEUROMUSCULAR REEDUCATION: CPT

## 2024-12-02 NOTE — PROGRESS NOTES
PHYSICAL / OCCUPATIONAL THERAPY - DAILY TREATMENT NOTE    Patient Name: Amy Bernardo    Date: 2024    : 1940  Insurance: Payor: MEDICARE / Plan: MEDICARE PART A AND B / Product Type: *No Product type* /      Patient  verified Yes     Visit #   Current / Total 2 16   Time   In / Out 10:41 11:35   Pain   In / Out 2/10 1/10   Subjective Functional Status/Changes: Pt reports her back pain continues to fluctuate on the right but seems to get better when she stretches upright and back. She reports increased fluid in her legs today and needs to take her water pill when she gets home.      TREATMENT AREA =  Other low back pain [M54.59]     OBJECTIVE    Modalities Rationale:     decrease pain to improve patient's ability to progress to PLOF and address remaining functional goals.     min [] Estim Unattended, type/location:                                      []  w/ice    []  w/heat    min [] Estim Attended, type/location:                                     []  w/US     []  w/ice    []  w/heat    []  TENS insruct      min []  Mechanical Traction: type/lbs                   []  pro   []  sup   []  int   []  cont    []  before manual    []  after manual    min []  Ultrasound, settings/location:     10 min  unbill [x]  Ice     []  Heat    location/position: Seated  Low back    min []  Paraffin,  details:     min []  Vasopneumatic Device, press/temp:     min []  Whirlpool / Fluido:    If using vaso (only need to measure limb vaso being performed on)      pre-treatment girth :       post-treatment girth :       measured at (landmark location) :      min []  Other:    Skin assessment post-treatment:   Intact      Therapeutic Procedures:    Tx Min Billable or 1:1 Min (if diff from Tx Min) Procedure, Rationale, Specifics   19  90006 Therapeutic Exercise (timed):  increase ROM, strength, coordination, balance, and proprioception to improve patient's ability to progress to PLOF and address remaining

## 2024-12-05 ENCOUNTER — HOSPITAL ENCOUNTER (OUTPATIENT)
Facility: HOSPITAL | Age: 84
Setting detail: RECURRING SERIES
Discharge: HOME | End: 2024-12-08
Payer: MEDICARE

## 2024-12-05 PROCEDURE — 97110 THERAPEUTIC EXERCISES: CPT

## 2024-12-05 PROCEDURE — 97530 THERAPEUTIC ACTIVITIES: CPT

## 2024-12-05 PROCEDURE — 97112 NEUROMUSCULAR REEDUCATION: CPT

## 2024-12-05 NOTE — PROGRESS NOTES
patient's ability to progress to PLOF and address remaining functional goals. (see flow sheet as applicable)     Details if applicable:  see flow sheet     10  23857 Neuromuscular Re-Education (timed):  improve balance, coordination, kinesthetic sense, posture, core stability and proprioception to improve patient's ability to develop conscious control of individual muscles and awareness of position of extremities in order to progress to PLOF and address remaining functional goals. (see flow sheet as applicable)     Details if applicable:  core re-ed   20  76130 Therapeutic Activity (timed):  use of dynamic activities replicating functional movements to increase ROM, strength, coordination, balance, and proprioception in order to improve patient's ability to progress to PLOF and address remaining functional goals.  (see flow sheet as applicable)     Details if applicable:  sit to stands, step ups   40  [facility] Totals Reminder: bill using total billable min of TIMED therapeutic procedures (example: do not include dry needle or estim unattended, both untimed codes, in totals to left)  8-22 min = 1 unit; 23-37 min = 2 units; 38-52 min = 3 units; 53-67 min = 4 units; 68-82 min = 5 units   Total Total     [x]  Patient Education billed concurrently with other procedures   [x] Review HEP    [] Progressed/Changed HEP, detail:    [] Other detail:       Objective Information/Functional Measures/Assessment  Right lateral lean and shuffled steps during ambulation; cued to work on heel strike with ambulation  Decreased left hip strength compared to right noted with hip abd/ext exercises  Added step tap with lift off to work on hip flexor strength    Assessment: Pt progressing with strengthening exercises but still demonstrates imbalance with ambulation with decreased heel strike and right lateral trunk lean which may be contributing to veering during ambulation from altered COG. She is working on hip strength to improve stability

## 2024-12-09 ENCOUNTER — HOSPITAL ENCOUNTER (OUTPATIENT)
Facility: HOSPITAL | Age: 84
Setting detail: RECURRING SERIES
Discharge: HOME | End: 2024-12-12
Payer: MEDICARE

## 2024-12-09 PROCEDURE — 97110 THERAPEUTIC EXERCISES: CPT

## 2024-12-09 PROCEDURE — 97530 THERAPEUTIC ACTIVITIES: CPT

## 2024-12-09 NOTE — PROGRESS NOTES
for Low Back Pain/Dysfunction by 13% in order to demonstrate a significant improvement in pain for increased ease of daily activities.    Status at evaluation/last progress note: 20%     2.   Goal: Patient will perform Romberg EC balance on compliant surface without LOB in order to demonstrate improving balance for improve safety at home.   Status at evaluation/last progress note: 12 seconds   not met: multiple LOB (11/25/24)     3.   Goal: Patient will improve B hip flexion MMT to 4/5 in order to increase ease of curb negotiation.   Status at evaluation/last progress note: B: 3+/5  Added interventions per flow sheet (12/2/24)  Added step tap with lift off on 8\" step (12/5/24)     4.   Goal: Patient will improve left hip abduction MMT to 4/5 in order to improve stability during ambulation.   Status at evaluation/last progress note: 4-/5  Not met: 4-/5 (12/9/24)      Next PN: 12/20/24 RC due 1/20/25  Auth due (visit number/ date) LYNDA    PLAN  - Continue Plan of Care    Yinka Buckner PT    12/9/2024    7:14 AM  If an interpreting service was utilized for treatment of this patient, the contents of this document represent the material reviewed with the patient via the .     Future Appointments   Date Time Provider Department Center   12/9/2024 10:40 AM [facility]   12/12/2024 10:40 AM Behrens, Laura [facility], [facility]   12/17/2024 10:40 AM [facility]   12/18/2024 10:40 AM Behrens, Laura M, PTA [facility]   1/9/2025  9:15 AM Kassie Freed MD [facility] BS AMB   2/19/2025 11:00 AM [facility] FAST TRACK NURSE [facility]   6/19/2025  9:45 AM Elizabeth Chapa MD [facility] Sched

## 2024-12-12 ENCOUNTER — HOSPITAL ENCOUNTER (OUTPATIENT)
Facility: HOSPITAL | Age: 84
Setting detail: RECURRING SERIES
Discharge: HOME | End: 2024-12-15
Payer: MEDICARE

## 2024-12-12 PROCEDURE — 97530 THERAPEUTIC ACTIVITIES: CPT

## 2024-12-12 PROCEDURE — 97112 NEUROMUSCULAR REEDUCATION: CPT

## 2024-12-12 PROCEDURE — 97110 THERAPEUTIC EXERCISES: CPT

## 2024-12-12 NOTE — PROGRESS NOTES
Neuromuscular Re-Education (timed):  improve balance, coordination, kinesthetic sense, posture, core stability and proprioception to improve patient's ability to develop conscious control of individual muscles and awareness of position of extremities in order to progress to PLOF and address remaining functional goals. (see flow sheet as applicable)      Details if applicable:  stepping strategy; core   15  34640 Therapeutic Activity (timed):  use of dynamic activities replicating functional movements to increase ROM, strength, coordination, balance, and proprioception in order to improve patient's ability to progress to PLOF and address remaining functional goals.  (see flow sheet as applicable)     Details if applicable:  step ups, posture   51  [facility] Totals Reminder: bill using total billable min of TIMED therapeutic procedures (example: do not include dry needle or estim unattended, both untimed codes, in totals to left)  8-22 min = 1 unit; 23-37 min = 2 units; 38-52 min = 3 units; 53-67 min = 4 units; 68-82 min = 5 units   Total Total     [x]  Patient Education billed concurrently with other procedures   [x] Review HEP    [] Progressed/Changed HEP, detail:    [] Other detail:       Objective Information/Functional Measures/Assessment  Right lateral lean during ambulation  Cues to increase knee extension and heel strike  Shuffled gait  Decreased hip abduction needed for SLS and stability for step ups    Assessment: Pt continues to ambulate with increased knee flexion and shuffled gait with decreased heel strike. She ambulates with right lateral lean but has improved awareness of core engagement with activities. Will continue to progress LE strength for ease of transfers and ambulation.     Patient will continue to benefit from skilled PT / OT services to modify and progress therapeutic interventions, analyze and address functional mobility deficits, analyze and address ROM deficits, analyze and address strength

## 2024-12-17 ENCOUNTER — HOSPITAL ENCOUNTER (OUTPATIENT)
Facility: HOSPITAL | Age: 84
Setting detail: RECURRING SERIES
Discharge: HOME | End: 2024-12-20
Payer: MEDICARE

## 2024-12-17 PROCEDURE — 97110 THERAPEUTIC EXERCISES: CPT

## 2024-12-17 PROCEDURE — 97530 THERAPEUTIC ACTIVITIES: CPT

## 2024-12-17 PROCEDURE — 97112 NEUROMUSCULAR REEDUCATION: CPT

## 2024-12-17 NOTE — PROGRESS NOTES
and address functional mobility deficits, analyze and address ROM deficits, analyze and address strength deficits, analyze and address soft tissue restrictions, analyze and cue for proper movement patterns, analyze and modify for postural abnormalities, and analyze and address imbalance/dizziness to address functional deficits and attain remaining goals.    Progress toward goals / Updated goals:  []  See Progress Note/Recertification    Goal: Patient will improve The Revised Oswestry Disability Index for Low Back Pain/Dysfunction by 13% in order to demonstrate a significant improvement in pain for increased ease of daily activities.    Status at evaluation/last progress note: 20%     2.   Goal: Patient will perform Romberg EC balance on compliant surface without LOB in order to demonstrate improving balance for improve safety at home.   Status at evaluation/last progress note: 12 seconds   not met: 4 seconds (12/17/24)     3.   Goal: Patient will improve B hip flexion MMT to 4/5 in order to increase ease of curb negotiation.   Status at evaluation/last progress note: B: 3+/5  Not met: B: 4-/5 (12/17/24)     4.   Goal: Patient will improve left hip abduction MMT to 4/5 in order to improve stability during ambulation.   Status at evaluation/last progress note: 4-/5  Not met: 4-/5 (12/9/24)      Next PN: 12/20/24 RC due 1/20/25  Auth due (visit number/ date) LYNDA       PLAN  - Continue Plan of Care    Yinka Buckner PT    12/17/2024    7:29 AM  If an interpreting service was utilized for treatment of this patient, the contents of this document represent the material reviewed with the patient via the .     Future Appointments   Date Time Provider Department Center   12/17/2024 10:40 AM Yinka Buckner PT [facility]   12/18/2024 10:40 AM Behrens, Laura M, PTA [facility]   12/31/2024 10:00 AM Behrens, Laura M, PTA [facility]   1/2/2025 12:00 PM Behrens, Laura M, PTA [facility]   1/9/2025  9:15 AM

## 2024-12-18 ENCOUNTER — HOSPITAL ENCOUNTER (OUTPATIENT)
Facility: HOSPITAL | Age: 84
Setting detail: RECURRING SERIES
Discharge: HOME | End: 2024-12-21
Payer: MEDICARE

## 2024-12-18 PROCEDURE — 97530 THERAPEUTIC ACTIVITIES: CPT

## 2024-12-18 PROCEDURE — 97110 THERAPEUTIC EXERCISES: CPT

## 2024-12-18 NOTE — PROGRESS NOTES
PHYSICAL / OCCUPATIONAL THERAPY - DAILY TREATMENT NOTE    Patient Name: Amy Bernardo    Date: 2024    : 1940  Insurance: Payor: MEDICARE / Plan: MEDICARE PART A AND B / Product Type: *No Product type* /      Patient  verified Yes     Visit #   Current / Total 7 16   Time   In / Out 10:40 11:33   Pain   In / Out 4/10 2/10   Subjective Functional Status/Changes: Pt. Reports she woke up with some more pain today. She reports no problems after last visit     TREATMENT AREA =  Other low back pain [M54.59]     OBJECTIVE    Modalities Rationale:     decrease pain to improve patient's ability to progress to PLOF and address remaining functional goals.     min [] Estim Unattended, type/location:                                      []  w/ice    []  w/heat    min [] Estim Attended, type/location:                                     []  w/US     []  w/ice    []  w/heat    []  TENS insruct      min []  Mechanical Traction: type/lbs                   []  pro   []  sup   []  int   []  cont    []  before manual    []  after manual    min []  Ultrasound, settings/location:     15 min  unbill [x]  Ice     []  Heat    location/position: Seated  Low back    min []  Paraffin,  details:     min []  Vasopneumatic Device, press/temp:     min []  Whirlpool / Fluido:    If using vaso (only need to measure limb vaso being performed on)      pre-treatment girth :       post-treatment girth :       measured at (landmark location) :      min []  Other:    Skin assessment post-treatment:   Intact      Therapeutic Procedures:    Tx Min Billable or 1:1 Min (if diff from Tx Min) Procedure, Rationale, Specifics   28  07449 Therapeutic Exercise (timed):  increase ROM, strength, coordination, balance, and proprioception to improve patient's ability to progress to PLOF and address remaining functional goals. (see flow sheet as applicable)     Details if applicable:  see flow sheet     10  01744 Therapeutic Activity

## 2024-12-31 ENCOUNTER — APPOINTMENT (OUTPATIENT)
Facility: HOSPITAL | Age: 84
End: 2024-12-31
Payer: MEDICARE

## 2025-01-09 ENCOUNTER — OFFICE VISIT (OUTPATIENT)
Age: 85
End: 2025-01-09
Payer: MEDICARE

## 2025-01-09 DIAGNOSIS — Z87.891 FORMER SMOKER: ICD-10-CM

## 2025-01-09 DIAGNOSIS — G93.31 POST-INFLUENZA SYNDROME: ICD-10-CM

## 2025-01-09 DIAGNOSIS — J44.9 COPD, GROUP C, BY GOLD 2017 CLASSIFICATION (HCC): Primary | ICD-10-CM

## 2025-01-09 PROCEDURE — 1159F MED LIST DOCD IN RCRD: CPT | Performed by: INTERNAL MEDICINE

## 2025-01-09 PROCEDURE — 1160F RVW MEDS BY RX/DR IN RCRD: CPT | Performed by: INTERNAL MEDICINE

## 2025-01-09 PROCEDURE — 1090F PRES/ABSN URINE INCON ASSESS: CPT | Performed by: INTERNAL MEDICINE

## 2025-01-09 PROCEDURE — 1036F TOBACCO NON-USER: CPT | Performed by: INTERNAL MEDICINE

## 2025-01-09 PROCEDURE — G8399 PT W/DXA RESULTS DOCUMENT: HCPCS | Performed by: INTERNAL MEDICINE

## 2025-01-09 PROCEDURE — G8427 DOCREV CUR MEDS BY ELIG CLIN: HCPCS | Performed by: INTERNAL MEDICINE

## 2025-01-09 PROCEDURE — 3023F SPIROM DOC REV: CPT | Performed by: INTERNAL MEDICINE

## 2025-01-09 PROCEDURE — 99214 OFFICE O/P EST MOD 30 MIN: CPT | Performed by: INTERNAL MEDICINE

## 2025-01-09 PROCEDURE — 1123F ACP DISCUSS/DSCN MKR DOCD: CPT | Performed by: INTERNAL MEDICINE

## 2025-01-09 PROCEDURE — G8420 CALC BMI NORM PARAMETERS: HCPCS | Performed by: INTERNAL MEDICINE

## 2025-01-09 RX ORDER — BUMETANIDE 1 MG/1
1 TABLET ORAL DAILY
COMMUNITY
Start: 2024-10-30

## 2025-01-09 ASSESSMENT — ENCOUNTER SYMPTOMS
SHORTNESS OF BREATH: 1
COLOR CHANGE: 0
VOICE CHANGE: 0
EYE REDNESS: 0
TROUBLE SWALLOWING: 0
ABDOMINAL PAIN: 0
STRIDOR: 0
NAUSEA: 0
DIARRHEA: 0
CONSTIPATION: 0
EYE PAIN: 0
EYE ITCHING: 0
FACIAL SWELLING: 0
BLOOD IN STOOL: 0
VOMITING: 0
APNEA: 0
EYE DISCHARGE: 0
PHOTOPHOBIA: 0
CHEST TIGHTNESS: 0
COUGH: 0
CHOKING: 0

## 2025-01-09 NOTE — PROGRESS NOTES
Amy Bernardo (:  1940) is a 84 y.o. female,Established patient, here for evaluation of the following chief complaint(s):  COPD, Follow-up ([facility]), and Follow-Up from Hospital ([facility]: COPD with acute exacerbation/Influenza A)      Assessment & Plan   ASSESSMENT/PLAN:  1. COPD, group C, by GOLD 2017 classification (HCC)  2. Former smoker  3. Post-influenza syndrome        Likely COPD exacerbation triggered by Influenza A infection  Continue Prednisone to completion.  Instructed pt to use flutter device  Advised on optimizing nutritional support to recover from recent catabolic state.  Continue Trelegy and ADRIANNA at same doses.  Discussed risks and benefits of pulmonary rehab, pt will resume maintenance once recovered.   Return in about 6 months (around 2025).         Subjective   SUBJECTIVE/OBJECTIVE:  Former smoker with history of COPD, FEV1 68% in . Pt has complained of progressive shortness of breath, now with walking up 1 flight of stairs or with long walks. Pt was started on Trelegy with good response, reports good compliance.    Pt was admitted to [facility] for Influenza A in Dec 2024. She is still on a course of Prednisone but has finished antibiotics course. She continues to recover but still complains of intermittent non productive cough, B thigh weakness. Per daughter, pt's appetite was poor and is now starting to recover.       Review of Systems   Constitutional:  Positive for appetite change. Negative for activity change, chills, diaphoresis, fatigue, fever and unexpected weight change.   HENT:  Negative for congestion, facial swelling, hearing loss, nosebleeds, postnasal drip, tinnitus, trouble swallowing and voice change.    Eyes:  Negative for photophobia, pain, discharge, redness, itching and visual disturbance.   Respiratory:  Positive for shortness of breath (with moderate to severe exertion). Negative for apnea, cough, choking, chest tightness and stridor.

## 2025-01-09 NOTE — PROGRESS NOTES
Amy Vanessa Bernardo presents today for   Chief Complaint   Patient presents with    COPD    Follow-up     [facility]RE[facility]       Is someone accompanying this pt? Daughter    Is the patient using any DME equipment during OV? No    -DME Company NA    Depression Screenin/22/2024    11:23 AM   PHQ-9 Questionaire   Little interest or pleasure in doing things 0   Feeling down, depressed, or hopeless 0   Trouble falling or staying asleep, or sleeping too much 0   Feeling tired or having little energy 0   Poor appetite or overeating 0   Feeling bad about yourself - or that you are a failure or have let yourself or your family down 0   Trouble concentrating on things, such as reading the newspaper or watching television 0   Moving or speaking so slowly that other people could have noticed. Or the opposite - being so fidgety or restless that you have been moving around a lot more than usual 0   Thoughts that you would be better off dead, or of hurting yourself in some way 0   PHQ-9 Total Score 0   If you checked off any problems, how difficult have these problems made it for you to do your work, take care of things at home, or get along with other people? 0       Learning Needs Questionnaire:     No question data found.      Fall Risk:          No data to display                 Abuse Screening:          No data to display                  Coordination of Care:    1. Have you been to the ER, urgent care clinic since your last visit? Hospitalized since your last visit? [facility]: COPD with acute exacerbation  Influenza A    2. Have you seen or consulted any other health care providers outside of the [facility] since your last visit? Include any pap smears or colon screening. PCP    Medication list has been update per patient.

## 2025-01-20 ENCOUNTER — TELEPHONE (OUTPATIENT)
Age: 85
End: 2025-01-20

## 2025-01-20 NOTE — TELEPHONE ENCOUNTER
Patient is having some issues with sob in the mornings she doesn't have her pulse OX with her at her daughters she left it at home.  She is wanting to know if there is something that would help with that.

## 2025-01-27 ENCOUNTER — TELEPHONE (OUTPATIENT)
Age: 85
End: 2025-01-27

## 2025-01-27 NOTE — TELEPHONE ENCOUNTER
Patient is using the neb tid she doesn't have the rescue inhaler she is stating around 93 but the sob seems to be all day now not just in the mornings No fever and no other sx

## 2025-01-27 NOTE — TELEPHONE ENCOUNTER
Spoke with the patient and advised Dr. TREVINO feels that since sx are getting worse she should go to the ER for eval.  Patient stated her daughter will be in town tomorrow and she will have her take her.

## 2025-02-06 ENCOUNTER — HOSPITAL ENCOUNTER (OUTPATIENT)
Facility: HOSPITAL | Age: 85
Setting detail: RECURRING SERIES
Discharge: HOME | End: 2025-02-09
Payer: MEDICARE

## 2025-02-06 PROCEDURE — 97110 THERAPEUTIC EXERCISES: CPT

## 2025-02-06 PROCEDURE — 97530 THERAPEUTIC ACTIVITIES: CPT

## 2025-02-06 NOTE — PROGRESS NOTES
PHYSICAL / OCCUPATIONAL THERAPY - DAILY TREATMENT NOTE    Patient Name: Amy Bernardo    Date: 2025    : 1940  Insurance: Payor: MEDICARE / Plan: MEDICARE PART A AND B / Product Type: *No Product type* /      Patient  verified Yes     Visit #   Current / Total 8 16   Time   In / Out 12:00 12:54   Pain   In / Out 3/10 2/10   Subjective Functional Status/Changes: Pt. Reports she is doing ok today but still has SOB and weakness following hospitalization with flu.      TREATMENT AREA =  Other low back pain [M54.59]     OBJECTIVE    Modalities Rationale:     decrease pain and increase tissue extensibility to improve patient's ability to progress to PLOF and address remaining functional goals.     min [] Estim Unattended, type/location:                                      []  w/ice    []  w/heat    min [] Estim Attended, type/location:                                     []  w/US     []  w/ice    []  w/heat    []  TENS insruct      min []  Mechanical Traction: type/lbs                   []  pro   []  sup   []  int   []  cont    []  before manual    []  after manual    min []  Ultrasound, settings/location:     15 min  unbill [x]  Ice     []  Heat    location/position: Seated  Low back    min []  Paraffin,  details:     min []  Vasopneumatic Device, press/temp:     min []  Whirlpool / Fluido:    If using vaso (only need to measure limb vaso being performed on)      pre-treatment girth :       post-treatment girth :       measured at (landmark location) :      min []  Other:    Skin assessment post-treatment:   Intact      Therapeutic Procedures:    Tx Min Billable or 1:1 Min (if diff from Tx Min) Procedure, Rationale, Specifics   29  64169 Therapeutic Exercise (timed):  increase ROM, strength, coordination, balance, and proprioception to improve patient's ability to progress to PLOF and address remaining functional goals. (see flow sheet as applicable)     Details if applicable:  see flow

## 2025-02-06 NOTE — THERAPY RECERTIFICATION
[facility] - [facility]  [address] [place] [address]  [phone] fax    Continued Plan of Care/ Re-certification for Physical Therapy Services    Patient Name: Amy Bernardo : 1940   Medical   Diagnosis: Other low back pain [M54.59] Treatment Diagnosis: M54.59  OTHER LOWER BACK PAIN      Onset Date: Ongoing for yrs Payor :  Payor: MEDICARE / Plan: MEDICARE PART A AND B / Product Type: *No Product type* /    Referral Source: Bhavik Crain MD Start of Care (SOC): 2024   Prior Hospitalization: See medical history Provider #: [id number]   Prior Level of Function: Independent Community Ambulator. Lives Alone. Does Gardening Activities and likes to Cook.    Comorbidities:  Other:   Osteoporosis, Cancer, Right Sh Arthroplasty     Visits from Start of Care: 30    Missed Visits: 1    The Plan of Care and following information is based on the patient's current status:  Goal: Patient will improve The Revised Oswestry Disability Index for Low Back Pain/Dysfunction by 13% in order to demonstrate a significant improvement in pain for increased ease of daily activities.    Status at evaluation/last progress note: 14%  Current Status: not met    Goal: Patient will perform Romberg EC balance on compliant surface without LOB in order to demonstrate improving balance for improve safety at home.   Status at evaluation/last progress note: 4 seconds  Current Status: not met     Goal: Patient will improve B hip flexion MMT to 4/5 in order to increase ease of curb negotiation.   Status at evaluation/last progress note: B: 4-/5  Current Status: met    Goal: Patient will improve left hip abduction MMT to 4/5 in order to improve stability during ambulation.   Status at evaluation/last progress note: 4-/5  Current Status: not met    Key functional changes: improving hip flexor stretch      Problems/ barriers to goal attainment: hospitalized with flu     Problem

## 2025-02-10 ENCOUNTER — TRANSCRIBE ORDERS (OUTPATIENT)
Facility: HOSPITAL | Age: 85
End: 2025-02-10

## 2025-02-10 ENCOUNTER — HOSPITAL ENCOUNTER (OUTPATIENT)
Facility: HOSPITAL | Age: 85
Discharge: HOME | End: 2025-02-13
Payer: MEDICARE

## 2025-02-10 DIAGNOSIS — I25.119 ATHEROSCLEROSIS OF NATIVE CORONARY ARTERY WITH ANGINA PECTORIS, UNSPECIFIED WHETHER NATIVE OR TRANSPLANTED HEART (HCC): ICD-10-CM

## 2025-02-10 DIAGNOSIS — I25.119 ATHEROSCLEROSIS OF NATIVE CORONARY ARTERY WITH ANGINA PECTORIS, UNSPECIFIED WHETHER NATIVE OR TRANSPLANTED HEART (HCC): Primary | ICD-10-CM

## 2025-02-10 LAB
ANION GAP SERPL CALC-SCNC: 6 MMOL/L (ref 3–18)
BASOPHILS # BLD: 0.03 K/UL (ref 0–0.1)
BASOPHILS NFR BLD: 0.4 % (ref 0–2)
BUN SERPL-MCNC: 17 MG/DL (ref 7–18)
BUN/CREAT SERPL: 30 (ref 12–20)
CALCIUM SERPL-MCNC: 8.6 MG/DL (ref 8.5–10.1)
CHLORIDE SERPL-SCNC: 110 MMOL/L (ref 100–111)
CO2 SERPL-SCNC: 26 MMOL/L (ref 21–32)
CREAT SERPL-MCNC: 0.56 MG/DL (ref 0.6–1.3)
CREAT UR-MCNC: 95 MG/DL (ref 30–125)
DIFFERENTIAL METHOD BLD: ABNORMAL
EOSINOPHIL # BLD: 0.09 K/UL (ref 0–0.4)
EOSINOPHIL NFR BLD: 1.1 % (ref 0–5)
ERYTHROCYTE [DISTWIDTH] IN BLOOD BY AUTOMATED COUNT: 16.3 % (ref 11.6–14.5)
GLUCOSE SERPL-MCNC: 91 MG/DL (ref 74–99)
HCT VFR BLD AUTO: 36.7 % (ref 35–45)
HGB BLD-MCNC: 11 G/DL (ref 12–16)
IMM GRANULOCYTES # BLD AUTO: 0.02 K/UL (ref 0–0.04)
IMM GRANULOCYTES NFR BLD AUTO: 0.2 % (ref 0–0.5)
INR PPP: 1 (ref 0.9–1.1)
LYMPHOCYTES # BLD: 0.92 K/UL (ref 0.9–3.6)
LYMPHOCYTES NFR BLD: 11.2 % (ref 21–52)
MCH RBC QN AUTO: 28.8 PG (ref 24–34)
MCHC RBC AUTO-ENTMCNC: 30 G/DL (ref 31–37)
MCV RBC AUTO: 96.1 FL (ref 78–100)
MONOCYTES # BLD: 0.54 K/UL (ref 0.05–1.2)
MONOCYTES NFR BLD: 6.6 % (ref 3–10)
NEUTS SEG # BLD: 6.64 K/UL (ref 1.8–8)
NEUTS SEG NFR BLD: 80.5 % (ref 40–73)
NRBC # BLD: 0 K/UL (ref 0–0.01)
NRBC BLD-RTO: 0 PER 100 WBC
PLATELET # BLD AUTO: 309 K/UL (ref 135–420)
PMV BLD AUTO: 9.7 FL (ref 9.2–11.8)
POTASSIUM SERPL-SCNC: 4.5 MMOL/L (ref 3.5–5.5)
PROTHROMBIN TIME: 13.3 SEC (ref 11.9–14.9)
RBC # BLD AUTO: 3.82 M/UL (ref 4.2–5.3)
SODIUM SERPL-SCNC: 142 MMOL/L (ref 136–145)
WBC # BLD AUTO: 8.2 K/UL (ref 4.6–13.2)

## 2025-02-10 PROCEDURE — 36415 COLL VENOUS BLD VENIPUNCTURE: CPT

## 2025-02-10 PROCEDURE — 80048 BASIC METABOLIC PNL TOTAL CA: CPT

## 2025-02-10 PROCEDURE — 85610 PROTHROMBIN TIME: CPT

## 2025-02-10 PROCEDURE — 85025 COMPLETE CBC W/AUTO DIFF WBC: CPT

## 2025-02-10 PROCEDURE — 82570 ASSAY OF URINE CREATININE: CPT

## 2025-02-10 PROCEDURE — 93005 ELECTROCARDIOGRAM TRACING: CPT

## 2025-02-11 ENCOUNTER — APPOINTMENT (OUTPATIENT)
Facility: HOSPITAL | Age: 85
End: 2025-02-11
Payer: MEDICARE

## 2025-02-11 LAB
EKG DIAGNOSIS: NORMAL
EKG Q-T INTERVAL: 398 MS
EKG QRS DURATION: 68 MS
EKG QTC CALCULATION (BAZETT): 435 MS
EKG R AXIS: 47 DEGREES
EKG T AXIS: 69 DEGREES
EKG VENTRICULAR RATE: 72 BPM

## 2025-02-11 PROCEDURE — 93010 ELECTROCARDIOGRAM REPORT: CPT | Performed by: INTERNAL MEDICINE

## 2025-02-17 ENCOUNTER — HOSPITAL ENCOUNTER (OUTPATIENT)
Facility: HOSPITAL | Age: 85
Setting detail: RECURRING SERIES
Discharge: HOME | End: 2025-02-20
Payer: MEDICARE

## 2025-02-17 PROCEDURE — 97530 THERAPEUTIC ACTIVITIES: CPT

## 2025-02-17 PROCEDURE — 97112 NEUROMUSCULAR REEDUCATION: CPT

## 2025-02-17 PROCEDURE — 97110 THERAPEUTIC EXERCISES: CPT

## 2025-02-17 NOTE — PROGRESS NOTES
PHYSICAL / OCCUPATIONAL THERAPY - DAILY TREATMENT NOTE    Patient Name: Amy Bernardo    Date: 2025    : 1940  Insurance: Payor: MEDICARE / Plan: MEDICARE PART A AND B / Product Type: *No Product type* /      Patient  verified Yes     Visit #   Current / Total 1 16   Time   In / Out 11:20 12:17   Pain   In / Out 2/10 1/10   Subjective Functional Status/Changes: Pt reports having difficulty getting something up off the floor in the kitchen the other day and she couldn't reach down and get her legs to get her back up so she isn't sure if she was overthinking it or not.      TREATMENT AREA =  Other low back pain [M54.59]     OBJECTIVE    Modalities Rationale:     decrease pain and increase tissue extensibility to improve patient's ability to progress to PLOF and address remaining functional goals.     min [] Estim Unattended, type/location:                                      []  w/ice    []  w/heat    min [] Estim Attended, type/location:                                     []  w/US     []  w/ice    []  w/heat    []  TENS insruct      min []  Mechanical Traction: type/lbs                   []  pro   []  sup   []  int   []  cont    []  before manual    []  after manual    min []  Ultrasound, settings/location:     15 min  unbill [x]  Ice     []  Heat    location/position: Seated  Low back    min []  Paraffin,  details:     min []  Vasopneumatic Device, press/temp:     min []  Whirlpool / Fluido:    If using vaso (only need to measure limb vaso being performed on)      pre-treatment girth :       post-treatment girth :       measured at (landmark location) :      min []  Other:    Skin assessment post-treatment:   Intact      Therapeutic Procedures:    Tx Min Billable or 1:1 Min (if diff from Tx Min) Procedure, Rationale, Specifics   17 13 [id number] Therapeutic Exercise (timed):  increase ROM, strength, coordination, balance, and proprioception to improve patient's ability to progress to PLOF

## 2025-02-19 ENCOUNTER — HOSPITAL ENCOUNTER (OUTPATIENT)
Facility: HOSPITAL | Age: 85
Setting detail: INFUSION SERIES
End: 2025-02-19
Payer: MEDICARE

## 2025-02-20 ENCOUNTER — APPOINTMENT (OUTPATIENT)
Facility: HOSPITAL | Age: 85
End: 2025-02-20
Payer: MEDICARE

## 2025-02-25 ENCOUNTER — HOSPITAL ENCOUNTER (OUTPATIENT)
Facility: HOSPITAL | Age: 85
Setting detail: RECURRING SERIES
End: 2025-02-25
Payer: MEDICARE

## 2025-02-25 NOTE — PROGRESS NOTES
PHYSICAL / OCCUPATIONAL THERAPY - DAILY TREATMENT NOTE    Patient Name: Amy Bernardo    Date: 2025    : 1940  Insurance: Payor: MEDICARE / Plan: MEDICARE PART A AND B / Product Type: *No Product type* /      Patient  verified {YES/NO:49621}     Visit #   Current / Total 2 16   Time   In / Out *** ***   Pain   In / Out *** ***   Subjective Functional Status/Changes: ***     TREATMENT AREA =  Other low back pain [M54.59]     OBJECTIVE    {[facility] Modality Grid:73946}     Therapeutic Procedures:    Tx Min Billable or 1:1 Min (if diff from Tx Min) Procedure, Rationale, Specifics     {[facility] Ther Procedures:24496}     Details if applicable:         {[facility] Ther Procedures:67837}     Details if applicable:       {[facility] Ther Procedures:54404}     Details if applicable:       {[facility] Ther Procedures:89196}     Details if applicable:       {[facility] Ther Procedures:82917}     Details if applicable:       [facility] Totals Reminder: bill using total billable min of TIMED therapeutic procedures (example: do not include dry needle or estim unattended, both untimed codes, in totals to left)  8-22 min = 1 unit; 23-37 min = 2 units; 38-52 min = 3 units; 53-67 min = 4 units; 68-82 min = 5 units   Total Total     [x]  Patient Education billed concurrently with other procedures   [x] Review HEP    [] Progressed/Changed HEP, detail:    [] Other detail:       Objective Information/Functional Measures/Assessment    ***    Patient will continue to benefit from skilled PT / OT services to {[facility]:53676} to address functional deficits and attain remaining goals.    Progress toward goals / Updated goals:  []  See Progress Note/Recertification    Goal: Patient will improve The Revised Oswestry Disability Index for Low Back Pain/Dysfunction to 6% or less in order to demonstrate a significant improvement in pain for increased ease of daily activities.    Status at evaluation/last progress

## 2025-02-27 ENCOUNTER — HOSPITAL ENCOUNTER (OUTPATIENT)
Facility: HOSPITAL | Age: 85
Setting detail: INFUSION SERIES
End: 2025-02-27
Payer: MEDICARE

## 2025-02-27 ENCOUNTER — HOSPITAL ENCOUNTER (OUTPATIENT)
Facility: HOSPITAL | Age: 85
Setting detail: RECURRING SERIES
End: 2025-02-27
Payer: MEDICARE

## 2025-02-27 VITALS
DIASTOLIC BLOOD PRESSURE: 75 MMHG | TEMPERATURE: 97 F | HEART RATE: 85 BPM | RESPIRATION RATE: 16 BRPM | OXYGEN SATURATION: 95 % | SYSTOLIC BLOOD PRESSURE: 121 MMHG

## 2025-02-27 DIAGNOSIS — M81.8 IDIOPATHIC OSTEOPOROSIS: Primary | ICD-10-CM

## 2025-02-27 DIAGNOSIS — E83.42 HYPOMAGNESEMIA: ICD-10-CM

## 2025-02-27 LAB
ALBUMIN SERPL-MCNC: 3.1 G/DL (ref 3.4–5)
ANION GAP SERPL CALC-SCNC: 4 MMOL/L (ref 3–18)
BUN SERPL-MCNC: 21 MG/DL (ref 7–18)
BUN/CREAT SERPL: 29 (ref 12–20)
CALCIUM SERPL-MCNC: 8.5 MG/DL (ref 8.5–10.1)
CHLORIDE SERPL-SCNC: 105 MMOL/L (ref 100–111)
CO2 SERPL-SCNC: 30 MMOL/L (ref 21–32)
CREAT SERPL-MCNC: 0.73 MG/DL (ref 0.6–1.3)
GLUCOSE SERPL-MCNC: 101 MG/DL (ref 74–99)
MAGNESIUM SERPL-MCNC: 2.1 MG/DL (ref 1.6–2.6)
PHOSPHATE SERPL-MCNC: 4.5 MG/DL (ref 2.5–4.9)
POTASSIUM SERPL-SCNC: 3.6 MMOL/L (ref 3.5–5.5)
SODIUM SERPL-SCNC: 139 MMOL/L (ref 136–145)

## 2025-02-27 PROCEDURE — 96372 THER/PROPH/DIAG INJ SC/IM: CPT

## 2025-02-27 PROCEDURE — 80069 RENAL FUNCTION PANEL: CPT

## 2025-02-27 PROCEDURE — 36415 COLL VENOUS BLD VENIPUNCTURE: CPT

## 2025-02-27 PROCEDURE — 6360000002 HC RX W HCPCS: Performed by: STUDENT IN AN ORGANIZED HEALTH CARE EDUCATION/TRAINING PROGRAM

## 2025-02-27 PROCEDURE — 83735 ASSAY OF MAGNESIUM: CPT

## 2025-02-27 RX ORDER — ONDANSETRON 2 MG/ML
8 INJECTION INTRAMUSCULAR; INTRAVENOUS
OUTPATIENT
Start: 2025-02-27

## 2025-02-27 RX ORDER — SODIUM CHLORIDE 9 MG/ML
INJECTION, SOLUTION INTRAVENOUS CONTINUOUS
OUTPATIENT
Start: 2025-02-27

## 2025-02-27 RX ORDER — DIPHENHYDRAMINE HYDROCHLORIDE 50 MG/ML
50 INJECTION INTRAMUSCULAR; INTRAVENOUS
OUTPATIENT
Start: 2025-02-27

## 2025-02-27 RX ORDER — ACETAMINOPHEN 325 MG/1
650 TABLET ORAL
OUTPATIENT
Start: 2025-02-27

## 2025-02-27 RX ORDER — HYDROCORTISONE SODIUM SUCCINATE 100 MG/2ML
100 INJECTION INTRAMUSCULAR; INTRAVENOUS
OUTPATIENT
Start: 2025-02-27

## 2025-02-27 RX ORDER — ALBUTEROL SULFATE 90 UG/1
4 INHALANT RESPIRATORY (INHALATION) PRN
OUTPATIENT
Start: 2025-02-27

## 2025-02-27 RX ORDER — EPINEPHRINE 1 MG/ML
0.3 INJECTION, SOLUTION, CONCENTRATE INTRAVENOUS PRN
OUTPATIENT
Start: 2025-02-27

## 2025-02-27 RX ADMIN — DENOSUMAB 60 MG: 60 INJECTION SUBCUTANEOUS at 12:47

## 2025-02-27 ASSESSMENT — PAIN - FUNCTIONAL ASSESSMENT: PAIN_FUNCTIONAL_ASSESSMENT: NONE - DENIES PAIN

## 2025-02-27 NOTE — PROGRESS NOTES
[facility] Progress Note    Date: 2025    Name: Amy Bernardo    MRN: [id number]         : 1940    Ms. Bernardo arrived in the [facility] today at 1105, in stable condition, here for her PROLIA INJECTION + LABS (EVERY 6 MONTHS). She was assessed and education was provided.     Ms. Bernardo's vitals were reviewed.  Vitals:    25 1107   BP: 121/75   Pulse: 85   Resp: 16   Temp: 97 °F (36.1 °C)   SpO2: 95%       Ms. Bernardo presented to the infusion center today stating that she was doing fairly well, and voicing no major complaints.     She also stated that she had tolerated past PROLIA injections well, and without any problems.       Blood for the ordered PRE-PROLIA LABS (RENAL FUNCTION PANEL, MAGNESIUM, PHOSPHORUS) was drawn from her LEFT AC without incident, and was sent out by STAT  to the [facility] for STAT processing.       The lab results from today were as follows:    Results for orders placed or performed during the hospital encounter of 25   Renal Function Panel   Result Value Ref Range    Sodium 139 136 - 145 mmol/L    Potassium 3.6 3.5 - 5.5 mmol/L    Chloride 105 100 - 111 mmol/L    CO2 30 21 - 32 mmol/L    Anion Gap 4 3.0 - 18 mmol/L    Glucose 101 (H) 74 - 99 mg/dL    BUN 21 (H) 7.0 - 18 MG/DL    Creatinine 0.73 0.6 - 1.3 MG/DL    BUN/Creatinine Ratio 29 (H) 12 - 20      Est, Glom Filt Rate 81 >60 ml/min/1.73m2    Calcium 8.5 8.5 - 10.1 MG/DL    Phosphorus PENDING MG/DL    Albumin 3.1 (L) 3.4 - 5.0 g/dL   Magnesium   Result Value Ref Range    Magnesium 2.1 1.6 - 2.6 mg/dL       Denosumab (PROLIA) 60 mg was administered SQ in the back of her LEFT ARM per order and without incident.       Ms. Bernardo tolerated well and voiced no complaints.     [facility]. Bern[facility] in [facility] condition at 1255.     She is scheduled to return in 6 months on 25 at 1100, for her next appointment, for her PRE-PROLIA LABS + PROLIA  INJECTION.       Tami Molina RN  February 27, 2025  11:16 AM

## 2025-03-04 ENCOUNTER — HOSPITAL ENCOUNTER (OUTPATIENT)
Facility: HOSPITAL | Age: 85
Setting detail: RECURRING SERIES
Discharge: HOME | End: 2025-03-07
Payer: MEDICARE

## 2025-03-04 PROCEDURE — 97110 THERAPEUTIC EXERCISES: CPT

## 2025-03-04 PROCEDURE — G0283 ELEC STIM OTHER THAN WOUND: HCPCS

## 2025-03-04 PROCEDURE — 97530 THERAPEUTIC ACTIVITIES: CPT

## 2025-03-04 PROCEDURE — 97140 MANUAL THERAPY 1/> REGIONS: CPT

## 2025-03-04 NOTE — PROGRESS NOTES
PHYSICAL / OCCUPATIONAL THERAPY - DAILY TREATMENT NOTE    Patient Name: Amy Bernardo    Date: 3/4/2025    : 1940  Insurance: Payor: MEDICARE / Plan: MEDICARE PART A AND B / Product Type: *No Product type* /      Patient  verified Yes     Visit #   Current / Total 2 16   Time   In / Out 12:00 1:20   Pain   In / Out 6-7/10 5/10   Subjective Functional Status/Changes: Pt reports she was bending to  a humidifier and pulled her back. She has been in increased pain since and hasn't been to the MD for it. She has a lidocaine patch on.      TREATMENT AREA =  Other low back pain [M54.59]     OBJECTIVE    Modalities Rationale:     decrease pain and increase tissue extensibility to improve patient's ability to progress to PLOF and address remaining functional goals.    15 min [x] Estim Unattended, type/location: IFC l/s in sitting                                     []  w/ice    [x]  w/heat    min [] Estim Attended, type/location:                                     []  w/US     []  w/ice    []  w/heat    []  TENS insruct      min []  Mechanical Traction: type/lbs                   []  pro   []  sup   []  int   []  cont    []  before manual    []  after manual    min []  Ultrasound, settings/location:     10 min  unbill []  Ice     [x]  Heat    location/position: Seated  Low back    min []  Paraffin,  details:     min []  Vasopneumatic Device, press/temp:     min []  Whirlpool / Fluido:    If using vaso (only need to measure limb vaso being performed on)      pre-treatment girth :       post-treatment girth :       measured at (landmark location) :      min []  Other:    Skin assessment post-treatment:   Intact      Therapeutic Procedures:    Tx Min Billable or 1:1 Min (if diff from Tx Min) Procedure, Rationale, Specifics   20 68 [id number] Therapeutic Exercise (timed):  increase ROM, strength, coordination, balance, and proprioception to improve patient's ability to progress to PLOF and address

## 2025-03-06 ENCOUNTER — APPOINTMENT (OUTPATIENT)
Facility: HOSPITAL | Age: 85
End: 2025-03-06
Payer: MEDICARE

## 2025-03-11 ENCOUNTER — HOSPITAL ENCOUNTER (OUTPATIENT)
Facility: HOSPITAL | Age: 85
Setting detail: RECURRING SERIES
Discharge: HOME | End: 2025-03-14
Payer: MEDICARE

## 2025-03-11 PROCEDURE — 97110 THERAPEUTIC EXERCISES: CPT

## 2025-03-11 PROCEDURE — G0283 ELEC STIM OTHER THAN WOUND: HCPCS

## 2025-03-11 PROCEDURE — 97112 NEUROMUSCULAR REEDUCATION: CPT

## 2025-03-11 NOTE — PROGRESS NOTES
PHYSICAL / OCCUPATIONAL THERAPY - DAILY TREATMENT NOTE    Patient Name: Amy Bernardo    Date: 3/11/2025    : 1940  Insurance: Payor: MEDICARE / Plan: MEDICARE PART A AND B / Product Type: *No Product type* /      Patient  verified Yes     Visit #   Current / Total 3 16   Time   In / Out 11:25 12:14   Pain   In / Out 4/10 4/10   Subjective Functional Status/Changes: Pt reports still a lot of pain with movement. She states the medication is helping some. It doesn't hurt to push on but when she tries to get up she feels it.      TREATMENT AREA =  Other low back pain [M54.59]     OBJECTIVE    Modalities Rationale:     decrease pain and increase tissue extensibility to improve patient's ability to progress to PLOF and address remaining functional goals.    15 min [x] Estim Unattended, type/location: IFC l/s in sitting                                     []  w/ice    [x]  w/heat    min [] Estim Attended, type/location:                                     []  w/US     []  w/ice    []  w/heat    []  TENS insruct      min []  Mechanical Traction: type/lbs                   []  pro   []  sup   []  int   []  cont    []  before manual    []  after manual    min []  Ultrasound, settings/location:      min  unbill []  Ice     []  Heat    location/position:     min []  Paraffin,  details:     min []  Vasopneumatic Device, press/temp:     min []  Whirlpool / Fluido:    If using vaso (only need to measure limb vaso being performed on)      pre-treatment girth :       post-treatment girth :       measured at (landmark location) :      min []  Other:    Skin assessment post-treatment:   Intact      Therapeutic Procedures:    Tx Min Billable or 1:1 Min (if diff from Tx Min) Procedure, Rationale, Specifics   17  75632 Therapeutic Exercise (timed):  increase ROM, strength, coordination, balance, and proprioception to improve patient's ability to progress to PLOF and address remaining functional goals. (see flow

## 2025-03-12 NOTE — PROGRESS NOTES
[facility]  [address] [place] [address] - Ph: [phone]   Fx: [phone]  PHYSICAL THERAPY PROGRESS NOTE  [x] Progress Note  [] Discharge Summary    Patient Name: Amy Bernardo : 1940   Treatment/Medical Diagnosis: Other low back pain [M54.59]   Referral Source: Bhavik Crain MD     Date of Initial Visit: 2024 Attended Visits: 33 Missed Visits: 2       Comorbidities: Other: Osteoporosis, Cancer, Right Sh Arthroplasty   Prior Level of Function:Independent Community Ambulator. Lives Alone. Does Gardening Activities and likes to Cook.    SUMMARY OF TREATMENT  Ms. Bernardo is making slow progress towards updated goals due to sustaining a l/s strain when bending to  a humidifier approximately on 3/3/25. She has had increased pain in the l/s with transfers and movement since the injury with some benefit using heat, ice and electrical stimulation for pain relief. She just initiated working on some core strengthening again with stretching so have not been able to progress back to pre-hospitalization (gap from December to February) strengthening of the LE for balance and transfers. Skilled PT remains medically necessary to progress LE strength, balance and core strength in order to improve ease of ambulation, transfers and decrease fall risk.     CURRENT STATUS/Progress towards Goals:  1. Goal: Patient will improve The Revised Oswestry Disability Index for Low Back Pain/Dysfunction to 6% or less in order to demonstrate a significant improvement in pain for increased ease of daily activities.    Status at evaluation/last progress note: 12%  Status at evaluation/last progress note: Not yet reassessed     2. Goal: Patient will improve gait speed during 10 meter walk test to 0.8 m/s in order to increase ease of community ambulation.   Status at evaluation/last progress note: 0.67 m/s  Status at evaluation/last

## 2025-03-13 ENCOUNTER — HOSPITAL ENCOUNTER (OUTPATIENT)
Facility: HOSPITAL | Age: 85
Setting detail: RECURRING SERIES
Discharge: HOME | End: 2025-03-16
Payer: MEDICARE

## 2025-03-13 PROCEDURE — 97530 THERAPEUTIC ACTIVITIES: CPT

## 2025-03-13 PROCEDURE — 97112 NEUROMUSCULAR REEDUCATION: CPT

## 2025-03-13 PROCEDURE — 97110 THERAPEUTIC EXERCISES: CPT

## 2025-03-13 PROCEDURE — G0283 ELEC STIM OTHER THAN WOUND: HCPCS

## 2025-03-13 NOTE — PROGRESS NOTES
[facility]   6/19/2025  9:45 AM Elizabeth Chapa MD [facility]   8/28/2025 11:00 AM [facility] FAST TRACK NURSE YASMINE Acosta

## 2025-03-18 ENCOUNTER — HOSPITAL ENCOUNTER (OUTPATIENT)
Facility: HOSPITAL | Age: 85
Setting detail: RECURRING SERIES
End: 2025-03-18
Payer: MEDICARE

## 2025-03-20 ENCOUNTER — APPOINTMENT (OUTPATIENT)
Facility: HOSPITAL | Age: 85
End: 2025-03-20
Payer: MEDICARE

## 2025-03-26 ENCOUNTER — APPOINTMENT (OUTPATIENT)
Facility: HOSPITAL | Age: 85
End: 2025-03-26
Payer: MEDICARE

## 2025-03-27 ENCOUNTER — APPOINTMENT (OUTPATIENT)
Facility: HOSPITAL | Age: 85
End: 2025-03-27
Payer: MEDICARE

## 2025-04-03 ENCOUNTER — TELEPHONE (OUTPATIENT)
Facility: HOSPITAL | Age: 85
End: 2025-04-03

## 2025-04-22 ENCOUNTER — HOSPITAL ENCOUNTER (OUTPATIENT)
Facility: HOSPITAL | Age: 85
Setting detail: RECURRING SERIES
Discharge: HOME | End: 2025-04-25
Payer: MEDICARE

## 2025-04-22 PROCEDURE — 97535 SELF CARE MNGMENT TRAINING: CPT

## 2025-04-22 PROCEDURE — 97530 THERAPEUTIC ACTIVITIES: CPT

## 2025-04-22 PROCEDURE — 97164 PT RE-EVAL EST PLAN CARE: CPT

## 2025-04-22 NOTE — PROGRESS NOTES
PHYSICAL THERAPY - DAILY TREATMENT NOTE (updated )    Patient Name: Amy Bernardo    Date: 2025    : 1940  Insurance: Payor: MEDICARE / Plan: MEDICARE PART A AND B / Product Type: *No Product type* /      Patient  verified yes     Visit #   Current / Total 5 16   Time   In / Out 1145 1225   Pain   In / Out 3 3   Subjective Functional Status/Changes: See PN     TREATMENT AREA =  Other low back pain    Next PN due 25    OBJECTIVE    Therapeutic Procedures:  Tx Min Billable or 1:1 Min (if diff from Tx Min) Procedure, Rationale, Specifics   8  82108 Therapeutic Activity (timed):  use of dynamic activities replicating functional movements to increase ROM, strength, coordination, balance, and proprioception in order to improve patient's ability to progress to PLOF and address remaining functional goals.  (see flow sheet as applicable)     Details if applicable:     15  89168 Self Care/Home Management (timed):  improve patient knowledge and understanding of home injury/symptom/pain management, positioning, posture/ergonomics, home safety, activity modification, transfer techniques, and joint protection strategies  to improve patient's ability to progress to PLOF and address remaining functional goals.  (see flow sheet as applicable)     Details if applicable:     23  [facility] Totals Reminder: bill using total billable min of TIMED therapeutic procedures (example: do not include dry needle or estim unattended, both untimed codes, in totals to left)  8-22 min = 1 unit; 23-37 min = 2 units; 38-52 min = 3 units; 53-67 min = 4 units; 68-82 min = 5 units   Total Total       Charge Capture    [x]  Patient Education billed concurrently with other procedures   [x] Review HEP    [] Progressed/Changed HEP  [] Other:    Objective Information/Functional Measures/Assessment    See PN    Patient will continue to benefit from skilled PT services to modify and progress therapeutic interventions, analyze

## 2025-04-22 NOTE — PROGRESS NOTES
[facility] - [facility] AT [facility]  [address] [place] [address] Ph:[phone] Fx: [phone]  CONTINUED PLAN OF CARE/RECERTIFICATION FOR PHYSICAL THERAPY          Patient Name: Amy Bernardo : 1940   Treatment/Medical Diagnosis: Other low back pain [M54.59]   Onset Date: 3/3/25    Payor:  Payor: MEDICARE / Plan: MEDICARE PART A AND B / Product Type: *No Product type* /      Referral Source: Bhavik Crain MD Start of Care (SOC): 25   Prior Hospitalization: See Medical History Provider #: [id number]   Prior Level of Function:   Independent Community Ambulator. Lives Alone. Does Gardening Activities and likes to Cook      Comorbidities: Osteooporosis, Cancer, Right Shoulder Arthroplasty     Medications: Verified on Patient Summary List   Visits from SOC: 34 Missed Visits: 1, see below     Progress to Goals:  Patient will improve The Revised Oswestry Disability Index for Low Back Pain/Dysfunction to 6% or less in order to demonstrate a significant improvement in pain for increased ease of daily activities   Status at last Eval: 12%  Current Status: 28%  Goal Met?  no  2.  Patient will improve gait speed during 10 meter walk test to 0.8 m/s in order to increase ease of community ambulation.   Status at last Eval: .67  Current Status: .60  Goal Met?  no  3. Patient will improve B hip abduction MMT to 4/5 in order to increase ease of ambulation.   Status at last Eval: 4-/5  Current Status: 4-/5  Goal Met?  no  4. Goal: Patient will perform Romberg EC balance on compliant surface without LOB in order to demonstrate improving balance for improve safety at home.   Status at last Eval: unable  Current Status: unable  Goal Met?  no  5.  Patient will perform floor to stand transfer with SBA in order to improve safety at home.   Status at last Eval: n/a  Current Status: unable  Goal Met?  no    Key Functional Changes/Progress:

## 2025-04-29 ENCOUNTER — HOSPITAL ENCOUNTER (OUTPATIENT)
Facility: HOSPITAL | Age: 85
Setting detail: RECURRING SERIES
Discharge: HOME | End: 2025-05-02
Payer: MEDICARE

## 2025-04-29 PROCEDURE — 97110 THERAPEUTIC EXERCISES: CPT

## 2025-04-29 PROCEDURE — 97112 NEUROMUSCULAR REEDUCATION: CPT

## 2025-04-29 PROCEDURE — 97530 THERAPEUTIC ACTIVITIES: CPT

## 2025-04-29 NOTE — PROGRESS NOTES
PHYSICAL THERAPY - DAILY TREATMENT NOTE (updated )    Patient Name: Amy Bernardo    Date: 2025    : 1940  Insurance: Payor: MEDICARE / Plan: MEDICARE PART A AND B / Product Type: *No Product type* /      Patient  verified yes     Visit #   Current / Total 1 16   Time   In / Out 11:23 12:19   Pain   In / Out 4/10 2/10   Subjective Functional Status/Changes: Pt reports going Monday for EMG results. She has been getting pains down her legs. She is having a hard time getting up off the floor again and had to crawl to pull herself up the other day.      TREATMENT AREA =  Other low back pain    Next PN due 25    Modalities Rationale:     decrease inflammation and decrease pain to improve patient's ability to progress to PLOF and address remaining functional goals.     min [] Estim Unattended, type/location:                                      []  w/ice    []  w/heat    min [] Estim Attended, type/location:                                     []  w/US     []  w/ice    []  w/heat    []  TENS insruct      min []  Mechanical Traction: type/lbs                   []  pro   []  sup   []  int   []  cont    []  before manual    []  after manual    min []  Ultrasound, settings/location:     10 min  unbill [x]  Ice     []  Heat    location/position: Seated to l/s    min []  Paraffin,  details:     min []  Vasopneumatic Device, press/temp:     min []  Whirlpool / Fluido:    If using vaso (only need to measure limb vaso being performed on)      pre-treatment girth :       post-treatment girth :       measured at (landmark location) :      min []  Other:    Skin assessment post-treatment:   Intact      OBJECTIVE    Therapeutic Procedures:  Tx Min Billable or 1:1 Min (if diff from Tx Min) Procedure, Rationale, Specifics   10  21542 Therapeutic Activity (timed):  use of dynamic activities replicating functional movements to increase ROM, strength, coordination, balance, and proprioception in order

## 2025-05-01 ENCOUNTER — HOSPITAL ENCOUNTER (OUTPATIENT)
Facility: HOSPITAL | Age: 85
Setting detail: RECURRING SERIES
Discharge: HOME | End: 2025-05-04
Payer: MEDICARE

## 2025-05-01 PROCEDURE — 97110 THERAPEUTIC EXERCISES: CPT

## 2025-05-01 PROCEDURE — 97112 NEUROMUSCULAR REEDUCATION: CPT

## 2025-05-01 PROCEDURE — 97530 THERAPEUTIC ACTIVITIES: CPT

## 2025-05-01 NOTE — PROGRESS NOTES
PHYSICAL THERAPY - DAILY TREATMENT NOTE (updated )    Patient Name: Amy Bernardo    Date: 2025    : 1940  Insurance: Payor: MEDICARE / Plan: MEDICARE PART A AND B / Product Type: *No Product type* /      Patient  verified yes     Visit #   Current / Total 2 16   Time   In / Out 12:40 1:37   Pain   In / Out 4/10 2/10   Subjective Functional Status/Changes: Pt reports going Monday for EMG results for why her left leg feels so weak/numb. She gets a right buttocks pain in sitting and stands up to rub it out.      TREATMENT AREA =  Other low back pain    Next PN due 25    Modalities Rationale:     decrease inflammation and decrease pain to improve patient's ability to progress to PLOF and address remaining functional goals.     min [] Estim Unattended, type/location:                                      []  w/ice    []  w/heat    min [] Estim Attended, type/location:                                     []  w/US     []  w/ice    []  w/heat    []  TENS insruct      min []  Mechanical Traction: type/lbs                   []  pro   []  sup   []  int   []  cont    []  before manual    []  after manual    min []  Ultrasound, settings/location:     15 min  unbill [x]  Ice     []  Heat    location/position: Seated to l/s    min []  Paraffin,  details:     min []  Vasopneumatic Device, press/temp:     min []  Whirlpool / Fluido:    If using vaso (only need to measure limb vaso being performed on)      pre-treatment girth :       post-treatment girth :       measured at (landmark location) :      min []  Other:    Skin assessment post-treatment:   Intact      OBJECTIVE    Therapeutic Procedures:  Tx Min Billable or 1:1 Min (if diff from Tx Min) Procedure, Rationale, Specifics   77 20052 Therapeutic Activity (timed):  use of dynamic activities replicating functional movements to increase ROM, strength, coordination, balance, and proprioception in order to improve patient's ability to progress

## 2025-05-06 ENCOUNTER — HOSPITAL ENCOUNTER (OUTPATIENT)
Facility: HOSPITAL | Age: 85
Setting detail: RECURRING SERIES
Discharge: HOME | End: 2025-05-09
Payer: MEDICARE

## 2025-05-06 PROCEDURE — 97110 THERAPEUTIC EXERCISES: CPT

## 2025-05-06 PROCEDURE — 97530 THERAPEUTIC ACTIVITIES: CPT

## 2025-05-06 NOTE — PROGRESS NOTES
PHYSICAL / OCCUPATIONAL THERAPY - DAILY TREATMENT NOTE    Patient Name: Amy Bernardo    Date: 2025    : 1940  Insurance: Payor: MEDICARE / Plan: MEDICARE PART A AND B / Product Type: *No Product type* /      Patient  verified Yes     Visit #   Current / Total 3 16   Time   In / Out 12:40 1:34   Pain   In / Out 4/10 2/10   Subjective Functional Status/Changes: Pt. Reports the EMG showed leg symptoms were coming from her back.      TREATMENT AREA =  Other low back pain    OBJECTIVE    Modalities Rationale:     decrease pain to improve patient's ability to progress to PLOF and address remaining functional goals.     min [] Estim Unattended, type/location:                                      []  w/ice    []  w/heat    min [] Estim Attended, type/location:                                     []  w/US     []  w/ice    []  w/heat    []  TENS insruct      min []  Mechanical Traction: type/lbs                   []  pro   []  sup   []  int   []  cont    []  before manual    []  after manual    min []  Ultrasound, settings/location:     10 min  unbill [x]  Ice     []  Heat    location/position: Seated  Low back    min []  Paraffin,  details:     min []  Vasopneumatic Device, press/temp:     min []  Whirlpool / Fluido:    If using vaso (only need to measure limb vaso being performed on)      pre-treatment girth :       post-treatment girth :       measured at (landmark location) :      min []  Other:    Skin assessment post-treatment:   Intact      Therapeutic Procedures:    Tx Min Billable or 1:1 Min (if diff from Tx Min) Procedure, Rationale, Specifics   34  35506 Therapeutic Exercise (timed):  increase ROM, strength, coordination, balance, and proprioception to improve patient's ability to progress to PLOF and address remaining functional goals. (see flow sheet as applicable)     Details if applicable:  see flow sheet     10  12869 Therapeutic Activity (timed):  use of dynamic activities

## 2025-05-08 ENCOUNTER — HOSPITAL ENCOUNTER (OUTPATIENT)
Facility: HOSPITAL | Age: 85
Setting detail: RECURRING SERIES
Discharge: HOME | End: 2025-05-11
Payer: MEDICARE

## 2025-05-08 PROCEDURE — 97110 THERAPEUTIC EXERCISES: CPT

## 2025-05-08 PROCEDURE — 97112 NEUROMUSCULAR REEDUCATION: CPT

## 2025-05-08 PROCEDURE — 97530 THERAPEUTIC ACTIVITIES: CPT

## 2025-05-08 NOTE — PROGRESS NOTES
PHYSICAL / OCCUPATIONAL THERAPY - DAILY TREATMENT NOTE    Patient Name: Amy Bernardo    Date: 2025    : 1940  Insurance: Payor: MEDICARE / Plan: MEDICARE PART A AND B / Product Type: *No Product type* /      Patient  verified Yes     Visit #   Current / Total 4 16   Time   In / Out 12:40 1:30   Pain   In / Out 4/10 2/10   Subjective Functional Status/Changes: Pt reports her knee was swollen and sore after her last visit on the left. She states still sore today but a bit better. She states the MD wanted her to make sure therapy was strengthening her left leg but she is also going to get an epidural to help with the nerve.      TREATMENT AREA =  Other low back pain    OBJECTIVE    Modalities Rationale:     decrease pain to improve patient's ability to progress to PLOF and address remaining functional goals.     min [] Estim Unattended, type/location:                                      []  w/ice    []  w/heat    min [] Estim Attended, type/location:                                     []  w/US     []  w/ice    []  w/heat    []  TENS insruct      min []  Mechanical Traction: type/lbs                   []  pro   []  sup   []  int   []  cont    []  before manual    []  after manual    min []  Ultrasound, settings/location:     10 min  unbill [x]  Ice     []  Heat    location/position: Seated  Low back    min []  Paraffin,  details:     min []  Vasopneumatic Device, press/temp:     min []  Whirlpool / Fluido:    If using vaso (only need to measure limb vaso being performed on)      pre-treatment girth :       post-treatment girth :       measured at (landmark location) :      min []  Other:    Skin assessment post-treatment:   Intact      Therapeutic Procedures:    Tx Min Billable or 1:1 Min (if diff from Tx Min) Procedure, Rationale, Specifics   15  94275 Therapeutic Exercise (timed):  increase ROM, strength, coordination, balance, and proprioception to improve patient's ability to progress

## 2025-05-13 ENCOUNTER — HOSPITAL ENCOUNTER (OUTPATIENT)
Facility: HOSPITAL | Age: 85
Setting detail: RECURRING SERIES
Discharge: HOME | End: 2025-05-16
Payer: MEDICARE

## 2025-05-13 PROCEDURE — 97530 THERAPEUTIC ACTIVITIES: CPT

## 2025-05-13 PROCEDURE — 97110 THERAPEUTIC EXERCISES: CPT

## 2025-05-13 PROCEDURE — 97112 NEUROMUSCULAR REEDUCATION: CPT

## 2025-05-13 NOTE — PROGRESS NOTES
PHYSICAL / OCCUPATIONAL THERAPY - DAILY TREATMENT NOTE    Patient Name: Amy Bernardo    Date: 2025    : 1940  Insurance: Payor: MEDICARE / Plan: MEDICARE PART A AND B / Product Type: *No Product type* /      Patient  verified Yes     Visit #   Current / Total 5 16   Time   In / Out 12:00 12:56   Pain   In / Out 4/10 2/10   Subjective Functional Status/Changes: Pt. Reports she is feeling ok today. She had to get to the ground early to get a pan out and was able to get back up but with difficulty      TREATMENT AREA =  Other low back pain    OBJECTIVE    Modalities Rationale:     decrease pain to improve patient's ability to progress to PLOF and address remaining functional goals.     min [] Estim Unattended, type/location:                                      []  w/ice    []  w/heat    min [] Estim Attended, type/location:                                     []  w/US     []  w/ice    []  w/heat    []  TENS insruct      min []  Mechanical Traction: type/lbs                   []  pro   []  sup   []  int   []  cont    []  before manual    []  after manual    min []  Ultrasound, settings/location:     15 min  unbill [x]  Ice     []  Heat    location/position: Seated  Low back    min []  Paraffin,  details:     min []  Vasopneumatic Device, press/temp:     min []  Whirlpool / Fluido:    If using vaso (only need to measure limb vaso being performed on)      pre-treatment girth :       post-treatment girth :       measured at (landmark location) :      min []  Other:    Skin assessment post-treatment:   Intact      Therapeutic Procedures:    Tx Min Billable or 1:1 Min (if diff from Tx Min) Procedure, Rationale, Specifics   20  84127 Therapeutic Exercise (timed):  increase ROM, strength, coordination, balance, and proprioception to improve patient's ability to progress to PLOF and address remaining functional goals. (see flow sheet as applicable)     Details if applicable:  see flow sheet     10

## 2025-05-15 ENCOUNTER — APPOINTMENT (OUTPATIENT)
Facility: HOSPITAL | Age: 85
End: 2025-05-15
Payer: MEDICARE

## 2025-05-20 ENCOUNTER — HOSPITAL ENCOUNTER (OUTPATIENT)
Facility: HOSPITAL | Age: 85
Setting detail: RECURRING SERIES
Discharge: HOME | End: 2025-05-23
Payer: MEDICARE

## 2025-05-20 PROCEDURE — 97110 THERAPEUTIC EXERCISES: CPT

## 2025-05-20 PROCEDURE — 97530 THERAPEUTIC ACTIVITIES: CPT

## 2025-05-20 PROCEDURE — 97112 NEUROMUSCULAR REEDUCATION: CPT

## 2025-05-20 NOTE — PROGRESS NOTES
[facility]  [address] [place] [address] - Ph: [phone]   Fx: [phone]  PHYSICAL THERAPY PROGRESS NOTE  [x] Progress Note  [] Discharge Summary    Patient Name: Amy Bernardo : 1940   Treatment/Medical Diagnosis: Other low back pain [M54.59]   Referral Source: Bhavik Crain MD     Date of Initial Visit: 24 Attended Visits: 41 Missed Visits: 1       Prior Level of Function:   Independent Community Ambulator. Lives Alone. Does Gardening Activities and likes to Cook      Comorbidities: Osteooporosis, Cancer, Right Shoulder Arthroplasty       SUMMARY OF TREATMENT  Pt. Is progressing with physical therapy despite increased OSW score to 35.6%. She was able to perform a floor to stand transfer at home with UE support. Gait speed during 10 meter walk test continues to be limited to 0.67 m/s and hip abduction strength is 4-/5 bilaterally. Romberg EC balance on foam is limited to 13 seconds. She continues to have decreased B single leg stability and decreased LE strength which is affecting her ambulation and transfers.     CURRENT STATUS/Progress towards Goals:  Goal: Patient will improve The Revised Oswestry Disability Index for Low Back Pain/Dysfunction to 6% or less in order to demonstrate a significant improvement in pain for increased ease of daily activities   Status at last Eval: 12%  Not met    2.   Goal: Patient will improve gait speed during 10 meter walk test to 0.8 m/s in order to increase ease of community ambulation.   Status at last Eval: .67  Not met    3.   Goal:Patient will improve B hip abduction MMT to 4/5 in order to increase ease of ambulation.   Status at last Eval: 4-/5  Not met    4.   Goal: Patient will perform Romberg EC balance on compliant surface without LOB in order to demonstrate improving balance for improve safety at home.   Status at last Eval: unable  Not met    5.   Goal:Patient

## 2025-05-20 NOTE — PROGRESS NOTES
PHYSICAL / OCCUPATIONAL THERAPY - DAILY TREATMENT NOTE    Patient Name: Amy Bernardo    Date: 2025    : 1940  Insurance: Payor: MEDICARE / Plan: MEDICARE PART A AND B / Product Type: *No Product type* /      Patient  verified Yes     Visit #   Current / Total 6 16   Time   In / Out 11:46 12:50   Pain   In / Out 4/10 2/10   Subjective Functional Status/Changes: Pt. Reports she is feeling about the same today. She is going golfing tomorrow.      TREATMENT AREA =  Other low back pain    OBJECTIVE    Modalities Rationale:     decrease pain to improve patient's ability to progress to PLOF and address remaining functional goals.     min [] Estim Unattended, type/location:                                      []  w/ice    []  w/heat    min [] Estim Attended, type/location:                                     []  w/US     []  w/ice    []  w/heat    []  TENS insruct      min []  Mechanical Traction: type/lbs                   []  pro   []  sup   []  int   []  cont    []  before manual    []  after manual    min []  Ultrasound, settings/location:     10 min  unbill [x]  Ice     []  Heat    location/position: Seated   Low back    min []  Paraffin,  details:     min []  Vasopneumatic Device, press/temp:     min []  Whirlpool / Fluido:    If using vaso (only need to measure limb vaso being performed on)      pre-treatment girth :       post-treatment girth :       measured at (landmark location) :      min []  Other:    Skin assessment post-treatment:   Intact      Therapeutic Procedures:    Tx Min Billable or 1:1 Min (if diff from Tx Min) Procedure, Rationale, Specifics   32  [id number] Therapeutic Exercise (timed):  increase ROM, strength, coordination, balance, and proprioception to improve patient's ability to progress to PLOF and address remaining functional goals. (see flow sheet as applicable)     Details if applicable:  see flow sheet     10  29324 Therapeutic Activity (timed):  use of dynamic

## 2025-05-22 ENCOUNTER — HOSPITAL ENCOUNTER (OUTPATIENT)
Facility: HOSPITAL | Age: 85
Setting detail: RECURRING SERIES
Discharge: HOME | End: 2025-05-25
Payer: MEDICARE

## 2025-05-22 PROCEDURE — 97530 THERAPEUTIC ACTIVITIES: CPT

## 2025-05-22 PROCEDURE — 97112 NEUROMUSCULAR REEDUCATION: CPT

## 2025-05-22 PROCEDURE — 97110 THERAPEUTIC EXERCISES: CPT

## 2025-05-22 NOTE — PROGRESS NOTES
PHYSICAL / OCCUPATIONAL THERAPY - DAILY TREATMENT NOTE    Patient Name: Amy Bernardo    Date: 2025    : 1940  Insurance: Payor: MEDICARE / Plan: MEDICARE PART A AND B / Product Type: *No Product type* /      Patient  verified Yes     Visit #   Current / Total 7 16   Time   In / Out 12:04 12:53   Pain   In / Out 4/10 2/10   Subjective Functional Status/Changes: Pt. Reports she is feeling about the same today     TREATMENT AREA =  Other low back pain    OBJECTIVE    Modalities Rationale:     decrease pain to improve patient's ability to progress to PLOF and address remaining functional goals.     min [] Estim Unattended, type/location:                                      []  w/ice    []  w/heat    min [] Estim Attended, type/location:                                     []  w/US     []  w/ice    []  w/heat    []  TENS insruct      min []  Mechanical Traction: type/lbs                   []  pro   []  sup   []  int   []  cont    []  before manual    []  after manual    min []  Ultrasound, settings/location:     10 min  unbill [x]  Ice     []  Heat    location/position: Seated  Low back    min []  Paraffin,  details:     min []  Vasopneumatic Device, press/temp:     min []  Whirlpool / Fluido:    If using vaso (only need to measure limb vaso being performed on)      pre-treatment girth :       post-treatment girth :       measured at (landmark location) :      min []  Other:    Skin assessment post-treatment:   Intact      Therapeutic Procedures:    Tx Min Billable or 1:1 Min (if diff from Tx Min) Procedure, Rationale, Specifics   21  [id number] Therapeutic Exercise (timed):  increase ROM, strength, coordination, balance, and proprioception to improve patient's ability to progress to PLOF and address remaining functional goals. (see flow sheet as applicable)     Details if applicable:  see flow sheet     10  99761 Neuromuscular Re-Education (timed):  improve balance, coordination, kinesthetic

## 2025-05-27 ENCOUNTER — HOSPITAL ENCOUNTER (OUTPATIENT)
Facility: HOSPITAL | Age: 85
Setting detail: RECURRING SERIES
Discharge: HOME | End: 2025-05-30
Payer: MEDICARE

## 2025-05-27 PROCEDURE — 97110 THERAPEUTIC EXERCISES: CPT

## 2025-05-27 PROCEDURE — 97530 THERAPEUTIC ACTIVITIES: CPT

## 2025-05-27 PROCEDURE — 97112 NEUROMUSCULAR REEDUCATION: CPT

## 2025-05-27 NOTE — PROGRESS NOTES
PHYSICAL / OCCUPATIONAL THERAPY - DAILY TREATMENT NOTE    Patient Name: Amy Bernardo    Date: 2025    : 1940  Insurance: Payor: MEDICARE / Plan: MEDICARE PART A AND B / Product Type: *No Product type* /      Patient  verified Yes     Visit #   Current / Total 8 16   Time   In / Out 12:00 12:40   Pain   In / Out 4/10 2/10   Subjective Functional Status/Changes: Pt reports soreness across her low back especially with standing/walking. She has to clean her kitchen floor when she goes home today. She needs to cancel next week because she is getting her back epidural and also has a MD appt for her heart.      TREATMENT AREA =  Other low back pain    OBJECTIVE      Therapeutic Procedures:    Tx Min Billable or 1:1 Min (if diff from Tx Min) Procedure, Rationale, Specifics   15  10621 Therapeutic Exercise (timed):  increase ROM, strength, coordination, balance, and proprioception to improve patient's ability to progress to PLOF and address remaining functional goals. (see flow sheet as applicable)     Details if applicable:  see flow sheet     15  24686 Neuromuscular Re-Education (timed):  improve balance, coordination, kinesthetic sense, posture, core stability and proprioception to improve patient's ability to develop conscious control of individual muscles and awareness of position of extremities in order to progress to PLOF and address remaining functional goals. (see flow sheet as applicable)     Details if applicable:  shayy disc   10  41913 Therapeutic Activity (timed):  use of dynamic activities replicating functional movements to increase ROM, strength, coordination, balance, and proprioception in order to improve patient's ability to progress to PLOF and address remaining functional goals.  (see flow sheet as applicable)     Details if applicable:  therapy progression back to lunges after epidural to get more sensation back to left LE   40  [facility] Totals Reminder: bill using total

## 2025-05-29 ENCOUNTER — HOSPITAL ENCOUNTER (OUTPATIENT)
Facility: HOSPITAL | Age: 85
Setting detail: RECURRING SERIES
End: 2025-05-29
Payer: MEDICARE

## 2025-05-29 PROCEDURE — 97112 NEUROMUSCULAR REEDUCATION: CPT

## 2025-05-29 PROCEDURE — 97530 THERAPEUTIC ACTIVITIES: CPT

## 2025-05-29 PROCEDURE — 97110 THERAPEUTIC EXERCISES: CPT

## 2025-05-29 NOTE — PROGRESS NOTES
PHYSICAL / OCCUPATIONAL THERAPY - DAILY TREATMENT NOTE    Patient Name: Amy Bernardo    Date: 2025    : 1940  Insurance: Payor: MEDICARE / Plan: MEDICARE PART A AND B / Product Type: *No Product type* /      Patient  verified Yes     Visit #   Current / Total 9 16   Time   In / Out 12:34 1:27   Pain   In / Out 4/10 2/10   Subjective Functional Status/Changes: Pt said she is doing well today. She said she was sweeping her floors     TREATMENT AREA =  Other low back pain    OBJECTIVE    Modalities Rationale:     decrease inflammation and decrease pain to improve patient's ability to progress to PLOF and address remaining functional goals.     min [] Estim Unattended, type/location:                                      []  w/ice    []  w/heat    min [] Estim Attended, type/location:                                     []  w/US     []  w/ice    []  w/heat    []  TENS insruct      min []  Mechanical Traction: type/lbs                   []  pro   []  sup   []  int   []  cont    []  before manual    []  after manual    min []  Ultrasound, settings/location:     10 min  unbill [x]  Ice     []  Heat    location/position: On L/S while sitting    min []  Paraffin,  details:     min []  Vasopneumatic Device, press/temp:     min []  Whirlpool / Fluido:    If using vaso (only need to measure limb vaso being performed on)      pre-treatment girth :       post-treatment girth :       measured at (landmark location) :      min []  Other:    Skin assessment post-treatment:   Intact      Therapeutic Procedures:    Tx Min Billable or 1:1 Min (if diff from Tx Min) Procedure, Rationale, Specifics   25  36187 Therapeutic Exercise (timed):  increase ROM, strength, coordination, balance, and proprioception to improve patient's ability to progress to PLOF and address remaining functional goals. (see flow sheet as applicable)     Details if applicable:  see flow sheet     8  65735 Therapeutic Activity (timed):   Zuri HESTER, PTA [facility]   6/11/2025 12:00 PM Yinka Buckner, PT [facility]   6/17/2025 12:00 PM Behrens, Laura M, PTA [facility]   6/19/2025  9:45 AM Elizabeth Chapa MD [facility]   6/19/2025 12:00 PM Behrens, Laura M, PTA [facility]   6/24/2025 12:00 PM Behrens, Laura M, PTA [facility]   6/26/2025 12:00 PM Yinka Buckner, PT [facility]   8/28/2025 11:00 AM [facility]

## 2025-06-03 ENCOUNTER — APPOINTMENT (OUTPATIENT)
Facility: HOSPITAL | Age: 85
End: 2025-06-03
Payer: MEDICARE

## 2025-06-05 ENCOUNTER — APPOINTMENT (OUTPATIENT)
Facility: HOSPITAL | Age: 85
End: 2025-06-05
Payer: MEDICARE

## 2025-06-10 ENCOUNTER — APPOINTMENT (OUTPATIENT)
Facility: HOSPITAL | Age: 85
End: 2025-06-10
Payer: MEDICARE

## 2025-06-11 ENCOUNTER — HOSPITAL ENCOUNTER (OUTPATIENT)
Facility: HOSPITAL | Age: 85
Setting detail: RECURRING SERIES
Discharge: HOME | End: 2025-06-14
Payer: MEDICARE

## 2025-06-11 PROCEDURE — 97112 NEUROMUSCULAR REEDUCATION: CPT

## 2025-06-11 PROCEDURE — 97110 THERAPEUTIC EXERCISES: CPT

## 2025-06-11 PROCEDURE — 97530 THERAPEUTIC ACTIVITIES: CPT

## 2025-06-11 NOTE — PROGRESS NOTES
abduction MMT to 4/5 in order to increase ease of ambulation.   Status at last Eval: 4-/5  Added hip abduction iso on shayy disc (5/27/25)     4.   Goal: Patient will perform Romberg EC balance on compliant surface without LOB in order to demonstrate improving balance for improve safety at home.   Status at last Eval: unable  Not met: EC floor was 30 seconds (6/11/25)     Next PN: 6/19/25 RC due  6/21/25  Auth due (visit number/ date) LYNDA    PLAN  - Continue Plan of Care    Yinka Buckner PT    6/11/2025    7:04 AM  If an interpreting service was utilized for treatment of this patient, the contents of this document represent the material reviewed with the patient via the .     Future Appointments   Date Time Provider Department Center   6/11/2025 12:00 PM Yinka Buckner PT [facility]   6/17/2025 12:00 PM Behrens, Laura M, PTA [facility]   6/19/2025  9:45 AM Elizabeth Chapa MD [facility]   6/19/2025 12:00 PM Behrens, Laura M, PTA [facility]   6/24/2025 12:00 PM Behrens, Laura M, PTA [facility]   6/26/2025 12:00 PM Yinka Buckner PT [facility]   8/28/2025 11:00 AM [facility] FAST TRACK NURSE [facility]

## 2025-06-17 ENCOUNTER — HOSPITAL ENCOUNTER (OUTPATIENT)
Facility: HOSPITAL | Age: 85
Setting detail: RECURRING SERIES
Discharge: HOME | End: 2025-06-20
Payer: MEDICARE

## 2025-06-17 PROCEDURE — 97140 MANUAL THERAPY 1/> REGIONS: CPT

## 2025-06-17 PROCEDURE — 97535 SELF CARE MNGMENT TRAINING: CPT

## 2025-06-17 PROCEDURE — 97110 THERAPEUTIC EXERCISES: CPT

## 2025-06-17 NOTE — PROGRESS NOTES
0.67 m/s (05/29/2025)     3.   Goal:Patient will improve B hip abduction MMT to 4/5 in order to increase ease of ambulation.   Status at last Eval: 4-/5  Added hip abduction iso on shayy disc (5/27/25)     4.   Goal: Patient will perform Romberg EC balance on compliant surface without LOB in order to demonstrate improving balance for improve safety at home.   Status at last Eval: unable  Not met: EC floor was 30 seconds (6/11/25)     Next PN: 6/19/25 RC due  6/21/25  Auth due (visit number/ date) LYNDA    PLAN  - Continue Plan of Care    Laura M Behrens, PTA    6/17/2025    7:41 AM  If an interpreting service was utilized for treatment of this patient, the contents of this document represent the material reviewed with the patient via the .     Future Appointments   Date Time Provider Department Center   6/17/2025 12:00 PM Behrens, Laura M, PTA [facility]   6/19/2025  9:45 AM Elizabeth Chapa MD Counts include 234 beds at the [facility]   6/19/2025 12:00 PM Behrens, Laura M, PTA [facility]   6/24/2025 12:00 PM Behrens, Laura M, PTA [facility]   6/26/2025 12:00 PM Yinka Buckner PT [facility]   8/28/2025 11:00 AM [facility] FAST TRACK NURSE [facility]

## 2025-06-19 ENCOUNTER — HOSPITAL ENCOUNTER (OUTPATIENT)
Facility: HOSPITAL | Age: 85
Setting detail: RECURRING SERIES
Discharge: HOME | End: 2025-06-22
Payer: MEDICARE

## 2025-06-19 PROCEDURE — 97112 NEUROMUSCULAR REEDUCATION: CPT

## 2025-06-19 PROCEDURE — 97110 THERAPEUTIC EXERCISES: CPT

## 2025-06-19 PROCEDURE — 97530 THERAPEUTIC ACTIVITIES: CPT

## 2025-06-19 NOTE — PROGRESS NOTES
PHYSICAL / OCCUPATIONAL THERAPY - DAILY TREATMENT NOTE    Patient Name: Amy Bernardo    Date: 2025    : 1940  Insurance: Payor: MEDICARE / Plan: MEDICARE PART A AND B / Product Type: *No Product type* /      Patient  verified Yes     Visit #   Current / Total 12 16   Time   In / Out 12:00 12:40   Pain   In / Out 2/10 2/10   Subjective Functional Status/Changes: Pt reports feeling like her balance would be better if she could feel her left leg. She states her leg feels numb down the back of her leg to the bottom of her foot. She goes back to Dr. Mccormick next week. She states the back pain itself is less.      TREATMENT AREA =  Other low back pain    OBJECTIVE    Therapeutic Procedures:    Tx Min Billable or 1:1 Min (if diff from Tx Min) Procedure, Rationale, Specifics   10  92242 Therapeutic Exercise (timed):  increase ROM, strength, coordination, balance, and proprioception to improve patient's ability to progress to PLOF and address remaining functional goals. (see flow sheet as applicable)     Details if applicable:  see flow sheet     20  66369 Therapeutic Activity (timed):  use of dynamic activities replicating functional movements to increase ROM, strength, coordination, balance, and proprioception in order to improve patient's ability to progress to PLOF and address remaining functional goals.  (see flow sheet as applicable)     Details if applicable:  goal reassessment; back brace donning   10  05855 Neuromuscular Re-Education (timed):  improve balance, coordination, kinesthetic sense, posture, core stability and proprioception to improve patient's ability to develop conscious control of individual muscles and awareness of position of extremities in order to progress to PLOF and address remaining functional goals. (see flow sheet as applicable)     Details if applicable:  balance assessment   40  [facility] Totals Reminder: bill using total billable min of TIMED therapeutic procedures

## 2025-06-20 NOTE — THERAPY RECERTIFICATION
[facility] - [facility]  [address] [place] [address]  [phone] fax    Continued Plan of Care/ Re-certification for Physical Therapy Services    Patient Name: Amy Bernardo : 1940   Treatment/Medical Diagnosis: Other low back pain [M54.59]   Onset Date: 3/3/25     Payor:  Payor: MEDICARE / Plan: MEDICARE PART A AND B / Product Type: *No Product type* /        Referral Source: Bhavik Crain MD Start of Care (SOC): 25   Prior Hospitalization: See Medical History Provider #: [id number]   Prior Level of Function:   Independent Community Ambulator. Lives Alone. Does Gardening Activities and likes to Cook      Comorbidities: Osteooporosis, Cancer, Right Shoulder Arthroplasty      Medications: Verified on Patient Summary List     Visits from Start of Care: 47    Missed Visits: 3    The Plan of Care and following information is based on the patient's current status:  Goal: Patient will improve The Revised Oswestry Disability Index for Low Back Pain/Dysfunction to 6% or less in order to demonstrate a significant improvement in pain for increased ease of daily activities   Status at last Eval: 12%  Current Status: not met    Goal: Patient will improve gait speed during 10 meter walk test to 0.8 m/s in order to increase ease of community ambulation.   Status at last Eval: .67 m/s  Current Status: not met    Goal:Patient will improve B hip abduction MMT to 4/5 in order to increase ease of ambulation.   Status at last Eval: 4-/5  Current Status: not met    Goal: Patient will perform Romberg EC balance on compliant surface without LOB in order to demonstrate improving balance for improve safety at home.   Status at last Eval: unable  Current Status: not met    Key functional changes: improving gait speed during 10 meter walk test      Problems/ barriers to goal attainment: yes   Surgery since initial evaluation heart surgery, vertebral fracture with

## 2025-06-24 ENCOUNTER — HOSPITAL ENCOUNTER (OUTPATIENT)
Facility: HOSPITAL | Age: 85
Setting detail: RECURRING SERIES
Discharge: HOME | End: 2025-06-27
Payer: MEDICARE

## 2025-06-24 PROCEDURE — 97112 NEUROMUSCULAR REEDUCATION: CPT

## 2025-06-24 PROCEDURE — 97110 THERAPEUTIC EXERCISES: CPT

## 2025-06-24 PROCEDURE — 97530 THERAPEUTIC ACTIVITIES: CPT

## 2025-06-24 NOTE — PROGRESS NOTES
balance, and proprioception to improve patient's ability to progress to PLOF and address remaining functional goals. (see flow sheet as applicable)     Details if applicable:  see flow sheet     15  22974 Therapeutic Activity (timed):  use of dynamic activities replicating functional movements to increase ROM, strength, coordination, balance, and proprioception in order to improve patient's ability to progress to PLOF and address remaining functional goals.  (see flow sheet as applicable)     Details if applicable:  step forward, step backward; sit to stands   15  03758 Neuromuscular Re-Education (timed):  improve balance, coordination, kinesthetic sense, posture, core stability and proprioception to improve patient's ability to develop conscious control of individual muscles and awareness of position of extremities in order to progress to PLOF and address remaining functional goals. (see flow sheet as applicable)     Details if applicable:  balance; glute activation; core   42  [facility] Totals Reminder: bill using total billable min of TIMED therapeutic procedures (example: do not include dry needle or estim unattended, both untimed codes, in totals to left)  8-22 min = 1 unit; 23-37 min = 2 units; 38-52 min = 3 units; 53-67 min = 4 units; 68-82 min = 5 units   Total Total     Charge Capture    [x]  Patient Education billed concurrently with other procedures   [x] Review HEP    [] Progressed/Changed HEP, detail:    [] Other detail:       Objective Information/Functional Measures/Assessment  Genu valgus with sit to stands  Left trendelenburg  Decreased stability with stepping strategies  Added glute strengthening  Add shayy disc for core strengthening  Called Dr. Crain's office (epidural was given at L5-S1)    Pt continues to have numbness down left LE along S1 dermatome. She has improved ease of getting out of her recliner at home. She has noticed return of LE swelling unchanged with diuretic or elevation. She

## 2025-06-26 ENCOUNTER — APPOINTMENT (OUTPATIENT)
Facility: HOSPITAL | Age: 85
End: 2025-06-26
Payer: MEDICARE

## 2025-06-30 ENCOUNTER — HOSPITAL ENCOUNTER (OUTPATIENT)
Facility: HOSPITAL | Age: 85
Setting detail: RECURRING SERIES
Discharge: HOME | End: 2025-07-03
Payer: MEDICARE

## 2025-06-30 PROCEDURE — 97112 NEUROMUSCULAR REEDUCATION: CPT

## 2025-06-30 PROCEDURE — 97110 THERAPEUTIC EXERCISES: CPT

## 2025-06-30 PROCEDURE — 97530 THERAPEUTIC ACTIVITIES: CPT

## 2025-06-30 NOTE — PROGRESS NOTES
PHYSICAL / OCCUPATIONAL THERAPY - DAILY TREATMENT NOTE    Patient Name: Amy Bernardo    Date: 2025    : 1940  Insurance: Payor: MEDICARE / Plan: MEDICARE PART A AND B / Product Type: *No Product type* /      Patient  verified Yes     Visit #   Current / Total 2 16   Time   In / Out 11:20 12:22   Pain   In / Out 4/10 2/10   Subjective Functional Status/Changes: Pt reports Dr. Crain said he can't do an epidural in the left for 3 months after the right so that won't be until September. She is having a hard time getting into the cardiologist to follow up with ongoing swelling in her legs despite taking water pills. She forgot her cane today in the garage.      TREATMENT AREA =  Other low back pain    OBJECTIVE      Modalities Rationale:     decrease inflammation and decrease pain to improve patient's ability to progress to PLOF and address remaining functional goals.     min [] Estim Unattended, type/location:                                      []  w/ice    []  w/heat    min [] Estim Attended, type/location:                                     []  w/US     []  w/ice    []  w/heat    []  TENS insruct      min []  Mechanical Traction: type/lbs                   []  pro   []  sup   []  int   []  cont    []  before manual    []  after manual    min []  Ultrasound, settings/location:     15 min  unbill [x]  Ice     []  Heat    location/position: Seated to l/s    min []  Paraffin,  details:     min []  Vasopneumatic Device, press/temp:     min []  Whirlpool / Fluido:    If using vaso (only need to measure limb vaso being performed on)      pre-treatment girth :       post-treatment girth :       measured at (landmark location) :      min []  Other:    Skin assessment post-treatment:   Intact    Therapeutic Procedures:    Tx Min Billable or 1:1 Min (if diff from Tx Min) Procedure, Rationale, Specifics   17  33043 Therapeutic Exercise (timed):  increase ROM, strength, coordination, balance, and

## 2025-07-02 ENCOUNTER — HOSPITAL ENCOUNTER (OUTPATIENT)
Facility: HOSPITAL | Age: 85
Setting detail: RECURRING SERIES
Discharge: HOME | End: 2025-07-05
Payer: MEDICARE

## 2025-07-02 PROCEDURE — 97110 THERAPEUTIC EXERCISES: CPT

## 2025-07-02 PROCEDURE — 97112 NEUROMUSCULAR REEDUCATION: CPT

## 2025-07-02 PROCEDURE — 97530 THERAPEUTIC ACTIVITIES: CPT

## 2025-07-02 NOTE — PROGRESS NOTES
PHYSICAL / OCCUPATIONAL THERAPY - DAILY TREATMENT NOTE    Patient Name: Amy Bernardo    Date: 2025    : 1940  Insurance: Payor: MEDICARE / Plan: MEDICARE PART A AND B / Product Type: *No Product type* /      Patient  verified Yes     Visit #   Current / Total 3 16   Time   In / Out 2:00 2:58   Pain   In / Out 2/10 2/10   Subjective Functional Status/Changes: Pt reports less swelling today but only difference was she wore compression socks all day yesterday with taking the fluid pill as well. She remembered her cane today but lost it earlier in the house. She reports she was able to get a MD appt with heart doctor next week to discuss leg swelling.      TREATMENT AREA =  Other low back pain    OBJECTIVE      Modalities Rationale:     decrease inflammation and decrease pain to improve patient's ability to progress to PLOF and address remaining functional goals.     min [] Estim Unattended, type/location:                                      []  w/ice    []  w/heat    min [] Estim Attended, type/location:                                     []  w/US     []  w/ice    []  w/heat    []  TENS insruct      min []  Mechanical Traction: type/lbs                   []  pro   []  sup   []  int   []  cont    []  before manual    []  after manual    min []  Ultrasound, settings/location:     10 min  unbill [x]  Ice     []  Heat    location/position: Seated to l/s    min []  Paraffin,  details:     min []  Vasopneumatic Device, press/temp:     min []  Whirlpool / Fluido:    If using vaso (only need to measure limb vaso being performed on)      pre-treatment girth :       post-treatment girth :       measured at (landmark location) :      min []  Other:    Skin assessment post-treatment:   Intact    Therapeutic Procedures:    Tx Min Billable or 1:1 Min (if diff from Tx Min) Procedure, Rationale, Specifics   18  [id number] Therapeutic Exercise (timed):  increase ROM, strength, coordination, balance, and

## 2025-07-08 ENCOUNTER — HOSPITAL ENCOUNTER (OUTPATIENT)
Facility: HOSPITAL | Age: 85
Setting detail: RECURRING SERIES
Discharge: HOME | End: 2025-07-11
Payer: MEDICARE

## 2025-07-08 ENCOUNTER — APPOINTMENT (OUTPATIENT)
Facility: HOSPITAL | Age: 85
End: 2025-07-08
Payer: MEDICARE

## 2025-07-08 PROCEDURE — 97530 THERAPEUTIC ACTIVITIES: CPT

## 2025-07-08 PROCEDURE — 97110 THERAPEUTIC EXERCISES: CPT

## 2025-07-08 PROCEDURE — 97112 NEUROMUSCULAR REEDUCATION: CPT

## 2025-07-08 NOTE — PROGRESS NOTES
order to improve safety at home.   Status at evaluation/last progress note: n/a  Working on sit to stand transfers to build LE strength for floor transfers (7/2/25)  Have not re-initiated due to left radicular symptoms and LE swelling (7/8/25)     Next PN: 7/18/2025  Auth due (visit number/ date) LYNDA    PLAN  - Continue Plan of Care    Laura M Behrens, PTA    7/8/2025    7:22 AM  If an interpreting service was utilized for treatment of this patient, the contents of this document represent the material reviewed with the patient via the .     Future Appointments   Date Time Provider Department Center   7/8/2025  9:20 AM Behrens, Laura M, PTA [facility]   7/15/2025 12:40 PM Yinka Buckner PT [facility]   7/17/2025  1:20 PM Yinka Buckner PT [facility]   7/22/2025  1:20 PM Yinka Buckner PT [facility]   7/24/2025  1:20 PM Yinka Buckner PT [facility]   7/30/2025 12:40 PM Behrens, Laura M, PTA [facility]   7/31/2025 12:40 PM Behrens, Laura M, PTA [facility]   8/28/2025 11:00 AM [facility] FAST TRACK NURSE YASMINE Acosta

## 2025-07-09 ENCOUNTER — APPOINTMENT (OUTPATIENT)
Facility: HOSPITAL | Age: 85
End: 2025-07-09
Payer: MEDICARE

## 2025-07-10 ENCOUNTER — HOSPITAL ENCOUNTER (OUTPATIENT)
Facility: HOSPITAL | Age: 85
Setting detail: RECURRING SERIES
Discharge: HOME | End: 2025-07-13
Payer: MEDICARE

## 2025-07-10 PROCEDURE — 97112 NEUROMUSCULAR REEDUCATION: CPT

## 2025-07-10 PROCEDURE — 97530 THERAPEUTIC ACTIVITIES: CPT

## 2025-07-10 PROCEDURE — 97110 THERAPEUTIC EXERCISES: CPT

## 2025-07-10 NOTE — PROGRESS NOTES
PHYSICAL / OCCUPATIONAL THERAPY - DAILY TREATMENT NOTE    Patient Name: Amy Bernardo    Date: 7/10/2025    : 1940  Insurance: Payor: MEDICARE / Plan: MEDICARE PART A AND B / Product Type: *No Product type* /      Patient  verified Yes     Visit #   Current / Total 5 16   Time   In / Out 12:00 12:52   Pain   In / Out 3/10 2/10   Subjective Functional Status/Changes: Pt. Reports she still has constant numbness down left leg. She followed up with cardiologist and now has an appointment with pulmonologist in November     TREATMENT AREA =  Other low back pain    OBJECTIVE    Modalities Rationale:     decrease pain to improve patient's ability to progress to PLOF and address remaining functional goals.     min [] Estim Unattended, type/location:                                      []  w/ice    []  w/heat    min [] Estim Attended, type/location:                                     []  w/US     []  w/ice    []  w/heat    []  TENS insruct      min []  Mechanical Traction: type/lbs                   []  pro   []  sup   []  int   []  cont    []  before manual    []  after manual    min []  Ultrasound, settings/location:     10 min  unbill [x]  Ice     []  Heat    location/position: Seated  Low back    min []  Paraffin,  details:     min []  Vasopneumatic Device, press/temp:     min []  Whirlpool / Fluido:    If using vaso (only need to measure limb vaso being performed on)      pre-treatment girth :       post-treatment girth :       measured at (landmark location) :      min []  Other:    Skin assessment post-treatment:   Intact      Therapeutic Procedures:    Tx Min Billable or 1:1 Min (if diff from Tx Min) Procedure, Rationale, Specifics   23  [id number] Therapeutic Exercise (timed):  increase ROM, strength, coordination, balance, and proprioception to improve patient's ability to progress to PLOF and address remaining functional goals. (see flow sheet as applicable)     Details if applicable:  see flow

## 2025-07-15 ENCOUNTER — HOSPITAL ENCOUNTER (OUTPATIENT)
Facility: HOSPITAL | Age: 85
Setting detail: RECURRING SERIES
Discharge: HOME | End: 2025-07-18
Payer: MEDICARE

## 2025-07-15 ENCOUNTER — APPOINTMENT (OUTPATIENT)
Facility: HOSPITAL | Age: 85
End: 2025-07-15
Payer: MEDICARE

## 2025-07-15 PROCEDURE — 97110 THERAPEUTIC EXERCISES: CPT

## 2025-07-15 PROCEDURE — 97140 MANUAL THERAPY 1/> REGIONS: CPT

## 2025-07-15 NOTE — PROGRESS NOTES
Progress Note/Recertification    Goal: Patient will improve The Revised Oswestry Disability Index for Low Back Pain/Dysfunction by 13% in order to demonstrate a significant improvement in pain for increased ease of daily activities.    Status at evaluation/last progress note: 40%     2.   Goal: Patient will improve gait speed during 10 meter walk test to 0.8 m/s in order to increase ease of community ambulation.   Status at evaluation/last progress note: 0.75 m/s  Not met: 0.54 m/s (7/15/25)     3.   Goal: Patient will improve B hip abduction MMT to 4/5 in order to increase ease of ambulation.   Status at evaluation/last progress note: B: 4-/5  Added exercises per flow sheet; standing hip abduction and seated band abduction (6/24/25)     4.   Goal: Patient will perform Romberg EC balance on compliant surface for 30 seconds in order to demonstrate improving balance for improve safety at home.   Status at evaluation/last progress note: 3 seconds  30 seconds EC balance on floor; 30 EO balance on foam (6/30/25)  Not met: decreased balance with eyes open (7/10/25)     5.   Goal: Patient will perform floor to stand transfer with modified independence in order to improve safety at home.   Status at evaluation/last progress note: n/a  Working on sit to stand transfers to build LE strength for floor transfers (7/2/25)  Have not re-initiated due to left radicular symptoms and LE swelling (7/8/25)     Next PN: 7/18/2025  Auth due (visit number/ date) LYNDA    PLAN  - Continue Plan of Care    Yinka Buckner PT    7/15/2025    7:34 AM  If an interpreting service was utilized for treatment of this patient, the contents of this document represent the material reviewed with the patient via the .     Future Appointments   Date Time Provider Department Center   7/15/2025 12:40 PM Yinka Buckner PT [facility]   7/17/2025  1:20 PM Yinka Buckner PT [facility]   7/22/2025  1:20 PM Yinka Buckner PT

## 2025-07-17 ENCOUNTER — HOSPITAL ENCOUNTER (OUTPATIENT)
Facility: HOSPITAL | Age: 85
Setting detail: RECURRING SERIES
Discharge: HOME | End: 2025-07-20
Payer: MEDICARE

## 2025-07-17 PROCEDURE — 97530 THERAPEUTIC ACTIVITIES: CPT

## 2025-07-17 PROCEDURE — 97110 THERAPEUTIC EXERCISES: CPT

## 2025-07-18 NOTE — PROGRESS NOTES
[facility] - [facility]  [address] [place] [address]  [phone] fax    Physical Therapy Discharge Summary    Patient Name: Amy Bernardo : 1940   Treatment/Medical Diagnosis: Other low back pain [M54.59]   Onset Date: 3/3/25     Payor:  Payor: MEDICARE / Plan: MEDICARE PART A AND B / Product Type: *No Product type* /        Referral Source: Bhavik Crain MD Start of Care (SOC): 25   Prior Hospitalization: See Medical History Provider #: [id number]   Prior Level of Function:   Independent Community Ambulator. Lives Alone. Does Gardening Activities and likes to Cook      Comorbidities: Osteooporosis, Cancer, Right Shoulder Arthroplasty       Visits from Start of Care: 54    Missed Visits: 3    Reporting Period : 25 to 25    Summary of Care:  Goal: Patient will improve The Revised Oswestry Disability Index for Low Back Pain/Dysfunction by 13% in order to demonstrate a significant improvement in pain for increased ease of daily activities.    Status at evaluation/last progress note: 40%  Status at discharge: not met    Goal: Patient will improve gait speed during 10 meter walk test to 0.8 m/s in order to increase ease of community ambulation.   Status at evaluation/last progress note: 0.75 m/s  Status at discharge: not met     Goal: Patient will improve B hip abduction MMT to 4/5 in order to increase ease of ambulation.   Status at evaluation/last progress note: B: 4-/5  Status at discharge: not met    Goal: Patient will perform Romberg EC balance on compliant surface for 30 seconds in order to demonstrate improving balance for improve safety at home.   Status at evaluation/last progress note: 3 seconds  Status at discharge: not met    Goal: Patient will perform floor to stand transfer with modified independence in order to improve safety at home.   Status at evaluation/last progress note: n/a  Status at discharge: not met    Pt. 
Physical Therapy Discharge Instructions      [facility] - [facility]  [address] [place] [address] [phone] fax    Patient: Amy Bernardo  : 1940      Continue Home Exercise Program 2 times per day for 4 weeks, then decrease to 3 times per week      Continue with    [x] Ice  as needed   [] Heat           Follow up with MD:     [] Upon completion of therapy     [x] As needed      Recommendations:     [x]   Return to activity with home program    []   Return to activity with the following modifications:       []Post Rehab Program    []Join Independent aquatic program     []Return to/join local gym      Additional Comments: Keep up with your exercises at home           
    Charge Capture    [x]  Patient Education billed concurrently with other procedures   [x] Review HEP    [] Progressed/Changed HEP, detail:    [] Other detail:       Objective Information/Functional Measures/Assessment    Hip abd MMT: right: 4/5 left: 4-/5  EC Romberg on foam: 6 seconds    Progress toward goals / Updated goals:  [x]  See Progress Note/Recertification    PLAN  - Discharge due to : plateau in progress    Yinka Buckner PT    7/17/2025    7:29 AM  If an interpreting service was utilized for treatment of this patient, the contents of this document represent the material reviewed with the patient via the .     Future Appointments   Date Time Provider Department Center   7/17/2025  1:20 PM Yinka Buckner PT [facility]   7/22/2025  1:20 PM Yinka Buckner PT [facility]   7/24/2025  1:20 PM Yinka Buckner PT [facility]   7/30/2025 12:40 PM Behrens, Laura M, PTA [facility]   7/31/2025 12:40 PM Behrens, Laura M, PTA [facility]   8/28/2025 11:00 AM [facility] FAST TRACK NURSE [facility]   11/3/2025 11:00 AM Kassie Freed MD [facility]

## 2025-07-22 ENCOUNTER — APPOINTMENT (OUTPATIENT)
Facility: HOSPITAL | Age: 85
End: 2025-07-22
Payer: MEDICARE

## 2025-07-24 ENCOUNTER — APPOINTMENT (OUTPATIENT)
Facility: HOSPITAL | Age: 85
End: 2025-07-24
Payer: MEDICARE

## 2025-07-29 ENCOUNTER — APPOINTMENT (OUTPATIENT)
Facility: HOSPITAL | Age: 85
End: 2025-07-29
Payer: MEDICARE

## 2025-07-30 ENCOUNTER — APPOINTMENT (OUTPATIENT)
Facility: HOSPITAL | Age: 85
End: 2025-07-30
Payer: MEDICARE

## 2025-07-31 ENCOUNTER — APPOINTMENT (OUTPATIENT)
Facility: HOSPITAL | Age: 85
End: 2025-07-31
Payer: MEDICARE

## 2025-08-20 ENCOUNTER — APPOINTMENT (OUTPATIENT)
Facility: HOSPITAL | Age: 85
End: 2025-08-20
Payer: MEDICARE

## 2025-08-21 ENCOUNTER — TRANSCRIBE ORDERS (OUTPATIENT)
Facility: HOSPITAL | Age: 85
End: 2025-08-21

## 2025-08-21 DIAGNOSIS — M81.0 AGE-RELATED OSTEOPOROSIS WITHOUT CURRENT PATHOLOGICAL FRACTURE: Primary | ICD-10-CM

## 2025-08-28 ENCOUNTER — HOSPITAL ENCOUNTER (OUTPATIENT)
Facility: HOSPITAL | Age: 85
Setting detail: INFUSION SERIES
Discharge: HOME | End: 2025-08-31
Payer: MEDICARE

## 2025-08-28 VITALS
TEMPERATURE: 98 F | RESPIRATION RATE: 16 BRPM | HEART RATE: 73 BPM | SYSTOLIC BLOOD PRESSURE: 96 MMHG | DIASTOLIC BLOOD PRESSURE: 61 MMHG | OXYGEN SATURATION: 94 %

## 2025-08-28 DIAGNOSIS — Z79.899 ENCOUNTER FOR MEDICATION MANAGEMENT: ICD-10-CM

## 2025-08-28 DIAGNOSIS — M81.8 IDIOPATHIC OSTEOPOROSIS: Primary | ICD-10-CM

## 2025-08-28 DIAGNOSIS — M81.0 AGE RELATED OSTEOPOROSIS, UNSPECIFIED PATHOLOGICAL FRACTURE PRESENCE: ICD-10-CM

## 2025-08-28 DIAGNOSIS — E83.42 HYPOMAGNESEMIA: ICD-10-CM

## 2025-08-28 LAB
ALBUMIN SERPL-MCNC: 3.2 G/DL (ref 3.4–5)
ANION GAP SERPL CALC-SCNC: 11 MMOL/L (ref 7–16)
BUN SERPL-MCNC: 16 MG/DL (ref 6–23)
BUN/CREAT SERPL: 26
CALCIUM SERPL-MCNC: 8.7 MG/DL (ref 8.5–10.1)
CHLORIDE SERPL-SCNC: 103 MMOL/L (ref 98–107)
CO2 SERPL-SCNC: 27 MMOL/L (ref 21–32)
CREAT SERPL-MCNC: 0.62 MG/DL (ref 0.6–1.3)
GLUCOSE SERPL-MCNC: 105 MG/DL (ref 74–108)
MAGNESIUM SERPL-MCNC: 1.9 MG/DL (ref 1.6–2.6)
PHOSPHATE SERPL-MCNC: 4.4 MG/DL (ref 2.5–4.9)
POTASSIUM SERPL-SCNC: 4 MMOL/L (ref 3.5–5.5)
SODIUM SERPL-SCNC: 140 MMOL/L (ref 136–145)

## 2025-08-28 PROCEDURE — 6360000002 HC RX W HCPCS

## 2025-08-28 PROCEDURE — 80069 RENAL FUNCTION PANEL: CPT

## 2025-08-28 PROCEDURE — 96372 THER/PROPH/DIAG INJ SC/IM: CPT

## 2025-08-28 PROCEDURE — 83735 ASSAY OF MAGNESIUM: CPT

## 2025-08-28 PROCEDURE — 36415 COLL VENOUS BLD VENIPUNCTURE: CPT

## 2025-08-28 RX ORDER — HYDROCORTISONE SODIUM SUCCINATE 100 MG/2ML
100 INJECTION INTRAMUSCULAR; INTRAVENOUS
Status: CANCELLED | OUTPATIENT
Start: 2025-08-28

## 2025-08-28 RX ORDER — EPINEPHRINE 1 MG/ML
0.3 INJECTION, SOLUTION, CONCENTRATE INTRAVENOUS PRN
Status: CANCELLED | OUTPATIENT
Start: 2025-08-28

## 2025-08-28 RX ORDER — HYDROCODONE BITARTRATE AND ACETAMINOPHEN 5; 325 MG/1; MG/1
1 TABLET ORAL 3 TIMES DAILY PRN
COMMUNITY
Start: 2025-04-09

## 2025-08-28 RX ORDER — ROPINIROLE 1 MG/1
1 TABLET, FILM COATED ORAL 2 TIMES DAILY
COMMUNITY

## 2025-08-28 RX ORDER — SODIUM CHLORIDE 9 MG/ML
INJECTION, SOLUTION INTRAVENOUS CONTINUOUS
OUTPATIENT
Start: 2026-02-22

## 2025-08-28 RX ORDER — HYDROCORTISONE SODIUM SUCCINATE 100 MG/2ML
100 INJECTION INTRAMUSCULAR; INTRAVENOUS
OUTPATIENT
Start: 2026-02-22

## 2025-08-28 RX ORDER — EPINEPHRINE 1 MG/ML
0.3 INJECTION, SOLUTION, CONCENTRATE INTRAVENOUS PRN
OUTPATIENT
Start: 2026-02-22

## 2025-08-28 RX ORDER — ACETAMINOPHEN 325 MG/1
650 TABLET ORAL
OUTPATIENT
Start: 2026-02-22

## 2025-08-28 RX ORDER — IPRATROPIUM BROMIDE AND ALBUTEROL SULFATE 2.5; .5 MG/3ML; MG/3ML
1 SOLUTION RESPIRATORY (INHALATION) DAILY PRN
COMMUNITY

## 2025-08-28 RX ORDER — DIPHENHYDRAMINE HYDROCHLORIDE 50 MG/ML
50 INJECTION, SOLUTION INTRAMUSCULAR; INTRAVENOUS
Status: CANCELLED | OUTPATIENT
Start: 2025-08-28

## 2025-08-28 RX ORDER — SODIUM CHLORIDE 9 MG/ML
INJECTION, SOLUTION INTRAVENOUS CONTINUOUS
Status: CANCELLED | OUTPATIENT
Start: 2025-08-28

## 2025-08-28 RX ORDER — DIPHENHYDRAMINE HYDROCHLORIDE 50 MG/ML
50 INJECTION, SOLUTION INTRAMUSCULAR; INTRAVENOUS
OUTPATIENT
Start: 2026-02-22

## 2025-08-28 RX ORDER — ONDANSETRON 2 MG/ML
8 INJECTION INTRAMUSCULAR; INTRAVENOUS
OUTPATIENT
Start: 2026-02-22

## 2025-08-28 RX ORDER — ALBUTEROL SULFATE 90 UG/1
4 INHALANT RESPIRATORY (INHALATION) PRN
OUTPATIENT
Start: 2026-02-22

## 2025-08-28 RX ORDER — ACETAMINOPHEN 325 MG/1
650 TABLET ORAL
Status: CANCELLED | OUTPATIENT
Start: 2025-08-28

## 2025-08-28 RX ORDER — ALBUTEROL SULFATE 90 UG/1
4 INHALANT RESPIRATORY (INHALATION) PRN
Status: CANCELLED | OUTPATIENT
Start: 2025-08-28

## 2025-08-28 RX ORDER — ONDANSETRON 2 MG/ML
8 INJECTION INTRAMUSCULAR; INTRAVENOUS
Status: CANCELLED | OUTPATIENT
Start: 2025-08-28

## 2025-08-28 RX ADMIN — DENOSUMAB 60 MG: 60 INJECTION SUBCUTANEOUS at 13:52

## 2025-09-04 ENCOUNTER — HOSPITAL ENCOUNTER (OUTPATIENT)
Facility: HOSPITAL | Age: 85
Discharge: HOME | End: 2025-09-04
Payer: MEDICARE

## 2025-09-04 DIAGNOSIS — R05.1 ACUTE COUGH: ICD-10-CM

## 2025-09-04 PROCEDURE — 71046 X-RAY EXAM CHEST 2 VIEWS: CPT

## (undated) DEVICE — HEWSON SUTURE RETRIEVER: Brand: HEWSON SUTURE RETRIEVER

## (undated) DEVICE — SOLUTION IRRIG 1000ML H2O STRL BLT

## (undated) DEVICE — GAUZE,SPONGE,4"X4",16PLY,STRL,LF,10/TRAY: Brand: MEDLINE

## (undated) DEVICE — SUTURE VCRL SZ 3-0 L27IN ABSRB UD L26MM SH 1/2 CIR J416H

## (undated) DEVICE — SPONGE LAP 18X18IN STRL -- 5/PK

## (undated) DEVICE — SOFT SILICONE HYDROCELLULAR SACRUM DRESSING WITH LOCK AWAY LAYER: Brand: ALLEVYN LIFE SACRUM (LARGE) PACK OF 10

## (undated) DEVICE — [ARTHROSCOPY PUMP,  DO NOT USE IF PACKAGE IS DAMAGED,  KEEP DRY,  KEEP AWAY FROM SUNLIGHT,  PROTECT FROM HEAT AND RADIOACTIVE SOURCES.]: Brand: FLOSTEADY

## (undated) DEVICE — SUTURE PDS II SZ 1 L36IN ABSRB VLT CTXB L48MM 1/2 CIR BLNT ZB371

## (undated) DEVICE — CATHETER SUCT TR FL TIP 14FR W/ O CTRL

## (undated) DEVICE — Device

## (undated) DEVICE — SUTURE MCRYL SZ 4-0 L27IN ABSRB UD L24MM PS-1 3/8 CIR PRIM Y935H

## (undated) DEVICE — MEDI-VAC NON-CONDUCTIVE SUCTION TUBING: Brand: CARDINAL HEALTH

## (undated) DEVICE — GARMENT,MEDLINE,DVT,INT,CALF,MED, GEN2: Brand: MEDLINE

## (undated) DEVICE — YANKAUER,SMOOTH HANDLE,HIGH CAPACITY: Brand: MEDLINE INDUSTRIES, INC.

## (undated) DEVICE — FCPS RAD JAW 4LC 240CM W/NDL -- BX/20 RADIAL JAW 4

## (undated) DEVICE — BITE BLOCK ENDOSCP UNIV AD 6 TO 9.4 MM

## (undated) DEVICE — NEEDLE NRV BLK 21GA L4IN 30DEG INSUL BVL EXTN SET STIMUPLEX

## (undated) DEVICE — Z DISCONTINUED USE ITEM 2150868 IMMOBILIZER SHLDR M BLK BASIC ABD SLNG

## (undated) DEVICE — SOLUTION IV 1000ML 0.9% SOD CHL

## (undated) DEVICE — CANNULA THREADED FLEX 8.0 X 72MM: Brand: CLEAR-TRAC

## (undated) DEVICE — REM POLYHESIVE ADULT PATIENT RETURN ELECTRODE: Brand: VALLEYLAB

## (undated) DEVICE — SUTURE MCRYL SZ 4-0 L18IN ABSRB UD L19MM PS-2 3/8 CIR PRIM Y496G

## (undated) DEVICE — PAD,NON-ADHERENT,3X8,STERILE,LF,1/PK: Brand: MEDLINE

## (undated) DEVICE — SHOULDER SUSPENSION KIT 6 PER BOX

## (undated) DEVICE — HANDPIECE SET WITH HIGH FLOW TIP AND SUCTION TUBE: Brand: INTERPULSE

## (undated) DEVICE — PACK PROCEDURE SURG TOT HIP BSHR LF

## (undated) DEVICE — LINER SUCT CANSTR 3000CC PLAS SFT PRE ASSEMB W/OUT TBNG W/

## (undated) DEVICE — FLEX ADVANTAGE 3000CC: Brand: FLEX ADVANTAGE

## (undated) DEVICE — SYRINGE MED 50ML LUERSLIP TIP

## (undated) DEVICE — STERILE POLYISOPRENE POWDER-FREE SURGICAL GLOVES: Brand: PROTEXIS

## (undated) DEVICE — 2108 SERIES SAGITTAL BLADE (24.8 X 1.24 X 80.1MM)

## (undated) DEVICE — (D)PREP SKN CHLRAPRP APPL 26ML -- CONVERT TO ITEM 371833

## (undated) DEVICE — SYRINGE MED 25GA 3ML L5/8IN SUBQ PLAS W/ DETACH NDL SFTY

## (undated) DEVICE — PAD,ABDOMINAL,8"X10",ST,LF: Brand: MEDLINE

## (undated) DEVICE — (D)PACK ICE DISP -- DISC BY MFR

## (undated) DEVICE — SUTURE VCRL SZ 2-0 L36IN ABSRB UD L36MM CT-1 1/2 CIR J945H

## (undated) DEVICE — 3M™ MEDIPORE™ H SOFT CLOTH SURGICAL TAPE 2864, 4 INCH X 10 YARD (10CM X 9,14M), 12 ROLLS/CASE: Brand: 3M™ MEDIPORE™

## (undated) DEVICE — SOLUTION IRRIG 3000ML 0.9% SOD CHL FLX CONT 0797208] ICU MEDICAL INC]

## (undated) DEVICE — AIRLIFE™ NASAL OXYGEN CANNULA CURVED, FLARED TIP WITH 14 FOOT (4.3 M) CRUSH-RESISTANT TUBING, OVER-THE-EAR STYLE: Brand: AIRLIFE™

## (undated) DEVICE — SKIN MARKER,REGULAR TIP WITH RULER AND LABELS: Brand: DEVON

## (undated) DEVICE — NEEDLE SUT SZ 2 S STL MAYO CATGUT 1/2 CIR TAPR PT

## (undated) DEVICE — BANDAGE,GAUZE,BULKEE II,4.5"X4.1YD,STRL: Brand: MEDLINE

## (undated) DEVICE — Z CONVERTED USE 2273640 DEPRESSOR TNGE SMOOTH 0.69X6 IN WHT BIRCH BLADE NS PURITAN

## (undated) DEVICE — FLUFF AND POLYMER UNDERPAD,EXTRA HEAVY: Brand: WINGS

## (undated) DEVICE — BLANKET WRM AD W50XL85.8IN PACU FULL BODY FORC AIR

## (undated) DEVICE — BLADE SHV 4.0MM TOMCAT --

## (undated) DEVICE — NEEDLE SUT SZ 2 MAYO 1/2 CIR TAPR PNT DISP

## (undated) DEVICE — SUTURE VCRL SZ 3-0 L27IN ABSRB UD L36MM CT-1 1/2 CIR J258H

## (undated) DEVICE — UNDERPAD INCONT W23XL36IN STD BLU POLYPR BK FLUF SFT

## (undated) DEVICE — SUTURE VCRL SZ 0 L36IN ABSRB UD L36MM CT-1 1/2 CIR J946H

## (undated) DEVICE — 4-PORT MANIFOLD: Brand: NEPTUNE 2

## (undated) DEVICE — ENDOSCOPY PUMP TUBING/ CAP SET: Brand: ERBE

## (undated) DEVICE — 3M™ BAIR HUGGER® SURGICAL ACCESS BLANKET, FULL BODY, 10 PER CASE 61000: Brand: BAIR HUGGER™

## (undated) DEVICE — BASIN EMSIS 16OZ GRAPHITE PLAS KID SHP MOLD GRAD FOR ORAL

## (undated) DEVICE — SUCTION COBB

## (undated) DEVICE — BASIN EMESIS 500CC ROSE 250/CS 60/PLT: Brand: MEDEGEN MEDICAL PRODUCTS, LLC

## (undated) DEVICE — SUTURE VCRL SZ 0 L27IN ABSRB UD L36MM CT-1 1/2 CIR J260H

## (undated) DEVICE — CANNULA NSL AD TBNG L14FT STD PVC O2 CRV CONN NONFLARED NSL

## (undated) DEVICE — MEDI-VAC SUCTION HIGH CAPACITY: Brand: CARDINAL HEALTH

## (undated) DEVICE — KIT CLN UP BON SECOURS MARYV

## (undated) DEVICE — SYR 50ML SLIP TIP NSAF LF STRL --

## (undated) DEVICE — BLANKET WRM W40.2XL55.9IN IORT LO BODY + MISTRAL AIR

## (undated) DEVICE — NEEDLE SPNL 22GA L3.5IN BLK HUB S STL REG WALL FIT STYL W/

## (undated) DEVICE — FLUID CONTROL SHOULDER DRAPE PACK: Brand: CONVERTORS

## (undated) DEVICE — 3M™ STERI-STRIP™ COMPOUND BENZOIN TINCTURE 40 BAGS/CARTON 4 CARTONS/CASE C1544: Brand: 3M™ STERI-STRIP™

## (undated) DEVICE — DRAPE,REIN 53X77,STERILE: Brand: MEDLINE

## (undated) DEVICE — FORCEPS BX L240CM JAW DIA2.4MM ORNG L CAP W/ NDL DISP RAD

## (undated) DEVICE — DISPOSABLE MULTI BAG ADAPTERS Y                                    TUBING, STERILE, 2 TO A SET 6 SETS                                    PER BOX

## (undated) DEVICE — ADHESIVE SKIN CLOSURE 4X22 CM PREMIERPRO EXOFINFUSION DISP

## (undated) DEVICE — SPIROMETER INCENT 2500ML W ONE W VLV

## (undated) DEVICE — SUTURE FIBERWIRE SZ 2 W/ TAPERED NEEDLE BLUE L38IN NONABSORB BLU L26.5MM 1/2 CIRCLE AR7200

## (undated) DEVICE — BLADE SHV DIA4MM RED RESECT FOR GEN DEB REM OF PERIOST FR

## (undated) DEVICE — NEEDLE HYPO 18GA L1.5IN PNK S STL HUB POLYPR SHLD REG BVL

## (undated) DEVICE — WATERPROOF, BACTERIA PROOF DRESSING WITH ABSORBENT SEE THROUGH PAD: Brand: OPSITE POST-OP VISIBLE 25X10CM CTN 20

## (undated) DEVICE — SYR LR LCK 1ML GRAD NSAF 30ML --